# Patient Record
Sex: MALE | Race: BLACK OR AFRICAN AMERICAN | NOT HISPANIC OR LATINO
[De-identification: names, ages, dates, MRNs, and addresses within clinical notes are randomized per-mention and may not be internally consistent; named-entity substitution may affect disease eponyms.]

---

## 2021-04-05 ENCOUNTER — NON-APPOINTMENT (OUTPATIENT)
Age: 61
End: 2021-04-05

## 2022-01-30 ENCOUNTER — FORM ENCOUNTER (OUTPATIENT)
Age: 62
End: 2022-01-30

## 2022-02-14 VITALS
TEMPERATURE: 97.1 F | WEIGHT: 160 LBS | DIASTOLIC BLOOD PRESSURE: 87 MMHG | BODY MASS INDEX: 25.11 KG/M2 | SYSTOLIC BLOOD PRESSURE: 151 MMHG | HEART RATE: 94 BPM | HEIGHT: 67 IN

## 2022-03-02 ENCOUNTER — FORM ENCOUNTER (OUTPATIENT)
Age: 62
End: 2022-03-02

## 2022-03-02 VITALS
SYSTOLIC BLOOD PRESSURE: 158 MMHG | HEART RATE: 117 BPM | TEMPERATURE: 98.6 F | OXYGEN SATURATION: 90 % | DIASTOLIC BLOOD PRESSURE: 83 MMHG

## 2022-03-06 ENCOUNTER — FORM ENCOUNTER (OUTPATIENT)
Age: 62
End: 2022-03-06

## 2022-03-15 ENCOUNTER — OUTPATIENT (OUTPATIENT)
Dept: OUTPATIENT SERVICES | Facility: HOSPITAL | Age: 62
LOS: 1 days | End: 2022-03-15
Payer: COMMERCIAL

## 2022-03-15 VITALS — DIASTOLIC BLOOD PRESSURE: 90 MMHG | TEMPERATURE: 97.8 F | SYSTOLIC BLOOD PRESSURE: 153 MMHG | OXYGEN SATURATION: 97 %

## 2022-03-15 DIAGNOSIS — R35.0 FREQUENCY OF MICTURITION: ICD-10-CM

## 2022-03-15 PROCEDURE — 76775 US EXAM ABDO BACK WALL LIM: CPT

## 2022-03-22 DIAGNOSIS — D41.01 NEOPLASM OF UNCERTAIN BEHAVIOR OF RIGHT KIDNEY: ICD-10-CM

## 2022-04-04 ENCOUNTER — FORM ENCOUNTER (OUTPATIENT)
Age: 62
End: 2022-04-04

## 2022-04-05 VITALS
SYSTOLIC BLOOD PRESSURE: 132 MMHG | HEART RATE: 92 BPM | OXYGEN SATURATION: 96 % | HEIGHT: 66 IN | BODY MASS INDEX: 24.11 KG/M2 | WEIGHT: 150 LBS | TEMPERATURE: 97.6 F | DIASTOLIC BLOOD PRESSURE: 82 MMHG

## 2022-04-21 ENCOUNTER — FORM ENCOUNTER (OUTPATIENT)
Age: 62
End: 2022-04-21

## 2022-04-21 ENCOUNTER — OUTPATIENT (OUTPATIENT)
Dept: OUTPATIENT SERVICES | Facility: HOSPITAL | Age: 62
LOS: 1 days | End: 2022-04-21
Payer: COMMERCIAL

## 2022-04-21 VITALS
WEIGHT: 145.06 LBS | TEMPERATURE: 98 F | HEART RATE: 80 BPM | HEIGHT: 67 IN | OXYGEN SATURATION: 97 % | DIASTOLIC BLOOD PRESSURE: 80 MMHG | SYSTOLIC BLOOD PRESSURE: 141 MMHG | RESPIRATION RATE: 16 BRPM

## 2022-04-21 DIAGNOSIS — I10 ESSENTIAL (PRIMARY) HYPERTENSION: ICD-10-CM

## 2022-04-21 DIAGNOSIS — Z00.8 ENCOUNTER FOR OTHER GENERAL EXAMINATION: ICD-10-CM

## 2022-04-21 DIAGNOSIS — R06.00 DYSPNEA, UNSPECIFIED: ICD-10-CM

## 2022-04-21 DIAGNOSIS — D41.01 NEOPLASM OF UNCERTAIN BEHAVIOR OF RIGHT KIDNEY: ICD-10-CM

## 2022-04-21 DIAGNOSIS — Z98.890 OTHER SPECIFIED POSTPROCEDURAL STATES: Chronic | ICD-10-CM

## 2022-04-21 LAB
BLD GP AB SCN SERPL QL: NEGATIVE — SIGNIFICANT CHANGE UP
RH IG SCN BLD-IMP: NEGATIVE — SIGNIFICANT CHANGE UP

## 2022-04-21 PROCEDURE — 70450 CT HEAD/BRAIN W/O DYE: CPT | Mod: 26

## 2022-04-21 PROCEDURE — 70450 CT HEAD/BRAIN W/O DYE: CPT

## 2022-04-21 NOTE — H&P PST ADULT - NSICDXPASTMEDICALHX_GEN_ALL_CORE_FT
PAST MEDICAL HISTORY:  Hypertension     Neoplasm of uncertain behavior of kidney, right     Rheumatoid arthritis

## 2022-04-21 NOTE — H&P PST ADULT - HISTORY OF PRESENT ILLNESS
61 year old male with pre op dx of neoplasm of uncertain behavior of right kidney. patient scheduled for single port robotic assisted laparoscopic right partial nephrectomy , possible open surgery

## 2022-04-21 NOTE — H&P PST ADULT - PROBLEM SELECTOR PLAN 2
Patient instructed to take metoprolol and ramapril with a sip of water on the morning of procedure.    Abilio precautions Patient instructed to take metoprolol and ramipril with a sip of water on the morning of procedure.    HUBERT precautions

## 2022-04-21 NOTE — H&P PST ADULT - PROBLEM SELECTOR PLAN 3
Requesting cardiac eval due h/o shortness of breathe PST requesting cardiac eval due to shortness of breathe PST requesting cardiac eval due to shortness of breathe.     Requesting recent ECHO and Stress test PST requesting cardiac eval due to shortness of breathe. Patient already seen his cardiologist on 4/18/22    Requesting recent ECHO and Stress test

## 2022-04-21 NOTE — H&P PST ADULT - NEGATIVE OPHTHALMOLOGIC SYMPTOMS
no diplopia/no photophobia/no lacrimation L/no lacrimation R/no blurred vision L/no discharge L/no discharge R/no pain L/no pain R/no irritation L

## 2022-04-21 NOTE — H&P PST ADULT - ACTIVITY
patient complaint of shortness of breath while walking 1 to 2 blocks and when climbs 1 flight of stairs,\ patient complaint of shortness of breathe while walking 1 to 2 blocks and when climbs 1 flight of stairs,

## 2022-04-21 NOTE — H&P PST ADULT - PROBLEM SELECTOR PLAN 1
Patient tentatively scheduled for     Pre-op instructions provided. Pt given verbal and written instructions with teach back on chlorhexidine shampoo and pepcid. Pt verbalized understanding with return demonstration.    Pt has a scheduled preop COVID test. Patient tentatively scheduled for single port robotic assisted laparoscopic right partial nephrectomy , possible open surgery 4/29/22    Pre-op instructions provided. Pt given verbal and written instructions with teach back on chlorhexidine shampoo and Pepcid. Pt verbalized understanding with return demonstration.    Pt has a scheduled preop COVID test.

## 2022-04-21 NOTE — H&P PST ADULT - LAST CARDIAC ANGIOGRAM/IMAGING
Jan/2022 in [facility] due uncontrolled tachycardia Jan/2022 in [facility] ( patient stated " because my heart was beating fast"?)

## 2022-04-21 NOTE — H&P PST ADULT - LAB RESULTS AND INTERPRETATION
CBC , BMP copy in chart, Type and screen pending results CBC , CMP copy in chart, Type and screen pending results

## 2022-04-21 NOTE — H&P PST ADULT - NSICDXPASTSURGICALHX_GEN_ALL_CORE_FT
PAST SURGICAL HISTORY:  History of cardiac cath janusary 2022     PAST SURGICAL HISTORY:  History of cardiac cath jan 2022, at [facility]

## 2022-04-21 NOTE — H&P PST ADULT - GENITOURINARY COMMENTS
Preop dx of neoplasm of uncertain behavior of right kidney preop dx of neoplasm of uncertain behavior of right kidney

## 2022-04-28 ENCOUNTER — APPOINTMENT (OUTPATIENT)
Dept: NUCLEAR MEDICINE | Facility: CLINIC | Age: 62
End: 2022-04-28
Payer: COMMERCIAL

## 2022-04-28 ENCOUNTER — TRANSCRIPTION ENCOUNTER (OUTPATIENT)
Age: 62
End: 2022-04-28

## 2022-04-28 ENCOUNTER — RESULT REVIEW (OUTPATIENT)
Age: 62
End: 2022-04-28

## 2022-04-28 ENCOUNTER — OUTPATIENT (OUTPATIENT)
Dept: OUTPATIENT SERVICES | Facility: HOSPITAL | Age: 62
LOS: 1 days | End: 2022-04-28

## 2022-04-28 DIAGNOSIS — Z01.812 ENCOUNTER FOR PREPROCEDURAL LABORATORY EXAMINATION: ICD-10-CM

## 2022-04-28 DIAGNOSIS — Z98.890 OTHER SPECIFIED POSTPROCEDURAL STATES: Chronic | ICD-10-CM

## 2022-04-28 DIAGNOSIS — Z86.79 PERSONAL HISTORY OF OTHER DISEASES OF THE CIRCULATORY SYSTEM: ICD-10-CM

## 2022-04-28 DIAGNOSIS — Z78.9 OTHER SPECIFIED HEALTH STATUS: ICD-10-CM

## 2022-04-28 DIAGNOSIS — Z20.822 ENCOUNTER FOR PREPROCEDURAL LABORATORY EXAMINATION: ICD-10-CM

## 2022-04-28 DIAGNOSIS — Z00.00 ENCOUNTER FOR GENERAL ADULT MEDICAL EXAMINATION WITHOUT ABNORMAL FINDINGS: ICD-10-CM

## 2022-04-28 PROBLEM — M06.9 RHEUMATOID ARTHRITIS, UNSPECIFIED: Chronic | Status: ACTIVE | Noted: 2022-04-21

## 2022-04-28 PROBLEM — I10 ESSENTIAL (PRIMARY) HYPERTENSION: Chronic | Status: ACTIVE | Noted: 2022-04-21

## 2022-04-28 PROBLEM — D41.01 NEOPLASM OF UNCERTAIN BEHAVIOR OF RIGHT KIDNEY: Chronic | Status: ACTIVE | Noted: 2022-04-21

## 2022-04-28 PROCEDURE — 78815 PET IMAGE W/CT SKULL-THIGH: CPT | Mod: 26,PI

## 2022-04-28 NOTE — ASU PATIENT PROFILE, ADULT - FALL HARM RISK - RISK INTERVENTIONS

## 2022-04-29 ENCOUNTER — INPATIENT (INPATIENT)
Facility: HOSPITAL | Age: 62
LOS: 0 days | Discharge: HOME | End: 2022-04-30
Attending: UROLOGY | Admitting: UROLOGY
Payer: COMMERCIAL

## 2022-04-29 ENCOUNTER — RESULT REVIEW (OUTPATIENT)
Age: 62
End: 2022-04-29

## 2022-04-29 VITALS
RESPIRATION RATE: 16 BRPM | SYSTOLIC BLOOD PRESSURE: 138 MMHG | HEIGHT: 67 IN | WEIGHT: 145.06 LBS | HEART RATE: 102 BPM | OXYGEN SATURATION: 100 % | TEMPERATURE: 99 F | DIASTOLIC BLOOD PRESSURE: 76 MMHG

## 2022-04-29 DIAGNOSIS — D41.01 NEOPLASM OF UNCERTAIN BEHAVIOR OF RIGHT KIDNEY: ICD-10-CM

## 2022-04-29 DIAGNOSIS — Z98.890 OTHER SPECIFIED POSTPROCEDURAL STATES: Chronic | ICD-10-CM

## 2022-04-29 LAB — GAS PNL BLDA: SIGNIFICANT CHANGE UP

## 2022-04-29 PROCEDURE — 88307 TISSUE EXAM BY PATHOLOGIST: CPT | Mod: 26

## 2022-04-29 PROCEDURE — 99223 1ST HOSP IP/OBS HIGH 75: CPT

## 2022-04-29 PROCEDURE — 76998 US GUIDE INTRAOP: CPT | Mod: 26

## 2022-04-29 PROCEDURE — 88313 SPECIAL STAINS GROUP 2: CPT | Mod: 26

## 2022-04-29 PROCEDURE — 50543 LAPARO PARTIAL NEPHRECTOMY: CPT | Mod: RT

## 2022-04-29 DEVICE — LIGATING CLIPS WECK HEMOLOK POLYMER LARGE (PURPLE) 6: Type: IMPLANTABLE DEVICE | Status: FUNCTIONAL

## 2022-04-29 RX ORDER — POLYETHYLENE GLYCOL 3350 17 G/17G
17 POWDER, FOR SOLUTION ORAL DAILY
Refills: 0 | Status: DISCONTINUED | OUTPATIENT
Start: 2022-04-29 | End: 2022-04-30

## 2022-04-29 RX ORDER — SODIUM CHLORIDE 9 MG/ML
1000 INJECTION, SOLUTION INTRAVENOUS
Refills: 0 | Status: DISCONTINUED | OUTPATIENT
Start: 2022-04-29 | End: 2022-04-30

## 2022-04-29 RX ORDER — AMLODIPINE BESYLATE 2.5 MG/1
1 TABLET ORAL
Qty: 0 | Refills: 0 | DISCHARGE

## 2022-04-29 RX ORDER — HEPARIN SODIUM 5000 [USP'U]/ML
5000 INJECTION INTRAVENOUS; SUBCUTANEOUS EVERY 8 HOURS
Refills: 0 | Status: DISCONTINUED | OUTPATIENT
Start: 2022-04-29 | End: 2022-04-30

## 2022-04-29 RX ORDER — SODIUM CHLORIDE 9 MG/ML
1000 INJECTION, SOLUTION INTRAVENOUS
Refills: 0 | Status: DISCONTINUED | OUTPATIENT
Start: 2022-04-29 | End: 2022-04-29

## 2022-04-29 RX ORDER — METOPROLOL TARTRATE 50 MG
25 TABLET ORAL DAILY
Refills: 0 | Status: DISCONTINUED | OUTPATIENT
Start: 2022-04-29 | End: 2022-04-29

## 2022-04-29 RX ORDER — METOPROLOL TARTRATE 50 MG
25 TABLET ORAL DAILY
Refills: 0 | Status: DISCONTINUED | OUTPATIENT
Start: 2022-04-29 | End: 2022-04-30

## 2022-04-29 RX ORDER — OXYCODONE HYDROCHLORIDE 5 MG/1
5 TABLET ORAL EVERY 6 HOURS
Refills: 0 | Status: DISCONTINUED | OUTPATIENT
Start: 2022-04-29 | End: 2022-04-30

## 2022-04-29 RX ORDER — ONDANSETRON 8 MG/1
4 TABLET, FILM COATED ORAL EVERY 6 HOURS
Refills: 0 | Status: DISCONTINUED | OUTPATIENT
Start: 2022-04-29 | End: 2022-04-30

## 2022-04-29 RX ORDER — KETOROLAC TROMETHAMINE 30 MG/ML
15 SYRINGE (ML) INJECTION EVERY 6 HOURS
Refills: 0 | Status: DISCONTINUED | OUTPATIENT
Start: 2022-04-29 | End: 2022-04-30

## 2022-04-29 RX ORDER — LISINOPRIL 2.5 MG/1
20 TABLET ORAL DAILY
Refills: 0 | Status: DISCONTINUED | OUTPATIENT
Start: 2022-04-29 | End: 2022-04-30

## 2022-04-29 RX ORDER — CYCLOBENZAPRINE HYDROCHLORIDE 10 MG/1
1 TABLET, FILM COATED ORAL
Qty: 0 | Refills: 0 | DISCHARGE

## 2022-04-29 RX ORDER — SENNA PLUS 8.6 MG/1
2 TABLET ORAL AT BEDTIME
Refills: 0 | Status: DISCONTINUED | OUTPATIENT
Start: 2022-04-29 | End: 2022-04-30

## 2022-04-29 RX ORDER — METOPROLOL TARTRATE 50 MG
1 TABLET ORAL
Qty: 0 | Refills: 0 | DISCHARGE

## 2022-04-29 RX ORDER — ACETAMINOPHEN 500 MG
650 TABLET ORAL EVERY 6 HOURS
Refills: 0 | Status: DISCONTINUED | OUTPATIENT
Start: 2022-04-29 | End: 2022-04-30

## 2022-04-29 RX ADMIN — Medication 15 MILLIGRAM(S): at 23:40

## 2022-04-29 RX ADMIN — OXYCODONE HYDROCHLORIDE 5 MILLIGRAM(S): 5 TABLET ORAL at 13:25

## 2022-04-29 RX ADMIN — Medication 650 MILLIGRAM(S): at 21:42

## 2022-04-29 RX ADMIN — HEPARIN SODIUM 5000 UNIT(S): 5000 INJECTION INTRAVENOUS; SUBCUTANEOUS at 21:43

## 2022-04-29 RX ADMIN — Medication 650 MILLIGRAM(S): at 16:31

## 2022-04-29 RX ADMIN — SODIUM CHLORIDE 125 MILLILITER(S): 9 INJECTION, SOLUTION INTRAVENOUS at 23:40

## 2022-04-29 RX ADMIN — Medication 1 APPLICATION(S): at 18:06

## 2022-04-29 RX ADMIN — HEPARIN SODIUM 5000 UNIT(S): 5000 INJECTION INTRAVENOUS; SUBCUTANEOUS at 15:05

## 2022-04-29 RX ADMIN — Medication 15 MILLIGRAM(S): at 18:05

## 2022-04-29 RX ADMIN — SODIUM CHLORIDE 125 MILLILITER(S): 9 INJECTION, SOLUTION INTRAVENOUS at 15:05

## 2022-04-29 NOTE — CHART NOTE - NSCHARTNOTEFT_GEN_A_CORE
Post op Check: 62 yo M POD #0 SP RAL right partial nephrectomy    Pt seen and examined without complaints. Pain is controlled. Denies SOB/CP/N/V.     Vital Signs Last 24 Hrs  T(C): 36.6 (29 Apr 2022 14:42), Max: 37.1 (29 Apr 2022 07:08)  T(F): 97.8 (29 Apr 2022 14:42), Max: 98.8 (29 Apr 2022 07:08)  HR: 86 (29 Apr 2022 14:42) (63 - 102)  BP: 138/89 (29 Apr 2022 14:42) (134/83 - 149/84)  BP(mean): 95 (29 Apr 2022 14:00) (94 - 105)  RR: 19 (29 Apr 2022 14:42) (14 - 24)  SpO2: 100% (29 Apr 2022 14:42) (96% - 100%)    I&O's Summary  Humphreys: 225 + 300 in bag yellow  29 Apr 2022 07:01  -  29 Apr 2022 16:07  --------------------------------------------------------  IN: 400 mL / OUT: 225 mL / NET: 175 mL        Physical Exam  Gen: NAD  Pulm: No respiratory distress, clear to auscultation  CV: Reg  Abd: Dressings c/d/i mildly distended, nontender  : Humphreys secured  Venodynes: In place                  Plan:   IVF: LR @ 125  Diet: CLD, may advance in AM  Labs: None  Abx: None  Strict I&O's  Analgesia and antiemetics as needed  F/u Medicine  DVT prophylaxis/OOB  Incentive spirometry

## 2022-04-29 NOTE — CONSULT NOTE ADULT - SUBJECTIVE AND OBJECTIVE BOX
Erik Shannon MD ([place])  Division of Hospital Medicine  Pager [phone]    HPI      ROS:      PAST MEDICAL & SURGICAL HISTORY:  Hypertension    Rheumatoid arthritis    Neoplasm of uncertain behavior of kidney, right    History of cardiac cath  jan 2022, at [facility] no stent          Allergies:   No Known Allergies        Meds:        SHx:      FHx:      Physical Exam:  Vital Signs Last 24 Hrs  T(C): 36.6 (29 Apr 2022 14:42), Max: 37.1 (29 Apr 2022 07:08)  T(F): 97.8 (29 Apr 2022 14:42), Max: 98.8 (29 Apr 2022 07:08)  HR: 86 (29 Apr 2022 14:42) (63 - 102)  BP: 138/89 (29 Apr 2022 14:42) (134/83 - 149/84)  BP(mean): 95 (29 Apr 2022 14:00) (94 - 105)  RR: 19 (29 Apr 2022 14:42) (14 - 24)  SpO2: 100% (29 Apr 2022 14:42) (96% - 100%)          Labs:                    Imaging: Erik WOODSON (Jb) MD [facility]  Division of Hospital Medicine  Pager [phone]    HPI  61M with PMH of HTN, OA, chronic SOB on intermittent home O2 found to have right kidney mass. Presented to hospital today for R partial nephrectomy. Admitted for further monitoring. Pt currently reports mild abdominal pain, mainly at incisional sites. Humphreys in place. Family present at bedside. No other significant acute complaints at this time. Family and pt endorsed concerns re: other issue, namely chronic SOB. Pt had cath in Jan 2022 showing clean coronaries. Pt also reportedly seen by pulmonologist and Rx-ed home O2, which he uses intermittently only in the mornings. He was unable to elaborate whether or not he had PFTs. He reports compliance w/ his BP meds - HTN otherwise well controlled. Pt also reports intermittent hand swelling w/ pain and also cold sensation. He noted nonspecific lesion on the tip of his index finger on the right hand. No drainage/bleeding. Area is painful/tender. He was reportedly started on steroid cream, which helped. Otherwise, no f/c, NV, SOB, CP, diarrhea.     ROS:  All other systems negative except as noted above.     PAST MEDICAL & SURGICAL HISTORY:  Hypertension    Rheumatoid arthritis    Neoplasm of uncertain behavior of kidney, right    History of cardiac cath  jan 2022, at [facility] no stent          Allergies:   No Known Allergies        Meds:  MEDICATIONS  (STANDING):  acetaminophen     Tablet .. 650 milliGRAM(s) Oral every 6 hours  clobetasol 0.05% Cream 1 Application(s) Topical two times a day  heparin   Injectable 5000 Unit(s) SubCutaneous every 8 hours  ketorolac   Injectable 15 milliGRAM(s) IV Push every 6 hours  lactated ringers. 1000 milliLiter(s) (125 mL/Hr) IV Continuous <Continuous>  lisinopril 20 milliGRAM(s) Oral daily  metoprolol succinate ER 25 milliGRAM(s) Oral daily  polyethylene glycol 3350 17 Gram(s) Oral daily    MEDICATIONS  (PRN):  ondansetron Injectable 4 milliGRAM(s) IV Push every 6 hours PRN Nausea and/or Vomiting  oxyCODONE    IR 5 milliGRAM(s) Oral every 6 hours PRN Severe Pain (7 - 10)  senna 2 Tablet(s) Oral at bedtime PRN Constipation      Home Medications:  clobetasol topical: Apply topically to affected area 2 times a day (29 Apr 2022 13:07)  meloxicam 7.5 mg oral tablet: 1 tab(s) orally once a day, As Needed LD 4/22/2022 (29 Apr 2022 13:07)  metoprolol tartrate 25 mg oral tablet: 1 tab(s) orally once a day (29 Apr 2022 13:07)  ramipril 5 mg oral capsule: 1 cap(s) orally once a day (29 Apr 2022 13:07)        SHx:  No tobacco, ETOH, drug use.  Works as a doorman.    FHx:  Sister had CKD.  Parents had HTN/DM.      Physical Exam:  Vital Signs Last 24 Hrs  T(C): 36.6 (29 Apr 2022 14:42), Max: 37.1 (29 Apr 2022 07:08)  T(F): 97.8 (29 Apr 2022 14:42), Max: 98.8 (29 Apr 2022 07:08)  HR: 86 (29 Apr 2022 14:42) (63 - 102)  BP: 138/89 (29 Apr 2022 14:42) (134/83 - 149/84)  BP(mean): 95 (29 Apr 2022 14:00) (94 - 105)  RR: 19 (29 Apr 2022 14:42) (14 - 24)  SpO2: 100% (29 Apr 2022 14:42) (96% - 100%)    Gen- In bed, comfortable, NAD  Eyes- EOMI, PERRLA, nonicteric.  EMNT- Fair dentition. MMM. No tonsilar exudates. No posterior pharynx erythema.  Neck- Supple. No masses. No tracheal deviation.  Resp- CTAB, good effort. No r/r/w. No accessory muscle use.  CVS- RRR, S1S2, no g/r/m. No LE edema.  GI- Soft abd, NT, ND, +BSx4. No HSM.  MSK- No C/C. ROM intact. No crepitus.  Neuro- CN II-XII intact. Speech fluent/face symmetric. Sensation intact.  Skin- No rashes/ulcers. Warm/moist.  Psych- AAOx3. Appropriate mood/affect.  - Humphreys clear vishal urine.    Labs:  No labs at this time.        Imaging: none.

## 2022-04-29 NOTE — CONSULT NOTE ADULT - PROBLEM SELECTOR RECOMMENDATION 3
Nonspecific complaint. Chronic in nature. Brought up by family and inquired on other potential workup.  Explained to family that this seems to be a known problem and pt has already had mutiple diagnostics done - including eval by cards/pulm.  At this time, defer for further evaluation.  -Will check TSH given nonspecific sx of feeling chills/weakness.  -Noted to have home O2  - would check amb sats in AM - he is satting well on RA at rest.     DVT ppx-  HSQ      Communication  Spoke w/ family at bedside.  D/w urology. Pls call with any questions.

## 2022-04-29 NOTE — PATIENT PROFILE ADULT - FALL HARM RISK - HARM RISK INTERVENTIONS

## 2022-04-29 NOTE — PACU DISCHARGE NOTE - COMMENTS
PT SEEN AND EXAMINED 2/2 TO CONCERNS FOR CHANGE IN NEURO EXAM.  PT SEEN AND EXAMINED. NO FOCAL NEURO DEFICIT. A AND O X4.   SURGICAL TEAM AWARE. D/C TO FLOOR.

## 2022-04-29 NOTE — CONSULT NOTE ADULT - ASSESSMENT
61M with PMH of HTN, OA, chronic SOB on intermittent home O2 found to have right kidney mass. Presented to hospital today for R partial nephrectomy. OR course uneventful. Admitted for monitoring. Medicine c/s for post-op med management.

## 2022-04-30 ENCOUNTER — INPATIENT (INPATIENT)
Facility: HOSPITAL | Age: 62
LOS: 2 days | Discharge: HOME | End: 2022-05-03
Attending: UROLOGY | Admitting: UROLOGY
Payer: COMMERCIAL

## 2022-04-30 ENCOUNTER — TRANSCRIPTION ENCOUNTER (OUTPATIENT)
Age: 62
End: 2022-04-30

## 2022-04-30 VITALS
RESPIRATION RATE: 22 BRPM | DIASTOLIC BLOOD PRESSURE: 95 MMHG | OXYGEN SATURATION: 97 % | HEIGHT: 67 IN | TEMPERATURE: 100 F | HEART RATE: 112 BPM | SYSTOLIC BLOOD PRESSURE: 154 MMHG

## 2022-04-30 VITALS
DIASTOLIC BLOOD PRESSURE: 72 MMHG | HEART RATE: 92 BPM | TEMPERATURE: 98 F | SYSTOLIC BLOOD PRESSURE: 124 MMHG | RESPIRATION RATE: 17 BRPM | OXYGEN SATURATION: 94 %

## 2022-04-30 DIAGNOSIS — Z98.890 OTHER SPECIFIED POSTPROCEDURAL STATES: Chronic | ICD-10-CM

## 2022-04-30 LAB — TSH SERPL-MCNC: 4.24 UIU/ML — HIGH (ref 0.27–4.2)

## 2022-04-30 PROCEDURE — 93010 ELECTROCARDIOGRAM REPORT: CPT

## 2022-04-30 PROCEDURE — 99285 EMERGENCY DEPT VISIT HI MDM: CPT | Mod: 25

## 2022-04-30 PROCEDURE — 99232 SBSQ HOSP IP/OBS MODERATE 35: CPT

## 2022-04-30 RX ORDER — OXYCODONE HYDROCHLORIDE 5 MG/1
1 TABLET ORAL
Qty: 8 | Refills: 0
Start: 2022-04-30

## 2022-04-30 RX ORDER — SODIUM CHLORIDE 9 MG/ML
1000 INJECTION, SOLUTION INTRAVENOUS
Refills: 0 | Status: DISCONTINUED | OUTPATIENT
Start: 2022-04-30 | End: 2022-04-30

## 2022-04-30 RX ADMIN — Medication 15 MILLIGRAM(S): at 05:46

## 2022-04-30 RX ADMIN — Medication 650 MILLIGRAM(S): at 12:34

## 2022-04-30 RX ADMIN — Medication 10 MILLIGRAM(S): at 05:47

## 2022-04-30 RX ADMIN — Medication 1 APPLICATION(S): at 05:48

## 2022-04-30 RX ADMIN — Medication 25 MILLIGRAM(S): at 05:46

## 2022-04-30 RX ADMIN — SODIUM CHLORIDE 75 MILLILITER(S): 9 INJECTION, SOLUTION INTRAVENOUS at 07:37

## 2022-04-30 RX ADMIN — HEPARIN SODIUM 5000 UNIT(S): 5000 INJECTION INTRAVENOUS; SUBCUTANEOUS at 05:46

## 2022-04-30 RX ADMIN — LISINOPRIL 20 MILLIGRAM(S): 2.5 TABLET ORAL at 05:46

## 2022-04-30 RX ADMIN — OXYCODONE HYDROCHLORIDE 5 MILLIGRAM(S): 5 TABLET ORAL at 05:49

## 2022-04-30 RX ADMIN — POLYETHYLENE GLYCOL 3350 17 GRAM(S): 17 POWDER, FOR SOLUTION ORAL at 12:34

## 2022-04-30 RX ADMIN — Medication 15 MILLIGRAM(S): at 12:34

## 2022-04-30 RX ADMIN — OXYCODONE HYDROCHLORIDE 5 MILLIGRAM(S): 5 TABLET ORAL at 06:45

## 2022-04-30 RX ADMIN — Medication 100 MILLIGRAM(S): at 12:45

## 2022-04-30 NOTE — CONSULT NOTE ADULT - SUBJECTIVE AND OBJECTIVE BOX
CHIEF COMPLAINT: sob, s/p surgery    HISTORY OF PRESENT ILLNESS:  61M with PMH of HTN, OA, chronic SOB on intermittent home O2 found to have right kidney mass. Presented to hospital today for R partial nephrectomy. Admitted for further monitoring. Pt currently reports mild abdominal pain, mainly at incisional sites. Humphreys in place. Family present at bedside. No other significant acute complaints at this time. Family and pt endorsed concerns re: other issue, namely chronic SOB. Pt had cath in Jan 2022 showing clean coronaries. Pt also reportedly seen by pulmonologist and Rx-ed home O2, which he uses intermittently only in the mornings. He was unable to elaborate whether or not he had PFTs. He reports compliance w/ his BP meds - HTN otherwise well controlled. Pt also reports intermittent hand swelling w/ pain and also cold sensation. He noted nonspecific lesion on the tip of his index finger on the right hand. No drainage/bleeding. Area is painful/tender. He was reportedly started on steroid cream, which helped. Otherwise, no f/c, NV, SOB, CP, diarrhea.       Allergies    No Known Allergies    Intolerances    	    MEDICATIONS:  heparin   Injectable 5000 Unit(s) SubCutaneous every 8 hours  lisinopril 20 milliGRAM(s) Oral daily  metoprolol succinate ER 25 milliGRAM(s) Oral daily        acetaminophen     Tablet .. 650 milliGRAM(s) Oral every 6 hours  ketorolac   Injectable 15 milliGRAM(s) IV Push every 6 hours  ondansetron Injectable 4 milliGRAM(s) IV Push every 6 hours PRN  oxyCODONE    IR 5 milliGRAM(s) Oral every 6 hours PRN    polyethylene glycol 3350 17 Gram(s) Oral daily  senna 2 Tablet(s) Oral at bedtime PRN      clobetasol 0.05% Cream 1 Application(s) Topical two times a day  dextrose 5% + sodium chloride 0.45%. 1000 milliLiter(s) IV Continuous <Continuous>      PAST MEDICAL & SURGICAL HISTORY:  Hypertension    Rheumatoid arthritis    Neoplasm of uncertain behavior of kidney, right    History of cardiac cath  jan 2022, at [facility] no stent        FAMILY HISTORY:  FH: diabetes mellitus (Mother)        SOCIAL HISTORY:    non smoker. indep in adl    REVIEW OF SYSTEMS:  See HPI, otherwise complete 10 point review of systems negative    [ ] All others negative	      PHYSICAL EXAM:  T(C): 36.8 (04-30-22 @ 09:47), Max: 37.3 (04-29-22 @ 21:50)  HR: 92 (04-30-22 @ 09:47) (63 - 99)  BP: 124/72 (04-30-22 @ 09:47) (118/72 - 149/84)  RR: 17 (04-30-22 @ 09:47) (14 - 24)  SpO2: 94% (04-30-22 @ 09:47) (94% - 100%)  Wt(kg): --  I&O's Summary    29 Apr 2022 07:01  -  30 Apr 2022 07:00  --------------------------------------------------------  IN: 1100 mL / OUT: 2425 mL / NET: -1325 mL    30 Apr 2022 07:01  -  30 Apr 2022 10:33  --------------------------------------------------------  IN: 0 mL / OUT: 100 mL / NET: -100 mL        Appearance: No Acute Distress	  HEENT:  Normal oral mucosa, PERRL, EOMI	  Cardiovascular: Normal S1 S2, No JVD, No murmurs/rubs/gallops  Respiratory: Lungs clear to auscultation bilaterally  Gastrointestinal:  Soft, Non-tender, + BS	  Skin: No rashes, No ecchymoses, No cyanosis	  Neurologic: Non-focal  Extremities: No clubbing, cyanosis or edema  Vascular: Peripheral pulses palpable 2+ bilaterally  Psychiatry: A & O x 3, Mood & affect appropriate    Laboratory Data:	 	              proBNP:   Lipid Profile:   HgA1c:   TSH: Thyroid Stimulating Hormone, Serum: 4.24 uIU/mL (04-30 @ 06:48)        CARDIAC MARKERS:            Interpretation of Telemetry: 	    ECG:  	  RADIOLOGY:  OTHER: 	    PREVIOUS DIAGNOSTIC TESTING:    [ ] Echocardiogram:  [ ] Catheterization:  [ ] Stress Test:  	    Assessment:  61M with PMH of HTN, OA, chronic SOB on intermittent home O2 found to have right kidney mass now s/p R partial nephrectomy.    Recs:  cardiac stable  no postop cardiac events noted  chronic dyspnea with extensive op cardiac and pulmonary workup thats been unrevealing. no signs of chf or ischemic at present  care per urology  dvt ppx  will follow  Advanced care planning forms were discussed. Code status including forceful chest compressions, defibrillation and intubation were discussed. The risks benefits and alternatives to pertinent cardiac procedures and interventions were discussed in detail and all questions were answered. Duration: 15 minutes.        Greater than 90 minutes spent on total encounter; more than 50% of the visit was spent counseling and/or coordinating care by the attending physician.   	  Juarez Carver MD   Cardiovascular Diseases  [phone]

## 2022-04-30 NOTE — PROGRESS NOTE ADULT - ASSESSMENT
62 yo male s/p SP RAL R partial nephrectomy.    4/30: No events overnight, pain controlled, tolerating liquids    - AM labs reviewed  - No antibiotics  - Advance diet to regular  - TOV pending  - Likely discharge home today 60 yo male s/p SP RAL R partial nephrectomy.    4/30: No events overnight, pain controlled, tolerating liquids    - AM labs reviewed  - No antibiotics  - Advance diet to regular  - TOV pending  - OOB, DVT ppx  - F/u medicine  - Likely discharge home today

## 2022-04-30 NOTE — DISCHARGE NOTE PROVIDER - CARE PROVIDER_API CALL
Alcides Gardner)  Urology  [address] [place] [address], [place] [address]  Phone: [phone]  Fax: [phone]  Established Patient  Follow Up Time:

## 2022-04-30 NOTE — PROGRESS NOTE ADULT - SUBJECTIVE AND OBJECTIVE BOX
[facility] Division of Hospital Medicine  Erik EllisHarrison) MD [facility]  Pager [phone]    SUBJECTIVE:  Chief complaint: Renal mass.    Pt seen and evaluated at bedside this AM. No o/n events. Denies any SOB/Cp/NV. Feels some bloating. Passing gas, had BM. Voiding w/o issues. No new complaints.      ROS: All systems negative except as noted.      Vital Signs Last 24 Hrs  T(C): 36.8 (30 Apr 2022 09:47), Max: 37.3 (29 Apr 2022 21:50)  T(F): 98.3 (30 Apr 2022 09:47), Max: 99.1 (29 Apr 2022 21:50)  HR: 92 (30 Apr 2022 09:47) (74 - 99)  BP: 124/72 (30 Apr 2022 09:47) (118/72 - 147/78)  BP(mean): 95 (29 Apr 2022 14:00) (95 - 104)  RR: 17 (30 Apr 2022 09:47) (14 - 19)  SpO2: 94% (30 Apr 2022 09:47) (94% - 100%)      PHYSICAL EXAM:  Gen- In bed, comfortable, NAD  Resp- CTAB, good effort. No r/r/w. No accessory muscle use.  CVS- RRR, S1S2, no g/r/m. No LE edema.  GI- Soft abd, NT, ND, +BSx4. No HSM.  MSK- No C/C. ROM intact. No crepitus.  Neuro- CN II-XII intact. Speech fluent/face symmetric. Sensation intact.  Psych- AAOx3. Appropriate mood/affect.      MEDICATION:  MEDICATIONS  (STANDING):  acetaminophen     Tablet .. 650 milliGRAM(s) Oral every 6 hours  clobetasol 0.05% Cream 1 Application(s) Topical two times a day  dextrose 5% + sodium chloride 0.45%. 1000 milliLiter(s) (75 mL/Hr) IV Continuous <Continuous>  heparin   Injectable 5000 Unit(s) SubCutaneous every 8 hours  ketorolac   Injectable 15 milliGRAM(s) IV Push every 6 hours  lisinopril 20 milliGRAM(s) Oral daily  metoprolol succinate ER 25 milliGRAM(s) Oral daily  polyethylene glycol 3350 17 Gram(s) Oral daily    MEDICATIONS  (PRN):  benzonatate 100 milliGRAM(s) Oral three times a day PRN Cough  ondansetron Injectable 4 milliGRAM(s) IV Push every 6 hours PRN Nausea and/or Vomiting  oxyCODONE    IR 5 milliGRAM(s) Oral every 6 hours PRN Severe Pain (7 - 10)  senna 2 Tablet(s) Oral at bedtime PRN Constipation            LABORATORY:  TSH 4.24                  ABG - ( 29 Apr 2022 08:52 )  pH, Arterial: 7.40  pH, Blood: x     /  pCO2: 36    /  pO2: 216   / HCO3: 22    / Base Excess: -2.2  /  SaO2: 99.3

## 2022-04-30 NOTE — DISCHARGE NOTE NURSING/CASE MANAGEMENT/SOCIAL WORK - PATIENT PORTAL LINK FT
You can access the FollowMyHealth Patient Portal offered by [facility] by registering at the following website: [email or web address]. By joining [facility]’s FollowMyHealth portal, you will also be able to view your health information using other applications (apps) compatible with our system.

## 2022-04-30 NOTE — PROGRESS NOTE ADULT - PROBLEM SELECTOR PLAN 3
Nonspecific complaint. Chronic in nature. Brought up by family and inquired on other potential workup.  Explained to family that this seems to be a known problem and pt has already had multiple diagnostics done - including eval by cards/pulm.  At this time, defer for further evaluation.  -Cards recs appreciated.   -TSH just above upper limit of normal. Advised to have repeat TFTs as OP after acute hospitalization.    DVT ppx-  HSQ    Dispo- As per urology. Pls call with any questions.

## 2022-04-30 NOTE — PROGRESS NOTE ADULT - SUBJECTIVE AND OBJECTIVE BOX
Subjective  No acute events overnight. No complaints this morning. Denies abdominal pain. Tolerating liquids without nausea. No flatus yet.    Objective    Vital signs  T(F): , Max: 99.1 (04-29-22 @ 21:50)  HR: 99 (04-30-22 @ 05:48)  BP: 139/76 (04-30-22 @ 05:48)  SpO2: 95% (04-30-22 @ 05:48)  Wt(kg): --    Output     OUT:    Indwelling Catheter - Urethral (mL): 2425 mL  Total OUT: 2425 mL    Total NET: -2425 mL          Gen: NAD  Abd: soft, nontender, mildly distended, incisions c/d/i  : Humphreys secured in place, draining clear yellow urine

## 2022-04-30 NOTE — DISCHARGE NOTE NURSING/CASE MANAGEMENT/SOCIAL WORK - NSDCPEFALRISK_GEN_ALL_CORE
For information on Fall & Injury Prevention, visit: [email or web address] OR  [email or web address] OR  https://www.cdc.gov/steadi/patient.html

## 2022-04-30 NOTE — DISCHARGE NOTE PROVIDER - NSDCCPCAREPLAN_GEN_ALL_CORE_FT
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 Follow up with your primary care provider for continued blood pressure monitoring.

## 2022-04-30 NOTE — DISCHARGE NOTE PROVIDER - NSDCACTIVITY_GEN_ALL_CORE
Return to Work/School allowed/Showering allowed/Walking - Indoors allowed/No heavy lifting/straining/Walking - Outdoors allowed

## 2022-04-30 NOTE — DISCHARGE NOTE PROVIDER - NSDCFUADDINST_GEN_ALL_CORE_FT
Keep well hydrated. No heavy lifting (greater than 10 pounds) or straining for 4 to 6 weeks. You may shower - just pat the white strips dry, which will fall off in a few weeks. Change dressing at drain site daily or as needed until dry. Do not drive when taking pain medication.  Dr. Gardner's office will call you to schedule a follow up appointment.  Call the office if you have fever greater than 101, difficulty urinating, your urine becomes bloody, pain not relieved with pain medication, nausea/vomiting.

## 2022-04-30 NOTE — DISCHARGE NOTE PROVIDER - HOSPITAL COURSE
62 yo male s/p SP RAL R partial nephrectomy.    4/30: No events overnight, pain controlled, tolerating liquids    At the time of discharge, the patient was hemodynamically stable, was tolerating PO diet, was voiding urine, was ambulating, and was comfortable with adequate pain control. The patient was instructed to follow up with Dr. Gardner after discharge from the hospital. The patient felt comfortable with discharge. The patient was discharged to home.

## 2022-04-30 NOTE — ED ADULT TRIAGE NOTE - CHIEF COMPLAINT QUOTE
Pt st" I had mass removed from my Kidney yesterday they kept me overnight just got home today around 4pm checked my temperature it was 101. I called the surgeon told to come back to ER. ...I also have a cough. I took tylenol around 5pm." Pt st" I had mass removed from my Kidney yesterday they kept me overnight just got home today around 4pm checked my temperature it was 101. I called the surgeon told to come back to ER. ...I also have a cough. I took tylenol around 5pm." Pt appears short of breath yet denies feeling short of breath.

## 2022-04-30 NOTE — DISCHARGE NOTE PROVIDER - NSDCMRMEDTOKEN_GEN_ALL_CORE_FT
clobetasol topical: Apply topically to affected area 2 times a day  meloxicam 7.5 mg oral tablet: 1 tab(s) orally once a day, As Needed LD 4/22/2022  metoprolol tartrate 25 mg oral tablet: 1 tab(s) orally once a day  oxyCODONE 5 mg oral tablet: 1 tab(s) orally every 6 hours, As needed, Severe Pain (7 - 10) MDD:4  ramipril 5 mg oral capsule: 1 cap(s) orally once a day

## 2022-05-01 DIAGNOSIS — R50.9 FEVER, UNSPECIFIED: ICD-10-CM

## 2022-05-01 DIAGNOSIS — I10 ESSENTIAL (PRIMARY) HYPERTENSION: ICD-10-CM

## 2022-05-01 DIAGNOSIS — D41.01 NEOPLASM OF UNCERTAIN BEHAVIOR OF RIGHT KIDNEY: ICD-10-CM

## 2022-05-01 DIAGNOSIS — R06.00 DYSPNEA, UNSPECIFIED: ICD-10-CM

## 2022-05-01 DIAGNOSIS — A41.9 SEPSIS, UNSPECIFIED ORGANISM: ICD-10-CM

## 2022-05-01 LAB
ALBUMIN SERPL ELPH-MCNC: 3.9 G/DL — SIGNIFICANT CHANGE UP (ref 3.3–5)
ALP SERPL-CCNC: 79 U/L — SIGNIFICANT CHANGE UP (ref 40–120)
ALT FLD-CCNC: 28 U/L — SIGNIFICANT CHANGE UP (ref 4–41)
ANION GAP SERPL CALC-SCNC: 12 MMOL/L — SIGNIFICANT CHANGE UP (ref 7–14)
ANION GAP SERPL CALC-SCNC: 13 MMOL/L — SIGNIFICANT CHANGE UP (ref 7–14)
ANION GAP SERPL CALC-SCNC: 13 MMOL/L — SIGNIFICANT CHANGE UP (ref 7–14)
APPEARANCE UR: CLEAR — SIGNIFICANT CHANGE UP
APTT BLD: 31.8 SEC — SIGNIFICANT CHANGE UP (ref 27–36.3)
AST SERPL-CCNC: 72 U/L — HIGH (ref 4–40)
B PERT DNA SPEC QL NAA+PROBE: SIGNIFICANT CHANGE UP
B PERT+PARAPERT DNA PNL SPEC NAA+PROBE: SIGNIFICANT CHANGE UP
BACTERIA # UR AUTO: NEGATIVE — SIGNIFICANT CHANGE UP
BASE EXCESS BLDV CALC-SCNC: -0.3 MMOL/L — SIGNIFICANT CHANGE UP (ref -2–3)
BASE EXCESS BLDV CALC-SCNC: 1 MMOL/L — SIGNIFICANT CHANGE UP (ref -2–3)
BASOPHILS # BLD AUTO: 0.04 K/UL — SIGNIFICANT CHANGE UP (ref 0–0.2)
BASOPHILS # BLD AUTO: 0.04 K/UL — SIGNIFICANT CHANGE UP (ref 0–0.2)
BASOPHILS NFR BLD AUTO: 0.3 % — SIGNIFICANT CHANGE UP (ref 0–2)
BASOPHILS NFR BLD AUTO: 0.3 % — SIGNIFICANT CHANGE UP (ref 0–2)
BILIRUB SERPL-MCNC: 0.6 MG/DL — SIGNIFICANT CHANGE UP (ref 0.2–1.2)
BILIRUB UR-MCNC: NEGATIVE — SIGNIFICANT CHANGE UP
BLD GP AB SCN SERPL QL: NEGATIVE — SIGNIFICANT CHANGE UP
BLOOD GAS VENOUS COMPREHENSIVE RESULT: SIGNIFICANT CHANGE UP
BLOOD GAS VENOUS COMPREHENSIVE RESULT: SIGNIFICANT CHANGE UP
BORDETELLA PARAPERTUSSIS (RAPRVP): SIGNIFICANT CHANGE UP
BUN SERPL-MCNC: 12 MG/DL — SIGNIFICANT CHANGE UP (ref 7–23)
BUN SERPL-MCNC: 13 MG/DL — SIGNIFICANT CHANGE UP (ref 7–23)
BUN SERPL-MCNC: 15 MG/DL — SIGNIFICANT CHANGE UP (ref 7–23)
C PNEUM DNA SPEC QL NAA+PROBE: SIGNIFICANT CHANGE UP
CALCIUM SERPL-MCNC: 8.9 MG/DL — SIGNIFICANT CHANGE UP (ref 8.4–10.5)
CALCIUM SERPL-MCNC: 9.2 MG/DL — SIGNIFICANT CHANGE UP (ref 8.4–10.5)
CALCIUM SERPL-MCNC: 9.4 MG/DL — SIGNIFICANT CHANGE UP (ref 8.4–10.5)
CHLORIDE BLDV-SCNC: 103 MMOL/L — SIGNIFICANT CHANGE UP (ref 96–108)
CHLORIDE SERPL-SCNC: 101 MMOL/L — SIGNIFICANT CHANGE UP (ref 98–107)
CHLORIDE SERPL-SCNC: 101 MMOL/L — SIGNIFICANT CHANGE UP (ref 98–107)
CHLORIDE SERPL-SCNC: 102 MMOL/L — SIGNIFICANT CHANGE UP (ref 98–107)
CO2 BLDV-SCNC: 26.4 MMOL/L — HIGH (ref 22–26)
CO2 BLDV-SCNC: 26.8 MMOL/L — HIGH (ref 22–26)
CO2 SERPL-SCNC: 21 MMOL/L — LOW (ref 22–31)
CO2 SERPL-SCNC: 22 MMOL/L — SIGNIFICANT CHANGE UP (ref 22–31)
CO2 SERPL-SCNC: 22 MMOL/L — SIGNIFICANT CHANGE UP (ref 22–31)
COLOR SPEC: SIGNIFICANT CHANGE UP
CREAT SERPL-MCNC: 1 MG/DL — SIGNIFICANT CHANGE UP (ref 0.5–1.3)
CREAT SERPL-MCNC: 1.01 MG/DL — SIGNIFICANT CHANGE UP (ref 0.5–1.3)
CREAT SERPL-MCNC: 1.01 MG/DL — SIGNIFICANT CHANGE UP (ref 0.5–1.3)
DIFF PNL FLD: ABNORMAL
EGFR: 84 ML/MIN/1.73M2 — SIGNIFICANT CHANGE UP
EGFR: 84 ML/MIN/1.73M2 — SIGNIFICANT CHANGE UP
EGFR: 85 ML/MIN/1.73M2 — SIGNIFICANT CHANGE UP
EOSINOPHIL # BLD AUTO: 0.04 K/UL — SIGNIFICANT CHANGE UP (ref 0–0.5)
EOSINOPHIL # BLD AUTO: 0.08 K/UL — SIGNIFICANT CHANGE UP (ref 0–0.5)
EOSINOPHIL NFR BLD AUTO: 0.3 % — SIGNIFICANT CHANGE UP (ref 0–6)
EOSINOPHIL NFR BLD AUTO: 0.6 % — SIGNIFICANT CHANGE UP (ref 0–6)
EPI CELLS # UR: 0 /HPF — SIGNIFICANT CHANGE UP (ref 0–5)
FLUAV SUBTYP SPEC NAA+PROBE: SIGNIFICANT CHANGE UP
FLUBV RNA SPEC QL NAA+PROBE: SIGNIFICANT CHANGE UP
GAS PNL BLDV: 136 MMOL/L — SIGNIFICANT CHANGE UP (ref 136–145)
GLUCOSE BLDV-MCNC: 88 MG/DL — SIGNIFICANT CHANGE UP (ref 70–99)
GLUCOSE SERPL-MCNC: 83 MG/DL — SIGNIFICANT CHANGE UP (ref 70–99)
GLUCOSE SERPL-MCNC: 93 MG/DL — SIGNIFICANT CHANGE UP (ref 70–99)
GLUCOSE SERPL-MCNC: 98 MG/DL — SIGNIFICANT CHANGE UP (ref 70–99)
GLUCOSE UR QL: NEGATIVE — SIGNIFICANT CHANGE UP
HADV DNA SPEC QL NAA+PROBE: SIGNIFICANT CHANGE UP
HCO3 BLDV-SCNC: 25 MMOL/L — SIGNIFICANT CHANGE UP (ref 22–29)
HCO3 BLDV-SCNC: 25 MMOL/L — SIGNIFICANT CHANGE UP (ref 22–29)
HCOV 229E RNA SPEC QL NAA+PROBE: SIGNIFICANT CHANGE UP
HCOV HKU1 RNA SPEC QL NAA+PROBE: SIGNIFICANT CHANGE UP
HCOV NL63 RNA SPEC QL NAA+PROBE: SIGNIFICANT CHANGE UP
HCOV OC43 RNA SPEC QL NAA+PROBE: SIGNIFICANT CHANGE UP
HCT VFR BLD CALC: 32.9 % — LOW (ref 39–50)
HCT VFR BLD CALC: 36.5 % — LOW (ref 39–50)
HCT VFR BLDA CALC: 34 % — LOW (ref 39–51)
HGB BLD CALC-MCNC: 11.2 G/DL — LOW (ref 13–17)
HGB BLD-MCNC: 10.4 G/DL — LOW (ref 13–17)
HGB BLD-MCNC: 11.1 G/DL — LOW (ref 13–17)
HMPV RNA SPEC QL NAA+PROBE: SIGNIFICANT CHANGE UP
HPIV1 RNA SPEC QL NAA+PROBE: SIGNIFICANT CHANGE UP
HPIV2 RNA SPEC QL NAA+PROBE: SIGNIFICANT CHANGE UP
HPIV3 RNA SPEC QL NAA+PROBE: SIGNIFICANT CHANGE UP
HPIV4 RNA SPEC QL NAA+PROBE: SIGNIFICANT CHANGE UP
HYALINE CASTS # UR AUTO: 0 /LPF — SIGNIFICANT CHANGE UP (ref 0–7)
IANC: 10.34 K/UL — HIGH (ref 1.8–7.4)
IANC: 9.96 K/UL — HIGH (ref 1.8–7.4)
IMM GRANULOCYTES NFR BLD AUTO: 0.4 % — SIGNIFICANT CHANGE UP (ref 0–1.5)
IMM GRANULOCYTES NFR BLD AUTO: 0.5 % — SIGNIFICANT CHANGE UP (ref 0–1.5)
INR BLD: 1.26 RATIO — HIGH (ref 0.88–1.16)
KETONES UR-MCNC: ABNORMAL
LACTATE BLDV-MCNC: 2.4 MMOL/L — HIGH (ref 0.5–2)
LEUKOCYTE ESTERASE UR-ACNC: NEGATIVE — SIGNIFICANT CHANGE UP
LYMPHOCYTES # BLD AUTO: 1.85 K/UL — SIGNIFICANT CHANGE UP (ref 1–3.3)
LYMPHOCYTES # BLD AUTO: 13.9 % — SIGNIFICANT CHANGE UP (ref 13–44)
LYMPHOCYTES # BLD AUTO: 16.8 % — SIGNIFICANT CHANGE UP (ref 13–44)
LYMPHOCYTES # BLD AUTO: 2.23 K/UL — SIGNIFICANT CHANGE UP (ref 1–3.3)
M PNEUMO DNA SPEC QL NAA+PROBE: SIGNIFICANT CHANGE UP
MAGNESIUM SERPL-MCNC: 1.8 MG/DL — SIGNIFICANT CHANGE UP (ref 1.6–2.6)
MCHC RBC-ENTMCNC: 29.1 PG — SIGNIFICANT CHANGE UP (ref 27–34)
MCHC RBC-ENTMCNC: 29.2 PG — SIGNIFICANT CHANGE UP (ref 27–34)
MCHC RBC-ENTMCNC: 30.4 GM/DL — LOW (ref 32–36)
MCHC RBC-ENTMCNC: 31.6 GM/DL — LOW (ref 32–36)
MCV RBC AUTO: 92.4 FL — SIGNIFICANT CHANGE UP (ref 80–100)
MCV RBC AUTO: 95.5 FL — SIGNIFICANT CHANGE UP (ref 80–100)
MONOCYTES # BLD AUTO: 0.95 K/UL — HIGH (ref 0–0.9)
MONOCYTES # BLD AUTO: 0.97 K/UL — HIGH (ref 0–0.9)
MONOCYTES NFR BLD AUTO: 7.1 % — SIGNIFICANT CHANGE UP (ref 2–14)
MONOCYTES NFR BLD AUTO: 7.3 % — SIGNIFICANT CHANGE UP (ref 2–14)
NEUTROPHILS # BLD AUTO: 10.34 K/UL — HIGH (ref 1.8–7.4)
NEUTROPHILS # BLD AUTO: 9.96 K/UL — HIGH (ref 1.8–7.4)
NEUTROPHILS NFR BLD AUTO: 74.9 % — SIGNIFICANT CHANGE UP (ref 43–77)
NEUTROPHILS NFR BLD AUTO: 77.6 % — HIGH (ref 43–77)
NITRITE UR-MCNC: NEGATIVE — SIGNIFICANT CHANGE UP
NRBC # BLD: 0 /100 WBCS — SIGNIFICANT CHANGE UP
NRBC # BLD: 0 /100 WBCS — SIGNIFICANT CHANGE UP
NRBC # FLD: 0 K/UL — SIGNIFICANT CHANGE UP
NRBC # FLD: 0 K/UL — SIGNIFICANT CHANGE UP
NT-PROBNP SERPL-SCNC: 964 PG/ML — HIGH
PCO2 BLDV: 38 MMHG — LOW (ref 42–55)
PCO2 BLDV: 45 MMHG — SIGNIFICANT CHANGE UP (ref 42–55)
PH BLDV: 7.36 — SIGNIFICANT CHANGE UP (ref 7.32–7.43)
PH BLDV: 7.43 — SIGNIFICANT CHANGE UP (ref 7.32–7.43)
PH UR: 6 — SIGNIFICANT CHANGE UP (ref 5–8)
PHOSPHATE SERPL-MCNC: 3.5 MG/DL — SIGNIFICANT CHANGE UP (ref 2.5–4.5)
PLATELET # BLD AUTO: 244 K/UL — SIGNIFICANT CHANGE UP (ref 150–400)
PLATELET # BLD AUTO: 312 K/UL — SIGNIFICANT CHANGE UP (ref 150–400)
PO2 BLDV: 29 MMHG — SIGNIFICANT CHANGE UP
PO2 BLDV: 58 MMHG — SIGNIFICANT CHANGE UP
POTASSIUM BLDV-SCNC: 3.9 MMOL/L — SIGNIFICANT CHANGE UP (ref 3.5–5.1)
POTASSIUM SERPL-MCNC: 3.8 MMOL/L — SIGNIFICANT CHANGE UP (ref 3.5–5.3)
POTASSIUM SERPL-MCNC: 4 MMOL/L — SIGNIFICANT CHANGE UP (ref 3.5–5.3)
POTASSIUM SERPL-MCNC: 5.8 MMOL/L — HIGH (ref 3.5–5.3)
POTASSIUM SERPL-SCNC: 3.8 MMOL/L — SIGNIFICANT CHANGE UP (ref 3.5–5.3)
POTASSIUM SERPL-SCNC: 4 MMOL/L — SIGNIFICANT CHANGE UP (ref 3.5–5.3)
POTASSIUM SERPL-SCNC: 5.8 MMOL/L — HIGH (ref 3.5–5.3)
PROT SERPL-MCNC: 7.8 G/DL — SIGNIFICANT CHANGE UP (ref 6–8.3)
PROT UR-MCNC: ABNORMAL
PROTHROM AB SERPL-ACNC: 14.6 SEC — HIGH (ref 10.5–13.4)
RAPID RVP RESULT: SIGNIFICANT CHANGE UP
RBC # BLD: 3.56 M/UL — LOW (ref 4.2–5.8)
RBC # BLD: 3.82 M/UL — LOW (ref 4.2–5.8)
RBC # FLD: 13.8 % — SIGNIFICANT CHANGE UP (ref 10.3–14.5)
RBC # FLD: 14 % — SIGNIFICANT CHANGE UP (ref 10.3–14.5)
RBC CASTS # UR COMP ASSIST: 6 /HPF — HIGH (ref 0–4)
RH IG SCN BLD-IMP: NEGATIVE — SIGNIFICANT CHANGE UP
RSV RNA SPEC QL NAA+PROBE: SIGNIFICANT CHANGE UP
RV+EV RNA SPEC QL NAA+PROBE: SIGNIFICANT CHANGE UP
SAO2 % BLDV: 48.6 % — SIGNIFICANT CHANGE UP
SAO2 % BLDV: 90.2 % — SIGNIFICANT CHANGE UP
SARS-COV-2 RNA SPEC QL NAA+PROBE: SIGNIFICANT CHANGE UP
SODIUM SERPL-SCNC: 135 MMOL/L — SIGNIFICANT CHANGE UP (ref 135–145)
SODIUM SERPL-SCNC: 136 MMOL/L — SIGNIFICANT CHANGE UP (ref 135–145)
SODIUM SERPL-SCNC: 136 MMOL/L — SIGNIFICANT CHANGE UP (ref 135–145)
SP GR SPEC: 1.01 — SIGNIFICANT CHANGE UP (ref 1–1.05)
TROPONIN T, HIGH SENSITIVITY RESULT: 40 NG/L — SIGNIFICANT CHANGE UP
UROBILINOGEN FLD QL: SIGNIFICANT CHANGE UP
WBC # BLD: 13.29 K/UL — HIGH (ref 3.8–10.5)
WBC # BLD: 13.32 K/UL — HIGH (ref 3.8–10.5)
WBC # FLD AUTO: 13.29 K/UL — HIGH (ref 3.8–10.5)
WBC # FLD AUTO: 13.32 K/UL — HIGH (ref 3.8–10.5)
WBC UR QL: 2 /HPF — SIGNIFICANT CHANGE UP (ref 0–5)

## 2022-05-01 PROCEDURE — 99232 SBSQ HOSP IP/OBS MODERATE 35: CPT

## 2022-05-01 PROCEDURE — 71045 X-RAY EXAM CHEST 1 VIEW: CPT | Mod: 26

## 2022-05-01 PROCEDURE — 71275 CT ANGIOGRAPHY CHEST: CPT | Mod: 26,MA

## 2022-05-01 PROCEDURE — 99231 SBSQ HOSP IP/OBS SF/LOW 25: CPT | Mod: 24

## 2022-05-01 RX ORDER — ACETAMINOPHEN 500 MG
975 TABLET ORAL EVERY 6 HOURS
Refills: 0 | Status: DISCONTINUED | OUTPATIENT
Start: 2022-05-01 | End: 2022-05-03

## 2022-05-01 RX ORDER — FUROSEMIDE 40 MG
20 TABLET ORAL ONCE
Refills: 0 | Status: COMPLETED | OUTPATIENT
Start: 2022-05-01 | End: 2022-05-01

## 2022-05-01 RX ORDER — SENNA PLUS 8.6 MG/1
2 TABLET ORAL AT BEDTIME
Refills: 0 | Status: DISCONTINUED | OUTPATIENT
Start: 2022-05-01 | End: 2022-05-03

## 2022-05-01 RX ORDER — LISINOPRIL 2.5 MG/1
20 TABLET ORAL DAILY
Refills: 0 | Status: DISCONTINUED | OUTPATIENT
Start: 2022-05-01 | End: 2022-05-03

## 2022-05-01 RX ORDER — BENZOCAINE AND MENTHOL 5; 1 G/100ML; G/100ML
1 LIQUID ORAL ONCE
Refills: 0 | Status: COMPLETED | OUTPATIENT
Start: 2022-05-01 | End: 2022-05-01

## 2022-05-01 RX ORDER — POLYETHYLENE GLYCOL 3350 17 G/17G
17 POWDER, FOR SOLUTION ORAL DAILY
Refills: 0 | Status: DISCONTINUED | OUTPATIENT
Start: 2022-05-01 | End: 2022-05-03

## 2022-05-01 RX ORDER — METOPROLOL TARTRATE 50 MG
25 TABLET ORAL DAILY
Refills: 0 | Status: DISCONTINUED | OUTPATIENT
Start: 2022-05-01 | End: 2022-05-01

## 2022-05-01 RX ORDER — SODIUM CHLORIDE 9 MG/ML
1000 INJECTION, SOLUTION INTRAVENOUS
Refills: 0 | Status: DISCONTINUED | OUTPATIENT
Start: 2022-05-01 | End: 2022-05-01

## 2022-05-01 RX ORDER — PIPERACILLIN AND TAZOBACTAM 4; .5 G/20ML; G/20ML
3.38 INJECTION, POWDER, LYOPHILIZED, FOR SOLUTION INTRAVENOUS ONCE
Refills: 0 | Status: COMPLETED | OUTPATIENT
Start: 2022-05-01 | End: 2022-05-01

## 2022-05-01 RX ORDER — HEPARIN SODIUM 5000 [USP'U]/ML
5000 INJECTION INTRAVENOUS; SUBCUTANEOUS EVERY 8 HOURS
Refills: 0 | Status: DISCONTINUED | OUTPATIENT
Start: 2022-05-01 | End: 2022-05-03

## 2022-05-01 RX ORDER — VANCOMYCIN HCL 1 G
1000 VIAL (EA) INTRAVENOUS ONCE
Refills: 0 | Status: COMPLETED | OUTPATIENT
Start: 2022-05-01 | End: 2022-05-01

## 2022-05-01 RX ORDER — CEFTRIAXONE 500 MG/1
1000 INJECTION, POWDER, FOR SOLUTION INTRAMUSCULAR; INTRAVENOUS EVERY 24 HOURS
Refills: 0 | Status: DISCONTINUED | OUTPATIENT
Start: 2022-05-01 | End: 2022-05-03

## 2022-05-01 RX ORDER — KETOROLAC TROMETHAMINE 30 MG/ML
15 SYRINGE (ML) INJECTION EVERY 6 HOURS
Refills: 0 | Status: DISCONTINUED | OUTPATIENT
Start: 2022-05-01 | End: 2022-05-03

## 2022-05-01 RX ORDER — SODIUM CHLORIDE 9 MG/ML
2000 INJECTION, SOLUTION INTRAVENOUS ONCE
Refills: 0 | Status: COMPLETED | OUTPATIENT
Start: 2022-05-01 | End: 2022-05-01

## 2022-05-01 RX ORDER — METOPROLOL TARTRATE 50 MG
25 TABLET ORAL DAILY
Refills: 0 | Status: DISCONTINUED | OUTPATIENT
Start: 2022-05-01 | End: 2022-05-03

## 2022-05-01 RX ORDER — OXYCODONE HYDROCHLORIDE 5 MG/1
5 TABLET ORAL EVERY 6 HOURS
Refills: 0 | Status: DISCONTINUED | OUTPATIENT
Start: 2022-05-01 | End: 2022-05-03

## 2022-05-01 RX ORDER — CELECOXIB 200 MG/1
200 CAPSULE ORAL DAILY
Refills: 0 | Status: DISCONTINUED | OUTPATIENT
Start: 2022-05-01 | End: 2022-05-01

## 2022-05-01 RX ORDER — ONDANSETRON 8 MG/1
4 TABLET, FILM COATED ORAL EVERY 6 HOURS
Refills: 0 | Status: DISCONTINUED | OUTPATIENT
Start: 2022-05-01 | End: 2022-05-03

## 2022-05-01 RX ADMIN — SODIUM CHLORIDE 100 MILLILITER(S): 9 INJECTION, SOLUTION INTRAVENOUS at 05:16

## 2022-05-01 RX ADMIN — BENZOCAINE AND MENTHOL 1 LOZENGE: 5; 1 LIQUID ORAL at 10:03

## 2022-05-01 RX ADMIN — Medication 15 MILLIGRAM(S): at 14:49

## 2022-05-01 RX ADMIN — Medication 20 MILLIGRAM(S): at 10:33

## 2022-05-01 RX ADMIN — SODIUM CHLORIDE 2000 MILLILITER(S): 9 INJECTION, SOLUTION INTRAVENOUS at 00:44

## 2022-05-01 RX ADMIN — HEPARIN SODIUM 5000 UNIT(S): 5000 INJECTION INTRAVENOUS; SUBCUTANEOUS at 21:42

## 2022-05-01 RX ADMIN — OXYCODONE HYDROCHLORIDE 5 MILLIGRAM(S): 5 TABLET ORAL at 21:41

## 2022-05-01 RX ADMIN — POLYETHYLENE GLYCOL 3350 17 GRAM(S): 17 POWDER, FOR SOLUTION ORAL at 14:04

## 2022-05-01 RX ADMIN — Medication 15 MILLIGRAM(S): at 13:57

## 2022-05-01 RX ADMIN — LISINOPRIL 20 MILLIGRAM(S): 2.5 TABLET ORAL at 06:24

## 2022-05-01 RX ADMIN — OXYCODONE HYDROCHLORIDE 5 MILLIGRAM(S): 5 TABLET ORAL at 06:39

## 2022-05-01 RX ADMIN — PIPERACILLIN AND TAZOBACTAM 200 GRAM(S): 4; .5 INJECTION, POWDER, LYOPHILIZED, FOR SOLUTION INTRAVENOUS at 00:44

## 2022-05-01 RX ADMIN — OXYCODONE HYDROCHLORIDE 5 MILLIGRAM(S): 5 TABLET ORAL at 22:11

## 2022-05-01 RX ADMIN — HEPARIN SODIUM 5000 UNIT(S): 5000 INJECTION INTRAVENOUS; SUBCUTANEOUS at 06:24

## 2022-05-01 RX ADMIN — HEPARIN SODIUM 5000 UNIT(S): 5000 INJECTION INTRAVENOUS; SUBCUTANEOUS at 13:57

## 2022-05-01 RX ADMIN — Medication 25 MILLIGRAM(S): at 03:59

## 2022-05-01 RX ADMIN — CEFTRIAXONE 100 MILLIGRAM(S): 500 INJECTION, POWDER, FOR SOLUTION INTRAMUSCULAR; INTRAVENOUS at 06:23

## 2022-05-01 RX ADMIN — Medication 250 MILLIGRAM(S): at 03:59

## 2022-05-01 RX ADMIN — Medication 10 MILLIGRAM(S): at 18:11

## 2022-05-01 NOTE — PROGRESS NOTE ADULT - PROBLEM SELECTOR PLAN 2
Chronic in nature. Per cards - had extensive pulm/cardio workup that was unrevealing. At this time, CTA of chest did show GGO, which may be indicative of pulmonary edema, which correlated with his lung exam.  -Pt is s/p 3L IVF in the ED. He has been on maintenance at 100cc/h  -Stop IVF now.  -Will check BNP.  -Lasix 20mg IV x1.  -On 4L by NC - sats have been excellent - would advise weaning off O2 as sats allow.  -Reassess.   -Cardiology called for further collateral. Await callback. Consider possible ECHO.

## 2022-05-01 NOTE — ED ADULT NURSE REASSESSMENT NOTE - NS ED NURSE REASSESS COMMENT FT1
Pt A&Ox4, resting on stretcher. Respirations even and unlabored, sinus tachycardia on monitor, sating 100% on 4L o2 via NC. Repeat labs drawn per order. Pt well appearing, NAD noted. Pending lab results. bed in lowest position, side rails up, call bell in hand, safety maintained.  will continue to monitor.

## 2022-05-01 NOTE — ED PROVIDER NOTE - PHYSICAL EXAMINATION
Gen: Alert, ill-appearing  Head: NC, AT,  EOMI, normal lids/conjunctiva  ENT:  normal hearing, patent oropharynx without erythema/exudate  Neck: +supple, no tenderness/meningismus/JVD, +Trachea midline  Chest: no chest wall tenderness, equal chest rise  Pulm: Bilateral BS, tachypneic, no wheeze/stridor/retractions  CV: tachycardic, no M/R/G, +dist pulses  Abd: +BS, soft, +ttp around corina-umbilical region and adjacent to surgical incision sites, steri-strips in place without blood or drainage, no surrounding erythema  back: +mild left cvat  Rectal: deferred  Mskel: no edema/erythema/cyanosis  Skin: no rash  Neuro: AAOx3, no sensory/motor deficits, CN 2-12 intact

## 2022-05-01 NOTE — ED ADULT NURSE NOTE - OBJECTIVE STATEMENT
61 yom presents A&Ox4 s/p operation for kidney mass removal yesterday, c/o fever and cough that started today. Was dc'd around 4 p.m today then checked temp and noticed it was 101, surgeon told him to come into ED. Respirations even and unlabored, sinus tachycardia on monitor, afebrile at this time - took Tylenol prior to arrival, incision site on abdomen intact. No drainage or redness present. Pt sating 90% on RA, on home o2 - p[laced pt on 4L o2 via NC and o2 improved to 100%. pt denies any chest pain, dyspnea, dizziness, or LOC. 20g IV placed in L AC. MD at bedside for eval. bed in lowest position, side rails up, call bell in hand, safety maintained. awaiting further orders. will continue to monitor.

## 2022-05-01 NOTE — PATIENT PROFILE ADULT - NSPROPTRIGHTCAREGIVER_GEN_A_NUR
[FreeTextEntry1] : This is a very pleasant 70-year-old female who is accompanied by her  to this office.  Working out in the garden last week, she was stung by a yellow jacket on the left third finger.  After 2 days the swelling became significant and she went to the emergency room for evaluation.\par She was found to have cellulitis at the site of the bee sting, and was administered IV vancomycin and IV Unasyn.  There was improvement in symptomatology, she was sent home on Keflex and hydroxyzine.  She has been taking the Keflex faithfully with slow improvement to the finger.\par On recheck today her vital signs are stable and she is doing well.  She will continue to complete the course of Keflex and report any issues\par History of breast cancer who follows with hematology/oncology.\par History of history of osteopiña recently had a DEXA scan\par History of psoriasis.  A prescription for Dovonex cream is prescribed.\par History of Endo morphea diet and exercise are discussed yes declines

## 2022-05-01 NOTE — PATIENT PROFILE ADULT - FALL HARM RISK - HARM RISK INTERVENTIONS

## 2022-05-01 NOTE — ED PROVIDER NOTE - PROGRESS NOTE DETAILS
Jesus PGY3 - Significant improvement in VS w/ HR in the 90s and RR 20.  Pt. evaluated by urology, TBA their service under Dr. Gardner.

## 2022-05-01 NOTE — PROGRESS NOTE ADULT - PROBLEM SELECTOR PLAN 1
Post-op fever. No new fevers since admission. No obvious source. UA/RVP neg. No obvious signs of URI/LRI  -Monitor off antipyretics.  -Urology started pt on CTX.   -Follow up cultures.   -CTA ruled out PE. Would complete workup by checking LE venous duplex to r/o DVT.  -Incentive spirometer use to avoid atelectasis.

## 2022-05-01 NOTE — ED PROVIDER NOTE - CLINICAL SUMMARY MEDICAL DECISION MAKING FREE TEXT BOX
60yo M w/ pmh as above s/p partial R nephrectomy 2days ago, p/w fever, arrives septic. Labs/culture/fluids/abx, urology consulted, TBA.

## 2022-05-01 NOTE — H&P ADULT - ASSESSMENT
62 yo male s/p R laparoscopic partial nephrectomy 4/28 re-admitted with postoperative fever    -- Admit to urology, Dr. Gardner  -- CTA Chest PE protocol  -- Broad spectrum ABx  -- F/u blood, urine cultures  -- wean O2  -- monitor fevers   -- DVT ppx    Discussed with on call attending Dr. Ziegler, and Dr. Gardner

## 2022-05-01 NOTE — H&P ADULT - NSHPPHYSICALEXAM_GEN_ALL_CORE
Gen: resting comfortable, diaphoretic   Res: unlabored breathing on NC  CV: Tachycardic, regular rhythm   Gen: Softly distended, tenderness to deep palpation throughout, post-op crepitus, incisions c/d/i  : voiding clear yellow urine at bedside  Ext: no LE tenderness or erythema

## 2022-05-01 NOTE — H&P ADULT - HISTORY OF PRESENT ILLNESS
62 yo male PMH HTN, CAD, RA, chronic SOB (intermittent home O2) and recent right laparoscopic partial nephrectomy 4/29 who was discharged home 4/30 and returns to the ED after fever at home. Patient recovered well postoperatively and continued to complain of feeling cold at home. Patient's wife performed oral temp of 101.1F and was instructed to come to the ED for further evaluation. Patient states he feels chills and complains of abdominal pain with coughing and moving. Notes bloating. Denies chest pain, difficulty breathing, leg pain. States he is voiding well clear yellow urine without dysuria. Was taking acetaminophen at home.     In ED T 100.2F, -123, RR 20-22 100% on 4L NC. WBC 13.3, HCT 36.5, Cr 1.01    v 62 yo male PMH HTN, CAD, RA, chronic SOB (intermittent home O2) and recent right laparoscopic partial nephrectomy 4/29 who was discharged home 4/30 and returns to the ED after fever at home. Patient recovered well postoperatively and continued to complain of feeling cold at home. Patient's wife performed oral temp of 101.1F and was instructed to come to the ED for further evaluation. Patient states he feels chills and complains of abdominal pain with coughing and moving. Notes bloating. Denies chest pain, difficulty breathing, leg pain. States he is voiding well clear yellow urine without dysuria. Was taking acetaminophen at home.     In ED T 100.2F, -123, RR 20-22 100% on 4L NC. WBC 13.3, HCT 36.5, Cr 1.01. CXR clear lungs.

## 2022-05-01 NOTE — ED PROVIDER NOTE - ATTENDING CONTRIBUTION TO CARE
History and physical above obtained and documented (or dictated) by me (attending physician).  Resident or ACP and/or medical student involved in case for assistance with disposition and may document involvement as necessary via progress note(s).   -Dr. Cheung

## 2022-05-01 NOTE — ED ADULT NURSE NOTE - NSIMPLEMENTINTERV_GEN_ALL_ED
Implemented All Fall Risk Interventions:  [place] to call system. Call bell, personal items and telephone within reach. Instruct patient to call for assistance. Room bathroom lighting operational. Non-slip footwear when patient is off stretcher. Physically safe environment: no spills, clutter or unnecessary equipment. Stretcher in lowest position, wheels locked, appropriate side rails in place. Provide visual cue, wrist band, yellow gown, etc. Monitor gait and stability. Monitor for mental status changes and reorient to person, place, and time. Review medications for side effects contributing to fall risk. Reinforce activity limits and safety measures with patient and family.

## 2022-05-01 NOTE — ED PROVIDER NOTE - OBJECTIVE STATEMENT
Pertinent PMH/PSH/FHx/SHx and Review of Systems contained within:  60yo M w/ pmh htn, OA, chronic SOB on home O2 (4L prn, followed by pulmonology w/ no known copd and no smoking hx), s/p partial Right nephrectomy 2 days ago (Friday morning, April 29th) by Dr. Gardner (urology), discharged yesterday afternoon (Saturday), returns for persistent fevers. Pt reports that after he arrived home, took tylenol, approx hr later - recorded oral temp of 101.8, took more tylenol hours later and still febrile, at which point he called Dr. Gardner's answering service, spoke w/ "Ann Marie" and told to come to ED. Pt reports unchanged/stable amount of abd pain since discharge, worsened when coughing, but does endorse chills. Reports clear urine but episode of urinary incontinence a few hours ago and states "I don't feel like I'm in control of my bladder right now". On ROS, also reports cough productive of clear sputum since discharge. Pt is covid vaxx2 (2nd dose in Dec 2021). Pt has not had BM since discharge but is passing flatus.    No photophobia/eye pain/changes in vision, No ear pain/sore throat/dysphagia, No chest pain/palpitations, no wheeze/stridor, No N/V/D, no dysuria/frequency/discharge, No neck/back pain, no rash, no new focal neuro symptoms.

## 2022-05-01 NOTE — ED ADULT NURSE REASSESSMENT NOTE - NS ED NURSE REASSESS COMMENT FT1
Pt A&Ox4 resting on stretcher. Respirations even and unlabored, sating 100% on 4L o2 via NC, normal sinus rhythm on monitor. pt endorsed 7/10 abdominal pain earlier - medication administered per eMAR. Pt appears more comfortable now, endorses pain relief. Pt denies any other complaints, no dyspnea, chest pain, dizziness or discomfort. Pt well appearing, NAD noted. Report given to 9T RN. Pt awaiting transport to bed assignment.

## 2022-05-01 NOTE — PROGRESS NOTE ADULT - SUBJECTIVE AND OBJECTIVE BOX
[facility] Division of Hospital Medicine  Erik Darden) MD Shiva  Pager [phone]    SUBJECTIVE:  Chief complaint: Renal mass.    Pt seen and evaluated at bedside this AM. The patient was discharged from [facility] yesterday, but returned in the evening for fever. Since arrival, he's had low grade temp. Pt reports a persistent cough since post-op. Mild sore throat since post-op. He states he's voiding w/o issues, no hematura, no foul smell. He reports AVALOS. Otherwise, no chills, NV, no pain.      ROS: All systems negative except as noted.      Vital Signs Last 24 Hrs  T(C): 37.4 (01 May 2022 08:12), Max: 37.9 (2022 23:08)  T(F): 99.3 (01 May 2022 08:12), Max: 100.2 (2022 23:08)  HR: 97 (01 May 2022 08:12) (92 - 123)  BP: 142/85 (01 May 2022 08:12) (133/89 - 155/93)  BP(mean): --  RR: 18 (01 May 2022 08:12) (18 - 22)  SpO2: 98% (01 May 2022 08:12) (90% - 100%)    PHYSICAL EXAM:  Gen- In bed, comfortable, NAD  Resp- Bibasilar crackles, good effort. No r/r/w. No accessory muscle use.  CVS- RRR, S1S2, no g/r/m. No LE edema.  GI- Soft abd, NT, ND, +BSx4. No HSM.  MSK- No C/C. ROM intact. No crepitus.  Neuro- CN II-XII intact. Speech fluent/face symmetric. Sensation intact.  Psych- AAOx3. Appropriate mood/affect.      MEDICATION:  MEDICATIONS  (STANDING):  cefTRIAXone   IVPB 1000 milliGRAM(s) IV Intermittent every 24 hours  furosemide   Injectable 20 milliGRAM(s) IV Push once  heparin   Injectable 5000 Unit(s) SubCutaneous every 8 hours  lisinopril 20 milliGRAM(s) Oral daily  metoprolol succinate ER 25 milliGRAM(s) Oral daily    MEDICATIONS  (PRN):  ketorolac   Injectable 15 milliGRAM(s) IV Push every 6 hours PRN Moderate Pain (4 - 6)  ondansetron Injectable 4 milliGRAM(s) IV Push every 6 hours PRN Nausea and/or Vomiting  oxyCODONE    IR 5 milliGRAM(s) Oral every 6 hours PRN Severe Pain (7 - 10)  senna 2 Tablet(s) Oral at bedtime PRN Constipation      LABORATORY:                          10.4   13.32 )-----------( 244      ( 01 May 2022 06:44 )             32.9     05    136  |  102  |  12  ----------------------------<  98  4.0   |  22  |  1.00    Ca    9.2      01 May 2022 06:44  Phos  3.5     05-  Mg     1.80         TPro  7.8  /  Alb  3.9  /  TBili  0.6  /  DBili  x   /  AST  72<H>  /  ALT  28  /  AlkPhos  79      PT/INR - ( 01 May 2022 00:57 )   PT: 14.6 sec;   INR: 1.26 ratio         PTT - ( 01 May 2022 00:57 )  PTT:31.8 sec  Urinalysis Basic - ( 01 May 2022 01:45 )    Color: Light Yellow / Appearance: Clear / S.014 / pH: x  Gluc: x / Ketone: Small  / Bili: Negative / Urobili: <2 mg/dL   Blood: x / Protein: 30 mg/dL / Nitrite: Negative   Leuk Esterase: Negative / RBC: 6 /HPF / WBC 2 /HPF   Sq Epi: x / Non Sq Epi: 0 /HPF / Bacteria: Negative            SARS-CoV-2: NotDetec (01 May 2022 01:17)                               [facility] Division of Hospital Medicine  Erik DardenRiaz Shannon MD  Pager [phone]    SUBJECTIVE:  Chief complaint: fever.    Pt seen and evaluated at bedside this AM. The patient was discharged from [facility] yesterday, but returned in the evening for fever. Since arrival, he's had low grade temp. Pt reports a persistent cough since post-op. Mild sore throat since post-op. He states he's voiding w/o issues, no hematura, no foul smell. He reports AVALOS. Otherwise, no chills, NV, no pain.      ROS: All systems negative except as noted.      Vital Signs Last 24 Hrs  T(C): 37.4 (01 May 2022 08:12), Max: 37.9 (2022 23:08)  T(F): 99.3 (01 May 2022 08:12), Max: 100.2 (2022 23:08)  HR: 97 (01 May 2022 08:12) (92 - 123)  BP: 142/85 (01 May 2022 08:12) (133/89 - 155/93)  BP(mean): --  RR: 18 (01 May 2022 08:12) (18 - 22)  SpO2: 98% (01 May 2022 08:12) (90% - 100%)    PHYSICAL EXAM:  Gen- In bed, comfortable, NAD  Resp- Bibasilar crackles, good effort. No r/r/w. No accessory muscle use.  CVS- RRR, S1S2, no g/r/m. No LE edema.  GI- Soft abd, NT, ND, +BSx4. No HSM.  MSK- No C/C. ROM intact. No crepitus.  Neuro- CN II-XII intact. Speech fluent/face symmetric. Sensation intact.  Psych- AAOx3. Appropriate mood/affect.      MEDICATION:  MEDICATIONS  (STANDING):  cefTRIAXone   IVPB 1000 milliGRAM(s) IV Intermittent every 24 hours  furosemide   Injectable 20 milliGRAM(s) IV Push once  heparin   Injectable 5000 Unit(s) SubCutaneous every 8 hours  lisinopril 20 milliGRAM(s) Oral daily  metoprolol succinate ER 25 milliGRAM(s) Oral daily    MEDICATIONS  (PRN):  ketorolac   Injectable 15 milliGRAM(s) IV Push every 6 hours PRN Moderate Pain (4 - 6)  ondansetron Injectable 4 milliGRAM(s) IV Push every 6 hours PRN Nausea and/or Vomiting  oxyCODONE    IR 5 milliGRAM(s) Oral every 6 hours PRN Severe Pain (7 - 10)  senna 2 Tablet(s) Oral at bedtime PRN Constipation      LABORATORY:                          10.4   13.32 )-----------( 244      ( 01 May 2022 06:44 )             32.9         136  |  102  |  12  ----------------------------<  98  4.0   |  22  |  1.00    Ca    9.2      01 May 2022 06:44  Phos  3.5       Mg     1.80         TPro  7.8  /  Alb  3.9  /  TBili  0.6  /  DBili  x   /  AST  72<H>  /  ALT  28  /  AlkPhos  79      PT/INR - ( 01 May 2022 00:57 )   PT: 14.6 sec;   INR: 1.26 ratio         PTT - ( 01 May 2022 00:57 )  PTT:31.8 sec  Urinalysis Basic - ( 01 May 2022 01:45 )    Color: Light Yellow / Appearance: Clear / S.014 / pH: x  Gluc: x / Ketone: Small  / Bili: Negative / Urobili: <2 mg/dL   Blood: x / Protein: 30 mg/dL / Nitrite: Negative   Leuk Esterase: Negative / RBC: 6 /HPF / WBC 2 /HPF   Sq Epi: x / Non Sq Epi: 0 /HPF / Bacteria: Negative            SARS-CoV-2: NotDeteyadiel (01 May 2022 01:17)

## 2022-05-01 NOTE — H&P ADULT - NSHPLABSRESULTS_GEN_ALL_CORE
11.1   13.29 )-----------( 312      ( 01 May 2022 00:57 )             36.5       135  |  101  |  15  ----------------------------<  83  5.8<H>   |  21<L>  |  1.01    Ca    9.4      01 May 2022 00:57    TPro  7.8  /  Alb  3.9  /  TBili  0.6  /  DBili  x   /  AST  72<H>  /  ALT  28  /  AlkPhos  79      Urinalysis Basic - ( 01 May 2022 01:45 )    Color: Light Yellow / Appearance: Clear / S.014 / pH: x  Gluc: x / Ketone: Small  / Bili: Negative / Urobili: <2 mg/dL   Blood: x / Protein: 30 mg/dL / Nitrite: Negative   Leuk Esterase: Negative / RBC: 6 /HPF / WBC 2 /HPF   Sq Epi: x / Non Sq Epi: 0 /HPF / Bacteria: Negative    Radiology:  < from: Xray Chest 1 View- PORTABLE-Urgent (22 @ 01:24) >    IMPRESSION:  Clear lungs.    < end of copied text >

## 2022-05-01 NOTE — ED ADULT NURSE NOTE - CHIEF COMPLAINT QUOTE
Pt st" I had mass removed from my Kidney yesterday they kept me overnight just got home today around 4pm checked my temperature it was 101. I called the surgeon told to come back to ER. ...I also have a cough. I took tylenol around 5pm." Pt appears short of breath yet denies feeling short of breath.

## 2022-05-01 NOTE — PROGRESS NOTE ADULT - ASSESSMENT
61M with PMH of HTN, OA, chronic SOB on intermittent home O2 found to have right kidney mass. He is s/p partial nephrectomy. He was discharged from [facility] on 5/1 but returns to hospital with fever. Infectious workup is neg to date. UA fairly unremarkable with on 2WBCs. RVP negative. He does have leukocytosis. CTA ruled out PE, however, he was noted to have GGO, which may be indicative of pulmonary edema. Pt admitted for evaluation of post-op fever.

## 2022-05-02 DIAGNOSIS — M06.9 RHEUMATOID ARTHRITIS, UNSPECIFIED: ICD-10-CM

## 2022-05-02 LAB
ANION GAP SERPL CALC-SCNC: 13 MMOL/L — SIGNIFICANT CHANGE UP (ref 7–14)
BUN SERPL-MCNC: 16 MG/DL — SIGNIFICANT CHANGE UP (ref 7–23)
CALCIUM SERPL-MCNC: 9 MG/DL — SIGNIFICANT CHANGE UP (ref 8.4–10.5)
CHLORIDE SERPL-SCNC: 100 MMOL/L — SIGNIFICANT CHANGE UP (ref 98–107)
CO2 SERPL-SCNC: 24 MMOL/L — SIGNIFICANT CHANGE UP (ref 22–31)
CREAT SERPL-MCNC: 0.99 MG/DL — SIGNIFICANT CHANGE UP (ref 0.5–1.3)
CULTURE RESULTS: SIGNIFICANT CHANGE UP
EGFR: 86 ML/MIN/1.73M2 — SIGNIFICANT CHANGE UP
GLUCOSE SERPL-MCNC: 91 MG/DL — SIGNIFICANT CHANGE UP (ref 70–99)
HCT VFR BLD CALC: 34.5 % — LOW (ref 39–50)
HGB BLD-MCNC: 10.9 G/DL — LOW (ref 13–17)
MCHC RBC-ENTMCNC: 29.4 PG — SIGNIFICANT CHANGE UP (ref 27–34)
MCHC RBC-ENTMCNC: 31.6 GM/DL — LOW (ref 32–36)
MCV RBC AUTO: 93 FL — SIGNIFICANT CHANGE UP (ref 80–100)
NRBC # BLD: 0 /100 WBCS — SIGNIFICANT CHANGE UP
NRBC # FLD: 0 K/UL — SIGNIFICANT CHANGE UP
PLATELET # BLD AUTO: 298 K/UL — SIGNIFICANT CHANGE UP (ref 150–400)
POTASSIUM SERPL-MCNC: 3.7 MMOL/L — SIGNIFICANT CHANGE UP (ref 3.5–5.3)
POTASSIUM SERPL-SCNC: 3.7 MMOL/L — SIGNIFICANT CHANGE UP (ref 3.5–5.3)
RBC # BLD: 3.71 M/UL — LOW (ref 4.2–5.8)
RBC # FLD: 13.7 % — SIGNIFICANT CHANGE UP (ref 10.3–14.5)
SODIUM SERPL-SCNC: 137 MMOL/L — SIGNIFICANT CHANGE UP (ref 135–145)
SPECIMEN SOURCE: SIGNIFICANT CHANGE UP
TROPONIN T, HIGH SENSITIVITY RESULT: 42 NG/L — SIGNIFICANT CHANGE UP
WBC # BLD: 14.32 K/UL — HIGH (ref 3.8–10.5)
WBC # FLD AUTO: 14.32 K/UL — HIGH (ref 3.8–10.5)

## 2022-05-02 PROCEDURE — 99232 SBSQ HOSP IP/OBS MODERATE 35: CPT

## 2022-05-02 PROCEDURE — 99233 SBSQ HOSP IP/OBS HIGH 50: CPT

## 2022-05-02 PROCEDURE — 93970 EXTREMITY STUDY: CPT | Mod: 26

## 2022-05-02 RX ADMIN — Medication 975 MILLIGRAM(S): at 12:38

## 2022-05-02 RX ADMIN — OXYCODONE HYDROCHLORIDE 5 MILLIGRAM(S): 5 TABLET ORAL at 22:30

## 2022-05-02 RX ADMIN — CEFTRIAXONE 100 MILLIGRAM(S): 500 INJECTION, POWDER, FOR SOLUTION INTRAMUSCULAR; INTRAVENOUS at 05:38

## 2022-05-02 RX ADMIN — SENNA PLUS 2 TABLET(S): 8.6 TABLET ORAL at 21:33

## 2022-05-02 RX ADMIN — HEPARIN SODIUM 5000 UNIT(S): 5000 INJECTION INTRAVENOUS; SUBCUTANEOUS at 13:35

## 2022-05-02 RX ADMIN — LISINOPRIL 20 MILLIGRAM(S): 2.5 TABLET ORAL at 05:38

## 2022-05-02 RX ADMIN — Medication 975 MILLIGRAM(S): at 01:08

## 2022-05-02 RX ADMIN — POLYETHYLENE GLYCOL 3350 17 GRAM(S): 17 POWDER, FOR SOLUTION ORAL at 12:37

## 2022-05-02 RX ADMIN — Medication 975 MILLIGRAM(S): at 13:08

## 2022-05-02 RX ADMIN — HEPARIN SODIUM 5000 UNIT(S): 5000 INJECTION INTRAVENOUS; SUBCUTANEOUS at 21:33

## 2022-05-02 RX ADMIN — OXYCODONE HYDROCHLORIDE 5 MILLIGRAM(S): 5 TABLET ORAL at 21:39

## 2022-05-02 RX ADMIN — HEPARIN SODIUM 5000 UNIT(S): 5000 INJECTION INTRAVENOUS; SUBCUTANEOUS at 05:38

## 2022-05-02 RX ADMIN — Medication 25 MILLIGRAM(S): at 05:38

## 2022-05-02 NOTE — PROGRESS NOTE ADULT - SUBJECTIVE AND OBJECTIVE BOX
Tmax 100.5  1AM  Afeb 144/85 96 96% 2L  Pt claims he uses oxygen at home only a few minutes a day  Pt has no c/o; feels better;  had BM overnite after fleet  Abd- softer;  NT ND; + flatus    steri's C&D  Void 300  CTA neg

## 2022-05-02 NOTE — PROGRESS NOTE ADULT - PROBLEM SELECTOR PLAN 4
Stable. Continue home medicine.    DVT ppx- as per primary team. -Management as per urology.  -Pain control PRN.

## 2022-05-02 NOTE — PROGRESS NOTE ADULT - PROBLEM SELECTOR PLAN 2
Chronic in nature. Per cards - had extensive pulm/cardio workup that was unrevealing. At this time, CTA of chest did show GGO, which may be indicative of pulmonary edema, which correlated with his lung exam.  -Pt is s/p 3L IVF in the ED. He has been on maintenance at 100cc/h  -Stop IVF now.  -Will check BNP.  -Lasix 20mg IV x1.  -On 4L by NC - sats have been excellent - would advise weaning off O2 as sats allow.  -Reassess.   -Cardiology called for further collateral. Await callback. Consider possible ECHO. Chronic in nature. Per cards - had extensive pulm/cardio workup that was unrevealing. At this time, CTA of chest did show GGO, which may be indicative of pulmonary edema, which correlated with his lung exam on initial eval by hospitalist. s/p about 3L IVFs  - BNP mildly elevated 964, s/p Lasix 20mg IV x1.  - weaned off O2  - cards notes "chronic dyspnea with extensive op cardiac and pulmonary workup thats been unrevealing. no signs of chf or ischemic at present"

## 2022-05-02 NOTE — PROGRESS NOTE ADULT - PROBLEM SELECTOR PROBLEM 4
A (Catheter Bln Inpact Admiral 5mm 80mm 130cm Accepts) balloon was inflated in the right common femoral artery. The balloon was inflated at 8 dean for 180 seconds at 4/20/2018 10:48 AM.  The balloon was used for 2nd inflation at 8 dean for 60 seconds.   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

## 2022-05-02 NOTE — PROGRESS NOTE ADULT - PROBLEM SELECTOR PLAN 3
-Management as per urology.  -Pain control PRN. Reports dx with RA in January in setting B shoulder pains, was taking some medicine ?meloxicam, but hasn't been taking recently, felt it made his feet swell up. Now just using Tylenol PRN. Saw Dr. Hussain Frederick rheumatology.  - left message for Dr. Frederick to review previous w/u, but does not seem like there is an acute exacerbation at this time

## 2022-05-02 NOTE — PROGRESS NOTE ADULT - SUBJECTIVE AND OBJECTIVE BOX
[facility] Division of Hospital Medicine  Xuan Davis MD  Pager (M-F, 8A-5P):  In-house pager [phone]; Long-range pager [phone]  Other Times:  Please page Hospitalist in Charge -  In-house pager [phone]    Patient is a 61y old  Male who presents with a chief complaint of sepsis (02 May 2022 07:54)    SUBJECTIVE / OVERNIGHT EVENTS:  Low grade temp over night...    ADDITIONAL REVIEW OF SYSTEMS:    MEDICATIONS  (STANDING):  cefTRIAXone   IVPB 1000 milliGRAM(s) IV Intermittent every 24 hours  heparin   Injectable 5000 Unit(s) SubCutaneous every 8 hours  lisinopril 20 milliGRAM(s) Oral daily  metoprolol succinate ER 25 milliGRAM(s) Oral daily  polyethylene glycol 3350 17 Gram(s) Oral daily  senna 2 Tablet(s) Oral at bedtime    MEDICATIONS  (PRN):  acetaminophen     Tablet .. 975 milliGRAM(s) Oral every 6 hours PRN Mild Pain (1 - 3), Moderate Pain (4 - 6), Severe Pain (7 - 10)  ketorolac   Injectable 15 milliGRAM(s) IV Push every 6 hours PRN Moderate Pain (4 - 6)  ondansetron Injectable 4 milliGRAM(s) IV Push every 6 hours PRN Nausea and/or Vomiting  oxyCODONE    IR 5 milliGRAM(s) Oral every 6 hours PRN Severe Pain (7 - 10)  senna 2 Tablet(s) Oral at bedtime PRN Constipation      CAPILLARY BLOOD GLUCOSE        I&O's Summary    01 May 2022 07:01  -  02 May 2022 07:00  --------------------------------------------------------  IN: 0 mL / OUT: 2050 mL / NET: -2050 mL        PHYSICAL EXAM:  Vital Signs Last 24 Hrs  T(C): 37.2 (02 May 2022 09:39), Max: 38.1 (02 May 2022 01:13)  T(F): 98.9 (02 May 2022 09:39), Max: 100.5 (02 May 2022 01:13)  HR: 107 (02 May 2022 09:39) (96 - 116)  BP: 139/91 (02 May 2022 09:39) (139/91 - 153/83)  BP(mean): --  RR: 19 (02 May 2022 09:39) (18 - 22)  SpO2: 93% (02 May 2022 09:39) (82% - 100%)    CONSTITUTIONAL: sitting up on couch, talking on phone, NAD  EYES: PERRLA; conjunctiva and sclera clear  ENMT: Moist oral mucosa, no pharyngeal injection or exudates; normal dentition  NECK: Supple, no palpable masses; no thyromegaly  RESPIRATORY: Normal respiratory effort; lungs are clear to auscultation bilaterally  CARDIOVASCULAR: Regular rate and rhythm, normal S1 and S2, no murmur/rub/gallop; No lower extremity edema; Peripheral pulses are 2+ bilaterally  ABDOMEN: Nontender to palpation, normoactive bowel sounds, no rebound/guarding; No hepatosplenomegaly  MUSCLOSKELETAL:  Normal gait; no clubbing or cyanosis of digits; no joint swelling or tenderness to palpation  PSYCH: A+O to person, place, and time; affect appropriate  NEUROLOGY: CN 2-12 are intact and symmetric; no gross sensory deficits;   SKIN: No rashes; no palpable lesions    LABS:                        10.9   14.32 )-----------( 298      ( 02 May 2022 07:13 )             34.5     05-02    137  |  100  |  16  ----------------------------<  91  3.7   |  24  |  0.99    Ca    9.0      02 May 2022 07:13  Phos  3.5     05-01  Mg     1.80     05-01    TPro  7.8  /  Alb  3.9  /  TBili  0.6  /  DBili  x   /  AST  72<H>  /  ALT  28  /  AlkPhos  79  05-01    PT/INR - ( 01 May 2022 00:57 )   PT: 14.6 sec;   INR: 1.26 ratio    PTT - ( 01 May 2022 00:57 )  PTT:31.8 sec    Urinalysis Basic - ( 01 May 2022 01:45 )  Color: Light Yellow / Appearance: Clear / S.014 / pH: x  Gluc: x / Ketone: Small  / Bili: Negative / Urobili: <2 mg/dL   Blood: x / Protein: 30 mg/dL / Nitrite: Negative   Leuk Esterase: Negative / RBC: 6 /HPF / WBC 2 /HPF   Sq Epi: x / Non Sq Epi: 0 /HPF / Bacteria: Negative    Culture - Blood (collected 01 May 2022 08:35)  Source: .Blood Blood-Peripheral  Preliminary Report (02 May 2022 09:01):    No growth to date.    Culture - Blood (collected 01 May 2022 08:35)  Source: .Blood Blood-Peripheral  Preliminary Report (02 May 2022 09:01):    No growth to date.    Culture - Urine (collected 01 May 2022 01:37)  Source: Clean Catch Clean Catch (Midstream)  Final Report (02 May 2022 07:28):    <10,000 CFU/mL Normal Urogenital Bri    RADIOLOGY & ADDITIONAL TESTS:  Results Reviewed:   Imaging Personally Reviewed:  Electrocardiogram Personally Reviewed:    COORDINATION OF CARE:  Care Discussed with Consultants/Other Providers [Y/N]: urology re overall care  Prior or Outpatient Records Reviewed [Y/N]:   [facility] Division of Hospital Medicine  Xuan Davis MD  Pager (M-F, 8A-5P):  In-house pager [phone]; Long-range pager [phone]  Other Times:  Please page Hospitalist in Charge -  In-house pager [phone]    Patient is a 61y old  Male who presents with a chief complaint of sepsis (02 May 2022 07:54)    SUBJECTIVE / OVERNIGHT EVENTS:  Low grade temp over night.  Seen earlier, sitting up on cough, talking on phone. Doing ok, stomach feels a little bloated. +flatus but no BM until enema last night, then liquidy BM.   Rare cough with little phlegm.   Reports dx with RA in January in setting B shoulder pains, was taking some medicine ?meloxicam, but hasn't been taking recently, felt it made his feet swell up. Now just using Tylenol PRN. Saw Dr. Hussain Frederick rheumatology.  ADDITIONAL REVIEW OF SYSTEMS:    MEDICATIONS  (STANDING):  cefTRIAXone   IVPB 1000 milliGRAM(s) IV Intermittent every 24 hours  heparin   Injectable 5000 Unit(s) SubCutaneous every 8 hours  lisinopril 20 milliGRAM(s) Oral daily  metoprolol succinate ER 25 milliGRAM(s) Oral daily  polyethylene glycol 3350 17 Gram(s) Oral daily  senna 2 Tablet(s) Oral at bedtime    MEDICATIONS  (PRN):  acetaminophen     Tablet .. 975 milliGRAM(s) Oral every 6 hours PRN Mild Pain (1 - 3), Moderate Pain (4 - 6), Severe Pain (7 - 10)  ketorolac   Injectable 15 milliGRAM(s) IV Push every 6 hours PRN Moderate Pain (4 - 6)  ondansetron Injectable 4 milliGRAM(s) IV Push every 6 hours PRN Nausea and/or Vomiting  oxyCODONE    IR 5 milliGRAM(s) Oral every 6 hours PRN Severe Pain (7 - 10)  senna 2 Tablet(s) Oral at bedtime PRN Constipation    I&O's Summary    01 May 2022 07:01  -  02 May 2022 07:00  --------------------------------------------------------  IN: 0 mL / OUT: 2050 mL / NET: -2050 mL    PHYSICAL EXAM:  Vital Signs Last 24 Hrs  T(C): 37.2 (02 May 2022 09:39), Max: 38.1 (02 May 2022 01:13)  T(F): 98.9 (02 May 2022 09:39), Max: 100.5 (02 May 2022 01:13)  HR: 107 (02 May 2022 09:39) (96 - 116)  BP: 139/91 (02 May 2022 09:39) (139/91 - 153/83)  BP(mean): --  RR: 19 (02 May 2022 09:39) (18 - 22)  SpO2: 93% (02 May 2022 09:39) (82% - 100%)    CONSTITUTIONAL: sitting up on couch, talking on phone, NAD  RESPIRATORY: Normal respiratory effort; lungs are clear to auscultation bilaterally  CARDIOVASCULAR: Regular rate and rhythm, normal S1 and S2, no murmur/rub/gallop; No lower extremity edema; Peripheral pulses are 2+ bilaterally  ABDOMEN: steri strips covering incisions, mildly bloated, +BS, no rebound/guarding  MUSCULOSKELETAL no clubbing or cyanosis of digits; no joint swelling or tenderness to palpation  PSYCH: A+O to person, place, and time; affect appropriate  NEUROLOGY: CN 2-12 are intact and symmetric; no gross sensory deficits;   SKIN: No rashes; no palpable lesions  no tenderness, erythema, increased warmth at current or old IV sites     LABS:                        10.9   14.32 )-----------( 298      ( 02 May 2022 07:13 )             34.5     05-    137  |  100  |  16  ----------------------------<  91  3.7   |  24  |  0.99    Ca    9.0      02 May 2022 07:13  Phos  3.5     05-  Mg     1.80     05-    TPro  7.8  /  Alb  3.9  /  TBili  0.6  /  DBili  x   /  AST  72<H>  /  ALT  28  /  AlkPhos  79  05-01    PT/INR - ( 01 May 2022 00:57 )   PT: 14.6 sec;   INR: 1.26 ratio    PTT - ( 01 May 2022 00:57 )  PTT:31.8 sec    Urinalysis Basic - ( 01 May 2022 01:45 )  Color: Light Yellow / Appearance: Clear / S.014 / pH: x  Gluc: x / Ketone: Small  / Bili: Negative / Urobili: <2 mg/dL   Blood: x / Protein: 30 mg/dL / Nitrite: Negative   Leuk Esterase: Negative / RBC: 6 /HPF / WBC 2 /HPF   Sq Epi: x / Non Sq Epi: 0 /HPF / Bacteria: Negative    Culture - Blood (collected 01 May 2022 08:35)  Source: .Blood Blood-Peripheral  Preliminary Report (02 May 2022 09:01):    No growth to date.    Culture - Blood (collected 01 May 2022 08:35)  Source: .Blood Blood-Peripheral  Preliminary Report (02 May 2022 09:01):    No growth to date.    Culture - Urine (collected 01 May 2022 01:37)  Source: Clean Catch Clean Catch (Midstream)  Final Report (02 May 2022 07:28):    <10,000 CFU/mL Normal Urogenital Bri    RADIOLOGY & ADDITIONAL TESTS:  Results Reviewed:   Imaging Personally Reviewed:  Electrocardiogram Personally Reviewed:    COORDINATION OF CARE:  Care Discussed with Consultants/Other Providers [Y/N]: urology re overall care  Prior or Outpatient Records Reviewed [Y/N]:   [facility] Division of Hospital Medicine  Xuan Davis MD  Pager (M-F, 8A-5P):  In-house pager [phone]; Long-range pager [phone]  Other Times:  Please page Hospitalist in Charge -  In-house pager [phone]    Patient is a 61y old  Male who presents with a chief complaint of sepsis (02 May 2022 07:54)    SUBJECTIVE / OVERNIGHT EVENTS:  Low grade temp over night.  Seen earlier, sitting up on cough, talking on phone. Doing ok, stomach feels a little bloated. +flatus, no BM until enema last night, then liquidy BM.   Rare cough with little phlegm.   Reports dx with RA in January in setting B shoulder pains, was taking some medicine ?meloxicam, but hasn't been taking recently, felt it made his feet swell up. Now just using Tylenol PRN. Saw Dr. Hussain Frederick rheumatology.  ADDITIONAL REVIEW OF SYSTEMS:    MEDICATIONS  (STANDING):  cefTRIAXone   IVPB 1000 milliGRAM(s) IV Intermittent every 24 hours  heparin   Injectable 5000 Unit(s) SubCutaneous every 8 hours  lisinopril 20 milliGRAM(s) Oral daily  metoprolol succinate ER 25 milliGRAM(s) Oral daily  polyethylene glycol 3350 17 Gram(s) Oral daily  senna 2 Tablet(s) Oral at bedtime    MEDICATIONS  (PRN):  acetaminophen     Tablet .. 975 milliGRAM(s) Oral every 6 hours PRN Mild Pain (1 - 3), Moderate Pain (4 - 6), Severe Pain (7 - 10)  ketorolac   Injectable 15 milliGRAM(s) IV Push every 6 hours PRN Moderate Pain (4 - 6)  ondansetron Injectable 4 milliGRAM(s) IV Push every 6 hours PRN Nausea and/or Vomiting  oxyCODONE    IR 5 milliGRAM(s) Oral every 6 hours PRN Severe Pain (7 - 10)  senna 2 Tablet(s) Oral at bedtime PRN Constipation    I&O's Summary    01 May 2022 07:01  -  02 May 2022 07:00  --------------------------------------------------------  IN: 0 mL / OUT: 2050 mL / NET: -2050 mL    PHYSICAL EXAM:  Vital Signs Last 24 Hrs  T(C): 37.2 (02 May 2022 09:39), Max: 38.1 (02 May 2022 01:13)  T(F): 98.9 (02 May 2022 09:39), Max: 100.5 (02 May 2022 01:13)  HR: 107 (02 May 2022 09:39) (96 - 116)  BP: 139/91 (02 May 2022 09:39) (139/91 - 153/83)  BP(mean): --  RR: 19 (02 May 2022 09:39) (18 - 22)  SpO2: 93% (02 May 2022 09:39) (82% - 100%)    CONSTITUTIONAL: sitting up on couch, talking on phone, NAD  RESPIRATORY: Normal respiratory effort; lungs are clear to auscultation bilaterally  CARDIOVASCULAR: Regular rate and rhythm, normal S1 and S2, no murmur/rub/gallop; No lower extremity edema; Peripheral pulses are 2+ bilaterally  ABDOMEN: steri strips covering incisions, mildly bloated, +BS, no rebound/guarding  MUSCULOSKELETAL no clubbing or cyanosis of digits; no joint swelling or tenderness to palpation  PSYCH: A+O to person, place, and time; affect appropriate  NEUROLOGY: CN 2-12 are intact and symmetric; no gross sensory deficits;   SKIN: No rashes; no palpable lesions  no tenderness, erythema, increased warmth at current or old IV sites     LABS:                        10.9   14.32 )-----------( 298      ( 02 May 2022 07:13 )             34.5     05-    137  |  100  |  16  ----------------------------<  91  3.7   |  24  |  0.99    Ca    9.0      02 May 2022 07:13  Phos  3.5     05-  Mg     1.80     05-    TPro  7.8  /  Alb  3.9  /  TBili  0.6  /  DBili  x   /  AST  72<H>  /  ALT  28  /  AlkPhos  79  05-01    PT/INR - ( 01 May 2022 00:57 )   PT: 14.6 sec;   INR: 1.26 ratio    PTT - ( 01 May 2022 00:57 )  PTT:31.8 sec    Urinalysis Basic - ( 01 May 2022 01:45 )  Color: Light Yellow / Appearance: Clear / S.014 / pH: x  Gluc: x / Ketone: Small  / Bili: Negative / Urobili: <2 mg/dL   Blood: x / Protein: 30 mg/dL / Nitrite: Negative   Leuk Esterase: Negative / RBC: 6 /HPF / WBC 2 /HPF   Sq Epi: x / Non Sq Epi: 0 /HPF / Bacteria: Negative    Culture - Blood (collected 01 May 2022 08:35)  Source: .Blood Blood-Peripheral  Preliminary Report (02 May 2022 09:01):    No growth to date.    Culture - Blood (collected 01 May 2022 08:35)  Source: .Blood Blood-Peripheral  Preliminary Report (02 May 2022 09:01):    No growth to date.    Culture - Urine (collected 01 May 2022 01:37)  Source: Clean Catch Clean Catch (Midstream)  Final Report (02 May 2022 07:28):    <10,000 CFU/mL Normal Urogenital Bri    RADIOLOGY & ADDITIONAL TESTS:  Results Reviewed:   Imaging Personally Reviewed:  Electrocardiogram Personally Reviewed:    COORDINATION OF CARE:  Care Discussed with Consultants/Other Providers [Y/N]: urology re overall care  Prior or Outpatient Records Reviewed [Y/N]:

## 2022-05-02 NOTE — PROGRESS NOTE ADULT - SUBJECTIVE AND OBJECTIVE BOX
Cardiovascular Disease Progress Note    Overnight events: No acute events overnight.  readmitted for fever. no cp/sob/palps/dizziness  Otherwise review of systems negative    Objective Findings:  T(C): 36.8 (22 @ 05:30), Max: 38.1 (22 @ 01:13)  HR: 96 (22 @ 05:30) (96 - 116)  BP: 144/85 (22 @ 05:30) (142/85 - 153/83)  RR: 18 (22 @ 05:30) (18 - 22)  SpO2: 96% (22 @ 05:30) (82% - 100%)  Wt(kg): --  Daily     Daily Weight in k.8 (02 May 2022 01:13)      Physical Exam:  Gen: NAD  HEENT: EOMI  CV: RRR, normal S1 + S2, no m/r/g  Lungs: CTAB  Abd: soft, non-tender  Ext: No edema    Telemetry:    Laboratory Data:                        10.9   14.32 )-----------( 298      ( 02 May 2022 07:13 )             34.5     05-02    137  |  100  |  16  ----------------------------<  91  3.7   |  24  |  0.99    Ca    9.0      02 May 2022 07:13  Phos  3.5     05-  Mg     1.80     05-    TPro  7.8  /  Alb  3.9  /  TBili  0.6  /  DBili  x   /  AST  72<H>  /  ALT  28  /  AlkPhos  79  05-01    PT/INR - ( 01 May 2022 00:57 )   PT: 14.6 sec;   INR: 1.26 ratio         PTT - ( 01 May 2022 00:57 )  PTT:31.8 sec          Inpatient Medications:  MEDICATIONS  (STANDING):  cefTRIAXone   IVPB 1000 milliGRAM(s) IV Intermittent every 24 hours  heparin   Injectable 5000 Unit(s) SubCutaneous every 8 hours  lisinopril 20 milliGRAM(s) Oral daily  metoprolol succinate ER 25 milliGRAM(s) Oral daily  polyethylene glycol 3350 17 Gram(s) Oral daily  senna 2 Tablet(s) Oral at bedtime      Assessment:  61M with PMH of HTN, OA, chronic SOB on intermittent home O2 found to have right kidney mass now s/p R partial nephrectomy c/b postop fever    Recs:  cardiac stable  no postop cardiac events noted  chronic dyspnea with extensive op cardiac and pulmonary workup thats been unrevealing. no signs of chf or ischemic at present  care per urology  dvt ppx  f/u fever workup  will follow        Over 25 minutes spent on total encounter; more than 50% of the visit was spent counseling and/or coordinating care by the attending physician.      Juarez Carver MD   Cardiovascular Disease  [phone]

## 2022-05-02 NOTE — PROGRESS NOTE ADULT - ASSESSMENT
61M with PMH of HTN, OA, chronic SOB on intermittent home O2 found to have right kidney mass. 4/29 s/p RAL R partial nephrectomy, discharged 5/1, returned with fever, lactate 2.4, given 2L fluid bolus in ED. Infectious workup is neg to date. UA fairly unremarkable with only 2WBCs. RVP negative. He does have leukocytosis. CTA ruled out PE, however, he was noted to have GGO, which may be indicative of pulmonary edema. Pt admitted for evaluation of post-op fever. 61M HTN, OA, ?RA, chronic SOB on intermittent home O2 found to have right kidney mass. 4/29 s/p RAL R partial nephrectomy, discharged 5/1, returned with fever, lactate 2.4, given 2L fluid bolus in ED. Infectious workup is neg to date. UA fairly unremarkable with only 2WBCs. RVP negative. He does have leukocytosis. CTA ruled out PE, however, he was noted to have GGO, which may be indicative of pulmonary edema. Pt admitted for evaluation of post-op fever.

## 2022-05-02 NOTE — PROGRESS NOTE ADULT - SUBJECTIVE AND OBJECTIVE BOX
PULMONARY PROGRESS NOTE    PLACIDO GOLDMAN  MRN-[id number]    Patient is a 61y old  Male who presents with a chief complaint of fever (02 May 2022 09:53)      HPI:  -known to Dr Hill, pfts outpatient show normal belkis/volume with severe reduced dlco  covid vaccine & booster done. - no autoimmune history in family - denies night sweat, -no hemoptysis, has lost about 20 lbs.    -he is now s/p right partial nephrectomy path pending , felt febrile a few hours after discharge so returned back to [facility]      pulm consult placed for fever/abnormal CT chest  seen on 2L 100%   today- feels dyspnea with exertion, - some cough, febrile last night       ROS:   -    ACTIVE MEDICATION LIST:  MEDICATIONS  (STANDING):  cefTRIAXone   IVPB 1000 milliGRAM(s) IV Intermittent every 24 hours  heparin   Injectable 5000 Unit(s) SubCutaneous every 8 hours  lisinopril 20 milliGRAM(s) Oral daily  metoprolol succinate ER 25 milliGRAM(s) Oral daily  polyethylene glycol 3350 17 Gram(s) Oral daily  senna 2 Tablet(s) Oral at bedtime    MEDICATIONS  (PRN):  acetaminophen     Tablet .. 975 milliGRAM(s) Oral every 6 hours PRN Mild Pain (1 - 3), Moderate Pain (4 - 6), Severe Pain (7 - 10)  ketorolac   Injectable 15 milliGRAM(s) IV Push every 6 hours PRN Moderate Pain (4 - 6)  ondansetron Injectable 4 milliGRAM(s) IV Push every 6 hours PRN Nausea and/or Vomiting  oxyCODONE    IR 5 milliGRAM(s) Oral every 6 hours PRN Severe Pain (7 - 10)  senna 2 Tablet(s) Oral at bedtime PRN Constipation      EXAM:  Vital Signs Last 24 Hrs  T(C): 37.2 (02 May 2022 13:50), Max: 38.1 (02 May 2022 01:13)  T(F): 98.9 (02 May 2022 13:50), Max: 100.5 (02 May 2022 01:13)  HR: 93 (02 May 2022 13:50) (93 - 116)  BP: 134/78 (02 May 2022 13:50) (134/78 - 153/83)  BP(mean): --  RR: 19 (02 May 2022 13:50) (18 - 22)  SpO2: 96% (02 May 2022 13:50) (82% - 100%)    GENERAL: The patient is awake and alert in no apparent distress.     LUNGS: Clear to auscultation without wheezing, rales or rhonchi; respirations unlabored    HEART: Regular rate and rhythm without murmur.                            10.9   14.32 )-----------( 298      ( 02 May 2022 07:13 )             34.5       05-02    137  |  100  |  16  ----------------------------<  91  3.7   |  24  |  0.99    Ca    9.0      02 May 2022 07:13  Phos  3.5     05-01  Mg     1.80     05-01    TPro  7.8  /  Alb  3.9  /  TBili  0.6  /  DBili  x   /  AST  72<H>  /  ALT  28  /  AlkPhos  79  05-01     rad< from: CT Angio Chest PE Protocol w/ IV Cont (05.01.22 @ 03:49) >  INTERPRETATION:  CLINICAL INFORMATION: Persistent fevers, partial right   nephrectomy 4/29/2022. Question pulmonary embolism.    COMPARISON: CTA chest 3/8/2022.    CONTRAST/COMPLICATIONS:  IV Contrast: Omnipaque 350  90 cc administered   10 cc discarded  Oral Contrast: NONE  Complications: None reported at time of study completion    PROCEDURE:  CT Angiogram of the chest was obtainedwith intravenous contrast. Three   dimensional maximum intensity projection (MIP) images were generated.    FINDINGS:    PULMONARY ANGIOGRAM: No pulmonary embolism.  Dilatation of the main   pulmonary artery at 3.5 cm.    LYMPH NODES/MEDIASTINUM: Enlarged right hilar lymph node measuring 1.5   cm, slightly increased when compared to prior study.    HEART/VASCULATURE: Heart size is normal. No pericardial effusion.  No   aortic dissection.    AIRWAYS/LUNGS/PLEURA: Patent central airways. Bilateral lower lobe   subsegmental atelectasis. Bilateral groundglass opacities, most confluent   in the left lower lobe. Mild interlobular septal thickening. No pleural   effusion or pneumothorax.    UPPER ABDOMEN: Pneumoperitoneum, likely expected postoperative change.    BONES/SOFT TISSUES: Similar left greater than right bilateral axillary   lymphadenopathy. Postoperative changes of the anterior abdomen with   subcutaneous emphysema. Degenerative changes of the spine and bilateral   glenohumeral joints..    IMPRESSION:    No pulmonary embolism.    Bilateral groundglass opacities and interlobular septal thickening   suggestive of mild pulmonary edema.    Pneumoperitoneum, likely expected postoperative change.    Similar bilateral axillary lymphadenopathy. Slight increase in right   hilar lymphadenopathy compared to prior study 3/8/2022.    --- End of Report ---          < end of copied text >      PROBLEM LIST:  61y Male with HEALTH ISSUES - PROBLEM Dx:  Fever    Hypertension    Neoplasm of uncertain behavior of kidney, right    Dyspnea    Rheumatoid arthritis         RECS:  CT suggestive of fluid overload, would defer to cardio re: any role in diuresis  on empiric abx  f/u cultures  if he remains febrile despite abx- CT abdomen pelvis?  f/u path from renal biopsy  lymphadenopathy? needs follow up outpatient  use IS- post operatively  check room air pulse ox          Please call with any questions.    Ernestina Ye DO  [facility] Pulmonary/Sleep Medicine  [phone]

## 2022-05-02 NOTE — PROGRESS NOTE ADULT - PROBLEM SELECTOR PLAN 1
Post-op fever. No new fevers since admission. No obvious source. UA/RVP neg. No obvious signs of URI/LRI  -Monitor off antipyretics.  -Urology started pt on CTX.   -Follow up cultures.   -CTA ruled out PE. Would complete workup by checking LE venous duplex to r/o DVT.  -Incentive spirometer use to avoid atelectasis. Post-op fever. Tm 100.5 this am  No obvious source. WBC 14...  - UA/RVP neg, blood cx x2 NGTD; cCT no PE but bilateral GGO  -Monitor off antipyretics.  -on CTX per urology  -Follow up cultures, temp, WBC  -CTA ruled out PE. Would complete workup by checking LE venous duplex to r/o DVT.  -Incentive spirometer use to avoid atelectasis.

## 2022-05-02 NOTE — PROGRESS NOTE ADULT - ASSESSMENT
This 62 yo M had R. lap partial neph 4/29; admitted yesterday for fevers at home; low grade here; CTA neg  Plan:  - cultures pending  - LE dopplers pending  - AM labs  - poss CT abd

## 2022-05-03 ENCOUNTER — TRANSCRIPTION ENCOUNTER (OUTPATIENT)
Age: 62
End: 2022-05-03

## 2022-05-03 VITALS
RESPIRATION RATE: 17 BRPM | DIASTOLIC BLOOD PRESSURE: 83 MMHG | OXYGEN SATURATION: 96 % | HEART RATE: 98 BPM | SYSTOLIC BLOOD PRESSURE: 128 MMHG | TEMPERATURE: 100 F

## 2022-05-03 LAB
HCT VFR BLD CALC: 32.5 % — LOW (ref 39–50)
HGB BLD-MCNC: 10.3 G/DL — LOW (ref 13–17)
MCHC RBC-ENTMCNC: 29 PG — SIGNIFICANT CHANGE UP (ref 27–34)
MCHC RBC-ENTMCNC: 31.7 GM/DL — LOW (ref 32–36)
MCV RBC AUTO: 91.5 FL — SIGNIFICANT CHANGE UP (ref 80–100)
NRBC # BLD: 0 /100 WBCS — SIGNIFICANT CHANGE UP
NRBC # FLD: 0 K/UL — SIGNIFICANT CHANGE UP
PLATELET # BLD AUTO: 286 K/UL — SIGNIFICANT CHANGE UP (ref 150–400)
RBC # BLD: 3.55 M/UL — LOW (ref 4.2–5.8)
RBC # FLD: 13.7 % — SIGNIFICANT CHANGE UP (ref 10.3–14.5)
WBC # BLD: 10.85 K/UL — HIGH (ref 3.8–10.5)
WBC # FLD AUTO: 10.85 K/UL — HIGH (ref 3.8–10.5)

## 2022-05-03 PROCEDURE — 99232 SBSQ HOSP IP/OBS MODERATE 35: CPT

## 2022-05-03 RX ORDER — FUROSEMIDE 40 MG
20 TABLET ORAL DAILY
Refills: 0 | Status: DISCONTINUED | OUTPATIENT
Start: 2022-05-03 | End: 2022-05-03

## 2022-05-03 RX ADMIN — Medication 25 MILLIGRAM(S): at 05:36

## 2022-05-03 RX ADMIN — OXYCODONE HYDROCHLORIDE 5 MILLIGRAM(S): 5 TABLET ORAL at 12:37

## 2022-05-03 RX ADMIN — OXYCODONE HYDROCHLORIDE 5 MILLIGRAM(S): 5 TABLET ORAL at 13:37

## 2022-05-03 RX ADMIN — HEPARIN SODIUM 5000 UNIT(S): 5000 INJECTION INTRAVENOUS; SUBCUTANEOUS at 05:36

## 2022-05-03 RX ADMIN — CEFTRIAXONE 100 MILLIGRAM(S): 500 INJECTION, POWDER, FOR SOLUTION INTRAMUSCULAR; INTRAVENOUS at 05:36

## 2022-05-03 RX ADMIN — Medication 20 MILLIGRAM(S): at 12:34

## 2022-05-03 RX ADMIN — LISINOPRIL 20 MILLIGRAM(S): 2.5 TABLET ORAL at 05:36

## 2022-05-03 NOTE — DISCHARGE NOTE PROVIDER - CARE PROVIDER_API CALL
Alcides Gardner)  Urology  [address] [place] [address], [place] [address]  Phone: [phone]  Fax: [phone]  Follow Up Time:    Alcides Gardner)  Urology  [address], [address]  [place] [address]  Phone: [phone]  Fax: [phone]  Follow Up Time:     Jocelyne Hill)  Critical Care Medicine; Internal Medicine; Pulmonary Disease  [address] [facility] [facility] [address]  [place] [address]  Phone: [phone]  Fax: [phone]  Follow Up Time:    Alcides Gardner)  Urology  [address] [facility], [address]  [place] [address]  Phone: [phone]  Fax: [phone]  Follow Up Time:     Jocelyne Hill)  Critical Care Medicine; Internal Medicine; Pulmonary Disease  [address] [facility] [address]  [place] [address]  Phone: [phone]  Fax: [phone]  Follow Up Time:     Jose Daniel Carver  CARDIOVASCULAR DISEASE  [address] [place] [address]  Phone: [phone]  Fax: [phone]  Follow Up Time:

## 2022-05-03 NOTE — DISCHARGE NOTE NURSING/CASE MANAGEMENT/SOCIAL WORK - PATIENT PORTAL LINK FT
You can access the FollowMyHealth Patient Portal offered by [facility] by registering at the following website: [email or web address]. By joining [email or web address]’s FollowMyHealth portal, you will also be able to view your health information using other applications (apps) compatible with our system.

## 2022-05-03 NOTE — PROGRESS NOTE ADULT - SUBJECTIVE AND OBJECTIVE BOX
No events overnite  Afeb 105/49 73 96%RA  Pt has no c/o; feels better  Abd- soft NT ND; + flatus +BM    wounds C&D  Voiding  All cultures neg/NTD  Doppler neg

## 2022-05-03 NOTE — DISCHARGE NOTE PROVIDER - HOSPITAL COURSE
This 60 yo M had R. lap partial neph 4/29; admitted 5/1 for fevers at home; low grade here to 100.5;  abebrile now 36 hrs  - CTA neg  - cultures neg  - LE dopplers neg  No w/u needed from cards, pulm or med; home today; f/u in office This 60 yo M had R. lap partial neph 4/29; admitted 5/1 for fevers at home; low grade here to 100.5;  abebrile now 36 hrs  - CTA neg  - cultures neg  - LE dopplers neg  ****spoke with pt's cardiologist Dr Carver; he will do echo in office

## 2022-05-03 NOTE — PROGRESS NOTE ADULT - PROBLEM SELECTOR PLAN 3
Reports dx with RA in January in setting B shoulder pains, was taking some medicine ?meloxicam, but hasn't been taking recently, felt it made his feet swell up. Now just using Tylenol PRN. Saw Dr. Hussain Frederick rheumatology.  - left message for Dr. Frederick to review previous w/u, but does not seem like there is an acute exacerbation at this time Reports dx with RA in January in setting B shoulder pains, was taking some medicine ?meloxicam, but hasn't been taking recently, felt it made his feet swell up. Now just using Tylenol PRN. Saw Dr. Hussain Frederick rheumatology.  - outpt f/u Dr. Frederick

## 2022-05-03 NOTE — PROGRESS NOTE ADULT - PROBLEM SELECTOR PLAN 1
Post-op fever. Tm 100.5 this am  No obvious source. WBC 14...  - UA/RVP neg, blood cx x2 NGTD; cCT no PE but bilateral GGO  -Monitor off antipyretics.  -on CTX per urology  -Follow up cultures, temp, WBC  -CTA ruled out PE. Would complete workup by checking LE venous duplex to r/o DVT.  -Incentive spirometer use to avoid atelectasis. afebrile past 24 hours, WBC down to 10.8  No obvious source  - UA/RVP neg, blood cx x2 NGTD; cCT no PE but bilateral GGO   -Monitor off antipyretics.  -on CTX per urology - d/c off abx  -Follow up cultures, temp, WBC  -CTA ruled out PE, BLE US neg  d/w pulm - GGO was probably fluid, doubt pneumonia; pulm recommend outpt f/u for enlarged lymph nodes noted on PET scan

## 2022-05-03 NOTE — PROGRESS NOTE ADULT - SUBJECTIVE AND OBJECTIVE BOX
[facility] Division of Hospital Medicine  Xuan Davis MD  Pager (M-F, 8A-5P):  In-house pager [phone]; Long-range pager [phone]  Other Times:  Please page Hospitalist in Charge -  In-house pager [phone]    Patient is a 61y old  Male who presents with a chief complaint of sepsis (03 May 2022 10:10)      SUBJECTIVE / OVERNIGHT EVENTS:    ADDITIONAL REVIEW OF SYSTEMS:    MEDICATIONS  (STANDING):  cefTRIAXone   IVPB 1000 milliGRAM(s) IV Intermittent every 24 hours  furosemide    Tablet 20 milliGRAM(s) Oral daily  heparin   Injectable 5000 Unit(s) SubCutaneous every 8 hours  lisinopril 20 milliGRAM(s) Oral daily  metoprolol succinate ER 25 milliGRAM(s) Oral daily  polyethylene glycol 3350 17 Gram(s) Oral daily  senna 2 Tablet(s) Oral at bedtime    MEDICATIONS  (PRN):  acetaminophen     Tablet .. 975 milliGRAM(s) Oral every 6 hours PRN Mild Pain (1 - 3), Moderate Pain (4 - 6), Severe Pain (7 - 10)  ketorolac   Injectable 15 milliGRAM(s) IV Push every 6 hours PRN Moderate Pain (4 - 6)  ondansetron Injectable 4 milliGRAM(s) IV Push every 6 hours PRN Nausea and/or Vomiting  oxyCODONE    IR 5 milliGRAM(s) Oral every 6 hours PRN Severe Pain (7 - 10)  senna 2 Tablet(s) Oral at bedtime PRN Constipation      CAPILLARY BLOOD GLUCOSE        I&O's Summary      PHYSICAL EXAM:  Vital Signs Last 24 Hrs  T(C): 37 (03 May 2022 09:34), Max: 37.5 (02 May 2022 21:30)  T(F): 98.6 (03 May 2022 09:34), Max: 99.5 (02 May 2022 21:30)  HR: 100 (03 May 2022 09:34) (92 - 102)  BP: 150/83 (03 May 2022 09:34) (134/78 - 152/80)  BP(mean): --  RR: 18 (03 May 2022 09:34) (18 - 19)  SpO2: 95% (03 May 2022 09:34) (95% - 98%)    CONSTITUTIONAL: resting in bed, NAD  RESPIRATORY: Normal respiratory effort; lungs are clear to auscultation bilaterally  CARDIOVASCULAR: Regular rate and rhythm, normal S1 and S2, no murmur/rub/gallop; No lower extremity edema; Peripheral pulses are 2+ bilaterally  ABDOMEN: steri strips covering incisions, mildly bloated, +BS, no rebound/guarding  MUSCULOSKELETAL no clubbing or cyanosis of digits; no joint swelling or tenderness to palpation  PSYCH: A+O to person, place, and time; affect appropriate  NEUROLOGY: CN 2-12 are intact and symmetric; no gross sensory deficits;   SKIN: No rashes; no palpable lesions  no tenderness, erythema, increased warmth at current or old IV sites     LABS:                        10.3   10.85 )-----------( 286      ( 03 May 2022 06:53 )             32.5     05-02    137  |  100  |  16  ----------------------------<  91  3.7   |  24  |  0.99    Ca    9.0      02 May 2022 07:13    Culture - Blood (collected 01 May 2022 08:35)  Source: .Blood Blood-Peripheral  Preliminary Report (02 May 2022 09:01):    No growth to date.    Culture - Blood (collected 01 May 2022 08:35)  Source: .Blood Blood-Peripheral  Preliminary Report (02 May 2022 09:01):    No growth to date.    Culture - Urine (collected 01 May 2022 01:37)  Source: Clean Catch Clean Catch (Midstream)  Final Report (02 May 2022 07:28):    <10,000 CFU/mL Normal Urogenital Bri    RADIOLOGY & ADDITIONAL TESTS:  Results Reviewed:   Imaging Personally Reviewed:  Electrocardiogram Personally Reviewed:    COORDINATION OF CARE:  Care Discussed with Consultants/Other Providers [Y/N]: urology re overall care  Prior or Outpatient Records Reviewed [Y/N]:   [facility] Division of Hospital Medicine  Xuan Davis MD  Pager (M-F, 8A-5P):  In-house pager [phone]; Long-range pager [phone]  Other Times:  Please page Hospitalist in Charge -  In-house pager [phone]    Patient is a 61y old  Male who presents with a chief complaint of sepsis (03 May 2022 10:10)      SUBJECTIVE / OVERNIGHT EVENTS:  Pt seen earlier, doing ok. Mild cough without change. No chest pain. When coughs mild discomfort.   ADDITIONAL REVIEW OF SYSTEMS:    MEDICATIONS  (STANDING):  cefTRIAXone   IVPB 1000 milliGRAM(s) IV Intermittent every 24 hours  furosemide    Tablet 20 milliGRAM(s) Oral daily  heparin   Injectable 5000 Unit(s) SubCutaneous every 8 hours  lisinopril 20 milliGRAM(s) Oral daily  metoprolol succinate ER 25 milliGRAM(s) Oral daily  polyethylene glycol 3350 17 Gram(s) Oral daily  senna 2 Tablet(s) Oral at bedtime    MEDICATIONS  (PRN):  acetaminophen     Tablet .. 975 milliGRAM(s) Oral every 6 hours PRN Mild Pain (1 - 3), Moderate Pain (4 - 6), Severe Pain (7 - 10)  ketorolac   Injectable 15 milliGRAM(s) IV Push every 6 hours PRN Moderate Pain (4 - 6)  ondansetron Injectable 4 milliGRAM(s) IV Push every 6 hours PRN Nausea and/or Vomiting  oxyCODONE    IR 5 milliGRAM(s) Oral every 6 hours PRN Severe Pain (7 - 10)  senna 2 Tablet(s) Oral at bedtime PRN Constipation    PHYSICAL EXAM:  Vital Signs Last 24 Hrs  T(C): 37 (03 May 2022 09:34), Max: 37.5 (02 May 2022 21:30)  T(F): 98.6 (03 May 2022 09:34), Max: 99.5 (02 May 2022 21:30)  HR: 100 (03 May 2022 09:34) (92 - 102)  BP: 150/83 (03 May 2022 09:34) (134/78 - 152/80)  BP(mean): --  RR: 18 (03 May 2022 09:34) (18 - 19)  SpO2: 95% (03 May 2022 09:34) (95% - 98%)    CONSTITUTIONAL: resting in bed, NAD  RESPIRATORY: Normal respiratory effort; lungs are clear to auscultation bilaterally  CARDIOVASCULAR: Regular rate and rhythm, normal S1 and S2, no murmur/rub/gallop; No lower extremity edema; Peripheral pulses are 2+ bilaterally  ABDOMEN: steri strips covering incisions, +BS, no rebound/guarding  MUSCULOSKELETAL no clubbing or cyanosis of digits; no joint swelling or tenderness to palpation  PSYCH: A+O to person, place, and time; affect appropriate  NEUROLOGY: CN 2-12 are intact and symmetric; no gross sensory deficits;   SKIN: No rashes; no palpable lesions  no tenderness, erythema, increased warmth at current or old IV sites     LABS:                        10.3   10.85 )-----------( 286      ( 03 May 2022 06:53 )             32.5     05-02    137  |  100  |  16  ----------------------------<  91  3.7   |  24  |  0.99    Ca    9.0      02 May 2022 07:13    Culture - Blood (collected 01 May 2022 08:35)  Source: .Blood Blood-Peripheral  Preliminary Report (02 May 2022 09:01):    No growth to date.    Culture - Blood (collected 01 May 2022 08:35)  Source: .Blood Blood-Peripheral  Preliminary Report (02 May 2022 09:01):    No growth to date.    Culture - Urine (collected 01 May 2022 01:37)  Source: Clean Catch Clean Catch (Midstream)  Final Report (02 May 2022 07:28):    <10,000 CFU/mL Normal Urogenital Bri    RADIOLOGY & ADDITIONAL TESTS:  Results Reviewed:   Imaging Personally Reviewed:  Electrocardiogram Personally Reviewed:    COORDINATION OF CARE:  Care Discussed with Consultants/Other Providers [Y/N]: urology re overall care  Prior or Outpatient Records Reviewed [Y/N]:

## 2022-05-03 NOTE — PROGRESS NOTE ADULT - ASSESSMENT
61M HTN, OA, ?RA, chronic SOB on intermittent home O2 found to have right kidney mass. 4/29 s/p RAL R partial nephrectomy, discharged 5/1, returned with fever, lactate 2.4, given 2L fluid bolus in ED. Infectious workup is neg to date. UA fairly unremarkable with only 2WBCs. RVP negative. He does have leukocytosis. CTA ruled out PE, however, he was noted to have GGO, which may be indicative of pulmonary edema. Pt admitted for evaluation of post-op fever. 61M HTN, OA, ?RA, chronic SOB on intermittent home O2 found to have right kidney mass. 4/29 s/p RAL R partial nephrectomy, discharged 5/1, returned with fever, lactate 2.4, given 2L fluid bolus in ED. Infectious workup is neg to date. UA fairly unremarkable with only 2WBCs. RVP negative. He does have leukocytosis. CTA ruled out PE, however, he was noted to have GGO, which may be indicative of pulmonary edema. Pt admitted for evaluation of post-op fever.  Infectious w/u negative

## 2022-05-03 NOTE — PROGRESS NOTE ADULT - PROBLEM SELECTOR PLAN 5
Stable. Continue home medicine.    DVT ppx- as per primary team.
Stable. Continue home medicine.    DVT ppx- as per primary team.

## 2022-05-03 NOTE — PROGRESS NOTE ADULT - SUBJECTIVE AND OBJECTIVE BOX
Cardiovascular Disease Progress Note    Overnight events: No acute events overnight.  no cp/sob/palps/dizziness  Otherwise review of systems negative    Objective Findings:  T(C): 37.4 (22 @ 05:35), Max: 37.5 (22 @ 21:30)  HR: 102 (22 @ 05:35) (92 - 107)  BP: 146/86 (22 @ 05:35) (134/78 - 152/80)  RR: 18 (22 @ 05:35) (18 - 19)  SpO2: 95% (22 @ 05:35) (93% - 98%)  Wt(kg): --  Daily     Daily Weight in k.4 (03 May 2022 01:37)      Physical Exam:  Gen: NAD  HEENT: EOMI  CV: RRR, normal S1 + S2, no m/r/g  Lungs: CTAB  Abd: soft, non-tender  Ext: No edema    Telemetry:    Laboratory Data:                        10.3   10.85 )-----------( 286      ( 03 May 2022 06:53 )             32.5         137  |  100  |  16  ----------------------------<  91  3.7   |  24  |  0.99    Ca    9.0      02 May 2022 07:13                Inpatient Medications:  MEDICATIONS  (STANDING):  cefTRIAXone   IVPB 1000 milliGRAM(s) IV Intermittent every 24 hours  furosemide    Tablet 20 milliGRAM(s) Oral daily  heparin   Injectable 5000 Unit(s) SubCutaneous every 8 hours  lisinopril 20 milliGRAM(s) Oral daily  metoprolol succinate ER 25 milliGRAM(s) Oral daily  polyethylene glycol 3350 17 Gram(s) Oral daily  senna 2 Tablet(s) Oral at bedtime      Assessment:  61M with PMH of HTN, OA, chronic SOB on intermittent home O2 found to have right kidney mass now s/p R partial nephrectomy c/b postop fever    Recs:  cardiac stable  no postop cardiac events noted  ct chest suggestive of mild pulmonary edema, elevated bnp, slight dyspnea --> suspect secondary to volume resuscitation from IV fluid administration --> start lasix 20mg po qd, check 2d echo  f/u pulmonary  care per urology  dvt ppx  f/u fever workup  will follow        Over 25 minutes spent on total encounter; more than 50% of the visit was spent counseling and/or coordinating care by the attending physician.      Juarez Carver MD   Cardiovascular Disease  [phone]

## 2022-05-03 NOTE — DISCHARGE NOTE NURSING/CASE MANAGEMENT/SOCIAL WORK - NSDCPNINST_GEN_ALL_CORE
Notify Dr Gardner if you experience any fever >100.5, shaking chills, nausea vomiting.  Drink plenty of fluids.  Complete your course of antibiotics as prescribed.

## 2022-05-03 NOTE — PROGRESS NOTE ADULT - ASSESSMENT
This 62 yo M had R. lap partial neph 4/29; admitted yesterday for fevers at home; low grade here; CTA neg  Plan:  - cultures neg  - LE dopplers neg  - AM CBC  - f/u med, cards, pulm

## 2022-05-03 NOTE — DISCHARGE NOTE NURSING/CASE MANAGEMENT/SOCIAL WORK - NSDCPEFALRISK_GEN_ALL_CORE
For information on Fall & Injury Prevention, visit: [email or web address] OR  [email or web address] OR  https://www.cdc.gov/steadi/patient.html

## 2022-05-03 NOTE — DISCHARGE NOTE PROVIDER - PROVIDER TOKENS
PROVIDER:[TOKEN:[[id number]]] PROVIDER:[TOKEN:[[id number]]],PROVIDER:[TOKEN:[[id number]]] PROVIDER:[TOKEN:[[id number]]],PROVIDER:[TOKEN:[[id number]]],PROVIDER:[TOKEN:[[id number]]]

## 2022-05-03 NOTE — DISCHARGE NOTE PROVIDER - CARE PROVIDERS DIRECT ADDRESSES
,[email or web address]

## 2022-05-03 NOTE — DISCHARGE NOTE PROVIDER - NSDCMRMEDTOKEN_GEN_ALL_CORE_FT
clobetasol topical: Apply topically to affected area 2 times a day  meloxicam 7.5 mg oral tablet: 1 tab(s) orally once a day, As Needed LD 4/22/2022  metoprolol tartrate 25 mg oral tablet: 1 tab(s) orally once a day  ramipril 5 mg oral capsule: 1 cap(s) orally once a day

## 2022-05-03 NOTE — PROGRESS NOTE ADULT - PROBLEM SELECTOR PLAN 2
Chronic in nature. Per cards - had extensive pulm/cardio workup that was unrevealing. At this time, CTA of chest did show GGO, which may be indicative of pulmonary edema, which correlated with his lung exam on initial eval by hospitalist. s/p about 3L IVFs  - BNP mildly elevated 964, s/p Lasix 20mg IV x1.  - weaned off O2  - cards notes "chronic dyspnea with extensive op cardiac and pulmonary workup thats been unrevealing. no signs of chf or ischemic at present" Chronic in nature. Per cards - had extensive pulm/cardio workup that was unrevealing. At this time, CTA of chest did show GGO, which may be indicative of pulmonary edema, which correlated with his lung exam on initial eval by hospitalist. s/p about 3L IVFs  - BNP mildly elevated 964, s/p Lasix 20mg IV x1.  - weaned off O2  - cards notes "chronic dyspnea with extensive op cardiac and pulmonary workup thats been unrevealing. no signs of chf or ischemic at present"  d/w pulm - GGO was probably fluid, doubt pneumonia; pulm recommend outpt f/u for enlarged lymph nodes noted on PET scan

## 2022-05-03 NOTE — DISCHARGE NOTE PROVIDER - NSDCCPCAREPLAN_GEN_ALL_CORE_FT
PRINCIPAL DISCHARGE DIAGNOSIS  Diagnosis: Sepsis  Assessment and Plan of Treatment: Drink plenty of fluids  Follow same recommendations from discharge on Saturday  Call Dr Gardner's off ice f/u appt       PRINCIPAL DISCHARGE DIAGNOSIS  Diagnosis: Sepsis  Assessment and Plan of Treatment: Drink plenty of fluids  Follow same recommendations from discharge on Saturday  Call Dr Gardner's off ice f/u appt      SECONDARY DISCHARGE DIAGNOSES  Diagnosis: Enlarged lymph nodes  Assessment and Plan of Treatment: Make an appointment to see Dr. Hill to follow up on the enlarged lymph nodes noted on your PET scan.     PRINCIPAL DISCHARGE DIAGNOSIS  Diagnosis: Sepsis  Assessment and Plan of Treatment: Drink plenty of fluids  Follow same recommendations from discharge on Saturday  Call Dr Gardner's off ice f/u appt  F/U with Dr Carver to do echo in office      SECONDARY DISCHARGE DIAGNOSES  Diagnosis: Enlarged lymph nodes  Assessment and Plan of Treatment: Make an appointment to see Dr. Hill to follow up on the enlarged lymph nodes noted on your PET scan.

## 2022-05-05 LAB — SURGICAL PATHOLOGY STUDY: SIGNIFICANT CHANGE UP

## 2022-05-06 ENCOUNTER — NON-APPOINTMENT (OUTPATIENT)
Age: 62
End: 2022-05-06

## 2022-05-06 LAB
CULTURE RESULTS: SIGNIFICANT CHANGE UP
CULTURE RESULTS: SIGNIFICANT CHANGE UP
SPECIMEN SOURCE: SIGNIFICANT CHANGE UP
SPECIMEN SOURCE: SIGNIFICANT CHANGE UP

## 2022-05-12 RX ORDER — AMLODIPINE AND OLMESARTAN MEDOXOMIL 10; 40 MG/1; MG/1
TABLET ORAL
Refills: 0 | Status: DISCONTINUED | COMMUNITY
End: 2022-03-02

## 2022-05-12 RX ORDER — MONTELUKAST 10 MG/1
10 TABLET, FILM COATED ORAL
Refills: 0 | Status: COMPLETED | COMMUNITY
Start: 2021-10-08 | End: 2022-02-14

## 2022-05-12 RX ORDER — CLARITHROMYCIN 500 MG/1
500 TABLET, FILM COATED ORAL
Refills: 0 | Status: DISCONTINUED | COMMUNITY
Start: 2021-11-03

## 2022-05-12 RX ORDER — AZILSARTAN KAMEDOXOMIL AND CHLORTHALIDONE 40; 25 MG/1; MG/1
40-25 TABLET ORAL
Refills: 0 | Status: COMPLETED | COMMUNITY
End: 2022-05-12

## 2022-05-12 RX ORDER — FLUCONAZOLE 150 MG/1
150 TABLET ORAL
Refills: 0 | Status: COMPLETED | COMMUNITY
End: 2022-02-14

## 2022-05-12 RX ORDER — FLASH GLUCOSE SENSOR
KIT MISCELLANEOUS
Refills: 0 | Status: COMPLETED | COMMUNITY
Start: 2021-11-17 | End: 2022-02-14

## 2022-05-12 RX ORDER — ALPRAZOLAM 0.25 MG/1
0.25 TABLET ORAL
Refills: 0 | Status: COMPLETED | COMMUNITY
Start: 2021-08-17 | End: 2022-02-14

## 2022-05-12 RX ORDER — FLUTICASONE FUROATE AND VILANTEROL TRIFENATATE 200; 25 UG/1; UG/1
200-25 POWDER RESPIRATORY (INHALATION)
Qty: 1 | Refills: 0 | Status: DISCONTINUED | COMMUNITY
Start: 2022-02-01

## 2022-05-12 RX ORDER — METHYLPREDNISOLONE 4 MG/1
4 TABLET ORAL
Refills: 0 | Status: COMPLETED | COMMUNITY
Start: 2021-11-03 | End: 2022-02-14

## 2022-05-12 RX ORDER — CHOLECALCIFEROL (VITAMIN D3) 10(400)/ML
32G X 4 MM DROPS ORAL
Refills: 0 | Status: COMPLETED | COMMUNITY
Start: 2022-01-15 | End: 2022-02-14

## 2022-05-12 RX ORDER — CEFDINIR 300 MG/1
300 CAPSULE ORAL
Refills: 0 | Status: COMPLETED | COMMUNITY
Start: 2022-01-09 | End: 2022-02-14

## 2022-05-12 RX ORDER — PROPRANOLOL HYDROCHLORIDE 80 MG/1
80 CAPSULE, EXTENDED RELEASE ORAL
Refills: 0 | Status: COMPLETED | COMMUNITY
Start: 2021-07-06 | End: 2022-02-14

## 2022-05-12 RX ORDER — CLOPIDOGREL BISULFATE 75 MG/1
75 TABLET, FILM COATED ORAL
Refills: 0 | Status: COMPLETED | COMMUNITY
Start: 2022-02-01 | End: 2022-02-14

## 2022-05-12 RX ORDER — EMPAGLIFLOZIN 25 MG/1
25 TABLET, FILM COATED ORAL
Refills: 0 | Status: COMPLETED | COMMUNITY
Start: 2022-02-10 | End: 2022-02-14

## 2022-05-12 RX ORDER — PREDNISONE 10 MG/1
10 TABLET ORAL
Refills: 0 | Status: COMPLETED | COMMUNITY
Start: 2022-01-13 | End: 2022-02-14

## 2022-05-12 RX ORDER — INSULIN DEGLUDEC INJECTION 200 U/ML
200 INJECTION, SOLUTION SUBCUTANEOUS
Refills: 0 | Status: COMPLETED | COMMUNITY
Start: 2021-10-26 | End: 2022-02-14

## 2022-05-12 RX ORDER — HYDROCORTISONE 1 %
12 CREAM (GRAM) TOPICAL
Refills: 0 | Status: ACTIVE | COMMUNITY
Start: 2022-01-24

## 2022-05-12 RX ORDER — AMLODIPINE BESYLATE 5 MG/1
5 TABLET ORAL
Refills: 0 | Status: COMPLETED | COMMUNITY
Start: 2021-08-23 | End: 2022-02-14

## 2022-05-12 RX ORDER — AMOXICILLIN AND CLAVULANATE POTASSIUM 875; 125 MG/1; MG/1
875-125 TABLET, COATED ORAL
Refills: 0 | Status: COMPLETED | COMMUNITY
Start: 2021-10-14 | End: 2022-02-14

## 2022-05-12 RX ORDER — FLUTICASONE PROPIONATE 50 UG/1
50 SPRAY, METERED NASAL
Refills: 0 | Status: COMPLETED | COMMUNITY
Start: 2021-10-14 | End: 2022-02-14

## 2022-05-12 RX ORDER — NYSTATIN AND TRIAMCINOLONE ACETONIDE 100000; 1 [USP'U]/G; MG/G
100000-0.1 OINTMENT TOPICAL
Refills: 0 | Status: DISCONTINUED | COMMUNITY
Start: 2021-11-24

## 2022-05-23 ENCOUNTER — APPOINTMENT (OUTPATIENT)
Dept: UROLOGY | Facility: CLINIC | Age: 62
End: 2022-05-23
Payer: COMMERCIAL

## 2022-05-23 DIAGNOSIS — D41.01 NEOPLASM OF UNCERTAIN BEHAVIOR OF RIGHT KIDNEY: ICD-10-CM

## 2022-05-23 PROCEDURE — 99024 POSTOP FOLLOW-UP VISIT: CPT

## 2022-05-23 RX ORDER — OXYCODONE 5 MG/1
5 TABLET ORAL
Qty: 8 | Refills: 0 | Status: DISCONTINUED | COMMUNITY
Start: 2022-04-30

## 2022-05-23 RX ORDER — PROMETHAZINE HYDROCHLORIDE AND DEXTROMETHORPHAN HYDROBROMIDE ORAL SOLUTION 15; 6.25 MG/5ML; MG/5ML
6.25-15 SOLUTION ORAL
Qty: 240 | Refills: 0 | Status: DISCONTINUED | COMMUNITY
Start: 2021-11-29

## 2022-05-23 RX ORDER — CLOBETASOL PROPIONATE 0.5 MG/G
0.05 CREAM TOPICAL
Qty: 60 | Refills: 0 | Status: DISCONTINUED | COMMUNITY
Start: 2022-04-18

## 2022-05-23 RX ORDER — AZITHROMYCIN 250 MG/1
250 TABLET, FILM COATED ORAL
Qty: 6 | Refills: 0 | Status: DISCONTINUED | COMMUNITY
Start: 2021-11-29

## 2022-05-23 RX ORDER — MELOXICAM 7.5 MG/1
7.5 TABLET ORAL
Qty: 30 | Refills: 0 | Status: DISCONTINUED | COMMUNITY
Start: 2022-02-21

## 2022-05-23 NOTE — PHYSICAL EXAM
[General Appearance - Well Developed] : well developed [Normal Appearance] : normal appearance [Abdomen Soft] : soft [Abdomen Tenderness] : non-tender [Urethral Meatus] : meatus normal [Heart Rate And Rhythm] : Heart rate and rhythm were normal [Edema] : no peripheral edema [] : no respiratory distress [Exaggerated Use Of Accessory Muscles For Inspiration] : no accessory muscle use [Oriented To Time, Place, And Person] : oriented to person, place, and time [Affect] : the affect was normal [Not Anxious] : not anxious [Normal Station and Gait] : the gait and station were normal for the patient's age [FreeTextEntry1] : incisions healing

## 2022-05-23 NOTE — HISTORY OF PRESENT ILLNESS
[FreeTextEntry1] : Patient is a 61 year old man here today for follow up for kidney cancer\par S/P right partial nephrectomy April 29th 2022\par PATH: ccRCC, [id number], margins negative. \par \par Denies any post operative complications. Incisions to abdomen healing \par Tolerating PO, denies any fever, denies chills. Denies any bothersome urinary symptoms. Denies any gross hematuria

## 2022-05-23 NOTE — ASSESSMENT
[FreeTextEntry1] : Pathology results reviewed.\par CBC, BMP today.\par Follow up in 6 months for office renal US.

## 2022-05-24 LAB
ANION GAP SERPL CALC-SCNC: 14 MMOL/L
BASOPHILS # BLD AUTO: 0.08 K/UL
BASOPHILS NFR BLD AUTO: 0.7 %
BUN SERPL-MCNC: 20 MG/DL
CALCIUM SERPL-MCNC: 10.1 MG/DL
CHLORIDE SERPL-SCNC: 104 MMOL/L
CO2 SERPL-SCNC: 24 MMOL/L
CREAT SERPL-MCNC: 0.9 MG/DL
EGFR: 97 ML/MIN/1.73M2
EOSINOPHIL # BLD AUTO: 0.19 K/UL
EOSINOPHIL NFR BLD AUTO: 1.7 %
GLUCOSE SERPL-MCNC: 91 MG/DL
HCT VFR BLD CALC: 35.4 %
HGB BLD-MCNC: 10.5 G/DL
IMM GRANULOCYTES NFR BLD AUTO: 0.5 %
LYMPHOCYTES # BLD AUTO: 2.51 K/UL
LYMPHOCYTES NFR BLD AUTO: 21.9 %
MAN DIFF?: NORMAL
MCHC RBC-ENTMCNC: 28.8 PG
MCHC RBC-ENTMCNC: 29.7 GM/DL
MCV RBC AUTO: 97 FL
MONOCYTES # BLD AUTO: 1.03 K/UL
MONOCYTES NFR BLD AUTO: 9 %
NEUTROPHILS # BLD AUTO: 7.58 K/UL
NEUTROPHILS NFR BLD AUTO: 66.2 %
PLATELET # BLD AUTO: 348 K/UL
POTASSIUM SERPL-SCNC: 5.2 MMOL/L
RBC # BLD: 3.65 M/UL
RBC # FLD: 14.6 %
SODIUM SERPL-SCNC: 142 MMOL/L
WBC # FLD AUTO: 11.45 K/UL

## 2022-05-31 ENCOUNTER — APPOINTMENT (OUTPATIENT)
Dept: PULMONOLOGY | Facility: CLINIC | Age: 62
End: 2022-05-31
Payer: COMMERCIAL

## 2022-05-31 VITALS
DIASTOLIC BLOOD PRESSURE: 76 MMHG | BODY MASS INDEX: 23.3 KG/M2 | WEIGHT: 145 LBS | HEIGHT: 66 IN | HEART RATE: 122 BPM | SYSTOLIC BLOOD PRESSURE: 119 MMHG | OXYGEN SATURATION: 95 % | TEMPERATURE: 98.6 F

## 2022-05-31 PROCEDURE — 99214 OFFICE O/P EST MOD 30 MIN: CPT | Mod: 25

## 2022-05-31 NOTE — CONSULT LETTER
[FreeTextEntry1] : Dear Dr.Norman Carver,\par \par I had the pleasure of evaluating your patient, PLACIDO GOLDMAN today in pulmonary consultation.  Please refer to my attached note for my findings and recommendations.\par \par \par Thank you for allowing me to participate in the care of your patient, please feel free to call with any questions or concerns.\par \par \par Sincerely,\par \par Jocelyne Hill MD\par [facility] \par [facility]\par \par

## 2022-05-31 NOTE — ASSESSMENT
[FreeTextEntry1] : sarcoid?\par schedule ultrasound guided lymph node biopsy\par pre op labs today\par \par trial of bronchodilator/inhaled steroid therapy\par \par cont supplemental O2 with exertion\par \par fu 1 mo\par \par Total encounter time 30 minutes.\par

## 2022-05-31 NOTE — HISTORY OF PRESENT ILLNESS
[TextBox_4] : s/p partial nephrectomy\par \par on supplemental O2 bc of dyspnea and desats with exertion\par \par feeling ok\par \par now coughing more

## 2022-05-31 NOTE — PROCEDURE
[FreeTextEntry1] : \par ACC: [id number] EXAM: CT ANGIO CHEST PULM ART WAWIC\par \par PROCEDURE DATE: 05/01/2022\par \par \par \par INTERPRETATION: CLINICAL INFORMATION: Persistent fevers, partial right nephrectomy 4/29/2022. Question pulmonary embolism.\par \par COMPARISON: CTA chest 3/8/2022.\par \par CONTRAST/COMPLICATIONS:\par IV Contrast: Omnipaque 350 90 cc administered 10 cc discarded\par Oral Contrast: NONE\par Complications: None reported at time of study completion\par \par PROCEDURE:\par CT Angiogram of the chest was obtained with intravenous contrast. Three dimensional maximum intensity projection (MIP) images were generated.\par \par FINDINGS:\par \par PULMONARY ANGIOGRAM: No pulmonary embolism. Dilatation of the main pulmonary artery at 3.5 cm.\par \par LYMPH NODES/MEDIASTINUM: Enlarged right hilar lymph node measuring 1.5 cm, slightly increased when compared to prior study.\par \par HEART/VASCULATURE: Heart size is normal. No pericardial effusion. No aortic dissection.\par \par AIRWAYS/LUNGS/PLEURA: Patent central airways. Bilateral lower lobe subsegmental atelectasis. Bilateral groundglass opacities, most confluent in the left lower lobe. Mild interlobular septal thickening. No pleural effusion or pneumothorax.\par \par UPPER ABDOMEN: Pneumoperitoneum, likely expected postoperative change.\par \par BONES/SOFT TISSUES: Similar left greater than right bilateral axillary lymphadenopathy. Postoperative changes of the anterior abdomen with subcutaneous emphysema. Degenerative changes of the spine and bilateral glenohumeral joints..\par \par IMPRESSION:\par \par No pulmonary embolism.\par \par Bilateral groundglass opacities and interlobular septal thickening suggestive of mild pulmonary edema.\par \par Pneumoperitoneum, likely expected postoperative change.\par \par Similar bilateral axillary lymphadenopathy. Slight increase in right hilar lymphadenopathy compared to prior study 3/8/2022.\par \par --- End of Report ---\par \par \par \par \par RUSSEL STOKES MD; Resident Radiology\par This document has been electronically signed.\par VANI PEARSON MD; Attending Radiologist\par This document has been electronically signed. 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 PET/CT is done as part of the initial treatment strategy evaluation.\par \par RADIOPHARMACEUTICAL: 10.73 mCi F-18, FDG, I.V.\par \par TECHNIQUE: Fasting blood sugar measured prior to injection of radiopharmaceutical was 91 mg/dl. Following intravenous injection of the radiopharmaceutical and an uptake period of approximately 60 minutes, FDG-PET/CT was obtained on a [employer] Discovery 710 from skull base to mid thigh. Oral contrast was given during the uptake period. CT was performed during shallow respiration. The CT protocol was optimized for PET attenuation correction and anatomic localization. The CT protocol was not designed to produce and cannot replace state-of-the-art diagnostic CT images with specific imaging protocols for different body parts and indications. Images were reviewed on a dedicated workstation using multiplanar reconstruction.\par \par The standardized uptake values (SUV) are normalized to patient body weight and indicate the highest activity concentration (SUVmax) in a given disease site. All image numbers refer to axial image number.\par \par COMPARISON: No prior PET/CT.\par \par OTHER STUDIES USED FOR CORRELATION: Report of CTA of chest dated 3/8/2022.\par \par FINDINGS:\par \par HEAD/NECK: Physiologic FDG activity in visualized brain, head, and neck.\par \par CHEST: There are a a few mildly enlarged FDG-avid bilateral axillary lymph nodes, larger on the left. For reference, a left axillary lymph node measures 1.8 x 1.5 cm, SUV 4.8 (image 65). A representative right axillary node measures 1.6 x 0.8 cm, SUV 3.7 (image 70). An FDG-avid left retropectoral node measures 0.7 x 0.6 cm, SUV 2.7 (image 65).\par \par There are a few small FDG-avid mediastinal and bilateral hilar lymph nodes which are difficult to delineate on CT. For reference, a subcarinal lymph node demonstrates SUV 2.4 (image 86) and a right perihilar lymph node demonstrates SUV 2.9 (image 85).\par \par LUNGS: No abnormal FDG activity. Non-FDG-avid biapical scarring.\par \par PLEURA/PERICARDIUM: No abnormal FDG activity. No effusion.\par \par HEPATOBILIARY/PANCREAS: Physiologic FDG activity. Liver SUV mean is\par \par SPLEEN: Physiologic FDG activity. Normal in size.\par \par ADRENAL GLANDS: No abnormal FDG activity. No nodule.\par \par KIDNEYS/URINARY BLADDER: Physiologic excreted FDG activity. No renal mass is identified, however, evaluation is limited in the absence of intravenous contrast.\par \par REPRODUCTIVE ORGANS: No abnormal FDG activity.\par \par ABDOMINOPELVIC NODES: No enlarged or FDG-avid lymph node.\par \par BOWEL/PERITONEUM/MESENTERY: No abnormal FDG activity.\par \par BONES/SOFT TISSUES: No abnormal FDG activity.\par \par IMPRESSION: Abnormal FDG-PET/CT scan.\par \par 1. Few mildly enlarged FDG-avid bilateral axillary lymph nodes and small, mildly FDG-avid left retropectoral, mediastinal, and bilateral hilar lymph nodes are nonspecific. Axillary lymph nodes may be further evaluated with ultrasound and percutaneous needle biopsy, as indicated.\par \par 2. Remainder of study demonstrates no evidence of FDG-avid disease.\par \par 3. No renal mass is identified, however, evaluation is limited in the absence of intravenous contrast. Physiologic FDG activity in the kidneys limits detection of FDG-avid renal lesion.\par \par --- End of Report ---\par \par \par

## 2022-06-01 LAB
APTT BLD: 34.9 SEC
INR PPP: 1.12 RATIO
PT BLD: 13 SEC

## 2022-07-29 ENCOUNTER — APPOINTMENT (OUTPATIENT)
Dept: RADIOLOGY | Facility: IMAGING CENTER | Age: 62
End: 2022-07-29

## 2022-07-29 ENCOUNTER — APPOINTMENT (OUTPATIENT)
Dept: ULTRASOUND IMAGING | Facility: IMAGING CENTER | Age: 62
End: 2022-07-29

## 2022-07-29 ENCOUNTER — OUTPATIENT (OUTPATIENT)
Dept: OUTPATIENT SERVICES | Facility: HOSPITAL | Age: 62
LOS: 1 days | End: 2022-07-29
Payer: COMMERCIAL

## 2022-07-29 ENCOUNTER — RESULT REVIEW (OUTPATIENT)
Age: 62
End: 2022-07-29

## 2022-07-29 ENCOUNTER — OUTPATIENT (OUTPATIENT)
Dept: OUTPATIENT SERVICES | Facility: HOSPITAL | Age: 62
LOS: 1 days | End: 2022-07-29

## 2022-07-29 DIAGNOSIS — Z00.8 ENCOUNTER FOR OTHER GENERAL EXAMINATION: ICD-10-CM

## 2022-07-29 DIAGNOSIS — R59.0 LOCALIZED ENLARGED LYMPH NODES: ICD-10-CM

## 2022-07-29 DIAGNOSIS — Z98.890 OTHER SPECIFIED POSTPROCEDURAL STATES: Chronic | ICD-10-CM

## 2022-07-29 PROCEDURE — 88185 FLOWCYTOMETRY/TC ADD-ON: CPT

## 2022-07-29 PROCEDURE — 88341 IMHCHEM/IMCYTCHM EA ADD ANTB: CPT | Mod: 26,59

## 2022-07-29 PROCEDURE — 88189 FLOWCYTOMETRY/READ 16 & >: CPT

## 2022-07-29 PROCEDURE — 88341 IMHCHEM/IMCYTCHM EA ADD ANTB: CPT

## 2022-07-29 PROCEDURE — 76942 ECHO GUIDE FOR BIOPSY: CPT | Mod: 26

## 2022-07-29 PROCEDURE — 88342 IMHCHEM/IMCYTCHM 1ST ANTB: CPT

## 2022-07-29 PROCEDURE — 88173 CYTOPATH EVAL FNA REPORT: CPT | Mod: 26

## 2022-07-29 PROCEDURE — 88305 TISSUE EXAM BY PATHOLOGIST: CPT | Mod: 26

## 2022-07-29 PROCEDURE — 76942 ECHO GUIDE FOR BIOPSY: CPT

## 2022-07-29 PROCEDURE — 88172 CYTP DX EVAL FNA 1ST EA SITE: CPT

## 2022-07-29 PROCEDURE — 88365 INSITU HYBRIDIZATION (FISH): CPT | Mod: 26

## 2022-07-29 PROCEDURE — 88360 TUMOR IMMUNOHISTOCHEM/MANUAL: CPT | Mod: 26

## 2022-07-29 PROCEDURE — 38505 NEEDLE BIOPSY LYMPH NODES: CPT | Mod: LT

## 2022-07-29 PROCEDURE — 73521 X-RAY EXAM HIPS BI 2 VIEWS: CPT | Mod: 26

## 2022-07-29 PROCEDURE — 88173 CYTOPATH EVAL FNA REPORT: CPT

## 2022-07-29 PROCEDURE — 87205 SMEAR GRAM STAIN: CPT

## 2022-07-29 PROCEDURE — 88360 TUMOR IMMUNOHISTOCHEM/MANUAL: CPT

## 2022-07-29 PROCEDURE — 88305 TISSUE EXAM BY PATHOLOGIST: CPT

## 2022-07-29 PROCEDURE — 73552 X-RAY EXAM OF FEMUR 2/>: CPT | Mod: 26,50

## 2022-07-29 PROCEDURE — 38505 NEEDLE BIOPSY LYMPH NODES: CPT

## 2022-07-29 PROCEDURE — 88365 INSITU HYBRIDIZATION (FISH): CPT

## 2022-07-29 PROCEDURE — 73552 X-RAY EXAM OF FEMUR 2/>: CPT

## 2022-07-29 PROCEDURE — 73521 X-RAY EXAM HIPS BI 2 VIEWS: CPT

## 2022-07-29 PROCEDURE — 88342 IMHCHEM/IMCYTCHM 1ST ANTB: CPT | Mod: 26,59

## 2022-08-23 ENCOUNTER — APPOINTMENT (OUTPATIENT)
Dept: MRI IMAGING | Facility: CLINIC | Age: 62
End: 2022-08-23

## 2022-08-23 ENCOUNTER — OUTPATIENT (OUTPATIENT)
Dept: OUTPATIENT SERVICES | Facility: HOSPITAL | Age: 62
LOS: 1 days | End: 2022-08-23
Payer: COMMERCIAL

## 2022-08-23 DIAGNOSIS — Z00.8 ENCOUNTER FOR OTHER GENERAL EXAMINATION: ICD-10-CM

## 2022-08-23 DIAGNOSIS — Z98.890 OTHER SPECIFIED POSTPROCEDURAL STATES: Chronic | ICD-10-CM

## 2022-08-23 PROCEDURE — 72148 MRI LUMBAR SPINE W/O DYE: CPT | Mod: 26

## 2022-08-23 PROCEDURE — 72148 MRI LUMBAR SPINE W/O DYE: CPT

## 2022-09-13 ENCOUNTER — APPOINTMENT (OUTPATIENT)
Dept: PULMONOLOGY | Facility: CLINIC | Age: 62
End: 2022-09-13

## 2022-09-25 ENCOUNTER — OUTPATIENT (OUTPATIENT)
Dept: OUTPATIENT SERVICES | Facility: HOSPITAL | Age: 62
LOS: 1 days | End: 2022-09-25

## 2022-09-25 DIAGNOSIS — Z00.8 ENCOUNTER FOR OTHER GENERAL EXAMINATION: ICD-10-CM

## 2022-09-25 DIAGNOSIS — Z98.890 OTHER SPECIFIED POSTPROCEDURAL STATES: Chronic | ICD-10-CM

## 2022-10-05 ENCOUNTER — APPOINTMENT (OUTPATIENT)
Dept: MRI IMAGING | Facility: CLINIC | Age: 62
End: 2022-10-05
Payer: COMMERCIAL

## 2022-10-05 PROCEDURE — 72149 MRI LUMBAR SPINE W/DYE: CPT

## 2022-10-05 PROCEDURE — A9585: CPT | Mod: JW

## 2022-11-09 ENCOUNTER — OUTPATIENT (OUTPATIENT)
Dept: OUTPATIENT SERVICES | Facility: HOSPITAL | Age: 62
LOS: 1 days | End: 2022-11-09
Payer: COMMERCIAL

## 2022-11-09 ENCOUNTER — APPOINTMENT (OUTPATIENT)
Dept: UROLOGY | Facility: CLINIC | Age: 62
End: 2022-11-09
Payer: COMMERCIAL

## 2022-11-09 DIAGNOSIS — R35.0 FREQUENCY OF MICTURITION: ICD-10-CM

## 2022-11-09 DIAGNOSIS — Z98.890 OTHER SPECIFIED POSTPROCEDURAL STATES: Chronic | ICD-10-CM

## 2022-11-09 DIAGNOSIS — C64.1 MALIGNANT NEOPLASM OF RIGHT KIDNEY, EXCEPT RENAL PELVIS: ICD-10-CM

## 2022-11-09 PROCEDURE — 76775 US EXAM ABDO BACK WALL LIM: CPT

## 2022-11-09 PROCEDURE — 99213 OFFICE O/P EST LOW 20 MIN: CPT

## 2022-11-09 RX ORDER — HALOBETASOL PROPIONATE 0.5 MG/G
0.05 OINTMENT TOPICAL
Qty: 50 | Refills: 0 | Status: ACTIVE | COMMUNITY
Start: 2022-08-22

## 2022-11-09 RX ORDER — TRAMADOL HYDROCHLORIDE 50 MG/1
50 TABLET, COATED ORAL
Qty: 90 | Refills: 0 | Status: COMPLETED | COMMUNITY
Start: 2022-09-19 | End: 2022-11-09

## 2022-11-09 RX ORDER — TRAMADOL HYDROCHLORIDE AND ACETAMINOPHEN 37.5; 325 MG/1; MG/1
37.5-325 TABLET, FILM COATED ORAL
Qty: 60 | Refills: 0 | Status: COMPLETED | COMMUNITY
Start: 2022-08-15 | End: 2022-11-09

## 2022-11-09 NOTE — PHYSICAL EXAM
[General Appearance - Well Developed] : well developed [General Appearance - Well Nourished] : well nourished [Normal Appearance] : normal appearance [Well Groomed] : well groomed [General Appearance - In No Acute Distress] : no acute distress [Abdomen Soft] : soft [Abdomen Tenderness] : non-tender [Costovertebral Angle Tenderness] : no ~M costovertebral angle tenderness [Urethral Meatus] : meatus normal [Urinary Bladder Findings] : the bladder was normal on palpation [Scrotum] : the scrotum was normal [Testes Mass (___cm)] : there were no testicular masses [Edema] : no peripheral edema [] : no respiratory distress [Exaggerated Use Of Accessory Muscles For Inspiration] : no accessory muscle use [Oriented To Time, Place, And Person] : oriented to person, place, and time [Affect] : the affect was normal [Mood] : the mood was normal [Not Anxious] : not anxious [Normal Station and Gait] : the gait and station were normal for the patient's age [No Focal Deficits] : no focal deficits [No Palpable Adenopathy] : no palpable adenopathy

## 2022-11-10 LAB
ANION GAP SERPL CALC-SCNC: 13 MMOL/L
BASOPHILS # BLD AUTO: 0.07 K/UL
BASOPHILS NFR BLD AUTO: 0.7 %
BUN SERPL-MCNC: 16 MG/DL
CALCIUM SERPL-MCNC: 9.7 MG/DL
CHLORIDE SERPL-SCNC: 106 MMOL/L
CO2 SERPL-SCNC: 22 MMOL/L
CREAT SERPL-MCNC: 0.8 MG/DL
EGFR: 99 ML/MIN/1.73M2
EOSINOPHIL # BLD AUTO: 0.12 K/UL
EOSINOPHIL NFR BLD AUTO: 1.2 %
GLUCOSE SERPL-MCNC: 82 MG/DL
HCT VFR BLD CALC: 39 %
HGB BLD-MCNC: 11.7 G/DL
IMM GRANULOCYTES NFR BLD AUTO: 0.3 %
LYMPHOCYTES # BLD AUTO: 2.21 K/UL
LYMPHOCYTES NFR BLD AUTO: 22.3 %
MAN DIFF?: NORMAL
MCHC RBC-ENTMCNC: 28.7 PG
MCHC RBC-ENTMCNC: 30 GM/DL
MCV RBC AUTO: 95.6 FL
MONOCYTES # BLD AUTO: 1.05 K/UL
MONOCYTES NFR BLD AUTO: 10.6 %
NEUTROPHILS # BLD AUTO: 6.44 K/UL
NEUTROPHILS NFR BLD AUTO: 64.9 %
PLATELET # BLD AUTO: 299 K/UL
POTASSIUM SERPL-SCNC: 4.6 MMOL/L
RBC # BLD: 4.08 M/UL
RBC # FLD: 14.6 %
SODIUM SERPL-SCNC: 141 MMOL/L
WBC # FLD AUTO: 9.92 K/UL

## 2022-11-10 NOTE — ASSESSMENT
[FreeTextEntry1] : Reviewed renal US findings. No local recurrence noted \par He will follow up with gastroenterologist in regards to decreased appetite\par \par Will draw CBC and BMP today and call patient with results \par Needs CT  of abdomen for next surveillance imaging in 6 months

## 2022-11-10 NOTE — HISTORY OF PRESENT ILLNESS
[FreeTextEntry1] : Patient is a 62 year old man comes in today for  history of kidney cancer \par S/P single port robotic assisted laparoscopic partial nephrectomy, April 29th 2022 \par PATH: ccRCC, Grade 2, pT1a margins negative \par \par He feels that he has had a decreased appetite recently. Saw Dr. Puentes last month, he will also follow up with GI. He has also been following up with pulmonologist for dyspnea on excretion which he intermittently uses home oxygen. \par Lymph node biopsy performed for enlarged lymph nodes seen on PET \par PATH: reactive follicular hyperplasia \par \par Denies any bothersome urinary symptoms, denies any gross hematuria\par Renal US performed in office today

## 2022-12-14 ENCOUNTER — INPATIENT (INPATIENT)
Facility: HOSPITAL | Age: 62
LOS: 12 days | Discharge: HOME | DRG: 286 | End: 2022-12-27
Attending: INTERNAL MEDICINE | Admitting: INTERNAL MEDICINE
Payer: COMMERCIAL

## 2022-12-14 VITALS
DIASTOLIC BLOOD PRESSURE: 114 MMHG | TEMPERATURE: 98 F | HEART RATE: 70 BPM | OXYGEN SATURATION: 97 % | SYSTOLIC BLOOD PRESSURE: 168 MMHG | HEIGHT: 66 IN | WEIGHT: 134.92 LBS | RESPIRATION RATE: 28 BRPM

## 2022-12-14 DIAGNOSIS — Z98.890 OTHER SPECIFIED POSTPROCEDURAL STATES: Chronic | ICD-10-CM

## 2022-12-14 DIAGNOSIS — R09.89 OTHER SPECIFIED SYMPTOMS AND SIGNS INVOLVING THE CIRCULATORY AND RESPIRATORY SYSTEMS: ICD-10-CM

## 2022-12-14 DIAGNOSIS — R06.00 DYSPNEA, UNSPECIFIED: ICD-10-CM

## 2022-12-14 LAB
ALBUMIN SERPL ELPH-MCNC: 3.9 G/DL — SIGNIFICANT CHANGE UP (ref 3.3–5)
ALP SERPL-CCNC: 181 U/L — HIGH (ref 40–120)
ALT FLD-CCNC: 24 U/L — SIGNIFICANT CHANGE UP (ref 10–45)
ANION GAP SERPL CALC-SCNC: 12 MMOL/L — SIGNIFICANT CHANGE UP (ref 5–17)
APTT BLD: 31.5 SEC — SIGNIFICANT CHANGE UP (ref 27.5–35.5)
AST SERPL-CCNC: 48 U/L — HIGH (ref 10–40)
BASOPHILS # BLD AUTO: 0.08 K/UL — SIGNIFICANT CHANGE UP (ref 0–0.2)
BASOPHILS NFR BLD AUTO: 0.8 % — SIGNIFICANT CHANGE UP (ref 0–2)
BILIRUB SERPL-MCNC: 0.7 MG/DL — SIGNIFICANT CHANGE UP (ref 0.2–1.2)
BUN SERPL-MCNC: 31 MG/DL — HIGH (ref 7–23)
CALCIUM SERPL-MCNC: 9.4 MG/DL — SIGNIFICANT CHANGE UP (ref 8.4–10.5)
CHLORIDE SERPL-SCNC: 106 MMOL/L — SIGNIFICANT CHANGE UP (ref 96–108)
CK MB BLD-MCNC: 3 % — SIGNIFICANT CHANGE UP (ref 0–3.5)
CK MB CFR SERPL CALC: 6.9 NG/ML — HIGH (ref 0–6.7)
CK SERPL-CCNC: 228 U/L — HIGH (ref 30–200)
CO2 SERPL-SCNC: 22 MMOL/L — SIGNIFICANT CHANGE UP (ref 22–31)
CREAT SERPL-MCNC: 1.54 MG/DL — HIGH (ref 0.5–1.3)
EGFR: 50 ML/MIN/1.73M2 — LOW
EOSINOPHIL # BLD AUTO: 0.07 K/UL — SIGNIFICANT CHANGE UP (ref 0–0.5)
EOSINOPHIL NFR BLD AUTO: 0.7 % — SIGNIFICANT CHANGE UP (ref 0–6)
FLUAV AG NPH QL: SIGNIFICANT CHANGE UP
FLUBV AG NPH QL: SIGNIFICANT CHANGE UP
GLUCOSE SERPL-MCNC: 83 MG/DL — SIGNIFICANT CHANGE UP (ref 70–99)
HCT VFR BLD CALC: 42.3 % — SIGNIFICANT CHANGE UP (ref 39–50)
HGB BLD-MCNC: 12.8 G/DL — LOW (ref 13–17)
IMM GRANULOCYTES NFR BLD AUTO: 0.4 % — SIGNIFICANT CHANGE UP (ref 0–0.9)
INR BLD: 1.36 RATIO — HIGH (ref 0.88–1.16)
LYMPHOCYTES # BLD AUTO: 2.65 K/UL — SIGNIFICANT CHANGE UP (ref 1–3.3)
LYMPHOCYTES # BLD AUTO: 24.9 % — SIGNIFICANT CHANGE UP (ref 13–44)
MCHC RBC-ENTMCNC: 28.8 PG — SIGNIFICANT CHANGE UP (ref 27–34)
MCHC RBC-ENTMCNC: 30.3 GM/DL — LOW (ref 32–36)
MCV RBC AUTO: 95.3 FL — SIGNIFICANT CHANGE UP (ref 80–100)
MONOCYTES # BLD AUTO: 0.87 K/UL — SIGNIFICANT CHANGE UP (ref 0–0.9)
MONOCYTES NFR BLD AUTO: 8.2 % — SIGNIFICANT CHANGE UP (ref 2–14)
NEUTROPHILS # BLD AUTO: 6.95 K/UL — SIGNIFICANT CHANGE UP (ref 1.8–7.4)
NEUTROPHILS NFR BLD AUTO: 65 % — SIGNIFICANT CHANGE UP (ref 43–77)
NRBC # BLD: 0 /100 WBCS — SIGNIFICANT CHANGE UP (ref 0–0)
NT-PROBNP SERPL-SCNC: HIGH PG/ML (ref 0–300)
PLATELET # BLD AUTO: 277 K/UL — SIGNIFICANT CHANGE UP (ref 150–400)
POTASSIUM SERPL-MCNC: 4.4 MMOL/L — SIGNIFICANT CHANGE UP (ref 3.5–5.3)
POTASSIUM SERPL-SCNC: 4.4 MMOL/L — SIGNIFICANT CHANGE UP (ref 3.5–5.3)
PROT SERPL-MCNC: 8.3 G/DL — SIGNIFICANT CHANGE UP (ref 6–8.3)
PROTHROM AB SERPL-ACNC: 15.7 SEC — HIGH (ref 10.5–13.4)
RBC # BLD: 4.44 M/UL — SIGNIFICANT CHANGE UP (ref 4.2–5.8)
RBC # FLD: 15.3 % — HIGH (ref 10.3–14.5)
RSV RNA NPH QL NAA+NON-PROBE: SIGNIFICANT CHANGE UP
SARS-COV-2 RNA SPEC QL NAA+PROBE: SIGNIFICANT CHANGE UP
SODIUM SERPL-SCNC: 140 MMOL/L — SIGNIFICANT CHANGE UP (ref 135–145)
TROPONIN T, HIGH SENSITIVITY RESULT: 104 NG/L — HIGH (ref 0–51)
TROPONIN T, HIGH SENSITIVITY RESULT: 84 NG/L — HIGH (ref 0–51)
WBC # BLD: 10.66 K/UL — HIGH (ref 3.8–10.5)
WBC # FLD AUTO: 10.66 K/UL — HIGH (ref 3.8–10.5)

## 2022-12-14 PROCEDURE — 93010 ELECTROCARDIOGRAM REPORT: CPT | Mod: NC

## 2022-12-14 PROCEDURE — 71045 X-RAY EXAM CHEST 1 VIEW: CPT | Mod: 26

## 2022-12-14 PROCEDURE — 71275 CT ANGIOGRAPHY CHEST: CPT | Mod: 26,MD

## 2022-12-14 PROCEDURE — 99233 SBSQ HOSP IP/OBS HIGH 50: CPT

## 2022-12-14 PROCEDURE — 99223 1ST HOSP IP/OBS HIGH 75: CPT

## 2022-12-14 PROCEDURE — 99285 EMERGENCY DEPT VISIT HI MDM: CPT

## 2022-12-14 RX ORDER — METOPROLOL TARTRATE 50 MG
25 TABLET ORAL ONCE
Refills: 0 | Status: COMPLETED | OUTPATIENT
Start: 2022-12-14 | End: 2022-12-14

## 2022-12-14 RX ORDER — FUROSEMIDE 40 MG
20 TABLET ORAL ONCE
Refills: 0 | Status: COMPLETED | OUTPATIENT
Start: 2022-12-14 | End: 2022-12-14

## 2022-12-14 RX ORDER — MELOXICAM 15 MG/1
1 TABLET ORAL
Qty: 0 | Refills: 0 | DISCHARGE

## 2022-12-14 RX ORDER — METOPROLOL TARTRATE 50 MG
1 TABLET ORAL
Qty: 0 | Refills: 0 | DISCHARGE

## 2022-12-14 RX ORDER — RAMIPRIL 5 MG
1 CAPSULE ORAL
Qty: 0 | Refills: 0 | DISCHARGE

## 2022-12-14 RX ADMIN — Medication 20 MILLIGRAM(S): at 20:00

## 2022-12-14 RX ADMIN — Medication 25 MILLIGRAM(S): at 19:59

## 2022-12-14 NOTE — H&P ADULT - NEGATIVE ENMT SYMPTOMS
----- Message from Barron Magaña MD sent at 5/28/2021  8:45 AM CDT -----  Patient has H pylori gastritis.  Please prescribe amoxicillin 1 g b.i.d., clarithromycin 500 mg b.i.d. and omeprazole 40 mg b.i.d. for 2 weeks.  After that take omeprazole 40 mg daily.  Submit stool sample for H pylori in 6 weeks to document eradication.  Also place recall EGD in 3 years for gastric intestinal metaplasia   no hearing difficulty/no ear pain

## 2022-12-14 NOTE — H&P ADULT - PSYCHIATRIC
PT SITTING UP IN BED ALERT AND ORIENTED WITH  AT THE BEDSIDE. REPORTS
NO ISSUES AFTER EATING A REGULAR DIET BREAKFAST AND HAS BEEN ABLE TO VOID X2
WITH NO ISSUES. WILL CONT TO MONITOR. details… normal affect/alert and oriented x3/normal behavior

## 2022-12-14 NOTE — ED PROVIDER NOTE - NS ED ROS FT
Review of Systems:  -General: no fever or chills  -ENT: no congestion, no difficulty swallowing  -Pulmonary: no cough, +shortness of breath  -Cardiac: no chest pain  -Gastrointestinal: no abdominal pain, no nausea, no vomiting, and no diarrhea.  -Genitourinary: no blood or pain with urination  -Musculoskeletal: no back or neck pain  -Skin: no rashes  -Endocrine: No h/o diabetes   -Neurologic: No new weakness or numbness in extremities    All else negative unless otherwise specified elsewhere in this note.

## 2022-12-14 NOTE — ED ADULT NURSE REASSESSMENT NOTE - NS ED NURSE REASSESS COMMENT FT1
Pt remains hypertensive after receiving meds. RN made MD aware. MD to determine next intervention. Pt remains a&ox4, spontaneous respirations and equal chest rise. On 4L per NC. He offers no complaints at this time. Safety precautions maintained. Will continue to monitor.

## 2022-12-14 NOTE — H&P ADULT - SKIN
-- DO NOT REPLY / DO NOT REPLY ALL --  -- Message is from the [facility]--    COVID-19 Universal Screening: N/A - Not about scheduling    General Patient Message      Reason for Call: Mom requesting call back, stated pt had scan done yesterday & wants to know if they need to schedule follow up appt or when should they hear back about scan.    Caller Information       Type Contact Phone    03/24/2021 01:27 PM CDT Phone (Incoming) LAURIE PUCKETT (Mother) [phone] (M)          Alternative phone number: n/a    Turnaround time given to caller:   \"This message will be sent to [state Provider's name]. The clinical team will fulfill your request as soon as they review your message.\"     warm and dry/color normal

## 2022-12-14 NOTE — ED PROVIDER NOTE - PHYSICAL EXAMINATION
On Physical Exam:  General: well appearing, in NAD, speaking clearly in full sentences and without difficulty; cooperative with exam  HEENT: anicteric sclera, airway patent  Neck: no neck tenderness, no nuchal rigidity  Cardiac: tachycardiac, s1s2  Lungs: CTABL  Abdomen: soft nontender/nondistended  : no bladder tenderness or distension  Skin: intact, no rash  Extremities: 2+ pitting peripheral edema in b/l LE, no gross deformities

## 2022-12-14 NOTE — ED PROVIDER NOTE - ST/T WAVE
no st elevations or depressions; new t-wave inversions in lateral lead (compared to previous ECG in EMR)

## 2022-12-14 NOTE — H&P ADULT - PROBLEM SELECTOR PLAN 3
Crt 1.5 up from prior in record. Possibly cardiorenal?  -Trend BMP  -Renal dose medications  -Holding spironolactone for now

## 2022-12-14 NOTE — ED PROVIDER NOTE - OBJECTIVE STATEMENT
Attending note (Martínez): 61 y/o M with h/o HTN, RA, and chronic SOB / on home O2 (4L, prn; no known pulmonary disease history) presenting with worsening shortness of breath; reportedly needing to keep Oxygen on constantly for the past several days. Also increased leg swelling (bilateral).  Per Dr VIKTORIA Puentes: O2 sat 80% on RA, EF on TTE with new moderate dysfunction and LVH.

## 2022-12-14 NOTE — ED ADULT NURSE REASSESSMENT NOTE - NS ED NURSE REASSESS COMMENT FT1
Report received from Michelle OCHOA. Assumed care of pt. Pt is a&ox4, spontaneous respirations and equal chest rise. Hypertensive. On 4L per nasal cannula. Pt denies SOB while at rest. He also denies CP, palpitations, cough, fever, chills, n/v/d. Wife at bedside. Bed locked and in lowest position and call bell within reach. Will continue to monitor.

## 2022-12-14 NOTE — H&P ADULT - NSHPADDITIONALINFOADULT_GEN_ALL_CORE
I was asked to see this patient by the hospitalist in charge. Dr. Marte to assume care for patient in AM and thereafter

## 2022-12-14 NOTE — H&P ADULT - PROBLEM SELECTOR PLAN 1
Pulmonary edema and HF likely contributing given elevated BNP and edema. Unclear if other chronic process ongoing. Sarcoid? pulm htn?  -Cont . Lasix 40mg IV daily for now  -Cont. oxygen  -Continuous pulse oximetry for now  -TTE  -Cardiology and/or pulmonology consult in AM

## 2022-12-14 NOTE — H&P ADULT - PROBLEM SELECTOR PLAN 2
Note some likely pulmonary edema on CTA. Also with signs of R sided HF/ pulmonary HTN. Suspect some relation to whatever chronic ongoing process has been causing pt's idiopathic dyspnea  -Cont. lasix 40mg IV daily for now  -Vital signs and continuous pulse oximetry as above  -TTE  -Strict I/Os and daily weight  -Cards consult and/or pulm consult

## 2022-12-14 NOTE — H&P ADULT - HISTORY OF PRESENT ILLNESS
62M w/ pmhx of HTN, OA, ?RA, chronic idiopathic SOB/dyspnea on intermittent home O2 found to have right kidney mass. 4/29 s/p RAL R partial nephrectomy p/w acute hypoxic respiratory failure. Pt endorses gradually worsening SOB and AVALOS for past 5-7 days. Starting the past 3-5 days he has also started to notice mild bilateral lower extremity edema. Has been worse with exertion. At baseline he usually only uses 02 at home and not at work. When pt woke up this AM his wife felt he looked too bad and made him see Dr. Carver today. While at office reportedly he was 80% on RA and TTE found new moderate dysfunction and LVH. Sent to ER for further evaluation. Pt denies any fevers, chills, recent travel, new cough. Denies any changes in medications or family hx of pulmonary disease. Has been getting worked up this year with Dr. Hill for pulmonology.     In ER: Given Lasix 20mg IV, Lopressor 25mg PO x1

## 2022-12-14 NOTE — H&P ADULT - NSHPLABSRESULTS_GEN_ALL_CORE
I have reviewed the labs, imaging and ekg. EKG with Sinus tachycardia , Qtc 472 TWI  V4-V6. CXR w/o focal consolidations

## 2022-12-14 NOTE — ED ADULT NURSE REASSESSMENT NOTE - NS ED NURSE REASSESS COMMENT FT1
Pt asleep. Now normotensive. NSR on cardiac monitor. NAD. Bed locked and in lowest position and side rails up for safety. Will continue to monitor.

## 2022-12-14 NOTE — ED PROVIDER NOTE - CLINICAL SUMMARY MEDICAL DECISION MAKING FREE TEXT BOX
Attending note (Martínez): 62M p/w worsening SOb/AVALOS; lungs ctabl; +peripheral pitting edema in LE; initial evaluation through QDOC process, labs/cxr sent; notable for clear lungs on CXR, and with elevated troponin and proBNP; concerning for possible heart failure.  Will need further inpatient cardiac and pulmonary evaluation; plan for admission.

## 2022-12-14 NOTE — ED ADULT TRIAGE NOTE - IDEAL BODY WEIGHT(KG)
COVID-19 PCR test completed. Patient handout For Patients Who Have Been Tested for Covid-19 (Coronavirus) was given to the patient, which includes test result notification process.     63

## 2022-12-14 NOTE — ED ADULT NURSE NOTE - OBJECTIVE STATEMENT
61 yo presents to the ED from PMD office with wife at bedside. A&Ox4,ambuatory with 1 assist c/o SOB x 6 months, worsening acutely yesterday. on 4L intermittently at baseline but reports requiring continuous oxygen over the last few days. pt reports AVALOS with minimal exertion. reports chest ache at time of SOB, denies sharp pain. denies any recent fever, chills. reports cough x 3 weeks, nonproductive. pt on lasix x 1 year, denies changes in dose. reports bilateral leg swelling. sent by [facility] for low O2 sat, 80%% on RA and abnormal EKG as per wife. 20G inserted LAC in QDOC. EKG done in triage. pt placed on CM upon arrival to [facility]. pt noted to be hypertensive, reports history of HTN, compliant with meds as per pt. Patient undressed and placed into gown, call bell in hand and side rails up for safety. warm blanket provided.

## 2022-12-14 NOTE — H&P ADULT - ASSESSMENT
62M w/ pmhx of HTN, OA, ?RA, chronic idiopathic SOB/dyspnea on intermittent home O2 found to have right kidney mass. 4/29 s/p RAL R partial nephrectomy p/w acute hypoxic respiratory failure likely with a component of pulmonary edema

## 2022-12-14 NOTE — H&P ADULT - NSHPPHYSICALEXAM_GEN_ALL_CORE
Vital Signs Last 24 Hrs  T(C): 36.6 (12-14-22 @ 20:38), Max: 36.7 (12-14-22 @ 14:41)  T(F): 97.9 (12-14-22 @ 20:38), Max: 98 (12-14-22 @ 14:41)  HR: 101 (12-14-22 @ 23:46) (70 - 111)  BP: 143/96 (12-14-22 @ 23:46) (143/96 - 168/114)  BP(mean): --  RR: 22 (12-14-22 @ 21:51) (20 - 28)  SpO2: 96% (12-14-22 @ 21:51) (96% - 100%)

## 2022-12-14 NOTE — ED PROVIDER NOTE - PROGRESS NOTE DETAILS
Avtar Giang MD, ALENA Culp RN at [facility] notified to bring patient back asap for a + troponin with a near normal eGFR in concern for a NSTEMI. Attending note (Martínez): no evidence of PE on CT, suspicious for pulm HTN; GGO (chronic?). trop elevated but overall seems more consistent with right sided failure rather than acute MI; case discussed with Dr Marte who is discussing with his cardiologist and will admit for further evaluation/management.

## 2022-12-14 NOTE — ED ADULT NURSE NOTE - NSIMPLEMENTINTERV_GEN_ALL_ED
Implemented All Fall Risk Interventions:  [place] to call system. Call bell, personal items and telephone within reach. Instruct patient to call for assistance. Room bathroom lighting operational. Non-slip footwear when patient is off stretcher. Physically safe environment: no spills, clutter or unnecessary equipment. Stretcher in lowest position, wheels locked, appropriate side rails in place. Provide visual cue, wrist band, yellow gown, etc. Monitor gait and stability. Monitor for mental status changes and reorient to person, place, and time. Review medications for side effects contributing to fall risk. Reinforce activity limits and safety measures with patient and family.

## 2022-12-14 NOTE — ED PROVIDER NOTE - RAPID ASSESSMENT
62 y M OA, chronic SOB on home O2 (4L prn, followed by pulmonology w/ no known copd and no smoking hx), s/p partial Right nephrectomy months ago, now with increased work of breathing.  Has needed continuous O2 in the last few days and is having worsening AVALOS.  +LE edema.  From [place] -- "O2 sat 80%, EF on echo moderate dysfunction and LVH, both new.  Concern for amyloidosis/pulm hypertension."    I saw the patient waiting area; a brief history was taken and a thorough physical exam was not performed as there is no physical exam room available.  The patient will be seen and further worked up in the main emergency department and their care will be completed by the main emergency department team.  I was not involved in this patient's care after the QDOC process, and unless otherwise noted in the ED provider note, was not involved in their care during their ED course.  --RAJINDER

## 2022-12-15 DIAGNOSIS — J96.01 ACUTE RESPIRATORY FAILURE WITH HYPOXIA: ICD-10-CM

## 2022-12-15 DIAGNOSIS — Z29.9 ENCOUNTER FOR PROPHYLACTIC MEASURES, UNSPECIFIED: ICD-10-CM

## 2022-12-15 DIAGNOSIS — N17.9 ACUTE KIDNEY FAILURE, UNSPECIFIED: ICD-10-CM

## 2022-12-15 DIAGNOSIS — M06.9 RHEUMATOID ARTHRITIS, UNSPECIFIED: ICD-10-CM

## 2022-12-15 DIAGNOSIS — I10 ESSENTIAL (PRIMARY) HYPERTENSION: ICD-10-CM

## 2022-12-15 DIAGNOSIS — J96.21 ACUTE AND CHRONIC RESPIRATORY FAILURE WITH HYPOXIA: ICD-10-CM

## 2022-12-15 DIAGNOSIS — J81.1 CHRONIC PULMONARY EDEMA: ICD-10-CM

## 2022-12-15 LAB
ALBUMIN SERPL ELPH-MCNC: 3.7 G/DL — SIGNIFICANT CHANGE UP (ref 3.3–5)
ALP SERPL-CCNC: 178 U/L — HIGH (ref 40–120)
ALT FLD-CCNC: 23 U/L — SIGNIFICANT CHANGE UP (ref 10–45)
ANION GAP SERPL CALC-SCNC: 15 MMOL/L — SIGNIFICANT CHANGE UP (ref 5–17)
AST SERPL-CCNC: 35 U/L — SIGNIFICANT CHANGE UP (ref 10–40)
BASOPHILS # BLD AUTO: 0.11 K/UL — SIGNIFICANT CHANGE UP (ref 0–0.2)
BASOPHILS NFR BLD AUTO: 0.9 % — SIGNIFICANT CHANGE UP (ref 0–2)
BILIRUB SERPL-MCNC: 0.7 MG/DL — SIGNIFICANT CHANGE UP (ref 0.2–1.2)
BUN SERPL-MCNC: 36 MG/DL — HIGH (ref 7–23)
CALCIUM SERPL-MCNC: 9.5 MG/DL — SIGNIFICANT CHANGE UP (ref 8.4–10.5)
CHLORIDE SERPL-SCNC: 103 MMOL/L — SIGNIFICANT CHANGE UP (ref 96–108)
CO2 SERPL-SCNC: 22 MMOL/L — SIGNIFICANT CHANGE UP (ref 22–31)
CREAT SERPL-MCNC: 1.59 MG/DL — HIGH (ref 0.5–1.3)
EGFR: 48 ML/MIN/1.73M2 — LOW
EOSINOPHIL # BLD AUTO: 0.19 K/UL — SIGNIFICANT CHANGE UP (ref 0–0.5)
EOSINOPHIL NFR BLD AUTO: 1.5 % — SIGNIFICANT CHANGE UP (ref 0–6)
GLUCOSE SERPL-MCNC: 88 MG/DL — SIGNIFICANT CHANGE UP (ref 70–99)
HCT VFR BLD CALC: 41.2 % — SIGNIFICANT CHANGE UP (ref 39–50)
HCV AB S/CO SERPL IA: 0.08 S/CO — SIGNIFICANT CHANGE UP (ref 0–0.99)
HCV AB SERPL-IMP: SIGNIFICANT CHANGE UP
HGB BLD-MCNC: 12.4 G/DL — LOW (ref 13–17)
IMM GRANULOCYTES NFR BLD AUTO: 0.2 % — SIGNIFICANT CHANGE UP (ref 0–0.9)
LYMPHOCYTES # BLD AUTO: 29.3 % — SIGNIFICANT CHANGE UP (ref 13–44)
LYMPHOCYTES # BLD AUTO: 3.69 K/UL — HIGH (ref 1–3.3)
MAGNESIUM SERPL-MCNC: 2.1 MG/DL — SIGNIFICANT CHANGE UP (ref 1.6–2.6)
MCHC RBC-ENTMCNC: 28.7 PG — SIGNIFICANT CHANGE UP (ref 27–34)
MCHC RBC-ENTMCNC: 30.1 GM/DL — LOW (ref 32–36)
MCV RBC AUTO: 95.4 FL — SIGNIFICANT CHANGE UP (ref 80–100)
MONOCYTES # BLD AUTO: 1.04 K/UL — HIGH (ref 0–0.9)
MONOCYTES NFR BLD AUTO: 8.3 % — SIGNIFICANT CHANGE UP (ref 2–14)
NEUTROPHILS # BLD AUTO: 7.54 K/UL — HIGH (ref 1.8–7.4)
NEUTROPHILS NFR BLD AUTO: 59.8 % — SIGNIFICANT CHANGE UP (ref 43–77)
NRBC # BLD: 0 /100 WBCS — SIGNIFICANT CHANGE UP (ref 0–0)
PLATELET # BLD AUTO: 279 K/UL — SIGNIFICANT CHANGE UP (ref 150–400)
POTASSIUM SERPL-MCNC: 4.6 MMOL/L — SIGNIFICANT CHANGE UP (ref 3.5–5.3)
POTASSIUM SERPL-SCNC: 4.6 MMOL/L — SIGNIFICANT CHANGE UP (ref 3.5–5.3)
PROT SERPL-MCNC: 8 G/DL — SIGNIFICANT CHANGE UP (ref 6–8.3)
RBC # BLD: 4.32 M/UL — SIGNIFICANT CHANGE UP (ref 4.2–5.8)
RBC # FLD: 15.3 % — HIGH (ref 10.3–14.5)
SODIUM SERPL-SCNC: 140 MMOL/L — SIGNIFICANT CHANGE UP (ref 135–145)
WBC # BLD: 12.6 K/UL — HIGH (ref 3.8–10.5)
WBC # FLD AUTO: 12.6 K/UL — HIGH (ref 3.8–10.5)

## 2022-12-15 PROCEDURE — 99223 1ST HOSP IP/OBS HIGH 75: CPT

## 2022-12-15 PROCEDURE — 93306 TTE W/DOPPLER COMPLETE: CPT | Mod: 26

## 2022-12-15 PROCEDURE — 93356 MYOCRD STRAIN IMG SPCKL TRCK: CPT

## 2022-12-15 RX ORDER — LANOLIN ALCOHOL/MO/W.PET/CERES
3 CREAM (GRAM) TOPICAL AT BEDTIME
Refills: 0 | Status: DISCONTINUED | OUTPATIENT
Start: 2022-12-15 | End: 2022-12-27

## 2022-12-15 RX ORDER — HYDRALAZINE HCL 50 MG
10 TABLET ORAL ONCE
Refills: 0 | Status: COMPLETED | OUTPATIENT
Start: 2022-12-15 | End: 2022-12-15

## 2022-12-15 RX ORDER — METOPROLOL TARTRATE 50 MG
25 TABLET ORAL
Refills: 0 | Status: DISCONTINUED | OUTPATIENT
Start: 2022-12-15 | End: 2022-12-16

## 2022-12-15 RX ORDER — FUROSEMIDE 40 MG
40 TABLET ORAL DAILY
Refills: 0 | Status: DISCONTINUED | OUTPATIENT
Start: 2022-12-15 | End: 2022-12-16

## 2022-12-15 RX ORDER — ONDANSETRON 8 MG/1
4 TABLET, FILM COATED ORAL EVERY 8 HOURS
Refills: 0 | Status: DISCONTINUED | OUTPATIENT
Start: 2022-12-15 | End: 2022-12-27

## 2022-12-15 RX ORDER — ACETAMINOPHEN 500 MG
650 TABLET ORAL EVERY 6 HOURS
Refills: 0 | Status: DISCONTINUED | OUTPATIENT
Start: 2022-12-15 | End: 2022-12-27

## 2022-12-15 RX ADMIN — Medication 650 MILLIGRAM(S): at 04:47

## 2022-12-15 RX ADMIN — Medication 10 MILLIGRAM(S): at 20:08

## 2022-12-15 RX ADMIN — Medication 650 MILLIGRAM(S): at 16:40

## 2022-12-15 RX ADMIN — Medication 25 MILLIGRAM(S): at 05:33

## 2022-12-15 RX ADMIN — Medication 650 MILLIGRAM(S): at 05:23

## 2022-12-15 RX ADMIN — Medication 25 MILLIGRAM(S): at 17:15

## 2022-12-15 RX ADMIN — Medication 40 MILLIGRAM(S): at 05:33

## 2022-12-15 NOTE — PROGRESS NOTE ADULT - SUBJECTIVE AND OBJECTIVE BOX
Patient is a 62y old  Male who presents with a chief complaint of SOB, likely CHF exacerbation (15 Dec 2022 14:41)      SUBJECTIVE / OVERNIGHT EVENTS: weak.   Review of Systems  chest pain no  palpitations no  sob no  nausea no  headache no    MEDICATIONS  (STANDING):  furosemide   Injectable 40 milliGRAM(s) IV Push daily  metoprolol tartrate 25 milliGRAM(s) Oral two times a day    MEDICATIONS  (PRN):  acetaminophen     Tablet .. 650 milliGRAM(s) Oral every 6 hours PRN Temp greater or equal to 38C (100.4F), Mild Pain (1 - 3)  melatonin 3 milliGRAM(s) Oral at bedtime PRN Insomnia  ondansetron Injectable 4 milliGRAM(s) IV Push every 8 hours PRN Nausea and/or Vomiting      Vital Signs Last 24 Hrs  T(C): 37 (15 Dec 2022 15:34), Max: 37 (15 Dec 2022 15:34)  T(F): 98.6 (15 Dec 2022 15:34), Max: 98.6 (15 Dec 2022 15:34)  HR: 104 (15 Dec 2022 15:34) (92 - 111)  BP: 122/84 (15 Dec 2022 15:34) (122/84 - 162/119)  BP(mean): --  RR: 17 (15 Dec 2022 09:08) (17 - 26)  SpO2: 98% (15 Dec 2022 09:08) (96% - 100%)    Parameters below as of 15 Dec 2022 09:08  Patient On (Oxygen Delivery Method): nasal cannula  O2 Flow (L/min): 2      PHYSICAL EXAM:  GENERAL: NAD   HEAD:  Atraumatic, Normocephalic  EYES: EOMI, PERRLA, conjunctiva and sclera clear  NECK: Supple, No JVD  CHEST/LUNG: Clear to auscultation bilaterally; No wheeze  HEART: Regular rate and rhythm; No murmurs, rubs, or gallops  ABDOMEN: Soft, Nontender, Nondistended; Bowel sounds present  EXTREMITIES:  1-2+ edema  PSYCH: AAOx3  NEUROLOGY: non-focal  SKIN: No rashes or lesions    LABS:                        12.4   12.60 )-----------( 279      ( 15 Dec 2022 07:08 )             41.2     12-15    140  |  103  |  36<H>  ----------------------------<  88  4.6   |  22  |  1.59<H>    Ca    9.5      15 Dec 2022 07:08  Mg     2.1     12-15    TPro  8.0  /  Alb  3.7  /  TBili  0.7  /  DBili  x   /  AST  35  /  ALT  23  /  AlkPhos  178<H>  12-15    PT/INR - ( 14 Dec 2022 18:46 )   PT: 15.7 sec;   INR: 1.36 ratio         PTT - ( 14 Dec 2022 18:46 )  PTT:31.5 sec  CARDIAC MARKERS ( 14 Dec 2022 20:11 )  x     / x     / 228 U/L / x     / 6.9 ng/mL            RADIOLOGY & ADDITIONAL TESTS:    Imaging Personally Reviewed:    Consultant(s) Notes Reviewed:      Care Discussed with Consultants/Other Providers:

## 2022-12-15 NOTE — CONSULT NOTE ADULT - SUBJECTIVE AND OBJECTIVE BOX
CHIEF COMPLAINT: sob    HISTORY OF PRESENT ILLNESS:  62M w/ pmhx of HTN, OA, ?RA, chronic idiopathic SOB/dyspnea on intermittent home O2 found to have right kidney mass. 4/29 s/p RAL R partial nephrectomy p/w acute hypoxic respiratory failure. Pt endorses gradually worsening SOB and AVALOS for past 5-7 days. Starting the past 3-5 days he has also started to notice mild bilateral lower extremity edema. Has been worse with exertion. At baseline he usually only uses 02 at home and not at work. When pt woke up this AM his wife felt he looked too bad and made him see Dr. Carver today. While at office reportedly he was 80% on RA and TTE found new moderate dysfunction and LVH. Sent to ER for further evaluation. Pt denies any fevers, chills, recent travel, new cough. Denies any changes in medications or family hx of pulmonary disease. Has been getting worked up this year with Dr. Hill for pulmonology.       Allergies    No Known Allergies    Intolerances    	    MEDICATIONS:  furosemide   Injectable 40 milliGRAM(s) IV Push daily  metoprolol tartrate 25 milliGRAM(s) Oral two times a day        acetaminophen     Tablet .. 650 milliGRAM(s) Oral every 6 hours PRN  melatonin 3 milliGRAM(s) Oral at bedtime PRN  ondansetron Injectable 4 milliGRAM(s) IV Push every 8 hours PRN            PAST MEDICAL & SURGICAL HISTORY:  Hypertension      Rheumatoid arthritis      Neoplasm of uncertain behavior of kidney, right      History of cardiac cath  jan 2022, at [place] no stent          FAMILY HISTORY:  FH: diabetes mellitus (Mother)        SOCIAL HISTORY:    non smoker. indep in adl    REVIEW OF SYSTEMS:  See HPI, otherwise complete 10 point review of systems negative    [ ] All others negative	      PHYSICAL EXAM:  T(C): 36.4 (12-15-22 @ 04:30), Max: 36.7 (12-14-22 @ 14:41)  HR: 102 (12-15-22 @ 04:30) (70 - 111)  BP: 145/100 (12-15-22 @ 04:30) (130/98 - 168/114)  RR: 20 (12-15-22 @ 04:30) (20 - 28)  SpO2: 98% (12-15-22 @ 04:30) (96% - 100%)  Wt(kg): --  I&O's Summary      Appearance: No Acute Distress	  HEENT:  Normal oral mucosa, PERRL, EOMI	  Cardiovascular: Normal S1 S2, No JVD, No murmurs/rubs/gallops  Respiratory: Lungs clear to auscultation bilaterally  Gastrointestinal:  Soft, Non-tender, + BS	  Skin: No rashes, No ecchymoses, No cyanosis	  Neurologic: Non-focal  Extremities: No clubbing, cyanosis or edema  Vascular: Peripheral pulses palpable 2+ bilaterally  Psychiatry: A & O x 3, Mood & affect appropriate    Laboratory Data:	 	    CBC Full  -  ( 14 Dec 2022 16:26 )  WBC Count : 10.66 K/uL  Hemoglobin : 12.8 g/dL  Hematocrit : 42.3 %  Platelet Count - Automated : 277 K/uL  Mean Cell Volume : 95.3 fl  Mean Cell Hemoglobin : 28.8 pg  Mean Cell Hemoglobin Concentration : 30.3 gm/dL  Auto Neutrophil # : 6.95 K/uL  Auto Lymphocyte # : 2.65 K/uL  Auto Monocyte # : 0.87 K/uL  Auto Eosinophil # : 0.07 K/uL  Auto Basophil # : 0.08 K/uL  Auto Neutrophil % : 65.0 %  Auto Lymphocyte % : 24.9 %  Auto Monocyte % : 8.2 %  Auto Eosinophil % : 0.7 %  Auto Basophil % : 0.8 %    12-14    140  |  106  |  31<H>  ----------------------------<  83  4.4   |  22  |  1.54<H>    Ca    9.4      14 Dec 2022 16:26    TPro  8.3  /  Alb  3.9  /  TBili  0.7  /  DBili  x   /  AST  48<H>  /  ALT  24  /  AlkPhos  181<H>  12-14      proBNP: Serum Pro-Brain Natriuretic Peptide: 24385 pg/mL (12-14 @ 16:26)    Lipid Profile:   HgA1c:   TSH:       CARDIAC MARKERS:            Interpretation of Telemetry: 	    ECG:  	  RADIOLOGY:  OTHER: 	    PREVIOUS DIAGNOSTIC TESTING:    [ ] Echocardiogram:  [ ] Catheterization:  [ ] Stress Test:  	    IMPRESSION:  No pulmonary embolism. Enlargement of the main pulmonary artery up to 4.2   cm may reflect underlying pulmonary arterial hypertension.    Nonspecific diffuse bilateral groundglass opacities may represent   pulmonary edema versus mosaic attenuation consistent with small airway or   cysts small vessel disease      Assessment:  62M w/ pmhx of HTN, OA, ?RA, chronic idiopathic SOB/dyspnea on intermittent home O2 found to have right kidney mass. 4/29 s/p RAL R partial nephrectomy p/w acute hypoxic respiratory failure    Recs:  cardiac stable  cw iv diuresis, goal net net  cw beta blockers and afterload reduction  lhc 2022 at [facility], normal cors  obtain TTE - recent OP echo notable for lvh with mod lv dysfunction, grade 3 diastolic dysfunction, rv enlargement, severe TR and pulmonary htn  obtain cardiac MRI to evaluate for possible infiltrative cardiomyopathy  may ultimately need RHC to better assess hemodynamics and pHTN workup  pulmonary consultation  tele monitoring  will follow    Advanced care planning forms were discussed. Code status including forceful chest compressions, defibrillation and intubation were discussed. The risks benefits and alternatives to pertinent cardiac procedures and interventions were discussed in detail and all questions were answered. Duration: 15 minutes.    Greater than 90 minutes spent on total encounter; more than 50% of the visit was spent counseling and/or coordinating care by the attending physician.   	  Juarez Carver MD   Cardiovascular Diseases  [phone]

## 2022-12-15 NOTE — PATIENT PROFILE ADULT - FALL HARM RISK - HARM RISK INTERVENTIONS

## 2022-12-15 NOTE — PROGRESS NOTE ADULT - ASSESSMENT
62M w/ pmhx of HTN, OA, ?RA, chronic idiopathic SOB/dyspnea on intermittent home O2 found to have right kidney mass. 4/29 s/p RAL R partial nephrectomy p/w acute hypoxic respiratory failure likely with a component of pulmonary edema    Acute on chronic respiratory failure with hypoxia.   - Pulmonary edema and HF likely contributing given elevated BNP and edema. Unclear if other chronic process ongoing. Sarcoid? pulm htn?  - Lasix 40mg IV daily for now  - oxygen  - Continuous pulse oximetry for now  - TTE  - Cardiology consult  - Pulmonary consult    Pulmonary edema.   - Note some likely pulmonary edema on CTA. Also with signs of R sided HF/ pulmonary HTN. Suspect some relation to whatever chronic ongoing process has been causing pt's idiopathic dyspnea  - lasix 40mg IV daily for now  - Vital signs and continuous pulse oximetry as above  - TTE  - Strict I/Os and daily weight    DON (acute kidney injury).   - Crt 1.5 up from prior in record.  - Trend BMP  - Renal dose medications  - Holding spironolactone for now.    Rheumatoid arthritis.   - stable    Essential hypertension.   - Trend BP  - Cont. lopressor BID with hold parameters.    DVT prophylaxis.   - Hep subq.    Gerardo Marte MD phone [phone]

## 2022-12-15 NOTE — PROGRESS NOTE ADULT - SUBJECTIVE AND OBJECTIVE BOX
PULMONARY PROGRESS NOTE    PLACIDO GOLDMAN  MRN-[id number]    Patient is a 62y old  Male who presents with a chief complaint of SOB, likely CHF exacerbation (15 Dec 2022 07:08)      HPI:  -known to me from the office  -chronic AVALOS and chronic hypoxia  -recently worsening  -lost 35lbs since kidney surgery, echo abnormal as outpt    ROS:   -    MEDICATIONS  (STANDING):  furosemide   Injectable 40 milliGRAM(s) IV Push daily  metoprolol tartrate 25 milliGRAM(s) Oral two times a day    MEDICATIONS  (PRN):  acetaminophen     Tablet .. 650 milliGRAM(s) Oral every 6 hours PRN Temp greater or equal to 38C (100.4F), Mild Pain (1 - 3)  melatonin 3 milliGRAM(s) Oral at bedtime PRN Insomnia  ondansetron Injectable 4 milliGRAM(s) IV Push every 8 hours PRN Nausea and/or Vomiting      EXAM:  Vital Signs Last 24 Hrs  T(C): 36.9 (15 Dec 2022 09:08), Max: 36.9 (15 Dec 2022 09:08)  T(F): 98.4 (15 Dec 2022 09:08), Max: 98.4 (15 Dec 2022 09:08)  HR: 97 (15 Dec 2022 09:08) (86 - 111)  BP: 125/72 (15 Dec 2022 09:08) (125/72 - 162/119)  BP(mean): --  RR: 17 (15 Dec 2022 09:08) (17 - 26)  SpO2: 98% (15 Dec 2022 09:08) (96% - 100%)    Parameters below as of 15 Dec 2022 09:08  Patient On (Oxygen Delivery Method): nasal cannula  O2 Flow (L/min): 2        GENERAL: The patient is awake and alert in no apparent distress.     LUNGS: respirations unlabored      LABS/IMAGING: reviewed                        12.4   12.60 )-----------( 279      ( 15 Dec 2022 07:08 )             41.2     12-15    140  |  103  |  36<H>  ----------------------------<  88  4.6   |  22  |  1.59<H>    Ca    9.5      15 Dec 2022 07:08  Mg     2.1     12-15    TPro  8.0  /  Alb  3.7  /  TBili  0.7  /  DBili  x   /  AST  35  /  ALT  23  /  AlkPhos  178<H>  12-15    < from: CT Angio Chest PE Protocol w/ IV Cont (12.14.22 @ 19:14) >    ACC: [id number] EXAM:  CT ANGIO CHEST PULM [place]                          PROCEDURE DATE:  12/14/2022          INTERPRETATION:  CLINICAL INFORMATION: Dyspnea.    COMPARISON: CTA chest 5/1/2022.    CONTRAST/COMPLICATIONS:  IV Contrast: Omnipaque 350  83 cc administered   17 cc discarded  Oral Contrast: NONE  Complications: None reported at time of study completion    PROCEDURE:  CT Angiogram of the chest was obtained with intravenous contrast. Three   dimensional maximum intensity projection (MIP) images were generated.    FINDINGS:    PULMONARY ANGIOGRAM: Adequate opacification the pulmonary arterial tree   to the level of the segmental and subsegmental pulmonary arteries. No   filling defect. Main pulmonary artery is enlarged, measures 4.2 cm.    LYMPH NODES: Axillary lymphadenopathy in comparison with most recent   prior exam, 5/1/2022.    HEART/VASCULATURE: Heart size is enlarged. Aneurysmal dilatation   ascending aorta measures up to 3.9 cm at the level of the main pulmonary   artery.    AIRWAYS/LUNGS/PLEURA: Patent central airways. Tracheal secretions.   Biapical scarring/atelectasis. Diffuse bilateral groundglass opacities.   Trace left pleural effusion and/or pleural thickening. Minimal biapical   pleural parenchymal scarring.    UPPER ABDOMEN: Trace fluid along the posterior right hepatic lobe/right   perinephric space adjacent to the sutures.    BONES/SOFT TISSUES: Degenerative changes.    IMPRESSION:  No pulmonary embolism. Enlargement of the main pulmonary artery upto 4.2   cm may reflect underlying pulmonary arterial hypertension.    Nonspecific diffuse bilateral groundglass opacities may represent   pulmonary edema versus mosaic attenuation consistent with small airway or   cysts small vessel disease          --- End of Report ---           SUSANNE NUNEZ MD; Resident Radiologist  This document has been electronically signed.  ANAMARIA VICTOR MD; Attending Radiologist  This document has been electronically signed. Dec 14 2022  7:38PM    < end of copied text >    Specimen(s) Submitted   LYMPH NODE, AXILLARY, LEFT, US GUIDED CORE BIOPSY AND FNA   Final Diagnosis   LYMPH NODE, AXILLARY, LEFT, US GUIDED CORE BIOPSY AND FNA   NEGATIVE FOR MALIGNANT CELLS.   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 PET/CT is done as part   of the initial treatment strategy evaluation.    RADIOPHARMACEUTICAL:  10.73 mCi F-18, FDG, I.V.    TECHNIQUE:  Fasting blood sugar measured prior to injection of   radiopharmaceutical was 91 mg/dl. Following intravenous injection of the   radiopharmaceutical and an uptake period of approximately 60 minutes,   FDG-PET/CT was obtained on a [employer] Discovery 710 from skull base to mid   thigh. Oral contrast was given during the uptake period. CT was performed   during shallow respiration. The CT protocol was optimized for PET   attenuation correction and anatomic localization. The CT protocol was not   designed to produce and cannot replace state-of-the-art diagnostic CT   images with specific imaging protocols for different body parts and   indications. Images were reviewed on a dedicated workstation using   multiplanar reconstruction.    The standardized uptake values (SUV) are normalized to patient body   weight and indicate the highest activity concentration (SUVmax) in a   given disease site. All image numbers refer to axial image number.    COMPARISON:  No prior PET/CT.    OTHER STUDIES USED FOR CORRELATION: Report of CTA of chest dated 3/8/2022.    FINDINGS:    HEAD/NECK: Physiologic FDG activity in visualized brain, head, and neck.    CHEST: There are a a few mildly enlarged FDG-avid bilateral axillary   lymph nodes, larger on the left. For reference, a left axillary lymph   node measures 1.8 x 1.5 cm,SUV 4.8 (image 65). A representative right   axillary node measures 1.6 x 0.8 cm, SUV 3.7 (image 70). An FDG-avid left   retropectoral node measures 0.7 x 0.6 cm, SUV 2.7 (image 65).    There are a few small FDG-avid mediastinal and bilateral hilar lymph   nodes which are difficult to delineate on CT. For reference, a subcarinal   lymph node demonstrates SUV 2.4 (image 86) and a right perihilar lymph   node demonstrates SUV 2.9 (image 85).    LUNGS: No abnormal FDG activity. Non-FDG-avid biapicalscarring.    PLEURA/PERICARDIUM: No abnormal FDG activity. No effusion.    HEPATOBILIARY/PANCREAS:  Physiologic FDG activity. Liver SUV mean is    SPLEEN: Physiologic FDG activity. Normal in size.    ADRENAL GLANDS: No abnormal FDG activity. No nodule.    KIDNEYS/URINARY BLADDER: Physiologic excreted FDG activity. No renal mass   is identified, however, evaluation is limited in the absence of   intravenous contrast.    REPRODUCTIVE ORGANS: No abnormal FDG activity.    ABDOMINOPELVIC NODES: No enlarged or FDG-avid lymph node.    BOWEL/PERITONEUM/MESENTERY: No abnormal FDG activity.    BONES/SOFT TISSUES: No abnormal FDG activity.    IMPRESSION: Abnormal FDG-PET/CT scan.    1. Few mildly enlarged FDG-avid bilateral axillary lymph nodes and small,   mildly FDG-avid left retropectoral, mediastinal, and bilateral hilar   lymph nodes are nonspecific. Axillary lymph nodes may be further   evaluated with ultrasound and percutaneous needle biopsy, as indicated.    2. Remainder of study demonstrates no evidence of FDG-avid disease.    3. No renal mass is identified, however, evaluation is limited in the   absence of intravenous contrast. Physiologic FDG activity in the kidneys   limits detection of FDG-avid renal lesion.    --- End of Report ---               JANNY GRANDA MD; Attending Nuclear Medicine   This document has been electronically signed. Apr 28 2022  4:40PM    < end of copied text >      PROBLEM LIST:  62y Male with HEALTH ISSUES - PROBLEM Dx:  Suspected pulmonary embolism    Suspected deep vein thrombosis (DVT)    Pulmonary edema    Acute on chronic respiratory failure with hypoxia    Essential hypertension    DON (acute kidney injury)    DVT prophylaxis    Rheumatoid arthritis    RECS:  -agree with diuresis  -appreciate cardiology fu cardiac MRI  -RHC?  -cont supplemental O2      Unique Costa MD   [phone]           PULMONARY PROGRESS NOTE    PLACIDO GOLDMAN  MRN-[id number]    Patient is a 62y old  Male who presents with a chief complaint of SOB, likely CHF exacerbation (15 Dec 2022 07:08)      HPI:  -known to me from the office  -chronic AVALOS and chronic hypoxia  -recently worsening  -lost 35lbs since kidney surgery, echo abnormal as outpt    Office work up:  PFT april 2022--normal spirometry/volumes/LOW DLCO  exercise oximetry march 2022--desaturation to mid 80's on exertion  -PET scan and lymph note biopsy ordered see below.    ROS:   -    MEDICATIONS  (STANDING):  furosemide   Injectable 40 milliGRAM(s) IV Push daily  metoprolol tartrate 25 milliGRAM(s) Oral two times a day    MEDICATIONS  (PRN):  acetaminophen     Tablet .. 650 milliGRAM(s) Oral every 6 hours PRN Temp greater or equal to 38C (100.4F), Mild Pain (1 - 3)  melatonin 3 milliGRAM(s) Oral at bedtime PRN Insomnia  ondansetron Injectable 4 milliGRAM(s) IV Push every 8 hours PRN Nausea and/or Vomiting      EXAM:  Vital Signs Last 24 Hrs  T(C): 36.9 (15 Dec 2022 09:08), Max: 36.9 (15 Dec 2022 09:08)  T(F): 98.4 (15 Dec 2022 09:08), Max: 98.4 (15 Dec 2022 09:08)  HR: 97 (15 Dec 2022 09:08) (86 - 111)  BP: 125/72 (15 Dec 2022 09:08) (125/72 - 162/119)  BP(mean): --  RR: 17 (15 Dec 2022 09:08) (17 - 26)  SpO2: 98% (15 Dec 2022 09:08) (96% - 100%)    Parameters below as of 15 Dec 2022 09:08  Patient On (Oxygen Delivery Method): nasal cannula  O2 Flow (L/min): 2        GENERAL: The patient is awake and alert in no apparent distress.     LUNGS: respirations unlabored      LABS/IMAGING: reviewed                        12.4   12.60 )-----------( 279      ( 15 Dec 2022 07:08 )             41.2     12-15    140  |  103  |  36<H>  ----------------------------<  88  4.6   |  22  |  1.59<H>    Ca    9.5      15 Dec 2022 07:08  Mg     2.1     12-15    TPro  8.0  /  Alb  3.7  /  TBili  0.7  /  DBili  x   /  AST  35  /  ALT  23  /  AlkPhos  178<H>  12-15    < from: CT Angio Chest PE Protocol w/ IV Cont (12.14.22 @ 19:14) >    ACC: [id number] EXAM:  CT ANGIO CHEST PULM [facility]                          PROCEDURE DATE:  12/14/2022          INTERPRETATION:  CLINICAL INFORMATION: Dyspnea.    COMPARISON: CTA chest 5/1/2022.    CONTRAST/COMPLICATIONS:  IV Contrast: Omnipaque 350  83 cc administered   17 cc discarded  Oral Contrast: NONE  Complications: None reported at time of study completion    PROCEDURE:  CT Angiogram of the chest was obtained with intravenous contrast. Three   dimensional maximum intensity projection (MIP) images were generated.    FINDINGS:    PULMONARY ANGIOGRAM: Adequate opacification the pulmonary arterial tree   to the level of the segmental and subsegmental pulmonary arteries. No   filling defect. Main pulmonary artery is enlarged, measures 4.2 cm.    LYMPH NODES: Axillary lymphadenopathy in comparison with most recent   prior exam, 5/1/2022.    HEART/VASCULATURE: Heart size is enlarged. Aneurysmal dilatation   ascending aorta measures up to 3.9 cm at the level of the main pulmonary   artery.    AIRWAYS/LUNGS/PLEURA: Patent central airways. Tracheal secretions.   Biapical scarring/atelectasis. Diffuse bilateral groundglass opacities.   Trace left pleural effusion and/or pleural thickening. Minimal biapical   pleural parenchymal scarring.    UPPER ABDOMEN: Trace fluid along the posterior right hepatic lobe/right   perinephric space adjacent to the sutures.    BONES/SOFT TISSUES: Degenerative changes.    IMPRESSION:  No pulmonary embolism. Enlargement of the main pulmonary artery upto 4.2   cm may reflect underlying pulmonary arterial hypertension.    Nonspecific diffuse bilateral groundglass opacities may represent   pulmonary edema versus mosaic attenuation consistent with small airway or   cysts small vessel disease          --- End of Report ---           SUSANNE NUNEZ MD; Resident Radiologist  This document has been electronically signed.  ANAMARIA VICTOR MD; Attending Radiologist  This document has been electronically signed. Dec 14 2022  7:38PM    < end of copied text >    Specimen(s) Submitted   LYMPH NODE, AXILLARY, LEFT, US GUIDED CORE BIOPSY AND FNA   Final Diagnosis   LYMPH NODE, AXILLARY, LEFT, US GUIDED CORE BIOPSY AND FNA   NEGATIVE FOR MALIGNANT CELLS.   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 PET/CT is done as part   of the initial treatment strategy evaluation.    RADIOPHARMACEUTICAL:  10.73 mCi F-18, FDG, I.V.    TECHNIQUE:  Fasting blood sugar measured prior to injection of   radiopharmaceutical was 91 mg/dl. Following intravenous injection of the   radiopharmaceutical and an uptake period of approximately 60 minutes,   FDG-PET/CT was obtained on a [employer] Discovery 710 from skull base to mid   thigh. Oral contrast was given during the uptake period. CT was performed   during shallow respiration. The CT protocol was optimized for PET   attenuation correction and anatomic localization. The CT protocol was not   designed to produce and cannot replace state-of-the-art diagnostic CT   images with specific imaging protocols for different body parts and   indications. Images were reviewed on a dedicated workstation using   multiplanar reconstruction.    The standardized uptake values (SUV) are normalized to patient body   weight and indicate the highest activity concentration (SUVmax) in a   given disease site. All image numbers refer to axial image number.    COMPARISON:  No prior PET/CT.    OTHER STUDIES USED FOR CORRELATION: Report of CTA of chest dated 3/8/2022.    FINDINGS:    HEAD/NECK: Physiologic FDG activity in visualized brain, head, and neck.    CHEST: There are a a few mildly enlarged FDG-avid bilateral axillary   lymph nodes, larger on the left. For reference, a left axillary lymph   node measures 1.8 x 1.5 cm,SUV 4.8 (image 65). A representative right   axillary node measures 1.6 x 0.8 cm, SUV 3.7 (image 70). An FDG-avid left   retropectoral node measures 0.7 x 0.6 cm, SUV 2.7 (image 65).    There are a few small FDG-avid mediastinal and bilateral hilar lymph   nodes which are difficult to delineate on CT. For reference, a subcarinal   lymph node demonstrates SUV 2.4 (image 86) and a right perihilar lymph   node demonstrates SUV 2.9 (image 85).    LUNGS: No abnormal FDG activity. Non-FDG-avid biapicalscarring.    PLEURA/PERICARDIUM: No abnormal FDG activity. No effusion.    HEPATOBILIARY/PANCREAS:  Physiologic FDG activity. Liver SUV mean is    SPLEEN: Physiologic FDG activity. Normal in size.    ADRENAL GLANDS: No abnormal FDG activity. No nodule.    KIDNEYS/URINARY BLADDER: Physiologic excreted FDG activity. No renal mass   is identified, however, evaluation is limited in the absence of   intravenous contrast.    REPRODUCTIVE ORGANS: No abnormal FDG activity.    ABDOMINOPELVIC NODES: No enlarged or FDG-avid lymph node.    BOWEL/PERITONEUM/MESENTERY: No abnormal FDG activity.    BONES/SOFT TISSUES: No abnormal FDG activity.    IMPRESSION: Abnormal FDG-PET/CT scan.    1. Few mildly enlarged FDG-avid bilateral axillary lymph nodes and small,   mildly FDG-avid left retropectoral, mediastinal, and bilateral hilar   lymph nodes are nonspecific. Axillary lymph nodes may be further   evaluated with ultrasound and percutaneous needle biopsy, as indicated.    2. Remainder of study demonstrates no evidence of FDG-avid disease.    3. No renal mass is identified, however, evaluation is limited in the   absence of intravenous contrast. Physiologic FDG activity in the kidneys   limits detection of FDG-avid renal lesion.    --- End of Report ---               JANNY GRANDA MD; Attending Nuclear Medicine   This document has been electronically signed. Apr 28 2022  4:40PM    < end of copied text >      PROBLEM LIST:  62y Male with HEALTH ISSUES - PROBLEM Dx:  Suspected pulmonary embolism    Suspected deep vein thrombosis (DVT)    Pulmonary edema    Acute on chronic respiratory failure with hypoxia    Essential hypertension    DON (acute kidney injury)    DVT prophylaxis    Rheumatoid arthritis    RECS:  -agree with diuresis  -appreciate cardiology fu cardiac MRI  -RHC?  -cont supplemental O2      Unique Costa MD   [phone]

## 2022-12-16 LAB
HGB FLD-MCNC: 12.7 G/DL — SIGNIFICANT CHANGE UP (ref 12.6–17.4)
OXYHGB MFR BLDMV: 72.1 % — LOW (ref 90–95)
SAO2 % BLD: 73.6 % — SIGNIFICANT CHANGE UP (ref 60–90)

## 2022-12-16 PROCEDURE — 93451 RIGHT HEART CATH: CPT | Mod: 26

## 2022-12-16 PROCEDURE — 75561 CARDIAC MRI FOR MORPH W/DYE: CPT | Mod: 26

## 2022-12-16 RX ORDER — METOPROLOL TARTRATE 50 MG
50 TABLET ORAL
Refills: 0 | Status: DISCONTINUED | OUTPATIENT
Start: 2022-12-16 | End: 2022-12-27

## 2022-12-16 RX ORDER — CHLORHEXIDINE GLUCONATE 213 G/1000ML
1 SOLUTION TOPICAL DAILY
Refills: 0 | Status: DISCONTINUED | OUTPATIENT
Start: 2022-12-16 | End: 2022-12-27

## 2022-12-16 RX ORDER — SACUBITRIL AND VALSARTAN 24; 26 MG/1; MG/1
1 TABLET, FILM COATED ORAL
Refills: 0 | Status: DISCONTINUED | OUTPATIENT
Start: 2022-12-16 | End: 2022-12-27

## 2022-12-16 RX ADMIN — Medication 40 MILLIGRAM(S): at 05:04

## 2022-12-16 RX ADMIN — Medication 25 MILLIGRAM(S): at 05:03

## 2022-12-16 RX ADMIN — SACUBITRIL AND VALSARTAN 1 TABLET(S): 24; 26 TABLET, FILM COATED ORAL at 17:09

## 2022-12-16 RX ADMIN — Medication 50 MILLIGRAM(S): at 17:09

## 2022-12-16 RX ADMIN — CHLORHEXIDINE GLUCONATE 1 APPLICATION(S): 213 SOLUTION TOPICAL at 17:09

## 2022-12-16 NOTE — PROGRESS NOTE ADULT - SUBJECTIVE AND OBJECTIVE BOX
Patient is a 62y old  Male who presents with a chief complaint of SOB, likely CHF exacerbation (15 Dec 2022 16:42)      SUBJECTIVE / OVERNIGHT EVENTS: weak  Review of Systems  chest pain no  palpitations no  sob yes  nausea no  headache no    MEDICATIONS  (STANDING):  chlorhexidine 2% Cloths 1 Application(s) Topical daily  metoprolol tartrate 50 milliGRAM(s) Oral two times a day  sacubitril 24 mG/valsartan 26 mG 1 Tablet(s) Oral two times a day    MEDICATIONS  (PRN):  acetaminophen     Tablet .. 650 milliGRAM(s) Oral every 6 hours PRN Temp greater or equal to 38C (100.4F), Mild Pain (1 - 3)  melatonin 3 milliGRAM(s) Oral at bedtime PRN Insomnia  ondansetron Injectable 4 milliGRAM(s) IV Push every 8 hours PRN Nausea and/or Vomiting      Vital Signs Last 24 Hrs  T(C): 36.8 (16 Dec 2022 20:22), Max: 36.8 (16 Dec 2022 04:34)  T(F): 98.2 (16 Dec 2022 20:22), Max: 98.3 (16 Dec 2022 04:34)  HR: 57 (16 Dec 2022 20:22) (57 - 102)  BP: 127/89 (16 Dec 2022 20:22) (127/89 - 160/110)  BP(mean): --  RR: 18 (16 Dec 2022 20:22) (18 - 20)  SpO2: 100% (16 Dec 2022 20:22) (100% - 100%)    Parameters below as of 16 Dec 2022 20:22  Patient On (Oxygen Delivery Method): nasal cannula        PHYSICAL EXAM:  GENERAL: NAD  HEAD:  Atraumatic, Normocephalic  EYES: EOMI, PERRLA, conjunctiva and sclera clear  NECK: Supple, No JVD  CHEST/LUNG: Clear to auscultation bilaterally; No wheeze  HEART: Regular rate and rhythm; No murmurs, rubs, or gallops  ABDOMEN: Soft, Nontender, Nondistended; Bowel sounds present  EXTREMITIES:  2+ Peripheral Pulses, No clubbing, cyanosis, or edema  PSYCH: AAOx3  NEUROLOGY: non-focal  SKIN: No rashes or lesions    LABS:                        12.4   12.60 )-----------( 279      ( 15 Dec 2022 07:08 )             41.2     12-15    140  |  103  |  36<H>  ----------------------------<  88  4.6   |  22  |  1.59<H>    Ca    9.5      15 Dec 2022 07:08  Mg     2.1     12-15    TPro  8.0  /  Alb  3.7  /  TBili  0.7  /  DBili  x   /  AST  35  /  ALT  23  /  AlkPhos  178<H>  12-15      < from: TTE with Doppler (w/Cont) (12.15.22 @ 18:19) >  Conclusions:  1. Moderate concentric left ventricular hypertrophy.  2. Severe global left ventricular systolic dysfunction.  Highly trabeculated apex.  Endocardial visualization  enhanced with intravenous injection of Ultrasonic Enhancing  Agent (Definity). No left ventricular thrombus.  3. Global Longitudinal Strain -6.4% (Adali, HR 96 bpm, BP  125/72).  GLS is severely reduced.  4. Increased E/e'  is consistent with elevated left  ventricular filling pressure.  5. Moderate right atrial enlargement.  6. Right ventricular enlargement with decreased right  ventricular systolic function.  7. Estimated pulmonary artery systolic pressure equals 60  mm Hg, assuming right atrial pressure equals 8 mm Hg,  consistent with moderate pulmonary pressures.    < end of copied text >  < from: Cardiac Catheterization (12.16.22 @ 15:31) >  Diagnostic Conclusions:   Mild pre-capillary pulmonary hypertension preserved cardiac output     < end of copied text >            RADIOLOGY & ADDITIONAL TESTS:    Imaging Personally Reviewed:    Consultant(s) Notes Reviewed:      Care Discussed with Consultants/Other Providers:

## 2022-12-16 NOTE — PROGRESS NOTE ADULT - ASSESSMENT
62M w/ pmhx of HTN, OA, ?RA, chronic idiopathic SOB/dyspnea on intermittent home O2 found to have right kidney mass. 4/29 s/p RAL R partial nephrectomy p/w acute hypoxic respiratory failure likely with a component of pulmonary edema    Acute on chronic respiratory failure with hypoxia.   - Pulmonary edema and HF likely contributing given elevated BNP and edema.   - Lasix 40mg IV daily for now  - oxygen  - Continuous pulse oximetry for now  - TTE  - Cardiology consult noted  - Pulmonary consult noted    Pulmonary Hypertension  - Pulmonary follow  - s/p RHC     Pulmonary edema.   - Note some likely pulmonary edema on CTA. Also with signs of R sided HF/ pulmonary HTN. Suspect some relation to whatever chronic ongoing process has been causing pt's idiopathic dyspnea  - lasix 40mg IV daily for now  - Vital signs and continuous pulse oximetry as above  - TTE  - Strict I/Os and daily weight    DON (acute kidney injury).   - Crt 1.5 up from prior in record.  - Trend BMP  - Renal dose medications  - Holding spironolactone for now.    Rheumatoid arthritis.   - stable    Essential hypertension.   - Trend BP  - Cont. lopressor BID with hold parameters.    DVT prophylaxis.   - Hep subq.    Gerardo Marte MD phone [phone]

## 2022-12-16 NOTE — PROGRESS NOTE PEDS - SUBJECTIVE AND OBJECTIVE BOX
Cardiovascular Disease Progress Note    Overnight events: No acute events overnight.   + sob. no cp/palps/dizziness  Otherwise review of systems negative    Objective Findings:  T(C): 36.8 (12-16-22 @ 04:34), Max: 37 (12-15-22 @ 15:34)  HR: 100 (12-16-22 @ 04:34) (97 - 104)  BP: 158/104 (12-16-22 @ 04:34) (122/84 - 158/104)  RR: 18 (12-16-22 @ 04:34) (17 - 18)  SpO2: 100% (12-16-22 @ 04:34) (95% - 100%)  Wt(kg): --  Daily     Daily       Physical Exam:  Gen: NAD  HEENT: EOMI  CV: RRR, normal S1 + S2, no m/r/g  Lungs: CTAB  Abd: soft, non-tender  Ext: No edema    Telemetry:    Laboratory Data:                        12.4   12.60 )-----------( 279      ( 15 Dec 2022 07:08 )             41.2     12-15    140  |  103  |  36<H>  ----------------------------<  88  4.6   |  22  |  1.59<H>    Ca    9.5      15 Dec 2022 07:08  Mg     2.1     12-15    TPro  8.0  /  Alb  3.7  /  TBili  0.7  /  DBili  x   /  AST  35  /  ALT  23  /  AlkPhos  178<H>  12-15    PT/INR - ( 14 Dec 2022 18:46 )   PT: 15.7 sec;   INR: 1.36 ratio         PTT - ( 14 Dec 2022 18:46 )  PTT:31.5 sec  CARDIAC MARKERS ( 14 Dec 2022 20:11 )  x     / x     / 228 U/L / x     / 6.9 ng/mL        Conclusions:  1. Moderate concentric left ventricular hypertrophy.  2. Severe global left ventricular systolic dysfunction.  Highly trabeculated apex.  Endocardial visualization  enhanced with intravenous injection of Ultrasonic Enhancing  Agent (Definity). No left ventricular thrombus.  3. Global Longitudinal Strain -6.4% (Adali, HR 96 bpm, BP  125/72).  GLS is severely reduced.  4. Increased E/e'  is consistent with elevated left  ventricular filling pressure.  5. Moderate right atrial enlargement.  6. Right ventricular enlargement with decreased right  ventricular systolic function.  7. Estimated pulmonary artery systolic pressure equals 60  mm Hg, assuming right atrial pressure equals 8 mm Hg,  consistent with moderate pulmonary pressures.      Inpatient Medications:  MEDICATIONS  (STANDING):  furosemide   Injectable 40 milliGRAM(s) IV Push daily  metoprolol tartrate 25 milliGRAM(s) Oral two times a day      Assessment:  62M w/ pmhx of HTN, OA, ?RA, chronic idiopathic SOB/dyspnea on intermittent home O2 found to have right kidney mass. 4/29 s/p RAL R partial nephrectomy p/w acute hypoxic respiratory failure    Recs:  cardiac stable  cw iv diuresis, goal net neg  cw beta blockers and afterload reduction (start entresto 24/26mg bid)  [facility] 2022 at [facility], normal cors  TTE --> mod clvh, severe lv dysfunction, rve with dec rvsf, mod phtn  obtain cardiac MRI to evaluate for possible infiltrative cardiomyopathy  will need RHC to better assess hemodynamics and pHTN workup  suggest rheumatology evaluation  appreciate pulmonary recommendations  tele monitoring  will follow        Over 35 minutes spent on total encounter; more than 50% of the visit was spent counseling and/or coordinating care by the attending physician.      Juarez Carver MD   Cardiovascular Disease  [phone]

## 2022-12-17 LAB
ALBUMIN SERPL ELPH-MCNC: 3.5 G/DL — SIGNIFICANT CHANGE UP (ref 3.3–5)
ALP SERPL-CCNC: 149 U/L — HIGH (ref 40–120)
ALT FLD-CCNC: 17 U/L — SIGNIFICANT CHANGE UP (ref 10–45)
ANION GAP SERPL CALC-SCNC: 14 MMOL/L — SIGNIFICANT CHANGE UP (ref 5–17)
AST SERPL-CCNC: 25 U/L — SIGNIFICANT CHANGE UP (ref 10–40)
BILIRUB SERPL-MCNC: 0.8 MG/DL — SIGNIFICANT CHANGE UP (ref 0.2–1.2)
BUN SERPL-MCNC: 34 MG/DL — HIGH (ref 7–23)
CALCIUM SERPL-MCNC: 8.8 MG/DL — SIGNIFICANT CHANGE UP (ref 8.4–10.5)
CHLORIDE SERPL-SCNC: 102 MMOL/L — SIGNIFICANT CHANGE UP (ref 96–108)
CO2 SERPL-SCNC: 24 MMOL/L — SIGNIFICANT CHANGE UP (ref 22–31)
CREAT SERPL-MCNC: 1.82 MG/DL — HIGH (ref 0.5–1.3)
EGFR: 41 ML/MIN/1.73M2 — LOW
GLUCOSE SERPL-MCNC: 91 MG/DL — SIGNIFICANT CHANGE UP (ref 70–99)
HCT VFR BLD CALC: 42.3 % — SIGNIFICANT CHANGE UP (ref 39–50)
HGB BLD-MCNC: 13.1 G/DL — SIGNIFICANT CHANGE UP (ref 13–17)
MCHC RBC-ENTMCNC: 29 PG — SIGNIFICANT CHANGE UP (ref 27–34)
MCHC RBC-ENTMCNC: 31 GM/DL — LOW (ref 32–36)
MCV RBC AUTO: 93.6 FL — SIGNIFICANT CHANGE UP (ref 80–100)
NRBC # BLD: 0 /100 WBCS — SIGNIFICANT CHANGE UP (ref 0–0)
PLATELET # BLD AUTO: 305 K/UL — SIGNIFICANT CHANGE UP (ref 150–400)
POTASSIUM SERPL-MCNC: 3.5 MMOL/L — SIGNIFICANT CHANGE UP (ref 3.5–5.3)
POTASSIUM SERPL-SCNC: 3.5 MMOL/L — SIGNIFICANT CHANGE UP (ref 3.5–5.3)
PROT SERPL-MCNC: 7.4 G/DL — SIGNIFICANT CHANGE UP (ref 6–8.3)
RBC # BLD: 4.52 M/UL — SIGNIFICANT CHANGE UP (ref 4.2–5.8)
RBC # FLD: 15.6 % — HIGH (ref 10.3–14.5)
SODIUM SERPL-SCNC: 140 MMOL/L — SIGNIFICANT CHANGE UP (ref 135–145)
WBC # BLD: 10.11 K/UL — SIGNIFICANT CHANGE UP (ref 3.8–10.5)
WBC # FLD AUTO: 10.11 K/UL — SIGNIFICANT CHANGE UP (ref 3.8–10.5)

## 2022-12-17 PROCEDURE — 74176 CT ABD & PELVIS W/O CONTRAST: CPT | Mod: 26

## 2022-12-17 PROCEDURE — 99233 SBSQ HOSP IP/OBS HIGH 50: CPT

## 2022-12-17 RX ADMIN — SACUBITRIL AND VALSARTAN 1 TABLET(S): 24; 26 TABLET, FILM COATED ORAL at 05:28

## 2022-12-17 RX ADMIN — Medication 50 MILLIGRAM(S): at 17:23

## 2022-12-17 RX ADMIN — SACUBITRIL AND VALSARTAN 1 TABLET(S): 24; 26 TABLET, FILM COATED ORAL at 17:23

## 2022-12-17 RX ADMIN — Medication 50 MILLIGRAM(S): at 05:28

## 2022-12-17 RX ADMIN — CHLORHEXIDINE GLUCONATE 1 APPLICATION(S): 213 SOLUTION TOPICAL at 17:23

## 2022-12-17 NOTE — PROGRESS NOTE ADULT - SUBJECTIVE AND OBJECTIVE BOX
Patient is a 62y old  Male who presents with a chief complaint of SOB, likely CHF exacerbation (17 Dec 2022 12:22)      SUBJECTIVE / OVERNIGHT EVENTS: weak  Review of Systems  chest pain no  palpitations no  sob yes  nausea no  headache no    MEDICATIONS  (STANDING):  chlorhexidine 2% Cloths 1 Application(s) Topical daily  metoprolol tartrate 50 milliGRAM(s) Oral two times a day  sacubitril 24 mG/valsartan 26 mG 1 Tablet(s) Oral two times a day    MEDICATIONS  (PRN):  acetaminophen     Tablet .. 650 milliGRAM(s) Oral every 6 hours PRN Temp greater or equal to 38C (100.4F), Mild Pain (1 - 3)  melatonin 3 milliGRAM(s) Oral at bedtime PRN Insomnia  ondansetron Injectable 4 milliGRAM(s) IV Push every 8 hours PRN Nausea and/or Vomiting      Vital Signs Last 24 Hrs  T(C): 36.7 (17 Dec 2022 13:10), Max: 36.8 (16 Dec 2022 20:22)  T(F): 98 (17 Dec 2022 13:10), Max: 98.2 (16 Dec 2022 20:22)  HR: 96 (17 Dec 2022 13:10) (57 - 99)  BP: 128/89 (17 Dec 2022 13:10) (127/89 - 160/110)  BP(mean): --  RR: 18 (17 Dec 2022 13:10) (18 - 20)  SpO2: 97% (17 Dec 2022 13:10) (94% - 100%)    Parameters below as of 17 Dec 2022 13:10  Patient On (Oxygen Delivery Method): nasal cannula  O2 Flow (L/min): 3      PHYSICAL EXAM:  GENERAL: NAD  HEAD:  Atraumatic, Normocephalic  EYES: EOMI, PERRLA, conjunctiva and sclera clear  NECK: Supple, No JVD  CHEST/LUNG: Clear to auscultation bilaterally; No wheeze  HEART: Regular rate and rhythm; No murmurs, rubs, or gallops  ABDOMEN: Soft, Nontender, Nondistended; Bowel sounds present  EXTREMITIES:  2+ Peripheral Pulses, No clubbing, cyanosis, or edema  PSYCH: AAOx3  NEUROLOGY: non-focal  SKIN: No rashes or lesions    LABS:                        13.1   10.11 )-----------( 305      ( 17 Dec 2022 07:07 )             42.3     12-17    140  |  102  |  34<H>  ----------------------------<  91  3.5   |  24  |  1.82<H>    Ca    8.8      17 Dec 2022 07:06    TPro  7.4  /  Alb  3.5  /  TBili  0.8  /  DBili  x   /  AST  25  /  ALT  17  /  AlkPhos  149<H>  12-17                RADIOLOGY & ADDITIONAL TESTS:    Imaging Personally Reviewed:    Consultant(s) Notes Reviewed:      Care Discussed with Consultants/Other Providers:

## 2022-12-17 NOTE — PROGRESS NOTE ADULT - SUBJECTIVE AND OBJECTIVE BOX
Cardiovascular Disease Progress Note    Overnight events: No acute events overnight.  no new cardiac sx  Otherwise review of systems negative    Objective Findings:  T(C): 36.5 (12-17-22 @ 05:48), Max: 36.8 (12-16-22 @ 20:22)  HR: 81 (12-17-22 @ 05:48) (57 - 102)  BP: 131/82 (12-17-22 @ 05:48) (127/89 - 160/110)  RR: 18 (12-17-22 @ 05:48) (18 - 20)  SpO2: 94% (12-17-22 @ 05:48) (94% - 100%)  Wt(kg): --  Daily     Daily       Physical Exam:  Gen: NAD  HEENT: EOMI  CV: RRR, normal S1 + S2, no m/r/g  Lungs: CTAB  Abd: soft, non-tender  Ext: No edema    Telemetry:    Laboratory Data:                        13.1   10.11 )-----------( 305      ( 17 Dec 2022 07:07 )             42.3     12-17    140  |  102  |  34<H>  ----------------------------<  91  3.5   |  24  |  1.82<H>    Ca    8.8      17 Dec 2022 07:06    TPro  7.4  /  Alb  3.5  /  TBili  0.8  /  DBili  x   /  AST  25  /  ALT  17  /  AlkPhos  149<H>  12-17              Inpatient Medications:  MEDICATIONS  (STANDING):  chlorhexidine 2% Cloths 1 Application(s) Topical daily  metoprolol tartrate 50 milliGRAM(s) Oral two times a day  sacubitril 24 mG/valsartan 26 mG 1 Tablet(s) Oral two times a day      Assessment:  62M w/ pmhx of HTN, OA, ?RA, chronic idiopathic SOB/dyspnea on intermittent home O2 found to have right kidney mass. 4/29 s/p RAL R partial nephrectomy p/w acute hypoxic respiratory failure    Recs:  cardiac stable  resume lasix 20mg po qd once scr downtrends  cw gdmt for lv dysfunction - lopressor 50mg bid and entresto 24/26mg bid  [facility] 2022 at [facility], normal cors  TTE --> mod clvh, severe lv dysfunction, rve with dec rvsf, mod phtn  cardiac MRI results noted. cw medical therapy as above  s/p rhc, mild precap phtn, normal output and filling pressures  appreciate pulmonary recommendations  tele monitoring  anticipate dc home in 24 hours            Over 35 minutes spent on total encounter; more than 50% of the visit was spent counseling and/or coordinating care by the attending physician.      Juarez Carver MD   Cardiovascular Disease  [phone]

## 2022-12-17 NOTE — PROGRESS NOTE ADULT - SUBJECTIVE AND OBJECTIVE BOX
PULMONARY PROGRESS NOTE    PLACIDO GOLDMAN  MRN-[id number]    Patient is a 62y old  Male who presents with a chief complaint of SOB, likely CHF exacerbation (17 Dec 2022 08:43)      HPI:  -Appears comfortable. NAD  -    ROS:   -    ACTIVE MEDICATION LIST:  MEDICATIONS  (STANDING):  chlorhexidine 2% Cloths 1 Application(s) Topical daily  metoprolol tartrate 50 milliGRAM(s) Oral two times a day  sacubitril 24 mG/valsartan 26 mG 1 Tablet(s) Oral two times a day    MEDICATIONS  (PRN):  acetaminophen     Tablet .. 650 milliGRAM(s) Oral every 6 hours PRN Temp greater or equal to 38C (100.4F), Mild Pain (1 - 3)  melatonin 3 milliGRAM(s) Oral at bedtime PRN Insomnia  ondansetron Injectable 4 milliGRAM(s) IV Push every 8 hours PRN Nausea and/or Vomiting      EXAM:  Vital Signs Last 24 Hrs  T(C): 36.5 (17 Dec 2022 05:48), Max: 36.8 (16 Dec 2022 20:22)  T(F): 97.7 (17 Dec 2022 05:48), Max: 98.2 (16 Dec 2022 20:22)  HR: 81 (17 Dec 2022 05:48) (57 - 99)  BP: 131/82 (17 Dec 2022 05:48) (127/89 - 160/110)  BP(mean): --  RR: 18 (17 Dec 2022 05:48) (18 - 20)  SpO2: 94% (17 Dec 2022 05:48) (94% - 100%)    Parameters below as of 17 Dec 2022 05:48  Patient On (Oxygen Delivery Method): room air        GENERAL: The patient is awake and alert in no apparent distress.     LUNGS: Clear to auscultation without wheezing, rales or rhonchi; respirations unlabored    HEART: Regular rate and rhythm without murmur.    Labs:                        13.1   10.11 )-----------( 305      ( 17 Dec 2022 07:07 )             42.3   12-17    140  |  102  |  34<H>  ----------------------------<  91  3.5   |  24  |  1.82<H>    Ca    8.8      17 Dec 2022 07:06    TPro  7.4  /  Alb  3.5  /  TBili  0.8  /  DBili  x   /  AST  25  /  ALT  17  /  AlkPhos  149<H>  12-17  ACC: [id number] EXAM:  MR CARD MORPH FUNCTION WAW IC                          PROCEDURE DATE:  12/16/2022          INTERPRETATION:  MRI CARDIAC WITHOUT AND WITH CONTRAST      INDICATION: Dictated cardiomyopathy    COMPARISON: No prior studies available for comparison.    TECHNIQUE: Multi-sequential, multi-planar cardiac MRI was performed   before and after the intravenous administration of 9 mL of Gadavist; 1 mL   was discarded. First passed perfusion and late gadolinium enhanced images   were obtained.    SCANNER: Siemens 3.o T Dianna platform using a cardiac coil.    QUALITY: Fair.      FINDINGS:      MORPHOLOGY:    PERICARDIUM: The pericardium is normal in thickness (less than 4mm).   There is no pericardial effusion.    RIGHT ATRIUM: The right atrium may be enlarged. The inflow the IVC and   SVC are unremarkable.    RIGHT VENTRICLE: Right ventricle is unremarkable. There is no scalloping   or thickening of the free wall the right ventricle.    LEFT ATRIUM: Left atrium measures 3.0 cm the 3 chamber plane. There are   bilateral superior and inferior pulmonary veins.    LEFT VENTRICLE: Concentric thickening of the left ventricular myocardium.   Left ventricle measures 4.1 cm at end diastole and 3.5 cm end systole   (anteroseptal/inferolateral). There is no convincing focus of increased   T2 signal.    There is no convincing first pass perfusion abnormality. At the inferior   hinge point of the right ventricle, there is subtle late gadolinium   enhancement. There are no other convincing foci of late gadolinium   enhancement shown on the early or late phases of late gadolinium enhanced   imaging.    FUNCTION: No segmental wall motion abnormality. No global wall motion   abnormality.    LEFT VENTRICLE:  Unindexed:  EF: 36.04 %  EDV: 117.09 mL  ESV: 74.89 mL  SV: 42.20 mL  CO: 3.96 L/min    Indexed:  EDVi: 69.40 mL/m2  ESVi: 44.38 mL/m2  SV: 25.01 mL/m2  CO: 2.35 L/min/m2      VALVES:    MITRAL VALVE: Mitral regurgitation, not quantified  AORTIC VALVE: Aortic regurgitation, not quantified.      AORTA: Three-vessel left-sided aortic arch and left-sided descending   thoracic aorta.      NONCARDIAC FINDINGS: The other visualized thoracoabdominal structures are   unremarkable.      IMPRESSION:    1.  Subtle late gadolinium enhancement along the inferior hinge point of   the right ventricle can occur in a variety of clinical settings including   altered ventricular mechanics.  2.  Concentric thickening of the ventricular myocardium.  3.  The estimated left ventricular ejection fraction is 36.04%.    --- End of Report ---            LUDA ADAN MD; Attending Radiologist  This document has been electronically signed. Dec 16 2022  5:17PM    ACC: [id number] EXAM:  CT ANGIO CHEST PULM ART [facility]                          PROCEDURE DATE:  12/14/2022          INTERPRETATION:  CLINICAL INFORMATION: Dyspnea.    COMPARISON: CTA chest 5/1/2022.    CONTRAST/COMPLICATIONS:  IV Contrast: Omnipaque 350  83 cc administered   17 cc discarded  Oral Contrast: NONE  Complications: None reported at time of study completion    PROCEDURE:  CT Angiogram of the chest was obtained with intravenous contrast. Three   dimensional maximum intensity projection (MIP) images were generated.    FINDINGS:    PULMONARY ANGIOGRAM: Adequate opacification the pulmonary arterial tree   to the level of the segmental and subsegmental pulmonary arteries. No   filling defect. Main pulmonary artery is enlarged, measures 4.2 cm.    LYMPH NODES: Axillary lymphadenopathy in comparison with most recent   prior exam, 5/1/2022.    HEART/VASCULATURE: Heart size is enlarged. Aneurysmal dilatation   ascending aorta measures up to 3.9 cm at the level of the main pulmonary   artery.    AIRWAYS/LUNGS/PLEURA: Patent central airways. Tracheal secretions.   Biapical scarring/atelectasis. Diffuse bilateral groundglass opacities.   Trace left pleural effusion and/or pleural thickening. Minimal biapical   pleural parenchymal scarring.    UPPER ABDOMEN: Trace fluid along the posterior right hepatic lobe/right   perinephric space adjacent to the sutures.    BONES/SOFT TISSUES: Degenerative changes.    IMPRESSION:  No pulmonary embolism. Enlargement of the main pulmonary artery upto 4.2   cm may reflect underlying pulmonary arterial hypertension.    Nonspecific diffuse bilateral groundglass opacities may represent   pulmonary edema versus mosaic attenuation consistent with small airway or   cysts small vessel disease          --- End of Report ---           SUSANNE NUNEZ MD; Resident Radiologist  This document has been electronically signed.  ANAMARIA VICTOR MD; Attending Radiologist  This document has been electronically signed. Dec 14 2022  7:38PM    PROBLEM LIST:  62y Male with HEALTH ISSUES - PROBLEM Dx:      Pulmonary edema    Acute on chronic respiratory failure with hypoxia    Essential hypertension    DON (acute kidney injury)    DVT prophylaxis    Rheumatoid arthritis              RECS:  -agree with diuresis  -Cardio f/u  -cont supplemental O2      Marisa Lopes,   [phone]

## 2022-12-17 NOTE — PROGRESS NOTE ADULT - ASSESSMENT
62M w/ pmhx of HTN, OA, ?RA, chronic idiopathic SOB/dyspnea on intermittent home O2 found to have right kidney mass. 4/29 s/p RAL R partial nephrectomy p/w acute hypoxic respiratory failure likely with a component of pulmonary edema    Acute on chronic respiratory failure with hypoxia.   - Pulmonary edema and HF likely contributing given elevated BNP and edema.   - Lasix 40mg IV daily for now  - oxygen  - Continuous pulse oximetry for now  - TTE  - Cardiology consult noted  - Pulmonary consult noted    Pulmonary Hypertension  - Pulmonary follow  - s/p RHC   - Rheum eval as per cardiology    Pulmonary edema.   - Note some likely pulmonary edema on CTA. Also with signs of R sided HF/ pulmonary HTN. Suspect some relation to whatever chronic ongoing process has been causing pt's idiopathic dyspnea  - lasix 40mg IV daily for now  - Vital signs and continuous pulse oximetry as above  - TTE  - Strict I/Os and daily weight    DON (acute kidney injury).   - Crt 1.5 up from prior in record.  - Trend BMP  - Renal dose medications  - Holding spironolactone for now.    Rheumatoid arthritis.   - stable    Essential hypertension.   - Trend BP  - Cont. lopressor BID with hold parameters.    DVT prophylaxis.   - Hep subq.    Gerardo Marte MD phone [phone]

## 2022-12-18 LAB
24R-OH-CALCIDIOL SERPL-MCNC: 27.4 NG/ML — LOW (ref 30–80)
CRP SERPL-MCNC: 11 MG/L — HIGH (ref 0–4)
ERYTHROCYTE [SEDIMENTATION RATE] IN BLOOD: 48 MM/HR — HIGH (ref 0–20)
RHEUMATOID FACT SERPL-ACNC: <10 IU/ML — SIGNIFICANT CHANGE UP (ref 0–13)
TSH SERPL-MCNC: 4.55 UIU/ML — HIGH (ref 0.27–4.2)

## 2022-12-18 PROCEDURE — 99233 SBSQ HOSP IP/OBS HIGH 50: CPT | Mod: GC

## 2022-12-18 PROCEDURE — 99223 1ST HOSP IP/OBS HIGH 75: CPT | Mod: GC

## 2022-12-18 RX ADMIN — SACUBITRIL AND VALSARTAN 1 TABLET(S): 24; 26 TABLET, FILM COATED ORAL at 18:27

## 2022-12-18 RX ADMIN — CHLORHEXIDINE GLUCONATE 1 APPLICATION(S): 213 SOLUTION TOPICAL at 12:48

## 2022-12-18 RX ADMIN — Medication 50 MILLIGRAM(S): at 06:04

## 2022-12-18 RX ADMIN — Medication 50 MILLIGRAM(S): at 18:27

## 2022-12-18 RX ADMIN — SACUBITRIL AND VALSARTAN 1 TABLET(S): 24; 26 TABLET, FILM COATED ORAL at 06:04

## 2022-12-18 NOTE — PROGRESS NOTE ADULT - SUBJECTIVE AND OBJECTIVE BOX
Patient is a 62y old  Male who presents with a chief complaint of SOB, likely CHF exacerbation (18 Dec 2022 10:55)      SUBJECTIVE / OVERNIGHT EVENTS: weak.  Review of Systems  chest pain no  palpitations no  sob yes  nausea no  headache no    MEDICATIONS  (STANDING):  chlorhexidine 2% Cloths 1 Application(s) Topical daily  metoprolol tartrate 50 milliGRAM(s) Oral two times a day  sacubitril 24 mG/valsartan 26 mG 1 Tablet(s) Oral two times a day    MEDICATIONS  (PRN):  acetaminophen     Tablet .. 650 milliGRAM(s) Oral every 6 hours PRN Temp greater or equal to 38C (100.4F), Mild Pain (1 - 3)  melatonin 3 milliGRAM(s) Oral at bedtime PRN Insomnia  ondansetron Injectable 4 milliGRAM(s) IV Push every 8 hours PRN Nausea and/or Vomiting      Vital Signs Last 24 Hrs  T(C): 36.8 (18 Dec 2022 18:03), Max: 36.8 (18 Dec 2022 18:03)  T(F): 98.3 (18 Dec 2022 18:03), Max: 98.3 (18 Dec 2022 18:03)  HR: 70 (18 Dec 2022 18:03) (70 - 87)  BP: 134/86 (18 Dec 2022 18:03) (120/78 - 134/86)  BP(mean): --  RR: 17 (18 Dec 2022 18:03) (16 - 18)  SpO2: 96% (18 Dec 2022 18:03) (96% - 99%)    Parameters below as of 18 Dec 2022 18:03  Patient On (Oxygen Delivery Method): nasal cannula  O2 Flow (L/min): 2      PHYSICAL EXAM:  GENERAL: NAD  HEAD:  Atraumatic, Normocephalic  EYES: EOMI, PERRLA, conjunctiva and sclera clear  NECK: Supple, No JVD  CHEST/LUNG: Clear to auscultation bilaterally; No wheeze  HEART: Regular rate and rhythm; No murmurs, rubs, or gallops  ABDOMEN: Soft, Nontender, Nondistended; Bowel sounds present  EXTREMITIES:  2+ Peripheral Pulses, No clubbing, cyanosis, or edema  PSYCH: AAOx3  NEUROLOGY: non-focal  SKIN: No rashes or lesions    LABS:                        13.1   10.11 )-----------( 305      ( 17 Dec 2022 07:07 )             42.3     12-17    140  |  102  |  34<H>  ----------------------------<  91  3.5   |  24  |  1.82<H>    Ca    8.8      17 Dec 2022 07:06    TPro  7.4  /  Alb  3.5  /  TBili  0.8  /  DBili  x   /  AST  25  /  ALT  17  /  AlkPhos  149<H>  12-17                RADIOLOGY & ADDITIONAL TESTS:    Imaging Personally Reviewed:    Consultant(s) Notes Reviewed:      Care Discussed with Consultants/Other Providers:

## 2022-12-18 NOTE — PROGRESS NOTE ADULT - ASSESSMENT
62M w/ pmhx of HTN, OA, ?RA, chronic idiopathic SOB/dyspnea on intermittent home O2 found to have right kidney mass. 4/29 s/p RAL R partial nephrectomy p/w acute hypoxic respiratory failure likely with a component of pulmonary edema    Acute on chronic respiratory failure with hypoxia.   - Pulmonary edema and HF likely contributing given elevated BNP and edema resolved.   - hold Lasix   - oxygen  - Continuous pulse oximetry for now  - TTE noted with severe LV dysfunction   - Cardiology consult noted  - Pulmonary consult noted    Pulmonary Hypertension  - Pulmonary follow  - s/p RHC   - Rheum eval noted  - CT chest high resolution to r/o ILD    Pulmonary edema.   - Note some likely pulmonary edema on CTA. Also with signs of R sided HF/ pulmonary HTN. Suspect some relation to whatever chronic ongoing process has been causing pt's idiopathic dyspnea  - hold lasix   - Vital signs and continuous pulse oximetry as above  - Strict I/Os and daily weight    DON (acute kidney injury).   - Crt 1.5 up from prior in record.  - Trend BMP  - Renal dose medications  - Holding spironolactone and Lasix for now.  - Nephrology evaluation Dr Dominguez    Rheumatoid arthritis.   - stable    Essential hypertension.   - Trend BP  - Cont. lopressor BID with hold parameters.    DVT prophylaxis.   - Hep subq.    Gerardo Marte MD phone [phone]  62M w/ pmhx of HTN, OA, ?RA, chronic idiopathic SOB/dyspnea on intermittent home O2 found to have right kidney mass. 4/29 s/p RAL R partial nephrectomy p/w acute hypoxic respiratory failure likely with a component of pulmonary edema    Acute on chronic respiratory failure with hypoxia.   - Pulmonary edema and HF likely contributing given elevated BNP and edema resolved.   - hold Lasix   - oxygen  - Continuous pulse oximetry for now  - TTE noted with severe LV dysfunction   - Cardiology consult noted  - Pulmonary consult noted    Pulmonary Hypertension  - Pulmonary follow  - s/p RHC   - Rheum eval noted  - CT chest high resolution to r/o ILD if able to obtain.     Pulmonary edema.   - Note some likely pulmonary edema on CTA. Also with signs of R sided HF/ pulmonary HTN. Suspect some relation to whatever chronic ongoing process has been causing pt's idiopathic dyspnea  - hold lasix   - Vital signs and continuous pulse oximetry as above  - Strict I/Os and daily weight    DON (acute kidney injury).   - Crt 1.5 up from prior in record.  - Trend BMP  - Renal dose medications  - Holding spironolactone and Lasix for now.  - Nephrology evaluation Dr Dominguez    Rheumatoid arthritis.   - stable    Essential hypertension.   - Trend BP  - Cont. lopressor BID with hold parameters.    DVT prophylaxis.   - Hep subq.    Gerardo Marte MD phone [phone]

## 2022-12-18 NOTE — CONSULT NOTE ADULT - SUBJECTIVE AND OBJECTIVE BOX
incomplete PLACIDO GOLDMAN  [id number]    HPI: 63 yo M PMH HTN, OA, worsening SOB 2/2 pulmonary hypertension, now on home 02 (dx 2021), R RCC s/p partial nephrectomy (4/2022) p/w worsening sob. Pt says he has had worsening SOB the last couple of months. Used to use home O2 PRN, but now has to use daily. Also endorsed mild bilateral lower extremity edema. When pt woke up this AM his wife felt he looked too bad and made him see Dr. Carver today. While at office reportedly he was 80% on RA and TTE found new moderate dysfunction and LVH. Sent to ER for further evaluation. Pt denies any fevers, chills, recent travel, new cough. Denies any changes in medications or family hx of pulmonary disease. Has been getting worked up this year with Dr. Hill for pulmonology.     Hospital Course: CT chest showing pulmonary edema. Has been receiving diuresis. Had RHC showing mild pre-capillary pulmonary hypertension. Rheumatology consulted to evaluate for autoimmune etiologies.    Subjective: Pt says his respiratory status has been worsening and he has been losing significant weight. Says he established care w/ Rheumatology Dr. Hussain Frederick in 2021, and has negative rheumatologic w/u. Denies hx or being told that he had rheumatoid arthritis. Says he does not know why is breathing is worsening. Denies tobacco use, chemical/construction/9-11 exposure. 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 Born and raised in [facility] and then moved to [place]. Denies hx of RA, or being on prior medications such as methotrexate.     ROS:  -admits to Raynaud's like symptoms, new skin hypopigmentation involving b/l finges/MCP's, anterior chest, and lower neck area, chronic b/l shoulder pain, dry mouth  -denies nasal/oral ulcers, rash, dry eyes, chest pain, fever, chills, abdominal pain, nausea/vomiting     MEDICATIONS  (STANDING):  chlorhexidine 2% Cloths 1 Application(s) Topical daily  metoprolol tartrate 50 milliGRAM(s) Oral two times a day  sacubitril 24 mG/valsartan 26 mG 1 Tablet(s) Oral two times a day    MEDICATIONS  (PRN):  acetaminophen     Tablet .. 650 milliGRAM(s) Oral every 6 hours PRN Temp greater or equal to 38C (100.4F), Mild Pain (1 - 3)  melatonin 3 milliGRAM(s) Oral at bedtime PRN Insomnia  ondansetron Injectable 4 milliGRAM(s) IV Push every 8 hours PRN Nausea and/or Vomiting      Allergies    No Known Allergies    Intolerances    SOCIAL HISTORY: Lives w/ wife, originally from [facility], works as [place] doorman for many years, denies tobacco/alcohol use    FAMILY HISTORY:  FH: diabetes mellitus (Mother)        Vital Signs Last 24 Hrs  T(C): 36.7 (18 Dec 2022 12:11), Max: 36.7 (18 Dec 2022 12:11)  T(F): 98 (18 Dec 2022 12:11), Max: 98 (18 Dec 2022 12:11)  HR: 80 (18 Dec 2022 12:11) (80 - 87)  BP: 121/78 (18 Dec 2022 12:11) (120/78 - 128/85)  BP(mean): --  RR: 16 (18 Dec 2022 12:11) (16 - 18)  SpO2: 99% (18 Dec 2022 12:11) (96% - 99%)    Parameters below as of 18 Dec 2022 12:11  Patient On (Oxygen Delivery Method): nasal cannula  O2 Flow (L/min): 2      Physical Exam:  General: cachexic, breathing on nasal canula, b/l temporal wasting  HEENT: EOMI, MMM, no oral ulcers  CVS: +S1/S2, RRR  Resp: CTA b/l. No crackles/wheezing  GI: Soft, NT/ND +BS  MSK: No synovitis. 5/5 muscle strength in neck/arms/hands/thighs/legs/feet  Skin: no visible rashes. Areas of hypopigmentation involving b/l  MCP's, anterior chest, and lower neck area, dry b/l hands    LABS:                        13.1   10.11 )-----------( 305      ( 17 Dec 2022 07:07 )             42.3     12-17    140  |  102  |  34<H>  ----------------------------<  91  3.5   |  24  |  1.82<H>    Ca    8.8      17 Dec 2022 07:06    TPro  7.4  /  Alb  3.5  /  TBili  0.8  /  DBili  x   /  AST  25  /  ALT  17  /  AlkPhos  149<H>  12-17          RADIOLOGY & ADDITIONAL STUDIES: Reviewed    < from: CT Angio Chest PE Protocol w/ IV Cont (12.14.22 @ 19:14) >    ACC: [id number] EXAM:  CT ANGIO CHEST PULM ART WAWI                          PROCEDURE DATE:  12/14/2022          INTERPRETATION:  CLINICAL INFORMATION: Dyspnea.    COMPARISON: CTA chest 5/1/2022.    CONTRAST/COMPLICATIONS:  IV Contrast: Omnipaque 350  83 cc administered   17 cc discarded  Oral Contrast: NONE  Complications: None reported at time of study completion    PROCEDURE:  CT Angiogram of the chest was obtained with intravenous contrast. Three   dimensional maximum intensity projection (MIP) images were generated.    FINDINGS:    PULMONARY ANGIOGRAM: Adequate opacification the pulmonary arterial tree   to the level of the segmental and subsegmental pulmonary arteries. No   filling defect. Main pulmonary artery is enlarged, measures 4.2 cm.    LYMPH NODES: Axillary lymphadenopathy in comparison with most recent   prior exam, 5/1/2022.    HEART/VASCULATURE: Heart size is enlarged. Aneurysmal dilatation   ascending aorta measures up to 3.9 cm at the level of the main pulmonary   artery.    AIRWAYS/LUNGS/PLEURA: Patent central airways. Tracheal secretions.   Biapical scarring/atelectasis. Diffuse bilateral groundglass opacities.   Trace left pleural effusion and/or pleural thickening. Minimal biapical   pleural parenchymal scarring.    UPPER ABDOMEN: Trace fluid along the posterior right hepatic lobe/right   perinephric space adjacent to the sutures.    BONES/SOFT TISSUES: Degenerative changes.    IMPRESSION:  No pulmonary embolism. Enlargement of the main pulmonary artery upto 4.2   cm may reflect underlying pulmonary arterial hypertension.    Nonspecific diffuse bilateral groundglass opacities may represent   pulmonary edema versus mosaic attenuation consistent with small airway or   cysts small vessel disease          --- End of Report ---           SUSANNE NUNEZ MD; Resident Radiologist    < end of copied text >

## 2022-12-18 NOTE — CONSULT NOTE ADULT - ASSESSMENT
#group 1 pulmonary hypertension:  =No current evidence for ILD on current or past imaging  =prior LN biopsy unrelealing    Plan:  -would obtain high resolution CT chest w/o contrast to evaluate for signs of ILD  -will order connective tissue disease serology including: myomarker panel, SCL-70/centromere/RNA polymerase 3, CCP, RF, SSA/SSB, LUIS FERNANDO, vitamin D 1,25, ACE  -would consider cardiac PET, if CT chest unrevealing, to evaluate for sarcoid (prior cardiac MRI w/ subtle late gadolinum enhancement which can happen w/ sarcoid)    Discussed with Attending Dr. Rigoberto Gonzalez,   Rheumatology Fellow  Pager: [phone]  Available on TEAMS   #group 1 pulmonary hypertension:  =No current evidence for ILD on current or past imaging  =prior axillary LN biopsy unrevealing    Plan:  -would obtain high resolution CT chest w/o contrast to evaluate for signs of ILD  -will order connective tissue disease serology including: myomarker panel, SCL-70/centromere/RNA polymerase 3, CCP, RF, SSA/SSB, LUIS FERNANDO, vitamin D 1,25, ACE  -would consider cardiac PET, if CT chest unrevealing, to evaluate for sarcoid (prior cardiac MRI w/ subtle late gadolinum enhancement which can happen w/ sarcoid)    Discussed with Attending Dr. Rigoberto Gonzalez,   Rheumatology Fellow  Pager: [phone]  Available on TEAMS   61 yo M PMH HTN, OA, b/l axillary lymphadenopathy, worsening SOB 2/2 pulmonary hypertension, now on home 02 (dx 2021), R RCC s/p partial nephrectomy (4/2022) p/w worsening sob. Rheumatology consulted to evaluate for autoimmune etiologies.    #group 1 pulmonary hypertension: RHC 12/16/22  =No current evidence for ILD on current or past imaging (CT chest w/ contrast)  =prior axillary LN biopsy unrevealing  =established care w/ Rheumatology Dr. Hussain Frederick in 2021, and says he has negative rheumatologic w/u. Denies hx or being told that he had rheumatoid arthritis. Denies tobacco use, chemical/construction/9-11 exposure  =does endorse worsening sob, weight loss, raynaud's like symptoms, new skin hypopigmentation involving b/l MCP's/anterior chest/and lower neck area, chronic b/l shoulder pain, and dry mouth  =pt may have an underlying connective tissue disorder which we will repeat serology for, however no current evidence of ILD on prior CT w/ contrast imaging (the ideal test for ILD evaluation is high resolution CT chest without contrast)    Plan:  -would obtain high resolution CT chest w/o contrast to evaluate for signs of ILD  -will order connective tissue disease serology including: myomarker panel, anti-rosanna, SCL-70/centromere/RNA polymerase 3, CCP, RF, SSA/SSB, LUIS FERNANDO, vitamin D 1,25, ACE  -would consider cardiac PET, if CT chest unrevealing, to evaluate for sarcoid (prior cardiac MRI w/ subtle late gadolinum enhancement which can happen w/ sarcoid)    Discussed with Attending Dr. Rigoberto Gonzalez DO  Rheumatology Fellow  Pager: [phone]  Available on TEAMS

## 2022-12-19 LAB
ACE SERPL-CCNC: 26 U/L — SIGNIFICANT CHANGE UP (ref 14–82)
ALBUMIN SERPL ELPH-MCNC: 3.5 G/DL — SIGNIFICANT CHANGE UP (ref 3.3–5)
ALP SERPL-CCNC: 135 U/L — HIGH (ref 40–120)
ALT FLD-CCNC: 14 U/L — SIGNIFICANT CHANGE UP (ref 10–45)
ANION GAP SERPL CALC-SCNC: 11 MMOL/L — SIGNIFICANT CHANGE UP (ref 5–17)
ANION GAP SERPL CALC-SCNC: 14 MMOL/L — SIGNIFICANT CHANGE UP (ref 5–17)
ANTI-RIBONUCLEAR PROTEIN: 0.3 AI — SIGNIFICANT CHANGE UP
AST SERPL-CCNC: 18 U/L — SIGNIFICANT CHANGE UP (ref 10–40)
BILIRUB SERPL-MCNC: 0.9 MG/DL — SIGNIFICANT CHANGE UP (ref 0.2–1.2)
BUN SERPL-MCNC: 34 MG/DL — HIGH (ref 7–23)
BUN SERPL-MCNC: 36 MG/DL — HIGH (ref 7–23)
CALCIUM SERPL-MCNC: 9.3 MG/DL — SIGNIFICANT CHANGE UP (ref 8.4–10.5)
CALCIUM SERPL-MCNC: 9.3 MG/DL — SIGNIFICANT CHANGE UP (ref 8.4–10.5)
CCP IGG SERPL-ACNC: <8 UNITS — SIGNIFICANT CHANGE UP
CENTROMERE AB SER-ACNC: <0.2 AI — SIGNIFICANT CHANGE UP
CHLORIDE SERPL-SCNC: 102 MMOL/L — SIGNIFICANT CHANGE UP (ref 96–108)
CHLORIDE SERPL-SCNC: 103 MMOL/L — SIGNIFICANT CHANGE UP (ref 96–108)
CO2 SERPL-SCNC: 22 MMOL/L — SIGNIFICANT CHANGE UP (ref 22–31)
CO2 SERPL-SCNC: 25 MMOL/L — SIGNIFICANT CHANGE UP (ref 22–31)
CREAT SERPL-MCNC: 1.94 MG/DL — HIGH (ref 0.5–1.3)
CREAT SERPL-MCNC: 2.05 MG/DL — HIGH (ref 0.5–1.3)
DSDNA AB FLD-ACNC: <0.2 AI — SIGNIFICANT CHANGE UP
EGFR: 36 ML/MIN/1.73M2 — LOW
EGFR: 38 ML/MIN/1.73M2 — LOW
ENA JO1 AB SER-ACNC: <0.2 AI — SIGNIFICANT CHANGE UP
ENA SCL70 AB SER-ACNC: <0.2 AI — SIGNIFICANT CHANGE UP
ENA SM AB FLD QL: <0.2 AI — SIGNIFICANT CHANGE UP
ENA SS-A AB FLD IA-ACNC: <0.2 AI — SIGNIFICANT CHANGE UP
GLUCOSE SERPL-MCNC: 150 MG/DL — HIGH (ref 70–99)
GLUCOSE SERPL-MCNC: 77 MG/DL — SIGNIFICANT CHANGE UP (ref 70–99)
POTASSIUM SERPL-MCNC: 4.2 MMOL/L — SIGNIFICANT CHANGE UP (ref 3.5–5.3)
POTASSIUM SERPL-MCNC: 4.2 MMOL/L — SIGNIFICANT CHANGE UP (ref 3.5–5.3)
POTASSIUM SERPL-SCNC: 4.2 MMOL/L — SIGNIFICANT CHANGE UP (ref 3.5–5.3)
POTASSIUM SERPL-SCNC: 4.2 MMOL/L — SIGNIFICANT CHANGE UP (ref 3.5–5.3)
PROT SERPL-MCNC: 7.2 G/DL — SIGNIFICANT CHANGE UP (ref 6–8.3)
RF+CCP IGG SER-IMP: NEGATIVE — SIGNIFICANT CHANGE UP
SODIUM SERPL-SCNC: 138 MMOL/L — SIGNIFICANT CHANGE UP (ref 135–145)
SODIUM SERPL-SCNC: 139 MMOL/L — SIGNIFICANT CHANGE UP (ref 135–145)
VIT D25+D1,25 OH+D1,25 PNL SERPL-MCNC: 22.4 PG/ML — SIGNIFICANT CHANGE UP (ref 19.9–79.3)

## 2022-12-19 RX ORDER — SODIUM CHLORIDE 9 MG/ML
500 INJECTION INTRAMUSCULAR; INTRAVENOUS; SUBCUTANEOUS ONCE
Refills: 0 | Status: DISCONTINUED | OUTPATIENT
Start: 2022-12-19 | End: 2022-12-19

## 2022-12-19 RX ADMIN — SACUBITRIL AND VALSARTAN 1 TABLET(S): 24; 26 TABLET, FILM COATED ORAL at 18:18

## 2022-12-19 RX ADMIN — Medication 50 MILLIGRAM(S): at 06:28

## 2022-12-19 RX ADMIN — CHLORHEXIDINE GLUCONATE 1 APPLICATION(S): 213 SOLUTION TOPICAL at 13:12

## 2022-12-19 RX ADMIN — Medication 50 MILLIGRAM(S): at 18:18

## 2022-12-19 RX ADMIN — SACUBITRIL AND VALSARTAN 1 TABLET(S): 24; 26 TABLET, FILM COATED ORAL at 06:28

## 2022-12-19 NOTE — CONSULT NOTE ADULT - SUBJECTIVE AND OBJECTIVE BOX
NEPHROLOGY - NSN    Patient seen and examined.    HPI:  62M w/ pmhx of HTN, OA, ?RA, chronic idiopathic SOB/dyspnea on intermittent home O2 found to have right kidney mass. 4/29 s/p RAL R partial nephrectomy p/w acute hypoxic respiratory failure. Pt endorses gradually worsening SOB and AVALOS for past 5-7 days. Starting the past 3-5 days he has also started to notice mild bilateral lower extremity edema. Has been worse with exertion. At baseline he usually only uses 02 at home and not at work. When pt woke up this AM his wife felt he looked too bad and made him see Dr. Carver today. While at office reportedly he was 80% on RA and TTE found new moderate dysfunction and LVH. Sent to ER for further evaluation. Pt denies any fevers, chills, recent travel, new cough. Denies any changes in medications or family hx of pulmonary disease. Has been getting worked up this year with Dr. Hill for pulmonology.   He was getting IV lasix and started on Entresto on the 16th.  He states that the breathing has improved   There is no hematuria or bubbles in the urine.  No history of NSAIDS or nephrolithisis.  The patient urinates once or twice in the night and there is no incontinence.  No family hx or renal disease or back pain.    No recent abx use.  No alleviating or aggravating factors with respect to the kidneys.       PAST MEDICAL & SURGICAL HISTORY:  Hypertension      Rheumatoid arthritis      Neoplasm of uncertain behavior of kidney, right      History of cardiac cath  jan 2022, at [facility] no stent          MEDICATIONS  (STANDING):  chlorhexidine 2% Cloths 1 Application(s) Topical daily  metoprolol tartrate 50 milliGRAM(s) Oral two times a day  sacubitril 24 mG/valsartan 26 mG 1 Tablet(s) Oral two times a day  sodium chloride 0.9% Bolus 500 milliLiter(s) IV Bolus once      Allergies    No Known Allergies    Intolerances        SOCIAL HISTORY:  Denies alcohol abuse, drug abuse or tobacco usage.     FAMILY HISTORY:  FH: diabetes mellitus (Mother)        VITALS:  T(C): 36.9 (12-19-22 @ 04:55), Max: 36.9 (12-19-22 @ 04:55)  HR: 99 (12-19-22 @ 04:55) (70 - 99)  BP: 115/78 (12-19-22 @ 04:55) (115/78 - 134/94)  RR: 18 (12-19-22 @ 04:55) (16 - 18)  SpO2: 98% (12-19-22 @ 04:55) (95% - 99%)    REVIEW OF SYSTEMS:  + SOB. Good oral intake and denies fatigue or weakness. All other pertinent systems are reviewed and are negative.    PHYSICAL EXAM:  Constitutional: NAD  HEENT: EOMI  Neck:   + JVD, supple   Respiratory: CTA B/L  Cardiovascular: S1 and S2, RRR  Gastrointestinal: + BS, soft, NT, ND  Extremities: No peripheral edema, + peripheral pulses  Neurological: A/O x 3, CN2-12 intact  Psychiatric: Normal mood, normal affect  : No Humphreys  Skin: No rashes, C/D/I  Access: Not applicable    I and O's:    12-17 @ 07:01  -  12-18 @ 07:00  --------------------------------------------------------  IN: 0 mL / OUT: 700 mL / NET: -700 mL    12-18 @ 07:01  -  12-19 @ 07:00  --------------------------------------------------------  IN: 1200 mL / OUT: 1500 mL / NET: -300 mL          LABS:    12-19    139  |  103  |  36<H>  ----------------------------<  77  4.2   |  22  |  2.05<H>    Ca    9.3      19 Dec 2022 07:20    TPro  7.2  /  Alb  3.5  /  TBili  0.9  /  DBili  x   /  AST  18  /  ALT  14  /  AlkPhos  135<H>  12-19      URINE:      RADIOLOGY & ADDITIONAL STUDIES:    < from: CT Abdomen and Pelvis No Cont (12.17.22 @ 22:51) >    ACC: [id number] EXAM:  CT ABDOMEN AND PELVIS                          PROCEDURE DATE:  12/17/2022          INTERPRETATION:  CLINICAL INFORMATION: Weight loss    COMPARISON: PET/CT 4/28/2022    CONTRAST/COMPLICATIONS:  IV Contrast: NONE  Oral Contrast: NONE  Complications: None reported at time of study completion    PROCEDURE:  CT of the Abdomen and Pelvis was performed.  Sagittal and coronal reformats were performed.    FINDINGS: Evaluation is limited bilaterally intravenous and oral contrast.    LOWER CHEST: Trace anterior pericardial fluid..    LIVER: Within normal limits.  BILE DUCTS: Normal caliber.  GALLBLADDER: Within normal limits contracted.  SPLEEN: Within normal limits.  PANCREAS: Within normal limits.  ADRENALS: Within normal limits.  KIDNEYS/URETERS: Status post partial right renal wedge resection. No   hydronephrosis of either kidney..    BLADDER: Within normal limits.  REPRODUCTIVE ORGANS: Prominent prostate gland.    BOWEL: No bowel obstruction. Appendix is normal. Mild diverticulosis coli.  PERITONEUM: No ascites.  VESSELS: Mild atherosclerotic calcifications..  RETROPERITONEUM/LYMPH NODES: No lymphadenopathy.  ABDOMINAL WALL: Within normal limits.  BONES: Degenerative changes of the spine with grade 1 anterolisthesisof   L4 on L5.    IMPRESSION:  No acute abdominal pathology.    No evidence for abdominal or pelvic mass within the limitations of this   noncontrast CT.        --- End of Report ---            ENEIDA ANSARI MD; Attending Radiologist  This document has been electronically signed. Dec 18 2022  1:27AM    < end of copied text >

## 2022-12-19 NOTE — CONSULT NOTE ADULT - SUBJECTIVE AND OBJECTIVE BOX
Chief Complaint:  Patient is a 62y old  Male who presents with a chief complaint of SOB, likely CHF exacerbation (19 Dec 2022 10:32)      HPI: Patient with history of right sided RCC. He had a partial nephrectomy. It was earlier this year. it was a pT1a. He was noted to have a paraprotein and saw another oncologist in the past. He did have an axillary lymph node biopsy earlier this year and there was no malignancy identified. He is here with SOB. He has heart issues. He has had a CTA and a CT A/P and no malignancy was identified. He says that he had a colonoscopy earlier this year and it was fine. He has lost 40-50 pounds this year. His appetite fluctuates.     Medications:  acetaminophen     Tablet .. 650 milliGRAM(s) Oral every 6 hours PRN  chlorhexidine 2% Cloths 1 Application(s) Topical daily  melatonin 3 milliGRAM(s) Oral at bedtime PRN  metoprolol tartrate 50 milliGRAM(s) Oral two times a day  ondansetron Injectable 4 milliGRAM(s) IV Push every 8 hours PRN  sacubitril 24 mG/valsartan 26 mG 1 Tablet(s) Oral two times a day        Allergies:  No Known Allergies    FAMILY HISTORY:  FH: diabetes mellitus (Mother)  No cancer      Social history: . Works as a . No tobacco, ETOH, or IVDA    PAST MEDICAL & SURGICAL HISTORY:  Hypertension      Rheumatoid arthritis      Neoplasm of uncertain behavior of kidney, right      History of cardiac cath  jan 2022, at [facility] no stent    REVIEW OF SYSTEMS      General: has fatigue. No fevers, chills, or sweats    Skin/Breast: No rash, itch, or bruising  	  Ophthalmologic: No change in vision  	  ENMT: No hearing loss, nosebleeds, sore throat	    Respiratory and Thorax: Occasional cough. SOB is improving. No wheezing  	  Cardiovascular:	No CP or palpitations    Gastrointestinal:	 No abd pain, N/V. Occasional diarrhea    Genitourinary:	No dysuria or hematuria    Musculoskeletal:	 No back pain, leg pain, swelling    Neurological: No HA or dizziness    Vitals:  Vital Signs Last 24 Hrs  T(C): 36.4 (19 Dec 2022 20:29), Max: 36.9 (19 Dec 2022 04:55)  T(F): 97.5 (19 Dec 2022 20:29), Max: 98.5 (19 Dec 2022 04:55)  HR: 85 (19 Dec 2022 20:29) (85 - 104)  BP: 122/85 (19 Dec 2022 20:29) (115/78 - 123/86)  BP(mean): --  RR: 18 (19 Dec 2022 20:29) (18 - 18)  SpO2: 99% (19 Dec 2022 20:29) (95% - 99%)    Parameters below as of 19 Dec 2022 20:29  Patient On (Oxygen Delivery Method): room air        Pex:  alert NAD  EOMI anicteric sclera  Neck No LNA  Cv s1 S2 RRR  Lungs clear B/L  abd soft NT ND +BS  No LE edema or tenderness    Labs:      12-19    138  |  102  |  34<H>  ----------------------------<  150<H>  4.2   |  25  |  1.94<H>    Ca    9.3      19 Dec 2022 11:32    TPro  7.2  /  Alb  3.5  /  TBili  0.9  /  DBili  x   /  AST  18  /  ALT  14  /  AlkPhos  135<H>  12-19      [id number]

## 2022-12-19 NOTE — PROGRESS NOTE ADULT - ASSESSMENT
62M w/ pmhx of HTN, OA, ?RA, chronic idiopathic SOB/dyspnea on intermittent home O2 found to have right kidney mass. 4/29 s/p RAL R partial nephrectomy p/w acute hypoxic respiratory failure likely with a component of pulmonary edema    Acute on chronic respiratory failure with hypoxia.   - Pulmonary edema and HF likely contributing given elevated BNP and edema resolved.   - hold Lasix   - oxygen  - Continuous pulse oximetry for now  - TTE noted with severe LV dysfunction   - Cardiology consult noted  - Pulmonary consult noted    Pulmonary Hypertension  - Pulmonary follow  - s/p RHC   - Rheum eval noted  - CT chest high resolution to r/o ILD if able to obtain.     Pulmonary edema.   - Note some likely pulmonary edema on CTA. Also with signs of R sided HF/ pulmonary HTN. Suspect some relation to whatever chronic ongoing process has been causing pt's idiopathic dyspnea  - hold lasix   - Vital signs and continuous pulse oximetry as above  - Strict I/Os and daily weight    DON (acute kidney injury).   - Crt 1.5 up from prior in record.  - Trend BMP  - Renal dose medications  - Holding spironolactone and Lasix for now.  - Nephrology follow Dr Dominguez    Rheumatoid arthritis.   - stable     Renal Cancer  - s/p R nephrectomy  - Bone scan  - Immunoglobulins   - Oncology evaluation    Essential hypertension.   - Trend BP  - Cont. lopressor BID with hold parameters.    DVT prophylaxis.   - Hep subq.    Gerardo Marte MD phone [phone]

## 2022-12-19 NOTE — CONSULT NOTE ADULT - ASSESSMENT
62M w/ pmhx of HTN, OA, ?RA, chronic idiopathic SOB/dyspnea on intermittent home O2 found to have right kidney mass s/p RAL R partial nephrectomy p/w acute hypoxic respiratory failure  DON on top of CKD stage 3a   New LV dysfunction      1 CVS- Hold off on both IV lasix and IVF;  RHC last week did not show volume overloaded state   The Entresto started on the 16th may explain the DON?  2Renal- There is no obstruction noted and the CT was done without contrast   Check UA;    Loop diuretics on hol d  3 GI-Start Ensure   4 Pulm- CT of chest ordered  [facility]    [facility]ayed [facility]   [phone]

## 2022-12-19 NOTE — CONSULT NOTE ADULT - ASSESSMENT
62-year-old male with RCC (papillary clear cell pT1a) surgically removed earlier this year. He is here with SOB and being treated for CHF. He has lost a significant amount of weight recently. CT C/A/P done here does not note any malignancy. He reports that he had a colonoscopy earlier this year and it was okay. He is not anemic. He has kidney disease. he has a mild high alk phos which is not uncommon in setting of kidney disease. However, with history of RCC would get a bone scan. Also with a history of an MGUS. Calcium is normal. Will order serum immunoglobulins.

## 2022-12-19 NOTE — PROGRESS NOTE ADULT - SUBJECTIVE AND OBJECTIVE BOX
Patient is a 62y old  Male who presents with a chief complaint of SOB, likely CHF exacerbation (19 Dec 2022 21:29)      SUBJECTIVE / OVERNIGHT EVENTS: weak.  Review of Systems  chest pain no  palpitations no  sob improving   nausea no  headache no    MEDICATIONS  (STANDING):  chlorhexidine 2% Cloths 1 Application(s) Topical daily  metoprolol tartrate 50 milliGRAM(s) Oral two times a day  sacubitril 24 mG/valsartan 26 mG 1 Tablet(s) Oral two times a day    MEDICATIONS  (PRN):  acetaminophen     Tablet .. 650 milliGRAM(s) Oral every 6 hours PRN Temp greater or equal to 38C (100.4F), Mild Pain (1 - 3)  melatonin 3 milliGRAM(s) Oral at bedtime PRN Insomnia  ondansetron Injectable 4 milliGRAM(s) IV Push every 8 hours PRN Nausea and/or Vomiting      Vital Signs Last 24 Hrs  T(C): 36.4 (19 Dec 2022 20:29), Max: 36.9 (19 Dec 2022 04:55)  T(F): 97.5 (19 Dec 2022 20:29), Max: 98.5 (19 Dec 2022 04:55)  HR: 85 (19 Dec 2022 20:29) (85 - 104)  BP: 122/85 (19 Dec 2022 20:29) (115/78 - 123/86)  BP(mean): --  RR: 18 (19 Dec 2022 20:29) (18 - 18)  SpO2: 99% (19 Dec 2022 20:29) (95% - 99%)    Parameters below as of 19 Dec 2022 20:29  Patient On (Oxygen Delivery Method): room air        PHYSICAL EXAM:  GENERAL: NAD   HEAD:  Atraumatic, Normocephalic  EYES: EOMI, PERRLA, conjunctiva and sclera clear  NECK: Supple, No JVD  CHEST/LUNG: Clear to auscultation bilaterally; No wheeze  HEART: Regular rate and rhythm; No murmurs, rubs, or gallops  ABDOMEN: Soft, Nontender, Nondistended; Bowel sounds present  EXTREMITIES:  2+ Peripheral Pulses, No clubbing, cyanosis, or edema  PSYCH: AAOx3  NEUROLOGY: non-focal  SKIN: No rashes or lesions    LABS:    12-19    138  |  102  |  34<H>  ----------------------------<  150<H>  4.2   |  25  |  1.94<H>    Ca    9.3      19 Dec 2022 11:32    TPro  7.2  /  Alb  3.5  /  TBili  0.9  /  DBili  x   /  AST  18  /  ALT  14  /  AlkPhos  135<H>  12-19                RADIOLOGY & ADDITIONAL TESTS:    Imaging Personally Reviewed:    Consultant(s) Notes Reviewed:      Care Discussed with Consultants/Other Providers:

## 2022-12-19 NOTE — PROGRESS NOTE ADULT - SUBJECTIVE AND OBJECTIVE BOX
Cardiovascular Disease Progress Note    Overnight events: No acute events overnight.  no cp/sob/palps  Otherwise review of systems negative    Objective Findings:  T(C): 36.9 (12-19-22 @ 04:55), Max: 36.9 (12-19-22 @ 04:55)  HR: 99 (12-19-22 @ 04:55) (70 - 99)  BP: 115/78 (12-19-22 @ 04:55) (115/78 - 134/94)  RR: 18 (12-19-22 @ 04:55) (16 - 18)  SpO2: 98% (12-19-22 @ 04:55) (95% - 99%)  Wt(kg): --  Daily     Daily       Physical Exam:  Gen: NAD  HEENT: EOMI  CV: RRR, normal S1 + S2, no m/r/g  Lungs: CTAB  Abd: soft, non-tender  Ext: No edema    Telemetry:    Laboratory Data:                    Inpatient Medications:  MEDICATIONS  (STANDING):  chlorhexidine 2% Cloths 1 Application(s) Topical daily  metoprolol tartrate 50 milliGRAM(s) Oral two times a day  sacubitril 24 mG/valsartan 26 mG 1 Tablet(s) Oral two times a day      Assessment:  62M w/ pmhx of HTN, OA, ?RA, chronic idiopathic SOB/dyspnea on intermittent home O2 found to have right kidney mass. 4/29 s/p RAL R partial nephrectomy p/w acute hypoxic respiratory failure    Recs:  cardiac stable  resume lasix 20mg po qd once scr downtrends. please obtain daily bmp  cw gdmt for lv dysfunction - lopressor 50mg bid and entresto 24/26mg bid  [facility] 2022 at [facility], normal cors  TTE --> mod clvh, severe lv dysfunction, rve with dec rvsf, mod phtn  cardiac MRI results noted. cw medical therapy as above  s/p rhc, mild precap phtn, normal output and filling pressures  appreciate pulmonary recommendations  tele monitoring  previous lymph node bx neg for sarcoid      Over 35 minutes spent on total encounter; more than 50% of the visit was spent counseling and/or coordinating care by the attending physician.      Juarez Carver MD   Cardiovascular Disease  [phone]

## 2022-12-19 NOTE — CONSULT NOTE ADULT - REASON FOR ADMISSION
SOB, likely CHF exacerbation

## 2022-12-19 NOTE — PROGRESS NOTE ADULT - ASSESSMENT
62M w/ pmhx of HTN, OA, ?RA, chronic idiopathic SOB/dyspnea on intermittent home O2 found to have right kidney mass s/p RAL R partial nephrectomy p/w acute hypoxic respiratory failure  DON

## 2022-12-19 NOTE — PROGRESS NOTE ADULT - SUBJECTIVE AND OBJECTIVE BOX
NEPHROLOGY - NSN    Patient seen and examined.    HPI:  62M w/ pmhx of HTN, OA, ?RA, chronic idiopathic SOB/dyspnea on intermittent home O2 found to have right kidney mass. 4/29 s/p RAL R partial nephrectomy p/w acute hypoxic respiratory failure. Pt endorses gradually worsening SOB and AVALOS for past 5-7 days. Starting the past 3-5 days he has also started to notice mild bilateral lower extremity edema. Has been worse with exertion. At baseline he usually only uses 02 at home and not at work. When pt woke up this AM his wife felt he looked too bad and made him see Dr. Carver today. While at office reportedly he was 80% on RA and TTE found new moderate dysfunction and LVH. Sent to ER for further evaluation. Pt denies any fevers, chills, recent travel, new cough. Denies any changes in medications or family hx of pulmonary disease. Has been getting worked up this year with Dr. Hill for pulmonology.   Entresto started on the 16th and the creatinine has progressively increased  There is no hematuria or bubbles in the urine.  No history of NSAIDS or nephrolithisis.  The patient urinates once or twice in the night and there is no incontinence.  No family hx or renal disease or back pain.    No recent abx use.  No alleviating or aggravating factors with respect to the kidneys.     In ER: Given Lasix 20mg IV, Lopressor 25mg PO x1 (14 Dec 2022 22:32)      PAST MEDICAL & SURGICAL HISTORY:  Hypertension      Rheumatoid arthritis      Neoplasm of uncertain behavior of kidney, right      History of cardiac cath  jan 2022, at [place] no stent          MEDICATIONS  (STANDING):  chlorhexidine 2% Cloths 1 Application(s) Topical daily  metoprolol tartrate 50 milliGRAM(s) Oral two times a day  sacubitril 24 mG/valsartan 26 mG 1 Tablet(s) Oral two times a day  sodium chloride 0.9% Bolus 500 milliLiter(s) IV Bolus once      Allergies    No Known Allergies    Intolerances        SOCIAL HISTORY:  Denies alcohol abuse, drug abuse or tobacco usage.     FAMILY HISTORY:  FH: diabetes mellitus (Mother)        VITALS:  T(C): 36.9 (12-19-22 @ 04:55), Max: 36.9 (12-19-22 @ 04:55)  HR: 99 (12-19-22 @ 04:55) (70 - 99)  BP: 115/78 (12-19-22 @ 04:55) (115/78 - 134/94)  RR: 18 (12-19-22 @ 04:55) (16 - 18)  SpO2: 98% (12-19-22 @ 04:55) (95% - 99%)    REVIEW OF SYSTEMS:  Denies any nausea, vomiting, diarrhea, fever or chills. Denies chest pain, SOB, focal weakness, hematuria or dysuria. Good oral intake and denies fatigue or weakness. All other pertinent systems are reviewed and are negative.    PHYSICAL EXAM:  Constitutional: NAD  HEENT: EOMI  Neck:  No JVD, supple   Respiratory: CTA B/L  Cardiovascular: S1 and S2, RRR  Gastrointestinal: + BS, soft, NT, ND  Extremities: No peripheral edema, + peripheral pulses  Neurological: A/O x 3, CN2-12 intact  Psychiatric: Normal mood, normal affect  : No Humphreys  Skin: No rashes, C/D/I  Access: Not applicable    I and O's:    12-17 @ 07:01  -  12-18 @ 07:00  --------------------------------------------------------  IN: 0 mL / OUT: 700 mL / NET: -700 mL    12-18 @ 07:01  -  12-19 @ 07:00  --------------------------------------------------------  IN: 1200 mL / OUT: 1500 mL / NET: -300 mL          LABS:    12-19    139  |  103  |  36<H>  ----------------------------<  77  4.2   |  22  |  2.05<H>    Ca    9.3      19 Dec 2022 07:20    TPro  7.2  /  Alb  3.5  /  TBili  0.9  /  DBili  x   /  AST  18  /  ALT  14  /  AlkPhos  135<H>  12-19      URINE:      RADIOLOGY & ADDITIONAL STUDIES:

## 2022-12-20 LAB
ANION GAP SERPL CALC-SCNC: 11 MMOL/L — SIGNIFICANT CHANGE UP (ref 5–17)
APPEARANCE UR: CLEAR — SIGNIFICANT CHANGE UP
BACTERIA # UR AUTO: NEGATIVE — SIGNIFICANT CHANGE UP
BILIRUB UR-MCNC: NEGATIVE — SIGNIFICANT CHANGE UP
BUN SERPL-MCNC: 39 MG/DL — HIGH (ref 7–23)
C3 SERPL-MCNC: 132 MG/DL — SIGNIFICANT CHANGE UP (ref 81–157)
C4 SERPL-MCNC: 32 MG/DL — SIGNIFICANT CHANGE UP (ref 13–39)
CALCIUM SERPL-MCNC: 9 MG/DL — SIGNIFICANT CHANGE UP (ref 8.4–10.5)
CHLORIDE SERPL-SCNC: 103 MMOL/L — SIGNIFICANT CHANGE UP (ref 96–108)
CO2 SERPL-SCNC: 22 MMOL/L — SIGNIFICANT CHANGE UP (ref 22–31)
COLOR SPEC: SIGNIFICANT CHANGE UP
CREAT SERPL-MCNC: 1.79 MG/DL — HIGH (ref 0.5–1.3)
DIFF PNL FLD: ABNORMAL
EGFR: 42 ML/MIN/1.73M2 — LOW
EPI CELLS # UR: 1 /HPF — SIGNIFICANT CHANGE UP
GLUCOSE SERPL-MCNC: 87 MG/DL — SIGNIFICANT CHANGE UP (ref 70–99)
GLUCOSE UR QL: ABNORMAL
HYALINE CASTS # UR AUTO: 2 /LPF — SIGNIFICANT CHANGE UP (ref 0–2)
IGA FLD-MCNC: 286 MG/DL — SIGNIFICANT CHANGE UP (ref 84–499)
IGG FLD-MCNC: 1774 MG/DL — HIGH (ref 610–1660)
IGM SERPL-MCNC: 70 MG/DL — SIGNIFICANT CHANGE UP (ref 35–242)
KAPPA LC SER QL IFE: 10.7 MG/DL — HIGH (ref 0.33–1.94)
KAPPA LC SER QL IFE: 10.7 MG/DL — HIGH (ref 0.33–1.94)
KAPPA/LAMBDA FREE LIGHT CHAIN RATIO, SERUM: 3.23 RATIO — HIGH (ref 0.26–1.65)
KAPPA/LAMBDA FREE LIGHT CHAIN RATIO, SERUM: 3.23 RATIO — HIGH (ref 0.26–1.65)
KETONES UR-MCNC: NEGATIVE — SIGNIFICANT CHANGE UP
LAMBDA LC SER QL IFE: 3.31 MG/DL — HIGH (ref 0.57–2.63)
LAMBDA LC SER QL IFE: 3.31 MG/DL — HIGH (ref 0.57–2.63)
LEUKOCYTE ESTERASE UR-ACNC: NEGATIVE — SIGNIFICANT CHANGE UP
NITRITE UR-MCNC: NEGATIVE — SIGNIFICANT CHANGE UP
PH UR: 6 — SIGNIFICANT CHANGE UP (ref 5–8)
POTASSIUM SERPL-MCNC: 5.1 MMOL/L — SIGNIFICANT CHANGE UP (ref 3.5–5.3)
POTASSIUM SERPL-SCNC: 5.1 MMOL/L — SIGNIFICANT CHANGE UP (ref 3.5–5.3)
PROT UR-MCNC: ABNORMAL
RBC CASTS # UR COMP ASSIST: 3 /HPF — SIGNIFICANT CHANGE UP (ref 0–4)
SODIUM SERPL-SCNC: 136 MMOL/L — SIGNIFICANT CHANGE UP (ref 135–145)
SP GR SPEC: 1.02 — SIGNIFICANT CHANGE UP (ref 1.01–1.02)
UROBILINOGEN FLD QL: NEGATIVE — SIGNIFICANT CHANGE UP
WBC UR QL: 1 /HPF — SIGNIFICANT CHANGE UP (ref 0–5)

## 2022-12-20 PROCEDURE — 99233 SBSQ HOSP IP/OBS HIGH 50: CPT

## 2022-12-20 PROCEDURE — 78306 BONE IMAGING WHOLE BODY: CPT | Mod: 26

## 2022-12-20 PROCEDURE — 71045 X-RAY EXAM CHEST 1 VIEW: CPT | Mod: 26

## 2022-12-20 RX ADMIN — SACUBITRIL AND VALSARTAN 1 TABLET(S): 24; 26 TABLET, FILM COATED ORAL at 17:36

## 2022-12-20 RX ADMIN — Medication 50 MILLIGRAM(S): at 17:36

## 2022-12-20 RX ADMIN — SACUBITRIL AND VALSARTAN 1 TABLET(S): 24; 26 TABLET, FILM COATED ORAL at 05:05

## 2022-12-20 RX ADMIN — Medication 50 MILLIGRAM(S): at 05:05

## 2022-12-20 NOTE — PROGRESS NOTE ADULT - SUBJECTIVE AND OBJECTIVE BOX
NEPHROLOGY-[facility] [phone]        Patient seen and examined in bed.  He was the same         MEDICATIONS  (STANDING):  chlorhexidine 2% Cloths 1 Application(s) Topical daily  metoprolol tartrate 50 milliGRAM(s) Oral two times a day  sacubitril 24 mG/valsartan 26 mG 1 Tablet(s) Oral two times a day      VITAL:  T(C): , Max: 36.9 (12-19-22 @ 17:50)  T(F): , Max: 98.5 (12-19-22 @ 17:50)  HR: 94 (12-20-22 @ 04:28)  BP: 118/77 (12-20-22 @ 04:28)  BP(mean): --  RR: 18 (12-20-22 @ 04:28)  SpO2: 99% (12-20-22 @ 04:28)  Wt(kg): --    I and O's:    12-19 @ 07:01  -  12-20 @ 07:00  --------------------------------------------------------  IN: 500 mL / OUT: 701 mL / NET: -201 mL          PHYSICAL EXAM:    Constitutional: NAD  Neck:  No JVD  Respiratory: crackles at bases   Cardiovascular: S1 and S2  Gastrointestinal: BS+, soft, NT/ND  Extremities: No peripheral edema  Neurological: A/O x 3, no focal deficits  Psychiatric: Normal mood, normal affect  : No Humphreys  Skin: No rashes  Access: Not applicable    LABS:    12-20    136  |  103  |  39<H>  ----------------------------<  87  5.1   |  22  |  1.79<H>    Ca    9.0      20 Dec 2022 05:03    TPro  7.2  /  Alb  3.5  /  TBili  0.9  /  DBili  x   /  AST  18  /  ALT  14  /  AlkPhos  135<H>  12-19          Urine Studies:          RADIOLOGY & ADDITIONAL STUDIES:

## 2022-12-20 NOTE — PROGRESS NOTE ADULT - SUBJECTIVE AND OBJECTIVE BOX
Patient is a 62y old  Male who presents with a chief complaint of SOB, likely CHF exacerbation (20 Dec 2022 15:36)      SUBJECTIVE / OVERNIGHT EVENTS: Comfortable without new complaints.   Review of Systems  chest pain no  palpitations no  sob improving   nausea no  headache no    MEDICATIONS  (STANDING):  chlorhexidine 2% Cloths 1 Application(s) Topical daily  metoprolol tartrate 50 milliGRAM(s) Oral two times a day  sacubitril 24 mG/valsartan 26 mG 1 Tablet(s) Oral two times a day    MEDICATIONS  (PRN):  acetaminophen     Tablet .. 650 milliGRAM(s) Oral every 6 hours PRN Temp greater or equal to 38C (100.4F), Mild Pain (1 - 3)  melatonin 3 milliGRAM(s) Oral at bedtime PRN Insomnia  ondansetron Injectable 4 milliGRAM(s) IV Push every 8 hours PRN Nausea and/or Vomiting      Vital Signs Last 24 Hrs  T(C): 36.7 (20 Dec 2022 12:44), Max: 36.8 (20 Dec 2022 04:28)  T(F): 98 (20 Dec 2022 12:44), Max: 98.2 (20 Dec 2022 04:28)  HR: 94 (20 Dec 2022 12:44) (85 - 94)  BP: 118/83 (20 Dec 2022 12:44) (118/77 - 122/85)  BP(mean): --  RR: 18 (20 Dec 2022 12:44) (18 - 18)  SpO2: 98% (20 Dec 2022 12:44) (98% - 99%)    Parameters below as of 20 Dec 2022 12:44  Patient On (Oxygen Delivery Method): nasal cannula  O2 Flow (L/min): 2      PHYSICAL EXAM:  GENERAL: NAD   HEAD:  Atraumatic, Normocephalic  EYES: EOMI, PERRLA, conjunctiva and sclera clear  NECK: Supple, No JVD  CHEST/LUNG: Clear to auscultation bilaterally; No wheeze  HEART: Regular rate and rhythm; No murmurs, rubs, or gallops  ABDOMEN: Soft, Nontender, Nondistended; Bowel sounds present  EXTREMITIES:  2+ Peripheral Pulses, No clubbing, cyanosis, or edema  PSYCH: AAOx3  NEUROLOGY: non-focal  SKIN: No rashes or lesions    LABS:        136  |  103  |  39<H>  ----------------------------<  87  5.1   |  22  |  1.79<H>    Ca    9.0      20 Dec 2022 05:03    TPro  7.2  /  Alb  3.5  /  TBili  0.9  /  DBili  x   /  AST  18  /  ALT  14  /  AlkPhos  135<H>  12-19          Urinalysis Basic - ( 20 Dec 2022 10:34 )    Color: Light Yellow / Appearance: Clear / S.018 / pH: x  Gluc: x / Ketone: Negative  / Bili: Negative / Urobili: Negative   Blood: x / Protein: 100 mg/dl / Nitrite: Negative   Leuk Esterase: Negative / RBC: 3 /hpf / WBC 1 /HPF   Sq Epi: x / Non Sq Epi: 1 /hpf / Bacteria: Negative          RADIOLOGY & ADDITIONAL TESTS:    Imaging Personally Reviewed:  < from: [facility] Bone Imaging Total (22 @ 10:55) >  IMPRESSION: No scintigraphic evidence of skeletal metastasis.    < end of copied text >    Consultant(s) Notes Reviewed:      Care Discussed with Consultants/Other Providers:

## 2022-12-20 NOTE — PROGRESS NOTE ADULT - SUBJECTIVE AND OBJECTIVE BOX
Cardiovascular Disease Progress Note    Overnight events: No acute events overnight.  no cp/sob/palps  Otherwise review of systems negative    Objective Findings:  T(C): 36.8 (12-20-22 @ 04:28), Max: 36.9 (12-19-22 @ 17:50)  HR: 94 (12-20-22 @ 04:28) (85 - 104)  BP: 118/77 (12-20-22 @ 04:28) (118/77 - 123/86)  RR: 18 (12-20-22 @ 04:28) (18 - 18)  SpO2: 99% (12-20-22 @ 04:28) (95% - 99%)  Wt(kg): --  Daily     Daily       Physical Exam:  Gen: NAD  HEENT: EOMI  CV: RRR, normal S1 + S2, no m/r/g  Lungs: CTAB  Abd: soft, non-tender  Ext: No edema    Telemetry:    Laboratory Data:    12-20    136  |  103  |  39<H>  ----------------------------<  87  5.1   |  22  |  1.79<H>    Ca    9.0      20 Dec 2022 05:03    TPro  7.2  /  Alb  3.5  /  TBili  0.9  /  DBili  x   /  AST  18  /  ALT  14  /  AlkPhos  135<H>  12-19              Inpatient Medications:  MEDICATIONS  (STANDING):  chlorhexidine 2% Cloths 1 Application(s) Topical daily  metoprolol tartrate 50 milliGRAM(s) Oral two times a day  sacubitril 24 mG/valsartan 26 mG 1 Tablet(s) Oral two times a day      Assessment:  62M w/ pmhx of HTN, OA, ?RA, chronic idiopathic SOB/dyspnea on intermittent home O2 found to have right kidney mass. 4/29 s/p RAL R partial nephrectomy p/w acute hypoxic respiratory failure    Recs:  cardiac stable  resume lasix 20mg po qd in 24-48 hours  cw gdmt for lv dysfunction - lopressor 50mg bid and entresto 24/26mg bid  [facility] 2022 at [facility], normal cors  TTE --> mod clvh, severe lv dysfunction, rve with dec rvsf, mod phtn  cardiac MRI results noted. cw medical therapy as above  s/p rhc, mild precap phtn, normal output and filling pressures  appreciate pulmonary recommendations  tele monitoring  previous lymph node bx neg for sarcoid, ? role for cardiac PET per rheum          Over 25 minutes spent on total encounter; more than 50% of the visit was spent counseling and/or coordinating care by the attending physician.      Juarez Carver MD   Cardiovascular Disease  [phone]

## 2022-12-20 NOTE — PROGRESS NOTE ADULT - ASSESSMENT
History of RCC. Unintentional weight loss. CT C/A/P no obvious malignancy. Bone scan done (mild high alk phos) and await results. History of an MGUS. Immunoglobulins as above not too high. DELVIS pending. DON improving.

## 2022-12-20 NOTE — PROGRESS NOTE ADULT - SUBJECTIVE AND OBJECTIVE BOX
PULMONARY PROGRESS NOTE    PLACIDO GOLDMAN  MRN-[id number]    Patient is a 62y old  Male who presents with a chief complaint of SOB, likely CHF exacerbation (17 Dec 2022 08:43)      HPI:  sitting up eating breakfast  on room air  says breathing feels better    ROS:   -    MEDICATIONS  (STANDING):  chlorhexidine 2% Cloths 1 Application(s) Topical daily  metoprolol tartrate 50 milliGRAM(s) Oral two times a day  sacubitril 24 mG/valsartan 26 mG 1 Tablet(s) Oral two times a day    MEDICATIONS  (PRN):  acetaminophen     Tablet .. 650 milliGRAM(s) Oral every 6 hours PRN Temp greater or equal to 38C (100.4F), Mild Pain (1 - 3)  melatonin 3 milliGRAM(s) Oral at bedtime PRN Insomnia  ondansetron Injectable 4 milliGRAM(s) IV Push every 8 hours PRN Nausea and/or Vomiting      EXAM:          GENERAL: The patient is awake and alert in no apparent distress.     LUNGS: Clear to auscultation without wheezing, rales or rhonchi; respirations unlabored        Labs:             12-20    136  |  103  |  39<H>  ----------------------------<  87  5.1   |  22  |  1.79<H>    Ca    9.0      20 Dec 2022 05:03    TPro  7.2  /  Alb  3.5  /  TBili  0.9  /  DBili  x   /  AST  18  /  ALT  14  /  AlkPhos  135<H>  12-19    ACC: [id number] EXAM:  MR CARD MORPH FUNCTION [facility]                          PROCEDURE DATE:  12/16/2022          INTERPRETATION:  MRI CARDIAC WITHOUT AND WITH CONTRAST      INDICATION: Dictated cardiomyopathy    COMPARISON: No prior studies available for comparison.    TECHNIQUE: Multi-sequential, multi-planar cardiac MRI was performed   before and after the intravenous administration of 9 mL of Gadavist; 1 mL   was discarded. First passed perfusion and late gadolinium enhanced images   were obtained.    SCANNER: Siemens 3.o T Dianna platform using a cardiac coil.    QUALITY: Fair.      FINDINGS:      MORPHOLOGY:    PERICARDIUM: The pericardium is normal in thickness (less than 4mm).   There is no pericardial effusion.    RIGHT ATRIUM: The right atrium may be enlarged. The inflow the IVC and   SVC are unremarkable.    RIGHT VENTRICLE: Right ventricle is unremarkable. There is no scalloping   or thickening of the free wall the right ventricle.    LEFT ATRIUM: Left atrium measures 3.0 cm the 3 chamber plane. There are   bilateral superior and inferior pulmonary veins.    LEFT VENTRICLE: Concentric thickening of the left ventricular myocardium.   Left ventricle measures 4.1 cm at end diastole and 3.5 cm end systole   (anteroseptal/inferolateral). There is no convincing focus of increased   T2 signal.    There is no convincing first pass perfusion abnormality. At the inferior   hinge point of the right ventricle, there is subtle late gadolinium   enhancement. There are no other convincing foci of late gadolinium   enhancement shown on the early or late phases of late gadolinium enhanced   imaging.    FUNCTION: No segmental wall motion abnormality. No global wall motion   abnormality.    LEFT VENTRICLE:  Unindexed:  EF: 36.04 %  EDV: 117.09 mL  ESV: 74.89 mL  SV: 42.20 mL  CO: 3.96 L/min    Indexed:  EDVi: 69.40 mL/m2  ESVi: 44.38 mL/m2  SV: 25.01 mL/m2  CO: 2.35 L/min/m2      VALVES:    MITRAL VALVE: Mitral regurgitation, not quantified  AORTIC VALVE: Aortic regurgitation, not quantified.      AORTA: Three-vessel left-sided aortic arch and left-sided descending   thoracic aorta.      NONCARDIAC FINDINGS: The other visualized thoracoabdominal structures are   unremarkable.      IMPRESSION:    1.  Subtle late gadolinium enhancement along the inferior hinge point of   the right ventricle can occur in a variety of clinical settings including   altered ventricular mechanics.  2.  Concentric thickening of the ventricular myocardium.  3.  The estimated left ventricular ejection fraction is 36.04%.    --- End of Report ---            LUDA ADAN MD; Attending Radiologist  This document has been electronically signed. Dec 16 2022  5:17PM    ACC: [id number] [facility]                          PROCEDURE DATE:  12/14/2022          INTERPRETATION:  CLINICAL INFORMATION: Dyspnea.    COMPARISON: CTA chest 5/1/2022.    CONTRAST/COMPLICATIONS:  IV Contrast: Omnipaque 350  83 cc administered   17 cc discarded  Oral Contrast: NONE  Complications: None reported at time of study completion    PROCEDURE:  CT Angiogram of the chest was obtained with intravenous contrast. Three   dimensional maximum intensity projection (MIP) images were generated.    FINDINGS:    PULMONARY ANGIOGRAM: Adequate opacification the pulmonary arterial tree   to the level of the segmental and subsegmental pulmonary arteries. No   filling defect. Main pulmonary artery is enlarged, measures 4.2 cm.    LYMPH NODES: Axillary lymphadenopathy in comparison with most recent   prior exam, 5/1/2022.    HEART/VASCULATURE: Heart size is enlarged. Aneurysmal dilatation   ascending aorta measures up to 3.9 cm at the level of the main pulmonary   artery.    AIRWAYS/LUNGS/PLEURA: Patent central airways. Tracheal secretions.   Biapical scarring/atelectasis. Diffuse bilateral groundglass opacities.   Trace left pleural effusion and/or pleural thickening. Minimal biapical   pleural parenchymal scarring.    UPPER ABDOMEN: Trace fluid along the posterior right hepatic lobe/right   perinephric space adjacent to the sutures.    BONES/SOFT TISSUES: Degenerative changes.    IMPRESSION:  No pulmonary embolism. Enlargement of the main pulmonary artery upto 4.2   cm may reflect underlying pulmonary arterial hypertension.    Nonspecific diffuse bilateral groundglass opacities may represent   pulmonary edema versus mosaic attenuation consistent with small airway or   cysts small vessel disease          --- End of Report ---           SUSANNE NUNEZ MD; Resident Radiologist  This document has been electronically signed.  ANAMARIA VICTOR MD; Attending Radiologist  This document has been electronically signed. Dec 14 2022  7:38PM    PROBLEM LIST:  62y Male with HEALTH ISSUES - PROBLEM Dx:      Pulmonary edema    Acute on chronic respiratory failure with hypoxia    Essential hypertension    DON (acute kidney injury)    DVT prophylaxis    Rheumatoid arthritis              RECS:  -workup ongoing  -appreciate rheum/onc evals  -monitor sats  -cards fu    Jocelyne Hill MD  [phone]

## 2022-12-20 NOTE — PROGRESS NOTE ADULT - ASSESSMENT
62M w/ pmhx of HTN, OA, ?RA, chronic idiopathic SOB/dyspnea on intermittent home O2 found to have right kidney mass s/p RAL R partial nephrectomy p/w acute hypoxic respiratory failure  DON on top of CKD stage 3a   New LV dysfunction  Hx of MGUS as well per Onc notation     1 CVS-   RHC last week did not show volume overloaded state   The Entresto started on the 16th  2Renal- Creatinine is improving(no lasix or ivf given yesterday) There is no obstruction noted and the CT was done without contrast   Check UA;  Check urine for bence alfaro given hx of MGUS as well   Loop diuretics on hold  3 GI-Start Ensure   4 Pulm- Order CXR today       Sayed [facility]   [phone]

## 2022-12-20 NOTE — PROGRESS NOTE ADULT - ASSESSMENT
62M w/ pmhx of HTN, OA, ?RA, chronic idiopathic SOB/dyspnea on intermittent home O2 found to have right kidney mass. 4/29 s/p RAL R partial nephrectomy p/w acute hypoxic respiratory failure likely with a component of pulmonary edema    Acute on chronic respiratory failure with hypoxia.   - Pulmonary edema and HF likely contributing given elevated BNP and edema resolved.   - hold Lasix   - oxygen  - Continuous pulse oximetry for now  - TTE noted with severe LV dysfunction   - Cardiology follow  - Pulmonary follow    Pulmonary Hypertension  - Pulmonary follow  - s/p RHC   - Rheum eval noted    Pulmonary edema.   - Note some likely pulmonary edema on CTA. Also with signs of R sided HF/ pulmonary HTN. Suspect some relation to whatever chronic ongoing process has been causing pt's idiopathic dyspnea  - hold lasix   - Vital signs and continuous pulse oximetry as above  - Strict I/Os and daily weight    DON (acute kidney injury).   - Crt 1.5 up from prior in record.  - Trend BMP  - Renal dose medications  - Holding spironolactone and Lasix for now.  - Nephrology follow Dr Dominguez    Rheumatoid arthritis.   - stable     Renal Cancer  - s/p R nephrectomy  - Bone scan noted  - Immunoglobulins noted  - Bence Pfeiffer protein in urine pending    - Oncology follow    Essential hypertension.   - Trend BP  - Cont. lopressor BID with hold parameters.    DVT prophylaxis.   - Hep subq.    DCP home.     Gerardo Marte MD phone [phone]

## 2022-12-20 NOTE — PROGRESS NOTE ADULT - SUBJECTIVE AND OBJECTIVE BOX
Patient is a 62y old  Male who presents with a chief complaint of SOB, likely CHF exacerbation (20 Dec 2022 09:27)    says that breathing is improving. Cough is better. No CP. No HA or dizziness. No bleeding. No N/V/D or abd pain. No bleeding noted.     Medication:   acetaminophen     Tablet .. 650 milliGRAM(s) Oral every 6 hours PRN  chlorhexidine 2% Cloths 1 Application(s) Topical daily  melatonin 3 milliGRAM(s) Oral at bedtime PRN  metoprolol tartrate 50 milliGRAM(s) Oral two times a day  ondansetron Injectable 4 milliGRAM(s) IV Push every 8 hours PRN  sacubitril 24 mG/valsartan 26 mG 1 Tablet(s) Oral two times a day      Physical exam    T(C): 36.7 (12-20-22 @ 12:44), Max: 36.9 (12-19-22 @ 17:50)  HR: 94 (12-20-22 @ 12:44) (85 - 104)  BP: 118/83 (12-20-22 @ 12:44) (118/77 - 123/86)  RR: 18 (12-20-22 @ 12:44) (18 - 18)  SpO2: 98% (12-20-22 @ 12:44) (98% - 99%)  Wt(kg): --    alert NAD  EOMI anicteric sclera  Cv s1 S2 RRR  Lungs clear B/L anteriorly   abd soft NT ND +BS  No LE edema or tenderness    Labs            12-20    136  |  103  |  39<H>  ----------------------------<  87  5.1   |  22  |  1.79<H>    Ca    9.0      20 Dec 2022 05:03    TPro  7.2  /  Alb  3.5  /  TBili  0.9  /  DBili  x   /  AST  18  /  ALT  14  /  AlkPhos  135<H>  12-19      LIVER FUNCTIONS - ( 19 Dec 2022 07:20 )  Alb: 3.5 g/dL / Pro: 7.2 g/dL / ALK PHOS: 135 U/L / ALT: 14 U/L / AST: 18 U/L / GGT: x           IgG   1774   IgA  286    IgM  70  kappa 10.7   lambda    3.3      [phone]

## 2022-12-20 NOTE — CHART NOTE - NSCHARTNOTEFT_GEN_A_CORE
Spoke w/ Radiologist Dr. Fowler and reviewed pt's CT chest imaging. No findings of ILD or mixed connective tissue disease fibrosis. Pt's does have non-specific ground glass opacities on recent CT, and can have imaging in future to have it followed up. Autoimmune serology negative including SLE, RA, sjogrens, systemic sclerosis.     Plan:  -f/u myomarker panel and RNA polymerase 3  -pt should follow up w/ his outpatient Rheumatologist Dr. Frederick on discharge for continued monitoring  -continue w/ heme/onc follow up for hx of MGUS and malignancy work up, will order SPEP/UPEP    Discussed with Attending Dr. Rigoberto Gonzalez, DO  Rheumatology Fellow  Pager: [phone]  Available on TEAMS Spoke w/ Radiologist Dr. Fowler and reviewed pt's CT chest imaging. No findings of ILD or mixed connective tissue disease fibrosis. Pt's does have non-specific ground glass opacities on recent CT, and can have imaging in future to have it followed up. Autoimmune serology negative including SLE, RA, sjogrens, systemic sclerosis.     Plan:  -no need for repeat CT chest at this time  -f/u myomarker panel and RNA polymerase 3  -pt should follow up w/ his outpatient Rheumatologist Dr. Frederick on discharge for continued monitoring  -continue w/ heme/onc follow up for hx of MGUS and malignancy work up, will order SPEP/UPEP    Discussed with Attending Dr. Rigoberto Gonzalez,   Rheumatology Fellow  Pager: [phone]  Available on TEAMS

## 2022-12-21 ENCOUNTER — NON-APPOINTMENT (OUTPATIENT)
Age: 62
End: 2022-12-21

## 2022-12-21 ENCOUNTER — TRANSCRIPTION ENCOUNTER (OUTPATIENT)
Age: 62
End: 2022-12-21

## 2022-12-21 LAB
ANA PAT FLD IF-IMP: ABNORMAL
ANA TITR SER: ABNORMAL
ANION GAP SERPL CALC-SCNC: 12 MMOL/L — SIGNIFICANT CHANGE UP (ref 5–17)
BUN SERPL-MCNC: 38 MG/DL — HIGH (ref 7–23)
CALCIUM SERPL-MCNC: 8.9 MG/DL — SIGNIFICANT CHANGE UP (ref 8.4–10.5)
CHLORIDE SERPL-SCNC: 104 MMOL/L — SIGNIFICANT CHANGE UP (ref 96–108)
CO2 SERPL-SCNC: 20 MMOL/L — LOW (ref 22–31)
CREAT SERPL-MCNC: 1.92 MG/DL — HIGH (ref 0.5–1.3)
EGFR: 39 ML/MIN/1.73M2 — LOW
GLUCOSE SERPL-MCNC: 83 MG/DL — SIGNIFICANT CHANGE UP (ref 70–99)
HCT VFR BLD CALC: 39.7 % — SIGNIFICANT CHANGE UP (ref 39–50)
HGB BLD-MCNC: 12 G/DL — LOW (ref 13–17)
MAGNESIUM SERPL-MCNC: 2 MG/DL — SIGNIFICANT CHANGE UP (ref 1.6–2.6)
MCHC RBC-ENTMCNC: 28.9 PG — SIGNIFICANT CHANGE UP (ref 27–34)
MCHC RBC-ENTMCNC: 30.2 GM/DL — LOW (ref 32–36)
MCV RBC AUTO: 95.7 FL — SIGNIFICANT CHANGE UP (ref 80–100)
NRBC # BLD: 0 /100 WBCS — SIGNIFICANT CHANGE UP (ref 0–0)
PHOSPHATE SERPL-MCNC: 3 MG/DL — SIGNIFICANT CHANGE UP (ref 2.5–4.5)
PLATELET # BLD AUTO: 333 K/UL — SIGNIFICANT CHANGE UP (ref 150–400)
POTASSIUM SERPL-MCNC: 4.7 MMOL/L — SIGNIFICANT CHANGE UP (ref 3.5–5.3)
POTASSIUM SERPL-SCNC: 4.7 MMOL/L — SIGNIFICANT CHANGE UP (ref 3.5–5.3)
PROT ?TM UR-MCNC: 77 MG/DL — HIGH (ref 0–12)
PROT ?TM UR-MCNC: 77 MG/DL — HIGH (ref 0–12)
PROT SERPL-MCNC: 6.6 G/DL — SIGNIFICANT CHANGE UP (ref 6–8.3)
PROT SERPL-MCNC: 6.6 G/DL — SIGNIFICANT CHANGE UP (ref 6–8.3)
RAPID RVP RESULT: SIGNIFICANT CHANGE UP
RBC # BLD: 4.15 M/UL — LOW (ref 4.2–5.8)
RBC # FLD: 15.7 % — HIGH (ref 10.3–14.5)
SARS-COV-2 RNA SPEC QL NAA+PROBE: SIGNIFICANT CHANGE UP
SODIUM SERPL-SCNC: 136 MMOL/L — SIGNIFICANT CHANGE UP (ref 135–145)
WBC # BLD: 11.52 K/UL — HIGH (ref 3.8–10.5)
WBC # FLD AUTO: 11.52 K/UL — HIGH (ref 3.8–10.5)

## 2022-12-21 RX ORDER — SODIUM CHLORIDE 9 MG/ML
1000 INJECTION INTRAMUSCULAR; INTRAVENOUS; SUBCUTANEOUS
Refills: 0 | Status: DISCONTINUED | OUTPATIENT
Start: 2022-12-21 | End: 2022-12-25

## 2022-12-21 RX ADMIN — SODIUM CHLORIDE 50 MILLILITER(S): 9 INJECTION INTRAMUSCULAR; INTRAVENOUS; SUBCUTANEOUS at 13:13

## 2022-12-21 RX ADMIN — Medication 650 MILLIGRAM(S): at 17:32

## 2022-12-21 RX ADMIN — SACUBITRIL AND VALSARTAN 1 TABLET(S): 24; 26 TABLET, FILM COATED ORAL at 05:56

## 2022-12-21 RX ADMIN — Medication 100 MILLIGRAM(S): at 10:13

## 2022-12-21 RX ADMIN — CHLORHEXIDINE GLUCONATE 1 APPLICATION(S): 213 SOLUTION TOPICAL at 13:14

## 2022-12-21 RX ADMIN — SACUBITRIL AND VALSARTAN 1 TABLET(S): 24; 26 TABLET, FILM COATED ORAL at 17:08

## 2022-12-21 RX ADMIN — Medication 50 MILLIGRAM(S): at 05:56

## 2022-12-21 RX ADMIN — Medication 650 MILLIGRAM(S): at 17:08

## 2022-12-21 RX ADMIN — Medication 50 MILLIGRAM(S): at 17:08

## 2022-12-21 NOTE — DISCHARGE NOTE PROVIDER - HOSPITAL COURSE
62M w/ pmhx of HTN, OA, ?RA, chronic idiopathic SOB/dyspnea on intermittent home O2 found to have right kidney mass. 4/29 s/p RAL R partial nephrectomy p/w acute hypoxic respiratory failure likely with a component of pulmonary edema    Acute on chronic respiratory failure with hypoxia.   - Pulmonary edema and HF likely contributing given elevated BNP and edema resolved.   - hold Lasix   - oxygen  - Continuous pulse oximetry for now  - TTE noted with severe LV dysfunction   - Cardiology follow  - Pulmonary follow    Pulmonary Hypertension  - Pulmonary follow  - s/p RHC   - Rheum eval noted    Pulmonary edema.   - Note some likely pulmonary edema on CTA. Also with signs of R sided HF/ pulmonary HTN. Suspect some relation to whatever chronic ongoing process has been causing pt's idiopathic dyspnea  - Vital signs and continuous pulse oximetry as above  - Strict I/Os and daily weight    DON (acute kidney injury).   - Crt 1.5 up from prior in record.  - Trend BMP  - Renal dose medications  - Holding spironolactone and Lasix for now.  - Nephrology follow Dr Dominguez 62M w/ pmhx of HTN, OA, ?RA, chronic idiopathic SOB/dyspnea on intermittent home O2 found to have right kidney mass. 4/29 s/p RAL R partial nephrectomy p/w acute hypoxic respiratory failure likely with a component of pulmonary edema. Admitted for Acute on chronic respiratory failure with hypoxia, 2/2 to Pulmonary edema and HF likely contributing given elevated BNP and edema resolved. TTE noted with severe LV dysfunction. Suspect some relation to whatever chronic ongoing process has been causing pt's idiopathic dyspnea. Patient also with DON and right kidney mass. Followed by Nephrology/Hem/Onc, Crt 1.5 up from prior in record. Holding spironolactone and Lasix for now. Per Hem/Nephro pt can follow as outpt, for possible out-pt bone marrow biopsy. A dx of plasma cell dyscrasia will not change inpt mgmt and mild anemia likely an AOCD process due to kidney disease. Stable for discharge home per Dr. Marte.

## 2022-12-21 NOTE — PROGRESS NOTE ADULT - ASSESSMENT
Kidney disease, history of MGUS. DELVIS pending. May consider out-pt bone marrow biopsy. Mild anemia likely an AOCD process due to kidney disease. Weight loss of unclear etiology. History of RCC resected. CT scans and bone scan without any evidence of malignancy. Reportedly normal colonoscopy earlier this year. He says that he is going home today so will not order anemia labs here, but can do from the office. He was given business card to call and make an appointment.

## 2022-12-21 NOTE — DISCHARGE NOTE PROVIDER - DETAILS OF MALNUTRITION DIAGNOSIS/DIAGNOSES
This patient has been assessed with a concern for Malnutrition and was treated during this hospitalization for the following Nutrition diagnosis/diagnoses:     -  12/22/2022: Severe protein-calorie malnutrition

## 2022-12-21 NOTE — PROGRESS NOTE ADULT - ASSESSMENT
62M w/ pmhx of HTN, OA, ?RA, chronic idiopathic SOB/dyspnea on intermittent home O2 found to have right kidney mass. 4/29 s/p RAL R partial nephrectomy p/w acute hypoxic respiratory failure likely with a component of pulmonary edema    Acute on chronic respiratory failure with hypoxia.   - Pulmonary edema and HF likely contributing given elevated BNP and edema resolved.   - hold Lasix   - oxygen  - Continuous pulse oximetry for now  - TTE noted with severe LV dysfunction   - Cardiology follow  - Pulmonary follow    Pulmonary Hypertension  - Pulmonary follow  - s/p RHC   - Rheum eval noted    Pulmonary edema.   - Note some likely pulmonary edema on CTA. Also with signs of R sided HF/ pulmonary HTN. Suspect some relation to whatever chronic ongoing process has been causing pt's idiopathic dyspnea  - hold lasix   - Vital signs and continuous pulse oximetry as above  - Strict I/Os and daily weight    DON (acute kidney injury).   - Crt 1.5 up from prior in record.  - Trend BMP  - Renal dose medications  - Holding spironolactone and Lasix for now.  - Nephrology follow Dr Dominguez    Rheumatoid arthritis.   - stable     Renal Cancer  - s/p R nephrectomy  - Bone scan noted  - Immunoglobulins noted  - Bence Pfeiffer protein in urine pending    - Oncology follow    Essential hypertension.   - Trend BP  - Cont. lopressor BID with hold parameters.    LUIS FERNANDO high titer  - DS DNA pending     DVT prophylaxis.   - Hep subq.    DCP home.     d/w patient and wife QA    Gerardo Marte MD phone [phone]

## 2022-12-21 NOTE — PROGRESS NOTE ADULT - SUBJECTIVE AND OBJECTIVE BOX
NEPHROLOGY-[facility] [phone]        Patient seen and examined in bed.  He was the same         MEDICATIONS  (STANDING):  chlorhexidine 2% Cloths 1 Application(s) Topical daily  metoprolol tartrate 50 milliGRAM(s) Oral two times a day  sacubitril 24 mG/valsartan 26 mG 1 Tablet(s) Oral two times a day      VITAL:  T(C): , Max: 36.8 (22 @ 20:27)  T(F): , Max: 98.3 (22 @ 20:27)  HR: 93 (22 @ 00:20)  BP: 121/81 (22 @ 00:20)  BP(mean): --  RR: 18 (22 @ 00:20)  SpO2: 100% (22 @ 00:20)  Wt(kg): --    I and O's:     @ 07:01  -   @ 07:00  --------------------------------------------------------  IN: 540 mL / OUT: 1500 mL / NET: -960 mL          PHYSICAL EXAM:    Constitutional: NAD  Neck:  No JVD  Respiratory: CTAB/L  Cardiovascular: S1 and S2  Gastrointestinal: BS+, soft, NT/ND  Extremities: No peripheral edema  Neurological: A/O x 3, no focal deficits  Psychiatric: Normal mood, normal affect  : No Humphreys  Skin: No rashes  Access: Not applicable    LABS:                        12.0   11.52 )-----------( 333      ( 21 Dec 2022 07:15 )             39.7         136  |  104  |  38<H>  ----------------------------<  83  4.7   |  20<L>  |  1.92<H>    Ca    8.9      21 Dec 2022 07:07  Phos  3.0       Mg     2.0         TPro  6.6  /  Alb  x   /  TBili  x   /  DBili  x   /  AST  x   /  ALT  x   /  AlkPhos  x             Urine Studies:  Urinalysis Basic - ( 20 Dec 2022 10:34 )    Color: Light Yellow / Appearance: Clear / S.018 / pH: x  Gluc: x / Ketone: Negative  / Bili: Negative / Urobili: Negative   Blood: x / Protein: 100 mg/dl / Nitrite: Negative   Leuk Esterase: Negative / RBC: 3 /hpf / WBC 1 /HPF   Sq Epi: x / Non Sq Epi: 1 /hpf / Bacteria: Negative            RADIOLOGY & ADDITIONAL STUDIES:  < from: Xray Chest 1 View- PORTABLE-Routine (Xray Chest 1 View- PORTABLE-Routine .) (22 @ 11:16) >    ACC: [id number] EXAM:  XR CHEST PORTABLE ROUTINE 1V                          PROCEDURE DATE:  2022          INTERPRETATION:  EXAMINATION: XR CHEST    CLINICAL INDICATION: crackles on exam    TECHNIQUE: Single frontal view of the chest was obtained.    COMPARISON: Chest x-ray 2022.    FINDINGS:    The heart size cannot be assessed on this projection.    The lungs are clear. No pneumothorax. No pleural effusion.    No acute osseous abnormalities.    IMPRESSION:  Clear lungs.    --- End of Report ---           IDANIA MEDINA MD; Resident Radiologist  This document has been electronically signed.  BELKIS CRUZ MD; Attending Radiologist  This document has been electronically signed. Dec 20 2022  3:2    < end of copied text >

## 2022-12-21 NOTE — PROGRESS NOTE ADULT - SUBJECTIVE AND OBJECTIVE BOX
Patient is a 62y old  Male who presents with a chief complaint of SOB, likely CHF exacerbation (21 Dec 2022 11:12)      SUBJECTIVE / OVERNIGHT EVENTS: No new complaints. Wife at bedside  Review of Systems  chest pain no  palpitations no  sob improving   nausea no  headache no    MEDICATIONS  (STANDING):  chlorhexidine 2% Cloths 1 Application(s) Topical daily  metoprolol tartrate 50 milliGRAM(s) Oral two times a day  sacubitril 24 mG/valsartan 26 mG 1 Tablet(s) Oral two times a day  sodium chloride 0.9%. 1000 milliLiter(s) (50 mL/Hr) IV Continuous <Continuous>    MEDICATIONS  (PRN):  acetaminophen     Tablet .. 650 milliGRAM(s) Oral every 6 hours PRN Temp greater or equal to 38C (100.4F), Mild Pain (1 - 3)  guaiFENesin Oral Liquid (Sugar-Free) 100 milliGRAM(s) Oral every 6 hours PRN Cough  melatonin 3 milliGRAM(s) Oral at bedtime PRN Insomnia  ondansetron Injectable 4 milliGRAM(s) IV Push every 8 hours PRN Nausea and/or Vomiting      Vital Signs Last 24 Hrs  T(C): 36.6 (21 Dec 2022 21:39), Max: 36.6 (21 Dec 2022 00:20)  T(F): 97.8 (21 Dec 2022 21:39), Max: 97.8 (21 Dec 2022 00:20)  HR: 91 (21 Dec 2022 21:39) (91 - 94)  BP: 122/77 (21 Dec 2022 21:39) (114/81 - 138/89)  BP(mean): --  RR: 18 (21 Dec 2022 21:39) (18 - 18)  SpO2: 98% (21 Dec 2022 21:39) (96% - 100%)    Parameters below as of 21 Dec 2022 21:39  Patient On (Oxygen Delivery Method): nasal cannula  O2 Flow (L/min): 2      PHYSICAL EXAM:  GENERAL: NAD  HEAD:  Atraumatic, Normocephalic  EYES: EOMI, PERRLA, conjunctiva and sclera clear  NECK: Supple, No JVD  CHEST/LUNG: Clear to auscultation bilaterally; No wheeze  HEART: Regular rate and rhythm; No murmurs, rubs, or gallops  ABDOMEN: Soft, Nontender, Nondistended; Bowel sounds present  EXTREMITIES:  2+ Peripheral Pulses, No clubbing, cyanosis, or edema  PSYCH: AAOx3  NEUROLOGY: non-focal  SKIN: No rashes or lesions    LABS:                        12.0   11.52 )-----------( 333      ( 21 Dec 2022 07:15 )             39.7     12-    136  |  104  |  38<H>  ----------------------------<  83  4.7   |  20<L>  |  1.92<H>    Ca    8.9      21 Dec 2022 07:07  Phos  3.0       Mg     2.0         TPro  6.6  /  Alb  x   /  TBili  x   /  DBili  x   /  AST  x   /  ALT  x   /  AlkPhos  x   -          Urinalysis Basic - ( 20 Dec 2022 10:34 )    Color: Light Yellow / Appearance: Clear / S.018 / pH: x  Gluc: x / Ketone: Negative  / Bili: Negative / Urobili: Negative   Blood: x / Protein: 100 mg/dl / Nitrite: Negative   Leuk Esterase: Negative / RBC: 3 /hpf / WBC 1 /HPF   Sq Epi: x / Non Sq Epi: 1 /hpf / Bacteria: Negative          RADIOLOGY & ADDITIONAL TESTS:    Imaging Personally Reviewed:    Consultant(s) Notes Reviewed:      Care Discussed with Consultants/Other Providers:

## 2022-12-21 NOTE — DISCHARGE NOTE PROVIDER - CARE PROVIDERS DIRECT ADDRESSES
,DirectAddress_Unknown ,DirectAddress_Unknown,[email or web address] ,DirectAddress_Unknown,[email or web address],DirectAddress_Unknown

## 2022-12-21 NOTE — DISCHARGE NOTE PROVIDER - NSDCCPCAREPLAN_GEN_ALL_CORE_FT
PRINCIPAL DISCHARGE DIAGNOSIS  Diagnosis: Acute on chronic respiratory failure with hypoxia  Assessment and Plan of Treatment: Take medication as prescribed  Follow up with your primary care provider      SECONDARY DISCHARGE DIAGNOSES  Diagnosis: DON (acute kidney injury)  Assessment and Plan of Treatment: Take medication as prescribed  Follow up with your primary care provider   Follow up with your nephrologist     PRINCIPAL DISCHARGE DIAGNOSIS  Diagnosis: Acute on chronic respiratory failure with hypoxia  Assessment and Plan of Treatment: Your respiratory failure was due to heart failure, congestive heart failure (CHF) is a chronic condition in which the heart has trouble pumping blood. In some cases of heart failure, fluid may back up into your lungs or you may have swelling (edema) in your lower legs. There are many causes of heart failure including high blood pressure, coronary artery disease, abnormal heart valves, heart muscle disease, lung disease, diabetes, etc. Symptoms include shortness of breath with activity or when lying flat, cough, swelling of the legs, fatigue, or increased urination during the night.   Treatment is aimed at managing the symptoms of heart failure and may include lifestyle changes, medications, or surgical procedures. Take medicines only as directed by your health care provider and do not stop unless instructed to do so. Eat heart-healthy foods with low or no trans/saturated fats, cholesterol and salt. Weigh yourself every day for early recognition of fluid accumulation.  SEEK IMMEDIATE MEDICAL CARE IF YOU HAVE ANY OF THE FOLLOWING SYMPTOMS: shortness of breath, change in mental status, chest pain, lightheadedness/dizziness/fainting, or worsening of symptoms including not being able to conduct normal physical activity. Take medication as prescribed  Follow up with your primary care provider      SECONDARY DISCHARGE DIAGNOSES  Diagnosis: DON (acute kidney injury)  Assessment and Plan of Treatment: You were noted to have a temporary insult to your kidney function either at the time that you arrived at the hospital or during your stay here. We have monitored your kidney function with blood work during your time here and you are at a level that no longer requires continued hospital level care. We do recommend that you follow up to continually have your kidney function checked. You can follow up with your primary care doctor, or, if recommended in the discharge paperwork, you should follow up with a kidney specialist called a nephrologist. Take medication as prescribed. Follow up with your primary care provider   Follow up with your nephrologist    Diagnosis: Renal mass  Assessment and Plan of Treatment: Please follow up with Nephrology and Hematology/Oncology for furhter work up and possible biopsy of mass.    Diagnosis: Essential hypertension  Assessment and Plan of Treatment: Hypertension  Hypertension, commonly called high blood pressure, is when the force of blood pumping through your arteries is too strong. Hypertension forces your heart to work harder to pump blood. Your arteries may become narrow or stiff. Having untreated or uncontrolled hypertension for a long period of time can cause heart attack, stroke, kidney disease, and other problems. If started on a medication, take exactly as prescribed by your health care professional. Maintain a healthy lifestyle and follow up with your primary care physician.  SEEK IMMEDIATE MEDICAL CARE IF YOU HAVE ANY OF THE FOLLOWING SYMPTOMS: severe headache, confusion, chest pain, abdominal pain, vomiting, or shortness of breath.

## 2022-12-21 NOTE — PROGRESS NOTE ADULT - SUBJECTIVE AND OBJECTIVE BOX
Patient is a 62y old  Male who presents with a chief complaint of SOB, likely CHF exacerbation (21 Dec 2022 09:55)    says that he feels okay. No pain in back. No CP or SOB. No cough. No fever, sweats, chills, No HA or dizziness. No N/V/D. No bleeding    Medication:   acetaminophen     Tablet .. 650 milliGRAM(s) Oral every 6 hours PRN  chlorhexidine 2% Cloths 1 Application(s) Topical daily  guaiFENesin Oral Liquid (Sugar-Free) 100 milliGRAM(s) Oral every 6 hours PRN  melatonin 3 milliGRAM(s) Oral at bedtime PRN  metoprolol tartrate 50 milliGRAM(s) Oral two times a day  ondansetron Injectable 4 milliGRAM(s) IV Push every 8 hours PRN  sacubitril 24 mG/valsartan 26 mG 1 Tablet(s) Oral two times a day      Physical exam    T(C): 36.6 (12-21-22 @ 00:20), Max: 36.8 (12-20-22 @ 20:27)  HR: 93 (12-21-22 @ 00:20) (92 - 94)  BP: 121/81 (12-21-22 @ 00:20) (118/83 - 126/87)  RR: 18 (12-21-22 @ 00:20) (18 - 18)  SpO2: 100% (12-21-22 @ 00:20) (98% - 100%)  Wt(kg): --    alert NAD  EOMI anicteric sclera  Cv s1 S2 RRR  Lungs clear B/L  abd soft NT ND +BS  No LE edema or tenderness    Labs                        12.0   11.52 )-----------( 333      ( 21 Dec 2022 07:15 )             39.7       12-21    136  |  104  |  38<H>  ----------------------------<  83  4.7   |  20<L>  |  1.92<H>    Ca    8.9      21 Dec 2022 07:07  Phos  3.0     12-21  Mg     2.0     12-21    TPro  6.6  /  Alb  x   /  TBili  x   /  DBili  x   /  AST  x   /  ALT  x   /  AlkPhos  x   12-21      LIVER FUNCTIONS - ( 21 Dec 2022 07:22 )  Alb: x     / Pro: 6.6 g/dL / ALK PHOS: x     / ALT: x     / AST: x     / GGT: x             [id number]

## 2022-12-21 NOTE — PROGRESS NOTE ADULT - ASSESSMENT
62M w/ pmhx of HTN, OA, ?RA, chronic idiopathic SOB/dyspnea on intermittent home O2 found to have right kidney mass s/p RAL R partial nephrectomy   DON on top of CKD stage 3a   New LV dysfunction  Hx of MGUS as well per Onc notation     1 CVS-   RHC last week did not show volume overloaded state   The Entresto started on the 16th  2Renal- Creatinine is still elevated ; no obstruction noted and the CT was done without contrast   Check UA;  Check urine for bence alfaro given hx of MGUS as well   Loop diuretics on hold  Start NS at 50cc/hr for 10 hours   3 GI-Start Ensure   4 Pulm-   CXR doen yesterday was clear        Sayed [facility]   [facility]   [phone]

## 2022-12-21 NOTE — DISCHARGE NOTE PROVIDER - NSDCMRMEDTOKEN_GEN_ALL_CORE_FT
Advair Diskus 250 mcg-50 mcg inhalation powder: 1 puff(s) inhaled 2 times a day  Albuterol (Eqv-ProAir HFA) 90 mcg/inh inhalation aerosol: 2 puff(s) inhaled 3 times a day  furosemide 20 mg oral tablet: 1 tab(s) orally every other day  metoprolol tartrate 25 mg oral tablet: 1 tab(s) orally 2 times a day  potassium chloride 20 mEq oral tablet, extended release: 1 tab(s) orally every other day  spironolactone 25 mg oral tablet: 1 tab(s) orally once a day  traMADol 50 mg oral tablet: 1 tab(s) orally 3 times a day, As Needed   acetaminophen 325 mg oral tablet: 2 tab(s) orally every 6 hours, As needed, Temp greater or equal to 38C (100.4F), Mild Pain (1 - 3)  Advair Diskus 250 mcg-50 mcg inhalation powder: 1 puff(s) inhaled 2 times a day  Albuterol (Eqv-ProAir HFA) 90 mcg/inh inhalation aerosol: 2 puff(s) inhaled 3 times a day, As Needed  fluticasone 50 mcg/inh nasal spray: 1 spray(s) nasal 2 times a day  furosemide 20 mg oral tablet: 1 tab(s) orally every other day  metoprolol tartrate 50 mg oral tablet: 1 tab(s) orally 2 times a day  potassium chloride 20 mEq oral tablet, extended release: 1 tab(s) orally every other day  sacubitril-valsartan 24 mg-26 mg oral tablet: 1 tab(s) orally 2 times a day  spironolactone 25 mg oral tablet: 1 tab(s) orally once a day   acetaminophen 325 mg oral tablet: 2 tab(s) orally every 6 hours, As needed, Temp greater or equal to 38C (100.4F), Mild Pain (1 - 3)  Advair Diskus 250 mcg-50 mcg inhalation powder: 1 puff(s) inhaled 2 times a day  Albuterol (Eqv-ProAir HFA) 90 mcg/inh inhalation aerosol: 2 puff(s) inhaled 3 times a day, As Needed  fluticasone 50 mcg/inh nasal spray: 1 spray(s) nasal 2 times a day  furosemide 20 mg oral tablet: 1 tab(s) orally every other day  metoprolol tartrate 50 mg oral tablet: 1 tab(s) orally 2 times a day  sacubitril-valsartan 24 mg-26 mg oral tablet: 1 tab(s) orally 2 times a day  spironolactone 25 mg oral tablet: 1 tab(s) orally once a day

## 2022-12-21 NOTE — DISCHARGE NOTE PROVIDER - PROVIDER TOKENS
PROVIDER:[TOKEN:[[id number]]] PROVIDER:[TOKEN:[[id number]]],PROVIDER:[TOKEN:[[id number]]] PROVIDER:[TOKEN:[[id number]]],PROVIDER:[TOKEN:[[id number]]],PROVIDER:[TOKEN:[[id number]]]

## 2022-12-21 NOTE — DISCHARGE NOTE PROVIDER - CARE PROVIDER_API CALL
Jose Daniel Carver J  CARDIOVASCULAR DISEASE  [address] [place] [address]  Phone: [phone]  Fax: [phone]  Follow Up Time:    Jose Daniel Carver J  CARDIOVASCULAR DISEASE  [address] [place] [address]  Phone: [phone]  Fax: [phone]  Follow Up Time:     Miller Dominguez)  Internal Medicine; Nephrology  [address] [facility], [address]  [place] [address]  Phone: [phone]  Fax: [phone]  Follow Up Time:    Jose Daniel Carver J  CARDIOVASCULAR DISEASE  [address] [place] [address]  Phone: [phone]  Fax: [phone]  Follow Up Time:     Miller Dominguez (MD)  Internal Medicine; Nephrology  [address] [facility] [address]  [place] [address]  Phone: [phone]  Fax: [phone]  Follow Up Time:     Toni Schmidt  HEMATOLOGY  [address] [place] [address], [address]  [place] [address]  Phone: [phone]  Fax: [phone]  Follow Up Time:

## 2022-12-22 LAB
DSDNA AB SER QL CLIF: NEGATIVE — SIGNIFICANT CHANGE UP
PROT SERPL-MCNC: 6.5 G/DL — SIGNIFICANT CHANGE UP (ref 6–8.3)
PROT SERPL-MCNC: 6.5 G/DL — SIGNIFICANT CHANGE UP (ref 6–8.3)

## 2022-12-22 RX ORDER — FLUTICASONE PROPIONATE 50 MCG
1 SPRAY, SUSPENSION NASAL
Refills: 0 | Status: DISCONTINUED | OUTPATIENT
Start: 2022-12-22 | End: 2022-12-27

## 2022-12-22 RX ADMIN — Medication 50 MILLIGRAM(S): at 05:56

## 2022-12-22 RX ADMIN — SACUBITRIL AND VALSARTAN 1 TABLET(S): 24; 26 TABLET, FILM COATED ORAL at 05:56

## 2022-12-22 RX ADMIN — Medication 100 MILLIGRAM(S): at 21:40

## 2022-12-22 RX ADMIN — Medication 100 MILLIGRAM(S): at 05:57

## 2022-12-22 RX ADMIN — SACUBITRIL AND VALSARTAN 1 TABLET(S): 24; 26 TABLET, FILM COATED ORAL at 17:14

## 2022-12-22 RX ADMIN — Medication 50 MILLIGRAM(S): at 17:15

## 2022-12-22 RX ADMIN — Medication 1 SPRAY(S): at 23:10

## 2022-12-22 RX ADMIN — CHLORHEXIDINE GLUCONATE 1 APPLICATION(S): 213 SOLUTION TOPICAL at 17:15

## 2022-12-22 NOTE — PROGRESS NOTE ADULT - ASSESSMENT
62M w/ pmhx of HTN, OA, ?RA, chronic idiopathic SOB/dyspnea on intermittent home O2 found to have right kidney mass s/p RAL R partial nephrectomy   DON on top of CKD stage 3a   New LV dysfunction  Hx of MGUS as well per Onc notation     1 CVS-   RHC last week did not show volume overloaded state   The Entresto started on the 16th  2Renal- No obstruction noted and the CT was done without contrast   Check UA;  Check urine for bence alfaro given hx of MGUS as well (re ordered for this am)   Loop diuretics on hold  SP fluids  3 GI-Start Ensure   4 Pulm-   CXR   clear      If creatinine has improved then dc planning with close outpt follow up       Sayed [facility]   [phone]

## 2022-12-22 NOTE — DIETITIAN INITIAL EVALUATION ADULT - ORAL INTAKE PTA/DIET HISTORY
cereal, milk, eggs, toast for breakfast, skips lunch, chicken or whatever else his wife prepares for dinner. snacks on fruit and nuts. strawberry flavored nutrition shakes and fruit smoothies.

## 2022-12-22 NOTE — PROGRESS NOTE ADULT - ASSESSMENT
62M w/ pmhx of HTN, OA, ?RA, chronic idiopathic SOB/dyspnea on intermittent home O2 found to have right kidney mass. 4/29 s/p RAL R partial nephrectomy p/w acute hypoxic respiratory failure likely with a component of pulmonary edema    Acute on chronic respiratory failure with hypoxia.   - Pulmonary edema and HF likely contributing given elevated BNP and edema resolved.   - hold Lasix   - oxygen  - Continuous pulse oximetry for now  - TTE noted with severe LV dysfunction   - Cardiology follow  - Pulmonary follow    Pulmonary Hypertension  - Pulmonary follow  - s/p RHC   - Rheum eval noted    Pulmonary edema.   - Note some likely pulmonary edema on CTA. Also with signs of R sided HF/ pulmonary HTN. Suspect some relation to whatever chronic ongoing process has been causing pt's idiopathic dyspnea  - hold lasix   - Vital signs and continuous pulse oximetry as above  - Strict I/Os and daily weight    DON (acute kidney injury).   - Crt 1.5 up from prior in record.  - Trend BMP  - Renal dose medications  - Holding spironolactone and Lasix for now.  - Nephrology follow Dr Dominguez    Rheumatoid arthritis.   - stable   - markers negative.    Renal Cancer  - s/p R nephrectomy  - Bone scan noted  - Immunoglobulins noted  - Bence Pfeiffer protein in urine pending   - UPEP   - Oncology follow    Essential hypertension.   - Trend BP  - Cont. lopressor BID with hold parameters.    LUIS FERNANDO high titer  - DS DNA negative  - Rheum follow    DVT prophylaxis.   - Hep subq.    DCP home.     d/w patient QA    Gerardo Marte MD phone [phone]

## 2022-12-22 NOTE — PROGRESS NOTE ADULT - ASSESSMENT
1) Heme- Pt Kidney disease, history of MGUS. DELVIS pending.   -May consider out-pt bone marrow biopsy.  - Mild anemia likely an AOCD process due to kidney disease.     2) - Weight loss of unclear etiology. History of RCC resected. CT scans and bone scan without any evidence of malignancy.   Reportedly normal colonoscopy earlier this year.      He was given business card , By Dr. Morrison for outpt follow up appointment.

## 2022-12-22 NOTE — PROGRESS NOTE ADULT - SUBJECTIVE AND OBJECTIVE BOX
Cardiovascular Disease Progress Note    Overnight events: No acute events overnight.  no cp/sob/palps  Otherwise review of systems negative    Objective Findings:  T(C): 37.1 (12-22-22 @ 05:55), Max: 37.1 (12-22-22 @ 05:55)  HR: 99 (12-22-22 @ 05:55) (91 - 99)  BP: 136/78 (12-22-22 @ 05:55) (114/81 - 138/89)  RR: 18 (12-22-22 @ 05:55) (18 - 18)  SpO2: 95% (12-22-22 @ 05:55) (95% - 98%)  Wt(kg): --  Daily     Daily       Physical Exam:  Gen: NAD  HEENT: EOMI  CV: RRR, normal S1 + S2, no m/r/g  Lungs: CTAB  Abd: soft, non-tender  Ext: No edema    Telemetry:    Laboratory Data:                        12.0   11.52 )-----------( 333      ( 21 Dec 2022 07:15 )             39.7     12-21    136  |  104  |  38<H>  ----------------------------<  83  4.7   |  20<L>  |  1.92<H>    Ca    8.9      21 Dec 2022 07:07  Phos  3.0     12-21  Mg     2.0     12-21    TPro  6.6  /  Alb  x   /  TBili  x   /  DBili  x   /  AST  x   /  ALT  x   /  AlkPhos  x   12-21              Inpatient Medications:  MEDICATIONS  (STANDING):  chlorhexidine 2% Cloths 1 Application(s) Topical daily  metoprolol tartrate 50 milliGRAM(s) Oral two times a day  sacubitril 24 mG/valsartan 26 mG 1 Tablet(s) Oral two times a day  sodium chloride 0.9%. 1000 milliLiter(s) (50 mL/Hr) IV Continuous <Continuous>      Assessment:  62M w/ pmhx of HTN, OA, ?RA, chronic idiopathic SOB/dyspnea on intermittent home O2 found to have right kidney mass. 4/29 s/p RAL R partial nephrectomy p/w acute hypoxic respiratory failure    Recs:  cardiac stable  diuretics per renal  cw gdmt for lv dysfunction - lopressor 50mg bid and entresto 24/26mg bid  lhc 2022 at [facility], normal cors  TTE --> mod clvh, severe lv dysfunction, rve with dec rvsf, mod phtn  cardiac MRI results noted. cw medical therapy as above  s/p rhc, mild precap phtn, normal output and filling pressures  appreciate pulmonary recommendations  tele monitoring  reportedly previous lymph node bx neg for sarcoid, ? role for cardiac PET per rheum      Over 25 minutes spent on total encounter; more than 50% of the visit was spent counseling and/or coordinating care by the attending physician.      Juarez Carver MD   Cardiovascular Disease  [phone]

## 2022-12-22 NOTE — DIETITIAN INITIAL EVALUATION ADULT - PERTINENT MEDS FT
MEDICATIONS  (STANDING):  chlorhexidine 2% Cloths 1 Application(s) Topical daily  metoprolol tartrate 50 milliGRAM(s) Oral two times a day  sacubitril 24 mG/valsartan 26 mG 1 Tablet(s) Oral two times a day  sodium chloride 0.9%. 1000 milliLiter(s) (50 mL/Hr) IV Continuous <Continuous>    MEDICATIONS  (PRN):  acetaminophen     Tablet .. 650 milliGRAM(s) Oral every 6 hours PRN Temp greater or equal to 38C (100.4F), Mild Pain (1 - 3)  guaiFENesin Oral Liquid (Sugar-Free) 100 milliGRAM(s) Oral every 6 hours PRN Cough  melatonin 3 milliGRAM(s) Oral at bedtime PRN Insomnia  ondansetron Injectable 4 milliGRAM(s) IV Push every 8 hours PRN Nausea and/or Vomiting

## 2022-12-22 NOTE — DIETITIAN INITIAL EVALUATION ADULT - PERTINENT LABORATORY DATA
12-21    136  |  104  |  38<H>  ----------------------------<  83  4.7   |  20<L>  |  1.92<H>    Ca    8.9      21 Dec 2022 07:07  Phos  3.0     12-21  Mg     2.0     12-21    TPro  6.6  /  Alb  x   /  TBili  x   /  DBili  x   /  AST  x   /  ALT  x   /  AlkPhos  x   12-21

## 2022-12-22 NOTE — DIETITIAN INITIAL EVALUATION ADULT - NS [facility] DIET ORDER
Diet, Regular:   Low Sodium  Supplement Feeding Modality:  Oral  Ensure Enlive Cans or Servings Per Day:  1       Frequency:  Three Times a day (12-19-22 @ 10:50) [Active]

## 2022-12-22 NOTE — PROGRESS NOTE ADULT - SUBJECTIVE AND OBJECTIVE BOX
NEPHROLOGY-[facility] [phone]        Patient seen and examined in bed.  He was the same         MEDICATIONS  (STANDING):  chlorhexidine 2% Cloths 1 Application(s) Topical daily  metoprolol tartrate 50 milliGRAM(s) Oral two times a day  sacubitril 24 mG/valsartan 26 mG 1 Tablet(s) Oral two times a day  sodium chloride 0.9%. 1000 milliLiter(s) (50 mL/Hr) IV Continuous <Continuous>      VITAL:  T(C): , Max: 37.1 (12-22-22 @ 05:55)  T(F): , Max: 98.7 (12-22-22 @ 05:55)  HR: 99 (12-22-22 @ 05:55)  BP: 136/78 (12-22-22 @ 05:55)  BP(mean): --  RR: 18 (12-22-22 @ 05:55)  SpO2: 95% (12-22-22 @ 05:55)  Wt(kg): --    I and O's:        PHYSICAL EXAM:    Constitutional: NAD  Neck:  No JVD  Respiratory: CTAB/L  Cardiovascular: S1 and S2  Gastrointestinal: BS+, soft, NT/ND  Extremities: No peripheral edema  Neurological: A/O x 3, no focal deficits  Psychiatric: Normal mood, normal affect  : No Humphreys  Skin: No rashes  Access: Not applicable    LABS:                        12.0   11.52 )-----------( 333      ( 21 Dec 2022 07:15 )             39.7     12-21    136  |  104  |  38<H>  ----------------------------<  83  4.7   |  20<L>  |  1.92<H>    Ca    8.9      21 Dec 2022 07:07  Phos  3.0     12-21  Mg     2.0     12-21    TPro  6.6  /  Alb  x   /  TBili  x   /  DBili  x   /  AST  x   /  ALT  x   /  AlkPhos  x   12-21          Urine Studies:          RADIOLOGY & ADDITIONAL STUDIES:

## 2022-12-22 NOTE — PROGRESS NOTE ADULT - SUBJECTIVE AND OBJECTIVE BOX
Patient is a 62y old  Male who presents with a chief complaint of SOB, likely CHF exacerbation (22 Dec 2022 11:45)      SUBJECTIVE / OVERNIGHT EVENTS: No new complaints.   Review of Systems  chest pain no  palpitations no  sob no  nausea no  headache no    MEDICATIONS  (STANDING):  chlorhexidine 2% Cloths 1 Application(s) Topical daily  metoprolol tartrate 50 milliGRAM(s) Oral two times a day  sacubitril 24 mG/valsartan 26 mG 1 Tablet(s) Oral two times a day  sodium chloride 0.9%. 1000 milliLiter(s) (50 mL/Hr) IV Continuous <Continuous>    MEDICATIONS  (PRN):  acetaminophen     Tablet .. 650 milliGRAM(s) Oral every 6 hours PRN Temp greater or equal to 38C (100.4F), Mild Pain (1 - 3)  guaiFENesin Oral Liquid (Sugar-Free) 100 milliGRAM(s) Oral every 6 hours PRN Cough  melatonin 3 milliGRAM(s) Oral at bedtime PRN Insomnia  ondansetron Injectable 4 milliGRAM(s) IV Push every 8 hours PRN Nausea and/or Vomiting      Vital Signs Last 24 Hrs  T(C): 36.5 (22 Dec 2022 12:47), Max: 37.1 (22 Dec 2022 05:55)  T(F): 97.7 (22 Dec 2022 12:47), Max: 98.7 (22 Dec 2022 05:55)  HR: 90 (22 Dec 2022 16:57) (90 - 99)  BP: 129/84 (22 Dec 2022 16:57) (110/76 - 136/78)  BP(mean): --  RR: 18 (22 Dec 2022 12:47) (18 - 18)  SpO2: 96% (22 Dec 2022 12:47) (95% - 98%)    Parameters below as of 22 Dec 2022 12:47  Patient On (Oxygen Delivery Method): nasal cannula  O2 Flow (L/min): 2      PHYSICAL EXAM:  GENERAL: NAD, weak  HEAD:  Atraumatic, Normocephalic  EYES: EOMI, PERRLA, conjunctiva and sclera clear  NECK: Supple, No JVD  CHEST/LUNG: Clear to auscultation bilaterally; No wheeze  HEART: Regular rate and rhythm; No murmurs, rubs, or gallops  ABDOMEN: Soft, Nontender, Nondistended; Bowel sounds present  EXTREMITIES:  2+ Peripheral Pulses, No clubbing, cyanosis, or edema  PSYCH: AAOx3  NEUROLOGY: non-focal  SKIN: No rashes or lesions    LABS:                        12.0   11.52 )-----------( 333      ( 21 Dec 2022 07:15 )             39.7     12-21    136  |  104  |  38<H>  ----------------------------<  83  4.7   |  20<L>  |  1.92<H>    Ca    8.9      21 Dec 2022 07:07  Phos  3.0     12-21  Mg     2.0     12-21    TPro  6.5  /  Alb  x   /  TBili  x   /  DBili  x   /  AST  x   /  ALT  x   /  AlkPhos  x   12-22                RADIOLOGY & ADDITIONAL TESTS:    Imaging Personally Reviewed:    Consultant(s) Notes Reviewed:      Care Discussed with Consultants/Other Providers:

## 2022-12-22 NOTE — PROGRESS NOTE ADULT - SUBJECTIVE AND OBJECTIVE BOX
PLACIDO GOLDMAN  MRN-[id number]          Review of System    Pt reports feeling ok overnight.  reports having trouble sleeping  denies pain.  no f/c/s  no cp or palp.  no cough.  breathing comfortably on o2 nc  no n/v/d  no dysuria or hematuria.     Current Meds  MEDICATIONS  (STANDING):  chlorhexidine 2% Cloths 1 Application(s) Topical daily  metoprolol tartrate 50 milliGRAM(s) Oral two times a day  sacubitril 24 mG/valsartan 26 mG 1 Tablet(s) Oral two times a day  sodium chloride 0.9%. 1000 milliLiter(s) (50 mL/Hr) IV Continuous <Continuous>    MEDICATIONS  (PRN):  acetaminophen     Tablet .. 650 milliGRAM(s) Oral every 6 hours PRN Temp greater or equal to 38C (100.4F), Mild Pain (1 - 3)  guaiFENesin Oral Liquid (Sugar-Free) 100 milliGRAM(s) Oral every 6 hours PRN Cough  melatonin 3 milliGRAM(s) Oral at bedtime PRN Insomnia  ondansetron Injectable 4 milliGRAM(s) IV Push every 8 hours PRN Nausea and/or Vomiting    Vital Signs Last 24 Hrs  T(C): 37.1 (22 Dec 2022 05:55), Max: 37.1 (22 Dec 2022 05:55)  T(F): 98.7 (22 Dec 2022 05:55), Max: 98.7 (22 Dec 2022 05:55)  HR: 99 (22 Dec 2022 05:55) (91 - 99)  BP: 136/78 (22 Dec 2022 05:55) (114/81 - 138/89)  BP(mean): --  RR: 18 (22 Dec 2022 05:55) (18 - 18)  SpO2: 95% (22 Dec 2022 05:55) (95% - 98%)    Parameters below as of 22 Dec 2022 05:55  Patient On (Oxygen Delivery Method): nasal cannula  O2 Flow (L/min): 2    PHYSICAL EXAM:  Constitutional: NAD    Eyes: PERRLA EOMI, anicteric sclera    Heent :No oral sores, no pharyngeal injection. moist mucosa.    Neck: supple, no jvd, no LAD    Respiratory: CTA b/l     Cardiovascular: s1s2    Gastrointestinal: soft, nt, nd, + BS    Neurological:A&O x 3 moves all ext.    Lymph Nodes: no lymphadenopathy.    Lab  CBC Full  -  ( 21 Dec 2022 07:15 )  WBC Count : 11.52 K/uL  RBC Count : 4.15 M/uL  Hemoglobin : 12.0 g/dL  Hematocrit : 39.7 %  Platelet Count - Automated : 333 K/uL  Mean Cell Volume : 95.7 fl  Mean Cell Hemoglobin : 28.9 pg  Mean Cell Hemoglobin Concentration : 30.2 gm/dL  Auto Neutrophil # : x  Auto Lymphocyte # : x  Auto Monocyte # : x  Auto Eosinophil # : x  Auto Basophil # : x  Auto Neutrophil % : x  Auto Lymphocyte % : x  Auto Monocyte % : x  Auto Eosinophil % : x  Auto Basophil % : x    12-21    136  |  104  |  38<H>  ----------------------------<  83  4.7   |  20<L>  |  1.92<H>    Ca    8.9      21 Dec 2022 07:07  Phos  3.0     12-21  Mg     2.0     12-21    TPro  6.6  /  Alb  x   /  TBili  x   /  DBili  x   /  AST  x   /  ALT  x   /  AlkPhos  x   12-21        Rad:    Assessment/Plan

## 2022-12-23 LAB — RNAP III AB SER-ACNC: 5 UNITS — SIGNIFICANT CHANGE UP (ref 0–19)

## 2022-12-23 PROCEDURE — 99232 SBSQ HOSP IP/OBS MODERATE 35: CPT

## 2022-12-23 RX ORDER — HEPARIN SODIUM 5000 [USP'U]/ML
5000 INJECTION INTRAVENOUS; SUBCUTANEOUS EVERY 8 HOURS
Refills: 0 | Status: DISCONTINUED | OUTPATIENT
Start: 2022-12-23 | End: 2022-12-27

## 2022-12-23 RX ADMIN — HEPARIN SODIUM 5000 UNIT(S): 5000 INJECTION INTRAVENOUS; SUBCUTANEOUS at 22:27

## 2022-12-23 RX ADMIN — Medication 50 MILLIGRAM(S): at 17:29

## 2022-12-23 RX ADMIN — Medication 650 MILLIGRAM(S): at 12:24

## 2022-12-23 RX ADMIN — Medication 50 MILLIGRAM(S): at 05:26

## 2022-12-23 RX ADMIN — Medication 1 SPRAY(S): at 17:30

## 2022-12-23 RX ADMIN — CHLORHEXIDINE GLUCONATE 1 APPLICATION(S): 213 SOLUTION TOPICAL at 11:23

## 2022-12-23 RX ADMIN — Medication 650 MILLIGRAM(S): at 11:21

## 2022-12-23 RX ADMIN — SACUBITRIL AND VALSARTAN 1 TABLET(S): 24; 26 TABLET, FILM COATED ORAL at 05:26

## 2022-12-23 RX ADMIN — SACUBITRIL AND VALSARTAN 1 TABLET(S): 24; 26 TABLET, FILM COATED ORAL at 17:29

## 2022-12-23 RX ADMIN — Medication 1 SPRAY(S): at 05:26

## 2022-12-23 NOTE — PROGRESS NOTE ADULT - SUBJECTIVE AND OBJECTIVE BOX
NEPHROLOGY[facility] [phone]        Patient seen and examined in bed.  He was the same         MEDICATIONS  (STANDING):  chlorhexidine 2% Cloths 1 Application(s) Topical daily  fluticasone propionate 50 MICROgram(s)/spray Nasal Spray 1 Spray(s) Both Nostrils two times a day  metoprolol tartrate 50 milliGRAM(s) Oral two times a day  sacubitril 24 mG/valsartan 26 mG 1 Tablet(s) Oral two times a day  sodium chloride 0.9%. 1000 milliLiter(s) (50 mL/Hr) IV Continuous <Continuous>      VITAL:  T(C): , Max: 36.9 (12-22-22 @ 20:53)  T(F): , Max: 98.4 (12-22-22 @ 20:53)  HR: 93 (12-23-22 @ 11:17)  BP: 117/74 (12-23-22 @ 11:17)  BP(mean): --  RR: 18 (12-23-22 @ 11:17)  SpO2: 99% (12-23-22 @ 11:17)  Wt(kg): --    I and O's:        PHYSICAL EXAM:    Constitutional: NAD  Neck:  No JVD  Respiratory: CTAB/L  Cardiovascular: S1 and S2  Gastrointestinal: BS+, soft, NT/ND  Extremities: No peripheral edema  Neurological: A/O x 3, no focal deficits  Psychiatric: Normal mood, normal affect  : No Humphreys  Skin: No rashes  Access: Not applicable    LABS:        TPro  6.5  /  Alb  x   /  TBili  x   /  DBili  x   /  AST  x   /  ALT  x   /  AlkPhos  x   12-22          Urine Studies:          RADIOLOGY & ADDITIONAL STUDIES:

## 2022-12-23 NOTE — PROGRESS NOTE ADULT - ASSESSMENT
62M w/ pmhx of HTN, OA, ?RA, chronic idiopathic SOB/dyspnea on intermittent home O2 found to have right kidney mass s/p RAL R partial nephrectomy   DON on top of CKD stage 3a   New LV dysfunction  Hx of MGUS as well per Onc notation     1 CVS-   RHC last week did not show volume overloaded state   The Entresto started on the 16th  2Renal- No obstruction noted and the CT was done without contrast   Labs pending ;  Check urine for bence alfaro (immunofixation is testing)     Loop diuretics on hold  SP fluids  3 GI-Start Ensure   4 Pulm-   CXR   clear  but remains on oxygen       Sayed [facility]   [phone]

## 2022-12-23 NOTE — PROGRESS NOTE ADULT - ASSESSMENT
62M w/ pmhx of HTN, OA, ?RA, chronic idiopathic SOB/dyspnea on intermittent home O2 found to have right kidney mass. 4/29 s/p RAL R partial nephrectomy p/w acute hypoxic respiratory failure likely with a component of pulmonary edema    Acute on chronic respiratory failure with hypoxia.   - Pulmonary edema and HF likely contributing given elevated BNP and edema resolved.   - hold Lasix   - oxygen  - Continuous pulse oximetry for now  - TTE noted with severe LV dysfunction   - Cardiology follow  - Pulmonary follow    Pulmonary Hypertension  - Pulmonary follow  - s/p RHC   - Rheum eval noted    Pulmonary edema.   - Note some likely pulmonary edema on CTA. Also with signs of R sided HF/ pulmonary HTN. Suspect some relation to whatever chronic ongoing process has been causing pt's idiopathic dyspnea  - Vital signs and continuous pulse oximetry as above  - Strict I/Os and daily weight    DON (acute kidney injury).   - Crt 1.5 up from prior in record.  - Trend BMP  - Renal dose medications  - Holding spironolactone and Lasix for now.  - Nephrology follow Dr Dominguez    Rheumatoid arthritis.   - stable   - markers negative.    Renal Cancer  - s/p R nephrectomy  - Bone scan noted  - Immunoglobulins noted  - Bence Pfeiffer protein in urine pending   - UPEP   - Oncology follow    Essential hypertension.   - Trend BP  - Cont. lopressor BID with hold parameters.    LUIS FERNANDO high titer  - DS DNA negative  - Rheum follow    DVT prophylaxis.   - Hep subq.    DCP home.     d/w patient and wife QA    Gerardo Marte MD phone [phone]

## 2022-12-23 NOTE — PROGRESS NOTE ADULT - SUBJECTIVE AND OBJECTIVE BOX
Patient is a 62y old  Male who presents with a chief complaint of SOB, likely CHF exacerbation (23 Dec 2022 15:23)      SUBJECTIVE / OVERNIGHT EVENTS: still on supplemental O2.   Review of Systems  chest pain no  palpitations no  sob improving   nausea no  headache no    MEDICATIONS  (STANDING):  chlorhexidine 2% Cloths 1 Application(s) Topical daily  fluticasone propionate 50 MICROgram(s)/spray Nasal Spray 1 Spray(s) Both Nostrils two times a day  heparin   Injectable 5000 Unit(s) SubCutaneous every 8 hours  metoprolol tartrate 50 milliGRAM(s) Oral two times a day  sacubitril 24 mG/valsartan 26 mG 1 Tablet(s) Oral two times a day  sodium chloride 0.9%. 1000 milliLiter(s) (50 mL/Hr) IV Continuous <Continuous>    MEDICATIONS  (PRN):  acetaminophen     Tablet .. 650 milliGRAM(s) Oral every 6 hours PRN Temp greater or equal to 38C (100.4F), Mild Pain (1 - 3)  guaiFENesin Oral Liquid (Sugar-Free) 100 milliGRAM(s) Oral every 6 hours PRN Cough  melatonin 3 milliGRAM(s) Oral at bedtime PRN Insomnia  ondansetron Injectable 4 milliGRAM(s) IV Push every 8 hours PRN Nausea and/or Vomiting      Vital Signs Last 24 Hrs  T(C): 36.3 (23 Dec 2022 11:17), Max: 36.4 (23 Dec 2022 04:28)  T(F): 97.3 (23 Dec 2022 11:17), Max: 97.6 (23 Dec 2022 04:28)  HR: 91 (23 Dec 2022 17:25) (91 - 93)  BP: 123/85 (23 Dec 2022 17:25) (111/74 - 123/85)  BP(mean): --  RR: 19 (23 Dec 2022 17:25) (18 - 19)  SpO2: 97% (23 Dec 2022 17:25) (97% - 99%)    Parameters below as of 23 Dec 2022 17:25  Patient On (Oxygen Delivery Method): nasal cannula  O2 Flow (L/min): 2      PHYSICAL EXAM:  GENERAL: NAD  HEAD:  Atraumatic, Normocephalic  EYES: EOMI, PERRLA, conjunctiva and sclera clear  NECK: Supple, No JVD  CHEST/LUNG: Clear to auscultation bilaterally; No wheeze  HEART: Regular rate and rhythm; No murmurs, rubs, or gallops  ABDOMEN: Soft, Nontender, Nondistended; Bowel sounds present  EXTREMITIES:  2+ Peripheral Pulses, No clubbing, cyanosis, or edema  PSYCH: AAOx3  NEUROLOGY: non-focal  SKIN: No rashes or lesions    LABS:        TPro  6.5  /  Alb  x   /  TBili  x   /  DBili  x   /  AST  x   /  ALT  x   /  AlkPhos  x   12-22                RADIOLOGY & ADDITIONAL TESTS:    Imaging Personally Reviewed:    Consultant(s) Notes Reviewed:      Care Discussed with Consultants/Other Providers:

## 2022-12-23 NOTE — PROGRESS NOTE ADULT - SUBJECTIVE AND OBJECTIVE BOX
Cardiovascular Disease Progress Note    Overnight events: No acute events overnight.  no new cardiac sx  Otherwise review of systems negative    Objective Findings:  T(C): 36.4 (12-23-22 @ 04:28), Max: 36.9 (12-22-22 @ 20:53)  HR: 93 (12-23-22 @ 04:28) (90 - 98)  BP: 111/74 (12-23-22 @ 04:28) (110/76 - 129/84)  RR: 18 (12-23-22 @ 04:28) (18 - 18)  SpO2: 99% (12-23-22 @ 04:28) (96% - 99%)  Wt(kg): --  Daily     Daily       Physical Exam:  Gen: NAD  HEENT: EOMI  CV: RRR, normal S1 + S2, no m/r/g  Lungs: CTAB  Abd: soft, non-tender  Ext: No edema    Telemetry:    Laboratory Data:                        12.0   11.52 )-----------( 333      ( 21 Dec 2022 07:15 )             39.7     12-21    136  |  104  |  38<H>  ----------------------------<  83  4.7   |  20<L>  |  1.92<H>    Ca    8.9      21 Dec 2022 07:07  Phos  3.0     12-21  Mg     2.0     12-21    TPro  6.5  /  Alb  x   /  TBili  x   /  DBili  x   /  AST  x   /  ALT  x   /  AlkPhos  x   12-22              Inpatient Medications:  MEDICATIONS  (STANDING):  chlorhexidine 2% Cloths 1 Application(s) Topical daily  fluticasone propionate 50 MICROgram(s)/spray Nasal Spray 1 Spray(s) Both Nostrils two times a day  metoprolol tartrate 50 milliGRAM(s) Oral two times a day  sacubitril 24 mG/valsartan 26 mG 1 Tablet(s) Oral two times a day  sodium chloride 0.9%. 1000 milliLiter(s) (50 mL/Hr) IV Continuous <Continuous>      Assessment:  62M w/ pmhx of HTN, OA, ?RA, chronic idiopathic SOB/dyspnea on intermittent home O2 found to have right kidney mass. 4/29 s/p RAL R partial nephrectomy p/w acute hypoxic respiratory failure    Recs:  cardiac stable  diuretics per renal  suggest obtaining bmp  cw gdmt for lv dysfunction - lopressor 50mg bid and entresto 24/26mg bid  lhc 2022 at [facility], normal cors  TTE --> mod clvh, severe lv dysfunction, rve with dec rvsf, mod phtn  cardiac MRI results noted. cw medical therapy as above  s/p rhc, mild precap phtn, normal output and filling pressures  appreciate pulmonary recommendations  tele monitoring  reportedly previous lymph node bx neg for sarcoid, ? role for cardiac PET per rheum      Over 25 minutes spent on total encounter; more than 50% of the visit was spent counseling and/or coordinating care by the attending physician.      Juarez Carver MD   Cardiovascular Disease  [phone]

## 2022-12-23 NOTE — PROGRESS NOTE ADULT - SUBJECTIVE AND OBJECTIVE BOX
PULMONARY PROGRESS NOTE    PLACIDO GOLDMAN  MRN-[id number]    Patient is a 62y old  Male who presents with a chief complaint of SOB, likely CHF exacerbation (23 Dec 2022 06:48)      HPI:  -on O2 feels ok awaiting test results  -    ROS:   -    MEDICATIONS  (STANDING):  chlorhexidine 2% Cloths 1 Application(s) Topical daily  fluticasone propionate 50 MICROgram(s)/spray Nasal Spray 1 Spray(s) Both Nostrils two times a day  metoprolol tartrate 50 milliGRAM(s) Oral two times a day  sacubitril 24 mG/valsartan 26 mG 1 Tablet(s) Oral two times a day  sodium chloride 0.9%. 1000 milliLiter(s) (50 mL/Hr) IV Continuous <Continuous>    MEDICATIONS  (PRN):  acetaminophen     Tablet .. 650 milliGRAM(s) Oral every 6 hours PRN Temp greater or equal to 38C (100.4F), Mild Pain (1 - 3)  guaiFENesin Oral Liquid (Sugar-Free) 100 milliGRAM(s) Oral every 6 hours PRN Cough  melatonin 3 milliGRAM(s) Oral at bedtime PRN Insomnia  ondansetron Injectable 4 milliGRAM(s) IV Push every 8 hours PRN Nausea and/or Vomiting        EXAM:  Vital Signs Last 24 Hrs  T(C): 36.4 (23 Dec 2022 04:28), Max: 36.9 (22 Dec 2022 20:53)  T(F): 97.6 (23 Dec 2022 04:28), Max: 98.4 (22 Dec 2022 20:53)  HR: 93 (23 Dec 2022 04:28) (90 - 98)  BP: 111/74 (23 Dec 2022 04:28) (110/76 - 129/84)  BP(mean): --  RR: 18 (23 Dec 2022 04:28) (18 - 18)  SpO2: 99% (23 Dec 2022 04:28) (96% - 99%)    Parameters below as of 23 Dec 2022 04:28  Patient On (Oxygen Delivery Method): nasal cannula  O2 Flow (L/min): 2        GENERAL: The patient is awake and alert in no apparent distress.     LUNGS: Clear to auscultation without wheezing, rales or rhonchi; respirations unlabored      LABS/IMAGING: reviewed      TPro  6.5  /  Alb  x   /  TBili  x   /  DBili  x   /  AST  x   /  ALT  x   /  AlkPhos  x   12-22      PROBLEM LIST:  62y Male with HEALTH ISSUES - PROBLEM Dx:  Suspected pulmonary embolism    Suspected deep vein thrombosis (DVT)    Pulmonary edema    Acute on chronic respiratory failure with hypoxia    Essential hypertension    DON (acute kidney injury)    DVT prophylaxis    Rheumatoid arthritis      RECS:  -cont supplemental O2  -fu onc studies  -appreciate cards   -outpt pulm fu      Jocelyne Hill MD   [phone]

## 2022-12-24 LAB
ANION GAP SERPL CALC-SCNC: 12 MMOL/L — SIGNIFICANT CHANGE UP (ref 5–17)
BUN SERPL-MCNC: 41 MG/DL — HIGH (ref 7–23)
CALCIUM SERPL-MCNC: 9.7 MG/DL — SIGNIFICANT CHANGE UP (ref 8.4–10.5)
CHLORIDE SERPL-SCNC: 102 MMOL/L — SIGNIFICANT CHANGE UP (ref 96–108)
CO2 SERPL-SCNC: 22 MMOL/L — SIGNIFICANT CHANGE UP (ref 22–31)
CREAT SERPL-MCNC: 1.8 MG/DL — HIGH (ref 0.5–1.3)
CREATININE, URINE RESULT: 58 MG/DL — SIGNIFICANT CHANGE UP
EGFR: 42 ML/MIN/1.73M2 — LOW
GLUCOSE SERPL-MCNC: 75 MG/DL — SIGNIFICANT CHANGE UP (ref 70–99)
HCT VFR BLD CALC: 39.2 % — SIGNIFICANT CHANGE UP (ref 39–50)
HGB BLD-MCNC: 11.8 G/DL — LOW (ref 13–17)
MCHC RBC-ENTMCNC: 28.8 PG — SIGNIFICANT CHANGE UP (ref 27–34)
MCHC RBC-ENTMCNC: 30.1 GM/DL — LOW (ref 32–36)
MCV RBC AUTO: 95.6 FL — SIGNIFICANT CHANGE UP (ref 80–100)
NRBC # BLD: 0 /100 WBCS — SIGNIFICANT CHANGE UP (ref 0–0)
PLATELET # BLD AUTO: 282 K/UL — SIGNIFICANT CHANGE UP (ref 150–400)
POTASSIUM SERPL-MCNC: 4.4 MMOL/L — SIGNIFICANT CHANGE UP (ref 3.5–5.3)
POTASSIUM SERPL-SCNC: 4.4 MMOL/L — SIGNIFICANT CHANGE UP (ref 3.5–5.3)
PROT ?TM UR-MCNC: 81 MG/DL — HIGH (ref 0–12)
PROT ?TM UR-MCNC: 81 MG/DL — HIGH (ref 0–12)
RBC # BLD: 4.1 M/UL — LOW (ref 4.2–5.8)
RBC # FLD: 15.6 % — HIGH (ref 10.3–14.5)
SODIUM SERPL-SCNC: 136 MMOL/L — SIGNIFICANT CHANGE UP (ref 135–145)
WBC # BLD: 9.83 K/UL — SIGNIFICANT CHANGE UP (ref 3.8–10.5)
WBC # FLD AUTO: 9.83 K/UL — SIGNIFICANT CHANGE UP (ref 3.8–10.5)

## 2022-12-24 RX ORDER — METOPROLOL TARTRATE 50 MG
1 TABLET ORAL
Qty: 0 | Refills: 0 | DISCHARGE

## 2022-12-24 RX ORDER — SACUBITRIL AND VALSARTAN 24; 26 MG/1; MG/1
1 TABLET, FILM COATED ORAL
Qty: 0 | Refills: 0 | DISCHARGE
Start: 2022-12-24

## 2022-12-24 RX ORDER — FLUTICASONE PROPIONATE 50 MCG
1 SPRAY, SUSPENSION NASAL
Qty: 0 | Refills: 0 | DISCHARGE
Start: 2022-12-24

## 2022-12-24 RX ORDER — METOPROLOL TARTRATE 50 MG
1 TABLET ORAL
Qty: 0 | Refills: 0 | DISCHARGE
Start: 2022-12-24

## 2022-12-24 RX ORDER — ALBUTEROL 90 UG/1
2 AEROSOL, METERED ORAL
Qty: 0 | Refills: 0 | DISCHARGE

## 2022-12-24 RX ORDER — METOPROLOL TARTRATE 50 MG
2 TABLET ORAL
Qty: 0 | Refills: 0 | DISCHARGE
Start: 2022-12-24

## 2022-12-24 RX ORDER — TRAMADOL HYDROCHLORIDE 50 MG/1
1 TABLET ORAL
Qty: 0 | Refills: 0 | DISCHARGE

## 2022-12-24 RX ORDER — ACETAMINOPHEN 500 MG
2 TABLET ORAL
Qty: 0 | Refills: 0 | DISCHARGE
Start: 2022-12-24

## 2022-12-24 RX ADMIN — Medication 1 SPRAY(S): at 18:49

## 2022-12-24 RX ADMIN — SACUBITRIL AND VALSARTAN 1 TABLET(S): 24; 26 TABLET, FILM COATED ORAL at 18:48

## 2022-12-24 RX ADMIN — CHLORHEXIDINE GLUCONATE 1 APPLICATION(S): 213 SOLUTION TOPICAL at 12:41

## 2022-12-24 RX ADMIN — Medication 50 MILLIGRAM(S): at 18:48

## 2022-12-24 RX ADMIN — HEPARIN SODIUM 5000 UNIT(S): 5000 INJECTION INTRAVENOUS; SUBCUTANEOUS at 05:40

## 2022-12-24 RX ADMIN — HEPARIN SODIUM 5000 UNIT(S): 5000 INJECTION INTRAVENOUS; SUBCUTANEOUS at 14:41

## 2022-12-24 RX ADMIN — Medication 50 MILLIGRAM(S): at 05:39

## 2022-12-24 RX ADMIN — SACUBITRIL AND VALSARTAN 1 TABLET(S): 24; 26 TABLET, FILM COATED ORAL at 05:39

## 2022-12-24 RX ADMIN — Medication 1 SPRAY(S): at 05:40

## 2022-12-24 RX ADMIN — HEPARIN SODIUM 5000 UNIT(S): 5000 INJECTION INTRAVENOUS; SUBCUTANEOUS at 22:46

## 2022-12-24 NOTE — PROGRESS NOTE ADULT - SUBJECTIVE AND OBJECTIVE BOX
PLACIDO GOLDMAN  MRN-[id number]    Patient is a 62y old  Male who presents with a chief complaint of SOB, likely CHF exacerbation (24 Dec 2022 15:07)      Review of System    Sitting in chair at bedside, talking on cell phone with spouse who then joins myself and pt , via phone for this evenings visit.     General:	Denies fatigue, fevers, chills, sweats, decreased appetite.    Skin/Breast: denies pruritis, rash  	  Ophthalmologic: no change in vision or blurring  	  HEENT	Denies dry mouth, oral sores, dysphagia,  change in hearing.    Respiratory and Thorax:  no cough, sob, wheeze, hemoptysis  	  Cardiovascular:	no cp , palp, orthopnea    Gastrointestinal:	no n/v/d constipation    Genitourinary:	no dysuria of frequency, no hematuria, no flank pain    Musculoskeletal:	no bone or joint pain. no muscle aches.     Neurological:	no change in sensory or motor function. no headache. no weakness.     Psychiatric:	no depression, no anxiety, insomnia.     Hematology/Lymphatics:	no bleeding or bruising      MEDICATIONS  (STANDING):  chlorhexidine 2% Cloths 1 Application(s) Topical daily  fluticasone propionate 50 MICROgram(s)/spray Nasal Spray 1 Spray(s) Both Nostrils two times a day  heparin   Injectable 5000 Unit(s) SubCutaneous every 8 hours  metoprolol tartrate 50 milliGRAM(s) Oral two times a day  sacubitril 24 mG/valsartan 26 mG 1 Tablet(s) Oral two times a day  sodium chloride 0.9%. 1000 milliLiter(s) (50 mL/Hr) IV Continuous <Continuous>    MEDICATIONS  (PRN):  acetaminophen     Tablet .. 650 milliGRAM(s) Oral every 6 hours PRN Temp greater or equal to 38C (100.4F), Mild Pain (1 - 3)  guaiFENesin Oral Liquid (Sugar-Free) 100 milliGRAM(s) Oral every 6 hours PRN Cough  melatonin 3 milliGRAM(s) Oral at bedtime PRN Insomnia  ondansetron Injectable 4 milliGRAM(s) IV Push every 8 hours PRN Nausea and/or Vomiting      Vitals  Vital Signs Last 24 Hrs  T(C): 36.4 (24 Dec 2022 21:20), Max: 36.8 (24 Dec 2022 18:45)  T(F): 97.6 (24 Dec 2022 21:20), Max: 98.3 (24 Dec 2022 18:45)  HR: 95 (24 Dec 2022 21:20) (82 - 99)  BP: 129/80 (24 Dec 2022 21:20) (121/82 - 134/84)  BP(mean): --  RR: 18 (24 Dec 2022 21:20) (18 - 18)  SpO2: 96% (24 Dec 2022 21:20) (93% - 96%)    Parameters below as of 24 Dec 2022 21:20  Patient On (Oxygen Delivery Method): room air      PHYSICAL EXAM:      Constitutional: NAD    Eyes: PERRLA EOMI, anicteric sclera    Heent :No oral sores, no pharyngeal injection. moist mucosa.    Neck: supple, no jvd, no LAD    Respiratory: CTA b/l     Cardiovascular: s1s2, no m/g/r    Gastrointestinal: soft, nt, nd, + BS    Extremities: no c/c/e    Neurological:A&O x 3 moves all ext.    Skin: no rash on exposed skin    Lymph Nodes: no lymphadenopathy.      Lab  CBC Full  -  ( 24 Dec 2022 07:11 )  WBC Count : 9.83 K/uL  RBC Count : 4.10 M/uL  Hemoglobin : 11.8 g/dL  Hematocrit : 39.2 %  Platelet Count - Automated : 282 K/uL  Mean Cell Volume : 95.6 fl  Mean Cell Hemoglobin : 28.8 pg  Mean Cell Hemoglobin Concentration : 30.1 gm/dL  Auto Neutrophil # : x  Auto Lymphocyte # : x  Auto Monocyte # : x  Auto Eosinophil # : x  Auto Basophil # : x  Auto Neutrophil % : x  Auto Lymphocyte % : x  Auto Monocyte % : x  Auto Eosinophil % : x  Auto Basophil % : x    12-24    136  |  102  |  41<H>  ----------------------------<  75  4.4   |  22  |  1.80<H>    Ca    9.7      24 Dec 2022 07:11          Rad:    Assessment/Plan

## 2022-12-24 NOTE — PROGRESS NOTE ADULT - ASSESSMENT
62M w/ pmhx of HTN, OA, ?RA, chronic idiopathic SOB/dyspnea on intermittent home O2 found to have right kidney mass. 4/29 s/p RAL R partial nephrectomy p/w acute hypoxic respiratory failure likely with a component of pulmonary edema    Acute on chronic respiratory failure with hypoxia.   - Pulmonary edema and HF likely contributing given elevated BNP and edema resolved.   - hold Lasix   - oxygen  - Continuous pulse oximetry for now  - TTE noted with severe LV dysfunction   - Cardiology follow  - Pulmonary follow    Pulmonary Hypertension  - Pulmonary follow  - s/p RHC   - Rheum eval noted    Pulmonary edema.   - Note some likely pulmonary edema on CTA. Also with signs of R sided HF/ pulmonary HTN. Suspect some relation to whatever chronic ongoing process has been causing pt's idiopathic dyspnea  - Vital signs and continuous pulse oximetry as above  - Strict I/Os and daily weight    DON (acute kidney injury).   - Crt 1.5 up from prior in record.  - Trend BMP  - Renal dose medications  - Holding spironolactone and Lasix for now.  - Nephrology follow Dr Dominguez    Rheumatoid arthritis.   - stable   - markers negative.    Renal Cancer  - s/p R nephrectomy  - Bone scan noted  - Immunoglobulins noted  - Bence Pfeiffer protein in urine pending   - UPEP   - Oncology follow    Essential hypertension.   - Trend BP  - Cont. lopressor BID with hold parameters.    LUIS FERNANDO high titer  - DS DNA negative  - Rheum follow    DVT prophylaxis.   - Hep subq.    DCP home if cleared by Nephrology.      d/w patient QA    Gerardo Marte MD phone [phone]

## 2022-12-24 NOTE — PROGRESS NOTE ADULT - ASSESSMENT
1) Heme- Pt Kidney disease, history of MGUS. livan pending  -May consider out-pt bone marrow biopsy.  - Mild anemia likely an AOCD process due to kidney disease.     2) - Weight loss of unclear etiology. History of RCC resected. CT scans and bone scan without any evidence of malignancy.   Reportedly normal colonoscopy earlier this year.      He was given business card , By Dr. Morrison for outpt follow up appointment.   plan d/w pt and spouse.

## 2022-12-24 NOTE — PROGRESS NOTE ADULT - SUBJECTIVE AND OBJECTIVE BOX
Patient is a 62y old  Male who presents with a chief complaint of SOB, likely CHF exacerbation (23 Dec 2022 21:37)      SUBJECTIVE / OVERNIGHT EVENTS: No new complaints.   Review of Systems  chest pain no  palpitations no  sob improving   nausea no  headache no    MEDICATIONS  (STANDING):  chlorhexidine 2% Cloths 1 Application(s) Topical daily  fluticasone propionate 50 MICROgram(s)/spray Nasal Spray 1 Spray(s) Both Nostrils two times a day  heparin   Injectable 5000 Unit(s) SubCutaneous every 8 hours  metoprolol tartrate 50 milliGRAM(s) Oral two times a day  sacubitril 24 mG/valsartan 26 mG 1 Tablet(s) Oral two times a day  sodium chloride 0.9%. 1000 milliLiter(s) (50 mL/Hr) IV Continuous <Continuous>    MEDICATIONS  (PRN):  acetaminophen     Tablet .. 650 milliGRAM(s) Oral every 6 hours PRN Temp greater or equal to 38C (100.4F), Mild Pain (1 - 3)  guaiFENesin Oral Liquid (Sugar-Free) 100 milliGRAM(s) Oral every 6 hours PRN Cough  melatonin 3 milliGRAM(s) Oral at bedtime PRN Insomnia  ondansetron Injectable 4 milliGRAM(s) IV Push every 8 hours PRN Nausea and/or Vomiting      Vital Signs Last 24 Hrs  T(C): 36.6 (24 Dec 2022 13:06), Max: 36.9 (23 Dec 2022 21:15)  T(F): 97.8 (24 Dec 2022 13:06), Max: 98.5 (23 Dec 2022 21:15)  HR: 99 (24 Dec 2022 13:06) (75 - 99)  BP: 134/84 (24 Dec 2022 13:06) (123/85 - 145/92)  BP(mean): --  RR: 18 (24 Dec 2022 13:06) (18 - 19)  SpO2: 93% (24 Dec 2022 13:06) (93% - 99%)    Parameters below as of 24 Dec 2022 04:50  Patient On (Oxygen Delivery Method): room air        PHYSICAL EXAM:  GENERAL: NAD  HEAD:  Atraumatic, Normocephalic  EYES: EOMI, PERRLA, conjunctiva and sclera clear  NECK: Supple, No JVD  CHEST/LUNG: Clear to auscultation bilaterally; No wheeze  HEART: Regular rate and rhythm; No murmurs, rubs, or gallops  ABDOMEN: Soft, Nontender, Nondistended; Bowel sounds present  EXTREMITIES:  2+ Peripheral Pulses, No clubbing, cyanosis, or edema  PSYCH: AAOx3  NEUROLOGY: non-focal  SKIN: No rashes or lesions    LABS:                        11.8   9.83  )-----------( 282      ( 24 Dec 2022 07:11 )             39.2     12-24    136  |  102  |  41<H>  ----------------------------<  75  4.4   |  22  |  1.80<H>    Ca    9.7      24 Dec 2022 07:11                  RADIOLOGY & ADDITIONAL TESTS:    Imaging Personally Reviewed:    Consultant(s) Notes Reviewed:      Care Discussed with Consultants/Other Providers:

## 2022-12-25 LAB
ANION GAP SERPL CALC-SCNC: 13 MMOL/L — SIGNIFICANT CHANGE UP (ref 5–17)
BUN SERPL-MCNC: 44 MG/DL — HIGH (ref 7–23)
CALCIUM SERPL-MCNC: 9.2 MG/DL — SIGNIFICANT CHANGE UP (ref 8.4–10.5)
CHLORIDE SERPL-SCNC: 101 MMOL/L — SIGNIFICANT CHANGE UP (ref 96–108)
CO2 SERPL-SCNC: 21 MMOL/L — LOW (ref 22–31)
CREAT SERPL-MCNC: 1.95 MG/DL — HIGH (ref 0.5–1.3)
EGFR: 38 ML/MIN/1.73M2 — LOW
GLUCOSE SERPL-MCNC: 82 MG/DL — SIGNIFICANT CHANGE UP (ref 70–99)
POTASSIUM SERPL-MCNC: 4.3 MMOL/L — SIGNIFICANT CHANGE UP (ref 3.5–5.3)
POTASSIUM SERPL-SCNC: 4.3 MMOL/L — SIGNIFICANT CHANGE UP (ref 3.5–5.3)
SODIUM SERPL-SCNC: 135 MMOL/L — SIGNIFICANT CHANGE UP (ref 135–145)

## 2022-12-25 RX ORDER — SODIUM CHLORIDE 9 MG/ML
1000 INJECTION INTRAMUSCULAR; INTRAVENOUS; SUBCUTANEOUS
Refills: 0 | Status: DISCONTINUED | OUTPATIENT
Start: 2022-12-25 | End: 2022-12-26

## 2022-12-25 RX ADMIN — HEPARIN SODIUM 5000 UNIT(S): 5000 INJECTION INTRAVENOUS; SUBCUTANEOUS at 05:29

## 2022-12-25 RX ADMIN — CHLORHEXIDINE GLUCONATE 1 APPLICATION(S): 213 SOLUTION TOPICAL at 12:29

## 2022-12-25 RX ADMIN — SODIUM CHLORIDE 50 MILLILITER(S): 9 INJECTION INTRAMUSCULAR; INTRAVENOUS; SUBCUTANEOUS at 11:40

## 2022-12-25 RX ADMIN — HEPARIN SODIUM 5000 UNIT(S): 5000 INJECTION INTRAVENOUS; SUBCUTANEOUS at 13:47

## 2022-12-25 RX ADMIN — SACUBITRIL AND VALSARTAN 1 TABLET(S): 24; 26 TABLET, FILM COATED ORAL at 18:38

## 2022-12-25 RX ADMIN — Medication 50 MILLIGRAM(S): at 05:29

## 2022-12-25 RX ADMIN — Medication 50 MILLIGRAM(S): at 18:38

## 2022-12-25 RX ADMIN — Medication 1 SPRAY(S): at 05:29

## 2022-12-25 RX ADMIN — Medication 1 SPRAY(S): at 18:38

## 2022-12-25 RX ADMIN — Medication 100 MILLIGRAM(S): at 00:23

## 2022-12-25 RX ADMIN — SACUBITRIL AND VALSARTAN 1 TABLET(S): 24; 26 TABLET, FILM COATED ORAL at 05:29

## 2022-12-25 RX ADMIN — HEPARIN SODIUM 5000 UNIT(S): 5000 INJECTION INTRAVENOUS; SUBCUTANEOUS at 23:03

## 2022-12-25 NOTE — PROGRESS NOTE ADULT - SUBJECTIVE AND OBJECTIVE BOX
[facility] [phone]        Patient seen and examined in bed    He was the same         MEDICATIONS  (STANDING):  chlorhexidine 2% Cloths 1 Application(s) Topical daily  fluticasone propionate 50 MICROgram(s)/spray Nasal Spray 1 Spray(s) Both Nostrils two times a day  heparin   Injectable 5000 Unit(s) SubCutaneous every 8 hours  metoprolol tartrate 50 milliGRAM(s) Oral two times a day  sacubitril 24 mG/valsartan 26 mG 1 Tablet(s) Oral two times a day  sodium chloride 0.9%. 1000 milliLiter(s) (50 mL/Hr) IV Continuous <Continuous>      VITAL:  T(C): , Max: 36.8 (12-24-22 @ 18:45)  T(F): , Max: 98.3 (12-24-22 @ 18:45)  HR: 101 (12-25-22 @ 04:59)  BP: 125/79 (12-25-22 @ 04:59)  BP(mean): --  RR: 18 (12-25-22 @ 04:59)  SpO2: 99% (12-25-22 @ 04:59)  Wt(kg): --    I and O's:    12-24 @ 07:01  -  12-25 @ 07:00  --------------------------------------------------------  IN: 240 mL / OUT: 500 mL / NET: -260 mL          PHYSICAL EXAM:    Constitutional: NAD  Neck:  No JVD  Respiratory: CTAB/L  Cardiovascular: S1 and S2  Gastrointestinal: BS+, soft, NT/ND  Extremities: No peripheral edema  Neurological: A/O x 3, no focal deficits  Psychiatric: Normal mood, normal affect  : No Humphreys  Skin: No rashes  Access: Not applicable    LABS:                        11.8   9.83  )-----------( 282      ( 24 Dec 2022 07:11 )             39.2     12-25    135  |  101  |  44<H>  ----------------------------<  82  4.3   |  21<L>  |  1.95<H>    Ca    9.2      25 Dec 2022 07:03            Urine Studies:          RADIOLOGY & ADDITIONAL STUDIES:            < from: Xray Chest 1 View- PORTABLE-Routine (Xray Chest 1 View- PORTABLE-Routine .) (12.20.22 @ 11:16) >    ACC: [id number] EXAM:  XR CHEST PORTABLE ROUTINE 1V                          PROCEDURE DATE:  12/20/2022          INTERPRETATION:  EXAMINATION: XR CHEST    CLINICAL INDICATION: crackles on exam    TECHNIQUE: Single frontal view of the chest was obtained.    COMPARISON: Chest x-ray 12/14/2022.    FINDINGS:    The heart size cannot be assessed on this projection.    The lungs are clear. No pneumothorax. No pleural effusion.    No acute osseous abnormalities.    IMPRESSION:  Clear lungs.    --- End of Report ---           IDANIA MEDINA MD; Resident Radiologist  This document has been electronically signed.  BELKIS CRUZ MD; Attending Radiologist  This document has been electronically signed. Dec 20 2022  3:24PM    < end of copied text >

## 2022-12-25 NOTE — PROGRESS NOTE ADULT - SUBJECTIVE AND OBJECTIVE BOX
PLACIDO GOLDMAN  MRN-[id number]    Patient is a 62y old  Male who presents with a chief complaint of SOB, likely CHF exacerbation (24 Dec 2022 21:29)      Review of System    Resting comfortably    Current Meds  MEDICATIONS  (STANDING):  chlorhexidine 2% Cloths 1 Application(s) Topical daily  fluticasone propionate 50 MICROgram(s)/spray Nasal Spray 1 Spray(s) Both Nostrils two times a day  heparin   Injectable 5000 Unit(s) SubCutaneous every 8 hours  metoprolol tartrate 50 milliGRAM(s) Oral two times a day  sacubitril 24 mG/valsartan 26 mG 1 Tablet(s) Oral two times a day  sodium chloride 0.9%. 1000 milliLiter(s) (50 mL/Hr) IV Continuous <Continuous>    MEDICATIONS  (PRN):  acetaminophen     Tablet .. 650 milliGRAM(s) Oral every 6 hours PRN Temp greater or equal to 38C (100.4F), Mild Pain (1 - 3)  guaiFENesin Oral Liquid (Sugar-Free) 100 milliGRAM(s) Oral every 6 hours PRN Cough  melatonin 3 milliGRAM(s) Oral at bedtime PRN Insomnia  ondansetron Injectable 4 milliGRAM(s) IV Push every 8 hours PRN Nausea and/or Vomiting      Vitals  Vital Signs Last 24 Hrs  T(C): 36.8 (25 Dec 2022 04:59), Max: 36.8 (24 Dec 2022 18:45)  T(F): 98.3 (25 Dec 2022 04:59), Max: 98.3 (24 Dec 2022 18:45)  HR: 101 (25 Dec 2022 04:59) (82 - 101)  BP: 125/79 (25 Dec 2022 04:59) (121/82 - 134/84)  BP(mean): --  RR: 18 (25 Dec 2022 04:59) (18 - 18)  SpO2: 99% (25 Dec 2022 04:59) (93% - 99%)    Parameters below as of 25 Dec 2022 04:59  Patient On (Oxygen Delivery Method): room air      PHYSICAL EXAM:     NAD    Lab  CBC Full  -  ( 24 Dec 2022 07:11 )  WBC Count : 9.83 K/uL  RBC Count : 4.10 M/uL  Hemoglobin : 11.8 g/dL  Hematocrit : 39.2 %  Platelet Count - Automated : 282 K/uL  Mean Cell Volume : 95.6 fl  Mean Cell Hemoglobin : 28.8 pg  Mean Cell Hemoglobin Concentration : 30.1 gm/dL  Auto Neutrophil # : x  Auto Lymphocyte # : x  Auto Monocyte # : x  Auto Eosinophil # : x  Auto Basophil # : x  Auto Neutrophil % : x  Auto Lymphocyte % : x  Auto Monocyte % : x  Auto Eosinophil % : x  Auto Basophil % : x    12-25    135  |  101  |  44<H>  ----------------------------<  82  4.3   |  21<L>  |  1.95<H>    Ca    9.2      25 Dec 2022 07:03          Rad:    Assessment/Plan

## 2022-12-25 NOTE — PROGRESS NOTE ADULT - ASSESSMENT
62M w/ pmhx of HTN, OA, ?RA, chronic idiopathic SOB/dyspnea on intermittent home O2 found to have right kidney mass. 4/29 s/p RAL R partial nephrectomy p/w acute hypoxic respiratory failure likely with a component of pulmonary edema    Acute on chronic respiratory failure with hypoxia.   - Pulmonary edema and HF likely contributing given elevated BNP and edema resolved.   - hold Lasix   - oxygen  - Continuous pulse oximetry for now  - TTE noted with severe LV dysfunction   - Cardiology follow  - Pulmonary follow    Pulmonary Hypertension  - Pulmonary follow  - s/p RHC   - Rheum eval noted    Pulmonary edema.   - Note some likely pulmonary edema on CTA. Also with signs of R sided HF/ pulmonary HTN. Suspect some relation to whatever chronic ongoing process has been causing pt's idiopathic dyspnea  - Vital signs and continuous pulse oximetry as above  - Strict I/Os and daily weight    DON (acute kidney injury).   - Crt 1.5 up from prior in record.  - Trend BMP  - Renal dose medications  - Holding spironolactone and Lasix for now.  - gentle IVF  - Nephrology follow Dr Dominguez    Rheumatoid arthritis.   - stable   - markers negative.    Renal Cancer  - s/p R nephrectomy  - Bone scan noted  - Immunoglobulins noted  - Bence Pfeiffer protein in urine pending   - UPEP   - Oncology follow  - may need BM bx    Essential hypertension.   - Trend BP  - Cont. lopressor BID with hold parameters.    LUIS FERNANDO high titer  - DS DNA negative  - Rheum follow  - check HIV    DVT prophylaxis.   - Hep subq.    DCP home if cleared by Nephrology.      d/w patient and sisterARTHUR Marte MD phone [phone]

## 2022-12-25 NOTE — PROGRESS NOTE ADULT - SUBJECTIVE AND OBJECTIVE BOX
Patient is a 62y old  Male who presents with a chief complaint of SOB, likely CHF exacerbation (25 Dec 2022 11:01)      SUBJECTIVE / OVERNIGHT EVENTS: weak. Sister at bedside.  Review of Systems  chest pain no  palpitations no  sob yes  nausea no  headache no    MEDICATIONS  (STANDING):  chlorhexidine 2% Cloths 1 Application(s) Topical daily  fluticasone propionate 50 MICROgram(s)/spray Nasal Spray 1 Spray(s) Both Nostrils two times a day  heparin   Injectable 5000 Unit(s) SubCutaneous every 8 hours  metoprolol tartrate 50 milliGRAM(s) Oral two times a day  sacubitril 24 mG/valsartan 26 mG 1 Tablet(s) Oral two times a day  sodium chloride 0.9%. 1000 milliLiter(s) (50 mL/Hr) IV Continuous <Continuous>    MEDICATIONS  (PRN):  acetaminophen     Tablet .. 650 milliGRAM(s) Oral every 6 hours PRN Temp greater or equal to 38C (100.4F), Mild Pain (1 - 3)  guaiFENesin Oral Liquid (Sugar-Free) 100 milliGRAM(s) Oral every 6 hours PRN Cough  melatonin 3 milliGRAM(s) Oral at bedtime PRN Insomnia  ondansetron Injectable 4 milliGRAM(s) IV Push every 8 hours PRN Nausea and/or Vomiting      Vital Signs Last 24 Hrs  T(C): 37.1 (25 Dec 2022 12:21), Max: 37.1 (25 Dec 2022 12:21)  T(F): 98.7 (25 Dec 2022 12:21), Max: 98.7 (25 Dec 2022 12:21)  HR: 89 (25 Dec 2022 12:21) (82 - 101)  BP: 117/79 (25 Dec 2022 12:21) (117/79 - 129/80)  BP(mean): --  RR: 18 (25 Dec 2022 12:21) (18 - 18)  SpO2: 95% (25 Dec 2022 12:21) (95% - 99%)    Parameters below as of 25 Dec 2022 12:21  Patient On (Oxygen Delivery Method): nasal cannula  O2 Flow (L/min): 2      PHYSICAL EXAM:  GENERAL: NAD   HEAD:  Atraumatic, Normocephalic  EYES: EOMI, PERRLA, conjunctiva and sclera clear  NECK: Supple, No JVD  CHEST/LUNG: Clear to auscultation bilaterally; No wheeze  HEART: Regular rate and rhythm; No murmurs, rubs, or gallops  ABDOMEN: Soft, Nontender, Nondistended; Bowel sounds present  EXTREMITIES:  2+ Peripheral Pulses, No clubbing, cyanosis, or edema  PSYCH: AAOx3  NEUROLOGY: non-focal  SKIN: No rashes or lesions    LABS:                        11.8   9.83  )-----------( 282      ( 24 Dec 2022 07:11 )             39.2     12-25    135  |  101  |  44<H>  ----------------------------<  82  4.3   |  21<L>  |  1.95<H>    Ca    9.2      25 Dec 2022 07:03                  RADIOLOGY & ADDITIONAL TESTS:    Imaging Personally Reviewed:    Consultant(s) Notes Reviewed:      Care Discussed with Consultants/Other Providers:

## 2022-12-26 LAB
ANION GAP SERPL CALC-SCNC: 14 MMOL/L — SIGNIFICANT CHANGE UP (ref 5–17)
BUN SERPL-MCNC: 46 MG/DL — HIGH (ref 7–23)
CALCIUM SERPL-MCNC: 9.3 MG/DL — SIGNIFICANT CHANGE UP (ref 8.4–10.5)
CHLORIDE SERPL-SCNC: 103 MMOL/L — SIGNIFICANT CHANGE UP (ref 96–108)
CO2 SERPL-SCNC: 20 MMOL/L — LOW (ref 22–31)
CREAT SERPL-MCNC: 2 MG/DL — HIGH (ref 0.5–1.3)
EGFR: 37 ML/MIN/1.73M2 — LOW
GLUCOSE SERPL-MCNC: 84 MG/DL — SIGNIFICANT CHANGE UP (ref 70–99)
HCT VFR BLD CALC: 39 % — SIGNIFICANT CHANGE UP (ref 39–50)
HGB BLD-MCNC: 11.7 G/DL — LOW (ref 13–17)
MCHC RBC-ENTMCNC: 29.4 PG — SIGNIFICANT CHANGE UP (ref 27–34)
MCHC RBC-ENTMCNC: 30 GM/DL — LOW (ref 32–36)
MCV RBC AUTO: 98 FL — SIGNIFICANT CHANGE UP (ref 80–100)
NRBC # BLD: 0 /100 WBCS — SIGNIFICANT CHANGE UP (ref 0–0)
PLATELET # BLD AUTO: 295 K/UL — SIGNIFICANT CHANGE UP (ref 150–400)
POTASSIUM SERPL-MCNC: 4.2 MMOL/L — SIGNIFICANT CHANGE UP (ref 3.5–5.3)
POTASSIUM SERPL-SCNC: 4.2 MMOL/L — SIGNIFICANT CHANGE UP (ref 3.5–5.3)
RBC # BLD: 3.98 M/UL — LOW (ref 4.2–5.8)
RBC # FLD: 15.4 % — HIGH (ref 10.3–14.5)
SODIUM SERPL-SCNC: 137 MMOL/L — SIGNIFICANT CHANGE UP (ref 135–145)
WBC # BLD: 9.85 K/UL — SIGNIFICANT CHANGE UP (ref 3.8–10.5)
WBC # FLD AUTO: 9.85 K/UL — SIGNIFICANT CHANGE UP (ref 3.8–10.5)

## 2022-12-26 RX ORDER — POTASSIUM CHLORIDE 20 MEQ
1 PACKET (EA) ORAL
Qty: 0 | Refills: 0 | DISCHARGE

## 2022-12-26 RX ADMIN — Medication 1 SPRAY(S): at 05:21

## 2022-12-26 RX ADMIN — HEPARIN SODIUM 5000 UNIT(S): 5000 INJECTION INTRAVENOUS; SUBCUTANEOUS at 21:16

## 2022-12-26 RX ADMIN — HEPARIN SODIUM 5000 UNIT(S): 5000 INJECTION INTRAVENOUS; SUBCUTANEOUS at 05:21

## 2022-12-26 RX ADMIN — CHLORHEXIDINE GLUCONATE 1 APPLICATION(S): 213 SOLUTION TOPICAL at 13:29

## 2022-12-26 RX ADMIN — Medication 1 SPRAY(S): at 17:14

## 2022-12-26 RX ADMIN — Medication 50 MILLIGRAM(S): at 08:51

## 2022-12-26 RX ADMIN — SACUBITRIL AND VALSARTAN 1 TABLET(S): 24; 26 TABLET, FILM COATED ORAL at 17:14

## 2022-12-26 RX ADMIN — Medication 50 MILLIGRAM(S): at 17:14

## 2022-12-26 RX ADMIN — HEPARIN SODIUM 5000 UNIT(S): 5000 INJECTION INTRAVENOUS; SUBCUTANEOUS at 13:29

## 2022-12-26 RX ADMIN — SACUBITRIL AND VALSARTAN 1 TABLET(S): 24; 26 TABLET, FILM COATED ORAL at 06:11

## 2022-12-26 NOTE — PROGRESS NOTE ADULT - SUBJECTIVE AND OBJECTIVE BOX
[facility] [phone]        Patient seen and examined in bed.  He was the same         MEDICATIONS  (STANDING):  aluminum hydroxide/magnesium hydroxide/simethicone Suspension 30 milliLiter(s) Oral once  chlorhexidine 2% Cloths 1 Application(s) Topical daily  fluticasone propionate 50 MICROgram(s)/spray Nasal Spray 1 Spray(s) Both Nostrils two times a day  heparin   Injectable 5000 Unit(s) SubCutaneous every 8 hours  metoprolol tartrate 50 milliGRAM(s) Oral two times a day  sacubitril 24 mG/valsartan 26 mG 1 Tablet(s) Oral two times a day  sodium chloride 0.9%. 1000 milliLiter(s) (50 mL/Hr) IV Continuous <Continuous>      VITAL:  T(C): , Max: 37.2 (12-26-22 @ 06:00)  T(F): , Max: 99 (12-26-22 @ 06:00)  HR: 90 (12-26-22 @ 08:34)  BP: 117/78 (12-26-22 @ 08:34)  BP(mean): --  RR: 18 (12-26-22 @ 04:44)  SpO2: 97% (12-26-22 @ 04:44)  Wt(kg): --    I and O's:    12-25 @ 07:01  -  12-26 @ 07:00  --------------------------------------------------------  IN: 0 mL / OUT: 600 mL / NET: -600 mL          PHYSICAL EXAM:    Constitutional: NAD  Neck:  No JVD  Respiratory: CTAB/L  Cardiovascular: S1 and S2  Gastrointestinal: BS+, soft, NT/ND  Extremities: No peripheral edema  Neurological: A/O x 3, no focal deficits  Psychiatric: Normal mood, normal affect  : No Humphreys  Skin: No rashes  Access: Not applicable    LABS:                        11.7   9.85  )-----------( 295      ( 26 Dec 2022 06:43 )             39.0     12-26    137  |  103  |  46<H>  ----------------------------<  84  4.2   |  20<L>  |  2.00<H>    Ca    9.3      26 Dec 2022 06:43            Urine Studies:          RADIOLOGY & ADDITIONAL STUDIES:

## 2022-12-26 NOTE — PROGRESS NOTE ADULT - SUBJECTIVE AND OBJECTIVE BOX
PLACIDO GOLDMAN  MRN-[id number]    Patient is a 62y old  Male who presents with a chief complaint of SOB, likely CHF exacerbation (25 Dec 2022 18:17)      Review of System    comfortable    Current Meds  MEDICATIONS  (STANDING):  aluminum hydroxide/magnesium hydroxide/simethicone Suspension 30 milliLiter(s) Oral once  chlorhexidine 2% Cloths 1 Application(s) Topical daily  fluticasone propionate 50 MICROgram(s)/spray Nasal Spray 1 Spray(s) Both Nostrils two times a day  heparin   Injectable 5000 Unit(s) SubCutaneous every 8 hours  metoprolol tartrate 50 milliGRAM(s) Oral two times a day  sacubitril 24 mG/valsartan 26 mG 1 Tablet(s) Oral two times a day  sodium chloride 0.9%. 1000 milliLiter(s) (50 mL/Hr) IV Continuous <Continuous>    MEDICATIONS  (PRN):  acetaminophen     Tablet .. 650 milliGRAM(s) Oral every 6 hours PRN Temp greater or equal to 38C (100.4F), Mild Pain (1 - 3)  guaiFENesin Oral Liquid (Sugar-Free) 100 milliGRAM(s) Oral every 6 hours PRN Cough  melatonin 3 milliGRAM(s) Oral at bedtime PRN Insomnia  ondansetron Injectable 4 milliGRAM(s) IV Push every 8 hours PRN Nausea and/or Vomiting      Vitals  Vital Signs Last 24 Hrs  T(C): 37.2 (26 Dec 2022 06:00), Max: 37.2 (26 Dec 2022 06:00)  T(F): 99 (26 Dec 2022 06:00), Max: 99 (26 Dec 2022 06:00)  HR: 90 (26 Dec 2022 08:34) (89 - 94)  BP: 117/78 (26 Dec 2022 08:34) (101/64 - 128/83)  BP(mean): --  RR: 18 (26 Dec 2022 04:44) (18 - 18)  SpO2: 97% (26 Dec 2022 04:44) (95% - 99%)    Parameters below as of 26 Dec 2022 04:44  Patient On (Oxygen Delivery Method): nasal cannula  O2 Flow (L/min): 2      Physical Exam     NAD    Lab  CBC Full  -  ( 26 Dec 2022 06:43 )  WBC Count : 9.85 K/uL  RBC Count : 3.98 M/uL  Hemoglobin : 11.7 g/dL  Hematocrit : 39.0 %  Platelet Count - Automated : 295 K/uL  Mean Cell Volume : 98.0 fl  Mean Cell Hemoglobin : 29.4 pg  Mean Cell Hemoglobin Concentration : 30.0 gm/dL  Auto Neutrophil # : x  Auto Lymphocyte # : x  Auto Monocyte # : x  Auto Eosinophil # : x  Auto Basophil # : x  Auto Neutrophil % : x  Auto Lymphocyte % : x  Auto Monocyte % : x  Auto Eosinophil % : x  Auto Basophil % : x    12-26    137  |  103  |  46<H>  ----------------------------<  84  4.2   |  20<L>  |  2.00<H>    Ca    9.3      26 Dec 2022 06:43          Rad:    Assessment/Plan

## 2022-12-26 NOTE — PROGRESS NOTE ADULT - SUBJECTIVE AND OBJECTIVE BOX
Patient is a 62y old  Male who presents with a chief complaint of SOB, likely CHF exacerbation (26 Dec 2022 11:15)      SUBJECTIVE / OVERNIGHT EVENTS: No new complaints.   Review of Systems  chest pain no  palpitations no  sob improving   nausea no  headache no    MEDICATIONS  (STANDING):  aluminum hydroxide/magnesium hydroxide/simethicone Suspension 30 milliLiter(s) Oral once  chlorhexidine 2% Cloths 1 Application(s) Topical daily  fluticasone propionate 50 MICROgram(s)/spray Nasal Spray 1 Spray(s) Both Nostrils two times a day  heparin   Injectable 5000 Unit(s) SubCutaneous every 8 hours  metoprolol tartrate 50 milliGRAM(s) Oral two times a day  sacubitril 24 mG/valsartan 26 mG 1 Tablet(s) Oral two times a day    MEDICATIONS  (PRN):  acetaminophen     Tablet .. 650 milliGRAM(s) Oral every 6 hours PRN Temp greater or equal to 38C (100.4F), Mild Pain (1 - 3)  guaiFENesin Oral Liquid (Sugar-Free) 100 milliGRAM(s) Oral every 6 hours PRN Cough  melatonin 3 milliGRAM(s) Oral at bedtime PRN Insomnia  ondansetron Injectable 4 milliGRAM(s) IV Push every 8 hours PRN Nausea and/or Vomiting      Vital Signs Last 24 Hrs  T(C): 36.6 (26 Dec 2022 12:52), Max: 37.2 (26 Dec 2022 06:00)  T(F): 97.8 (26 Dec 2022 12:52), Max: 99 (26 Dec 2022 06:00)  HR: 76 (26 Dec 2022 12:52) (76 - 94)  BP: 128/87 (26 Dec 2022 12:52) (101/64 - 128/87)  BP(mean): --  RR: 18 (26 Dec 2022 12:52) (18 - 18)  SpO2: 94% (26 Dec 2022 12:52) (94% - 99%)    Parameters below as of 26 Dec 2022 12:52  Patient On (Oxygen Delivery Method): room air        PHYSICAL EXAM:  GENERAL: NAD   HEAD:  Atraumatic, Normocephalic  EYES: EOMI, PERRLA, conjunctiva and sclera clear  NECK: Supple, No JVD  CHEST/LUNG: Clear to auscultation bilaterally; No wheeze  HEART: Regular rate and rhythm; No murmurs, rubs, or gallops  ABDOMEN: Soft, Nontender, Nondistended; Bowel sounds present  EXTREMITIES:  2+ Peripheral Pulses, No clubbing, cyanosis, or edema  PSYCH: AAOx3  NEUROLOGY: non-focal  SKIN: No rashes or lesions    LABS:                        11.7   9.85  )-----------( 295      ( 26 Dec 2022 06:43 )             39.0     12-26    137  |  103  |  46<H>  ----------------------------<  84  4.2   |  20<L>  |  2.00<H>    Ca    9.3      26 Dec 2022 06:43                  RADIOLOGY & ADDITIONAL TESTS:    Imaging Personally Reviewed:    Consultant(s) Notes Reviewed:      Care Discussed with Consultants/Other Providers:

## 2022-12-26 NOTE — PROGRESS NOTE ADULT - ASSESSMENT
62M w/ pmhx of HTN, OA, ?RA, chronic idiopathic SOB/dyspnea on intermittent home O2 found to have right kidney mass. 4/29 s/p RAL R partial nephrectomy p/w acute hypoxic respiratory failure likely with a component of pulmonary edema    Acute on chronic respiratory failure with hypoxia.   - Pulmonary edema and HF likely contributing given elevated BNP and edema resolved.   - hold Lasix   - oxygen  - Continuous pulse oximetry for now  - TTE noted with severe LV dysfunction   - Cardiology follow  - Pulmonary follow    CHF chronic systolic  - diurese as possible  - Entresto  - Cardiology follow    Pulmonary Hypertension  - Pulmonary follow  - s/p RHC   - Rheum eval noted    Pulmonary edema.   - Note some likely pulmonary edema on CTA. Also with signs of R sided HF/ pulmonary HTN. Suspect some relation to whatever chronic ongoing process has been causing pt's idiopathic dyspnea  - Vital signs and continuous pulse oximetry as above  - Strict I/Os and daily weight    DON (acute kidney injury).   - Crt 1.5 up from prior in record.  - Trend BMP  - Renal dose medications  - Holding spironolactone and Lasix for now.  - gentle IVF  - Nephrology follow Dr Dominguez    Rheumatoid arthritis.   - stable   - markers negative.    Renal Cancer  - s/p R nephrectomy  - Bone scan noted  - Immunoglobulins noted  - Bence Pfeiffer protein in urine pending   - UPEP   - Oncology follow  - may need BM bx ( OTP as per Heme Onc)     Essential hypertension.   - Trend BP  - Cont. lopressor BID with hold parameters.    LUIS FERNANDO high titer  - DS DNA negative  - Rheum follow  - check HIV    DVT prophylaxis.   - Hep subq.    DCP home in am if stable.     d/w patient QA    Gerardo Marte MD phone [phone]

## 2022-12-26 NOTE — PROGRESS NOTE ADULT - ASSESSMENT
62M w/ pmhx of HTN, OA, ?RA, chronic idiopathic SOB/dyspnea on intermittent home O2 found to have right kidney mass s/p RAL R partial nephrectomy   DON on top of CKD stage 3a   New LV dysfunction  Hx of MGUS as well per Onc notation     1 CVS-   RHC last week did not show volume overloaded state   The Entresto started on the 16th.  Dont want to stop   Was tachycardic yesterday despite being on bblockers and today the heart rate is better     2Renal- No obstruction noted and the CT was done without contrast   Check urine for bencamila alfaro (- test goes to [facility] and there the system is cancelling?-- I called out lab and although the urine electrophoresis says "results available: it is still testing )     Loop diuretics on hold  DC  IVF now   3 GI-Start Ensure   4 Pulm-   CXR   clear  but remains on oxygen     Spoke with Heme/Onc and pt can follow as outpt(possibility of amyloid was brought up).  From my end the renal function is about the same for the last few days 1.8-2.0.  Can he be dc'd with close outpt follow up?    Sayed [facility]   [phone]

## 2022-12-26 NOTE — PROGRESS NOTE ADULT - ASSESSMENT
1) Heme- Pt Kidney disease, history of MGUS. livan pending, bence jones pending.   -May consider out-pt bone marrow biopsy. A dx of plasma cell dyscrasia will not change our inpt mgmt  - Mild anemia likely an AOCD process due to kidney disease.     2) - Weight loss of unclear etiology. History of RCC resected. CT scans and bone scan without any evidence of malignancy.   Reportedly normal colonoscopy earlier this year.        Plan d/w Dr. Marte and Dr. Dominguez

## 2022-12-27 ENCOUNTER — TRANSCRIPTION ENCOUNTER (OUTPATIENT)
Age: 62
End: 2022-12-27

## 2022-12-27 VITALS
HEART RATE: 99 BPM | SYSTOLIC BLOOD PRESSURE: 135 MMHG | OXYGEN SATURATION: 93 % | DIASTOLIC BLOOD PRESSURE: 90 MMHG | TEMPERATURE: 98 F | RESPIRATION RATE: 18 BRPM

## 2022-12-27 LAB
ANION GAP SERPL CALC-SCNC: 12 MMOL/L — SIGNIFICANT CHANGE UP (ref 5–17)
BUN SERPL-MCNC: 43 MG/DL — HIGH (ref 7–23)
CALCIUM SERPL-MCNC: 9.4 MG/DL — SIGNIFICANT CHANGE UP (ref 8.4–10.5)
CHLORIDE SERPL-SCNC: 104 MMOL/L — SIGNIFICANT CHANGE UP (ref 96–108)
CO2 SERPL-SCNC: 22 MMOL/L — SIGNIFICANT CHANGE UP (ref 22–31)
CREAT SERPL-MCNC: 1.81 MG/DL — HIGH (ref 0.5–1.3)
EGFR: 42 ML/MIN/1.73M2 — LOW
GLUCOSE SERPL-MCNC: 85 MG/DL — SIGNIFICANT CHANGE UP (ref 70–99)
HCT VFR BLD CALC: 38.7 % — LOW (ref 39–50)
HGB BLD-MCNC: 11.5 G/DL — LOW (ref 13–17)
HIV 1+2 AB+HIV1 P24 AG SERPL QL IA: SIGNIFICANT CHANGE UP
MCHC RBC-ENTMCNC: 28.4 PG — SIGNIFICANT CHANGE UP (ref 27–34)
MCHC RBC-ENTMCNC: 29.7 GM/DL — LOW (ref 32–36)
MCV RBC AUTO: 95.6 FL — SIGNIFICANT CHANGE UP (ref 80–100)
NRBC # BLD: 0 /100 WBCS — SIGNIFICANT CHANGE UP (ref 0–0)
PLATELET # BLD AUTO: 303 K/UL — SIGNIFICANT CHANGE UP (ref 150–400)
POTASSIUM SERPL-MCNC: 4.6 MMOL/L — SIGNIFICANT CHANGE UP (ref 3.5–5.3)
POTASSIUM SERPL-SCNC: 4.6 MMOL/L — SIGNIFICANT CHANGE UP (ref 3.5–5.3)
RBC # BLD: 4.05 M/UL — LOW (ref 4.2–5.8)
RBC # FLD: 15.4 % — HIGH (ref 10.3–14.5)
SODIUM SERPL-SCNC: 138 MMOL/L — SIGNIFICANT CHANGE UP (ref 135–145)
WBC # BLD: 11.19 K/UL — HIGH (ref 3.8–10.5)
WBC # FLD AUTO: 11.19 K/UL — HIGH (ref 3.8–10.5)

## 2022-12-27 PROCEDURE — 85025 COMPLETE CBC W/AUTO DIFF WBC: CPT

## 2022-12-27 PROCEDURE — 86038 ANTINUCLEAR ANTIBODIES: CPT

## 2022-12-27 PROCEDURE — 0225U NFCT DS DNA&RNA 21 SARSCOV2: CPT

## 2022-12-27 PROCEDURE — 75561 CARDIAC MRI FOR MORPH W/DYE: CPT

## 2022-12-27 PROCEDURE — 83880 ASSAY OF NATRIURETIC PEPTIDE: CPT

## 2022-12-27 PROCEDURE — 81001 URINALYSIS AUTO W/SCOPE: CPT

## 2022-12-27 PROCEDURE — 82652 VIT D 1 25-DIHYDROXY: CPT

## 2022-12-27 PROCEDURE — 71275 CT ANGIOGRAPHY CHEST: CPT | Mod: MD

## 2022-12-27 PROCEDURE — C1894: CPT

## 2022-12-27 PROCEDURE — 86431 RHEUMATOID FACTOR QUANT: CPT

## 2022-12-27 PROCEDURE — C1887: CPT

## 2022-12-27 PROCEDURE — A9585: CPT

## 2022-12-27 PROCEDURE — 86803 HEPATITIS C AB TEST: CPT

## 2022-12-27 PROCEDURE — 85027 COMPLETE CBC AUTOMATED: CPT

## 2022-12-27 PROCEDURE — 82784 ASSAY IGA/IGD/IGG/IGM EACH: CPT

## 2022-12-27 PROCEDURE — 86140 C-REACTIVE PROTEIN: CPT

## 2022-12-27 PROCEDURE — 93451 RIGHT HEART CATH: CPT

## 2022-12-27 PROCEDURE — 84165 PROTEIN E-PHORESIS SERUM: CPT

## 2022-12-27 PROCEDURE — 86255 FLUORESCENT ANTIBODY SCREEN: CPT

## 2022-12-27 PROCEDURE — 86200 CCP ANTIBODY: CPT

## 2022-12-27 PROCEDURE — 86235 NUCLEAR ANTIGEN ANTIBODY: CPT

## 2022-12-27 PROCEDURE — 84443 ASSAY THYROID STIM HORMONE: CPT

## 2022-12-27 PROCEDURE — 36415 COLL VENOUS BLD VENIPUNCTURE: CPT

## 2022-12-27 PROCEDURE — 84484 ASSAY OF TROPONIN QUANT: CPT

## 2022-12-27 PROCEDURE — 93356 MYOCRD STRAIN IMG SPCKL TRCK: CPT

## 2022-12-27 PROCEDURE — 78306 BONE IMAGING WHOLE BODY: CPT

## 2022-12-27 PROCEDURE — 82550 ASSAY OF CK (CPK): CPT

## 2022-12-27 PROCEDURE — 96374 THER/PROPH/DIAG INJ IV PUSH: CPT

## 2022-12-27 PROCEDURE — 82553 CREATINE MB FRACTION: CPT

## 2022-12-27 PROCEDURE — 83521 IG LIGHT CHAINS FREE EACH: CPT

## 2022-12-27 PROCEDURE — 85610 PROTHROMBIN TIME: CPT

## 2022-12-27 PROCEDURE — 85730 THROMBOPLASTIN TIME PARTIAL: CPT

## 2022-12-27 PROCEDURE — 82306 VITAMIN D 25 HYDROXY: CPT

## 2022-12-27 PROCEDURE — 74176 CT ABD & PELVIS W/O CONTRAST: CPT

## 2022-12-27 PROCEDURE — 82164 ANGIOTENSIN I ENZYME TEST: CPT

## 2022-12-27 PROCEDURE — 94640 AIRWAY INHALATION TREATMENT: CPT

## 2022-12-27 PROCEDURE — 86316 IMMUNOASSAY TUMOR OTHER: CPT

## 2022-12-27 PROCEDURE — 84166 PROTEIN E-PHORESIS/URINE/CSF: CPT

## 2022-12-27 PROCEDURE — 80048 BASIC METABOLIC PNL TOTAL CA: CPT

## 2022-12-27 PROCEDURE — C8929: CPT

## 2022-12-27 PROCEDURE — 84100 ASSAY OF PHOSPHORUS: CPT

## 2022-12-27 PROCEDURE — 82803 BLOOD GASES ANY COMBINATION: CPT

## 2022-12-27 PROCEDURE — 87637 SARSCOV2&INF A&B&RSV AMP PRB: CPT

## 2022-12-27 PROCEDURE — 86334 IMMUNOFIX E-PHORESIS SERUM: CPT

## 2022-12-27 PROCEDURE — 80053 COMPREHEN METABOLIC PANEL: CPT

## 2022-12-27 PROCEDURE — 83516 IMMUNOASSAY NONANTIBODY: CPT

## 2022-12-27 PROCEDURE — 85652 RBC SED RATE AUTOMATED: CPT

## 2022-12-27 PROCEDURE — 87389 HIV-1 AG W/HIV-1&-2 AB AG IA: CPT

## 2022-12-27 PROCEDURE — 84155 ASSAY OF PROTEIN SERUM: CPT

## 2022-12-27 PROCEDURE — 99285 EMERGENCY DEPT VISIT HI MDM: CPT

## 2022-12-27 PROCEDURE — 71045 X-RAY EXAM CHEST 1 VIEW: CPT

## 2022-12-27 PROCEDURE — 86160 COMPLEMENT ANTIGEN: CPT

## 2022-12-27 PROCEDURE — 83735 ASSAY OF MAGNESIUM: CPT

## 2022-12-27 PROCEDURE — A9561: CPT

## 2022-12-27 RX ORDER — FLUTICASONE PROPIONATE AND SALMETEROL 50; 250 UG/1; UG/1
1 POWDER ORAL; RESPIRATORY (INHALATION)
Qty: 1 | Refills: 0
Start: 2022-12-27 | End: 2023-01-25

## 2022-12-27 RX ORDER — FLUTICASONE PROPIONATE AND SALMETEROL 50; 250 UG/1; UG/1
1 POWDER ORAL; RESPIRATORY (INHALATION)
Qty: 0 | Refills: 0 | DISCHARGE

## 2022-12-27 RX ORDER — SACUBITRIL AND VALSARTAN 24; 26 MG/1; MG/1
1 TABLET, FILM COATED ORAL
Qty: 60 | Refills: 0
Start: 2022-12-27 | End: 2023-01-25

## 2022-12-27 RX ADMIN — Medication 100 MILLIGRAM(S): at 02:40

## 2022-12-27 RX ADMIN — Medication 1 SPRAY(S): at 17:54

## 2022-12-27 RX ADMIN — SACUBITRIL AND VALSARTAN 1 TABLET(S): 24; 26 TABLET, FILM COATED ORAL at 17:54

## 2022-12-27 RX ADMIN — Medication 50 MILLIGRAM(S): at 17:54

## 2022-12-27 RX ADMIN — Medication 50 MILLIGRAM(S): at 05:22

## 2022-12-27 RX ADMIN — SACUBITRIL AND VALSARTAN 1 TABLET(S): 24; 26 TABLET, FILM COATED ORAL at 05:23

## 2022-12-27 NOTE — PROGRESS NOTE ADULT - ASSESSMENT
62M w/ pmhx of HTN, OA, ?RA, chronic idiopathic SOB/dyspnea on intermittent home O2 found to have right kidney mass. 4/29 s/p RAL R partial nephrectomy p/w acute hypoxic respiratory failure likely with a component of pulmonary edema    Acute on chronic respiratory failure with hypoxia.   - Pulmonary edema and HF likely contributing given elevated BNP and edema resolved.   - Lasix   - oxygen  - TTE noted with severe LV dysfunction   - Cardiology follow  - Pulmonary follow    CHF chronic systolic  - diurese as possible   - Entresto  - Cardiology follow    Pulmonary Hypertension  - Pulmonary follow  - s/p RHC   - Rheum eval noted    Pulmonary edema.   - Note some likely pulmonary edema on CTA. Also with signs of R sided HF/ pulmonary HTN. Suspect some relation to whatever chronic ongoing process has been causing pt's idiopathic dyspnea  - Vital signs and continuous pulse oximetry as above  - Strict I/Os and daily weight    DON (acute kidney injury).   - Crt 1.5 up from prior in record.  - Trend BMP  - Renal dose medications  - Holding spironolactone and Lasix for now.  - s/p gentle IVF  - Nephrology follow Dr Dominguez    Rheumatoid arthritis.   - stable   - markers negative.    Renal Cancer  - s/p R nephrectomy  - Bone scan noted  - Immunoglobulins noted  - Bence Pfeiffer protein in urine pending   - UPEP   - Oncology follow  - may need BM bx ( OTP as per Heme Onc)     Essential hypertension.   - Trend BP  - Cont. lopressor BID with hold parameters.    LUIS FERNANDO high titer  - DS DNA negative  - Rheum follow  - HIV negative    Carcinoid work up as OTP    DVT prophylaxis.   - Hep subq.    DC home. Follow with PMD/ Cardiology/ Oncology/ Nephrology in 3-4 days. QA     Gerardo Marte MD phone [phone]

## 2022-12-27 NOTE — PROGRESS NOTE ADULT - SUBJECTIVE AND OBJECTIVE BOX
NEPHROLOGY     Patient seen and examined.    MEDICATIONS  (STANDING):  aluminum hydroxide/magnesium hydroxide/simethicone Suspension 30 milliLiter(s) Oral once  chlorhexidine 2% Cloths 1 Application(s) Topical daily  fluticasone propionate 50 MICROgram(s)/spray Nasal Spray 1 Spray(s) Both Nostrils two times a day  heparin   Injectable 5000 Unit(s) SubCutaneous every 8 hours  metoprolol tartrate 50 milliGRAM(s) Oral two times a day  sacubitril 24 mG/valsartan 26 mG 1 Tablet(s) Oral two times a day    VITALS:  T(C): , Max: 36.9 (12-27-22 @ 11:42)  T(F): , Max: 98.5 (12-27-22 @ 11:42)  HR: 90 (12-27-22 @ 11:42)  BP: 122/74 (12-27-22 @ 11:42)  RR: 18 (12-27-22 @ 11:42)  SpO2: 99% (12-27-22 @ 11:42)    PHYSICAL EXAM:  Constitutional: NAD  Neck:  No JVD  Respiratory: CTAB/L  Cardiovascular: S1 and S2  Gastrointestinal: BS+, soft, NT/ND  Extremities: No peripheral edema  Neurological: A/O x 3, no focal deficits  Psychiatric: Normal mood, normal affect  : No Humphreys  Skin: No rashes    LABS:                        11.5   11.19 )-----------( 303      ( 27 Dec 2022 07:22 )             38.7     12-27    138  |  104  |  43<H>  ----------------------------<  85  4.6   |  22  |  1.81<H>    Ca    9.4      27 Dec 2022 07:22   NEPHROLOGY     Patient seen and examined with family at bedside, no new complaints, denies pain, no sob, on room air at present, in no acute distress.     MEDICATIONS  (STANDING):  aluminum hydroxide/magnesium hydroxide/simethicone Suspension 30 milliLiter(s) Oral once  chlorhexidine 2% Cloths 1 Application(s) Topical daily  fluticasone propionate 50 MICROgram(s)/spray Nasal Spray 1 Spray(s) Both Nostrils two times a day  heparin   Injectable 5000 Unit(s) SubCutaneous every 8 hours  metoprolol tartrate 50 milliGRAM(s) Oral two times a day  sacubitril 24 mG/valsartan 26 mG 1 Tablet(s) Oral two times a day    VITALS:  T(C): , Max: 36.9 (12-27-22 @ 11:42)  T(F): , Max: 98.5 (12-27-22 @ 11:42)  HR: 90 (12-27-22 @ 11:42)  BP: 122/74 (12-27-22 @ 11:42)  RR: 18 (12-27-22 @ 11:42)  SpO2: 99% (12-27-22 @ 11:42)    PHYSICAL EXAM:  Constitutional: NAD  Neck:  No JVD  Respiratory: CTAB/L  Cardiovascular: S1 and S2  Gastrointestinal: BS+, soft, NT/ND  Extremities: No peripheral edema  Neurological: A/O x 3, no focal deficits  Psychiatric: Normal mood, normal affect  : No Humphreys  Skin: No rashes    LABS:                        11.5   11.19 )-----------( 303      ( 27 Dec 2022 07:22 )             38.7     12-27    138  |  104  |  43<H>  ----------------------------<  85  4.6   |  22  |  1.81<H>  rad  Ca    9.4      27 Dec 2022 07:22    ASSESSMENT/PLAN:  62M w/ pmhx of HTN, OA, ?RA, chronic idiopathic SOB/dyspnea on intermittent home O2 found to have right kidney mass s/p RAL R partial nephrectomy   DON on top of CKD stage 3a   New LV dysfunction  Hx of MGUS as well per Onc notation     1 CVS-   RHC last week did not show volume overloaded state   The Entresto started on the 16th.  Dont want to stop   Was tachycardic day prior despite being on bblockers and now the heart rate is better     2Renal- No obstruction noted and the CT was done without contrast   Check urine for bence alfaro (- test goes to [facility] and there the system is cancelling?-- Dr. Dominguez called out lab and although the urine electrophoresis says "results available: it is still testing )     Loop diuretics on hold  Renal fxn stable last few days ~1.8-2    3 GI-Continue Ensure     4 Pulm-CXR clear but remains on oxygen     5 Heme/Onc d/w Dr. Dominguez, pt can follow as outpt(possibility of amyloid was brought up), possible outpt bmbx as outpt. Renal function is about the same for the last few days 1.8-2.0.  Can he be dc'd with close outpt follow up?    Haley Hagan, NP-C  [facility]  [phone]

## 2022-12-27 NOTE — PROGRESS NOTE ADULT - PROVIDER SPECIALTY LIST ADULT
Nephrology
Pulmonology
Cardiology
Heme/Onc
Internal Medicine
Nephrology
Pulmonology
Pulmonology
Cardiology
Heme/Onc
Internal Medicine
Pulmonology
Heme/Onc
Heme/Onc
Internal Medicine
Nephrology
Internal Medicine

## 2022-12-27 NOTE — PROGRESS NOTE ADULT - SUBJECTIVE AND OBJECTIVE BOX
Cardiovascular Disease Progress Note    Overnight events: No acute events overnight.  no cp/sob/palps  Otherwise review of systems negative    Objective Findings:  T(C): 36.5 (12-27-22 @ 04:40), Max: 36.6 (12-26-22 @ 12:52)  HR: 100 (12-27-22 @ 04:40) (76 - 100)  BP: 117/72 (12-27-22 @ 04:40) (117/72 - 140/85)  RR: 18 (12-27-22 @ 04:40) (18 - 18)  SpO2: 97% (12-27-22 @ 04:40) (94% - 97%)  Wt(kg): --  Daily     Daily       Physical Exam:  Gen: NAD  HEENT: EOMI  CV: RRR, normal S1 + S2, no m/r/g  Lungs: CTAB  Abd: soft, non-tender  Ext: No edema    Telemetry:    Laboratory Data:                        11.7   9.85  )-----------( 295      ( 26 Dec 2022 06:43 )             39.0     12-26    137  |  103  |  46<H>  ----------------------------<  84  4.2   |  20<L>  |  2.00<H>    Ca    9.3      26 Dec 2022 06:43                Inpatient Medications:  MEDICATIONS  (STANDING):  aluminum hydroxide/magnesium hydroxide/simethicone Suspension 30 milliLiter(s) Oral once  chlorhexidine 2% Cloths 1 Application(s) Topical daily  fluticasone propionate 50 MICROgram(s)/spray Nasal Spray 1 Spray(s) Both Nostrils two times a day  heparin   Injectable 5000 Unit(s) SubCutaneous every 8 hours  metoprolol tartrate 50 milliGRAM(s) Oral two times a day  sacubitril 24 mG/valsartan 26 mG 1 Tablet(s) Oral two times a day      Assessment:  62M w/ pmhx of HTN, OA, ?RA, chronic idiopathic SOB/dyspnea on intermittent home O2 found to have right kidney mass. 4/29 s/p RAL R partial nephrectomy p/w acute hypoxic respiratory failure    Recs:  cardiac stable  diuretics per renal  suggest obtaining bmp  cw gdmt for lv dysfunction - lopressor 50mg bid and entresto 24/26mg bid  [facility] 2022 at [facility], normal cors  TTE --> mod clvh, severe lv dysfunction, rve with dec rvsf, mod phtn  cardiac MRI results noted. cw medical therapy as above  s/p rhc, mild precap phtn, normal output and filling pressures  appreciate pulmonary recommendations  tele monitoring  reportedly previous lymph node bx neg for sarcoid, ? role for cardiac PET per rheum  would r/o carcinoid syndrome given TR, severe RA enlargement, diarrhea and unexplained weight loss --> check serum chromogranin A and 24 hour urine 5-  HIAA    Over 35 minutes spent on total encounter; more than 50% of the visit was spent counseling and/or coordinating care by the attending physician.      Juarez Carver MD   Cardiovascular Disease  [phone]

## 2022-12-27 NOTE — PROGRESS NOTE ADULT - SUBJECTIVE AND OBJECTIVE BOX
PLACIDO GOLDMAN  MRN-[id number]    Patient is a 62y old  Male who presents with a chief complaint of SOB, likely CHF exacerbation (27 Dec 2022 07:18)      Review of System    Resting comfortably  No new events    Current Meds  MEDICATIONS  (STANDING):  aluminum hydroxide/magnesium hydroxide/simethicone Suspension 30 milliLiter(s) Oral once  chlorhexidine 2% Cloths 1 Application(s) Topical daily  fluticasone propionate 50 MICROgram(s)/spray Nasal Spray 1 Spray(s) Both Nostrils two times a day  heparin   Injectable 5000 Unit(s) SubCutaneous every 8 hours  metoprolol tartrate 50 milliGRAM(s) Oral two times a day  sacubitril 24 mG/valsartan 26 mG 1 Tablet(s) Oral two times a day    MEDICATIONS  (PRN):  acetaminophen     Tablet .. 650 milliGRAM(s) Oral every 6 hours PRN Temp greater or equal to 38C (100.4F), Mild Pain (1 - 3)  guaiFENesin Oral Liquid (Sugar-Free) 100 milliGRAM(s) Oral every 6 hours PRN Cough  melatonin 3 milliGRAM(s) Oral at bedtime PRN Insomnia  ondansetron Injectable 4 milliGRAM(s) IV Push every 8 hours PRN Nausea and/or Vomiting    Vital Signs Last 24 Hrs  T(C): 36.5 (27 Dec 2022 04:40), Max: 36.6 (26 Dec 2022 12:52)  T(F): 97.7 (27 Dec 2022 04:40), Max: 97.8 (26 Dec 2022 12:52)  HR: 100 (27 Dec 2022 04:40) (76 - 100)  BP: 117/72 (27 Dec 2022 04:40) (117/72 - 140/85)  BP(mean): --  RR: 18 (27 Dec 2022 04:40) (18 - 18)  SpO2: 97% (27 Dec 2022 04:40) (94% - 97%)    Parameters below as of 27 Dec 2022 04:40  Patient On (Oxygen Delivery Method): room air      PHYSICAL EXAM:     NAD    Lab  CBC Full  -  ( 27 Dec 2022 07:22 )  WBC Count : 11.19 K/uL  RBC Count : 4.05 M/uL  Hemoglobin : 11.5 g/dL  Hematocrit : 38.7 %  Platelet Count - Automated : 303 K/uL  Mean Cell Volume : 95.6 fl  Mean Cell Hemoglobin : 28.4 pg  Mean Cell Hemoglobin Concentration : 29.7 gm/dL  Auto Neutrophil # : x  Auto Lymphocyte # : x  Auto Monocyte # : x  Auto Eosinophil # : x  Auto Basophil # : x  Auto Neutrophil % : x  Auto Lymphocyte % : x  Auto Monocyte % : x  Auto Eosinophil % : x  Auto Basophil % : x    12-26    137  |  103  |  46<H>  ----------------------------<  84  4.2   |  20<L>  |  2.00<H>    Ca    9.3      26 Dec 2022 06:43          Rad:    Assessment/Plan

## 2022-12-27 NOTE — DISCHARGE NOTE NURSING/CASE MANAGEMENT/SOCIAL WORK - PATIENT PORTAL LINK FT
You can access the FollowMyHealth Patient Portal offered by [facility] by registering at the following website: [email or web address]. By joining [facility]’s FollowMyHealth portal, you will also be able to view your health information using other applications (apps) compatible with our system.

## 2022-12-27 NOTE — PROGRESS NOTE ADULT - NUTRITIONAL ASSESSMENT
This patient has been assessed with a concern for Malnutrition and has been determined to have a diagnosis/diagnoses of Severe protein-calorie malnutrition.    This patient is being managed with:   Diet Regular-  Low Sodium  Supplement Feeding Modality:  Oral  Ensure Enlive Cans or Servings Per Day:  1       Frequency:  Three Times a day  Entered: Dec 19 2022 10:49AM    

## 2022-12-27 NOTE — PROGRESS NOTE ADULT - SUBJECTIVE AND OBJECTIVE BOX
Patient is a 62y old  Male who presents with a chief complaint of SOB, likely CHF exacerbation (27 Dec 2022 15:12)      SUBJECTIVE / OVERNIGHT EVENTS: No new complaints. Wife at bedside.  Review of Systems  chest pain no  palpitations no  sob as usual   nausea no  headache no    MEDICATIONS  (STANDING):  aluminum hydroxide/magnesium hydroxide/simethicone Suspension 30 milliLiter(s) Oral once  chlorhexidine 2% Cloths 1 Application(s) Topical daily  fluticasone propionate 50 MICROgram(s)/spray Nasal Spray 1 Spray(s) Both Nostrils two times a day  heparin   Injectable 5000 Unit(s) SubCutaneous every 8 hours  metoprolol tartrate 50 milliGRAM(s) Oral two times a day  sacubitril 24 mG/valsartan 26 mG 1 Tablet(s) Oral two times a day    MEDICATIONS  (PRN):  acetaminophen     Tablet .. 650 milliGRAM(s) Oral every 6 hours PRN Temp greater or equal to 38C (100.4F), Mild Pain (1 - 3)  guaiFENesin Oral Liquid (Sugar-Free) 100 milliGRAM(s) Oral every 6 hours PRN Cough  melatonin 3 milliGRAM(s) Oral at bedtime PRN Insomnia  ondansetron Injectable 4 milliGRAM(s) IV Push every 8 hours PRN Nausea and/or Vomiting      Vital Signs Last 24 Hrs  T(C): 36.9 (27 Dec 2022 11:42), Max: 36.9 (27 Dec 2022 11:42)  T(F): 98.5 (27 Dec 2022 11:42), Max: 98.5 (27 Dec 2022 11:42)  HR: 90 (27 Dec 2022 11:42) (88 - 100)  BP: 122/74 (27 Dec 2022 11:42) (117/72 - 140/85)  BP(mean): --  RR: 18 (27 Dec 2022 11:42) (18 - 18)  SpO2: 99% (27 Dec 2022 11:42) (95% - 99%)    Parameters below as of 27 Dec 2022 11:42  Patient On (Oxygen Delivery Method): room air        PHYSICAL EXAM:  GENERAL: NAD   HEAD:  Atraumatic, Normocephalic  EYES: EOMI, PERRLA, conjunctiva and sclera clear  NECK: Supple, No JVD  CHEST/LUNG: Clear to auscultation bilaterally; No wheeze  HEART: Regular rate and rhythm; No murmurs, rubs, or gallops  ABDOMEN: Soft, Nontender, Nondistended; Bowel sounds present  EXTREMITIES:  2+ Peripheral Pulses, No clubbing, cyanosis, or edema  PSYCH: AAOx3  NEUROLOGY: non-focal  SKIN: No rashes or lesions    LABS:                        11.5   11.19 )-----------( 303      ( 27 Dec 2022 07:22 )             38.7     12-27    138  |  104  |  43<H>  ----------------------------<  85  4.6   |  22  |  1.81<H>    Ca    9.4      27 Dec 2022 07:22                  RADIOLOGY & ADDITIONAL TESTS:    Imaging Personally Reviewed:    Consultant(s) Notes Reviewed:      Care Discussed with Consultants/Other Providers:

## 2022-12-28 LAB
% GAMMA, URINE: 12.2 % — SIGNIFICANT CHANGE UP
% M SPIKE, URINE: 1.8 % — SIGNIFICANT CHANGE UP
ALBUMIN 24H MFR UR ELPH: 36.6 % — SIGNIFICANT CHANGE UP
ALPHA1 GLOB 24H MFR UR ELPH: 24.5 % — SIGNIFICANT CHANGE UP
ALPHA2 GLOB 24H MFR UR ELPH: 11 % — SIGNIFICANT CHANGE UP
B-GLOBULIN 24H MFR UR ELPH: 15.7 % — SIGNIFICANT CHANGE UP
INTERPRETATION 24H UR IFE-IMP: SIGNIFICANT CHANGE UP
M PROTEIN 24H UR ELPH-MRATE: PRESENT
PROT PATTERN 24H UR ELPH-IMP: SIGNIFICANT CHANGE UP

## 2022-12-29 LAB — CGA FLD-MCNC: 156.5 NG/ML — HIGH (ref 0–101.8)

## 2022-12-30 LAB
% GAMMA, URINE: 12.9 % — SIGNIFICANT CHANGE UP
ALBUMIN 24H MFR UR ELPH: 38.3 % — SIGNIFICANT CHANGE UP
ALPHA1 GLOB 24H MFR UR ELPH: 25.1 % — SIGNIFICANT CHANGE UP
ALPHA2 GLOB 24H MFR UR ELPH: 10.3 % — SIGNIFICANT CHANGE UP
B-GLOBULIN 24H MFR UR ELPH: 13.4 % — SIGNIFICANT CHANGE UP
INTERPRETATION 24H UR IFE-IMP: SIGNIFICANT CHANGE UP
M PROTEIN 24H UR ELPH-MRATE: PRESENT
PROT PATTERN 24H UR ELPH-IMP: SIGNIFICANT CHANGE UP
TOTAL VOLUME - 24 HOUR: SIGNIFICANT CHANGE UP ML
URINE CREATININE CALCULATION: SIGNIFICANT CHANGE UP G/24 H (ref 1–2)

## 2023-01-01 NOTE — ED ADULT NURSE NOTE - HAVE YOU HAD A FIRST COVID-19 BOOSTER?
Subjective:     Stable, no events noted overnight.    Feeding: Breastmilk - baby cluster feeding appropriately   Infant is voiding and stooling.    Objective:     Vital Signs (Most Recent)  Temp: 99.1 °F (37.3 °C) (08/07/23 0855)  Pulse: 120 (08/07/23 0855)  Resp: (!) 39 (08/07/23 0855)     Most Recent Weight: 3585 g (7 lb 14.5 oz) (08/06/23 2040)  Percent Weight Change Since Birth: -3.1      Physical Exam  Vitals and nursing note reviewed.   Constitutional:       General: She is not in acute distress.     Appearance: Normal appearance.   HENT:      Head: Normocephalic. Anterior fontanelle is flat.      Comments: Caput resolved     Right Ear: External ear normal.      Left Ear: External ear normal.      Nose: Nose normal.      Mouth/Throat:      Mouth: Mucous membranes are moist.   Eyes:      Conjunctiva/sclera: Conjunctivae normal.   Cardiovascular:      Rate and Rhythm: Normal rate and regular rhythm.      Pulses: Normal pulses.      Heart sounds: No murmur heard.  Pulmonary:      Effort: Pulmonary effort is normal. No respiratory distress or retractions.      Breath sounds: Normal breath sounds.   Abdominal:      General: Abdomen is flat. Bowel sounds are normal. There is no distension.      Palpations: Abdomen is soft.   Genitourinary:     General: Normal vulva.   Musculoskeletal:         General: Normal range of motion.      Cervical back: Normal range of motion.   Skin:     General: Skin is warm.      Turgor: Normal.      Coloration: Skin is jaundiced (minimal facial).   Neurological:      General: No focal deficit present.      Mental Status: She is alert.      Primitive Reflexes: Suck normal. Symmetric [place].          Labs:  Recent Results (from the past 24 hour(s))   POCT glucose    Collection Time: 08/06/23 10:58 AM   Result Value Ref Range    POCT Glucose 56 (L) 70 - 110 mg/dL   POCT glucose    Collection Time: 08/06/23  4:13 PM   Result Value Ref Range    POCT Glucose 65 (L) 70 - 110 mg/dL   Bilirubin,  , Total    Collection Time: 23  6:30 AM   Result Value Ref Range    Bilirubin, Total -  7.0 (H) 0.1 - 6.0 mg/dL    Bilirubin, Direct    Collection Time: 23  6:30 AM   Result Value Ref Range    Bilirubin, Direct -  0.3 0.1 - 0.6 mg/dL          Yes

## 2023-01-03 LAB
% ALBUMIN: 47.4 % — SIGNIFICANT CHANGE UP
% ALBUMIN: 48.1 % — SIGNIFICANT CHANGE UP
% ALPHA 1: 5.7 % — SIGNIFICANT CHANGE UP
% ALPHA 1: 6.2 % — SIGNIFICANT CHANGE UP
% ALPHA 2: 10.3 % — SIGNIFICANT CHANGE UP
% ALPHA 2: 10.4 % — SIGNIFICANT CHANGE UP
% BETA: 11.8 % — SIGNIFICANT CHANGE UP
% BETA: 12.6 % — SIGNIFICANT CHANGE UP
% GAMMA: 23.6 % — SIGNIFICANT CHANGE UP
% GAMMA: 23.9 % — SIGNIFICANT CHANGE UP
ALBUMIN SERPL ELPH-MCNC: 3.1 G/DL — LOW (ref 3.6–5.5)
ALBUMIN SERPL ELPH-MCNC: 3.2 G/DL — LOW (ref 3.6–5.5)
ALBUMIN/GLOB SERPL ELPH: 0.9 RATIO — SIGNIFICANT CHANGE UP
ALBUMIN/GLOB SERPL ELPH: 0.9 RATIO — SIGNIFICANT CHANGE UP
ALPHA1 GLOB SERPL ELPH-MCNC: 0.4 G/DL — SIGNIFICANT CHANGE UP (ref 0.1–0.4)
ALPHA1 GLOB SERPL ELPH-MCNC: 0.4 G/DL — SIGNIFICANT CHANGE UP (ref 0.1–0.4)
ALPHA2 GLOB SERPL ELPH-MCNC: 0.7 G/DL — SIGNIFICANT CHANGE UP (ref 0.5–1)
ALPHA2 GLOB SERPL ELPH-MCNC: 0.7 G/DL — SIGNIFICANT CHANGE UP (ref 0.5–1)
B-GLOBULIN SERPL ELPH-MCNC: 0.8 G/DL — SIGNIFICANT CHANGE UP (ref 0.5–1)
B-GLOBULIN SERPL ELPH-MCNC: 0.8 G/DL — SIGNIFICANT CHANGE UP (ref 0.5–1)
GAMMA GLOBULIN: 1.6 G/DL — SIGNIFICANT CHANGE UP (ref 0.6–1.6)
GAMMA GLOBULIN: 1.6 G/DL — SIGNIFICANT CHANGE UP (ref 0.6–1.6)
INTERPRETATION SERPL IFE-IMP: SIGNIFICANT CHANGE UP
PROT PATTERN SERPL ELPH-IMP: SIGNIFICANT CHANGE UP
PROT PATTERN SERPL ELPH-IMP: SIGNIFICANT CHANGE UP

## 2023-01-09 LAB
ANTI PM-SCL-100 PLUS: <20 UNITS — SIGNIFICANT CHANGE UP
ANTI-SAE 1 IGG: <20 UNITS — SIGNIFICANT CHANGE UP
ANTI-SS-A 52 KD AB, IGG PLUS: <20 UNITS — SIGNIFICANT CHANGE UP
ANTI-U1-RNP AB PLUS: <20 UNITS — SIGNIFICANT CHANGE UP
EJ MYOMARKER3 PLUS: NEGATIVE — SIGNIFICANT CHANGE UP
ENA JO1 AB SER IA-ACNC: <20 UNITS — SIGNIFICANT CHANGE UP
FIBRILLARIN (U3 RNP) PLUS: NEGATIVE — SIGNIFICANT CHANGE UP
KU MYOMARKER3 PLUS: NEGATIVE — SIGNIFICANT CHANGE UP
MDA5 (P140)(CADM-140) PLUS: <20 UNITS — SIGNIFICANT CHANGE UP
MI-2 PLUS: NEGATIVE — SIGNIFICANT CHANGE UP
NXP-2 (P140) MYOPLUS: <20 UNITS — SIGNIFICANT CHANGE UP
OJ MYOMARKER3 PLUS: NEGATIVE — SIGNIFICANT CHANGE UP
PL-12 PLUS: NEGATIVE — SIGNIFICANT CHANGE UP
PL-7 PLUS: NEGATIVE — SIGNIFICANT CHANGE UP
SRP MYOMARKER3 PLUS: NEGATIVE — SIGNIFICANT CHANGE UP
TIF GAMMA (P155/140) PLUS: <20 UNITS — SIGNIFICANT CHANGE UP
U2 SNRNP PLUS: NEGATIVE — SIGNIFICANT CHANGE UP

## 2023-01-13 ENCOUNTER — APPOINTMENT (OUTPATIENT)
Dept: HEART FAILURE | Facility: CLINIC | Age: 63
End: 2023-01-13
Payer: COMMERCIAL

## 2023-01-13 ENCOUNTER — LABORATORY RESULT (OUTPATIENT)
Age: 63
End: 2023-01-13

## 2023-01-13 ENCOUNTER — NON-APPOINTMENT (OUTPATIENT)
Age: 63
End: 2023-01-13

## 2023-01-13 VITALS — SYSTOLIC BLOOD PRESSURE: 144 MMHG | HEART RATE: 105 BPM | DIASTOLIC BLOOD PRESSURE: 99 MMHG

## 2023-01-13 VITALS
SYSTOLIC BLOOD PRESSURE: 186 MMHG | HEART RATE: 114 BPM | DIASTOLIC BLOOD PRESSURE: 105 MMHG | BODY MASS INDEX: 21.69 KG/M2 | WEIGHT: 135 LBS | HEIGHT: 66 IN

## 2023-01-13 PROCEDURE — 99205 OFFICE O/P NEW HI 60 MIN: CPT | Mod: 25

## 2023-01-13 PROCEDURE — 36415 COLL VENOUS BLD VENIPUNCTURE: CPT

## 2023-01-13 PROCEDURE — 93000 ELECTROCARDIOGRAM COMPLETE: CPT

## 2023-01-13 RX ORDER — POTASSIUM CHLORIDE 750 MG/1
10 CAPSULE, EXTENDED RELEASE ORAL
Qty: 7 | Refills: 0 | Status: DISCONTINUED | COMMUNITY
Start: 2022-05-03 | End: 2023-01-13

## 2023-01-13 RX ORDER — FLUTICASONE PROPIONATE AND SALMETEROL 500; 50 UG/1; UG/1
500-50 POWDER RESPIRATORY (INHALATION)
Qty: 60 | Refills: 0 | Status: DISCONTINUED | COMMUNITY
Start: 2022-08-08 | End: 2023-01-13

## 2023-01-13 RX ORDER — CYCLOBENZAPRINE HYDROCHLORIDE 7.5 MG/1
TABLET, FILM COATED ORAL
Refills: 0 | Status: DISCONTINUED | COMMUNITY
End: 2023-01-13

## 2023-01-13 RX ORDER — FUROSEMIDE 20 MG/1
20 TABLET ORAL
Qty: 90 | Refills: 1 | Status: DISCONTINUED | COMMUNITY
Start: 2023-01-13 | End: 2023-01-13

## 2023-01-13 RX ORDER — METOPROLOL TARTRATE 75 MG/1
TABLET, FILM COATED ORAL
Refills: 0 | Status: DISCONTINUED | COMMUNITY
End: 2023-01-13

## 2023-01-13 RX ORDER — POTASSIUM CHLORIDE 1500 MG/1
20 TABLET, EXTENDED RELEASE ORAL
Qty: 15 | Refills: 0 | Status: DISCONTINUED | COMMUNITY
Start: 2022-07-25 | End: 2023-01-13

## 2023-01-13 RX ORDER — FUROSEMIDE 20 MG/1
20 TABLET ORAL
Qty: 15 | Refills: 0 | Status: DISCONTINUED | COMMUNITY
Start: 2022-07-25 | End: 2023-01-13

## 2023-01-13 RX ORDER — FLUTICASONE PROPIONATE AND SALMETEROL 250; 50 UG/1; UG/1
250-50 POWDER RESPIRATORY (INHALATION)
Qty: 1 | Refills: 2 | Status: DISCONTINUED | COMMUNITY
Start: 2022-05-31 | End: 2023-01-13

## 2023-01-18 ENCOUNTER — NON-APPOINTMENT (OUTPATIENT)
Age: 63
End: 2023-01-18

## 2023-01-20 LAB
ALBUMIN SERPL ELPH-MCNC: 4.2 G/DL
ALP BLD-CCNC: 206 U/L
ALT SERPL-CCNC: 36 U/L
ANION GAP SERPL CALC-SCNC: 17 MMOL/L
AST SERPL-CCNC: 53 U/L
BASOPHILS # BLD AUTO: 0.09 K/UL
BASOPHILS NFR BLD AUTO: 0.6 %
BILIRUB SERPL-MCNC: 0.4 MG/DL
BUN SERPL-MCNC: 42 MG/DL
CALCIUM SERPL-MCNC: 9.6 MG/DL
CHLORIDE SERPL-SCNC: 105 MMOL/L
CHOLEST SERPL-MCNC: 168 MG/DL
CO2 SERPL-SCNC: 19 MMOL/L
CREAT SERPL-MCNC: 2.21 MG/DL
CRP SERPL-MCNC: 18 MG/L
DEPRECATED KAPPA LC FREE/LAMBDA SER: 3.38 RATIO
EGFR: 33 ML/MIN/1.73M2
EOSINOPHIL # BLD AUTO: 0.09 K/UL
EOSINOPHIL NFR BLD AUTO: 0.6 %
ERYTHROCYTE [SEDIMENTATION RATE] IN BLOOD BY WESTERGREN METHOD: 76 MM/HR
GLUCOSE SERPL-MCNC: 51 MG/DL
HCT VFR BLD CALC: 37.6 %
HDLC SERPL-MCNC: 39 MG/DL
HGB BLD-MCNC: 11.1 G/DL
IMM GRANULOCYTES NFR BLD AUTO: 0.3 %
KAPPA LC CSF-MCNC: 3.41 MG/DL
KAPPA LC SERPL-MCNC: 11.54 MG/DL
LDLC SERPL CALC-MCNC: 97 MG/DL
LYMPHOCYTES # BLD AUTO: 3.92 K/UL
LYMPHOCYTES NFR BLD AUTO: 27.2 %
M PROTEIN SPEC IFE-MCNC: NORMAL
MAN DIFF?: NORMAL
MCHC RBC-ENTMCNC: 29.5 GM/DL
MCHC RBC-ENTMCNC: 30 PG
MCV RBC AUTO: 101.6 FL
MONOCYTES # BLD AUTO: 0.79 K/UL
MONOCYTES NFR BLD AUTO: 5.5 %
NEUTROPHILS # BLD AUTO: 9.49 K/UL
NEUTROPHILS NFR BLD AUTO: 65.8 %
NONHDLC SERPL-MCNC: 129 MG/DL
NT-PROBNP SERPL-MCNC: ABNORMAL PG/ML
PLATELET # BLD AUTO: 299 K/UL
POTASSIUM SERPL-SCNC: 4.9 MMOL/L
PROT SERPL-MCNC: 8 G/DL
RBC # BLD: 3.7 M/UL
RBC # FLD: 16.3 %
SODIUM SERPL-SCNC: 141 MMOL/L
TRIGL SERPL-MCNC: 161 MG/DL
TSH SERPL-ACNC: 7.47 UIU/ML
WBC # FLD AUTO: 14.43 K/UL

## 2023-01-22 NOTE — ASSESSMENT
[FreeTextEntry1] : Briefly, Mr. Cain is a 61 y/o [place] M w/ h/o HTN, ? RA, RCC s/p partial R nephrectomy (4/22), CKD (b/l Cr 1.5; 0.9 5/22), HFrEF/NICM (EF 25-30%, LVEDD 4.1 cm) who presents for establishment of care. Currently appears overloaded and hypertensive. Unclear etiology of CM however given LVH, relative low voltage, neuropathy, amyloid still on the differential despite reportedly nl Tc-Pyp as well as possible AL (given Kappa band on serum IF). Other consideration is sarcoid despite reassuring cardiac MRI as has LAD on CT imaging. Requires further workup including likely cardiac biopsy. \par \par 1. HFrEF - rapidly progressive over past 1-2 years\par - change lasix to bumex; instructed to take 2 mg twice/day for 3 days, then 2 mg daily\par - start hydral 25 mg q8h \par - c/w Entresto 24/26 twice/day for now \par - change lopressor to toprol 75 mg daily; uptitrate when more compensated\par - c/w belkis 25 mg daily\par - monitor weight/BP at home\par - counseled extensively on disease process\par - check labs\par - will review cardiac MRI \par \par 2. CKD - Cr august 1.3; recently 1.5-2\par - may benefit from renal consult\par - c/w meds as above\par \par 3. Hypertension\par - meds as above\par \par 4. Plasma cell dyscrasia - ? amyloid vs MGUS\par - will need further heme w/u\par \par RTC 2 and 4 weeks with NP, then me in 6 weeks

## 2023-01-22 NOTE — CARDIOLOGY SUMMARY
[de-identified] : \par 12/14/22 - sinus, , PRWP, inferior Q waves [de-identified] : \par 7/26/21 - treadmill stress test - EF 71%, no ECG changes; negative for ischemia/infarct [de-identified] : \par 12/15/22 - septum 1.5 cm, PW 1.3 cm, LVEDD 4.1 cm, EF 28%, highly trabeculated apex, mild-mod TR, RVSP 60, RV dysfunction/enlargement, DT 78 msec, E/e' 28, LVOT VTI 12 cm, IVC 1.5 cm [de-identified] : \par 12/16/22 - nl pericardium; concentric LVH; EF 36%, subtle LGE along inferior hinge point of RV\par \par 3/8/22 - Chest CT PE protocol - dilated PA (4 cm), mildly enlarged subcarinal LN (1.1 cm), mildly enlarged R hilar LN (1.2 cm), clear lungs; mildly enlarged bilateral axillary LN (L>R); no PE [de-identified] : \par 12/16/22 - RHC - RA 5, RV 48/11, PA 46/24/33, PCWP9, CO/CI 4.9/2.94, PVR 4.8\par \par January 2022 ([facility]) - reportedly normal LHC [de-identified] : \par 1/3/22 ([facility]) Tc-Pyp scan - negative for TTR amyloid

## 2023-01-22 NOTE — HISTORY OF PRESENT ILLNESS
[FreeTextEntry1] : Briefly, Mr. Cain is a 61 y/o Venezuelan M w/ h/o HTN, ? RA, RCC s/p partial R nephrectomy (4/22), CKD (b/l Cr 1.5; 0.9 5/22), HFrEF/NICM (EF 25-30%, LVEDD 4.1 cm) who presents for establishment of care. Referred by Dr. Manuelito Carver. Accompanied by sister (Cesar). \par \par Per patient, had been well and was followed by Dr. Carver. Was found to have hypertension and was treated with anti-hypertensives. \[facility] mariusz Reports having dyspnea in 6/2021 and underwent stress test which was normal with EF 71%. No further workup was done and continued to work through that time. mariusz vee Was found to have to have tachycardia with dyspnea (reportedly had been going on for 6 months) and was admitted to [facility] 12/29/21 and was referred to [facility]. While there, he had a workup which showed a right kidney mass. Was told he had some LV dysfunction. Underwent a Tc-PYP scan 1/3/22 which was negative for TTR amyloid. Underwent an angiogram which was reportedly normal. \shantel vee Remained dyspneic and had seen Dr. Hill (pulmonary) with an unrevealing workup. Had a CTA which was negative for PE but showed lymphadenopathy. Underwent PFTs 4/5/22 which showed FEV1 2.46 (89%), FVC 2.97 (86%), FEV1/FVC 83% with DLCO 43% which was normal lung volumes but with decreased DLCO. Was prescribed oxygen which he used as needed. 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 Went back to work June 1st until December when he had acute on chronic dyspnea. An echo done by Dr. Carver showed severe LV dysfunction. Was admitted to [facility] 12/14-12/27 for acute hypoxic respiratory failure.Also noted to have renal dysfunction. Was placed on low dose medications. During admission, had workup for anemia with some suspicion of plasma cell dyscrasia with plan for outpatient BM biopsy. Kappa/lambda ratio of 3.23 (10.7/3.31) with serum IF revealing weak Kappa band. \par \par Has been having weight loss over past year, approximately 30-35 pounds. Reported to have a poor PO intake with early satiety. Denies fevers/chills. Denies prior Covid illness. Received Covid vaccines. \par \par Due to weight loss, diarrhea had chromogranin checked for possible carcinoid syndrome which was elevated at 156.5 (although unclear significance of this). A 5-HIAA test was recommended but wasn't sent.\par \par Since discharge, has been having worsening dyspnea and cough. Was admitted at [facility] where he underwent a repeat angiogram which was normal. Had an EP study which showed inducible VT (?) and had ICD placed. Noted to have positive LUIS FERNANDO and is undergoing rheumatologic workup.\par \par Since discharge from [facility], reports not feeling well. States BP has been elevated. Has been having a dry cough since December. Reports some LE swelling. \par \par Denies orthopnea but reports cough is worse with sitting up. Denies PND. ET is limited to couple of steps. \par \par Reports numbness in hands/feet for past 2 years. Reports cold intolerance and fingers become pale. \par \par Reports having diarrhea for the past year. States he has 2-3x/day that is non-bloody. Denies N/V. Had a screening colonoscopy 1 year prior which was reportedly normal; found to have 2 polyps.  \par \par Monitors BP at home which has been elevated; ranging 120-150.

## 2023-01-22 NOTE — PHYSICAL EXAM
[Well Developed] : well developed [Well Nourished] : well nourished [No Acute Distress] : no acute distress [Normal Conjunctiva] : normal conjunctiva [Normal Venous Pressure] : normal venous pressure [No Carotid Bruit] : no carotid bruit [Normal S1, S2] : normal S1, S2 [No Murmur] : no murmur [No Rub] : no rub [No Gallop] : no gallop [Clear Lung Fields] : clear lung fields [Good Air Entry] : good air entry [No Respiratory Distress] : no respiratory distress  [Non Tender] : non-tender [No Masses/organomegaly] : no masses/organomegaly [Normal Bowel Sounds] : normal bowel sounds [Normal Gait] : normal gait [No Edema] : no edema [No Cyanosis] : no cyanosis [No Clubbing] : no clubbing [No Varicosities] : no varicosities [No Rash] : no rash [No Skin Lesions] : no skin lesions [Moves all extremities] : moves all extremities [No Focal Deficits] : no focal deficits [Normal Speech] : normal speech [Alert and Oriented] : alert and oriented [Normal memory] : normal memory [de-identified] : mildly dysneic  [de-identified] : JVP elevated approx 10-12 cm w/ HJR [de-identified] : tachycardic [de-identified] : distended [de-identified] : 1+ pitting edema to thighs

## 2023-01-25 ENCOUNTER — APPOINTMENT (OUTPATIENT)
Dept: HEART FAILURE | Facility: CLINIC | Age: 63
End: 2023-01-25
Payer: COMMERCIAL

## 2023-01-25 ENCOUNTER — NON-APPOINTMENT (OUTPATIENT)
Age: 63
End: 2023-01-25

## 2023-01-25 VITALS — BODY MASS INDEX: 20.98 KG/M2 | WEIGHT: 130 LBS

## 2023-01-25 VITALS
BODY MASS INDEX: 20.98 KG/M2 | HEART RATE: 121 BPM | DIASTOLIC BLOOD PRESSURE: 89 MMHG | SYSTOLIC BLOOD PRESSURE: 128 MMHG | HEIGHT: 66 IN

## 2023-01-25 PROCEDURE — 99214 OFFICE O/P EST MOD 30 MIN: CPT | Mod: 25

## 2023-01-25 PROCEDURE — 36415 COLL VENOUS BLD VENIPUNCTURE: CPT

## 2023-01-25 PROCEDURE — 93000 ELECTROCARDIOGRAM COMPLETE: CPT

## 2023-01-25 NOTE — PHYSICAL EXAM
[Well Developed] : well developed [Well Nourished] : well nourished [No Acute Distress] : no acute distress [Normal Conjunctiva] : normal conjunctiva [Normal Venous Pressure] : normal venous pressure [No Carotid Bruit] : no carotid bruit [Normal S1, S2] : normal S1, S2 [No Murmur] : no murmur [No Rub] : no rub [No Gallop] : no gallop [Clear Lung Fields] : clear lung fields [Good Air Entry] : good air entry [No Respiratory Distress] : no respiratory distress  [Non Tender] : non-tender [No Masses/organomegaly] : no masses/organomegaly [Normal Bowel Sounds] : normal bowel sounds [Normal Gait] : normal gait [No Edema] : no edema [No Cyanosis] : no cyanosis [No Clubbing] : no clubbing [No Varicosities] : no varicosities [No Rash] : no rash [No Skin Lesions] : no skin lesions [Moves all extremities] : moves all extremities [No Focal Deficits] : no focal deficits [Normal Speech] : normal speech [Alert and Oriented] : alert and oriented [Normal memory] : normal memory [Soft] : abdomen soft [Normal] : alert and oriented, normal memory [de-identified] : mildly dyspneic  [de-identified] : JVP approx 8 cm [de-identified] : tachycardic

## 2023-01-25 NOTE — CARDIOLOGY SUMMARY
[de-identified] : 1/25/23 sinus tachycardia, , PRWP, inferior Q waves\par \par 12/14/22 - sinus, , PRWP, inferior Q waves [de-identified] : \par 7/26/21 - treadmill stress test - EF 71%, no ECG changes; negative for ischemia/infarct [de-identified] : \par 12/15/22 - septum 1.5 cm, PW 1.3 cm, LVEDD 4.1 cm, EF 28%, highly trabeculated apex, mild-mod TR, RVSP 60, RV dysfunction/enlargement, DT 78 msec, E/e' 28, LVOT VTI 12 cm, IVC 1.5 cm [de-identified] : \par 12/16/22 - nl pericardium; concentric LVH; EF 36%, subtle LGE along inferior hinge point of RV\par \par 3/8/22 - Chest CT PE protocol - dilated PA (4 cm), mildly enlarged subcarinal LN (1.1 cm), mildly enlarged R hilar LN (1.2 cm), clear lungs; mildly enlarged bilateral axillary LN (L>R); no PE [de-identified] : \par 1/3/22 ([facility]) Tc-Pyp scan - negative for TTR amyloid [de-identified] : \par 12/16/22 - RHC - RA 5, RV 48/11, PA 46/24/33, PCWP9, CO/CI 4.9/2.94, PVR 4.8\par \par January 2022 ([facility]) - reportedly normal LHC

## 2023-01-25 NOTE — ASSESSMENT
[FreeTextEntry1] : Briefly, Mr. Cain is a 61 y/o Mauritanian M w/ h/o HTN, ? RA, RCC s/p partial R nephrectomy (4/22), CKD (b/l Cr 1.5; 0.9 5/22), HFrEF/NICM (EF 25-30%, LVEDD 4.1 cm) who presents for establishment of care. Currently appears euvolemic and normotensive but tachycardia. Unclear etiology of CM however given LVH, relative low voltage, neuropathy, amyloid still on the differential despite reportedly nl Tc-Pyp as well as possible AL (given Kappa band on serum IF). Other consideration is sarcoid despite reassuring cardiac MRI as has LAD on CT imaging. Requires further workup including likely cardiac biopsy. \par \par 1. HFrEF - rapidly progressive over past 1-2 years\par - continue bumex 2 mg daily, may be able to decrease\par - continue hydral 25 mg q8h \par - c/w Entresto 24/26 twice/day , will check labs and if K and renal function are stable, will increase Entresto to 49-51 mg bid and will decrease bumex\par - increase toprol  to 100 mg qhs from 75 mg daily\par - c/w belkis 25 mg daily\par - monitor weight/BP at home\par - counseled extensively on disease process\par - check labs\par - will review cardiac MRI \par - S/P ICD implantation, will schedule ICD check 2/8 to transfer care to [facility]\par \par 2. CKD - Cr august 1.3; recently 1.5-2\par - may benefit from renal consult, will recheck toay\par - c/w meds as above\par \par 3. Hypertension- improved\par - meds as above\par \par 4. Plasma cell dyscrasia - ? amyloid vs MGUS\par - will need further heme w/u\par \par RTC 2 weeks with NP and with Dr. Cuellar in 4 weeks, will call with labs and med changes.

## 2023-01-25 NOTE — HISTORY OF PRESENT ILLNESS
[FreeTextEntry1] : Briefly, Mr. Cain is a 61 y/o Finnish M w/ h/o HTN, ? RA, RCC s/p partial R nephrectomy (4/22), CKD (b/l Cr 1.5; 0.9 5/22), HFrEF/NICM (EF 25-30%, LVEDD 4.1 cm) who presents for establishment of care. Referred by Dr. Manuelito Carver. Accompanied by sister (Cesar). \par \par Per patient, had been well and was followed by Dr. Carver. Was found to have hypertension and was treated with anti-hypertensives. \[facility] mariusz Reports having dyspnea in 6/2021 and underwent stress test which was normal with EF 71%. No further workup was done and continued to work through that time. mariusz vee Was found to have to have tachycardia with dyspnea (reportedly had been going on for 6 months) and was admitted to [facility] 12/29/21 and was referred to [facility]. While there, he had a workup which showed a right kidney mass. Was told he had some LV dysfunction. Underwent a Tc-PYP scan 1/3/22 which was negative for TTR amyloid. Underwent an angiogram which was reportedly normal. \shantel vee Remained dyspneic and had seen Dr. Hill (pulmonary) with an unrevealing workup. Had a CTA which was negative for PE but showed lymphadenopathy. Underwent PFTs 4/5/22 which showed FEV1 2.46 (89%), FVC 2.97 (86%), FEV1/FVC 83% with DLCO 43% which was normal lung volumes but with decreased DLCO. Was prescribed oxygen which he used as needed. 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 Went back to work June 1st until December when he had acute on chronic dyspnea. An echo done by Dr. Carver showed severe LV dysfunction. Was admitted to [facility] 12/14-12/27 for acute hypoxic respiratory failure.Also noted to have renal dysfunction. Was placed on low dose medications. During admission, had workup for anemia with some suspicion of plasma cell dyscrasia with plan for outpatient BM biopsy. Kappa/lambda ratio of 3.23 (10.7/3.31) with serum IF revealing weak Kappa band. \par \par Has been having weight loss over past year, approximately 30-35 pounds. \par \par Reported to have a poor PO intake with early satiety. Denies fevers/chills. Denies prior Covid illness. Received Covid vaccines. \par \par Due to weight loss, diarrhea had chromogranin checked for possible carcinoid syndrome which was elevated at 156.5 (although unclear significance of this). A 5-HIAA test was recommended but wasn't sent.\par \par Since discharge, has been having worsening dyspnea and cough. Was admitted at [facility] where he underwent a repeat angiogram which was normal. Had an EP study with Dr. Balderas which showed inducible VT (?) and had ICD placed. Noted to have positive LUIS FERNANDO and is undergoing rheumatologic workup.\par \par Since discharge from [facility], reported not feeling well. States BP had been elevated. Has been having a dry cough since December. Reported some LE swelling which has resolved.. \par \par Last visit on 1/13/23, He was found to be volume overloaded and Dr. Cuellar changed lasix to bumex; instructed to take 2 mg twice/day for 3 days, then 2 mg daily, started hydral 25 mg q8h and has since increased to 50 mg q 8 hours, continued Entresto 24/26 twice/day  and changed lopressor to toprol 75 mg daily, continued  belkis 25 mg daily.  labs notable for serum pro BNP 12,067 and creat 2.21 and ESR 76. \par \par Denies orthopnea but reports cough is worse with sitting up. Denies PND. ET is improved from last visit and can walk up to 1 block from prior, was limited to couple of steps. States he can walk up 5 steps and then rests before continuing.  \par \par Reports numbness in hands/feet for past 2 years. Reports cold intolerance and fingers become pale. \par \par Reports having diarrhea for the past year. States he has 2-3x/day that is non-bloody. Denies N/V. Had a screening colonoscopy 1 year prior which was reportedly normal; found to have 2 polyps.  \par \par Monitors BP at home which has improved from last visit with 145/91 on 1/13 and recent B/P range 108/84 to 118/74 and weight 130 lbs.from 135 lbs last visit. \par \par He denies chest pain, palpitations, dizziness/LH, syncope and no ICD shocks. He wants to establish care in the device clinic at [facility], will request a transfer of remote with an in person device check next visit.

## 2023-01-27 LAB
ALBUMIN SERPL ELPH-MCNC: 4.3 G/DL
ALP BLD-CCNC: 159 U/L
ALT SERPL-CCNC: 16 U/L
ANION GAP SERPL CALC-SCNC: 18 MMOL/L
AST SERPL-CCNC: 30 U/L
BILIRUB SERPL-MCNC: 0.3 MG/DL
BUN SERPL-MCNC: 46 MG/DL
CALCIUM SERPL-MCNC: 9.7 MG/DL
CHLORIDE SERPL-SCNC: 101 MMOL/L
CO2 SERPL-SCNC: 24 MMOL/L
CREAT SERPL-MCNC: 1.53 MG/DL
EGFR: 51 ML/MIN/1.73M2
NT-PROBNP SERPL-MCNC: 4423 PG/ML
POTASSIUM SERPL-SCNC: 4.2 MMOL/L
PROT SERPL-MCNC: 8.2 G/DL
SODIUM SERPL-SCNC: 142 MMOL/L

## 2023-02-06 NOTE — DISCHARGE NOTE PROVIDER - NSDCCPGOAL_GEN_ALL_CORE_FT
To get better and follow your care plan as instructed. Quality 431: Preventive Care And Screening: Unhealthy Alcohol Use - Screening: Patient screened for unhealthy alcohol use using a single question and scores less than 2 times per year Quality 110: Preventive Care And Screening: Influenza Immunization: Influenza Immunization Administered during Influenza season Quality 111:Pneumonia Vaccination Status For Older Adults: Pneumococcal Vaccination Previously Received Quality 128: Preventive Care And Screening: Body Mass Index (Bmi) Screening And Follow-Up Plan: BMI is documented within normal parameters and no follow-up plan is required. Quality 130: Documentation Of Current Medications In The Medical Record: Current Medications Documented Quality 402: Tobacco Use And Help With Quitting Among Adolescents: Patient screened for tobacco and never smoked Detail Level: Detailed

## 2023-02-08 ENCOUNTER — NON-APPOINTMENT (OUTPATIENT)
Age: 63
End: 2023-02-08

## 2023-02-08 ENCOUNTER — APPOINTMENT (OUTPATIENT)
Dept: HEART FAILURE | Facility: CLINIC | Age: 63
End: 2023-02-08
Payer: COMMERCIAL

## 2023-02-08 ENCOUNTER — APPOINTMENT (OUTPATIENT)
Dept: ELECTROPHYSIOLOGY | Facility: CLINIC | Age: 63
End: 2023-02-08
Payer: COMMERCIAL

## 2023-02-08 VITALS
HEART RATE: 99 BPM | HEIGHT: 66 IN | BODY MASS INDEX: 20.93 KG/M2 | SYSTOLIC BLOOD PRESSURE: 110 MMHG | DIASTOLIC BLOOD PRESSURE: 78 MMHG | WEIGHT: 130.25 LBS

## 2023-02-08 LAB
ALBUMIN SERPL ELPH-MCNC: 4.2 G/DL
ALP BLD-CCNC: 163 U/L
ALT SERPL-CCNC: 16 U/L
ANION GAP SERPL CALC-SCNC: 15 MMOL/L
AST SERPL-CCNC: 26 U/L
BILIRUB SERPL-MCNC: 0.3 MG/DL
BUN SERPL-MCNC: 33 MG/DL
CALCIUM SERPL-MCNC: 9.7 MG/DL
CHLORIDE SERPL-SCNC: 104 MMOL/L
CO2 SERPL-SCNC: 22 MMOL/L
CREAT SERPL-MCNC: 1.58 MG/DL
EGFR: 49 ML/MIN/1.73M2
NT-PROBNP SERPL-MCNC: 6322 PG/ML
POTASSIUM SERPL-SCNC: 4.8 MMOL/L
PROT SERPL-MCNC: 7.6 G/DL
SODIUM SERPL-SCNC: 142 MMOL/L

## 2023-02-08 PROCEDURE — 93000 ELECTROCARDIOGRAM COMPLETE: CPT

## 2023-02-08 PROCEDURE — 93282 PRGRMG EVAL IMPLANTABLE DFB: CPT

## 2023-02-08 PROCEDURE — 99214 OFFICE O/P EST MOD 30 MIN: CPT | Mod: 25

## 2023-02-08 PROCEDURE — 36415 COLL VENOUS BLD VENIPUNCTURE: CPT

## 2023-02-09 NOTE — ADDENDUM
[FreeTextEntry1] : Labs reviewed with K 4.8 and creat 1.5, will increase Entresto to  mg bid and will restart belkis 12.5 mg daily.

## 2023-02-09 NOTE — PHYSICAL EXAM
[Well Developed] : well developed [Well Nourished] : well nourished [No Acute Distress] : no acute distress [Normal Conjunctiva] : normal conjunctiva [Normal Venous Pressure] : normal venous pressure [No Carotid Bruit] : no carotid bruit [Normal S1, S2] : normal S1, S2 [No Murmur] : no murmur [No Rub] : no rub [No Gallop] : no gallop [Clear Lung Fields] : clear lung fields [Good Air Entry] : good air entry [No Respiratory Distress] : no respiratory distress  [Soft] : abdomen soft [Non Tender] : non-tender [No Masses/organomegaly] : no masses/organomegaly [Normal Bowel Sounds] : normal bowel sounds [Normal Gait] : normal gait [No Cyanosis] : no cyanosis [No Clubbing] : no clubbing [No Varicosities] : no varicosities [No Rash] : no rash [No Skin Lesions] : no skin lesions [Moves all extremities] : moves all extremities [No Focal Deficits] : no focal deficits [Normal Speech] : normal speech [Normal] : alert and oriented, normal memory [Alert and Oriented] : alert and oriented [Normal memory] : normal memory [de-identified] : mildly dyspneic  [de-identified] : JVP approx 8-10 cm [de-identified] : tachycardic, Left  hest ICD [de-identified] : trace lower extremitye edema bilaterally, goes down at night

## 2023-02-09 NOTE — ASSESSMENT
[FreeTextEntry1] : Briefly, Mr. Cain is a 63 y/o [place] M w/ h/o HTN, ? RA, RCC s/p partial R nephrectomy (4/22), CKD (b/l Cr 1.5; 0.9 5/22), HFrEF/NICM (EF 25-30%, LVEDD 4.1 cm) who presents for establishment of care. Currently appears euvolemic and normotensive but tachycardia. Unclear etiology of CM however given LVH, relative low voltage, neuropathy, amyloid still on the differential despite reportedly nl Tc-Pyp as well as possible AL (given Kappa band on serum IF). Other consideration is sarcoid despite reassuring cardiac MRI as has LAD on CT imaging. Requires further workup including likely cardiac biopsy. Appears mildly volume overloaded and normotensive \par \par 1. HFrEF - rapidly progressive over past 1-2 years\par - take additional Bumex 2 mg today and then continue Bumex 2 mg daily with additional as needed for weight gain of 2-3 lbs in 2-3 days\par - increase hydra to 50 mg q 8 from 50 mg bid. \par - c/w Entresto 49/51 twice/day , will check labs and if K and renal function are stable, will increase Entresto to  mg bid.\par - continue Toprol 100 mg qhs \par - pt is not taking belkis 25 mg daily, may resume if K and renal function are stable\par - monitor weight/BP at home\par - counseled extensively on disease process\par - check labs\par - will review cardiac MRI \par - S/P ICD implantation 12/29/22. He had a device check today to transfer care and has a Biotronik ICD set at VDI 40 with no events, presenting rhythm 's, implanted by Dr Balderas at [facility]. \par \par 2. CKD - Cr august 1.3; recently 1.5-2\par - may benefit from renal consult, will recheck today\par - c/w meds as above\par \par 3. Hypertension- improved\par - meds as above\par \par 4. Plasma cell dyscrasia - ? amyloid vs MGUS\par - will need further heme w/u\par \par RTC 3 weeks with Dr. Cuellar in 4 weeks, will call with labs and med changes.

## 2023-02-09 NOTE — DISCHARGE NOTE NURSING/CASE MANAGEMENT/SOCIAL WORK - NSDCPNINST_GEN_ALL_CORE
Follow up with Dr. Gardner as instructed. Notify your provider if you experience fevers over 100.4, inability to urinate, dark red urine and/or blood clots in your urine, persistent nausea vomiting, pain unrelieved with pain medication. Drink plenty of fluids. Avoid straining and strenuous activity. You may take over the counter stool softeners as needed. 
Alert-The patient is alert, awake and responds to voice. The patient is oriented to time, place, and person. The triage nurse is able to obtain subjective information.

## 2023-02-09 NOTE — CARDIOLOGY SUMMARY
[de-identified] : 2/8/23 sinus tachycardia, , PRWP, inferior Q waves\par \par 1/25/23 sinus tachycardia, , PRWP, inferior Q waves\par \par 12/14/22 - sinus, , PRWP, inferior Q waves [de-identified] : \par 7/26/21 - treadmill stress test - EF 71%, no ECG changes; negative for ischemia/infarct [de-identified] : \par 12/15/22 - septum 1.5 cm, PW 1.3 cm, LVEDD 4.1 cm, EF 28%, highly trabeculated apex, mild-mod TR, RVSP 60, RV dysfunction/enlargement, DT 78 msec, E/e' 28, LVOT VTI 12 cm, IVC 1.5 cm [de-identified] : \par 12/16/22 - nl pericardium; concentric LVH; EF 36%, subtle LGE along inferior hinge point of RV\par \par 3/8/22 - Chest CT PE protocol - dilated PA (4 cm), mildly enlarged subcarinal LN (1.1 cm), mildly enlarged R hilar LN (1.2 cm), clear lungs; mildly enlarged bilateral axillary LN (L>R); no PE [de-identified] : \par 1/3/22 ([facility]) Tc-Pyp scan - negative for TTR amyloid [de-identified] : \par 12/16/22 - RHC - RA 5, RV 48/11, PA 46/24/33, PCWP9, CO/CI 4.9/2.94, PVR 4.8\par \par January 2022 ([facility]) - reportedly normal LHC

## 2023-02-09 NOTE — HISTORY OF PRESENT ILLNESS
[FreeTextEntry1] : Briefly, Mr. Cain is a 63 y/o Maldivian M w/ h/o HTN, ? RA, RCC s/p partial R nephrectomy (4/22), CKD (b/l Cr 1.5; 0.9 5/22), HFrEF/NICM (EF 25-30%, LVEDD 4.1 cm) who presents for establishment of care. Referred by Dr. Manuelito Carver. Accompanied by sister (Cesar). \par \par Per patient, had been well and was followed by Dr. Carver. Was found to have hypertension and was treated with antihypertensives. \shantel vee Reports having dyspnea in 6/2021 and underwent stress test which was normal with EF 71%. No further workup was done and continued to work through that time. mariusz vee Was found to have to have tachycardia with dyspnea (reportedly had been going on for 6 months) and was admitted to [place] 12/29/21 and was referred to [place]. While there, he had a workup which showed a right kidney mass. Was told he had some LV dysfunction. Underwent a Tc-PYP scan 1/3/22 which was negative for TTR amyloid. Underwent an angiogram which was reportedly normal. mariusz vee Remained dyspneic and had seen Dr. Hill (pulmonary) with an unrevealing workup. Had a CTA which was negative for PE but showed lymphadenopathy. Underwent PFTs 4/5/22 which showed FEV1 2.46 (89%), FVC 2.97 (86%), FEV1/FVC 83% with DLCO 43% which was normal lung volumes but with decreased DLCO. Was prescribed oxygen which he used as needed. 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 Went back to work June 1st until December when he had acute on chronic dyspnea. An echo done by Dr. Carver showed severe LV dysfunction. Was admitted to [facility] 12/14-12/27 for acute hypoxic respiratory failure.Also noted to have renal dysfunction. Was placed on low dose medications. During admission, had workup for anemia with some suspicion of plasma cell dyscrasia with plan for outpatient BM biopsy. Kappa/lambda ratio of 3.23 (10.7/3.31) with serum IF revealing weak Kappa band. \par \par Has been having weight loss over past year, approximately 30-35 pounds. \par \par Reported to have a poor PO intake with early satiety. Denies fevers/chills. Denies prior Covid illness. Received Covid vaccines. \par \par Due to weight loss, diarrhea had chromogranin checked for possible carcinoid syndrome which was elevated at 156.5 (although unclear significance of this). A 5-HIAA test was recommended but wasn't sent.\par \par Since discharge, has been having worsening dyspnea and cough. Was admitted at [place] where he underwent a repeat angiogram which was normal. Had an EP study with Dr. Balderas which showed inducible VT (?) and had ICD placed. Noted to have positive LUIS FERNANDO and is undergoing rheumatologic workup.\par \par Reports numbness in hands/feet for past 2 years. Reports cold intolerance and fingers become pale. \par \par Since discharge from [place], reported not feeling well. States BP had been elevated. Has been having a dry cough since December. Reported some LE swelling which has resolved.. \par \par Last visit on 1/2523, He was found to be compensated and Entresto was increased to 49-51 mg bid and metoprolol increased to 100 mg qhs. States he is not taking belkis 25 mg daily, thought it was stopped. He is taking hydral 50 mg bid, not tid. \par 1/25/23 labs notable for K 4.2 and creat 1.53, serum pro BNP serum pro BNP 4423 from  12,067 and creat 2.21 and ESR 76. \par \par Denies orthopnea/ PND but has dry cough during day and night. ET is improved since d/c and can walk up to 1 block from prior, was limited to couple of steps but he has dyspnea and his legs feel weak after. States he can walk up 5 steps and then rests before continuing.  He has poor appetite and feels full after small amounts of food. \par \par Reports having diarrhea for the past year. States he has 2-3x/day that is non-bloody. Denies N/V. Had a screening colonoscopy 1 year prior which was reportedly normal; found to have 2 polyps.  \par \[facility] Monitors BP at home which has been in the range of 119/62 to 128/ 62. Weight  range 130-134 lbs. \par \par He denies chest pain, palpitations, dizziness/LH, syncope and no ICD shocks. Pt had ICD check  today; he has a Biotronik ICD set at VDI 40 with no events, presenting rhythm 's, implanted by Dr Balderas at [facility] 12/29/22.

## 2023-02-22 ENCOUNTER — NON-APPOINTMENT (OUTPATIENT)
Age: 63
End: 2023-02-22

## 2023-02-22 ENCOUNTER — APPOINTMENT (OUTPATIENT)
Dept: HEART FAILURE | Facility: CLINIC | Age: 63
End: 2023-02-22
Payer: COMMERCIAL

## 2023-02-22 ENCOUNTER — INPATIENT (INPATIENT)
Facility: HOSPITAL | Age: 63
LOS: 8 days | Discharge: HOME | End: 2023-03-03
Attending: GENERAL PRACTICE | Admitting: GENERAL PRACTICE
Payer: COMMERCIAL

## 2023-02-22 VITALS
HEIGHT: 66 IN | HEART RATE: 105 BPM | SYSTOLIC BLOOD PRESSURE: 132 MMHG | BODY MASS INDEX: 21.05 KG/M2 | WEIGHT: 131 LBS | DIASTOLIC BLOOD PRESSURE: 92 MMHG | TEMPERATURE: 98.6 F

## 2023-02-22 VITALS
DIASTOLIC BLOOD PRESSURE: 93 MMHG | HEART RATE: 101 BPM | TEMPERATURE: 98 F | SYSTOLIC BLOOD PRESSURE: 122 MMHG | RESPIRATION RATE: 18 BRPM

## 2023-02-22 DIAGNOSIS — Z98.890 OTHER SPECIFIED POSTPROCEDURAL STATES: Chronic | ICD-10-CM

## 2023-02-22 LAB
ALBUMIN SERPL ELPH-MCNC: 4 G/DL — SIGNIFICANT CHANGE UP (ref 3.3–5)
ALP SERPL-CCNC: 178 U/L — HIGH (ref 40–120)
ALT FLD-CCNC: 24 U/L — SIGNIFICANT CHANGE UP (ref 4–41)
ANION GAP SERPL CALC-SCNC: 12 MMOL/L — SIGNIFICANT CHANGE UP (ref 7–14)
APTT BLD: 32.2 SEC — SIGNIFICANT CHANGE UP (ref 27–36.3)
AST SERPL-CCNC: 33 U/L — SIGNIFICANT CHANGE UP (ref 4–40)
B PERT DNA SPEC QL NAA+PROBE: SIGNIFICANT CHANGE UP
B PERT+PARAPERT DNA PNL SPEC NAA+PROBE: SIGNIFICANT CHANGE UP
BASE EXCESS BLDV CALC-SCNC: -4.1 MMOL/L — LOW (ref -2–3)
BASOPHILS # BLD AUTO: 0.09 K/UL — SIGNIFICANT CHANGE UP (ref 0–0.2)
BASOPHILS NFR BLD AUTO: 0.8 % — SIGNIFICANT CHANGE UP (ref 0–2)
BILIRUB SERPL-MCNC: 0.5 MG/DL — SIGNIFICANT CHANGE UP (ref 0.2–1.2)
BLD GP AB SCN SERPL QL: NEGATIVE — SIGNIFICANT CHANGE UP
BLOOD GAS VENOUS COMPREHENSIVE RESULT: SIGNIFICANT CHANGE UP
BORDETELLA PARAPERTUSSIS (RAPRVP): SIGNIFICANT CHANGE UP
BUN SERPL-MCNC: 25 MG/DL — HIGH (ref 7–23)
C PNEUM DNA SPEC QL NAA+PROBE: SIGNIFICANT CHANGE UP
CALCIUM SERPL-MCNC: 10 MG/DL — SIGNIFICANT CHANGE UP (ref 8.4–10.5)
CHLORIDE BLDV-SCNC: 109 MMOL/L — HIGH (ref 96–108)
CHLORIDE SERPL-SCNC: 111 MMOL/L — HIGH (ref 98–107)
CO2 BLDV-SCNC: 24 MMOL/L — SIGNIFICANT CHANGE UP (ref 22–26)
CO2 SERPL-SCNC: 21 MMOL/L — LOW (ref 22–31)
CREAT SERPL-MCNC: 1.4 MG/DL — HIGH (ref 0.5–1.3)
EGFR: 56 ML/MIN/1.73M2 — LOW
EOSINOPHIL # BLD AUTO: 0.22 K/UL — SIGNIFICANT CHANGE UP (ref 0–0.5)
EOSINOPHIL NFR BLD AUTO: 1.9 % — SIGNIFICANT CHANGE UP (ref 0–6)
FLUAV SUBTYP SPEC NAA+PROBE: SIGNIFICANT CHANGE UP
FLUBV RNA SPEC QL NAA+PROBE: SIGNIFICANT CHANGE UP
GAS PNL BLDV: 143 MMOL/L — SIGNIFICANT CHANGE UP (ref 136–145)
GAS PNL BLDV: SIGNIFICANT CHANGE UP
GLUCOSE BLDV-MCNC: 87 MG/DL — SIGNIFICANT CHANGE UP (ref 70–99)
GLUCOSE SERPL-MCNC: 87 MG/DL — SIGNIFICANT CHANGE UP (ref 70–99)
HADV DNA SPEC QL NAA+PROBE: SIGNIFICANT CHANGE UP
HCO3 BLDV-SCNC: 23 MMOL/L — SIGNIFICANT CHANGE UP (ref 22–29)
HCOV 229E RNA SPEC QL NAA+PROBE: SIGNIFICANT CHANGE UP
HCOV HKU1 RNA SPEC QL NAA+PROBE: SIGNIFICANT CHANGE UP
HCOV NL63 RNA SPEC QL NAA+PROBE: SIGNIFICANT CHANGE UP
HCOV OC43 RNA SPEC QL NAA+PROBE: SIGNIFICANT CHANGE UP
HCT VFR BLD CALC: 40 % — SIGNIFICANT CHANGE UP (ref 39–50)
HCT VFR BLDA CALC: 38 % — LOW (ref 39–51)
HGB BLD CALC-MCNC: 12.5 G/DL — LOW (ref 12.6–17.4)
HGB BLD-MCNC: 12.1 G/DL — LOW (ref 13–17)
HMPV RNA SPEC QL NAA+PROBE: SIGNIFICANT CHANGE UP
HPIV1 RNA SPEC QL NAA+PROBE: SIGNIFICANT CHANGE UP
HPIV2 RNA SPEC QL NAA+PROBE: SIGNIFICANT CHANGE UP
HPIV3 RNA SPEC QL NAA+PROBE: SIGNIFICANT CHANGE UP
HPIV4 RNA SPEC QL NAA+PROBE: SIGNIFICANT CHANGE UP
IANC: 6.43 K/UL — SIGNIFICANT CHANGE UP (ref 1.8–7.4)
IMM GRANULOCYTES NFR BLD AUTO: 0.3 % — SIGNIFICANT CHANGE UP (ref 0–0.9)
INR BLD: 1.34 RATIO — HIGH (ref 0.88–1.16)
LACTATE BLDV-MCNC: 3.2 MMOL/L — HIGH (ref 0.5–2)
LYMPHOCYTES # BLD AUTO: 34.9 % — SIGNIFICANT CHANGE UP (ref 13–44)
LYMPHOCYTES # BLD AUTO: 4.13 K/UL — HIGH (ref 1–3.3)
M PNEUMO DNA SPEC QL NAA+PROBE: SIGNIFICANT CHANGE UP
MCHC RBC-ENTMCNC: 29.7 PG — SIGNIFICANT CHANGE UP (ref 27–34)
MCHC RBC-ENTMCNC: 30.3 GM/DL — LOW (ref 32–36)
MCV RBC AUTO: 98 FL — SIGNIFICANT CHANGE UP (ref 80–100)
MONOCYTES # BLD AUTO: 0.95 K/UL — HIGH (ref 0–0.9)
MONOCYTES NFR BLD AUTO: 8 % — SIGNIFICANT CHANGE UP (ref 2–14)
NEUTROPHILS # BLD AUTO: 6.43 K/UL — SIGNIFICANT CHANGE UP (ref 1.8–7.4)
NEUTROPHILS NFR BLD AUTO: 54.1 % — SIGNIFICANT CHANGE UP (ref 43–77)
NRBC # BLD: 0 /100 WBCS — SIGNIFICANT CHANGE UP (ref 0–0)
NRBC # FLD: 0 K/UL — SIGNIFICANT CHANGE UP (ref 0–0)
NT-PROBNP SERPL-SCNC: 6513 PG/ML — HIGH
PCO2 BLDV: 47 MMHG — SIGNIFICANT CHANGE UP (ref 42–55)
PH BLDV: 7.29 — LOW (ref 7.32–7.43)
PLATELET # BLD AUTO: 271 K/UL — SIGNIFICANT CHANGE UP (ref 150–400)
PO2 BLDV: 24 MMHG — LOW (ref 25–45)
POTASSIUM BLDV-SCNC: 5 MMOL/L — SIGNIFICANT CHANGE UP (ref 3.5–5.1)
POTASSIUM SERPL-MCNC: 5 MMOL/L — SIGNIFICANT CHANGE UP (ref 3.5–5.3)
POTASSIUM SERPL-SCNC: 5 MMOL/L — SIGNIFICANT CHANGE UP (ref 3.5–5.3)
PROT SERPL-MCNC: 7.8 G/DL — SIGNIFICANT CHANGE UP (ref 6–8.3)
PROTHROM AB SERPL-ACNC: 15.6 SEC — HIGH (ref 10.5–13.4)
RAPID RVP RESULT: SIGNIFICANT CHANGE UP
RBC # BLD: 4.08 M/UL — LOW (ref 4.2–5.8)
RBC # FLD: 15.1 % — HIGH (ref 10.3–14.5)
RH IG SCN BLD-IMP: NEGATIVE — SIGNIFICANT CHANGE UP
RSV RNA SPEC QL NAA+PROBE: SIGNIFICANT CHANGE UP
RV+EV RNA SPEC QL NAA+PROBE: SIGNIFICANT CHANGE UP
SAO2 % BLDV: 25.3 % — LOW (ref 67–88)
SARS-COV-2 RNA SPEC QL NAA+PROBE: SIGNIFICANT CHANGE UP
SODIUM SERPL-SCNC: 144 MMOL/L — SIGNIFICANT CHANGE UP (ref 135–145)
TROPONIN T, HIGH SENSITIVITY RESULT: 88 NG/L — CRITICAL HIGH
WBC # BLD: 11.85 K/UL — HIGH (ref 3.8–10.5)
WBC # FLD AUTO: 11.85 K/UL — HIGH (ref 3.8–10.5)

## 2023-02-22 PROCEDURE — 99215 OFFICE O/P EST HI 40 MIN: CPT | Mod: 25

## 2023-02-22 PROCEDURE — 93000 ELECTROCARDIOGRAM COMPLETE: CPT

## 2023-02-22 PROCEDURE — 99285 EMERGENCY DEPT VISIT HI MDM: CPT

## 2023-02-22 PROCEDURE — 71045 X-RAY EXAM CHEST 1 VIEW: CPT | Mod: 26

## 2023-02-22 RX ORDER — BUMETANIDE 0.25 MG/ML
2 INJECTION INTRAMUSCULAR; INTRAVENOUS ONCE
Refills: 0 | Status: COMPLETED | OUTPATIENT
Start: 2023-02-22 | End: 2023-02-22

## 2023-02-22 RX ADMIN — BUMETANIDE 2 MILLIGRAM(S): 0.25 INJECTION INTRAMUSCULAR; INTRAVENOUS at 23:34

## 2023-02-22 NOTE — ED PROVIDER NOTE - ATTENDING APP SHARED VISIT CONTRIBUTION OF CARE
Patient is a 61 yo M with history of HTN, OA, RA, chronic idiopathic SOB/dyspnea on intermittent home O2 found to have right kidney mass. 4/29 s/p RAL R partial nephrectomy, history of CHF on entresto and Bumex here for progressive worsening of shortness of breath. He reports worsening progressive shortness of breath, AVALOS over the past 6 months. He uses O2 at home as needed. Patient had previous admission 12/14/22 - 12/22/22 for acute hypoxic respiratory failure likely with a component of pulmonary edema.  cardioloigst: Michael Cuellar  VS noted  Gen. no acute distress, Non toxic   HEENT: EOMI, mmm  Lungs: CTAB/L no C/ W /R   CVS: RRR   Abd; Soft non tender, non distended   Ext: b/l +1 pitting edema  Skin: no rash  Neuro: awake, alert, non focal clear speech  a/p: AVALOS, progressive - cardiology saw patient and recommends admission. Plan for labs, CXR, bumex 2 mg IV. TBA.   - Mary VICENTE Patient is a 61 yo M with history of HTN, OA, RA, chronic idiopathic SOB/dyspnea on intermittent home O2 found to have right kidney mass. 4/29 s/p RAL R partial nephrectomy, history of CHF on entresto and Bumex here for progressive worsening of shortness of breath. He reports worsening progressive shortness of breath, AVALOS over the past 6 months. He has had recent cough and orthopnea. He uses O2 at home as needed. Patient had previous admission 12/14/22 - 12/22/22 for acute hypoxic respiratory failure likely with a component of pulmonary edema. Patient saw Dr. Cuellar, cardiology, in the office today and was told to come to the hospital for admission and treatment.     cardiologist: Michael Cuellar.  VS noted  Gen. no acute distress, Non toxic   HEENT: EOMI, mmm  Lungs: CTAB/L no C/ W /R   CVS: RRR   Abd; Soft non tender, non distended   Ext: b/l +1 pitting edema  Skin: no rash  Neuro: awake, alert, non focal clear speech  a/p: AVALOS, progressive - cardiology saw patient and recommends admission. Plan for labs, CXR, bumex 2 mg IV. TBA.   - Mary VICENTE Patient is a 61 yo M with history of HTN, OA, RA, chronic idiopathic SOB/dyspnea on intermittent home O2 found to have right kidney mass. 4/29 s/p RAL R partial nephrectomy, history of CHF on entresto and Bumex here for progressive worsening of shortness of breath. He reports worsening progressive shortness of breath, AVALOS over the past 6 months. He has had recent cough and orthopnea. He uses O2 at home as needed. Patient had previous admission 12/14/22 - 12/22/22 for acute hypoxic respiratory failure likely with a component of pulmonary edema. Patient saw Dr. Cuellar, cardiology, in the office today and was told to come to the hospital for admission and treatment.     cardiologist: Michael Cuellar.  VS noted  Gen. no acute distress, Non toxic   HEENT: EOMI, mmm  Lungs: CTAB/L no C/ W /R   CVS: RRR   Abd; Soft non tender, non distended   Ext: b/l +1 pitting edema  Skin: no rash  Neuro: awake, alert, non focal clear speech  a/p: AVALOS, progressive - Concern for worsening CHF given AVALOS. cardiology saw patient and recommends admission. Plan for labs, CXR, bumex 2 mg IV. TBA.   - Mary VICENTE

## 2023-02-22 NOTE — CHART NOTE - NSCHARTNOTEFT_GEN_A_CORE
Patient seen in HF clinic by Dr. Michael Cuellar today.     Briefly, Mr. Cain is a 61 y/o Filipino M w/ h/o HTN, ? 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 Referred by Dr. Manuelito Carver. Accompanied by wife (Aliya).     Plan for ED:  Admit for ADHF with volume overload.    Please check:   CMP, proBNP, lactate, CBC.  CXR  Give Bumex 2 mg IV BID.   Pending BP and BUN/Cr, K - resume Entresto 97/103 mg po BID, and spironolactone 25 mg po qd. Hold for SBP <90.  Increase hydralazine to 50 mg po TID.   Continue Toprol 100 mg po qhs, hold for SBP <90.     Official consult from HF team to follow tomorrow.   The above is as d/w Dr. Michael Cuellar.   Call Spec [phone] or [phone] ([facility] overnight team) with questions. Patient seen in HF clinic by Dr. Michael Cuellar today.     Briefly, Mr. Cain is a 61 y/o [place] M w/ h/o HTN, ? RA, RCC s/p partial R nephrectomy (4/22), CKD (b/l Cr 1.5; 0.9 5/22), HFrEF/NICM (EF 25-30%, LVEDD 4.1 cm) of unclear etiology s/p ICD, plasma cell dyscrasia (Kappa/lambda 3.23) who presents for establishment of care at HF clinic today. Referred by Dr. Manuelito Carver. Accompanied by wife (Aliya). Sent to ED for ADHF.     Plan for ED:  Admit for ADHF with volume overload.    Please check:   CMP, proBNP, lactate, CBC.  CXR  Give Bumex 2 mg IV BID.   Pending BP and BUN/Cr, K - resume Entresto 97/103 mg po BID, and spironolactone 25 mg po qd. Hold for SBP <90.  Increase hydralazine to 50 mg po TID.   Continue Toprol 100 mg po qhs, hold for SBP <90.     Official consult from HF team to follow tomorrow.   The above is as d/w Dr. Michael Cuellar.   Call Spec [phone] or [phone] ([facility] overnight team) with questions.

## 2023-02-22 NOTE — ED PROVIDER NOTE - CLINICAL SUMMARY MEDICAL DECISION MAKING FREE TEXT BOX
62yoM with PMH of HTN, RA, RCC s/p R partial nephrectomy, CKD, HFrEF/NICM EF 25-30%, ICD, plasma cell dyscrasia, seen at HF clinic today, sent to ED for worsening heart failure. pt admits to progressively increasing SOB, AVALOS with cough, worsening at night over past 6 months, however increasingly worse over past month. pt on bumex and entresto.    Cardio evaluated, recommending inpatient diuresis and further monitoring.

## 2023-02-22 NOTE — ED PROVIDER NOTE - OBJECTIVE STATEMENT
62yoM with PMH of HTN, RA, RCC s/p R partial nephrectomy, CKD, HFrEF/NICM EF 25-30%, ICD, plasma cell dyscrasia, seen at HF clinic today, sent to ED for worsening heart failure. pt admits to progressively increasing SOB, AVALOS with cough, worsening at night over past 6 months, however increasingly worse over past month. pt on bumex and entresto. Pt using O2 at home as needed. Pt denies any chest pain, fevers, recent surgeries, dizziness, bloody stools. Follows with Cardio, DR. Michael Cuellar.

## 2023-02-22 NOTE — ED ADULT TRIAGE NOTE - CHIEF COMPLAINT QUOTE
pt sent from cardiologist office for SOB and cough x 1 year. pt also reports swelling to B/L LE. pt denies chest pain, n/v/d, fevers, chills. PMH CHF, PM, HTN. unable to obtain O2 in triage.

## 2023-02-22 NOTE — ED ADULT NURSE NOTE - OBJECTIVE STATEMENT
Pt presents to ED brought in by EMS for SOB and cough. Pt states that he has been dealing with SOB and cough with intermittent CP x1 year. Pt had PPM placed about a month ago, and since then has been having worsening SOB and cough. Pt states SOB has gotten so bad he is not able to ambulate more than a few steps without having to sit down and rest. Pt states his MD told him to come to ED for further eval. Pt currently tachypneic in the stretcher and O2 is 100% on RA. Difficulty obtaining O2 sat. Pt also noted to be hypertensive. Denies any other medical complaints.

## 2023-02-22 NOTE — ED PROVIDER NOTE - PROGRESS NOTE DETAILS
plan for admission for worsening ADHF, requiring BUMEX diuresis and further cardio recs  cr 1.4 (baseline 1.8)  trop 88 x2 ( c/w prior, no ECG changes or active chest pain)  BNP 6K  CXR clear  will admit.

## 2023-02-22 NOTE — ED PROVIDER NOTE - PHYSICAL EXAMINATION
CONSTITUTIONAL: Well-appearing; well-nourished; in no apparent distress;  HEAD: Normocephalic, atraumatic;  EYES: conjunctiva and sclera WNL;  ENT: normal nose;  NECK/LYMPH: Supple  CARD: Normal S1, S2; no murmurs, rubs, or gallops noted  RESP: Normal chest excursion with respiration; breath sounds clear and equal bilaterally; no wheezes, rhonchi, or rales noted  EXT/MS: moves all extremities; distal pulses are normal, 1+ pitting pedal edema b/l  SKIN: Normal for age and race; warm; dry; good turgor; no apparent lesions or exudate noted  NEURO: Awake, alert, oriented x 3, no gross deficits, CN II-XII grossly intact, no motor or sensory deficit noted  PSYCH: Normal mood; appropriate affect

## 2023-02-22 NOTE — ED PROVIDER NOTE - CARE PLAN
1 Principal Discharge DX:	Heart failure   Complex Repair And Bilobe Flap Text: The defect edges were debeveled with a #15 scalpel blade.  The primary defect was closed partially with a complex linear closure.  Given the location of the remaining defect, shape of the defect and the proximity to free margins a bilobe flap was deemed most appropriate for complete closure of the defect.  Using a sterile surgical marker, an appropriate advancement flap was drawn incorporating the defect and placing the expected incisions within the relaxed skin tension lines where possible.    The area thus outlined was incised deep to adipose tissue with a #15 scalpel blade.  The skin margins were undermined to an appropriate distance in all directions utilizing iris scissors.

## 2023-02-22 NOTE — ED PROVIDER NOTE - NS ED ATTENDING STATEMENT MOD
This was a shared visit with the NIESHA. I reviewed and verified the documentation and independently performed the documented:

## 2023-02-23 DIAGNOSIS — J96.21 ACUTE AND CHRONIC RESPIRATORY FAILURE WITH HYPOXIA: ICD-10-CM

## 2023-02-23 DIAGNOSIS — E88.09 OTHER DISORDERS OF PLASMA-PROTEIN METABOLISM, NOT ELSEWHERE CLASSIFIED: ICD-10-CM

## 2023-02-23 DIAGNOSIS — I50.9 HEART FAILURE, UNSPECIFIED: ICD-10-CM

## 2023-02-23 DIAGNOSIS — I10 ESSENTIAL (PRIMARY) HYPERTENSION: ICD-10-CM

## 2023-02-23 DIAGNOSIS — I50.23 ACUTE ON CHRONIC SYSTOLIC (CONGESTIVE) HEART FAILURE: ICD-10-CM

## 2023-02-23 DIAGNOSIS — N18.31 CHRONIC KIDNEY DISEASE, STAGE 3A: ICD-10-CM

## 2023-02-23 LAB
CK MB BLD-MCNC: 3.3 % — HIGH (ref 0–2.5)
CK MB CFR SERPL CALC: 6.6 NG/ML — SIGNIFICANT CHANGE UP
CK SERPL-CCNC: 202 U/L — HIGH (ref 30–200)
TROPONIN T, HIGH SENSITIVITY RESULT: 88 NG/L — CRITICAL HIGH

## 2023-02-23 PROCEDURE — 99223 1ST HOSP IP/OBS HIGH 75: CPT

## 2023-02-23 RX ORDER — BUDESONIDE AND FORMOTEROL FUMARATE DIHYDRATE 160; 4.5 UG/1; UG/1
2 AEROSOL RESPIRATORY (INHALATION)
Refills: 0 | Status: DISCONTINUED | OUTPATIENT
Start: 2023-02-23 | End: 2023-03-03

## 2023-02-23 RX ORDER — HYDRALAZINE HCL 50 MG
50 TABLET ORAL
Refills: 0 | Status: DISCONTINUED | OUTPATIENT
Start: 2023-02-23 | End: 2023-02-25

## 2023-02-23 RX ORDER — ONDANSETRON 8 MG/1
4 TABLET, FILM COATED ORAL EVERY 8 HOURS
Refills: 0 | Status: DISCONTINUED | OUTPATIENT
Start: 2023-02-23 | End: 2023-03-03

## 2023-02-23 RX ORDER — METOPROLOL TARTRATE 50 MG
50 TABLET ORAL
Refills: 0 | Status: DISCONTINUED | OUTPATIENT
Start: 2023-02-23 | End: 2023-02-23

## 2023-02-23 RX ORDER — SPIRONOLACTONE 25 MG/1
25 TABLET, FILM COATED ORAL DAILY
Refills: 0 | Status: DISCONTINUED | OUTPATIENT
Start: 2023-02-23 | End: 2023-03-03

## 2023-02-23 RX ORDER — OMEPRAZOLE MAGNESIUM, AMOXICILLIN AND RIFABUTIN 10; 250; 12.5 MG/1; MG/1; MG/1
4 CAPSULE, DELAYED RELEASE ORAL
Qty: 0 | Refills: 0 | DISCHARGE

## 2023-02-23 RX ORDER — ENOXAPARIN SODIUM 100 MG/ML
40 INJECTION SUBCUTANEOUS EVERY 24 HOURS
Refills: 0 | Status: DISCONTINUED | OUTPATIENT
Start: 2023-02-23 | End: 2023-03-03

## 2023-02-23 RX ORDER — LANOLIN ALCOHOL/MO/W.PET/CERES
3 CREAM (GRAM) TOPICAL AT BEDTIME
Refills: 0 | Status: DISCONTINUED | OUTPATIENT
Start: 2023-02-23 | End: 2023-03-03

## 2023-02-23 RX ORDER — METOPROLOL TARTRATE 50 MG
100 TABLET ORAL AT BEDTIME
Refills: 0 | Status: DISCONTINUED | OUTPATIENT
Start: 2023-02-23 | End: 2023-02-23

## 2023-02-23 RX ORDER — METOPROLOL TARTRATE 50 MG
50 TABLET ORAL DAILY
Refills: 0 | Status: DISCONTINUED | OUTPATIENT
Start: 2023-02-23 | End: 2023-03-03

## 2023-02-23 RX ORDER — ALBUTEROL 90 UG/1
1 AEROSOL, METERED ORAL THREE TIMES A DAY
Refills: 0 | Status: DISCONTINUED | OUTPATIENT
Start: 2023-02-23 | End: 2023-03-03

## 2023-02-23 RX ORDER — SACUBITRIL AND VALSARTAN 24; 26 MG/1; MG/1
1 TABLET, FILM COATED ORAL
Refills: 0 | Status: DISCONTINUED | OUTPATIENT
Start: 2023-02-23 | End: 2023-02-23

## 2023-02-23 RX ORDER — SACUBITRIL AND VALSARTAN 24; 26 MG/1; MG/1
1 TABLET, FILM COATED ORAL
Refills: 0 | Status: DISCONTINUED | OUTPATIENT
Start: 2023-02-23 | End: 2023-02-26

## 2023-02-23 RX ORDER — ACETAMINOPHEN 500 MG
650 TABLET ORAL EVERY 6 HOURS
Refills: 0 | Status: DISCONTINUED | OUTPATIENT
Start: 2023-02-23 | End: 2023-03-03

## 2023-02-23 RX ORDER — BUMETANIDE 0.25 MG/ML
2 INJECTION INTRAMUSCULAR; INTRAVENOUS
Refills: 0 | Status: COMPLETED | OUTPATIENT
Start: 2023-02-24 | End: 2023-02-25

## 2023-02-23 RX ORDER — FUROSEMIDE 40 MG
1 TABLET ORAL
Qty: 0 | Refills: 0 | DISCHARGE

## 2023-02-23 RX ORDER — FUROSEMIDE 40 MG
20 TABLET ORAL
Refills: 0 | Status: DISCONTINUED | OUTPATIENT
Start: 2023-02-23 | End: 2023-02-23

## 2023-02-23 RX ORDER — HYDRALAZINE HCL 50 MG
50 TABLET ORAL
Refills: 0 | Status: DISCONTINUED | OUTPATIENT
Start: 2023-02-23 | End: 2023-02-23

## 2023-02-23 RX ADMIN — Medication 50 MILLIGRAM(S): at 18:10

## 2023-02-23 RX ADMIN — Medication 20 MILLIGRAM(S): at 14:38

## 2023-02-23 RX ADMIN — BUDESONIDE AND FORMOTEROL FUMARATE DIHYDRATE 2 PUFF(S): 160; 4.5 AEROSOL RESPIRATORY (INHALATION) at 23:14

## 2023-02-23 RX ADMIN — SACUBITRIL AND VALSARTAN 1 TABLET(S): 24; 26 TABLET, FILM COATED ORAL at 18:10

## 2023-02-23 NOTE — CONSULT NOTE ADULT - SUBJECTIVE AND OBJECTIVE BOX
Patient is a 62y old  Male who presents with a chief complaint of     HPI:  62 year old Male with PMH of HTN, RA, plasma cell dyscrasia,  RCC s/p R partial nephrectomy,  CKD (baseline 1.4) and , HFrEF (28%; LVIDd 4.1 moderate TR/PH) s/p ICD seen at HF clinic on  with c/o progressive worsening  SOB/AVALOS/PND with cough X 3 months. Patient sent to ED by Dr. Cuellar for ADHF.         PAST MEDICAL & SURGICAL HISTORY:  Hypertension      Rheumatoid arthritis      Neoplasm of uncertain behavior of kidney, right      History of cardiac cath  2022, at [facility] no stent          FAMILY HISTORY:  FH: diabetes mellitus (Mother)        Home Medications:  acetaminophen 325 mg oral tablet: 2 tab(s) orally every 6 hours, As needed, Temp greater or equal to 38C (100.4F), Mild Pain (1 - 3) (24 Dec 2022 15:04)  Albuterol (Eqv-ProAir HFA) 90 mcg/inh inhalation aerosol: 2 puff(s) inhaled 3 times a day, As Needed (24 Dec 2022 15:04)  fluticasone 50 mcg/inh nasal spray: 1 spray(s) nasal 2 times a day (24 Dec 2022 15:04)  furosemide 20 mg oral tablet: 1 tab(s) orally every other day (14 Dec 2022 23:07)  metoprolol tartrate 50 mg oral tablet: 1 tab(s) orally 2 times a day (24 Dec 2022 15:04)  spironolactone 25 mg oral tablet: 1 tab(s) orally once a day (14 Dec 2022 23:07)      Medications:  acetaminophen     Tablet .. 650 milliGRAM(s) Oral every 6 hours PRN  albuterol    90 MICROgram(s) HFA Inhaler 1 Puff(s) Inhalation three times a day PRN  aluminum hydroxide/magnesium hydroxide/simethicone Suspension 30 milliLiter(s) Oral every 4 hours PRN  budesonide 160 MICROgram(s)/formoterol 4.5 MICROgram(s) Inhaler 2 Puff(s) Inhalation two times a day  furosemide   Injectable 20 milliGRAM(s) IV Push two times a day  melatonin 3 milliGRAM(s) Oral at bedtime PRN  metoprolol tartrate 50 milliGRAM(s) Oral two times a day  ondansetron Injectable 4 milliGRAM(s) IV Push every 8 hours PRN  sacubitril 24 mG/valsartan 26 mG 1 Tablet(s) Oral two times a day  spironolactone 25 milliGRAM(s) Oral daily      Review of systems:  10 point review of systems completed and are negative unless noted in HPI    Vitals:  T(C): 36.4 (23 @ 11:13), Max: 36.9 (23 @ 23:45)  HR: 104 (23 @ 11:13) (84 - 104)  BP: 124/81 (23 @ 11:13) (117/97 - 133/114)  BP(mean): --  RR: 18 (23 @ 11:13) (18 - 21)  SpO2: 96% (23 @ 11:13) (93% - 98%)    Daily     Daily         I&O's Summary      Physical Exam:  Appearance: No Acute Distress  Neck: JVD  Cardiovascular: Normal S1 S2  Respiratory: Clear to auscultation bilaterally  Gastrointestinal: Soft, Non-tender	  Skin: No cyanosis	  Neurologic: Non-focal  Extremities: BLE edema  Psychiatry: A & O x 3, Mood & affect appropriate    Labs:                        12.1   11.85 )-----------( 271      ( 2023 20:46 )             40.0         144  |  111<H>  |  25<H>  ----------------------------<  87  5.0   |  21<L>  |  1.40<H>    Ca    10.0      2023 20:46    TPro  7.8  /  Alb  4.0  /  TBili  0.5  /  DBili  x   /  AST  33  /  ALT  24  /  AlkPhos  178<H>      PT/INR - ( 2023 20:46 )   PT: 15.6 sec;   INR: 1.34 ratio         PTT - ( 2023 20:46 )  PTT:32.2 sec  CARDIAC MARKERS ( 2023 23:23 )  x     / x     / 202 U/L / x     / 6.6 ng/mL    Serum Pro-Brain Natriuretic Peptide: 6513:  (23 @ 20:46)    Blood Gas Venous - Lactate: 3.2: Elevated lactate. Consider ordering follow-up lactate to trend. mmol/L (23 @ 20:46)    Troponin T, High Sensitivity Result: 88:  (23 @ 23:23)        TELEMETRY:    Echocardiogram:  TTE with Doppler (w/Cont) (12.15.22 @ 18:19)   ------------------------------------------------------------------------  Dimensions:    Normal Values:  LA:     3.6    2.0 - 4.0 cm  Ao:     2.9    2.0 - 3.8 cm  SEPTUM: 1.5    0.6 - 1.2 cm  PWT:    1.3    0.6 - 1.1 cm  LVIDd:  4.1   3.0 - 5.6 cm  LVIDs:  3.0    1.8 - 4.0 cm  Derived variables:  LVMI: 128 g/m2  RWT: 0.63  Fractional short: 27 %  EF (Espino Rule): 28 %  ------------------------------------------------------------------------  Observations:  Mitral Valve: Normal mitral valve. Minimal mitral  regurgitation.  Aortic Valve/Aorta: Normal trileaflet aortic valve. Mild  aortic regurgitation.  Aortic Root: 2.9 cm.  Left Atrium: Normal left atrium.  LA volume index = 21  cc/m2.  Left Ventricle: Severe global left ventricular systolic  dysfunction.   Highly trabeculated apex.  Endocardial  visualization enhanced with intravenous injection of  Ultrasonic Enhancing Agent (Definity). No left ventricular  thrombus. Moderate concentric left ventricular hypertrophy.  Increased E/e'  is consistent with elevated left  ventricular filling pressure.  Right Heart: Moderate right atrial enlargement. Right  ventricular enlargement with decreased right ventricular  systolic function. Normal tricuspid valve. Mild-moderate  tricuspid regurgitation. Normal pulmonic valve. Mild  pulmonic regurgitation.  Pericardium/Pleura: Normal pericardium with no pericardial  effusion.  Hemodynamic: Estimated right atrial pressure is 8 mm Hg.  Estimated right ventricular systolic pressure equals 60 mm  Hg, assuming right atrial pressure equals 8 mm Hg,  consistent with moderate pulmonary hypertension.  ------------------------------------------------------------------------  Conclusions:  1. Moderate concentric left ventricular hypertrophy.  2. Severe global left ventricular systolic dysfunction.  Highly trabeculated apex.  Endocardial visualization  enhanced with intravenous injection of Ultrasonic Enhancing  Agent (Definity). No left ventricular thrombus.  3. Global Longitudinal Strain -6.4% (Adali, HR 96 bpm, BP  125/72).  GLS is severely reduced.  4. Increased E/e'  is consistent with elevated left  ventricular filling pressure.  5. Moderate right atrial enlargement.  6. Right ventricular enlargement with decreased right  ventricular systolic function.  7. Estimated pulmonary artery systolic pressure equals 60  mm Hg, assuming right atrial pressure equals 8 mm Hg,  consistent with moderate pulmonary pressures.  *** No previous Echo exam        EK Lead ECG (23 @ 15:50)     Ventricular Rate 96 BPM    Atrial Rate 96 BPM    P-R Interval 178 ms    QRS Duration 98 ms    Q-T Interval 378 ms    QTC Calculation(Bazett) 477 ms    P Axis 70 degrees    R Axis 265 degrees    T Axis -22 degrees    Diagnosis Line Normal sinus rhythm with sinus arrhythmia  Right superior axis deviation  Right ventricular hypertrophy with repolarization abnormality  Inferior infarct , age undetermined  Anterior infarct , age undetermined    Xray Chest 1 View- PORTABLE-Urgent (Xray Chest 1 View- PORTABLE-Urgent .) (23 @ 21:46)     FINDINGS:    Single lead left chest wall AICD.  Enlargement of the main pulmonary artery as noted on CTA chest from   2022.  The heart is normal in size.  The lungs are clear.  No pleural effusion or pneumothorax.    IMPRESSION:  Clear lungs.         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 Patient sent to ED by Dr. Cuellar for ADHF.         PAST MEDICAL & SURGICAL HISTORY:  Hypertension      Rheumatoid arthritis      Neoplasm of uncertain behavior of kidney, right      History of cardiac cath  2022, at [facility] no stent          FAMILY HISTORY:  FH: diabetes mellitus (Mother)        Home Medications:  acetaminophen 325 mg oral tablet: 2 tab(s) orally every 6 hours, As needed, Temp greater or equal to 38C (100.4F), Mild Pain (1 - 3) (24 Dec 2022 15:04)  Albuterol (Eqv-ProAir HFA) 90 mcg/inh inhalation aerosol: 2 puff(s) inhaled 3 times a day, As Needed (24 Dec 2022 15:04)  fluticasone 50 mcg/inh nasal spray: 1 spray(s) nasal 2 times a day (24 Dec 2022 15:04)  furosemide 20 mg oral tablet: 1 tab(s) orally every other day (14 Dec 2022 23:07)  metoprolol tartrate 50 mg oral tablet: 1 tab(s) orally 2 times a day (24 Dec 2022 15:04)  spironolactone 25 mg oral tablet: 1 tab(s) orally once a day (14 Dec 2022 23:07)      Medications:  acetaminophen     Tablet .. 650 milliGRAM(s) Oral every 6 hours PRN  albuterol    90 MICROgram(s) HFA Inhaler 1 Puff(s) Inhalation three times a day PRN  aluminum hydroxide/magnesium hydroxide/simethicone Suspension 30 milliLiter(s) Oral every 4 hours PRN  budesonide 160 MICROgram(s)/formoterol 4.5 MICROgram(s) Inhaler 2 Puff(s) Inhalation two times a day  furosemide   Injectable 20 milliGRAM(s) IV Push two times a day  melatonin 3 milliGRAM(s) Oral at bedtime PRN  metoprolol tartrate 50 milliGRAM(s) Oral two times a day  ondansetron Injectable 4 milliGRAM(s) IV Push every 8 hours PRN  sacubitril 24 mG/valsartan 26 mG 1 Tablet(s) Oral two times a day  spironolactone 25 milliGRAM(s) Oral daily      Review of systems:  10 point review of systems completed and are negative unless noted in HPI    Vitals:  T(C): 36.4 (23 @ 11:13), Max: 36.9 (23 @ 23:45)  HR: 104 (23 @ 11:13) (84 - 104)  BP: 124/81 (23 @ 11:13) (117/97 - 133/114)  BP(mean): --  RR: 18 (23 @ 11:13) (18 - 21)  SpO2: 96% (23 @ 11:13) (93% - 98%)    Daily     Daily         I&O's Summary      Physical Exam:  Appearance: No Acute Distress  Neck: JVD  Cardiovascular: Normal S1 S2  Respiratory: Clear to auscultation bilaterally  Gastrointestinal: Soft, Non-tender	  Skin: No cyanosis	  Neurologic: Non-focal  Extremities: BLE edema  Psychiatry: A & O x 3, Mood & affect appropriate    Labs:                        12.1   11.85 )-----------( 271      ( 2023 20:46 )             40.0         144  |  111<H>  |  25<H>  ----------------------------<  87  5.0   |  21<L>  |  1.40<H>    Ca    10.0      2023 20:46    TPro  7.8  /  Alb  4.0  /  TBili  0.5  /  DBili  x   /  AST  33  /  ALT  24  /  AlkPhos  178<H>      PT/INR - ( 2023 20:46 )   PT: 15.6 sec;   INR: 1.34 ratio         PTT - ( 2023 20:46 )  PTT:32.2 sec  CARDIAC MARKERS ( 2023 23:23 )  x     / x     / 202 U/L / x     / 6.6 ng/mL    Serum Pro-Brain Natriuretic Peptide: 6513:  (23 @ 20:46)    Blood Gas Venous - Lactate: 3.2: Elevated lactate. Consider ordering follow-up lactate to trend. mmol/L (23 @ 20:46)    Troponin T, High Sensitivity Result: 88:  (23 @ 23:23)        TELEMETRY:    Echocardiogram:  TTE with Doppler (w/Cont) (12.15.22 @ 18:19)   ------------------------------------------------------------------------  Dimensions:    Normal Values:  LA:     3.6    2.0 - 4.0 cm  Ao:     2.9    2.0 - 3.8 cm  SEPTUM: 1.5    0.6 - 1.2 cm  PWT:    1.3    0.6 - 1.1 cm  LVIDd:  4.1   3.0 - 5.6 cm  LVIDs:  3.0    1.8 - 4.0 cm  Derived variables:  LVMI: 128 g/m2  RWT: 0.63  Fractional short: 27 %  EF (Espino Rule): 28 %  ------------------------------------------------------------------------  Observations:  Mitral Valve: Normal mitral valve. Minimal mitral  regurgitation.  Aortic Valve/Aorta: Normal trileaflet aortic valve. Mild  aortic regurgitation.  Aortic Root: 2.9 cm.  Left Atrium: Normal left atrium.  LA volume index = 21  cc/m2.  Left Ventricle: Severe global left ventricular systolic  dysfunction.   Highly trabeculated apex.  Endocardial  visualization enhanced with intravenous injection of  Ultrasonic Enhancing Agent (Definity). No left ventricular  thrombus. Moderate concentric left ventricular hypertrophy.  Increased E/e'  is consistent with elevated left  ventricular filling pressure.  Right Heart: Moderate right atrial enlargement. Right  ventricular enlargement with decreased right ventricular  systolic function. Normal tricuspid valve. Mild-moderate  tricuspid regurgitation. Normal pulmonic valve. Mild  pulmonic regurgitation.  Pericardium/Pleura: Normal pericardium with no pericardial  effusion.  Hemodynamic: Estimated right atrial pressure is 8 mm Hg.  Estimated right ventricular systolic pressure equals 60 mm  Hg, assuming right atrial pressure equals 8 mm Hg,  consistent with moderate pulmonary hypertension.  ------------------------------------------------------------------------  Conclusions:  1. Moderate concentric left ventricular hypertrophy.  2. Severe global left ventricular systolic dysfunction.  Highly trabeculated apex.  Endocardial visualization  enhanced with intravenous injection of Ultrasonic Enhancing  Agent (Definity). No left ventricular thrombus.  3. Global Longitudinal Strain -6.4% (Adali, HR 96 bpm, BP  125/72).  GLS is severely reduced.  4. Increased E/e'  is consistent with elevated left  ventricular filling pressure.  5. Moderate right atrial enlargement.  6. Right ventricular enlargement with decreased right  ventricular systolic function.  7. Estimated pulmonary artery systolic pressure equals 60  mm Hg, assuming right atrial pressure equals 8 mm Hg,  consistent with moderate pulmonary pressures.  *** No previous Echo exam        EK Lead ECG (23 @ 15:50)     Ventricular Rate 96 BPM    Atrial Rate 96 BPM    P-R Interval 178 ms    QRS Duration 98 ms    Q-T Interval 378 ms    QTC Calculation(Bazett) 477 ms    P Axis 70 degrees    R Axis 265 degrees    T Axis -22 degrees    Diagnosis Line Normal sinus rhythm with sinus arrhythmia  Right superior axis deviation  Right ventricular hypertrophy with repolarization abnormality  Inferior infarct , age undetermined  Anterior infarct , age undetermined    Xray Chest 1 View- PORTABLE-Urgent (Xray Chest 1 View- PORTABLE-Urgent .) (23 @ 21:46)     FINDINGS:    Single lead left chest wall AICD.  Enlargement of the main pulmonary artery as noted on CTA chest from   2022.  The heart is normal in size.  The lungs are clear.  No pleural effusion or pneumothorax.    IMPRESSION:  Clear lungs.         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 Patient sent to ED by Dr. Cuellar for ADHF.         PAST MEDICAL & SURGICAL HISTORY:  Hypertension      Rheumatoid arthritis      Neoplasm of uncertain behavior of kidney, right      History of cardiac cath  2022, at [facility] no stent          FAMILY HISTORY:  FH: diabetes mellitus (Mother)        Home Medications:  acetaminophen 325 mg oral tablet: 2 tab(s) orally every 6 hours, As needed, Temp greater or equal to 38C (100.4F), Mild Pain (1 - 3) (24 Dec 2022 15:04)  Albuterol (Eqv-ProAir HFA) 90 mcg/inh inhalation aerosol: 2 puff(s) inhaled 3 times a day, As Needed (24 Dec 2022 15:04)  fluticasone 50 mcg/inh nasal spray: 1 spray(s) nasal 2 times a day (24 Dec 2022 15:04)  furosemide 20 mg oral tablet: 1 tab(s) orally every other day (14 Dec 2022 23:07)  metoprolol tartrate 50 mg oral tablet: 1 tab(s) orally 2 times a day (24 Dec 2022 15:04)  spironolactone 25 mg oral tablet: 1 tab(s) orally once a day (14 Dec 2022 23:07)      Medications:  acetaminophen     Tablet .. 650 milliGRAM(s) Oral every 6 hours PRN  albuterol    90 MICROgram(s) HFA Inhaler 1 Puff(s) Inhalation three times a day PRN  aluminum hydroxide/magnesium hydroxide/simethicone Suspension 30 milliLiter(s) Oral every 4 hours PRN  budesonide 160 MICROgram(s)/formoterol 4.5 MICROgram(s) Inhaler 2 Puff(s) Inhalation two times a day  furosemide   Injectable 20 milliGRAM(s) IV Push two times a day  melatonin 3 milliGRAM(s) Oral at bedtime PRN  metoprolol tartrate 50 milliGRAM(s) Oral two times a day  ondansetron Injectable 4 milliGRAM(s) IV Push every 8 hours PRN  sacubitril 24 mG/valsartan 26 mG 1 Tablet(s) Oral two times a day  spironolactone 25 milliGRAM(s) Oral daily      Review of systems:  10 point review of systems completed and are negative unless noted in HPI    Vitals:  T(C): 36.4 (23 @ 11:13), Max: 36.9 (23 @ 23:45)  HR: 104 (23 @ 11:13) (84 - 104)  BP: 124/81 (23 @ 11:13) (117/97 - 133/114)  BP(mean): --  RR: 18 (23 @ 11:13) (18 - 21)  SpO2: 96% (23 @ 11:13) (93% - 98%)    Daily     Daily         I&O's Summary      Physical Exam:  Appearance: No Acute Distress  Neck: JVP 8-10   Cardiovascular: Normal S1 S2  Respiratory: Clear to auscultation bilaterally  Gastrointestinal: Soft, Non-tender	  Skin: No cyanosis	  Neurologic: Non-focal  Extremities: No BLE edema; cool to touch   Psychiatry: A & O x 3, Mood & affect appropriate    Labs:                        12.1   11.85 )-----------( 271      ( 2023 20:46 )             40.0         144  |  111<H>  |  25<H>  ----------------------------<  87  5.0   |  21<L>  |  1.40<H>    Ca    10.0      2023 20:46    TPro  7.8  /  Alb  4.0  /  TBili  0.5  /  DBili  x   /  AST  33  /  ALT  24  /  AlkPhos  178<H>      PT/INR - ( 2023 20:46 )   PT: 15.6 sec;   INR: 1.34 ratio         PTT - ( 2023 20:46 )  PTT:32.2 sec  CARDIAC MARKERS ( 2023 23:23 )  x     / x     / 202 U/L / x     / 6.6 ng/mL    Serum Pro-Brain Natriuretic Peptide: 6513:  (23 @ 20:46)    Blood Gas Venous - Lactate: 3.2: Elevated lactate. Consider ordering follow-up lactate to trend. mmol/L (23 @ 20:46)    Troponin T, High Sensitivity Result: 88:  (23 @ 23:23)        TELEMETRY: Sinus Rhythm     Echocardiogram:  TTE with Doppler (w/Cont) (12.15.22 @ 18:19)   ------------------------------------------------------------------------  Dimensions:    Normal Values:  LA:     3.6    2.0 - 4.0 cm  Ao:     2.9    2.0 - 3.8 cm  SEPTUM: 1.5    0.6 - 1.2 cm  PWT:    1.3    0.6 - 1.1 cm  LVIDd:  4.1   3.0 - 5.6 cm  LVIDs:  3.0    1.8 - 4.0 cm  Derived variables:  LVMI: 128 g/m2  RWT: 0.63  Fractional short: 27 %  EF (Espino Rule): 28 %  ------------------------------------------------------------------------  Observations:  Mitral Valve: Normal mitral valve. Minimal mitral  regurgitation.  Aortic Valve/Aorta: Normal trileaflet aortic valve. Mild  aortic regurgitation.  Aortic Root: 2.9 cm.  Left Atrium: Normal left atrium.  LA volume index = 21  cc/m2.  Left Ventricle: Severe global left ventricular systolic  dysfunction.   Highly trabeculated apex.  Endocardial  visualization enhanced with intravenous injection of  Ultrasonic Enhancing Agent (Definity). No left ventricular  thrombus. Moderate concentric left ventricular hypertrophy.  Increased E/e'  is consistent with elevated left  ventricular filling pressure.  Right Heart: Moderate right atrial enlargement. Right  ventricular enlargement with decreased right ventricular  systolic function. Normal tricuspid valve. Mild-moderate  tricuspid regurgitation. Normal pulmonic valve. Mild  pulmonic regurgitation.  Pericardium/Pleura: Normal pericardium with no pericardial  effusion.  Hemodynamic: Estimated right atrial pressure is 8 mm Hg.  Estimated right ventricular systolic pressure equals 60 mm  Hg, assuming right atrial pressure equals 8 mm Hg,  consistent with moderate pulmonary hypertension.  ------------------------------------------------------------------------  Conclusions:  1. Moderate concentric left ventricular hypertrophy.  2. Severe global left ventricular systolic dysfunction.  Highly trabeculated apex.  Endocardial visualization  enhanced with intravenous injection of Ultrasonic Enhancing  Agent (Definity). No left ventricular thrombus.  3. Global Longitudinal Strain -6.4% (Adali, HR 96 bpm, BP  125/72).  GLS is severely reduced.  4. Increased E/e'  is consistent with elevated left  ventricular filling pressure.  5. Moderate right atrial enlargement.  6. Right ventricular enlargement with decreased right  ventricular systolic function.  7. Estimated pulmonary artery systolic pressure equals 60  mm Hg, assuming right atrial pressure equals 8 mm Hg,  consistent with moderate pulmonary pressures.  *** No previous Echo exam        EK Lead ECG (23 @ 15:50)     Ventricular Rate 96 BPM    Atrial Rate 96 BPM    P-R Interval 178 ms    QRS Duration 98 ms    Q-T Interval 378 ms    QTC Calculation(Bazett) 477 ms    P Axis 70 degrees    R Axis 265 degrees    T Axis -22 degrees    Diagnosis Line Normal sinus rhythm with sinus arrhythmia  Right superior axis deviation  Right ventricular hypertrophy with repolarization abnormality  Inferior infarct , age undetermined  Anterior infarct , age undetermined    Xray Chest 1 View- PORTABLE-Urgent (Xray Chest 1 View- PORTABLE-Urgent .) (23 @ 21:46)     FINDINGS:    Single lead left chest wall AICD.  Enlargement of the main pulmonary artery as noted on CTA chest from   2022.  The heart is normal in size.  The lungs are clear.  No pleural effusion or pneumothorax.    IMPRESSION:  Clear lungs.

## 2023-02-23 NOTE — CONSULT NOTE ADULT - ASSESSMENT
62 year old Male with PMH of HTN, RA, plasma cell dyscrasia,  RCC s/p R partial nephrectomy,  CKD (baseline 1.4) and , HFrEF (       ) seen at HF clinic on 2/22 with c/o progressive worsening  SOB/AVALOS/PND with cough X 3 months. Patient sent to ED by Dr. Cuellar for ADHF.     Pertinent labs:  BNP  6513    Lactate  3.2 repeat pending    Troponin 88/88/84     CXR: Enlargement of the main pulmonary artery as noted on CTA chest from 12/14/2022. The lungs are clear. No pleural effusion or pneumothorax.    EKG: Normal sinus rhythm with sinus arrhythmia Right superior axis deviation, RVH, Inferior infarct , age undetermined Anterior infarct , age undetermined    Cardiac Catheterization (12.16.22 @ 15:31)     Hemodynamic Pressures:   Phase          Location          [mmHg]               [mmHg]  [mmHg]           HR  Phase 0       RV                  s      48  ed      1189  Phase 0       PA                  s      46                d      24  m   33         97  Phase 0       PCW                 a      11                v       9  m       9      96  Phase 0       RA                  a       6                v       5  m       5      95    Oxygen Saturations   Phase          Location        Hb             Sat            pO2  Content        Group  Phase 0         AO                       13             100  17.27   SA  Phase 0         PA                       13          74  12.71   MV    Oxygen Saturation Average, Phase: Phase 0   PV Hb                PV Sat                 PV pO2  PV Content  SA Hb      12.70 g/dL SA Sat     100.00 %   SA pO2  SA Content     17.27 %  PA Hb                PA Sat     PA pO2  PA Content  MV Hb      12.70 g/dL MV Sat     73.60 %    MV pO2  MV Content     12.71 %  Strokework:   Phase:                    Phase 0     LVSW:                                             LVSW (index):   RVSW:                     19.50 g*m  RVSW (index):             11.55 g*m/m2  Flow Calculations, Phase: Phase 0   CO                     4.97 l/min    HR    MR Cardiac w/wo IV Cont (12.16.22 @ 11:15)   FINDINGS:      MORPHOLOGY:    PERICARDIUM: The pericardium is normal in thickness (less than 4mm).   There is no pericardial effusion.    RIGHT ATRIUM: The right atrium may be enlarged. The inflow the IVC and   SVC are unremarkable.    RIGHT VENTRICLE: Right ventricle is unremarkable. There is no scalloping   or thickening of the free wall the right ventricle.    LEFT ATRIUM: Left atrium measures 3.0 cm the 3 chamber plane. There are   bilateral superior and inferior pulmonary veins.    LEFT VENTRICLE: Concentric thickening of the left ventricular myocardium.   Left ventricle measures 4.1 cm at end diastole and 3.5 cm end systole   (anteroseptal/inferolateral). There is no convincing focus of increased   T2 signal.    There is no convincing first pass perfusion abnormality. At the inferior   hinge point of the right ventricle, there is subtle late gadolinium   enhancement. There are no other convincing foci of late gadolinium   enhancement shown on the early or late phases of late gadolinium enhanced   imaging.    FUNCTION: No segmental wall motion abnormality. No global wall motion   abnormality.    LEFT VENTRICLE:  Unindexed:  EF: 36.04 %  EDV: 117.09 mL  ESV: 74.89 mL  SV: 42.20 mL  CO: 3.96 L/min    Indexed:  EDVi: 69.40 mL/m2  ESVi: 44.38 mL/m2  SV: 25.01 mL/m2  CO: 2.35 L/min/m2      VALVES:    MITRAL VALVE: Mitral regurgitation, not quantified  AORTIC VALVE: Aortic regurgitation, not quantified.      AORTA: Three-vessel left-sided aortic arch and left-sided descending   thoracic aorta.      NONCARDIAC FINDINGS: The other visualized thoracoabdominal structures are   unremarkable.      IMPRESSION:    1.  Subtle late gadolinium enhancement along the inferior hinge point of   the right ventricle can occur in a variety of clinical settings including   altered ventricular mechanics.  2.  Concentric thickening of the ventricular myocardium.  3.  The estimated left ventricular ejection fraction is 36.04%.         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 Patient sent to ED by Dr. Cuellar for ADHF.     Pertinent labs:  BNP  6513    Lactate  3.2 repeat pending    Troponin 88/88/84     CXR: Enlargement of the main pulmonary artery as noted on CTA chest from 12/14/2022. The lungs are clear. No pleural effusion or pneumothorax.    EKG: Normal sinus rhythm with sinus arrhythmia Right superior axis deviation, RVH, Inferior infarct , age undetermined Anterior infarct , age undetermined    Cardiac Catheterization (12.16.22 @ 15:31)     Hemodynamic Pressures:   Phase          Location          [mmHg]               [mmHg]  [mmHg]           HR  Phase 0       RV                  s      48  ed      1189  Phase 0       PA                  s      46                d      24  m   33         97  Phase 0       PCW                 a      11                v       9  m       9      96  Phase 0       RA                  a       6                v       5  m       5      95    Oxygen Saturations   Phase          Location        Hb             Sat            pO2  Content        Group  Phase 0         AO                       13             100  17.27   SA  Phase 0         PA                       13          74  12.71   MV    Oxygen Saturation Average, Phase: Phase 0   PV Hb                PV Sat                 PV pO2  PV Content  SA Hb      12.70 g/dL SA Sat     100.00 %   SA pO2  SA Content     17.27 %  PA Hb                PA Sat     PA pO2  PA Content  MV Hb      12.70 g/dL MV Sat     73.60 %    MV pO2  MV Content     12.71 %  Strokework:   Phase:                    Phase 0     LVSW:                                             LVSW (index):   RVSW:                     19.50 g*m  RVSW (index):             11.55 g*m/m2  Flow Calculations, Phase: Phase 0   CO                     4.97 l/min    HR    MR Cardiac w/wo IV Cont (12.16.22 @ 11:15)   FINDINGS:      MORPHOLOGY:    PERICARDIUM: The pericardium is normal in thickness (less than 4mm).   There is no pericardial effusion.    RIGHT ATRIUM: The right atrium may be enlarged. The inflow the IVC and   SVC are unremarkable.    RIGHT VENTRICLE: Right ventricle is unremarkable. There is no scalloping   or thickening of the free wall the right ventricle.    LEFT ATRIUM: Left atrium measures 3.0 cm the 3 chamber plane. There are   bilateral superior and inferior pulmonary veins.    LEFT VENTRICLE: Concentric thickening of the left ventricular myocardium.   Left ventricle measures 4.1 cm at end diastole and 3.5 cm end systole   (anteroseptal/inferolateral). There is no convincing focus of increased   T2 signal.    There is no convincing first pass perfusion abnormality. At the inferior   hinge point of the right ventricle, there is subtle late gadolinium   enhancement. There are no other convincing foci of late gadolinium   enhancement shown on the early or late phases of late gadolinium enhanced   imaging.    FUNCTION: No segmental wall motion abnormality. No global wall motion   abnormality.    LEFT VENTRICLE:  Unindexed:  EF: 36.04 %  EDV: 117.09 mL  ESV: 74.89 mL  SV: 42.20 mL  CO: 3.96 L/min    Indexed:  EDVi: 69.40 mL/m2  ESVi: 44.38 mL/m2  SV: 25.01 mL/m2  CO: 2.35 L/min/m2      VALVES:    MITRAL VALVE: Mitral regurgitation, not quantified  AORTIC VALVE: Aortic regurgitation, not quantified.      AORTA: Three-vessel left-sided aortic arch and left-sided descending   thoracic aorta.      NONCARDIAC FINDINGS: The other visualized thoracoabdominal structures are   unremarkable.      IMPRESSION:    1.  Subtle late gadolinium enhancement along the inferior hinge point of   the right ventricle can occur in a variety of clinical settings including   altered ventricular mechanics.  2.  Concentric thickening of the ventricular myocardium.  3.  The estimated left ventricular ejection fraction is 36.04%.         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 Patient sent to ED by Dr. Cuellar for ADHF.     Pertinent labs:  BNP  6513    Lactate  3.2 repeat pending    Troponin 88/88/84         CXR: Enlargement of the main pulmonary artery as noted on CTA chest from 12/14/2022. The lungs are clear. No pleural effusion or pneumothorax.    EKG: Normal sinus rhythm with sinus arrhythmia Right superior axis deviation, RVH, Inferior infarct , age undetermined Anterior infarct , age undetermined    Cardiac Catheterization (12.16.22 @ 15:31)     Hemodynamic Pressures:   Phase          Location          [mmHg]               [mmHg]  [mmHg]           HR  Phase 0       RV                  s      48  ed      1189  Phase 0       PA                  s      46                d      24  m   33         97  Phase 0       PCW                 a      11                v       9  m       9      96  Phase 0       RA                  a       6                v       5  m       5      95    Oxygen Saturations   Phase          Location        Hb             Sat            pO2  Content        Group  Phase 0         AO                       13             100  17.27   SA  Phase 0         PA                       13          74  12.71   MV    Oxygen Saturation Average, Phase: Phase 0   PV Hb                PV Sat                 PV pO2  PV Content  SA Hb      12.70 g/dL SA Sat     100.00 %   SA pO2  SA Content     17.27 %  PA Hb                PA Sat     PA pO2  PA Content  MV Hb      12.70 g/dL MV Sat     73.60 %    MV pO2  MV Content     12.71 %  Strokework:   Phase:                    Phase 0     LVSW:                                             LVSW (index):   RVSW:                     19.50 g*m  RVSW (index):             11.55 g*m/m2  Flow Calculations, Phase: Phase 0   CO                     4.97 l/min    HR    MR Cardiac w/wo IV Cont (12.16.22 @ 11:15)   FINDINGS:      MORPHOLOGY:    PERICARDIUM: The pericardium is normal in thickness (less than 4mm).   There is no pericardial effusion.    RIGHT ATRIUM: The right atrium may be enlarged. The inflow the IVC and   SVC are unremarkable.    RIGHT VENTRICLE: Right ventricle is unremarkable. There is no scalloping   or thickening of the free wall the right ventricle.    LEFT ATRIUM: Left atrium measures 3.0 cm the 3 chamber plane. There are   bilateral superior and inferior pulmonary veins.    LEFT VENTRICLE: Concentric thickening of the left ventricular myocardium.   Left ventricle measures 4.1 cm at end diastole and 3.5 cm end systole   (anteroseptal/inferolateral). There is no convincing focus of increased   T2 signal.    There is no convincing first pass perfusion abnormality. At the inferior   hinge point of the right ventricle, there is subtle late gadolinium   enhancement. There are no other convincing foci of late gadolinium   enhancement shown on the early or late phases of late gadolinium enhanced   imaging.    FUNCTION: No segmental wall motion abnormality. No global wall motion   abnormality.    LEFT VENTRICLE:  Unindexed:  EF: 36.04 %  EDV: 117.09 mL  ESV: 74.89 mL  SV: 42.20 mL  CO: 3.96 L/min    Indexed:  EDVi: 69.40 mL/m2  ESVi: 44.38 mL/m2  SV: 25.01 mL/m2  CO: 2.35 L/min/m2      VALVES:    MITRAL VALVE: Mitral regurgitation, not quantified  AORTIC VALVE: Aortic regurgitation, not quantified.      AORTA: Three-vessel left-sided aortic arch and left-sided descending   thoracic aorta.      NONCARDIAC FINDINGS: The other visualized thoracoabdominal structures are   unremarkable.      IMPRESSION:    1.  Subtle late gadolinium enhancement along the inferior hinge point of   the right ventricle can occur in a variety of clinical settings including   altered ventricular mechanics.  2.  Concentric thickening of the ventricular myocardium.  3.  The estimated left ventricular ejection fraction is 36.04%.

## 2023-02-23 NOTE — H&P ADULT - PROBLEM SELECTOR PLAN 1
Pulmonary edema and HF likely contributing given elevated BNP and edema. Unclear if other chronic process ongoing. Sarcoid? pulm htn?  -Cont . Lasix 40mg IV daily for now  -Cont. oxygen  -Continuous pulse oximetry for now  -TTE  -Cardiology and/or pulmonology consult in AM IV diuretics  monitor Ins and outs, weight, labs, and vitals closely  O2 as needed and wean off as able  Cardio and HF team aware / following  medical optimization  patient post AICD in December  Continue Current medications otherwise and monitor.  supportive care

## 2023-02-23 NOTE — ED ADULT NURSE REASSESSMENT NOTE - AS PAIN REST
0 (no pain/absence of nonverbal indicators of pain)
0 (no pain/absence of nonverbal indicators of pain)
1 (mild pain)
0 (no pain/absence of nonverbal indicators of pain)

## 2023-02-23 NOTE — CONSULT NOTE ADULT - PROBLEM SELECTOR RECOMMENDATION 9
IV Lasix 20mg BID; Please switch to Bumex 2mg BID  Toprol 50mg in AM and 100mg in PM (hold SBP<90)  Entresto 24/26mg BID; Please increase to 97/103mg BID (hold SBP<90)  Hydralazine 50mg TID (hold SBP<90)  Spironolactone 25mg daily   Strict I/O  Daily standing weights   Monitor lytes replete K>4.0 and Mg>2.0  Trend SCr  Repeat TTE ordered/pending   Please consider Heme/Onc workup; Evaluation for AL possible bone marrow biopsy   Management per primary team  Pending final recommendations from HF attending IV Lasix 20mg BID; Please switch to Bumex 2mg BID  Toprol 50mg in AM and 100mg in PM (hold SBP<90); change to 100 mg in PM  Entresto 24/26mg BID; Please increase to 97/103mg BID (hold SBP<90)  Hydralazine 50mg TID (hold SBP<90)  Spironolactone 25mg daily   Strict I/O  Daily standing weights   Monitor lytes replete K>4.0 and Mg>2.0  Trend SCr  Repeat TTE ordered/pending   Please consider Heme/Onc workup; Evaluation for AL possible bone marrow biopsy   Management per primary team  Pending final recommendations from HF attending

## 2023-02-23 NOTE — CONSULT NOTE ADULT - NS ATTEND AMEND GEN_ALL_CORE FT
Briefly, 63 y/o Thai M w/ h/o HTN, RCC s/p partial R nephrectomy (4/22), CKD (bl Cr 1.5), HFrEF/NICM (EF 25-30%, LVEDD 4.1 cm, septum 1.3 cm/PW 1.3 cm) s/p ICD (at [facility] for positive EP study), plasma cell dyscrasia (Kappa/lambda 3.23; awaiting further workup) who was referred to ER for persistent dyspnea and cough for past 6 weeks. Of note, he had been hospitalized at [facility] as well as [facility] in past 1-2 months. Was evaluated for TTR amyloid which was negative but never underwent biopsy. Also noted to have R hilar LAD and axillary LAD on CT chest in 12/22 (negative for PE). Was given IV diuretics with good response. States cough improved. On exam, NAD, JVP 8-10 cm w/ HJR, RRR, no m/r/g, CTAB, nontender abdomen, no pedal edema, cool to palpation. Labs reviewed - trop 80s, BUN/Cr 25/1.4, BNP 6k. Overall stage C HF, NYHA class III with suspicion that there may be an infiltrative process (e.g. amyloid vs. sarcoid) and requires expedited workup. Would obtain BM biopsy this admission and may require a cardiac biopsy to evaluate for AL amyloid as has kappa predominance. In addition, sarcoid is a possibility as well given RVH and hilar LAD.  - resume meds as above  - switch to bumex 2 mg twice/day IV  - please consult hematology re: BM biopsy  - will consider cardiac biopsy in which case will need to be transferred to [facility] for further workup  - standing weights daily   - switched lopressor to toprol xl 50 mg daily  - c/w belkis 25 mg daily

## 2023-02-23 NOTE — H&P ADULT - HISTORY OF PRESENT ILLNESS
Pt is a 63 yo man with PMH of HTN, RA, RCC s/p R partial nephrectomy, CKD, HFrEF/NICM EF 25-30%, ICD, plasma cell dyscrasia, h/o H-pylori, treated, seen at HF clinic and sent to ED for worsening heart failure.   pt admits to progressively increasing SOB, AVALOS with cough, worsening at night over past 6 months with leg edema,  however increasingly worse over past month.   pt on bumex and entresto.   Pt using O2 at home as needed.   Pt denies any chest pain, fevers, recent surgeries, dizziness, bloody stools.

## 2023-02-23 NOTE — CONSULT NOTE ADULT - SUBJECTIVE AND OBJECTIVE BOX
CHIEF COMPLAINT: sob     HISTORY OF PRESENT ILLNESS:  62yoM with PMH of HTN, RA, RCC s/p R partial nephrectomy, CKD, HFrEF/NICM EF 25-30%, ICD, plasma cell dyscrasia, seen at HF clinic today, sent to ED for worsening heart failure. pt admits to progressively increasing SOB, AVALOS with cough, worsening at night over past 6 months, however increasingly worse over past month. pt on bumex and entresto. Pt using O2 at home as needed. Pt denies any chest pain, fevers, recent surgeries, dizziness, bloody stools. Follows with Cardio, DR. Michael Cuellar.      Allergies    No Known Allergies    Intolerances    	    MEDICATIONS:  see med rec                PAST MEDICAL & SURGICAL HISTORY:  Hypertension      Rheumatoid arthritis      Neoplasm of uncertain behavior of kidney, right      History of cardiac cath  jan 2022, at [facility] no stent          FAMILY HISTORY:  FH: diabetes mellitus (Mother)        SOCIAL HISTORY:    non smoker. indep in adl    REVIEW OF SYSTEMS:  See HPI, otherwise complete 10 point review of systems negative    [ ] All others negative	      PHYSICAL EXAM:  T(C): 36.9 (02-23-23 @ 05:09), Max: 36.9 (02-22-23 @ 23:45)  HR: 100 (02-23-23 @ 05:09) (84 - 101)  BP: 125/99 (02-23-23 @ 05:09) (117/97 - 133/114)  RR: 18 (02-23-23 @ 05:09) (18 - 21)  SpO2: 97% (02-23-23 @ 05:09) (93% - 98%)  Wt(kg): --  I&O's Summary      Appearance: No Acute Distress	  HEENT:  Normal oral mucosa, PERRL, EOMI	  Cardiovascular: Normal S1 S2, No JVD, No murmurs/rubs/gallops  Respiratory: Lungs clear to auscultation bilaterally  Gastrointestinal:  Soft, Non-tender, + BS	  Skin: No rashes, No ecchymoses, No cyanosis	  Neurologic: Non-focal  Extremities: No clubbing, cyanosis or edema  Vascular: Peripheral pulses palpable 2+ bilaterally  Psychiatry: A & O x 3, Mood & affect appropriate    Laboratory Data:	 	    CBC Full  -  ( 22 Feb 2023 20:46 )  WBC Count : 11.85 K/uL  Hemoglobin : 12.1 g/dL  Hematocrit : 40.0 %  Platelet Count - Automated : 271 K/uL  Mean Cell Volume : 98.0 fL  Mean Cell Hemoglobin : 29.7 pg  Mean Cell Hemoglobin Concentration : 30.3 gm/dL  Auto Neutrophil # : 6.43 K/uL  Auto Lymphocyte # : 4.13 K/uL  Auto Monocyte # : 0.95 K/uL  Auto Eosinophil # : 0.22 K/uL  Auto Basophil # : 0.09 K/uL  Auto Neutrophil % : 54.1 %  Auto Lymphocyte % : 34.9 %  Auto Monocyte % : 8.0 %  Auto Eosinophil % : 1.9 %  Auto Basophil % : 0.8 %    02-22    144  |  111<H>  |  25<H>  ----------------------------<  87  5.0   |  21<L>  |  1.40<H>    Ca    10.0      22 Feb 2023 20:46    TPro  7.8  /  Alb  4.0  /  TBili  0.5  /  DBili  x   /  AST  33  /  ALT  24  /  AlkPhos  178<H>  02-22      proBNP: Serum Pro-Brain Natriuretic Peptide: 6513 pg/mL (02-22 @ 20:46)    Lipid Profile:   HgA1c:   TSH:       CARDIAC MARKERS:            Interpretation of Telemetry: 	    ECG:  	  RADIOLOGY:  OTHER: 	    PREVIOUS DIAGNOSTIC TESTING:    [ ] Echocardiogram:  [ ] Catheterization:  [ ] Stress Test:  	    Assessment:  62yoM with PMH of HTN, RA, RCC s/p R partial nephrectomy, CKD, HFrEF/NICM EF 25-30%, ICD, plasma cell dyscrasia, seen at HF clinic today, sent to ED for worsening heart failure. pt admits to progressively increasing SOB, AVALOS with cough, worsening at night over past 6 months, however increasingly worse over past month    Recs:  admit to dr blount  consult hf/dr cuellar  cw gdmt for lv dysfunction  cw aggressive diuresis  heme f/u for plasma cell dyscrasia  recent cardiac mri with subtle LGE in RV --> ? should he be ruled out for cardiac sarcoid/cardiac bx  routine icd interrogation as outpatient  tele monitoring   will follow      Advanced care planning forms were discussed. Code status including forceful chest compressions, defibrillation and intubation were discussed. The risks benefits and alternatives to pertinent cardiac procedures and interventions were discussed in detail and all questions were answered. Duration: 15 minutes.    Greater than 90 minutes spent on total encounter; more than 50% of the visit was spent counseling and/or coordinating care by the attending physician.   	  Juarez Carver MD   Cardiovascular Diseases  [phone]

## 2023-02-23 NOTE — ED ADULT NURSE REASSESSMENT NOTE - NS ED NURSE REASSESS COMMENT FT1
Patient resting in stretcher, no distress noted. Side rails up and safety maintained. Fall precaution in place. Call bells within reach.
Pt A&Ox4. Respirations equal and unlabored. Pt O2 sat 99% on 3L NC. Pt resting in stretcher at this time with no complaints. Report given to GABRIELA Land for continuity of care. IV intact. VSS. No acute distress noted at this time. Awaiting transport to inpatient bed assignment.
Received patient in room 18, admitted to medicine waiting for bed assignment. Patient denies any pain/discomfort at this time. Patient is on cardiac monitor sinus rhythm w/O2 sat 100% on a 3L/NC. Patient is A&OX4, airway patent, breathing unlabored and even, radial pulses palpable. Side rails up and safety maintained. Fall precaution in place. Call bells within reach.
Report received from RN Small: Pt A&Ox4, respirations equal and unlabored. Pt resting in stretcher at this time comfortably with no complaints IV patent. Paced on cardiac monitor. O2 sat 99% on 4L NC. Awaiting inpatient bed assignment. No acute distress noted at this time.
Pt A&Ox4, respirations equal and unlabored. Pt resting in stretcher at this time with no complaints. Family member at bedside. IV patent. VSS. Medicated as per EMR orders. Safety maintained. Side rails raised, call bell in reach, bed in lowest position. Urinal at bedside. Awaiting inpatient bed assignment. No acute distress noted at this time.
Pt in room 18. A&Ox4, amb at baseline. Denies chest pain ,SOB, nausea, vomiting, headache, dizziness, numbness/tingling to hands/feet. Pt endorses symptom improvement post arrival. Pt states oxygen has helped with SOB as well as after medicating. Pt states after he urinated multiple times post medicating pt doesn't feel as short of breath.
Vitally stable. Endorses improvement with SOB. Satting 98% on 3L NC. Denies chest pain, SOB, nausea, vomiting, headache, dizziness, numbness/tingling to hands/feet. Repeat labs sent.

## 2023-02-23 NOTE — H&P ADULT - PROBLEM SELECTOR PLAN 2
Note some likely pulmonary edema on CTA. Also with signs of R sided HF/ pulmonary HTN. Suspect some relation to whatever chronic ongoing process has been causing pt's idiopathic dyspnea  -Cont. lasix 40mg IV daily for now  -Vital signs and continuous pulse oximetry as above  -TTE  -Strict I/Os and daily weight  -Cards consult and/or pulm consult as above  diuresis  wean off O2 as able  patient has PRN O2 at home

## 2023-02-23 NOTE — PATIENT PROFILE ADULT - FALL HARM RISK - HARM RISK INTERVENTIONS

## 2023-02-23 NOTE — H&P ADULT - NSHPPHYSICALEXAM_GEN_ALL_CORE
Vital Signs Last 24 HrsT(C): 36.4 (02-23-23 @ 11:13)  T(F): 97.5 (02-23-23 @ 11:13), Max: 98.5 (02-23-23 @ 05:09)  HR: 104 (02-23-23 @ 11:13) (84 - 104)  BP: 124/81 (02-23-23 @ 11:13)  RR: 18 (02-23-23 @ 11:13) (18 - 21)  SpO2: 96% (02-23-23 @ 11:13) (93% - 98%)        GEN: A&O X 3 , NAD , comfortable, pleasant, calm  in bed  HEENT: NCAT, PERRL, MMM, hearing intact, nasal canula O2  Neck: supple , no significant JVD appreciated   CVS: S1S2 , regular , No M/R/G appreciated  PULM: CTA B/L,  no W/R/R appreciated  ABD.: soft. non tender, non distended,  bowel sounds present  Extrem: intact pulses , no significant pitting edema appreciated    Derm: No rash , no ecchymoses  PSYCH : normal mood,  no delusion not anxious

## 2023-02-23 NOTE — H&P ADULT - PROBLEM SELECTOR PLAN 5
-Trend BP  -Cont. lopressor BID with hold parameters likely can follow up as outpatient with Onc  o CBC

## 2023-02-23 NOTE — H&P ADULT - NSVTERISKREFERASSESS_GEN_ALL_CORE
Refer to the Assessment tab to view/cancel completed assessment. Manual Repair Warning Statement: We plan on removing the manually selected variable below in favor of our much easier automatic structured text blocks found in the previous tab. We decided to do this to help make the flow better and give you the full power of structured data. Manual selection is never going to be ideal in our platform and I would encourage you to avoid using manual selection from this point on, especially since I will be sunsetting this feature. It is important that you do one of two things with the customized text below. First, you can save all of the text in a word file so you can have it for future reference. Second, transfer the text to the appropriate area in the Library tab. Lastly, if there is a flap or graft type which we do not have you need to let us know right away so I can add it in before the variable is hidden. No need to panic, we plan to give you roughly 6 months to make the change.

## 2023-02-23 NOTE — ED ADULT NURSE REASSESSMENT NOTE - PAIN RATING/NUMBER SCALE (0-10): ACTIVITY
0 (no pain/absence of nonverbal indicators of pain)
1 (mild pain)

## 2023-02-23 NOTE — H&P ADULT - CONVERSATION DETAILS
*** Advance directive /  goals of care discussion      I had a long discussion with patient ( and family) about patient's overall diagnosis, expected prognosis, and potential complications.       Discussed treatment options, comfort care / hospice as appropriate, and all other potential options of care.         Discussed risks, benefits, and alternatives of treatment as well.          Opportunity given for and all questions answered.            Reviewed available advance directives as available > patient has not thought much about this .. discussed in detail and all questions answered.      At this time patient to be  full code for now , with continuation of current medical therapy.     Goal is for pt to return > home and follow up as outpatient with current doctors      will continue to discuss GOC with pt and family and update plan as needed.     Patient's family have my contact information and will contact me with questions.     Additional time spent on Goals of care: 22 min.

## 2023-02-23 NOTE — H&P ADULT - PROBLEM SELECTOR PLAN 4
Pt endorses taking some meds previously but not anymore currently appears stable  monitor  Avoid nephrotoxic medications.

## 2023-02-23 NOTE — H&P ADULT - ASSESSMENT
62M w/ pmhx of HTN, OA, ?RA, chronic idiopathic SOB/dyspnea on intermittent home O2 found to have right kidney mass. 4/29 s/p RAL R partial nephrectomy p/w acute hypoxic respiratory failure likely with a component of pulmonary edema Pt is a 63 yo man with PMH of HTN, RA, RCC s/p R partial nephrectomy, CKD, HFrEF/NICM EF 25-30%, ICD, plasma cell dyscrasia, h/o H-pylori, treated, seen at HF clinic and sent to ED for worsening heart failure.   pt admits to progressively increasing SOB, AVALOS with cough, worsening at night over past 6 months with leg edema,  however increasingly worse over past month.   pt on bumex and entresto.   Pt using O2 at home as needed.   Pt denies any chest pain, fevers, recent surgeries, dizziness, bloody stools.

## 2023-02-23 NOTE — H&P ADULT - PROBLEM SELECTOR PLAN 3
Crt 1.5 up from prior in record. Possibly cardiorenal?  -Trend BMP  -Renal dose medications  -Holding spironolactone for now Continue home medications and monitor.  monitor and adjust as needed  cardiologist following

## 2023-02-23 NOTE — H&P ADULT - NSHPADDITIONALINFOADULT_GEN_ALL_CORE
I was asked to see this patient by the hospitalist in charge. Dr. Marte to assume care for patient in AM and thereafter Dr. SALIMA Vu ([phone])

## 2023-02-23 NOTE — H&P ADULT - NSHPREVIEWOFSYSTEMS_GEN_ALL_CORE
GEN: no fever, no chills, no pain  RESP: no SOB at rest , no cough, no sputum  CVS: no chest pain, no palpitations, edema improved   GI: no abdominal pain, no nausea, no vomiting, no constipation, no diarrhea  : no dysuria, no frequency, no hematuria, + good response to diuretics   NEURO: no headache, no dizziness  PSYCH: no depression, not anxious  Derm : no itching, no rash

## 2023-02-24 LAB
ALBUMIN SERPL ELPH-MCNC: 3.9 G/DL — SIGNIFICANT CHANGE UP (ref 3.3–5)
ALP SERPL-CCNC: 166 U/L — HIGH (ref 40–120)
ALT FLD-CCNC: 16 U/L — SIGNIFICANT CHANGE UP (ref 4–41)
ANION GAP SERPL CALC-SCNC: 12 MMOL/L — SIGNIFICANT CHANGE UP (ref 7–14)
AST SERPL-CCNC: 24 U/L — SIGNIFICANT CHANGE UP (ref 4–40)
BASOPHILS # BLD AUTO: 0.07 K/UL — SIGNIFICANT CHANGE UP (ref 0–0.2)
BASOPHILS NFR BLD AUTO: 0.7 % — SIGNIFICANT CHANGE UP (ref 0–2)
BILIRUB SERPL-MCNC: 0.6 MG/DL — SIGNIFICANT CHANGE UP (ref 0.2–1.2)
BUN SERPL-MCNC: 29 MG/DL — HIGH (ref 7–23)
CALCIUM SERPL-MCNC: 9.7 MG/DL — SIGNIFICANT CHANGE UP (ref 8.4–10.5)
CHLORIDE SERPL-SCNC: 107 MMOL/L — SIGNIFICANT CHANGE UP (ref 98–107)
CHOLEST SERPL-MCNC: 139 MG/DL — SIGNIFICANT CHANGE UP
CO2 SERPL-SCNC: 22 MMOL/L — SIGNIFICANT CHANGE UP (ref 22–31)
CREAT SERPL-MCNC: 1.54 MG/DL — HIGH (ref 0.5–1.3)
EGFR: 50 ML/MIN/1.73M2 — LOW
EOSINOPHIL # BLD AUTO: 0.19 K/UL — SIGNIFICANT CHANGE UP (ref 0–0.5)
EOSINOPHIL NFR BLD AUTO: 2 % — SIGNIFICANT CHANGE UP (ref 0–6)
GLUCOSE SERPL-MCNC: 85 MG/DL — SIGNIFICANT CHANGE UP (ref 70–99)
HCT VFR BLD CALC: 39.5 % — SIGNIFICANT CHANGE UP (ref 39–50)
HDLC SERPL-MCNC: 30 MG/DL — LOW
HGB BLD-MCNC: 11.9 G/DL — LOW (ref 13–17)
IANC: 5.19 K/UL — SIGNIFICANT CHANGE UP (ref 1.8–7.4)
IMM GRANULOCYTES NFR BLD AUTO: 0.3 % — SIGNIFICANT CHANGE UP (ref 0–0.9)
LIPID PNL WITH DIRECT LDL SERPL: 81 MG/DL — SIGNIFICANT CHANGE UP
LYMPHOCYTES # BLD AUTO: 3.19 K/UL — SIGNIFICANT CHANGE UP (ref 1–3.3)
LYMPHOCYTES # BLD AUTO: 34 % — SIGNIFICANT CHANGE UP (ref 13–44)
MAGNESIUM SERPL-MCNC: 2 MG/DL — SIGNIFICANT CHANGE UP (ref 1.6–2.6)
MCHC RBC-ENTMCNC: 28.9 PG — SIGNIFICANT CHANGE UP (ref 27–34)
MCHC RBC-ENTMCNC: 30.1 GM/DL — LOW (ref 32–36)
MCV RBC AUTO: 95.9 FL — SIGNIFICANT CHANGE UP (ref 80–100)
MONOCYTES # BLD AUTO: 0.7 K/UL — SIGNIFICANT CHANGE UP (ref 0–0.9)
MONOCYTES NFR BLD AUTO: 7.5 % — SIGNIFICANT CHANGE UP (ref 2–14)
NEUTROPHILS # BLD AUTO: 5.19 K/UL — SIGNIFICANT CHANGE UP (ref 1.8–7.4)
NEUTROPHILS NFR BLD AUTO: 55.5 % — SIGNIFICANT CHANGE UP (ref 43–77)
NON HDL CHOLESTEROL: 109 MG/DL — SIGNIFICANT CHANGE UP
NRBC # BLD: 0 /100 WBCS — SIGNIFICANT CHANGE UP (ref 0–0)
NRBC # FLD: 0 K/UL — SIGNIFICANT CHANGE UP (ref 0–0)
PHOSPHATE SERPL-MCNC: 4.1 MG/DL — SIGNIFICANT CHANGE UP (ref 2.5–4.5)
PLATELET # BLD AUTO: 289 K/UL — SIGNIFICANT CHANGE UP (ref 150–400)
POTASSIUM SERPL-MCNC: 3.6 MMOL/L — SIGNIFICANT CHANGE UP (ref 3.5–5.3)
POTASSIUM SERPL-SCNC: 3.6 MMOL/L — SIGNIFICANT CHANGE UP (ref 3.5–5.3)
PROT SERPL-MCNC: 7.7 G/DL — SIGNIFICANT CHANGE UP (ref 6–8.3)
RBC # BLD: 4.12 M/UL — LOW (ref 4.2–5.8)
RBC # FLD: 14.9 % — HIGH (ref 10.3–14.5)
SODIUM SERPL-SCNC: 141 MMOL/L — SIGNIFICANT CHANGE UP (ref 135–145)
TRIGL SERPL-MCNC: 138 MG/DL — SIGNIFICANT CHANGE UP
WBC # BLD: 9.37 K/UL — SIGNIFICANT CHANGE UP (ref 3.8–10.5)
WBC # FLD AUTO: 9.37 K/UL — SIGNIFICANT CHANGE UP (ref 3.8–10.5)

## 2023-02-24 PROCEDURE — 99233 SBSQ HOSP IP/OBS HIGH 50: CPT

## 2023-02-24 PROCEDURE — 93282 PRGRMG EVAL IMPLANTABLE DFB: CPT | Mod: 26

## 2023-02-24 PROCEDURE — 93010 ELECTROCARDIOGRAM REPORT: CPT

## 2023-02-24 PROCEDURE — 93306 TTE W/DOPPLER COMPLETE: CPT | Mod: 26

## 2023-02-24 RX ORDER — POTASSIUM CHLORIDE 20 MEQ
20 PACKET (EA) ORAL ONCE
Refills: 0 | Status: COMPLETED | OUTPATIENT
Start: 2023-02-24 | End: 2023-02-24

## 2023-02-24 RX ADMIN — Medication 20 MILLIEQUIVALENT(S): at 13:29

## 2023-02-24 RX ADMIN — ENOXAPARIN SODIUM 40 MILLIGRAM(S): 100 INJECTION SUBCUTANEOUS at 13:28

## 2023-02-24 RX ADMIN — BUMETANIDE 2 MILLIGRAM(S): 0.25 INJECTION INTRAMUSCULAR; INTRAVENOUS at 13:29

## 2023-02-24 RX ADMIN — SACUBITRIL AND VALSARTAN 1 TABLET(S): 24; 26 TABLET, FILM COATED ORAL at 17:45

## 2023-02-24 RX ADMIN — Medication 50 MILLIGRAM(S): at 06:02

## 2023-02-24 RX ADMIN — Medication 50 MILLIGRAM(S): at 17:45

## 2023-02-24 RX ADMIN — BUMETANIDE 2 MILLIGRAM(S): 0.25 INJECTION INTRAMUSCULAR; INTRAVENOUS at 06:01

## 2023-02-24 RX ADMIN — SPIRONOLACTONE 25 MILLIGRAM(S): 25 TABLET, FILM COATED ORAL at 06:02

## 2023-02-24 RX ADMIN — SACUBITRIL AND VALSARTAN 1 TABLET(S): 24; 26 TABLET, FILM COATED ORAL at 06:54

## 2023-02-24 RX ADMIN — BUDESONIDE AND FORMOTEROL FUMARATE DIHYDRATE 2 PUFF(S): 160; 4.5 AEROSOL RESPIRATORY (INHALATION) at 21:21

## 2023-02-24 RX ADMIN — BUDESONIDE AND FORMOTEROL FUMARATE DIHYDRATE 2 PUFF(S): 160; 4.5 AEROSOL RESPIRATORY (INHALATION) at 09:30

## 2023-02-24 NOTE — PROVIDER CONTACT NOTE (CHANGE IN STATUS NOTIFICATION) - BACKGROUND
plasma cell dyscrasia, stage 3a chronic kidney disease, hypertension, acute on chronic respiratory failure with hypoxia, acute on chronic systolic heart failure

## 2023-02-24 NOTE — CONSULT NOTE ADULT - SUBJECTIVE AND OBJECTIVE BOX
Chief Complaint:  Patient is a 62y old  Male who presents with a chief complaint of SOB, likely CHF exacerbation (24 Feb 2023 10:10)      HPI: patient known from prior admission. He has a recent history of RCC treated with surgery. It was an early stage lesion. He had a subsequent axillary lymph node biopsy to assess for lymphoma and it was negative. He had CT scans and a bone scan and there was no evidence of malignancy. He has a mild anemia and kidney disease. A serum DELVIS reported a faint band in igG and kappa and quantitatively they were high, but mild. He was supposed to follow up as out-pt but did not despite calling him and leaving messages. he admits that he got the messages but did not come in. Now admitted with SOB and asked to se ept for possible amyloid. He is being followed by art failure service.     Medications:  acetaminophen     Tablet .. 650 milliGRAM(s) Oral every 6 hours PRN  albuterol    90 MICROgram(s) HFA Inhaler 1 Puff(s) Inhalation three times a day PRN  aluminum hydroxide/magnesium hydroxide/simethicone Suspension 30 milliLiter(s) Oral every 4 hours PRN  budesonide 160 MICROgram(s)/formoterol 4.5 MICROgram(s) Inhaler 2 Puff(s) Inhalation two times a day  buMETAnide Injectable 2 milliGRAM(s) IV Push two times a day  enoxaparin Injectable 40 milliGRAM(s) SubCutaneous every 24 hours  hydrALAZINE 50 milliGRAM(s) Oral two times a day  melatonin 3 milliGRAM(s) Oral at bedtime PRN  metoprolol succinate ER 50 milliGRAM(s) Oral daily  ondansetron Injectable 4 milliGRAM(s) IV Push every 8 hours PRN  sacubitril 97 mG/valsartan 103 mG 1 Tablet(s) Oral two times a day  spironolactone 25 milliGRAM(s) Oral daily        Allergies:  No Known Allergies    FAMILY HISTORY:  FH: diabetes mellitus (Mother)        Social history:     PAST MEDICAL & SURGICAL HISTORY:  Hypertension      Rheumatoid arthritis      Neoplasm of uncertain behavior of kidney, right      History of cardiac cath  jan 2022, at [place] no stent      REVIEW OF SYSTEMS      General: Some weight loss. Appetite is fair. No fevers or sweats    Skin/Breast: No rash, itch, bruising.  	  Ophthalmologic: No change in vision  	  ENMT:	No hearing loss, nosebleeds, sore throat    Respiratory and Thorax: No hemoptysis or wheeze. Occasional cough and SOB are improving  	  Cardiovascular:	No CP    Gastrointestinal:	No N/V/D. abd pain    Genitourinary:	No dysuria or hematuria    Musculoskeletal:	 No back pain, leg pain, swelling    Neurological:	No HA or dizziness    Vitals:  Vital Signs Last 24 Hrs  T(C): 37.1 (24 Feb 2023 11:45), Max: 37.1 (24 Feb 2023 11:45)  T(F): 98.7 (24 Feb 2023 11:45), Max: 98.7 (24 Feb 2023 11:45)  HR: 100 (24 Feb 2023 13:36) (99 - 106)  BP: 99/65 (24 Feb 2023 13:36) (99/65 - 119/83)  BP(mean): --  RR: 17 (24 Feb 2023 11:45) (16 - 18)  SpO2: 99% (24 Feb 2023 11:45) (99% - 100%)    Parameters below as of 24 Feb 2023 11:45  Patient On (Oxygen Delivery Method): nasal cannula  O2 Flow (L/min): 3      Pex:  alert NAD  EOMI anicteric sclera  Neck No LNA  Cv RRR  Resp non labored  abd soft NT ND  No LE edema or tenderness    Labs:                        11.9   9.37  )-----------( 289      ( 24 Feb 2023 03:44 )             39.5     CBC Full  -  ( 24 Feb 2023 03:44 )  WBC Count : 9.37 K/uL  RBC Count : 4.12 M/uL  Hemoglobin : 11.9 g/dL  Hematocrit : 39.5 %  Platelet Count - Automated : 289 K/uL  Mean Cell Volume : 95.9 fL  Mean Cell Hemoglobin : 28.9 pg  Mean Cell Hemoglobin Concentration : 30.1 gm/dL  Auto Neutrophil # : 5.19 K/uL  Auto Lymphocyte # : 3.19 K/uL  Auto Monocyte # : 0.70 K/uL  Auto Eosinophil # : 0.19 K/uL  Auto Basophil # : 0.07 K/uL  Auto Neutrophil % : 55.5 %  Auto Lymphocyte % : 34.0 %  Auto Monocyte % : 7.5 %  Auto Eosinophil % : 2.0 %  Auto Basophil % : 0.7 %    02-24    141  |  107  |  29<H>  ----------------------------<  85  3.6   |  22  |  1.54<H>    Ca    9.7      24 Feb 2023 06:38  Phos  4.1     02-22  Mg     2.00     02-22    TPro  7.7  /  Alb  3.9  /  TBili  0.6  /  DBili  x   /  AST  24  /  ALT  16  /  AlkPhos  166<H>  02-24    PT/INR - ( 22 Feb 2023 20:46 )   PT: 15.6 sec;   INR: 1.34 ratio         PTT - ( 22 Feb 2023 20:46 )  PTT:32.2 sec  [id number]

## 2023-02-24 NOTE — PROGRESS NOTE ADULT - SUBJECTIVE AND OBJECTIVE BOX
Interval History:  Patient resting comfortably in bed   Denies CP/SOB/palpitations/dizziness  No acute events overnight    Medications:  acetaminophen     Tablet .. 650 milliGRAM(s) Oral every 6 hours PRN  albuterol    90 MICROgram(s) HFA Inhaler 1 Puff(s) Inhalation three times a day PRN  aluminum hydroxide/magnesium hydroxide/simethicone Suspension 30 milliLiter(s) Oral every 4 hours PRN  budesonide 160 MICROgram(s)/formoterol 4.5 MICROgram(s) Inhaler 2 Puff(s) Inhalation two times a day  buMETAnide Injectable 2 milliGRAM(s) IV Push two times a day  enoxaparin Injectable 40 milliGRAM(s) SubCutaneous every 24 hours  hydrALAZINE 50 milliGRAM(s) Oral two times a day  melatonin 3 milliGRAM(s) Oral at bedtime PRN  metoprolol succinate ER 50 milliGRAM(s) Oral daily  ondansetron Injectable 4 milliGRAM(s) IV Push every 8 hours PRN  potassium chloride   Powder 20 milliEquivalent(s) Oral once  sacubitril 97 mG/valsartan 103 mG 1 Tablet(s) Oral two times a day  spironolactone 25 milliGRAM(s) Oral daily      Vitals:  T(C): 36.7 (02-24-23 @ 05:34), Max: 36.8 (02-23-23 @ 14:30)  HR: 106 (02-24-23 @ 05:34) (99 - 106)  BP: 119/83 (02-24-23 @ 05:34) (103/76 - 124/81)  BP(mean): --  RR: 17 (02-24-23 @ 05:34) (16 - 18)  SpO2: 100% (02-24-23 @ 05:34) (96% - 100%)    Daily     Daily         I&O's Summary    23 Feb 2023 07:01  -  24 Feb 2023 07:00  --------------------------------------------------------  IN: 0 mL / OUT: 500 mL / NET: -500 mL        Physical Exam:  Appearance: No Acute Distress  Neck: JVP  Cardiovascular: Normal S1 S2  Respiratory: Clear to auscultation bilaterally  Gastrointestinal: Soft, Non-tender	  Skin: No cyanosis	  Neurologic: Non-focal  Extremities: No LE edema  Psychiatry: A & O x 3, Mood & affect appropriate    Labs:                        11.9   9.37  )-----------( 289      ( 24 Feb 2023 03:44 )             39.5     02-24    141  |  107  |  29<H>  ----------------------------<  85  3.6   |  22  |  1.54<H>    Ca    9.7      24 Feb 2023 06:38  Phos  4.1     02-22  Mg     2.00     02-22    TPro  7.7  /  Alb  3.9  /  TBili  0.6  /  DBili  x   /  AST  24  /  ALT  16  /  AlkPhos  166<H>  02-24    PT/INR - ( 22 Feb 2023 20:46 )   PT: 15.6 sec;   INR: 1.34 ratio         PTT - ( 22 Feb 2023 20:46 )  PTT:32.2 sec  CARDIAC MARKERS ( 22 Feb 2023 23:23 )  x     / x     / 202 U/L / x     / 6.6 ng/mL        TELEMETRY: Sinus Rhythm with brief episodes of NSVT     Echocardiogram:  TTE with Doppler (w/Cont) (12.15.22 @ 18:19)   ------------------------------------------------------------------------  Dimensions:    Normal Values:  LA:     3.6    2.0 - 4.0 cm  Ao:     2.9    2.0 - 3.8 cm  SEPTUM: 1.5    0.6 - 1.2 cm  PWT:    1.3    0.6 - 1.1 cm  LVIDd:  4.1   3.0 - 5.6 cm  LVIDs:  3.0    1.8 - 4.0 cm  Derived variables:  LVMI: 128 g/m2  RWT: 0.63  Fractional short: 27 %  EF (Espino Rule): 28 %  ------------------------------------------------------------------------  Observations:  Mitral Valve: Normal mitral valve. Minimal mitral  regurgitation.  Aortic Valve/Aorta: Normal trileaflet aortic valve. Mild  aortic regurgitation.  Aortic Root: 2.9 cm.  Left Atrium: Normal left atrium.  LA volume index = 21  cc/m2.  Left Ventricle: Severe global left ventricular systolic  dysfunction.   Highly trabeculated apex.  Endocardial  visualization enhanced with intravenous injection of  Ultrasonic Enhancing Agent (Definity). No left ventricular  thrombus. Moderate concentric left ventricular hypertrophy.  Increased E/e'  is consistent with elevated left  ventricular filling pressure.  Right Heart: Moderate right atrial enlargement. Right  ventricular enlargement with decreased right ventricular  systolic function. Normal tricuspid valve. Mild-moderate  tricuspid regurgitation. Normal pulmonic valve. Mild  pulmonic regurgitation.  Pericardium/Pleura: Normal pericardium with no pericardial  effusion.  Hemodynamic: Estimated right atrial pressure is 8 mm Hg.  Estimated right ventricular systolic pressure equals 60 mm  Hg, assuming right atrial pressure equals 8 mm Hg,  consistent with moderate pulmonary hypertension.  ------------------------------------------------------------------------  Conclusions:  1. Moderate concentric left ventricular hypertrophy.  2. Severe global left ventricular systolic dysfunction.  Highly trabeculated apex.  Endocardial visualization  enhanced with intravenous injection of Ultrasonic Enhancing  Agent (Definity). No left ventricular thrombus.  3. Global Longitudinal Strain -6.4% (Adali, HR 96 bpm, BP  125/72).  GLS is severely reduced.  4. Increased E/e'  is consistent with elevated left  ventricular filling pressure.  5. Moderate right atrial enlargement.  6. Right ventricular enlargement with decreased right  ventricular systolic function.  7. Estimated pulmonary artery systolic pressure equals 60  mm Hg, assuming right atrial pressure equals 8 mm Hg,  consistent with moderate pulmonary pressures.  *** No previous Echo exam.         Interval History:  Patient resting comfortably in bed   Denies CP/SOB/palpitations/dizziness  No acute events overnight    Medications:  acetaminophen     Tablet .. 650 milliGRAM(s) Oral every 6 hours PRN  albuterol    90 MICROgram(s) HFA Inhaler 1 Puff(s) Inhalation three times a day PRN  aluminum hydroxide/magnesium hydroxide/simethicone Suspension 30 milliLiter(s) Oral every 4 hours PRN  budesonide 160 MICROgram(s)/formoterol 4.5 MICROgram(s) Inhaler 2 Puff(s) Inhalation two times a day  buMETAnide Injectable 2 milliGRAM(s) IV Push two times a day  enoxaparin Injectable 40 milliGRAM(s) SubCutaneous every 24 hours  hydrALAZINE 50 milliGRAM(s) Oral two times a day  melatonin 3 milliGRAM(s) Oral at bedtime PRN  metoprolol succinate ER 50 milliGRAM(s) Oral daily  ondansetron Injectable 4 milliGRAM(s) IV Push every 8 hours PRN  potassium chloride   Powder 20 milliEquivalent(s) Oral once  sacubitril 97 mG/valsartan 103 mG 1 Tablet(s) Oral two times a day  spironolactone 25 milliGRAM(s) Oral daily      Vitals:  T(C): 36.7 (02-24-23 @ 05:34), Max: 36.8 (02-23-23 @ 14:30)  HR: 106 (02-24-23 @ 05:34) (99 - 106)  BP: 119/83 (02-24-23 @ 05:34) (103/76 - 124/81)  BP(mean): --  RR: 17 (02-24-23 @ 05:34) (16 - 18)  SpO2: 100% (02-24-23 @ 05:34) (96% - 100%)    Daily     Daily         I&O's Summary    23 Feb 2023 07:01  -  24 Feb 2023 07:00  --------------------------------------------------------  IN: 0 mL / OUT: 500 mL / NET: -500 mL        Physical Exam:  Appearance: No Acute Distress  Neck: JVP 10-12  Cardiovascular: Normal S1 S2  Respiratory: Clear to auscultation bilaterally  Gastrointestinal: Soft, Non-tender, distended	  Skin: No cyanosis	  Neurologic: Non-focal  Extremities: No LE edema  Psychiatry: A & O x 3, Mood & affect appropriate    Labs:                        11.9   9.37  )-----------( 289      ( 24 Feb 2023 03:44 )             39.5     02-24    141  |  107  |  29<H>  ----------------------------<  85  3.6   |  22  |  1.54<H>    Ca    9.7      24 Feb 2023 06:38  Phos  4.1     02-22  Mg     2.00     02-22    TPro  7.7  /  Alb  3.9  /  TBili  0.6  /  DBili  x   /  AST  24  /  ALT  16  /  AlkPhos  166<H>  02-24    PT/INR - ( 22 Feb 2023 20:46 )   PT: 15.6 sec;   INR: 1.34 ratio         PTT - ( 22 Feb 2023 20:46 )  PTT:32.2 sec  CARDIAC MARKERS ( 22 Feb 2023 23:23 )  x     / x     / 202 U/L / x     / 6.6 ng/mL        TELEMETRY: Sinus Rhythm with brief episodes of NSVT     Echocardiogram:  TTE with Doppler (w/Cont) (12.15.22 @ 18:19)   ------------------------------------------------------------------------  Dimensions:    Normal Values:  LA:     3.6    2.0 - 4.0 cm  Ao:     2.9    2.0 - 3.8 cm  SEPTUM: 1.5    0.6 - 1.2 cm  PWT:    1.3    0.6 - 1.1 cm  LVIDd:  4.1   3.0 - 5.6 cm  LVIDs:  3.0    1.8 - 4.0 cm  Derived variables:  LVMI: 128 g/m2  RWT: 0.63  Fractional short: 27 %  EF (Espino Rule): 28 %  ------------------------------------------------------------------------  Observations:  Mitral Valve: Normal mitral valve. Minimal mitral  regurgitation.  Aortic Valve/Aorta: Normal trileaflet aortic valve. Mild  aortic regurgitation.  Aortic Root: 2.9 cm.  Left Atrium: Normal left atrium.  LA volume index = 21  cc/m2.  Left Ventricle: Severe global left ventricular systolic  dysfunction.   Highly trabeculated apex.  Endocardial  visualization enhanced with intravenous injection of  Ultrasonic Enhancing Agent (Definity). No left ventricular  thrombus. Moderate concentric left ventricular hypertrophy.  Increased E/e'  is consistent with elevated left  ventricular filling pressure.  Right Heart: Moderate right atrial enlargement. Right  ventricular enlargement with decreased right ventricular  systolic function. Normal tricuspid valve. Mild-moderate  tricuspid regurgitation. Normal pulmonic valve. Mild  pulmonic regurgitation.  Pericardium/Pleura: Normal pericardium with no pericardial  effusion.  Hemodynamic: Estimated right atrial pressure is 8 mm Hg.  Estimated right ventricular systolic pressure equals 60 mm  Hg, assuming right atrial pressure equals 8 mm Hg,  consistent with moderate pulmonary hypertension.  ------------------------------------------------------------------------  Conclusions:  1. Moderate concentric left ventricular hypertrophy.  2. Severe global left ventricular systolic dysfunction.  Highly trabeculated apex.  Endocardial visualization  enhanced with intravenous injection of Ultrasonic Enhancing  Agent (Definity). No left ventricular thrombus.  3. Global Longitudinal Strain -6.4% (Adali, HR 96 bpm, BP  125/72).  GLS is severely reduced.  4. Increased E/e'  is consistent with elevated left  ventricular filling pressure.  5. Moderate right atrial enlargement.  6. Right ventricular enlargement with decreased right  ventricular systolic function.  7. Estimated pulmonary artery systolic pressure equals 60  mm Hg, assuming right atrial pressure equals 8 mm Hg,  consistent with moderate pulmonary pressures.  *** No previous Echo exam.

## 2023-02-24 NOTE — PROCEDURE NOTE - NSINTLVENTLD_CARD_ALL_CORE
Date: 6/16/2021    Patient Name: Honorio Myles To Whom it may concern: This letter has been written at the patient's request. The above patient was seen at the [facility] for treatment of a medical condition.     This patient No

## 2023-02-24 NOTE — CONSULT NOTE ADULT - SUBJECTIVE AND OBJECTIVE BOX
Interventional Radiology    Evaluate for Procedure:     HPI: 62y Male with history of RCC s/p R partial nephrectomy, HLD, and CKD presented for shortness of breath. Recent CT imaging and bone scan were negative for malignancy. Past serum DELVIS reported a faint IgG band and elevated kappa levels. There is a suspicion for possible amyloid and multiple myeloma, albeit low. IR is consulted for bone marrow biopsy.     Allergies:   Medications (Abx/Cardiac/Anticoagulation/Blood Products)  buMETAnide Injectable: 2 milliGRAM(s) IV Push ( @ 13:29)  buMETAnide Injectable: 2 milliGRAM(s) IV Push ( @ 23:34)  enoxaparin Injectable: 40 milliGRAM(s) SubCutaneous ( @ 13:28)  furosemide   Injectable: 20 milliGRAM(s) IV Push ( @ 14:38)  hydrALAZINE: 50 milliGRAM(s) Oral ( @ 18:10)  hydrALAZINE: 50 milliGRAM(s) Oral ( @ 06:02)  metoprolol succinate ER: 50 milliGRAM(s) Oral ( @ 06:02)  sacubitril 97 mG/valsartan 103 m Tablet(s) Oral ( @ 18:10)  sacubitril 97 mG/valsartan 103 m Tablet(s) Oral ( @ 06:54)  spironolactone: 25 milliGRAM(s) Oral ( @ 06:02)    Data:  167.6  57  T(C): 37.1  HR: 100  BP: 99/65  RR: 17  SpO2: 99%    -WBC 9.37 / HgB 11.9 / Hct 39.5 / Plt 289  -Na 141 / Cl 107 / BUN 29 / Glucose 85  -K 3.6 / CO2 22 / Cr 1.54  -ALT 16 / Alk Phos 166 / T.Bili 0.6  -INR 1.34 / PTT 32.2      Assessment/Plan: 62y Male with history of RCC s/p R partial nephrectomy, HLD, and CKD presented for shortness of breath. Recent CT imaging and bone scan were negative for malignancy. Past serum DELVIS reported a faint IgG band and elevated kappa levels. There is a suspicion for possible amyloid and multiple myeloma, albeit low. IR is consulted for bone marrow biopsy.     -- IR will plan to perform bone marrow biopsy next Wednesday, 3/1  -- NPO at midnight  -- hold a.m. anticoagulation  -- COVID test within 5 days of procedure  -- Pre-procedure labs  -- please complete IR pre-procedure note  -- please place IR procedure request order under      --  Daron Owen MD, PGY-2  Available on [employer] Teams    - Non-emergent consults: Place IR consult order in [place]  - Emergent issues (pager): [facility] [phone]; [facility] [phone]; [phone]  - Scheduling questions: [facility] [phone]; [facility] [phone]  - Clinic/outpatient booking: [facility] [phone]; [facility] [phone]

## 2023-02-24 NOTE — PROVIDER CONTACT NOTE (CHANGE IN STATUS NOTIFICATION) - ASSESSMENT
Patient vital signs 103/76, temp 98.1, 105 HR, Resp 18, 02 saturation 99%. No s/s of distress, patient denies pain or discomfort at this time.

## 2023-02-24 NOTE — PROGRESS NOTE ADULT - PROBLEM SELECTOR PLAN 1
IV Bumex 2mg BID  Toprol 50mg QD; Please increase to BID (hold SBP<90)  Entresto 97/103mg BID (hold SBP<90)  Hydralazine 50mg TID (hold SBP<90)  Spironolactone 25mg daily   Strict I/O  Daily standing weights   Monitor lytes replete K>4.0 and Mg>2.0  Trend SCr  Repeat TTE ordered/pending   Please consider Heme/Onc workup; Evaluation for AL possible bone marrow biopsy   EP/ICD interrogation today   Management per primary team  Pending final recommendations from HF attending IV Bumex 2mg BID; Please switch to PO Bumex 3mg on Sunday, 2/26  Toprol 50mg QD (hold SBP<90)  Entresto 97/103mg BID (hold SBP<90)  Hydralazine 50mg TID; Please increase to 75mg TID (hold SBP<90)- Uptitrate as tolerated   Spironolactone 25mg daily   Strict I/O  Daily standing weights   Monitor lytes replete K>4.0 and Mg>2.0  Trend SCr  Repeat Lactate in AM  Repeat TTE with strain ordered/pending   Please consider Heme/Onc workup; Evaluation for AL possible bone marrow biopsy   EP/ICD interrogation completed- no events noted    Management per primary team  Pending final recommendations from HF attending

## 2023-02-24 NOTE — PROGRESS NOTE ADULT - NS ATTEND AMEND GEN_ALL_CORE FT
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 Of note, he had been hospitalized at [facility] as well as [facility] in past 1-2 months. Was evaluated for TTR amyloid which was negative but never underwent biopsy. Also noted to have R hilar LAD and axillary LAD on CT chest in 12/22 (negative for PE). Was given IV diuretics with good response. States cough improved. On exam, NAD, JVP 8 cm w/ HJR, RRR, no m/r/g, CTAB, nontender abdomen, no pedal edema, cool to palpation. Labs reviewed - trop 80s, BUN/Cr 29/1.5, BNP 6k. Overall stage C HF, NYHA class III with suspicion that there may be an infiltrative process (e.g. amyloid vs. sarcoid) and requires expedited workup. Would obtain BM biopsy this admission and may require a cardiac biopsy to evaluate for AL amyloid as has kappa predominance. In addition, sarcoid is a possibility as well given RVH and hilar LAD.  - c/w bumex 2 mg twice/day IV for 2 days, switch to bumex 3 mg po daily on Sunday  - appreciate heme c/s re: BM biopsy  - will consider cardiac biopsy in which case will need to be transferred to [facility] for further workup however can do as outpatient   - increase hydral to 75 mg q8h; escalate as tolerated   - standing weights daily   - c/w toprol xl 50 mg daily  - c/w belkis 25 mg daily

## 2023-02-24 NOTE — CHART NOTE - NSCHARTNOTEFT_GEN_A_CORE
Interventional Radiology Pre-Procedure Note    This is a 62y Male    Procedure: Bone Marrow biopsy     IR Attending: Dr Ren     Medicine Attending: Dr Vu     Diagnosis/Indication: Patient is a 62y old  Male who presents with a chief complaint of SOB, likely CHF exacerbation (24 Feb 2023 16:33)      PAST MEDICAL & SURGICAL HISTORY:  Hypertension      Rheumatoid arthritis      Neoplasm of uncertain behavior of kidney, right      History of cardiac cath  jan 2022, at [facility] no stent           Male    Allergies: No Known Allergies      LABS:  CBC Full  -  ( 24 Feb 2023 03:44 )  WBC Count : 9.37 K/uL  RBC Count : 4.12 M/uL  Hemoglobin : 11.9 g/dL  Hematocrit : 39.5 %  Platelet Count - Automated : 289 K/uL  Mean Cell Volume : 95.9 fL  Mean Cell Hemoglobin : 28.9 pg  Mean Cell Hemoglobin Concentration : 30.1 gm/dL  Auto Neutrophil # : 5.19 K/uL  Auto Lymphocyte # : 3.19 K/uL  Auto Monocyte # : 0.70 K/uL  Auto Eosinophil # : 0.19 K/uL  Auto Basophil # : 0.07 K/uL  Auto Neutrophil % : 55.5 %  Auto Lymphocyte % : 34.0 %  Auto Monocyte % : 7.5 %  Auto Eosinophil % : 2.0 %  Auto Basophil % : 0.7 %    02-24    141  |  107  |  29<H>  ----------------------------<  85  3.6   |  22  |  1.54<H>    Ca    9.7      24 Feb 2023 06:38  Phos  4.1     02-22  Mg     2.00     02-22    TPro  7.7  /  Alb  3.9  /  TBili  0.6  /  DBili  x   /  AST  24  /  ALT  16  /  AlkPhos  166<H>  02-24    PT/INR - ( 22 Feb 2023 20:46 )   PT: 15.6 sec;   INR: 1.34 ratio         PTT - ( 22 Feb 2023 20:46 )  PTT:32.2 sec

## 2023-02-24 NOTE — CHART NOTE - NSCHARTNOTEFT_GEN_A_CORE
PA consulted IR for bone marrow biopsy.    ACP team to follow up recommendations.     Cheikh Romero PA-C,   Internal Medicine ACP   In house pager #[phone]

## 2023-02-24 NOTE — PROGRESS NOTE ADULT - ASSESSMENT
_________________________________________________________________________________________  ========>>  M E D I C A L   A T T E N D I N G    F O L L O W  U P  N O T E  <<=========  -----------------------------------------------------------------------------------------------------    - Patient seen and examined by me earlier today.   - In summary,  PLACIDO GOLDMAN is a 62y year old man admitted with CHF exacerbation  - Patient today overall doing ok, comfortable, eating OK.     Overall feeling better, breathing improved, good urine output, good appetite.    ==================>> REVIEW OF SYSTEM <<=================    GEN: no fever, no chills, no pain  RESP: no SOB at rest , no cough, no sputum  CVS: no chest pain, no palpitations, no edema  GI: no abdominal pain, no nausea, no constipation, no diarrhea  : no dysuria, no frequency, no hematuria  Neuro: no headache, no dizziness  Derm : no itching, no rash    ==================>> PHYSICAL EXAM <<=================    GEN: A&O X 3 , NAD , comfortable, pleasant, calm   HEENT: NCAT, PERRL, MMM, hearing intact, on minimal O2 via NC  Neck: supple , no JVD appreciated  CVS: S1S2 , regular , No M/R/G appreciated  PULM: CTA B/L,  no W/R/R appreciated  ABD.: soft. non tender, non distended,  bowel sounds present  Extrem: intact pulses , no signif edema   PSYCH : normal mood,  not anxious                            ( Note Written / Date of service :  02-24-23 )    ==================>> MEDICATIONS <<====================    MEDICATIONS  (STANDING):  budesonide 160 MICROgram(s)/formoterol 4.5 MICROgram(s) Inhaler 2 Puff(s) Inhalation two times a day  buMETAnide Injectable 2 milliGRAM(s) IV Push two times a day  enoxaparin Injectable 40 milliGRAM(s) SubCutaneous every 24 hours  hydrALAZINE 50 milliGRAM(s) Oral two times a day  metoprolol succinate ER 50 milliGRAM(s) Oral daily  sacubitril 97 mG/valsartan 103 mG 1 Tablet(s) Oral two times a day  spironolactone 25 milliGRAM(s) Oral daily    MEDICATIONS  (PRN):  acetaminophen     Tablet .. 650 milliGRAM(s) Oral every 6 hours PRN Temp greater or equal to 38C (100.4F), Mild Pain (1 - 3)  albuterol    90 MICROgram(s) HFA Inhaler 1 Puff(s) Inhalation three times a day PRN Shortness of Breath and/or Wheezing  aluminum hydroxide/magnesium hydroxide/simethicone Suspension 30 milliLiter(s) Oral every 4 hours PRN Dyspepsia  melatonin 3 milliGRAM(s) Oral at bedtime PRN Insomnia  ondansetron Injectable 4 milliGRAM(s) IV Push every 8 hours PRN Nausea and/or Vomiting    ___________  Active diet:  Diet, Regular:   DASH/TLC Sodium & Cholesterol Restricted (DASH)  ___________________    ==================>> VITAL SIGNS <<==================    T(C): 37.1 (02-24-23 @ 11:45), Max: 37.1 (02-24-23 @ 11:45)  HR: 100 (02-24-23 @ 13:36) (99 - 106)  BP: 99/65 (02-24-23 @ 13:36) (99/65 - 119/83)  RR: 17 (02-24-23 @ 11:45) (16 - 18)  SpO2: 99% (02-24-23 @ 11:45) (99% - 100%)     I&O's Summary    23 Feb 2023 07:01  -  24 Feb 2023 07:00  --------------------------------------------------------  IN: 0 mL / OUT: 500 mL / NET: -500 mL    24 Feb 2023 07:01  -  24 Feb 2023 16:33  --------------------------------------------------------  IN: 0 mL / OUT: 900 mL / NET: -900 mL     ==================>> LAB AND IMAGING <<==================                        11.9   9.37  )-----------( 289      ( 24 Feb 2023 03:44 )             39.5        02-24    141  |  107  |  29<H>  ----------------------------<  85  3.6   |  22  |  1.54<H>    Creatinine:  1.54  <<==, 1.40  <<==    Ca    9.7      24 Feb 2023 06:38  Phos  4.1     02-22  Mg     2.00     02-22    TPro  7.7  /  Alb  3.9  /  TBili  0.6  /  DBili  x   /  AST  24  /  ALT  16  /  AlkPhos  166<H>  02-24    PT/INR - ( 22 Feb 2023 20:46 )   PT: 15.6 sec;   INR: 1.34 ratio    PTT - ( 22 Feb 2023 20:46 )  PTT:32.2 sec              ~~ High Sensitivity Troponin  ~~  88  <<--, 88  <<--    Pro-BNP: 6513 (02-22-23 @ 20:46)    TSH:      4.55   (12-18-22)           Lipid profile:  (02-24-23)     Total: 139     LDL  : (p)     HDL  :30     TG   :138       ___________________________________________________________________________________  ===============>>  A S S E S S M E N T   A N D   P L A N <<===============  ------------------------------------------------------------------------------------------    · Assessment	  Pt is a 61 yo man with PMH of HTN, RA, RCC s/p R partial nephrectomy, CKD, HFrEF/NICM EF 25-30%, ICD, plasma cell dyscrasia, h/o H-pylori, treated, seen at HF clinic and sent to ED for worsening heart failure.   pt admits to progressively increasing SOB, AVALOS with cough, worsening at night over past 6 months with leg edema,  however increasingly worse over past month.   pt on bumex and entresto.   Pt using O2 at home as needed.   Pt denies any chest pain, fevers, recent surgeries, dizziness, bloody stools.       Problem/Plan - 1:  ·  Problem: Acute on chronic systolic heart failure.   Cardiology and heart failure following management appreciated.    Patient on IV diuretics with bumetanide.  monitor Ins and outs, weight, labs, and vitals closely  O2 as needed and wean off as able  medical optimization  patient post AICD in December  Continue Current medications otherwise and monitor.  supportive care.    Problem/Plan - 2:  ·  Problem: Acute on chronic respiratory failure with hypoxia.   ·  Plan: as above  diuresis  wean off O2 as able  patient has PRN O2 at home.    Problem/Plan - 3:  ·  Problem: history of  Hypertension.   Patient currently with low blood pressures.    Appreciated heart failure recommendations.    judicial adjustment of medications as tolerated.  monitor and adjust as needed    Problem/Plan - 4:  ·  Problem: Stage 3a chronic kidney disease.   ·  Plan: currently appears stable  monitor  Avoid nephrotoxic medications.    Problem/Plan - 5:  ·  Problem: history of Plasma cell dyscrasia.   Oncology consulted and appreciated.    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  Recommending a bone marrow biopsy.  Interventional radiology to evaluate for possible bone biopsy.    -GI/DVT Prophylaxis per protocol.    --------------------------------------------  Case discussed with patient, HF and Onc   Education given on findings and plan of care  ___________________________  H. JEANETH Vu.  Pager: [phone]

## 2023-02-24 NOTE — CHART NOTE - NSCHARTNOTEFT_GEN_A_CORE
Notified by primary RN that patient is with 24 beats of NSVT, and patient is now back to baseline rhythm. Patient was asymptomatic and NAD; sleeping during the time of episode. VSS. 12 lead EKG ordered. Advised RN to send Am Mag, Phos, and BMP stat and recommended to give am dose of lopressor early now. RN to notify with any acute changes. Will continue to monitor patient. Notified by primary RN that patient is with 24 beats of NSVT, and patient is now back to baseline rhythm. Patient was asymptomatic and NAD; sleeping during the time of episode. VSS. 12 lead EKG ordered; no acute changes seen. Advised RN to send Am Mag, Phos, and BMP stat and recommended to give am dose of lopressor early now. RN to notify with any acute changes. Will continue to monitor patient.

## 2023-02-24 NOTE — CONSULT NOTE ADULT - ASSESSMENT
62-year-old male admitted with SOB due to CHF. He has been losing weight. He has a history of RCC and had surgery. It was an early stage. Recent CT imaging and bone scan were negative for malignancy. He had a prior lymph node biopsy and that was negative for malignancy. He has kidney disease. He has a mild anemia and likely has an AOCD process. A serum DELVIS reported a faint band in igG and kappa and levels were high, but very mild. Doubt amyloid and MM and rather is an MGUS, but reasonable to do a bone marrow biopsy to assess for these and please schedule that with IR. Will order iron studies, B12, folate, LDh, and haptoglobin.

## 2023-02-24 NOTE — PROCEDURE NOTE - ADDITIONAL PROCEDURE DETAILS
Appropriate pacing and sensing. Capture threshold tested via iterative testing. No events corresponding tot his admission. No reprogramming done

## 2023-02-24 NOTE — PROGRESS NOTE ADULT - ASSESSMENT
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 Patient sent to ED by Dr. Cuellar for ADHF.     Pertinent labs:  BNP  6513    Lactate  3.2 repeat pending    Troponin 88/88/84         CXR: Enlargement of the main pulmonary artery as noted on CTA chest from 12/14/2022. The lungs are clear. No pleural effusion or pneumothorax.    EKG: Normal sinus rhythm with sinus arrhythmia Right superior axis deviation, RVH, Inferior infarct , age undetermined Anterior infarct , age undetermined    Cardiac Catheterization (12.16.22 @ 15:31)     Hemodynamic Pressures:   Phase          Location          [mmHg]               [mmHg]  [mmHg]           HR  Phase 0       RV                  s      48  ed      1189  Phase 0       PA                  s      46                d      24  m   33         97  Phase 0       PCW                 a      11                v       9  m       9      96  Phase 0       RA                  a       6                v       5  m       5      95    Oxygen Saturations   Phase          Location        Hb             Sat            pO2  Content        Group  Phase 0         AO                       13             100  17.27   SA  Phase 0         PA                       13          74  12.71   MV    Oxygen Saturation Average, Phase: Phase 0   PV Hb                PV Sat                 PV pO2  PV Content  SA Hb      12.70 g/dL SA Sat     100.00 %   SA pO2  SA Content     17.27 %  PA Hb                PA Sat     PA pO2  PA Content  MV Hb      12.70 g/dL MV Sat     73.60 %    MV pO2  MV Content     12.71 %  Strokework:   Phase:                    Phase 0     LVSW:                                             LVSW (index):   RVSW:                     19.50 g*m  RVSW (index):             11.55 g*m/m2  Flow Calculations, Phase: Phase 0   CO                     4.97 l/min    HR    MR Cardiac w/wo IV Cont (12.16.22 @ 11:15)   FINDINGS:      MORPHOLOGY:    PERICARDIUM: The pericardium is normal in thickness (less than 4mm).   There is no pericardial effusion.    RIGHT ATRIUM: The right atrium may be enlarged. The inflow the IVC and   SVC are unremarkable.    RIGHT VENTRICLE: Right ventricle is unremarkable. There is no scalloping   or thickening of the free wall the right ventricle.    LEFT ATRIUM: Left atrium measures 3.0 cm the 3 chamber plane. There are   bilateral superior and inferior pulmonary veins.    LEFT VENTRICLE: Concentric thickening of the left ventricular myocardium.   Left ventricle measures 4.1 cm at end diastole and 3.5 cm end systole   (anteroseptal/inferolateral). There is no convincing focus of increased   T2 signal.    There is no convincing first pass perfusion abnormality. At the inferior   hinge point of the right ventricle, there is subtle late gadolinium   enhancement. There are no other convincing foci of late gadolinium   enhancement shown on the early or late phases of late gadolinium enhanced   imaging.    FUNCTION: No segmental wall motion abnormality. No global wall motion   abnormality.    LEFT VENTRICLE:  Unindexed:  EF: 36.04 %  EDV: 117.09 mL  ESV: 74.89 mL  SV: 42.20 mL  CO: 3.96 L/min    Indexed:  EDVi: 69.40 mL/m2  ESVi: 44.38 mL/m2  SV: 25.01 mL/m2  CO: 2.35 L/min/m2      VALVES:    MITRAL VALVE: Mitral regurgitation, not quantified  AORTIC VALVE: Aortic regurgitation, not quantified.      AORTA: Three-vessel left-sided aortic arch and left-sided descending   thoracic aorta.      NONCARDIAC FINDINGS: The other visualized thoracoabdominal structures are   unremarkable.      IMPRESSION:    1.  Subtle late gadolinium enhancement along the inferior hinge point of   the right ventricle can occur in a variety of clinical settings including   altered ventricular mechanics.  2.  Concentric thickening of the ventricular myocardium.  3.  The estimated left ventricular ejection fraction is 36.04%.

## 2023-02-25 LAB
ALBUMIN SERPL ELPH-MCNC: 3.8 G/DL — SIGNIFICANT CHANGE UP (ref 3.3–5)
ALP SERPL-CCNC: 153 U/L — HIGH (ref 40–120)
ALT FLD-CCNC: 19 U/L — SIGNIFICANT CHANGE UP (ref 4–41)
ANION GAP SERPL CALC-SCNC: 16 MMOL/L — HIGH (ref 7–14)
AST SERPL-CCNC: 23 U/L — SIGNIFICANT CHANGE UP (ref 4–40)
BILIRUB SERPL-MCNC: 0.5 MG/DL — SIGNIFICANT CHANGE UP (ref 0.2–1.2)
BUN SERPL-MCNC: 42 MG/DL — HIGH (ref 7–23)
CALCIUM SERPL-MCNC: 9.5 MG/DL — SIGNIFICANT CHANGE UP (ref 8.4–10.5)
CHLORIDE SERPL-SCNC: 103 MMOL/L — SIGNIFICANT CHANGE UP (ref 98–107)
CO2 SERPL-SCNC: 22 MMOL/L — SIGNIFICANT CHANGE UP (ref 22–31)
CREAT SERPL-MCNC: 1.63 MG/DL — HIGH (ref 0.5–1.3)
EGFR: 47 ML/MIN/1.73M2 — LOW
FERRITIN SERPL-MCNC: 286 NG/ML — SIGNIFICANT CHANGE UP (ref 30–400)
FOLATE SERPL-MCNC: 12.9 NG/ML — SIGNIFICANT CHANGE UP (ref 3.1–17.5)
GLUCOSE SERPL-MCNC: 92 MG/DL — SIGNIFICANT CHANGE UP (ref 70–99)
HAPTOGLOB SERPL-MCNC: 152 MG/DL — SIGNIFICANT CHANGE UP (ref 34–200)
HCT VFR BLD CALC: 41.7 % — SIGNIFICANT CHANGE UP (ref 39–50)
HGB BLD-MCNC: 12.7 G/DL — LOW (ref 13–17)
IRON SATN MFR SERPL: 19 % — SIGNIFICANT CHANGE UP (ref 14–50)
IRON SATN MFR SERPL: 49 UG/DL — SIGNIFICANT CHANGE UP (ref 45–165)
LACTATE SERPL-SCNC: 2.8 MMOL/L — HIGH (ref 0.5–2)
LDH SERPL L TO P-CCNC: 305 U/L — HIGH (ref 135–225)
MAGNESIUM SERPL-MCNC: 2 MG/DL — SIGNIFICANT CHANGE UP (ref 1.6–2.6)
MCHC RBC-ENTMCNC: 29.1 PG — SIGNIFICANT CHANGE UP (ref 27–34)
MCHC RBC-ENTMCNC: 30.5 GM/DL — LOW (ref 32–36)
MCV RBC AUTO: 95.4 FL — SIGNIFICANT CHANGE UP (ref 80–100)
NRBC # BLD: 0 /100 WBCS — SIGNIFICANT CHANGE UP (ref 0–0)
NRBC # FLD: 0 K/UL — SIGNIFICANT CHANGE UP (ref 0–0)
PHOSPHATE SERPL-MCNC: 4.8 MG/DL — HIGH (ref 2.5–4.5)
PLATELET # BLD AUTO: 304 K/UL — SIGNIFICANT CHANGE UP (ref 150–400)
POTASSIUM SERPL-MCNC: 4 MMOL/L — SIGNIFICANT CHANGE UP (ref 3.5–5.3)
POTASSIUM SERPL-SCNC: 4 MMOL/L — SIGNIFICANT CHANGE UP (ref 3.5–5.3)
PROT SERPL-MCNC: 7.9 G/DL — SIGNIFICANT CHANGE UP (ref 6–8.3)
RBC # BLD: 4.37 M/UL — SIGNIFICANT CHANGE UP (ref 4.2–5.8)
RBC # FLD: 14.9 % — HIGH (ref 10.3–14.5)
SODIUM SERPL-SCNC: 141 MMOL/L — SIGNIFICANT CHANGE UP (ref 135–145)
TIBC SERPL-MCNC: 257 UG/DL — SIGNIFICANT CHANGE UP (ref 220–430)
UIBC SERPL-MCNC: 208 UG/DL — SIGNIFICANT CHANGE UP (ref 110–370)
VIT B12 SERPL-MCNC: 782 PG/ML — SIGNIFICANT CHANGE UP (ref 200–900)
WBC # BLD: 9.06 K/UL — SIGNIFICANT CHANGE UP (ref 3.8–10.5)
WBC # FLD AUTO: 9.06 K/UL — SIGNIFICANT CHANGE UP (ref 3.8–10.5)

## 2023-02-25 RX ORDER — BUMETANIDE 0.25 MG/ML
3 INJECTION INTRAMUSCULAR; INTRAVENOUS DAILY
Refills: 0 | Status: DISCONTINUED | OUTPATIENT
Start: 2023-02-26 | End: 2023-03-02

## 2023-02-25 RX ORDER — HYDRALAZINE HCL 50 MG
50 TABLET ORAL THREE TIMES A DAY
Refills: 0 | Status: DISCONTINUED | OUTPATIENT
Start: 2023-02-25 | End: 2023-02-26

## 2023-02-25 RX ADMIN — Medication 50 MILLIGRAM(S): at 05:36

## 2023-02-25 RX ADMIN — BUDESONIDE AND FORMOTEROL FUMARATE DIHYDRATE 2 PUFF(S): 160; 4.5 AEROSOL RESPIRATORY (INHALATION) at 09:17

## 2023-02-25 RX ADMIN — SACUBITRIL AND VALSARTAN 1 TABLET(S): 24; 26 TABLET, FILM COATED ORAL at 05:37

## 2023-02-25 RX ADMIN — Medication 50 MILLIGRAM(S): at 22:13

## 2023-02-25 RX ADMIN — BUMETANIDE 2 MILLIGRAM(S): 0.25 INJECTION INTRAMUSCULAR; INTRAVENOUS at 18:06

## 2023-02-25 RX ADMIN — BUMETANIDE 2 MILLIGRAM(S): 0.25 INJECTION INTRAMUSCULAR; INTRAVENOUS at 05:36

## 2023-02-25 RX ADMIN — SACUBITRIL AND VALSARTAN 1 TABLET(S): 24; 26 TABLET, FILM COATED ORAL at 19:02

## 2023-02-25 RX ADMIN — SPIRONOLACTONE 25 MILLIGRAM(S): 25 TABLET, FILM COATED ORAL at 05:37

## 2023-02-25 RX ADMIN — BUDESONIDE AND FORMOTEROL FUMARATE DIHYDRATE 2 PUFF(S): 160; 4.5 AEROSOL RESPIRATORY (INHALATION) at 22:11

## 2023-02-25 RX ADMIN — ENOXAPARIN SODIUM 40 MILLIGRAM(S): 100 INJECTION SUBCUTANEOUS at 13:50

## 2023-02-25 NOTE — PROGRESS NOTE ADULT - SUBJECTIVE AND OBJECTIVE BOX
CARDIOLOGY FOLLOW UP - Dr. Yanez(for [facility])  Date of Service: 2/25/2023  CC: no cp/sob    Review of Systems:  Constitutional: No fever, weight loss, or fatigue  Respiratory: No cough, wheezing, or hemoptysis, no shortness of breath  Cardiovascular: No chest pain, palpitations, passing out, dizziness, or leg swelling  Gastrointestinal: No abd or epigastric pain. No nausea, vomiting, or hematemesis; no diarrhea or consiptaiton, no melena or hematochezia  Vascular: No edema     TELEMETRY:    PHYSICAL EXAM:  T(C): 36.4 (02-25-23 @ 12:35), Max: 36.7 (02-25-23 @ 05:33)  HR: 112 (02-25-23 @ 12:35) (100 - 112)  BP: 98/56 (02-25-23 @ 12:35) (98/56 - 112/78)  RR: 18 (02-25-23 @ 12:35) (16 - 20)  SpO2: 98% (02-25-23 @ 12:35) (93% - 98%)  Wt(kg): --  I&O's Summary    24 Feb 2023 07:01  -  25 Feb 2023 07:00  --------------------------------------------------------  IN: 90 mL / OUT: 1900 mL / NET: -1810 mL    25 Feb 2023 07:01  -  25 Feb 2023 16:18  --------------------------------------------------------  IN: 0 mL / OUT: 700 mL / NET: -700 mL        Appearance: Normal	  Cardiovascular: Normal S1 S2,RRR, No JVD, No murmurs  Respiratory: Lungs clear to auscultation	  Gastrointestinal:  Soft, Non-tender, + BS	  Extremities: Normal range of motion, No clubbing, cyanosis or edema  Vascular: Peripheral pulses palpable 2+ bilaterally       Home Medications:  acetaminophen 325 mg oral tablet: 2 tab(s) orally every 6 hours, As needed, Temp greater or equal to 38C (100.4F), Mild Pain (1 - 3) (24 Dec 2022 15:04)  Albuterol (Eqv-ProAir HFA) 90 mcg/inh inhalation aerosol: 2 puff(s) inhaled 3 times a day, As Needed (24 Dec 2022 15:04)  Bumex 2 mg oral tablet: 1 tab(s) orally once a day (23 Feb 2023 12:48)  Entresto 97 mg-103 mg oral tablet: 1 tab(s) orally 2 times a day (23 Feb 2023 13:39)  fluticasone 50 mcg/inh nasal spray: 1 spray(s) nasal 2 times a day (24 Dec 2022 15:04)  hydrALAZINE 50 mg oral tablet: 1 tab(s) orally 2 times a day (23 Feb 2023 12:47)  spironolactone 25 mg oral tablet: 1 tab(s) orally once a day (14 Dec 2022 23:07)  Toprol- mg oral tablet, extended release: 1 tab(s) orally once a day (at bedtime) (23 Feb 2023 13:40)        MEDICATIONS  (STANDING):  budesonide 160 MICROgram(s)/formoterol 4.5 MICROgram(s) Inhaler 2 Puff(s) Inhalation two times a day  buMETAnide Injectable 2 milliGRAM(s) IV Push two times a day  enoxaparin Injectable 40 milliGRAM(s) SubCutaneous every 24 hours  hydrALAZINE 50 milliGRAM(s) Oral three times a day  metoprolol succinate ER 50 milliGRAM(s) Oral daily  sacubitril 97 mG/valsartan 103 mG 1 Tablet(s) Oral two times a day  spironolactone 25 milliGRAM(s) Oral daily        EKG:  RADIOLOGY:  DIAGNOSTIC TESTING:  [ ] Echocardiogram:  [ ] Catherterization:  [ ] Stress Test:  OTHER:     LABS:	 	                          12.7   9.06  )-----------( 304      ( 25 Feb 2023 06:55 )             41.7     02-25    141  |  103  |  42<H>  ----------------------------<  92  4.0   |  22  |  1.63<H>    Ca    9.5      25 Feb 2023 06:55  Phos  4.8     02-25  Mg     2.00     02-25    TPro  7.9  /  Alb  3.8  /  TBili  0.5  /  DBili  x   /  AST  23  /  ALT  19  /  AlkPhos  153<H>  02-25          CARDIAC MARKERS:

## 2023-02-25 NOTE — PROGRESS NOTE ADULT - SUBJECTIVE AND OBJECTIVE BOX
Interval History: no cardiac complaints    Medications:  acetaminophen     Tablet .. 650 milliGRAM(s) Oral every 6 hours PRN  albuterol    90 MICROgram(s) HFA Inhaler 1 Puff(s) Inhalation three times a day PRN  aluminum hydroxide/magnesium hydroxide/simethicone Suspension 30 milliLiter(s) Oral every 4 hours PRN  budesonide 160 MICROgram(s)/formoterol 4.5 MICROgram(s) Inhaler 2 Puff(s) Inhalation two times a day  buMETAnide Injectable 2 milliGRAM(s) IV Push two times a day  enoxaparin Injectable 40 milliGRAM(s) SubCutaneous every 24 hours  hydrALAZINE 50 milliGRAM(s) Oral two times a day  melatonin 3 milliGRAM(s) Oral at bedtime PRN  metoprolol succinate ER 50 milliGRAM(s) Oral daily  ondansetron Injectable 4 milliGRAM(s) IV Push every 8 hours PRN  sacubitril 97 mG/valsartan 103 mG 1 Tablet(s) Oral two times a day  spironolactone 25 milliGRAM(s) Oral daily      Vitals:  T(C): 36.7 (02-25-23 @ 05:33), Max: 37.1 (02-24-23 @ 11:45)  HR: 101 (02-25-23 @ 05:33) (100 - 105)  BP: 112/78 (02-25-23 @ 05:33) (99/65 - 112/78)  RR: 20 (02-25-23 @ 05:33) (16 - 20)  SpO2: 93% (02-25-23 @ 05:33) (93% - 99%)      Daily Height in cm: 167.64 (24 Feb 2023 15:46)    Daily     Weight (kg): 57 (02-24 @ 15:46)    I&O's Summary    24 Feb 2023 07:01  -  25 Feb 2023 07:00  --------------------------------------------------------  IN: 90 mL / OUT: 1900 mL / NET: -1810 mL        Physical Exam:  Appearance: No Acute Distress  HEENT: No JVD  Cardiovascular: Normal S1 S2, No murmurs/rubs/gallops  Respiratory: Clear to auscultation bilaterally  Gastrointestinal: Soft, Non-tender	  Skin: No cyanosis	  Neurologic: Non-focal  Extremities: No LE edema  Psychiatry: A & O x 3, Mood & affect appropriate    Labs:                        12.7   9.06  )-----------( 304      ( 25 Feb 2023 06:55 )             41.7     02-25    141  |  103  |  42<H>  ----------------------------<  92  4.0   |  22  |  1.63<H>    Ca    9.5      25 Feb 2023 06:55  Phos  4.8     02-25  Mg     2.00     02-25    TPro  7.9  /  Alb  3.8  /  TBili  0.5  /  DBili  x   /  AST  23  /  ALT  19  /  AlkPhos  153<H>  02-25          Serum Pro-Brain Natriuretic Peptide: 6513 pg/mL (02-22 @ 20:46)      Lactate Dehydrogenase, Serum: 305 U/L (02-25 @ 06:55)    Lactate, Blood: 2.8 mmol/L (02-25 @ 06:55)

## 2023-02-25 NOTE — PROGRESS NOTE ADULT - ASSESSMENT
62yoM with PMH of HTN, RA, RCC s/p R partial nephrectomy, CKD, HFrEF/NICM EF 25-30%, ICD, plasma cell dyscrasia, seen at HF clinic today, sent to ED for worsening heart failure. pt admits to progressively increasing SOB, AVALOS with cough, worsening at night over past 6 months, however increasingly worse over past month    Recs:  diuresis per CHF  cont GDMT  heme f/u for plasma cell dyscrasia  possible BM biopsy  eventual cardiac bx at [facility] per CHF  tele monitoring   will follow    35 minutes spent on total encounter; more than 50% of the visit was spent counseling and/or coordinating care by the attending physician.

## 2023-02-25 NOTE — PROGRESS NOTE ADULT - ASSESSMENT
63 yo man with PMH of HTN, RA, RCC s/p R partial nephrectomy, CKD, HFrEF/NICM EF 25-30%, ICD, plasma cell dyscrasia, h/o H-pylori, treated, seen at HF clinic and sent to ED for worsening heart failure.   pt admits to progressively increasing SOB, AVALOS with cough, worsening at night over past 6 months with leg edema,  however increasingly worse over past month.   pt on bumex and entresto.   Pt using O2 at home as needed.   Pt denies any chest pain, fevers, recent surgeries, dizziness, bloody stools.        Problem/Plan - 1:  ·  Problem: Acute on chronic systolic heart failure.   Cardiology and heart failure following management appreciated.    Patient on IV diuretics with bumetanide.  monitor Ins and outs, weight, labs, and vitals closely  O2 as needed and wean off as able  medical optimization  patient post AICD in December  Continue Current medications otherwise and monitor.  supportive care.     Problem/Plan - 2:  ·  Problem: Acute on chronic respiratory failure with hypoxia.   ·  Plan: as above  diuresis  wean off O2 as able  patient has PRN O2 at home.     Problem/Plan - 3:  ·  Problem: history of  Hypertension.   Patient currently with low blood pressures.    Appreciated heart failure recommendations.    judicial adjustment of medications as tolerated.  monitor and adjust as needed     Problem/Plan - 4:  ·  Problem: Stage 3a chronic kidney disease.   ·  Plan: currently appears stable  monitor  Avoid nephrotoxic medications.     Problem/Plan - 5:  ·  Problem: history of Plasma cell dyscrasia.   Oncology consulted and appreciated.    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  Recommending a bone marrow biopsy.Pt not sure ??  Interventional radiology to evaluate for possible bone biopsy.    -GI/DVT Prophylaxis per protocol.

## 2023-02-25 NOTE — PROGRESS NOTE ADULT - PROBLEM SELECTOR PLAN 1
IV Bumex 2mg BID; Please switch to PO Bumex 3mg on Sunday, 2/26  Toprol 50mg QD (hold SBP<90)  Entresto 97/103mg BID (hold SBP<90)  Hydralazine 50mg TID; Please increase to 75mg TID (hold SBP<90)- Uptitrate as tolerated   Spironolactone 25mg daily   Awaiting BM biopsy  Strict I/O  Daily standing weights   Monitor lytes replete K>4.0 and Mg>2.0  Trend SCr  Repeat Lactate in AM  Repeat TTE with strain ordered/pending   EP/ICD interrogation completed- no events noted    Management per primary team

## 2023-02-25 NOTE — PROGRESS NOTE ADULT - ASSESSMENT
Patient with a mild anemia. He has CKD. It is likely AOCD, Iron studies, B12, folate normal. LDH mild high, but haptoglobin normal so no hemolysis. No need for Epo at these levels.    Paraprotein. Favor an MGUS, but can do a bone marrow biopsy with IR to assess for MM/amyloid.

## 2023-02-25 NOTE — PROGRESS NOTE ADULT - SUBJECTIVE AND OBJECTIVE BOX
Date of Service  : 02-25-23     INTERVAL HPI/OVERNIGHT EVENTS: eating lunch . Said I am not sure if I should get Bone marrow BX??   Vital Signs Last 24 Hrs  T(C): 36.4 (25 Feb 2023 12:35), Max: 36.7 (25 Feb 2023 05:33)  T(F): 97.5 (25 Feb 2023 12:35), Max: 98.1 (25 Feb 2023 05:33)  HR: 112 (25 Feb 2023 12:35) (100 - 112)  BP: 98/56 (25 Feb 2023 12:35) (98/56 - 112/78)  BP(mean): --  RR: 18 (25 Feb 2023 12:35) (16 - 20)  SpO2: 98% (25 Feb 2023 12:35) (93% - 98%)    Parameters below as of 25 Feb 2023 12:35  Patient On (Oxygen Delivery Method): nasal cannula  O2 Flow (L/min): 2    I&O's Summary    24 Feb 2023 07:01  -  25 Feb 2023 07:00  --------------------------------------------------------  IN: 90 mL / OUT: 1900 mL / NET: -1810 mL    25 Feb 2023 07:01  -  25 Feb 2023 17:14  --------------------------------------------------------  IN: 0 mL / OUT: 700 mL / NET: -700 mL      MEDICATIONS  (STANDING):  budesonide 160 MICROgram(s)/formoterol 4.5 MICROgram(s) Inhaler 2 Puff(s) Inhalation two times a day  buMETAnide Injectable 2 milliGRAM(s) IV Push two times a day  enoxaparin Injectable 40 milliGRAM(s) SubCutaneous every 24 hours  hydrALAZINE 50 milliGRAM(s) Oral three times a day  metoprolol succinate ER 50 milliGRAM(s) Oral daily  sacubitril 97 mG/valsartan 103 mG 1 Tablet(s) Oral two times a day  spironolactone 25 milliGRAM(s) Oral daily    MEDICATIONS  (PRN):  acetaminophen     Tablet .. 650 milliGRAM(s) Oral every 6 hours PRN Temp greater or equal to 38C (100.4F), Mild Pain (1 - 3)  albuterol    90 MICROgram(s) HFA Inhaler 1 Puff(s) Inhalation three times a day PRN Shortness of Breath and/or Wheezing  aluminum hydroxide/magnesium hydroxide/simethicone Suspension 30 milliLiter(s) Oral every 4 hours PRN Dyspepsia  melatonin 3 milliGRAM(s) Oral at bedtime PRN Insomnia  ondansetron Injectable 4 milliGRAM(s) IV Push every 8 hours PRN Nausea and/or Vomiting    LABS:                        12.7   9.06  )-----------( 304      ( 25 Feb 2023 06:55 )             41.7     02-25    141  |  103  |  42<H>  ----------------------------<  92  4.0   |  22  |  1.63<H>    Ca    9.5      25 Feb 2023 06:55  Phos  4.8     02-25  Mg     2.00     02-25    TPro  7.9  /  Alb  3.8  /  TBili  0.5  /  DBili  x   /  AST  23  /  ALT  19  /  AlkPhos  153<H>  02-25        CAPILLARY BLOOD GLUCOSE              REVIEW OF SYSTEMS:  CONSTITUTIONAL: No fever, weight loss, or fatigue  EYES: No eye pain, visual disturbances, or discharge  ENMT:  No difficulty hearing, tinnitus, vertigo; No sinus or throat pain  NECK: No pain or stiffness  RESPIRATORY: No cough, wheezing, chills or hemoptysis; No shortness of breath  CARDIOVASCULAR: No chest pain, palpitations, dizziness, or leg swelling  GASTROINTESTINAL: No abdominal or epigastric pain. No nausea, vomiting, or hematemesis; No diarrhea or constipation. No melena or hematochezia.  GENITOURINARY: No dysuria, frequency, hematuria, or incontinence  NEUROLOGICAL: No headaches, memory loss, loss of strength, numbness, or tremors      Consultant(s) Notes Reviewed:  [x ] YES  [ ] NO    PHYSICAL EXAM:  GENERAL: NAD, NC Oxygen   HEAD:  Atraumatic, Normocephalic  EYES: EOMI, PERRLA, conjunctiva and sclera clear  ENMT: No tonsillar erythema, exudates, or enlargement; Moist mucous membranes, Good dentition, No lesions  NECK: Supple, No JVD, Normal thyroid  NERVOUS SYSTEM:  Alert & Oriented X3, No focal deficit   CHEST/LUNG: Good air entry bilateral with no  rales, rhonchi, wheezing, or rubs  HEART: Regular rate and rhythm; No murmurs, rubs, or gallops  ABDOMEN: Soft, Nontender, Nondistended; Bowel sounds present  EXTREMITIES:  2+ Peripheral Pulses, No clubbing, cyanosis, or edema      Care Discussed with Consultants/Other Providers [ x] YES  [ ] NO

## 2023-02-25 NOTE — PROGRESS NOTE ADULT - SUBJECTIVE AND OBJECTIVE BOX
Patient is a 62y old  Male who presents with a chief complaint of SOB, likely CHF exacerbation (25 Feb 2023 16:18)    says that the SOB is improving. No cough or CP. No fever. No HA or dizziness. he is eating and denies N/V/D. He is urinating a lot and no burning.     Medication:   acetaminophen     Tablet .. 650 milliGRAM(s) Oral every 6 hours PRN  albuterol    90 MICROgram(s) HFA Inhaler 1 Puff(s) Inhalation three times a day PRN  aluminum hydroxide/magnesium hydroxide/simethicone Suspension 30 milliLiter(s) Oral every 4 hours PRN  budesonide 160 MICROgram(s)/formoterol 4.5 MICROgram(s) Inhaler 2 Puff(s) Inhalation two times a day  enoxaparin Injectable 40 milliGRAM(s) SubCutaneous every 24 hours  hydrALAZINE 50 milliGRAM(s) Oral three times a day  melatonin 3 milliGRAM(s) Oral at bedtime PRN  metoprolol succinate ER 50 milliGRAM(s) Oral daily  ondansetron Injectable 4 milliGRAM(s) IV Push every 8 hours PRN  sacubitril 97 mG/valsartan 103 mG 1 Tablet(s) Oral two times a day  spironolactone 25 milliGRAM(s) Oral daily      Physical exam    T(C): 36.7 (02-25-23 @ 18:00), Max: 36.7 (02-25-23 @ 05:33)  HR: 100 (02-25-23 @ 18:00) (100 - 112)  BP: 95/65 (02-25-23 @ 19:34) (95/65 - 112/78)  RR: 18 (02-25-23 @ 18:00) (18 - 20)  SpO2: 95% (02-25-23 @ 18:00) (93% - 98%)  Wt(kg): --    alert NAD  EOMI anicteric sclera  Neck Supple No LNA  Cv s1 S2 RRR  Lungs clear B/L  abd soft NT ND +BS  No LE edema or tenderness    Labs                        12.7   9.06  )-----------( 304      ( 25 Feb 2023 06:55 )             41.7       02-25    141  |  103  |  42<H>  ----------------------------<  92  4.0   |  22  |  1.63<H>    Ca    9.5      25 Feb 2023 06:55  Phos  4.8     02-25  Mg     2.00     02-25    TPro  7.9  /  Alb  3.8  /  TBili  0.5  /  DBili  x   /  AST  23  /  ALT  19  /  AlkPhos  153<H>  02-25      LIVER FUNCTIONS - ( 25 Feb 2023 06:55 )  Alb: 3.8 g/dL / Pro: 7.9 g/dL / ALK PHOS: 153 U/L / ALT: 19 U/L / AST: 23 U/L / GGT: x           iron 49   TIBC  257  Ferritin  286    B12  782    folate   12.9    LDH   305 haptoglobin 152   [id number]

## 2023-02-26 LAB
ALBUMIN SERPL ELPH-MCNC: 3.6 G/DL — SIGNIFICANT CHANGE UP (ref 3.3–5)
ALP SERPL-CCNC: 144 U/L — HIGH (ref 40–120)
ALT FLD-CCNC: 19 U/L — SIGNIFICANT CHANGE UP (ref 4–41)
ANION GAP SERPL CALC-SCNC: 12 MMOL/L — SIGNIFICANT CHANGE UP (ref 7–14)
AST SERPL-CCNC: 22 U/L — SIGNIFICANT CHANGE UP (ref 4–40)
BILIRUB SERPL-MCNC: 0.4 MG/DL — SIGNIFICANT CHANGE UP (ref 0.2–1.2)
BUN SERPL-MCNC: 42 MG/DL — HIGH (ref 7–23)
CALCIUM SERPL-MCNC: 9.2 MG/DL — SIGNIFICANT CHANGE UP (ref 8.4–10.5)
CHLORIDE SERPL-SCNC: 105 MMOL/L — SIGNIFICANT CHANGE UP (ref 98–107)
CO2 SERPL-SCNC: 22 MMOL/L — SIGNIFICANT CHANGE UP (ref 22–31)
CREAT SERPL-MCNC: 1.64 MG/DL — HIGH (ref 0.5–1.3)
EGFR: 47 ML/MIN/1.73M2 — LOW
GLUCOSE SERPL-MCNC: 102 MG/DL — HIGH (ref 70–99)
HCT VFR BLD CALC: 41.5 % — SIGNIFICANT CHANGE UP (ref 39–50)
HGB BLD-MCNC: 12.5 G/DL — LOW (ref 13–17)
LACTATE SERPL-SCNC: 1.7 MMOL/L — SIGNIFICANT CHANGE UP (ref 0.5–2)
MAGNESIUM SERPL-MCNC: 2 MG/DL — SIGNIFICANT CHANGE UP (ref 1.6–2.6)
MCHC RBC-ENTMCNC: 29 PG — SIGNIFICANT CHANGE UP (ref 27–34)
MCHC RBC-ENTMCNC: 30.1 GM/DL — LOW (ref 32–36)
MCV RBC AUTO: 96.3 FL — SIGNIFICANT CHANGE UP (ref 80–100)
NRBC # BLD: 0 /100 WBCS — SIGNIFICANT CHANGE UP (ref 0–0)
NRBC # FLD: 0 K/UL — SIGNIFICANT CHANGE UP (ref 0–0)
PHOSPHATE SERPL-MCNC: 4.1 MG/DL — SIGNIFICANT CHANGE UP (ref 2.5–4.5)
PLATELET # BLD AUTO: 317 K/UL — SIGNIFICANT CHANGE UP (ref 150–400)
POTASSIUM SERPL-MCNC: 4 MMOL/L — SIGNIFICANT CHANGE UP (ref 3.5–5.3)
POTASSIUM SERPL-SCNC: 4 MMOL/L — SIGNIFICANT CHANGE UP (ref 3.5–5.3)
PROT SERPL-MCNC: 7.6 G/DL — SIGNIFICANT CHANGE UP (ref 6–8.3)
RBC # BLD: 4.31 M/UL — SIGNIFICANT CHANGE UP (ref 4.2–5.8)
RBC # FLD: 14.9 % — HIGH (ref 10.3–14.5)
SODIUM SERPL-SCNC: 139 MMOL/L — SIGNIFICANT CHANGE UP (ref 135–145)
WBC # BLD: 11.19 K/UL — HIGH (ref 3.8–10.5)
WBC # FLD AUTO: 11.19 K/UL — HIGH (ref 3.8–10.5)

## 2023-02-26 PROCEDURE — 93010 ELECTROCARDIOGRAM REPORT: CPT

## 2023-02-26 PROCEDURE — 73600 X-RAY EXAM OF ANKLE: CPT | Mod: 26,RT

## 2023-02-26 RX ORDER — SODIUM CHLORIDE 9 MG/ML
250 INJECTION INTRAMUSCULAR; INTRAVENOUS; SUBCUTANEOUS ONCE
Refills: 0 | Status: COMPLETED | OUTPATIENT
Start: 2023-02-26 | End: 2023-02-26

## 2023-02-26 RX ORDER — SACUBITRIL AND VALSARTAN 24; 26 MG/1; MG/1
1 TABLET, FILM COATED ORAL
Refills: 0 | Status: DISCONTINUED | OUTPATIENT
Start: 2023-02-26 | End: 2023-03-03

## 2023-02-26 RX ADMIN — SODIUM CHLORIDE 250 MILLILITER(S): 9 INJECTION INTRAMUSCULAR; INTRAVENOUS; SUBCUTANEOUS at 18:36

## 2023-02-26 RX ADMIN — ENOXAPARIN SODIUM 40 MILLIGRAM(S): 100 INJECTION SUBCUTANEOUS at 14:11

## 2023-02-26 RX ADMIN — SPIRONOLACTONE 25 MILLIGRAM(S): 25 TABLET, FILM COATED ORAL at 06:05

## 2023-02-26 RX ADMIN — Medication 3 MILLIGRAM(S): at 21:00

## 2023-02-26 RX ADMIN — BUDESONIDE AND FORMOTEROL FUMARATE DIHYDRATE 2 PUFF(S): 160; 4.5 AEROSOL RESPIRATORY (INHALATION) at 09:30

## 2023-02-26 RX ADMIN — BUDESONIDE AND FORMOTEROL FUMARATE DIHYDRATE 2 PUFF(S): 160; 4.5 AEROSOL RESPIRATORY (INHALATION) at 21:00

## 2023-02-26 NOTE — PROGRESS NOTE ADULT - PROBLEM SELECTOR PLAN 1
Transitioned to PO Bumex 3mg on Sunday, 2/26  Toprol 50mg QD (hold SBP<90)  Entresto 97/103mg BID (hold SBP<90)  Please discontinue Hydralazine for now   Spironolactone 25mg daily   Awaiting BM biopsy  Strict I/O  Daily standing weights   Monitor lytes replete K>4.0 and Mg>2.0  Trend SCr  Repeat Lactate in AM  Repeat TTE with strain ordered/pending   EP/ICD interrogation completed- no events noted    Management per primary team

## 2023-02-26 NOTE — PROVIDER CONTACT NOTE (MEDICATION) - SITUATION
pt is a/ox4, denies any pain, symptoms of hypotension, denies weakness, dizziness, on 2LNC. Pt BP manual was 90/56, HR is between 110-120 at this time sinus tachy, afebrile

## 2023-02-26 NOTE — PROVIDER CONTACT NOTE (MEDICATION) - ACTION/TREATMENT ORDERED:
per ACP, do not give entresto, reassess pt vital signs for any acute changes and monitor for any s/s of acute hypotension noted.

## 2023-02-26 NOTE — CHART NOTE - NSCHARTNOTEFT_GEN_A_CORE
Pt remains hypotensive, 80/56 manual and tachycardic to 126 on tele, sinus rhythm.  Pt seen and examined at bedside, no complaints, denies chest pain, sob.   EKG obtained     Discussed with Dr. Vu, will order 250 cc bolus     Will continue to monitor     Janice Youssef PA-C  Department of Medicine  Pager [phone] Pt remains hypotensive, 80/56 manual and tachycardic to 126 on tele, sinus rhythm.  Pt seen and examined at bedside, no complaints, denies chest pain, sob.   EKG obtained and reviewed with Dr. Vu, no acute ST-T changes     Discussed with Dr. uV, will order 250 cc bolus concern for overdiuresis     Signed out provided to night staff     Janice Youssef PA-C  Department of Medicine  Pager [phone] Pt remains hypotensive, 80/56 manual and tachycardic to 126 on tele, sinus rhythm.  Pt seen and examined at bedside, no complaints, denies chest pain, sob.     NAD  Lungs CTA  Ab soft nontender  LE no edema     EKG obtained and reviewed with Dr. Vu, no acute ST-T changes     Discussed with Dr. Vu, will order 250 cc bolus concern for overdiuresis     Signed out provided to night staff     Janice Youssef PA-C  Department of Medicine  Pager [phone]

## 2023-02-26 NOTE — PROGRESS NOTE ADULT - ASSESSMENT
Monoclonal protein, CKD. based upon immunoglobulin levels it is favored to be an MGUS, but cardiology wants to rule out amyloid (less likely) and in setting of CKD it is reasonable to assess for MM. He will be having a bone marrow biopsy with IR later this week.    Anemia is mild. Likely is AOCD. Anemia evaluation was negative for iorn.B12/folate deficiency and hemolysis. No need for Epo at this time. Just monitor the Hgb.    Mild leucocytosis today. Afebrile and no symptoms of infection. Monitor and care per medicine.    D/w medical attending.

## 2023-02-26 NOTE — PROGRESS NOTE ADULT - SUBJECTIVE AND OBJECTIVE BOX
Patient is a 62y old  Male who presents with a chief complaint of SOB, likely CHF exacerbation (26 Feb 2023 13:44)    Sister at bedside. AVALOS but no SOB at rest. No CP. Urinating and having BM. Appetite is fair and no N/V/D. No bleeding. No fever. No dizziness.     Medication:   acetaminophen     Tablet .. 650 milliGRAM(s) Oral every 6 hours PRN  albuterol    90 MICROgram(s) HFA Inhaler 1 Puff(s) Inhalation three times a day PRN  aluminum hydroxide/magnesium hydroxide/simethicone Suspension 30 milliLiter(s) Oral every 4 hours PRN  budesonide 160 MICROgram(s)/formoterol 4.5 MICROgram(s) Inhaler 2 Puff(s) Inhalation two times a day  buMETAnide 3 milliGRAM(s) Oral daily  enoxaparin Injectable 40 milliGRAM(s) SubCutaneous every 24 hours  melatonin 3 milliGRAM(s) Oral at bedtime PRN  metoprolol succinate ER 50 milliGRAM(s) Oral daily  ondansetron Injectable 4 milliGRAM(s) IV Push every 8 hours PRN  sacubitril 49 mG/valsartan 51 mG 1 Tablet(s) Oral two times a day  spironolactone 25 milliGRAM(s) Oral daily      Physical exam    T(C): 36.4 (02-26-23 @ 09:29), Max: 36.7 (02-25-23 @ 18:00)  HR: 110 (02-26-23 @ 10:24) (100 - 110)  BP: 88/52 (02-26-23 @ 10:24) (84/53 - 107/50)  RR: 16 (02-26-23 @ 09:29) (16 - 18)  SpO2: 96% (02-26-23 @ 09:29) (95% - 97%)  Wt(kg): --    alert NAD  EOMI anicteric sclera  Neck Supple No LNA  Cv s1 S2 RRR  Lungs clear B/L  abd soft NT ND +BS  No LE edema or tenderness    Labs                        12.5   11.19 )-----------( 317      ( 26 Feb 2023 06:36 )             41.5       02-26    139  |  105  |  42<H>  ----------------------------<  102<H>  4.0   |  22  |  1.64<H>    Ca    9.2      26 Feb 2023 06:36  Phos  4.1     02-26  Mg     2.00     02-26    TPro  7.6  /  Alb  3.6  /  TBili  0.4  /  DBili  x   /  AST  22  /  ALT  19  /  AlkPhos  144<H>  02-26      LIVER FUNCTIONS - ( 26 Feb 2023 06:36 )  Alb: 3.6 g/dL / Pro: 7.6 g/dL / ALK PHOS: 144 U/L / ALT: 19 U/L / AST: 22 U/L / GGT: x             [id number]

## 2023-02-26 NOTE — PROVIDER CONTACT NOTE (MEDICATION) - SITUATION
Pt receiving entresto, bumex and metoprolol, BP is 90/58 manual Pt receiving entresto, bumex and metoprolol, BP is 90/58 manual. Reassess BP and it was 88/52

## 2023-02-26 NOTE — PROVIDER CONTACT NOTE (MEDICATION) - ASSESSMENT
pt is a/ox4, denies any pain, symptoms of hypotension, denies weakness, dizziness, on 2LNC. Pt BP manual was 90/56, HR is between 110-120 at this time sinus tachy, afebrile
pt is a/ox4, denies any pain/distress, no s/s of hypotension of dizziness/weakness

## 2023-02-26 NOTE — PROGRESS NOTE ADULT - ASSESSMENT
_________________________________________________________________________________________  ========>>  M E D I C A L   A T T E N D I N G    F O L L O W  U P  N O T E  <<=========  -----------------------------------------------------------------------------------------------------    - Patient seen and examined by me earlier today.   - In summary,  PLACIDO GOLDMAN is a 62y year old man admitted with CHF exacerbation  - Patient today overall doing ok, comfortable, eating OK.     Overall feeling ok, breathing improved, good urine output, good appetite.    BP on the lower side today per RN, meds held for the most part .. Cardio / CHF team following    ==================>> REVIEW OF SYSTEM <<=================    GEN: no fever, no chills, no pain  RESP: no SOB at rest , no cough, no sputum  CVS: no chest pain, no palpitations, no edema  GI: no abdominal pain, no nausea, no constipation though no BM today yet ..   : no dysuria, no frequency  Neuro: no headache, no dizziness  Derm : no itching, no rash    ==================>> PHYSICAL EXAM <<=================    GEN: A&O X 3 , NAD , comfortable, pleasant, calm   HEENT: NCAT, PERRL, MMM, hearing intact, on minimal O2 via NC  Neck: supple , no JVD appreciated  CVS: S1S2 , regular , No M/R/G appreciated  PULM: CTA B/L,  no W/R/R appreciated  ABD.: soft. non tender, non distended,  bowel sounds present  Extrem: intact pulses , no signif edema   PSYCH : normal mood,  not anxious                             ( Note written / Date of service 02-26-23 )    ==================>> MEDICATIONS <<====================    budesonide 160 MICROgram(s)/formoterol 4.5 MICROgram(s) Inhaler 2 Puff(s) Inhalation two times a day  buMETAnide 3 milliGRAM(s) Oral daily  enoxaparin Injectable 40 milliGRAM(s) SubCutaneous every 24 hours  metoprolol succinate ER 50 milliGRAM(s) Oral daily  spironolactone 25 milliGRAM(s) Oral daily    MEDICATIONS  (PRN):  acetaminophen     Tablet .. 650 milliGRAM(s) Oral every 6 hours PRN Temp greater or equal to 38C (100.4F), Mild Pain (1 - 3)  albuterol    90 MICROgram(s) HFA Inhaler 1 Puff(s) Inhalation three times a day PRN Shortness of Breath and/or Wheezing  aluminum hydroxide/magnesium hydroxide/simethicone Suspension 30 milliLiter(s) Oral every 4 hours PRN Dyspepsia  melatonin 3 milliGRAM(s) Oral at bedtime PRN Insomnia  ondansetron Injectable 4 milliGRAM(s) IV Push every 8 hours PRN Nausea and/or Vomiting    ___________  Active diet:  Diet, Regular:   DASH/TLC Sodium & Cholesterol Restricted (DASH)  ___________________    ==================>> VITAL SIGNS <<==================    Height (cm): 167.6  Weight (kg): 57  BMI (kg/m2): 20.3  Vital Signs Last 24 HrsT(C): 36.4 (02-26-23 @ 09:29)  T(F): 97.6 (02-26-23 @ 09:29), Max: 98.1 (02-25-23 @ 18:00)  HR: 110 (02-26-23 @ 10:24) (100 - 110)  BP: 88/52 (02-26-23 @ 10:24)  RR: 16 (02-26-23 @ 09:29) (16 - 18)  SpO2: 96% (02-26-23 @ 09:29) (95% - 97%)       ==================>> LAB AND IMAGING <<==================                        12.5   11.19 )-----------( 317      ( 26 Feb 2023 06:36 )             41.5        02-26    139  |  105  |  42<H>  ----------------------------<  102<H>  4.0   |  22  |  1.64<H>    Ca    9.2      26 Feb 2023 06:36  Phos  4.1     02-26  Mg     2.00     02-26    TPro  7.6  /  Alb  3.6  /  TBili  0.4  /  DBili  x   /  AST  22  /  ALT  19  /  AlkPhos  144<H>  02-26    WBC count:   11.19 <<== ,  9.06 <<== ,  9.37 <<== ,  11.85 <<==   Hemoglobin:   12.5 <<==,  12.7 <<==,  11.9 <<==,  12.1 <<==  platelets:  317 <==, 304 <==, 289 <==, 271 <==    Creatinine:  1.64  <<==, 1.63  <<==, 1.54  <<==, 1.40  <<==  Sodium:   139  <==, 141  <==, 141  <==, 144  <==       AST:          22 <== , 23 <== , 24 <== , 33 <==      ALT:        19  <== , 19  <== , 16  <== , 24  <==      AP:        144  <=, 153  <=, 166  <=, 178  <=     Bili:        0.4  <=, 0.5  <=, 0.6  <=, 0.5  <=         ___________________________________________________________________________________  ===============>>  A S S E S S M E N T   A N D   P L A N <<===============  ------------------------------------------------------------------------------------------    · Assessment	  Pt is a 63 yo man with PMH of HTN, RA, RCC s/p R partial nephrectomy, CKD, HFrEF/NICM EF 25-30%, ICD, plasma cell dyscrasia, h/o H-pylori, treated, seen at HF clinic and sent to ED for worsening heart failure.   pt admits to progressively increasing SOB, AVALOS with cough, worsening at night over past 6 months with leg edema,  however increasingly worse over past month.   pt on bumex and entresto.   Pt using O2 at home as needed.   Pt denies any chest pain, fevers, recent surgeries, dizziness, bloody stools.       Problem/Plan - 1:  ·  Problem: Acute on chronic systolic heart failure.   Cardiology and heart failure following management appreciated.    Patient on IV diuretics with bumetanide.  monitor Ins and outs, weight, labs, and vitals closely  O2 as needed and wean off as able  medical optimization    BP on the low side >> ? over diuresed .. ? >>> meds being adjusted per cardiologist / CHF team...   patient post AICD in December  Continue Current medications otherwise and monitor.  supportive care.    Problem/Plan - 2:  ·  Problem: Acute on chronic respiratory failure with hypoxia.   ·  Plan: as above  diuresis  wean off O2 as able  patient has PRN O2 at home.    Problem/Plan - 3:  ·  Problem: history of  Hypertension.   Patient currently with low blood pressures.    Appreciated heart failure recommendations.    judicial adjustment of medications as tolerated.  monitor and adjust as needed    Problem/Plan - 4:  ·  Problem: Stage 3a chronic kidney disease.   ·  Plan: currently appears stable  monitor  Avoid nephrotoxic medications.    Problem/Plan - 5:  ·  Problem: history of Plasma cell dyscrasia.   Oncology consulted and appreciated.    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  Recommending a bone marrow biopsy.  Interventional radiology following for BX    -GI/DVT Prophylaxis per protocol.    --------------------------------------------  Case discussed with patient, family at bedside, RN..    Education given on findings and plan of care  ___________________________  H. JEANETH Vu.  Pager: [phone]

## 2023-02-26 NOTE — CHART NOTE - NSCHARTNOTEFT_GEN_A_CORE
Patient hypotensive 88/52 this am.   Cardiology called, pt due for metoprolol, entresto and bumex. Per cards hold metoprolol, and bumex today, decrease entresto dose to 49/51.     Janice Youssef PA-C  Department of Medicine  Pager [phone]

## 2023-02-26 NOTE — PROVIDER CONTACT NOTE (MEDICATION) - ACTION/TREATMENT ORDERED:
per ACP, give metoprolol d/t heart rate for control  reassess BP/vital signs in an hour after med given before giving entresto/bumex  monitor for acute s/s of hypotension per ACP and cardiology, do not give bumex, metoprolol or entresto  Recommend for reduced dosage of entresto at this time. Awaiting new orders and reassess BP

## 2023-02-26 NOTE — PROVIDER CONTACT NOTE (OTHER) - ACTION/TREATMENT ORDERED:
As per provider, RN to provide oral hydration and recheck blood pressure in 30 minutes. As per provider, RN to hold AM blood pressure meds. RN to provide oral hydration and recheck blood pressure in 30 minutes.

## 2023-02-26 NOTE — PROGRESS NOTE ADULT - SUBJECTIVE AND OBJECTIVE BOX
Interval History: Low BP pressures this morning     Medications:  acetaminophen     Tablet .. 650 milliGRAM(s) Oral every 6 hours PRN  albuterol    90 MICROgram(s) HFA Inhaler 1 Puff(s) Inhalation three times a day PRN  aluminum hydroxide/magnesium hydroxide/simethicone Suspension 30 milliLiter(s) Oral every 4 hours PRN  budesonide 160 MICROgram(s)/formoterol 4.5 MICROgram(s) Inhaler 2 Puff(s) Inhalation two times a day  buMETAnide 3 milliGRAM(s) Oral daily  enoxaparin Injectable 40 milliGRAM(s) SubCutaneous every 24 hours  hydrALAZINE 50 milliGRAM(s) Oral three times a day  melatonin 3 milliGRAM(s) Oral at bedtime PRN  metoprolol succinate ER 50 milliGRAM(s) Oral daily  ondansetron Injectable 4 milliGRAM(s) IV Push every 8 hours PRN  sacubitril 97 mG/valsartan 103 mG 1 Tablet(s) Oral two times a day  spironolactone 25 milliGRAM(s) Oral daily      Vitals:  T(C): 36.4 (23 @ 22:07), Max: 36.7 (23 @ 18:00)  HR: 106 (23 @ 06:00) (100 - 112)  BP: 92/62 (23 @ 06:36) (84/53 - 107/50)  RR: 16 (23 @ 06:00) (16 - 18)  SpO2: 97% (23 @ 06:00) (95% - 98%)      Daily Height in cm: 167.64 (2023 18:00)    Daily Weight in k.5 (2023 18:00)    Weight (kg): 57 ( @ 15:46)    I&O's Summary    2023 07:01  -  2023 07:00  --------------------------------------------------------  IN: 516 mL / OUT: 1200 mL / NET: -684 mL        Physical Exam:  Appearance: No Acute Distress  HEENT: No JVD  Cardiovascular: Normal S1 S2, No murmurs/rubs/gallops  Respiratory: Clear to auscultation bilaterally  Gastrointestinal: Soft, Non-tender	  Skin: No cyanosis	  Neurologic: Non-focal  Extremities: No LE edema  Psychiatry: A & O x 3, Mood & affect appropriate    Labs:                        12.5   11.19 )-----------( 317      ( 2023 06:36 )             41.5         139  |  105  |  42<H>  ----------------------------<  102<H>  4.0   |  22  |  1.64<H>    Ca    9.2      2023 06:36  Phos  4.1       Mg     2.00         TPro  7.6  /  Alb  3.6  /  TBili  0.4  /  DBili  x   /  AST  22  /  ALT  19  /  AlkPhos  144<H>            Serum Pro-Brain Natriuretic Peptide: 6513 pg/mL ( @ 20:46)      Lactate Dehydrogenase, Serum: 305 U/L ( @ 06:55)    Lactate, Blood: 1.7 mmol/L ( @ 06:36)  Lactate, Blood: 2.8 mmol/L ( @ 06:55)

## 2023-02-27 LAB
ALBUMIN SERPL ELPH-MCNC: 3.6 G/DL — SIGNIFICANT CHANGE UP (ref 3.3–5)
ALP SERPL-CCNC: 134 U/L — HIGH (ref 40–120)
ALT FLD-CCNC: 18 U/L — SIGNIFICANT CHANGE UP (ref 4–41)
ANION GAP SERPL CALC-SCNC: 10 MMOL/L — SIGNIFICANT CHANGE UP (ref 7–14)
AST SERPL-CCNC: 23 U/L — SIGNIFICANT CHANGE UP (ref 4–40)
BILIRUB SERPL-MCNC: 0.5 MG/DL — SIGNIFICANT CHANGE UP (ref 0.2–1.2)
BUN SERPL-MCNC: 36 MG/DL — HIGH (ref 7–23)
CALCIUM SERPL-MCNC: 9.2 MG/DL — SIGNIFICANT CHANGE UP (ref 8.4–10.5)
CHLORIDE SERPL-SCNC: 103 MMOL/L — SIGNIFICANT CHANGE UP (ref 98–107)
CO2 SERPL-SCNC: 22 MMOL/L — SIGNIFICANT CHANGE UP (ref 22–31)
CREAT SERPL-MCNC: 1.41 MG/DL — HIGH (ref 0.5–1.3)
EGFR: 56 ML/MIN/1.73M2 — LOW
GLUCOSE SERPL-MCNC: 93 MG/DL — SIGNIFICANT CHANGE UP (ref 70–99)
HCT VFR BLD CALC: 39.3 % — SIGNIFICANT CHANGE UP (ref 39–50)
HGB BLD-MCNC: 11.8 G/DL — LOW (ref 13–17)
LACTATE SERPL-SCNC: 1.3 MMOL/L — SIGNIFICANT CHANGE UP (ref 0.5–2)
MAGNESIUM SERPL-MCNC: 2.1 MG/DL — SIGNIFICANT CHANGE UP (ref 1.6–2.6)
MCHC RBC-ENTMCNC: 28.8 PG — SIGNIFICANT CHANGE UP (ref 27–34)
MCHC RBC-ENTMCNC: 30 GM/DL — LOW (ref 32–36)
MCV RBC AUTO: 95.9 FL — SIGNIFICANT CHANGE UP (ref 80–100)
NRBC # BLD: 0 /100 WBCS — SIGNIFICANT CHANGE UP (ref 0–0)
NRBC # FLD: 0 K/UL — SIGNIFICANT CHANGE UP (ref 0–0)
PHOSPHATE SERPL-MCNC: 3.4 MG/DL — SIGNIFICANT CHANGE UP (ref 2.5–4.5)
PLATELET # BLD AUTO: 296 K/UL — SIGNIFICANT CHANGE UP (ref 150–400)
POTASSIUM SERPL-MCNC: 4.2 MMOL/L — SIGNIFICANT CHANGE UP (ref 3.5–5.3)
POTASSIUM SERPL-SCNC: 4.2 MMOL/L — SIGNIFICANT CHANGE UP (ref 3.5–5.3)
PROT SERPL-MCNC: 7.4 G/DL — SIGNIFICANT CHANGE UP (ref 6–8.3)
RBC # BLD: 4.1 M/UL — LOW (ref 4.2–5.8)
RBC # FLD: 14.7 % — HIGH (ref 10.3–14.5)
SODIUM SERPL-SCNC: 135 MMOL/L — SIGNIFICANT CHANGE UP (ref 135–145)
WBC # BLD: 8.75 K/UL — SIGNIFICANT CHANGE UP (ref 3.8–10.5)
WBC # FLD AUTO: 8.75 K/UL — SIGNIFICANT CHANGE UP (ref 3.8–10.5)

## 2023-02-27 PROCEDURE — 93010 ELECTROCARDIOGRAM REPORT: CPT

## 2023-02-27 PROCEDURE — 99233 SBSQ HOSP IP/OBS HIGH 50: CPT

## 2023-02-27 RX ADMIN — ENOXAPARIN SODIUM 40 MILLIGRAM(S): 100 INJECTION SUBCUTANEOUS at 13:36

## 2023-02-27 RX ADMIN — BUDESONIDE AND FORMOTEROL FUMARATE DIHYDRATE 2 PUFF(S): 160; 4.5 AEROSOL RESPIRATORY (INHALATION) at 10:05

## 2023-02-27 RX ADMIN — Medication 50 MILLIGRAM(S): at 01:49

## 2023-02-27 RX ADMIN — SACUBITRIL AND VALSARTAN 1 TABLET(S): 24; 26 TABLET, FILM COATED ORAL at 21:21

## 2023-02-27 RX ADMIN — BUDESONIDE AND FORMOTEROL FUMARATE DIHYDRATE 2 PUFF(S): 160; 4.5 AEROSOL RESPIRATORY (INHALATION) at 21:21

## 2023-02-27 RX ADMIN — BUMETANIDE 3 MILLIGRAM(S): 0.25 INJECTION INTRAMUSCULAR; INTRAVENOUS at 06:10

## 2023-02-27 NOTE — PROGRESS NOTE ADULT - PROBLEM SELECTOR PLAN 1
Currently appears to have low normal JVP, but still has AVALOS and cough.   The cough is also persistent, and he does not believe it started after Entresto. He says he had it in the same severity prior to Entresto as well.   Missed Entresto and belkis due to low normal BP.   Transitioned to PO Bumex 3mg on Sunday, 2/26- maybe too much? Will d/w Dr. Dhillon regarding holding Bumex.  Continue Toprol 50mg QD (hold SBP<90)  Continue Entresto 49/51 mg BID (hold SBP<90)  Continue spironolactone 25mg daily   Awaiting BM biopsy- 3/1  Strict I/O  Daily standing weights   Monitor lytes replete to keep k 4.0-5.0 and Mg>2.0  Repeat TTE with strain ordered/pending   EP/ICD interrogation completed- no events noted    May need cardiac biopsy at [facility] -to r/o sarcoid and amyloid.

## 2023-02-27 NOTE — PROGRESS NOTE ADULT - ASSESSMENT
Monoclonal protein, anemia, CKD. For a bone marrow biopsy with IR, but low suspicion for MM/amyloid. Hgb adequate and can monitor for now.

## 2023-02-27 NOTE — PROGRESS NOTE ADULT - SUBJECTIVE AND OBJECTIVE BOX
Cardiovascular Disease Progress Note    Overnight events: No acute events overnight.  no cp/sob/palps/dizziness  Otherwise review of systems negative    Objective Findings:  T(C): 36.7 (02-27-23 @ 06:08), Max: 37.1 (02-27-23 @ 01:15)  HR: 108 (02-27-23 @ 06:08) (105 - 126)  BP: 104/69 (02-27-23 @ 06:08) (80/56 - 118/68)  RR: 16 (02-27-23 @ 06:08) (16 - 19)  SpO2: 94% (02-27-23 @ 06:08) (92% - 100%)  Wt(kg): --  Daily     Daily       Physical Exam:  Gen: NAD  HEENT: EOMI  CV: RRR, normal S1 + S2, no m/r/g  Lungs: CTAB  Abd: soft, non-tender  Ext: No edema    Telemetry: nsr st    Laboratory Data:                        11.8   8.75  )-----------( 296      ( 27 Feb 2023 06:20 )             39.3     02-27    135  |  103  |  36<H>  ----------------------------<  93  4.2   |  22  |  1.41<H>    Ca    9.2      27 Feb 2023 06:20  Phos  3.4     02-27  Mg     2.10     02-27    TPro  7.4  /  Alb  3.6  /  TBili  0.5  /  DBili  x   /  AST  23  /  ALT  18  /  AlkPhos  134<H>  02-27              Inpatient Medications:  MEDICATIONS  (STANDING):  budesonide 160 MICROgram(s)/formoterol 4.5 MICROgram(s) Inhaler 2 Puff(s) Inhalation two times a day  buMETAnide 3 milliGRAM(s) Oral daily  enoxaparin Injectable 40 milliGRAM(s) SubCutaneous every 24 hours  metoprolol succinate ER 50 milliGRAM(s) Oral daily  sacubitril 49 mG/valsartan 51 mG 1 Tablet(s) Oral two times a day  spironolactone 25 milliGRAM(s) Oral daily      Assessment:  62yoM with PMH of HTN, RA, RCC s/p R partial nephrectomy, CKD, HFrEF/NICM EF 25-30%, ICD, plasma cell dyscrasia, seen at HF clinic today, sent to ED for worsening heart failure. pt admits to progressively increasing SOB, AVALOS with cough, worsening at night over past 6 months, however increasingly worse over past month    Recs:  diuresis per CHF  cont GDMT  heme f/u for plasma cell dyscrasia  possible BM biopsy  eventual cardiac bx at [facility] per CHF  tele monitoring   will follow        Over 25 minutes spent on total encounter; more than 50% of the visit was spent counseling and/or coordinating care by the attending physician.      Juarez Carver MD   Cardiovascular Disease  [phone]

## 2023-02-27 NOTE — CARDIOLOGY SUMMARY
[de-identified] : \par 2/22/23 sinus tach, , APCs, incomplete RBBB, inferior Q waves; relatively low voltage\par 2/8/23 sinus tachycardia, , PRWP, incomplete RBBB, inferior Q waves\par 1/25/23 sinus tachycardia, , PRWP, inferior Q waves, incomplete RBBB\par 12/14/22 - sinus, , PRWP, inferior Q waves [de-identified] : \par 7/26/21 - treadmill stress test - EF 71%, no ECG changes; negative for ischemia/infarct [de-identified] : \par 12/15/22 - septum 1.5 cm, PW 1.3 cm, LVEDD 4.1 cm, EF 28%, highly trabeculated apex, mild-mod TR, RVSP 60, RV dysfunction/enlargement, DT 78 msec, E/e' 28, LVOT VTI 12 cm, IVC 1.5 cm [de-identified] : \par 12/16/22 - nl pericardium; concentric LVH; EF 36%, subtle LGE along inferior hinge point of RV\par \par 3/8/22 - Chest CT PE protocol - dilated PA (4 cm), mildly enlarged subcarinal LN (1.1 cm), mildly enlarged R hilar LN (1.2 cm), clear lungs; mildly enlarged bilateral axillary LN (L>R); no PE [de-identified] : \par 1/3/22 ([facility]) Tc-Pyp scan - negative for TTR amyloid [de-identified] : \par 12/16/22 - RHC - RA 5, RV 48/11, PA 46/24/33, PCWP9, CO/CI 4.9/2.94, PVR 4.8\par \par January 2022 ([facility]) - reportedly normal LHC

## 2023-02-27 NOTE — PROGRESS NOTE ADULT - SUBJECTIVE AND OBJECTIVE BOX
Medications:  acetaminophen     Tablet .. 650 milliGRAM(s) Oral every 6 hours PRN  albuterol    90 MICROgram(s) HFA Inhaler 1 Puff(s) Inhalation three times a day PRN  aluminum hydroxide/magnesium hydroxide/simethicone Suspension 30 milliLiter(s) Oral every 4 hours PRN  budesonide 160 MICROgram(s)/formoterol 4.5 MICROgram(s) Inhaler 2 Puff(s) Inhalation two times a day  buMETAnide 3 milliGRAM(s) Oral daily  enoxaparin Injectable 40 milliGRAM(s) SubCutaneous every 24 hours  melatonin 3 milliGRAM(s) Oral at bedtime PRN  metoprolol succinate ER 50 milliGRAM(s) Oral daily  ondansetron Injectable 4 milliGRAM(s) IV Push every 8 hours PRN  sacubitril 49 mG/valsartan 51 mG 1 Tablet(s) Oral two times a day  spironolactone 25 milliGRAM(s) Oral daily      Vitals:  Vital Signs Last 24 Hrs  T(C): 36.7 (27 Feb 2023 06:08), Max: 37.1 (27 Feb 2023 01:15)  T(F): 98.1 (27 Feb 2023 06:08), Max: 98.7 (27 Feb 2023 01:15)  HR: 102 (27 Feb 2023 09:23) (102 - 126)  BP: 98/66 (27 Feb 2023 09:23) (80/56 - 118/68)  BP(mean): 75 (27 Feb 2023 09:23) (75 - 75)  RR: 18 (27 Feb 2023 09:23) (16 - 19)  SpO2: 95% (27 Feb 2023 09:23) (92% - 100%)    Parameters below as of 27 Feb 2023 09:23  Patient On (Oxygen Delivery Method): nasal cannula  O2 Flow (L/min): 2      Daily     Daily     I&O's Detail    26 Feb 2023 07:01  -  27 Feb 2023 07:00  --------------------------------------------------------  IN:  Total IN: 0 mL    OUT:    Voided (mL): 1600 mL  Total OUT: 1600 mL    Total NET: -1600 mL      27 Feb 2023 07:01  -  27 Feb 2023 10:59  --------------------------------------------------------  IN:  Total IN: 0 mL    OUT:    Voided (mL): 350 mL  Total OUT: 350 mL    Total NET: -350 mL          Physical Exam:     General: No distress. Comfortable.  HEENT: EOM intact.  Neck: Neck supple. JVP not elevated. No masses  Chest: Clear to auscultation bilaterally  CV: Normal S1 and S2. No murmurs, rub, or gallops. Radial pulses normal.  Abdomen: Soft, non-distended, non-tender  Skin: No rashes or skin breakdown  Neurology: Alert and oriented times three. Sensation intact  Psych: Affect normal    Labs:                        11.8   8.75  )-----------( 296      ( 27 Feb 2023 06:20 )             39.3     02-27    135  |  103  |  36<H>  ----------------------------<  93  4.2   |  22  |  1.41<H>    Ca    9.2      27 Feb 2023 06:20  Phos  3.4     02-27  Mg     2.10     02-27    TPro  7.4  /  Alb  3.6  /  TBili  0.5  /  DBili  x   /  AST  23  /  ALT  18  /  AlkPhos  134<H>  02-27           Patient seen and examined. He states he still has AVALOS when walking on the floors here.   The cough is also persistent, and he does not believe it started after Entresto. He says he had it in the same severity prior to Entresto as well.   No SOB at rest, orthopnea, PND, CP, palpitations.       Medications:  acetaminophen     Tablet .. 650 milliGRAM(s) Oral every 6 hours PRN  albuterol    90 MICROgram(s) HFA Inhaler 1 Puff(s) Inhalation three times a day PRN  aluminum hydroxide/magnesium hydroxide/simethicone Suspension 30 milliLiter(s) Oral every 4 hours PRN  budesonide 160 MICROgram(s)/formoterol 4.5 MICROgram(s) Inhaler 2 Puff(s) Inhalation two times a day  buMETAnide 3 milliGRAM(s) Oral daily  enoxaparin Injectable 40 milliGRAM(s) SubCutaneous every 24 hours  melatonin 3 milliGRAM(s) Oral at bedtime PRN  metoprolol succinate ER 50 milliGRAM(s) Oral daily  ondansetron Injectable 4 milliGRAM(s) IV Push every 8 hours PRN  sacubitril 49 mG/valsartan 51 mG 1 Tablet(s) Oral two times a day  spironolactone 25 milliGRAM(s) Oral daily      Vitals:  Vital Signs Last 24 Hrs  T(C): 36.7 (27 Feb 2023 06:08), Max: 37.1 (27 Feb 2023 01:15)  T(F): 98.1 (27 Feb 2023 06:08), Max: 98.7 (27 Feb 2023 01:15)  HR: 102 (27 Feb 2023 09:23) (102 - 126)  BP: 98/66 (27 Feb 2023 09:23) (80/56 - 118/68)  BP(mean): 75 (27 Feb 2023 09:23) (75 - 75)  RR: 18 (27 Feb 2023 09:23) (16 - 19)  SpO2: 95% (27 Feb 2023 09:23) (92% - 100%)    Parameters below as of 27 Feb 2023 09:23  Patient On (Oxygen Delivery Method): nasal cannula  O2 Flow (L/min): 2      Daily     Daily     I&O's Detail    26 Feb 2023 07:01  -  27 Feb 2023 07:00  --------------------------------------------------------  IN:  Total IN: 0 mL    OUT:    Voided (mL): 1600 mL  Total OUT: 1600 mL    Total NET: -1600 mL      27 Feb 2023 07:01  -  27 Feb 2023 10:59  --------------------------------------------------------  IN:  Total IN: 0 mL    OUT:    Voided (mL): 350 mL  Total OUT: 350 mL    Total NET: -350 mL          Physical Exam:     General: No distress. Comfortable.  HEENT: EOM intact.  Neck: Neck supple. JVP normal. No masses  Chest: Clear to auscultation bilaterally  CV: Normal S1 and S2. No murmurs, rub, or gallops. Radial pulses normal. No LE edema and is warm b/l.   Abdomen: Soft, non-distended, non-tender  Skin: No rashes or skin breakdown  Neurology: Alert and oriented times three. Sensation intact  Psych: Affect normal    Labs:                        11.8   8.75  )-----------( 296      ( 27 Feb 2023 06:20 )             39.3     02-27    135  |  103  |  36<H>  ----------------------------<  93  4.2   |  22  |  1.41<H>    Ca    9.2      27 Feb 2023 06:20  Phos  3.4     02-27  Mg     2.10     02-27    TPro  7.4  /  Alb  3.6  /  TBili  0.5  /  DBili  x   /  AST  23  /  ALT  18  /  AlkPhos  134<H>  02-27

## 2023-02-27 NOTE — ASSESSMENT
[FreeTextEntry1] : Briefly, Mr. Cain is a 63 y/o [place] M w/ h/o HTN, ? 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 Unclear etiology of CM however given LVH, relative low voltage, neuropathy, amyloid still on the differential despite reportedly nl Tc-Pyp as well as possible AL (given Kappa band on serum IF) however cardiac MRI not suggestive of amyloid. Other consideration is sarcoid despite reassuring cardiac MRI as has LAD on CT imaging, incomplete RBBB. Requires further workup including likely cardiac biopsy. Appears mildly volume overloaded and normotensive and was recommended to be admitted. \par \par 1. HFrEF - rapidly progressive over past 1-2 years\par - on bumex 2 mg daily; will admit for IV diuresis\par - c/w hydral 50 mg three times/day\par - c/w Entresto 97/103 twice/day\par - continue Toprol 100 mg qhs \par - c/w belkis 12.5 mg daily \par - monitor weight/BP at home\par - counseled extensively on disease process\par - reviewed cardiac MRI \par - S/P ICD implantation 12/29/22\par \par 2. CKD - Cr august 1.3; recently 1.5-2\par - may benefit from renal consult\par - c/w meds as above\par \par 3. Hypertension- improved\par - meds as above\par \par 4. Plasma cell dyscrasia - ? amyloid vs MGUS\par - will need further heme w/u; plan for BM biopsy while admitted\par \par RTC after discharge

## 2023-02-27 NOTE — HISTORY OF PRESENT ILLNESS
[FreeTextEntry1] : Briefly, Mr. Cain is a 61 y/o [place] M w/ h/o HTN, ? 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 Referred by Dr. Manuelito Carver. Accompanied by wife (Aliya). \par \par For full initial details, please refer to note from 1/13/23\par \shantel Has had several admissions in the past year; last at [facility] in January. \par \shantel Was admitted at [facility] in December for heart failure and renal dysfunction. Was noted to have anemia with some suspicion of plasma cell dyscrasia with plan for outpatient BM biopsy. Kappa/lambda ratio of 3.23 (10.7/3.31) with serum IF revealing weak [place] band. Was to see Dr. Schmidt (hematology) for a BM biopsy but did not make appointment. \par \par Since last visit has continued to feel poorly with persistent cough during day and night. ET has been limited due to leg fatigue. States he can walk up 5 steps and then rests before continuing. He has poor appetite and feels full after small amounts of food. Recently had a fall this past Sunday when got lightheaded but denies any head trauma. \par \par Reports having diarrhea for the past year. States he has 2-3x/day that is non-bloody. Denies N/V. Had a screening colonoscopy 1 year prior which was reportedly normal; found to have 2 polyps.  \par \[facility] Monitors BP at home which has been in the range of 110-140. Weight range 130-132 lbs. \par \par He denies chest pain, palpitations, dizziness/LH, syncope and no ICD shocks. \par \par Denies prior Covid illness. Received Covid vaccines.

## 2023-02-27 NOTE — PROGRESS NOTE ADULT - ASSESSMENT
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 Patient sent to ED by Dr. Cuellar for ADHF.       Currently appears to have normal JVP, but still has AVALOS and cough.

## 2023-02-27 NOTE — PROGRESS NOTE ADULT - ASSESSMENT
_________________________________________________________________________________________  ========>>  M E D I C A L   A T T E N D I N G    F O L L O W  U P  N O T E  <<=========  -----------------------------------------------------------------------------------------------------    - Patient evaluated by me, chart reviewed.   - In summary,  PLACIDO GOLDMAN is a 62y year old man admitted with CHF exacerbation  - Patient today overall doing ok, comfortable, eating OK.      noted events last PM with decreased BP and tachycardia... now improved today     ==================>> REVIEW OF SYSTEM <<=================    GEN: no fever, no chills, no pain  RESP: no SOB at rest , no cough, no sputum  CVS: no chest pain, no palpitations, no edema  GI: no abdominal pain, no nausea, no constipation   : no dysuria, no frequency  Neuro: no headache, no dizziness  Derm : no itching, no rash    ==================>> PHYSICAL EXAM <<=================    GEN: A&O X 3 , NAD , comfortable, pleasant, calm   HEENT: NCAT, PERRL, MMM, hearing intact, on minimal O2 via NC  Neck: supple , no JVD appreciated  CVS: S1S2 , regular , No M/R/G appreciated  PULM: CTA B/L,  no W/R/R appreciated  ABD.: soft. non tender, non distended,  bowel sounds present  Extrem: intact pulses , no signif edema   PSYCH : normal mood,  not anxious                             ( Note written / Date of service 02-27-23 )    ==================>> MEDICATIONS <<====================    budesonide 160 MICROgram(s)/formoterol 4.5 MICROgram(s) Inhaler 2 Puff(s) Inhalation two times a day  buMETAnide 3 milliGRAM(s) Oral daily  enoxaparin Injectable 40 milliGRAM(s) SubCutaneous every 24 hours  metoprolol succinate ER 50 milliGRAM(s) Oral daily  sacubitril 49 mG/valsartan 51 mG 1 Tablet(s) Oral two times a day  spironolactone 25 milliGRAM(s) Oral daily    MEDICATIONS  (PRN):  acetaminophen     Tablet .. 650 milliGRAM(s) Oral every 6 hours PRN Temp greater or equal to 38C (100.4F), Mild Pain (1 - 3)  albuterol    90 MICROgram(s) HFA Inhaler 1 Puff(s) Inhalation three times a day PRN Shortness of Breath and/or Wheezing  aluminum hydroxide/magnesium hydroxide/simethicone Suspension 30 milliLiter(s) Oral every 4 hours PRN Dyspepsia  melatonin 3 milliGRAM(s) Oral at bedtime PRN Insomnia  ondansetron Injectable 4 milliGRAM(s) IV Push every 8 hours PRN Nausea and/or Vomiting    ___________  Active diet:  Diet, Regular:   DASH/TLC Sodium & Cholesterol Restricted (DASH)  ___________________    ==================>> VITAL SIGNS <<==================  Height (cm): 167.6  Weight (kg): 57  BMI (kg/m2): 20.3  Vital Signs Last 24 HrsT(C): 36.5 (02-27-23 @ 13:44)  T(F): 97.7 (02-27-23 @ 13:44), Max: 98.7 (02-27-23 @ 01:15)  HR: 112 (02-27-23 @ 13:44) (102 - 126)  BP: 101/74 (02-27-23 @ 13:44)  RR: 20 (02-27-23 @ 13:44) (16 - 20)  SpO2: 100% (02-27-23 @ 13:44) (92% - 100%)       ==================>> LAB AND IMAGING <<==================                        11.8   8.75  )-----------( 296      ( 27 Feb 2023 06:20 )             39.3        02-27    135  |  103  |  36<H>  ----------------------------<  93  4.2   |  22  |  1.41<H>    Ca    9.2      27 Feb 2023 06:20  Phos  3.4     02-27  Mg     2.10     02-27    TPro  7.4  /  Alb  3.6  /  TBili  0.5  /  DBili  x   /  AST  23  /  ALT  18  /  AlkPhos  134<H>  02-27    WBC count:   8.75 <<== ,  11.19 <<== ,  9.06 <<== ,  9.37 <<== ,  11.85 <<==   Hemoglobin:   11.8 <<==,  12.5 <<==,  12.7 <<==,  11.9 <<==,  12.1 <<==  platelets:  296 <==, 317 <==, 304 <==, 289 <==, 271 <==    Creatinine:  1.41  <<==, 1.64  <<==, 1.63  <<==, 1.54  <<==, 1.40  <<==  Sodium:   135  <==, 139  <==, 141  <==, 141  <==, 144  <==       AST:          23 <== , 22 <== , 23 <== , 24 <== , 33 <==      ALT:        18  <== , 19  <== , 19  <== , 16  <== , 24  <==      AP:        134  <=, 144  <=, 153  <=, 166  <=, 178  <=     Bili:        0.5  <=, 0.4  <=, 0.5  <=, 0.6  <=, 0.5  <=    ___________________________________________________________________________________  ===============>>  A S S E S S M E N T   A N D   P L A N <<===============  ------------------------------------------------------------------------------------------    · Assessment	  Pt is a 63 yo man with PMH of HTN, RA, RCC s/p R partial nephrectomy, CKD, HFrEF/NICM EF 25-30%, ICD, plasma cell dyscrasia, h/o H-pylori, treated, seen at HF clinic and sent to ED for worsening heart failure.   pt admits to progressively increasing SOB, AVALOS with cough, worsening at night over past 6 months with leg edema,  however increasingly worse over past month.   pt on bumex and entresto.   Pt using O2 at home as needed.   Pt denies any chest pain, fevers, recent surgeries, dizziness, bloody stools.       Problem/Plan - 1:  ·  Problem: Acute on chronic systolic heart failure.   Cardiology and heart failure following management appreciated.    Patient on IV diuretics with bumetanide.  monitor Ins and outs, weight, labs, and vitals closely  O2 as needed and wean off as able  medical optimization    BP on the low side >> ? over diuresed .. ? >>> meds being adjusted per cardiologist / CHF team...   patient post AICD in December  Continue Current medications otherwise and monitor.  supportive care.    Problem/Plan - 2:  ·  Problem: Acute on chronic respiratory failure with hypoxia.   ·  Plan: as above  diuresis  wean off O2 as able  patient has PRN O2 at home.    Problem/Plan - 3:  ·  Problem: history of  Hypertension.   Patient currently with low blood pressures.    Appreciated heart failure recommendations.    judicial adjustment of medications as tolerated.  monitor and adjust as needed    Problem/Plan - 4:  ·  Problem: Stage 3a chronic kidney disease.   ·  Plan: currently appears stable  monitor  Avoid nephrotoxic medications.    Problem/Plan - 5:  ·  Problem: history of Plasma cell dyscrasia.   Oncology consulted and appreciated.    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  Recommending a bone marrow biopsy.  Interventional radiology following for BX    -GI/DVT Prophylaxis per protocol.    --------------------------------------------  Case discussed   Education given on findings and plan of care  ___________________________  H. JEANETH Vu.  Pager: [phone]

## 2023-02-27 NOTE — PROGRESS NOTE ADULT - SUBJECTIVE AND OBJECTIVE BOX
Patient is a 62y old  Male who presents with a chief complaint of SOB, likely CHF exacerbation (27 Feb 2023 13:52)  says that he feels about the same. Breathing a little better. No pain. Appetite fair.     Medication:   acetaminophen     Tablet .. 650 milliGRAM(s) Oral every 6 hours PRN  albuterol    90 MICROgram(s) HFA Inhaler 1 Puff(s) Inhalation three times a day PRN  aluminum hydroxide/magnesium hydroxide/simethicone Suspension 30 milliLiter(s) Oral every 4 hours PRN  budesonide 160 MICROgram(s)/formoterol 4.5 MICROgram(s) Inhaler 2 Puff(s) Inhalation two times a day  buMETAnide 3 milliGRAM(s) Oral daily  enoxaparin Injectable 40 milliGRAM(s) SubCutaneous every 24 hours  melatonin 3 milliGRAM(s) Oral at bedtime PRN  metoprolol succinate ER 50 milliGRAM(s) Oral daily  ondansetron Injectable 4 milliGRAM(s) IV Push every 8 hours PRN  sacubitril 49 mG/valsartan 51 mG 1 Tablet(s) Oral two times a day  spironolactone 25 milliGRAM(s) Oral daily      Physical exam    T(C): 36.5 (02-27-23 @ 13:44), Max: 37.1 (02-27-23 @ 01:15)  HR: 107 (02-27-23 @ 17:12) (102 - 122)  BP: 107/76 (02-27-23 @ 17:12) (98/66 - 118/68)  RR: 18 (02-27-23 @ 17:12) (16 - 20)  SpO2: 100% (02-27-23 @ 17:12) (92% - 100%)  Wt(kg): --    alert NAD  EOMI anicteric sclera  Cv s1 S2 RRR  Lungs clear B/L anteriorly  abd soft NT ND +BS  No LE edema or tenderness    Labs                        11.8   8.75  )-----------( 296      ( 27 Feb 2023 06:20 )             39.3       02-27    135  |  103  |  36<H>  ----------------------------<  93  4.2   |  22  |  1.41<H>    Ca    9.2      27 Feb 2023 06:20  Phos  3.4     02-27  Mg     2.10     02-27    TPro  7.4  /  Alb  3.6  /  TBili  0.5  /  DBili  x   /  AST  23  /  ALT  18  /  AlkPhos  134<H>  02-27      LIVER FUNCTIONS - ( 27 Feb 2023 06:20 )  Alb: 3.6 g/dL / Pro: 7.4 g/dL / ALK PHOS: 134 U/L / ALT: 18 U/L / AST: 23 U/L / GGT: x             [id number]

## 2023-02-27 NOTE — PHYSICAL EXAM
[Well Developed] : well developed [Well Nourished] : well nourished [No Acute Distress] : no acute distress [Normal Conjunctiva] : normal conjunctiva [Normal Venous Pressure] : normal venous pressure [No Carotid Bruit] : no carotid bruit [Normal S1, S2] : normal S1, S2 [No Murmur] : no murmur [No Rub] : no rub [No Gallop] : no gallop [Clear Lung Fields] : clear lung fields [Good Air Entry] : good air entry [No Respiratory Distress] : no respiratory distress  [Soft] : abdomen soft [Non Tender] : non-tender [No Masses/organomegaly] : no masses/organomegaly [Normal Bowel Sounds] : normal bowel sounds [Normal Gait] : normal gait [No Cyanosis] : no cyanosis [No Clubbing] : no clubbing [No Varicosities] : no varicosities [No Rash] : no rash [No Skin Lesions] : no skin lesions [Moves all extremities] : moves all extremities [No Focal Deficits] : no focal deficits [Normal Speech] : normal speech [Normal] : alert and oriented, normal memory [Alert and Oriented] : alert and oriented [Normal memory] : normal memory [de-identified] : mildly dyspneic; appears uncomfortable [de-identified] : JVP approx 8-10 cm (best seen on L) [de-identified] : tachycardic, Left  hest ICD [de-identified] : trace lower extremitye edema bilaterally

## 2023-02-27 NOTE — PROGRESS NOTE ADULT - NS ATTEND AMEND GEN_ALL_CORE FT
Diuretic was held yesterday, with downtick in BUN and SCr.  Continue Bumex 3 mg po qd and monitor renal function.  Awaiting BM biopsy tomorrow.

## 2023-02-28 LAB
ANION GAP SERPL CALC-SCNC: 10 MMOL/L — SIGNIFICANT CHANGE UP (ref 7–14)
BUN SERPL-MCNC: 39 MG/DL — HIGH (ref 7–23)
CALCIUM SERPL-MCNC: 9.5 MG/DL — SIGNIFICANT CHANGE UP (ref 8.4–10.5)
CHLORIDE SERPL-SCNC: 102 MMOL/L — SIGNIFICANT CHANGE UP (ref 98–107)
CO2 SERPL-SCNC: 24 MMOL/L — SIGNIFICANT CHANGE UP (ref 22–31)
CREAT SERPL-MCNC: 1.42 MG/DL — HIGH (ref 0.5–1.3)
EGFR: 56 ML/MIN/1.73M2 — LOW
GLUCOSE SERPL-MCNC: 90 MG/DL — SIGNIFICANT CHANGE UP (ref 70–99)
HCT VFR BLD CALC: 38.8 % — LOW (ref 39–50)
HGB BLD-MCNC: 11.8 G/DL — LOW (ref 13–17)
MCHC RBC-ENTMCNC: 29.4 PG — SIGNIFICANT CHANGE UP (ref 27–34)
MCHC RBC-ENTMCNC: 30.4 GM/DL — LOW (ref 32–36)
MCV RBC AUTO: 96.5 FL — SIGNIFICANT CHANGE UP (ref 80–100)
NRBC # BLD: 0 /100 WBCS — SIGNIFICANT CHANGE UP (ref 0–0)
NRBC # FLD: 0 K/UL — SIGNIFICANT CHANGE UP (ref 0–0)
PLATELET # BLD AUTO: 301 K/UL — SIGNIFICANT CHANGE UP (ref 150–400)
POTASSIUM SERPL-MCNC: 4.2 MMOL/L — SIGNIFICANT CHANGE UP (ref 3.5–5.3)
POTASSIUM SERPL-SCNC: 4.2 MMOL/L — SIGNIFICANT CHANGE UP (ref 3.5–5.3)
RBC # BLD: 4.02 M/UL — LOW (ref 4.2–5.8)
RBC # FLD: 14.7 % — HIGH (ref 10.3–14.5)
SARS-COV-2 RNA SPEC QL NAA+PROBE: SIGNIFICANT CHANGE UP
SODIUM SERPL-SCNC: 136 MMOL/L — SIGNIFICANT CHANGE UP (ref 135–145)
WBC # BLD: 10.99 K/UL — HIGH (ref 3.8–10.5)
WBC # FLD AUTO: 10.99 K/UL — HIGH (ref 3.8–10.5)

## 2023-02-28 RX ORDER — SODIUM CHLORIDE 9 MG/ML
250 INJECTION INTRAMUSCULAR; INTRAVENOUS; SUBCUTANEOUS ONCE
Refills: 0 | Status: COMPLETED | OUTPATIENT
Start: 2023-02-28 | End: 2023-02-28

## 2023-02-28 RX ADMIN — SACUBITRIL AND VALSARTAN 1 TABLET(S): 24; 26 TABLET, FILM COATED ORAL at 21:34

## 2023-02-28 RX ADMIN — SODIUM CHLORIDE 250 MILLILITER(S): 9 INJECTION INTRAMUSCULAR; INTRAVENOUS; SUBCUTANEOUS at 09:12

## 2023-02-28 RX ADMIN — ENOXAPARIN SODIUM 40 MILLIGRAM(S): 100 INJECTION SUBCUTANEOUS at 12:41

## 2023-02-28 RX ADMIN — BUDESONIDE AND FORMOTEROL FUMARATE DIHYDRATE 2 PUFF(S): 160; 4.5 AEROSOL RESPIRATORY (INHALATION) at 09:13

## 2023-02-28 RX ADMIN — SACUBITRIL AND VALSARTAN 1 TABLET(S): 24; 26 TABLET, FILM COATED ORAL at 07:27

## 2023-02-28 RX ADMIN — SPIRONOLACTONE 25 MILLIGRAM(S): 25 TABLET, FILM COATED ORAL at 07:27

## 2023-02-28 RX ADMIN — Medication 50 MILLIGRAM(S): at 12:40

## 2023-02-28 NOTE — PROGRESS NOTE ADULT - ASSESSMENT
Monoclonal protein, anemia, CKD. For a bone marrow biopsy with IR, but low suspicion for MM/amyloid. Hgb adequate and can monitor for now.     BMBx planned for 3/1, with IR

## 2023-02-28 NOTE — PROGRESS NOTE ADULT - ASSESSMENT
_________________________________________________________________________________________  ========>>  M E D I C A L   A T T E N D I N G    F O L L O W  U P  N O T E  <<=========  -----------------------------------------------------------------------------------------------------    - Patient evaluated by me, chart reviewed.   - In summary,  PLACIDO GOLDMAN is a 62y year old man admitted with CHF exacerbation  - Patient today overall doing ok, comfortable, eating OK.      noted events last PM with decreased BP and tachycardia... now improved today     ==================>> REVIEW OF SYSTEM <<=================    GEN: no fever, no chills, no pain  RESP: no SOB at rest , no cough, no sputum  CVS: no chest pain, no palpitations, no edema  GI: no abdominal pain, no nausea, no constipation   : no dysuria, no frequency  Neuro: no headache, no dizziness  Derm : no itching, no rash    ==================>> PHYSICAL EXAM <<=================    GEN: A&O X 3 , NAD , comfortable, pleasant, calm   HEENT: NCAT, PERRL, MMM, hearing intact, on minimal O2 via NC  Neck: supple , no JVD appreciated  CVS: S1S2 , regular , No M/R/G appreciated  PULM: CTA B/L,  no W/R/R appreciated  ABD.: soft. non tender, non distended,  bowel sounds present  Extrem: intact pulses , no signif edema   PSYCH : normal mood,  not anxious                              ( Note written / Date of service 02-28-23 )    ==================>> MEDICATIONS <<====================    budesonide 160 MICROgram(s)/formoterol 4.5 MICROgram(s) Inhaler 2 Puff(s) Inhalation two times a day  buMETAnide 3 milliGRAM(s) Oral daily  enoxaparin Injectable 40 milliGRAM(s) SubCutaneous every 24 hours  metoprolol succinate ER 50 milliGRAM(s) Oral daily  sacubitril 49 mG/valsartan 51 mG 1 Tablet(s) Oral two times a day  spironolactone 25 milliGRAM(s) Oral daily    MEDICATIONS  (PRN):  acetaminophen     Tablet .. 650 milliGRAM(s) Oral every 6 hours PRN Temp greater or equal to 38C (100.4F), Mild Pain (1 - 3)  albuterol    90 MICROgram(s) HFA Inhaler 1 Puff(s) Inhalation three times a day PRN Shortness of Breath and/or Wheezing  aluminum hydroxide/magnesium hydroxide/simethicone Suspension 30 milliLiter(s) Oral every 4 hours PRN Dyspepsia  melatonin 3 milliGRAM(s) Oral at bedtime PRN Insomnia  ondansetron Injectable 4 milliGRAM(s) IV Push every 8 hours PRN Nausea and/or Vomiting    ___________  Active diet:  Diet, NPO after Midnight:      NPO Start Date: 28-Feb-2023,   NPO Start Time: 23:59  Diet, Regular:   DASH/TLC Sodium & Cholesterol Restricted (DASH)  ___________________    ==================>> VITAL SIGNS <<==================  Height (cm): 167.6  Vital Signs Last 24 HrsT(C): 36.3 (02-28-23 @ 12:32)  T(F): 97.4 (02-28-23 @ 12:32), Max: 98.2 (02-27-23 @ 22:40)  HR: 112 (02-28-23 @ 12:32) (106 - 114)  BP: 105/72 (02-28-23 @ 12:32)  RR: 18 (02-28-23 @ 12:32) (18 - 20)  SpO2: 100% (02-28-23 @ 12:32) (97% - 100%)      CAPILLARY BLOOD GLUCOSE         ==================>> LAB AND IMAGING <<==================                        11.8   10.99 )-----------( 301      ( 28 Feb 2023 06:03 )             38.8        02-28    136  |  102  |  39<H>  ----------------------------<  90  4.2   |  24  |  1.42<H>    Ca    9.5      28 Feb 2023 06:03  Phos  3.4     02-27  Mg     2.10     02-27    TPro  7.4  /  Alb  3.6  /  TBili  0.5  /  DBili  x   /  AST  23  /  ALT  18  /  AlkPhos  134<H>  02-27    WBC count:   10.99 <<== ,  8.75 <<== ,  11.19 <<== ,  9.06 <<== ,  9.37 <<==   Hemoglobin:   11.8 <<==,  11.8 <<==,  12.5 <<==,  12.7 <<==,  11.9 <<==  platelets:  301 <==, 296 <==, 317 <==, 304 <==, 289 <==, 271 <==    Creatinine:  1.42  <<==, 1.41  <<==, 1.64  <<==, 1.63  <<==, 1.54  <<==, 1.40  <<==  Sodium:   136  <==, 135  <==, 139  <==, 141  <==, 141  <==, 144  <==       AST:          23 <== , 22 <== , 23 <== , 24 <== , 33 <==      ALT:        18  <== , 19  <== , 19  <== , 16  <== , 24  <==      AP:        134  <=, 144  <=, 153  <=, 166  <=, 178  <=     Bili:        0.5  <=, 0.4  <=, 0.5  <=, 0.6  <=, 0.5  <=    ____________________________    M I C R O B I O L O G Y :      SARS-CoV-2: Tomer (02-22-23 @ 20:42)    ___________________________________________________________________________________  ===============>>  A S S E S S M E N T   A N D   P L A N <<===============  ------------------------------------------------------------------------------------------    · Assessment	  Pt is a 61 yo man with PMH of HTN, RA, RCC s/p R partial nephrectomy, CKD, HFrEF/NICM EF 25-30%, ICD, plasma cell dyscrasia, h/o H-pylori, treated, seen at HF clinic and sent to ED for worsening heart failure.   pt admits to progressively increasing SOB, AVALOS with cough, worsening at night over past 6 months with leg edema,  however increasingly worse over past month.   pt on bumex and entresto.   Pt using O2 at home as needed.   Pt denies any chest pain, fevers, recent surgeries, dizziness, bloody stools.       Problem/Plan - 1:  ·  Problem: Acute on chronic systolic heart failure.   Cardiology and heart failure following management appreciated.    Patient on IV diuretics with bumetanide.  monitor Ins and outs, weight, labs, and vitals closely  O2 as needed and wean off as able  medical optimization    BP on the low side >> ? over diuresed .. ? >>> meds being adjusted per cardiologist / CHF team...   patient post AICD in December  Continue Current medications otherwise and monitor.  supportive care.    Problem/Plan - 2:  ·  Problem: Acute on chronic respiratory failure with hypoxia.   ·  Plan: as above  diuresis  wean off O2 as able  patient has PRN O2 at home.    Problem/Plan - 3:  ·  Problem: history of  Hypertension.   Patient currently with low blood pressures.    Appreciated heart failure recommendations.    judicial adjustment of medications as tolerated.  monitor and adjust as needed    Problem/Plan - 4:  ·  Problem: Stage 3a chronic kidney disease.   ·  Plan: currently appears stable  monitor  Avoid nephrotoxic medications.    Problem/Plan - 5:  ·  Problem: history of Plasma cell dyscrasia.   Oncology consulted and appreciated.    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  Recommending a bone marrow biopsy.  Interventional radiology following for BX    -GI/DVT Prophylaxis per protocol.    --------------------------------------------  Case discussed   Education given on findings and plan of care  ___________________________  HEvangelist Vu D.O.  Pager: [phone]       _________________________________________________________________________________________  ========>>  M E D I C A L   A T T E N D I N G    F O L L O W  U P  N O T E  <<=========  -----------------------------------------------------------------------------------------------------    - Patient evaluated by me, chart reviewed.   - In summary,  PLACIDO GOLDMAN is a 62y year old man admitted with CHF exacerbation  - Patient today overall doing ok, comfortable, eating OK.     Patient overall feeling okay, Ambulating in the unit, no shortness of breath, no chest pain.    ==================>> REVIEW OF SYSTEM <<=================    GEN: no fever, no chills, no pain  RESP: no SOB at rest , no cough, no sputum  CVS: no chest pain, no palpitations, no edema  GI: no abdominal pain, no nausea, no constipation   : no dysuria, no frequency  Neuro: no headache, no dizziness  Derm : no itching, no rash    ==================>> PHYSICAL EXAM <<=================    GEN: A&O X 3 , NAD , comfortable, pleasant, calm in bed.. encouraged out of bed to chair with assistance as needed.   HEENT: NCAT, PERRL, MMM, hearing intact, not on O2 , saturating well   Neck: supple , no JVD appreciated  CVS: S1S2 , regular , Tachycardic.  Sinus tachycardia on telemetry  PULM: CTA B/L,  no W/R/R appreciated  ABD.: soft. non tender, non distended,  bowel sounds present  Extrem: intact pulses , no signif edema   PSYCH : normal mood,  not anxious                              ( Note written / Date of service 02-28-23 )    ==================>> MEDICATIONS <<====================    budesonide 160 MICROgram(s)/formoterol 4.5 MICROgram(s) Inhaler 2 Puff(s) Inhalation two times a day  buMETAnide 3 milliGRAM(s) Oral daily  enoxaparin Injectable 40 milliGRAM(s) SubCutaneous every 24 hours  metoprolol succinate ER 50 milliGRAM(s) Oral daily  sacubitril 49 mG/valsartan 51 mG 1 Tablet(s) Oral two times a day  spironolactone 25 milliGRAM(s) Oral daily    MEDICATIONS  (PRN):  acetaminophen     Tablet .. 650 milliGRAM(s) Oral every 6 hours PRN Temp greater or equal to 38C (100.4F), Mild Pain (1 - 3)  albuterol    90 MICROgram(s) HFA Inhaler 1 Puff(s) Inhalation three times a day PRN Shortness of Breath and/or Wheezing  aluminum hydroxide/magnesium hydroxide/simethicone Suspension 30 milliLiter(s) Oral every 4 hours PRN Dyspepsia  melatonin 3 milliGRAM(s) Oral at bedtime PRN Insomnia  ondansetron Injectable 4 milliGRAM(s) IV Push every 8 hours PRN Nausea and/or Vomiting    ___________  Active diet:  Diet, NPO after Midnight:      NPO Start Date: 28-Feb-2023,   NPO Start Time: 23:59  Diet, Regular:   DASH/TLC Sodium & Cholesterol Restricted (DASH)  ___________________    ==================>> VITAL SIGNS <<==================  Height (cm): 167.6  Vital Signs Last 24 HrsT(C): 36.3 (02-28-23 @ 12:32)  T(F): 97.4 (02-28-23 @ 12:32), Max: 98.2 (02-27-23 @ 22:40)  HR: 112 (02-28-23 @ 12:32) (106 - 114)  BP: 105/72 (02-28-23 @ 12:32)  RR: 18 (02-28-23 @ 12:32) (18 - 20)  SpO2: 100% (02-28-23 @ 12:32) (97% - 100%)       ==================>> LAB AND IMAGING <<==================                        11.8   10.99 )-----------( 301      ( 28 Feb 2023 06:03 )             38.8        02-28    136  |  102  |  39<H>  ----------------------------<  90  4.2   |  24  |  1.42<H>    Ca    9.5      28 Feb 2023 06:03  Phos  3.4     02-27  Mg     2.10     02-27    TPro  7.4  /  Alb  3.6  /  TBili  0.5  /  DBili  x   /  AST  23  /  ALT  18  /  AlkPhos  134<H>  02-27    WBC count:   10.99 <<== ,  8.75 <<== ,  11.19 <<== ,  9.06 <<== ,  9.37 <<==   Hemoglobin:   11.8 <<==,  11.8 <<==,  12.5 <<==,  12.7 <<==,  11.9 <<==  platelets:  301 <==, 296 <==, 317 <==, 304 <==, 289 <==, 271 <==    Creatinine:  1.42  <<==, 1.41  <<==, 1.64  <<==, 1.63  <<==, 1.54  <<==, 1.40  <<==  Sodium:   136  <==, 135  <==, 139  <==, 141  <==, 141  <==, 144  <==       AST:          23 <== , 22 <== , 23 <== , 24 <== , 33 <==      ALT:        18  <== , 19  <== , 19  <== , 16  <== , 24  <==      AP:        134  <=, 144  <=, 153  <=, 166  <=, 178  <=     Bili:        0.5  <=, 0.4  <=, 0.5  <=, 0.6  <=, 0.5  <=    __________________________________________________________________________________  ===============>>  A S S E S S M E N T   A N D   P L A N <<===============  ------------------------------------------------------------------------------------------    · Assessment	  Pt is a 61 yo man with PMH of HTN, RA, RCC s/p R partial nephrectomy, CKD, HFrEF/NICM EF 25-30%, ICD, plasma cell dyscrasia, h/o H-pylori, treated, seen at HF clinic and sent to ED for worsening heart failure.   pt admits to progressively increasing SOB, AVALOS with cough, worsening at night over past 6 months with leg edema,  however increasingly worse over past month.   pt on bumex and entresto.   Pt using O2 at home as needed.   Pt denies any chest pain, fevers, recent surgeries, dizziness, bloody stools.       Problem/Plan - 1:  ·  Problem: Acute on chronic systolic heart failure.   Cardiology and heart failure following management appreciated.    diuretics s needed as per cardiology  monitor Ins and outs, weight, labs, and vitals closely  O2 as needed   medical optimization    BP better but still having tachycardia  patient post AICD in December  Continue Current medications otherwise and monitor.  supportive care.    Problem/Plan - 2:  ·  Problem: Acute on chronic respiratory failure with hypoxia.   ·  Plan: as above  diuresis  wean off O2 as able  patient has PRN O2 at home.    Problem/Plan - 3:  ·  Problem: history of  Hypertension.   Patient currently with low blood pressures.    Appreciated heart failure recommendations.    judicial adjustment of medications as tolerated.  monitor and adjust as needed    Problem/Plan - 4:  ·  Problem: Stage 3a chronic kidney disease.   ·  Plan: currently appears stable  monitor  Avoid nephrotoxic medications.    Problem/Plan - 5:  ·  Problem: history of Plasma cell dyscrasia.   Oncology consulted and appreciated.    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  Recommending a bone marrow biopsy.  Interventional radiology following for BX    -GI/DVT Prophylaxis per protocol.    Discharge planning pending cardiology and CHF plan of care.    --------------------------------------------  Case discussed With patient and ACP  Education given on findings and plan of care  ___________________________  H. JEANETH Vu.  Pager: [phone]

## 2023-02-28 NOTE — PROGRESS NOTE ADULT - SUBJECTIVE AND OBJECTIVE BOX
Cardiovascular Disease Progress Note    Overnight events: No acute events overnight.  no cp/sob/palps  Otherwise review of systems negative    Objective Findings:  T(C): 36.8 (02-27-23 @ 22:40), Max: 36.8 (02-27-23 @ 22:40)  HR: 114 (02-27-23 @ 22:40) (102 - 114)  BP: 101/68 (02-27-23 @ 22:40) (98/66 - 107/76)  RR: 20 (02-27-23 @ 22:40) (16 - 20)  SpO2: 97% (02-27-23 @ 22:40) (94% - 100%)  Wt(kg): --  Daily     Daily       Physical Exam:  Gen: NAD  HEENT: EOMI  CV: RRR, normal S1 + S2, no m/r/g  Lungs: CTAB  Abd: soft, non-tender  Ext: No edema    Telemetry:    Laboratory Data:                        11.8   8.75  )-----------( 296      ( 27 Feb 2023 06:20 )             39.3     02-27    135  |  103  |  36<H>  ----------------------------<  93  4.2   |  22  |  1.41<H>    Ca    9.2      27 Feb 2023 06:20  Phos  3.4     02-27  Mg     2.10     02-27    TPro  7.4  /  Alb  3.6  /  TBili  0.5  /  DBili  x   /  AST  23  /  ALT  18  /  AlkPhos  134<H>  02-27          CONCLUSIONS:  1. Mitral annular calcification, otherwise normal mitral  valve. Mild mitral regurgitation.  2. Increased relative wall thickness with normal left  ventricular mass index, consistent with concentric left  ventricular remodeling.  3. Endocardium not well visualized; grossly normal left  ventricular systolic function.  4. Severe right atrial enlargement.  5. Right ventricular enlargement with decreased right  ventricular systolic function. A device wire is noted in  the right heart.  Systolic flattening of the  interventricular septum, consistent with RV pressure  overload.  6. Estimated right ventricular systolic pressure equals 60  mm Hg, assuming right atrial pressure equals 10 mm Hg,  consistent with moderate pulmonary hypertension.  7. Global longitudinal strain (GLS) analysis was performed  with the [employer] Vivid E95 (/minute; BP 99/65); global  longitudinal strain equals -12.3%, which is reduced.  *** No previous Echo exam.        Inpatient Medications:  MEDICATIONS  (STANDING):  budesonide 160 MICROgram(s)/formoterol 4.5 MICROgram(s) Inhaler 2 Puff(s) Inhalation two times a day  buMETAnide 3 milliGRAM(s) Oral daily  enoxaparin Injectable 40 milliGRAM(s) SubCutaneous every 24 hours  metoprolol succinate ER 50 milliGRAM(s) Oral daily  sacubitril 49 mG/valsartan 51 mG 1 Tablet(s) Oral two times a day  spironolactone 25 milliGRAM(s) Oral daily      Assessment:  62yoM with PMH of HTN, RA, RCC s/p R partial nephrectomy, CKD, HFrEF/NICM EF 25-30%, ICD, plasma cell dyscrasia, seen at HF clinic today, sent to ED for worsening heart failure. pt admits to progressively increasing SOB, AVALOS with cough, worsening at night over past 6 months, however increasingly worse over past month    Recs:  diuresis per CHF  cont GDMT, lvef improved  heme f/u for plasma cell dyscrasia  possible BM biopsy  eventual cardiac bx at [facility] per CHF  tele monitoring   will follow          Over 25 minutes spent on total encounter; more than 50% of the visit was spent counseling and/or coordinating care by the attending physician.      Juarez Carver MD   Cardiovascular Disease  [phone]

## 2023-02-28 NOTE — PROGRESS NOTE ADULT - SUBJECTIVE AND OBJECTIVE BOX
PLACIDO GOLDMAN  MRN-[id number]    Patient is a 62y old  Male who presents with a chief complaint of SOB, likely CHF exacerbation (28 Feb 2023 06:06)      Review of System    Comfortable overnight.  without complaints    General:	Denies fatigue, fevers, chills, sweats, decreased appetite.    Skin/Breast: denies pruritis, rash  	  Ophthalmologic: no change in vision or blurring  	  HEENT	Denies dry mouth, oral sores, dysphagia,  change in hearing.    Respiratory and Thorax: no cough, sob, wheeze, hemoptysis  	  Cardiovascular:	no cp , palp, orthopnea    Gastrointestinal:	no n/v/d constipation    Genitourinary:	no dysuria of frequency, no hematuria, no flank pain    Musculoskeletal:	no bone or joint pain. no muscle aches.     Neurological:	no change in sensory or motor function. no headache. no weakness.     Psychiatric:	no depression, no anxiety, insomnia.     Hematology/Lymphatics:	no bleeding or bruising    Current Meds  MEDICATIONS  (STANDING):  budesonide 160 MICROgram(s)/formoterol 4.5 MICROgram(s) Inhaler 2 Puff(s) Inhalation two times a day  buMETAnide 3 milliGRAM(s) Oral daily  enoxaparin Injectable 40 milliGRAM(s) SubCutaneous every 24 hours  metoprolol succinate ER 50 milliGRAM(s) Oral daily  sacubitril 49 mG/valsartan 51 mG 1 Tablet(s) Oral two times a day  spironolactone 25 milliGRAM(s) Oral daily    MEDICATIONS  (PRN):  acetaminophen     Tablet .. 650 milliGRAM(s) Oral every 6 hours PRN Temp greater or equal to 38C (100.4F), Mild Pain (1 - 3)  albuterol    90 MICROgram(s) HFA Inhaler 1 Puff(s) Inhalation three times a day PRN Shortness of Breath and/or Wheezing  aluminum hydroxide/magnesium hydroxide/simethicone Suspension 30 milliLiter(s) Oral every 4 hours PRN Dyspepsia  melatonin 3 milliGRAM(s) Oral at bedtime PRN Insomnia  ondansetron Injectable 4 milliGRAM(s) IV Push every 8 hours PRN Nausea and/or Vomiting      Vital Signs Last 24 Hrs  T(C): 36.8 (28 Feb 2023 07:20), Max: 36.8 (27 Feb 2023 22:40)  T(F): 98.2 (28 Feb 2023 07:20), Max: 98.2 (27 Feb 2023 22:40)  HR: 113 (28 Feb 2023 07:20) (102 - 114)  BP: 85/59 (28 Feb 2023 07:20) (85/59 - 107/76)  BP(mean): 87 (27 Feb 2023 17:12) (75 - 87)  RR: 20 (28 Feb 2023 07:20) (18 - 20)  SpO2: 100% (28 Feb 2023 07:20) (95% - 100%)    Parameters below as of 28 Feb 2023 07:20  Patient On (Oxygen Delivery Method): room air      PHYSICAL EXAM:    Constitutional: NAD    Eyes: PERRLA EOMI, anicteric sclera    Heent :No oral sores, no pharyngeal injection. moist mucosa.    Neck: supple, no jvd, no LAD    Respiratory: CTA b/l     Cardiovascular: s1s2, no m/g/r    Gastrointestinal: soft, nt, nd, + BS    Extremities: no c/c/e    Neurological:A&O x 3 moves all ext.    Skin: no rash on exposed skin    Lymph Nodes: no lymphadenopathy.    Lab  CBC Full  -  ( 28 Feb 2023 06:03 )  WBC Count : 10.99 K/uL  RBC Count : 4.02 M/uL  Hemoglobin : 11.8 g/dL  Hematocrit : 38.8 %  Platelet Count - Automated : 301 K/uL  Mean Cell Volume : 96.5 fL  Mean Cell Hemoglobin : 29.4 pg  Mean Cell Hemoglobin Concentration : 30.4 gm/dL  Auto Neutrophil # : x  Auto Lymphocyte # : x  Auto Monocyte # : x  Auto Eosinophil # : x  Auto Basophil # : x  Auto Neutrophil % : x  Auto Lymphocyte % : x  Auto Monocyte % : x  Auto Eosinophil % : x  Auto Basophil % : x    02-28    136  |  102  |  39<H>  ----------------------------<  90  4.2   |  24  |  1.42<H>    Ca    9.5      28 Feb 2023 06:03  Phos  3.4     02-27  Mg     2.10     02-27    TPro  7.4  /  Alb  3.6  /  TBili  0.5  /  DBili  x   /  AST  23  /  ALT  18  /  AlkPhos  134<H>  02-27        Rad:    Assessment/Plan

## 2023-03-01 LAB
ANION GAP SERPL CALC-SCNC: 12 MMOL/L — SIGNIFICANT CHANGE UP (ref 7–14)
APTT BLD: 32.9 SEC — SIGNIFICANT CHANGE UP (ref 27–36.3)
BUN SERPL-MCNC: 36 MG/DL — HIGH (ref 7–23)
CALCIUM SERPL-MCNC: 9.5 MG/DL — SIGNIFICANT CHANGE UP (ref 8.4–10.5)
CHLORIDE SERPL-SCNC: 105 MMOL/L — SIGNIFICANT CHANGE UP (ref 98–107)
CO2 SERPL-SCNC: 21 MMOL/L — LOW (ref 22–31)
CREAT SERPL-MCNC: 1.4 MG/DL — HIGH (ref 0.5–1.3)
EGFR: 56 ML/MIN/1.73M2 — LOW
GLUCOSE SERPL-MCNC: 83 MG/DL — SIGNIFICANT CHANGE UP (ref 70–99)
HCT VFR BLD CALC: 38.1 % — LOW (ref 39–50)
HGB BLD-MCNC: 11.5 G/DL — LOW (ref 13–17)
INR BLD: 1.2 RATIO — HIGH (ref 0.88–1.16)
MAGNESIUM SERPL-MCNC: 2.1 MG/DL — SIGNIFICANT CHANGE UP (ref 1.6–2.6)
MCHC RBC-ENTMCNC: 29.3 PG — SIGNIFICANT CHANGE UP (ref 27–34)
MCHC RBC-ENTMCNC: 30.2 GM/DL — LOW (ref 32–36)
MCV RBC AUTO: 97.2 FL — SIGNIFICANT CHANGE UP (ref 80–100)
NRBC # BLD: 0 /100 WBCS — SIGNIFICANT CHANGE UP (ref 0–0)
NRBC # FLD: 0 K/UL — SIGNIFICANT CHANGE UP (ref 0–0)
PHOSPHATE SERPL-MCNC: 3.1 MG/DL — SIGNIFICANT CHANGE UP (ref 2.5–4.5)
PLATELET # BLD AUTO: 271 K/UL — SIGNIFICANT CHANGE UP (ref 150–400)
POTASSIUM SERPL-MCNC: 4.3 MMOL/L — SIGNIFICANT CHANGE UP (ref 3.5–5.3)
POTASSIUM SERPL-SCNC: 4.3 MMOL/L — SIGNIFICANT CHANGE UP (ref 3.5–5.3)
PROTHROM AB SERPL-ACNC: 14 SEC — HIGH (ref 10.5–13.4)
RBC # BLD: 3.92 M/UL — LOW (ref 4.2–5.8)
RBC # FLD: 14.7 % — HIGH (ref 10.3–14.5)
SODIUM SERPL-SCNC: 138 MMOL/L — SIGNIFICANT CHANGE UP (ref 135–145)
WBC # BLD: 9.91 K/UL — SIGNIFICANT CHANGE UP (ref 3.8–10.5)
WBC # FLD AUTO: 9.91 K/UL — SIGNIFICANT CHANGE UP (ref 3.8–10.5)

## 2023-03-01 PROCEDURE — 77012 CT SCAN FOR NEEDLE BIOPSY: CPT | Mod: 26

## 2023-03-01 PROCEDURE — 88305 TISSUE EXAM BY PATHOLOGIST: CPT | Mod: 26,59

## 2023-03-01 PROCEDURE — 85097 BONE MARROW INTERPRETATION: CPT

## 2023-03-01 PROCEDURE — 20220 BONE BIOPSY TROCAR/NDL SUPFC: CPT

## 2023-03-01 PROCEDURE — 99233 SBSQ HOSP IP/OBS HIGH 50: CPT

## 2023-03-01 PROCEDURE — 88365 INSITU HYBRIDIZATION (FISH): CPT | Mod: 26

## 2023-03-01 PROCEDURE — 88341 IMHCHEM/IMCYTCHM EA ADD ANTB: CPT | Mod: 26,59

## 2023-03-01 PROCEDURE — 88360 TUMOR IMMUNOHISTOCHEM/MANUAL: CPT | Mod: 26

## 2023-03-01 PROCEDURE — 88342 IMHCHEM/IMCYTCHM 1ST ANTB: CPT | Mod: 26,59

## 2023-03-01 PROCEDURE — 88364 INSITU HYBRIDIZATION (FISH): CPT | Mod: 26

## 2023-03-01 PROCEDURE — 88189 FLOWCYTOMETRY/READ 16 & >: CPT

## 2023-03-01 PROCEDURE — 88313 SPECIAL STAINS GROUP 2: CPT | Mod: 26

## 2023-03-01 RX ADMIN — Medication 650 MILLIGRAM(S): at 23:47

## 2023-03-01 RX ADMIN — Medication 650 MILLIGRAM(S): at 22:33

## 2023-03-01 RX ADMIN — SPIRONOLACTONE 25 MILLIGRAM(S): 25 TABLET, FILM COATED ORAL at 06:22

## 2023-03-01 RX ADMIN — BUMETANIDE 3 MILLIGRAM(S): 0.25 INJECTION INTRAMUSCULAR; INTRAVENOUS at 06:22

## 2023-03-01 RX ADMIN — SACUBITRIL AND VALSARTAN 1 TABLET(S): 24; 26 TABLET, FILM COATED ORAL at 06:21

## 2023-03-01 RX ADMIN — BUDESONIDE AND FORMOTEROL FUMARATE DIHYDRATE 2 PUFF(S): 160; 4.5 AEROSOL RESPIRATORY (INHALATION) at 21:43

## 2023-03-01 RX ADMIN — Medication 50 MILLIGRAM(S): at 06:21

## 2023-03-01 RX ADMIN — ENOXAPARIN SODIUM 40 MILLIGRAM(S): 100 INJECTION SUBCUTANEOUS at 18:42

## 2023-03-01 RX ADMIN — SACUBITRIL AND VALSARTAN 1 TABLET(S): 24; 26 TABLET, FILM COATED ORAL at 18:42

## 2023-03-01 NOTE — DIETITIAN INITIAL EVALUATION ADULT - DATE OF WEIGHT PRIOR TO ADM
Detail Level: Simple Render Risk Assessment In Note?: no Comment: drug allergy (dye) vs. urticaria vs. urticarial stage of BP 29-Apr-2022

## 2023-03-01 NOTE — PROGRESS NOTE ADULT - SUBJECTIVE AND OBJECTIVE BOX
LPACIDO GOLDMAN  MRN-[id number]    Patient is a 62y old  Male who presents with a chief complaint of SOB, likely CHF exacerbation (01 Mar 2023 07:42)      Review of System        General:	Denies fatigue, fevers, chills, sweats, decreased appetite.    Skin/Breast: denies pruritis, rash  	  Ophthalmologic: no change in vision or blurring  	  HEENT	Denies dry mouth, oral sores, dysphagia,  change in hearing.    Respiratory and Thorax:  cough, sob, wheeze, hemoptysis  	  Cardiovascular:	no cp , palp, orthopnea    Gastrointestinal:	no n/v/d constipation    Genitourinary:	no dysuria of frequency, no hematuria, no flank pain    Musculoskeletal:	no bone or joint pain. no muscle aches.     Neurological:	no change in sensory or motor function. no headache. no weakness.     Psychiatric:	no depression, no anxiety, insomnia.     Hematology/Lymphatics:	no bleeding or bruising        Current Meds  MEDICATIONS  (STANDING):  budesonide 160 MICROgram(s)/formoterol 4.5 MICROgram(s) Inhaler 2 Puff(s) Inhalation two times a day  buMETAnide 3 milliGRAM(s) Oral daily  enoxaparin Injectable 40 milliGRAM(s) SubCutaneous every 24 hours  metoprolol succinate ER 50 milliGRAM(s) Oral daily  sacubitril 49 mG/valsartan 51 mG 1 Tablet(s) Oral two times a day  spironolactone 25 milliGRAM(s) Oral daily    MEDICATIONS  (PRN):  acetaminophen     Tablet .. 650 milliGRAM(s) Oral every 6 hours PRN Temp greater or equal to 38C (100.4F), Mild Pain (1 - 3)  albuterol    90 MICROgram(s) HFA Inhaler 1 Puff(s) Inhalation three times a day PRN Shortness of Breath and/or Wheezing  aluminum hydroxide/magnesium hydroxide/simethicone Suspension 30 milliLiter(s) Oral every 4 hours PRN Dyspepsia  melatonin 3 milliGRAM(s) Oral at bedtime PRN Insomnia  ondansetron Injectable 4 milliGRAM(s) IV Push every 8 hours PRN Nausea and/or Vomiting      Vitals  Vital Signs Last 24 Hrs  T(C): 36.8 (01 Mar 2023 05:20), Max: 36.8 (28 Feb 2023 17:26)  T(F): 98.2 (01 Mar 2023 05:20), Max: 98.3 (28 Feb 2023 17:26)  HR: 106 (01 Mar 2023 06:16) (104 - 112)  BP: 103/67 (01 Mar 2023 06:16) (90/50 - 119/97)  BP(mean): --  RR: 18 (01 Mar 2023 05:20) (18 - 18)  SpO2: 96% (01 Mar 2023 05:20) (96% - 100%)    Parameters below as of 01 Mar 2023 05:20  Patient On (Oxygen Delivery Method): nasal cannula  O2 Flow (L/min): 2      Physical Exam  PHYSICAL EXAM:      Constitutional: NAD    Eyes: PERRLA EOMI, anicteric sclera    Heent :No oral sores, no pharyngeal injection. moist mucosa.    Neck: supple, no jvd, no LAD    Respiratory: CTA b/l     Cardiovascular: s1s2, no m/g/r    Gastrointestinal: soft, nt, nd, + BS    Extremities: no c/c/e    Neurological:A&O x 3 moves all ext.    Skin: no rash on exposed skin    Lymph Nodes: no lymphadenopathy.              Lab  CBC Full  -  ( 01 Mar 2023 05:50 )  WBC Count : 9.91 K/uL  RBC Count : 3.92 M/uL  Hemoglobin : 11.5 g/dL  Hematocrit : 38.1 %  Platelet Count - Automated : 271 K/uL  Mean Cell Volume : 97.2 fL  Mean Cell Hemoglobin : 29.3 pg  Mean Cell Hemoglobin Concentration : 30.2 gm/dL  Auto Neutrophil # : x  Auto Lymphocyte # : x  Auto Monocyte # : x  Auto Eosinophil # : x  Auto Basophil # : x  Auto Neutrophil % : x  Auto Lymphocyte % : x  Auto Monocyte % : x  Auto Eosinophil % : x  Auto Basophil % : x    03-01    138  |  105  |  36<H>  ----------------------------<  83  4.3   |  21<L>  |  1.40<H>    Ca    9.5      01 Mar 2023 05:50  Phos  3.1     03-01  Mg     2.10     03-01      PT/INR - ( 01 Mar 2023 05:50 )   PT: 14.0 sec;   INR: 1.20 ratio         PTT - ( 01 Mar 2023 05:50 )  PTT:32.9 sec    Rad:    Assessment/Plan   PLACIDO GOLDMAN  MRN-[id number]    Patient is a 62y old  Male who presents with a chief complaint of SOB, likely CHF exacerbation (01 Mar 2023 07:42)      Review of System    Comfortable    General:	Denies fatigue, fevers, chills, sweats, decreased appetite.    Skin/Breast: denies pruritis, rash  	  Ophthalmologic: no change in vision or blurring  	  HEENT	Denies dry mouth, oral sores, dysphagia,  change in hearing.    Respiratory and Thorax: no cough, sob, wheeze, hemoptysis  	  Cardiovascular:	no cp , palp, orthopnea    Gastrointestinal:	no n/v/d constipation    Genitourinary:	no dysuria of frequency, no hematuria, no flank pain    Musculoskeletal:	no bone or joint pain. no muscle aches.     Neurological:	no change in sensory or motor function. no headache. no weakness.     Psychiatric:	no depression, no anxiety, insomnia.     Hematology/Lymphatics:	no bleeding or bruising    Current Meds  MEDICATIONS  (STANDING):  budesonide 160 MICROgram(s)/formoterol 4.5 MICROgram(s) Inhaler 2 Puff(s) Inhalation two times a day  buMETAnide 3 milliGRAM(s) Oral daily  enoxaparin Injectable 40 milliGRAM(s) SubCutaneous every 24 hours  metoprolol succinate ER 50 milliGRAM(s) Oral daily  sacubitril 49 mG/valsartan 51 mG 1 Tablet(s) Oral two times a day  spironolactone 25 milliGRAM(s) Oral daily    MEDICATIONS  (PRN):  acetaminophen     Tablet .. 650 milliGRAM(s) Oral every 6 hours PRN Temp greater or equal to 38C (100.4F), Mild Pain (1 - 3)  albuterol    90 MICROgram(s) HFA Inhaler 1 Puff(s) Inhalation three times a day PRN Shortness of Breath and/or Wheezing  aluminum hydroxide/magnesium hydroxide/simethicone Suspension 30 milliLiter(s) Oral every 4 hours PRN Dyspepsia  melatonin 3 milliGRAM(s) Oral at bedtime PRN Insomnia  ondansetron Injectable 4 milliGRAM(s) IV Push every 8 hours PRN Nausea and/or Vomiting      Vitals  Vital Signs Last 24 Hrs  T(C): 36.8 (01 Mar 2023 05:20), Max: 36.8 (28 Feb 2023 17:26)  T(F): 98.2 (01 Mar 2023 05:20), Max: 98.3 (28 Feb 2023 17:26)  HR: 106 (01 Mar 2023 06:16) (104 - 112)  BP: 103/67 (01 Mar 2023 06:16) (90/50 - 119/97)  BP(mean): --  RR: 18 (01 Mar 2023 05:20) (18 - 18)  SpO2: 96% (01 Mar 2023 05:20) (96% - 100%)    Parameters below as of 01 Mar 2023 05:20  Patient On (Oxygen Delivery Method): nasal cannula  O2 Flow (L/min): 2      Physical Exam  PHYSICAL EXAM:      Constitutional: NAD    Eyes: PERRLA EOMI, anicteric sclera    Heent :No oral sores, no pharyngeal injection. moist mucosa.    Neck: supple, no jvd, no LAD    Respiratory: CTA b/l     Cardiovascular: s1s2, no m/g/r    Gastrointestinal: soft, nt, nd, + BS    Extremities: no c/c/e    Neurological:A&O x 3 moves all ext.    Skin: no rash on exposed skin    Lymph Nodes: no lymphadenopathy.              Lab  CBC Full  -  ( 01 Mar 2023 05:50 )  WBC Count : 9.91 K/uL  RBC Count : 3.92 M/uL  Hemoglobin : 11.5 g/dL  Hematocrit : 38.1 %  Platelet Count - Automated : 271 K/uL  Mean Cell Volume : 97.2 fL  Mean Cell Hemoglobin : 29.3 pg  Mean Cell Hemoglobin Concentration : 30.2 gm/dL  Auto Neutrophil # : x  Auto Lymphocyte # : x  Auto Monocyte # : x  Auto Eosinophil # : x  Auto Basophil # : x  Auto Neutrophil % : x  Auto Lymphocyte % : x  Auto Monocyte % : x  Auto Eosinophil % : x  Auto Basophil % : x    03-01    138  |  105  |  36<H>  ----------------------------<  83  4.3   |  21<L>  |  1.40<H>    Ca    9.5      01 Mar 2023 05:50  Phos  3.1     03-01  Mg     2.10     03-01      PT/INR - ( 01 Mar 2023 05:50 )   PT: 14.0 sec;   INR: 1.20 ratio         PTT - ( 01 Mar 2023 05:50 )  PTT:32.9 sec    Rad:    Assessment/Plan

## 2023-03-01 NOTE — DIETITIAN INITIAL EVALUATION ADULT - PERTINENT MEDS FT
MEDICATIONS  (STANDING):  budesonide 160 MICROgram(s)/formoterol 4.5 MICROgram(s) Inhaler 2 Puff(s) Inhalation two times a day  buMETAnide 3 milliGRAM(s) Oral daily  enoxaparin Injectable 40 milliGRAM(s) SubCutaneous every 24 hours  metoprolol succinate ER 50 milliGRAM(s) Oral daily  sacubitril 49 mG/valsartan 51 mG 1 Tablet(s) Oral two times a day  spironolactone 25 milliGRAM(s) Oral daily    MEDICATIONS  (PRN):  acetaminophen     Tablet .. 650 milliGRAM(s) Oral every 6 hours PRN Temp greater or equal to 38C (100.4F), Mild Pain (1 - 3)  albuterol    90 MICROgram(s) HFA Inhaler 1 Puff(s) Inhalation three times a day PRN Shortness of Breath and/or Wheezing  aluminum hydroxide/magnesium hydroxide/simethicone Suspension 30 milliLiter(s) Oral every 4 hours PRN Dyspepsia  melatonin 3 milliGRAM(s) Oral at bedtime PRN Insomnia  ondansetron Injectable 4 milliGRAM(s) IV Push every 8 hours PRN Nausea and/or Vomiting

## 2023-03-01 NOTE — CHART NOTE - NSCHARTNOTEFT_GEN_A_CORE
PRE-INTERVENTIONAL RADIOLOGY PROCEDURE NOTE      Patient Age:     Patient Gender: M    Procedure: Bone Marrow Biopsy     Diagnosis/Indication: Monoclonal protein, anemia, CKD.    Interventional Radiology Attending Physician: Dr. Ren     Ordering Attending Physician: Dr. Vu    Pertinent Medical History:    Pertinent labs:                      11.5   9.91  )-----------( 271      ( 01 Mar 2023 05:50 )             38.1     03-01    138  |  105  |  36<H>  ----------------------------<  83  4.3   |  21<L>  |  1.40<H>    Ca    9.5      01 Mar 2023 05:50  Phos  3.1     03-01  Mg     2.10     03-01  PT/INR - ( 01 Mar 2023 05:50 )   PT: 14.0 sec;   INR: 1.20 ratio    PTT - ( 01 Mar 2023 05:50 )  PTT:32.9 sec    Patient Aware ? Yes PRE-INTERVENTIONAL RADIOLOGY PROCEDURE NOTE      Patient Age: 62    Patient Gender: M    Procedure: Bone Marrow Biopsy     Diagnosis/Indication: Monoclonal protein, anemia, CKD.    Interventional Radiology Attending Physician: Dr. Ren     Ordering Attending Physician: Dr. Vu    Pertinent Medical History:    Pertinent labs:                      11.5   9.91  )-----------( 271      ( 01 Mar 2023 05:50 )             38.1     03-01    138  |  105  |  36<H>  ----------------------------<  83  4.3   |  21<L>  |  1.40<H>    Ca    9.5      01 Mar 2023 05:50  Phos  3.1     03-01  Mg     2.10     03-01  PT/INR - ( 01 Mar 2023 05:50 )   PT: 14.0 sec;   INR: 1.20 ratio    PTT - ( 01 Mar 2023 05:50 )  PTT:32.9 sec    Patient Aware ? Yes

## 2023-03-01 NOTE — PROGRESS NOTE ADULT - PROBLEM SELECTOR PLAN 1
appears  not volume overloaded on exam, low normal JVP, but still has AVALOS.   The cough is also persistent, and he does not believe it started after Entresto. He says he had it in the same severity prior to Entresto as well.   Scr 1.4   I&O 2500, ? no daily standing weights   Transitioned to PO Bumex 3mg on Sunday, 2/26  Continue Toprol 50mg QD (hold SBP<90)  Continue Entresto 49/51 mg BID (hold SBP<90)  Continue spironolactone 25mg daily   Awaiting BM biopsy today   Strict I/O  Daily standing weights   Monitor lytes replete to keep k 4.0-5.0 and Mg>2.0  EP/ICD interrogation completed- no events noted    May need cardiac biopsy at [facility] -to r/o sarcoid and amyloid.

## 2023-03-01 NOTE — PROGRESS NOTE ADULT - ASSESSMENT
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 Patient sent to ED by Dr. Cuellar for ADHF.       Currently appears to have normal JVP, but still has AVALOS.

## 2023-03-01 NOTE — PRE PROCEDURE NOTE - PRE PROCEDURE EVALUATION
Interventional Radiology Pre-Procedure Note    HPI:  Pt is a 63 yo man with PMH of HTN, RA, RCC s/p R partial nephrectomy, CKD, HFrEF/NICM EF 25-30%, ICD, plasma cell dyscrasia, h/o H-pylori, treated, seen at HF clinic and sent to ED for worsening heart failure.   pt admits to progressively increasing SOB, AVALOS with cough, worsening at night over past 6 months with leg edema,  however increasingly worse over past month.   pt on bumex and entresto.   Pt using O2 at home as needed.   Pt denies any chest pain, fevers, recent surgeries, dizziness, bloody stools.  (23 Feb 2023 10:31)      PAST MEDICAL & SURGICAL HISTORY:  Hypertension      Rheumatoid arthritis      Neoplasm of uncertain behavior of kidney, right      History of cardiac cath  jan 2022, at [facility] no stent          Social History:     FAMILY HISTORY:  FH: diabetes mellitus (Mother)        Allergies: No Known Allergies      Current Medications: acetaminophen     Tablet .. 650 milliGRAM(s) Oral every 6 hours PRN  albuterol    90 MICROgram(s) HFA Inhaler 1 Puff(s) Inhalation three times a day PRN  aluminum hydroxide/magnesium hydroxide/simethicone Suspension 30 milliLiter(s) Oral every 4 hours PRN  budesonide 160 MICROgram(s)/formoterol 4.5 MICROgram(s) Inhaler 2 Puff(s) Inhalation two times a day  buMETAnide 3 milliGRAM(s) Oral daily  enoxaparin Injectable 40 milliGRAM(s) SubCutaneous every 24 hours  melatonin 3 milliGRAM(s) Oral at bedtime PRN  metoprolol succinate ER 50 milliGRAM(s) Oral daily  ondansetron Injectable 4 milliGRAM(s) IV Push every 8 hours PRN  sacubitril 49 mG/valsartan 51 mG 1 Tablet(s) Oral two times a day  spironolactone 25 milliGRAM(s) Oral daily      Labs:                         11.5   9.91  )-----------( 271      ( 01 Mar 2023 05:50 )             38.1       03-01    138  |  105  |  36<H>  ----------------------------<  83  4.3   |  21<L>  |  1.40<H>    Ca    9.5      01 Mar 2023 05:50  Phos  3.1     03-01  Mg     2.10     03-01        HCG:       Assessment/Plan:   This is a 62y  Male  presents with plasma cell dyscrasia  Plan is for bone marrow biopsy  Procedure/ risks/ benefits/ goals/ alternatives were explained. All questions answered. Informed content obtained from patient. Consent placed in chart.

## 2023-03-01 NOTE — PROGRESS NOTE ADULT - SUBJECTIVE AND OBJECTIVE BOX
Cardiovascular Disease Progress Note    Overnight events: No acute events overnight.  no new cardiac sx  Otherwise review of systems negative    Objective Findings:  T(C): 36.8 (03-01-23 @ 05:20), Max: 36.8 (02-28-23 @ 17:26)  HR: 106 (03-01-23 @ 06:16) (104 - 112)  BP: 103/67 (03-01-23 @ 06:16) (90/50 - 119/97)  RR: 18 (03-01-23 @ 05:20) (18 - 18)  SpO2: 96% (03-01-23 @ 05:20) (96% - 100%)  Wt(kg): --  Daily     Daily       Physical Exam:  Gen: NAD  HEENT: EOMI  CV: RRR, normal S1 + S2, no m/r/g  Lungs: CTAB  Abd: soft, non-tender  Ext: No edema    Telemetry:    Laboratory Data:                        11.5   9.91  )-----------( 271      ( 01 Mar 2023 05:50 )             38.1     02-28    136  |  102  |  39<H>  ----------------------------<  90  4.2   |  24  |  1.42<H>    Ca    9.5      28 Feb 2023 06:03      PT/INR - ( 01 Mar 2023 05:50 )   PT: 14.0 sec;   INR: 1.20 ratio         PTT - ( 01 Mar 2023 05:50 )  PTT:32.9 sec          Inpatient Medications:  MEDICATIONS  (STANDING):  budesonide 160 MICROgram(s)/formoterol 4.5 MICROgram(s) Inhaler 2 Puff(s) Inhalation two times a day  buMETAnide 3 milliGRAM(s) Oral daily  enoxaparin Injectable 40 milliGRAM(s) SubCutaneous every 24 hours  metoprolol succinate ER 50 milliGRAM(s) Oral daily  sacubitril 49 mG/valsartan 51 mG 1 Tablet(s) Oral two times a day  spironolactone 25 milliGRAM(s) Oral daily      Assessment:  62yoM with PMH of HTN, RA, RCC s/p R partial nephrectomy, CKD, HFrEF/NICM EF 25-30%, ICD, plasma cell dyscrasia, seen at HF clinic today, sent to ED for worsening heart failure. pt admits to progressively increasing SOB, AVALOS with cough, worsening at night over past 6 months, however increasingly worse over past month    Recs:  diuresis per CHF  cont GDMT, lvef improved  heme f/u for plasma cell dyscrasia  possible BM biopsy today  eventual cardiac bx at [facility] per CHF  tele monitoring   will follow          Over 25 minutes spent on total encounter; more than 50% of the visit was spent counseling and/or coordinating care by the attending physician.      Juarez Carver MD   Cardiovascular Disease  [phone]

## 2023-03-01 NOTE — DIETITIAN INITIAL EVALUATION ADULT - PERTINENT LABORATORY DATA
03-01    138  |  105  |  36<H>  ----------------------------<  83  4.3   |  21<L>  |  1.40<H>    Ca    9.5      01 Mar 2023 05:50  Phos  3.1     03-01  Mg     2.10     03-01

## 2023-03-01 NOTE — PROGRESS NOTE ADULT - ASSESSMENT
Monoclonal protein, anemia, CKD. For a bone marrow biopsy with IR, but low suspicion for MM/amyloid. Hgb adequate and can monitor for now.     BMBx planned for 3/1, with IR Monoclonal protein, anemia, CKD. For a bone marrow biopsy with IR, but low suspicion for MM/amyloid. Hgb adequate and can monitor for now.     BMBx planned for today, with IR

## 2023-03-01 NOTE — DIETITIAN INITIAL EVALUATION ADULT - [facility] FNS DIET ORDER
Diet, NPO after Midnight:      NPO Start Date: 28-Feb-2023,   NPO Start Time: 23:59 (02-28-23 @ 12:40)  Diet, Regular:   DASH/TLC {Sodium & Cholesterol Restricted} (DASH) (02-23-23 @ 10:34)

## 2023-03-01 NOTE — PROGRESS NOTE ADULT - ASSESSMENT
_________________________________________________________________________________________  ========>>  M E D I C A L   A T T E N D I N G    F O L L O W  U P  N O T E  <<=========  -----------------------------------------------------------------------------------------------------    - Patient evaluated by me, chart reviewed.   - In summary,  PLACIDO GOLDMAN is a 62y year old man admitted with CHF exacerbation  - Patient today overall doing ok, comfortable, eating OK.     Patient overall feeling okay, no shortness of breath, no chest pain. encouraged out of bed to chair with assistance as needed.     ==================>> REVIEW OF SYSTEM <<=================    GEN: no fever, no chills, no pain  RESP: no SOB at rest , no cough, no sputum  CVS: no chest pain, no palpitations, no edema  GI: no abdominal pain, no nausea,  : no dysuria, no frequency  Neuro: no headache, no dizziness  Derm : no itching, no rash    ==================>> PHYSICAL EXAM <<=================    GEN: A&O X 3 , NAD , comfortable, pleasant, calm in bed.. encouraged out of bed to chair with assistance as needed.   HEENT: NCAT, PERRL, MMM, hearing intact, on and off minimal O2 , saturating well   Neck: supple , no JVD appreciated  CVS: S1S2 , regular , Tachycardic.  Sinus tachycardia on telemetry  PULM: CTA B/L,  no W/R/R appreciated  ABD.: soft. non tender, non distended,  bowel sounds present  Extrem: intact pulses , no signif edema   PSYCH : normal mood,  not anxious                             ( Note written / Date of service 03-01-23 )    ==================>> MEDICATIONS <<====================    budesonide 160 MICROgram(s)/formoterol 4.5 MICROgram(s) Inhaler 2 Puff(s) Inhalation two times a day  buMETAnide 3 milliGRAM(s) Oral daily  enoxaparin Injectable 40 milliGRAM(s) SubCutaneous every 24 hours  metoprolol succinate ER 50 milliGRAM(s) Oral daily  sacubitril 49 mG/valsartan 51 mG 1 Tablet(s) Oral two times a day  spironolactone 25 milliGRAM(s) Oral daily    MEDICATIONS  (PRN):  acetaminophen     Tablet .. 650 milliGRAM(s) Oral every 6 hours PRN Temp greater or equal to 38C (100.4F), Mild Pain (1 - 3)  albuterol    90 MICROgram(s) HFA Inhaler 1 Puff(s) Inhalation three times a day PRN Shortness of Breath and/or Wheezing  aluminum hydroxide/magnesium hydroxide/simethicone Suspension 30 milliLiter(s) Oral every 4 hours PRN Dyspepsia  melatonin 3 milliGRAM(s) Oral at bedtime PRN Insomnia  ondansetron Injectable 4 milliGRAM(s) IV Push every 8 hours PRN Nausea and/or Vomiting    ___________  Active diet:  Diet, NPO after Midnight:      NPO Start Date: 28-Feb-2023,   NPO Start Time: 23:59  Diet, Regular:   DASH/TLC Sodium & Cholesterol Restricted (DASH)  ___________________    ==================>> VITAL SIGNS <<==================    Vital Signs Last 24 HrsT(C): 36.8 (03-01-23 @ 05:20)  T(F): 98.2 (03-01-23 @ 05:20), Max: 98.3 (02-28-23 @ 17:26)  HR: 106 (03-01-23 @ 06:16) (104 - 112)  BP: 103/67 (03-01-23 @ 06:16)  RR: 18 (03-01-23 @ 05:20) (18 - 18)  SpO2: 96% (03-01-23 @ 05:20) (96% - 100%)       ==================>> LAB AND IMAGING <<==================                        11.5   9.91  )-----------( 271      ( 01 Mar 2023 05:50 )             38.1        03-01    138  |  105  |  36<H>  ----------------------------<  83  4.3   |  21<L>  |  1.40<H>    Ca    9.5      01 Mar 2023 05:50  Phos  3.1     03-01  Mg     2.10     03-01      WBC count:   9.91 <<== ,  10.99 <<== ,  8.75 <<== ,  11.19 <<== ,  9.06 <<==   Hemoglobin:   11.5 <<==,  11.8 <<==,  11.8 <<==,  12.5 <<==,  12.7 <<==  platelets:  271 <==, 301 <==, 296 <==, 317 <==, 304 <==, 289 <==    Creatinine:  1.40  <<==, 1.42  <<==, 1.41  <<==, 1.64  <<==, 1.63  <<==, 1.54  <<==  Sodium:   138  <==, 136  <==, 135  <==, 139  <==, 141  <==, 141  <==       AST:          23 <== , 22 <== , 23 <== , 24 <== , 33 <==      ALT:        18  <== , 19  <== , 19  <== , 16  <== , 24  <==      AP:        134  <=, 144  <=, 153  <=, 166  <=, 178  <=     Bili:        0.5  <=, 0.4  <=, 0.5  <=, 0.6  <=, 0.5  <=    __________________________________________________________________________________  ===============>>  A S S E S S M E N T   A N D   P L A N <<===============  ------------------------------------------------------------------------------------------    · Assessment	  Pt is a 61 yo man with PMH of HTN, RA, RCC s/p R partial nephrectomy, CKD, HFrEF/NICM EF 25-30%, ICD, plasma cell dyscrasia, h/o H-pylori, treated, seen at HF clinic and sent to ED for worsening heart failure.   pt admits to progressively increasing SOB, AVALOS with cough, worsening at night over past 6 months with leg edema,  however increasingly worse over past month.   pt on bumex and entresto.   Pt using O2 at home as needed.   Pt denies any chest pain, fevers, recent surgeries, dizziness, bloody stools.       Problem/Plan - 1:  ·  Problem: Acute on chronic systolic heart failure.   Cardiology and heart failure following management appreciated.    diuretics s needed as per cardiology  monitor Ins and outs, weight, labs, and vitals closely  O2 as needed   medical optimization    BP better but still having tachycardia  patient post AICD in December  Continue Current medications otherwise and monitor.  supportive care.    Problem/Plan - 2:  ·  Problem: Acute on chronic respiratory failure with hypoxia.   ·  Plan: as above  diuresis  wean off O2 as able  patient has PRN O2 at home.    Problem/Plan - 3:  ·  Problem: history of  Hypertension.   Patient currently with low blood pressures.    Appreciated heart failure recommendations.    judicial adjustment of medications as tolerated.  monitor and adjust as needed    Problem/Plan - 4:  ·  Problem: Stage 3a chronic kidney disease.   ·  Plan: currently appears stable  monitor  Avoid nephrotoxic medications.    Problem/Plan - 5:  ·  Problem: history of Plasma cell dyscrasia.   Oncology consulted and appreciated.    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  Recommending a bone marrow biopsy.  Interventional radiology following for BX>> planned for today   ?? may need cardiac bx as well .. ? this admission or as outpatient ?     -GI/DVT Prophylaxis per protocol.    Discharge planning pending cardiology and CHF plan of care.    --------------------------------------------  Case discussed With patient and ACP  Education given on findings and plan of care  ___________________________  H. JEANETH Vu.  Pager: [phone]

## 2023-03-01 NOTE — DIETITIAN INITIAL EVALUATION ADULT - OTHER INFO
61 yo man with PMH of HTN, RA, RCC s/p R partial nephrectomy, CKD, HFrEF/NICM EF 25-30%, ICD, plasma cell dyscrasia, h/o H-pylori, treated, seen at HF clinic and sent to ED for worsening heart failure.     Pt NPO at present for Bone marrow biopsy, when feasible resume Regular, DASH/TLC diet. Noted wt. loss of 18#/ 12% in past 1 year. UBW- 145# (4/29/22) per [facility], Current wt- 126.7# (2/25/23). Noted skin intact, no edema per nursing flow sheet. Rec supplement Ensure Plus HP x3/day to promote wt gain.
40

## 2023-03-01 NOTE — PROGRESS NOTE ADULT - SUBJECTIVE AND OBJECTIVE BOX
Matt Lott NP  CCU Service  Cardiology   Spect: [id number] ([facility])     PATIENT: PLACIDO GOLDMAN, MRN: [id number]    CHIEF COMPLAINT: Patient is a 62y old  Male who presents with a chief complaint of SOB, likely CHF exacerbation (01 Mar 2023 08:06)      INTERVAL HISTORY/OVERNIGHT EVENTS: No overnight events. Complaining on AVALOS.  Denies abdominal pain, chest pain, cough. Has been ambulating with assistance. Oriented to person, place, and time. Breathing comfortably on O2 2l NC.    REVIEW OF SYSTEMS:    Constitutional:     [ ] negative [ ] fevers [ ] chills [ ] weight loss [ ] weight gain  HEENT:                  [ ] negative [ ] dry eyes [ ] eye irritation [ ] postnasal drip [ ] nasal congestion  CV:                         [ ] negative  [ ] chest pain [ ] orthopnea [ ] palpitations [ ] murmur  Resp:                     [ ] negative [ ] cough [ ] shortness of breath [ ] dyspnea [ ] wheezing [ ] sputum [ ] hemoptysis  GI:                          [ ] negative [ ] nausea [ ] vomiting [ ] diarrhea [ ] constipation [ ] abd pain [ ] dysphagia   :                        [ ] negative [ ] dysuria [ ] nocturia [ ] hematuria [ ] increased urinary frequency  Musculoskeletal: [ ] negative [ ] back pain [ ] myalgias [ ] arthralgias [ ] fracture  Skin:                       [ ] negative [ ] rash [ ] itch  Neurological:        [ ] negative [ ] headache [ ] dizziness [ ] syncope [ ] weakness [ ] numbness  Psychiatric:           [ ] negative [ ] anxiety [ ] depression  Endocrine:            [ ] negative [ ] diabetes [ ] thyroid problem  Heme/Lymph:      [ ] negative [ ] anemia [ ] bleeding problem  Allergic/Immune: [ ] negative [ ] itchy eyes [ ] nasal discharge [ ] hives [ ] angioedema    [x ] All other systems negative  [ ] Unable to assess ROS because ________.    MEDICATIONS:  MEDICATIONS  (STANDING):  budesonide 160 MICROgram(s)/formoterol 4.5 MICROgram(s) Inhaler 2 Puff(s) Inhalation two times a day  buMETAnide 3 milliGRAM(s) Oral daily  enoxaparin Injectable 40 milliGRAM(s) SubCutaneous every 24 hours  metoprolol succinate ER 50 milliGRAM(s) Oral daily  sacubitril 49 mG/valsartan 51 mG 1 Tablet(s) Oral two times a day  spironolactone 25 milliGRAM(s) Oral daily    MEDICATIONS  (PRN):  acetaminophen     Tablet .. 650 milliGRAM(s) Oral every 6 hours PRN Temp greater or equal to 38C (100.4F), Mild Pain (1 - 3)  albuterol    90 MICROgram(s) HFA Inhaler 1 Puff(s) Inhalation three times a day PRN Shortness of Breath and/or Wheezing  aluminum hydroxide/magnesium hydroxide/simethicone Suspension 30 milliLiter(s) Oral every 4 hours PRN Dyspepsia  melatonin 3 milliGRAM(s) Oral at bedtime PRN Insomnia  ondansetron Injectable 4 milliGRAM(s) IV Push every 8 hours PRN Nausea and/or Vomiting      ALLERGIES: Allergies    No Known Allergies    Intolerances        OBJECTIVE:  ICU Vital Signs Last 24 Hrs  T(C): 36.8 (01 Mar 2023 05:20), Max: 36.8 (28 Feb 2023 17:26)  T(F): 98.2 (01 Mar 2023 05:20), Max: 98.3 (28 Feb 2023 17:26)  HR: 106 (01 Mar 2023 06:16) (104 - 112)  BP: 103/67 (01 Mar 2023 06:16) (91/56 - 119/97)  RR: 18 (01 Mar 2023 05:20) (18 - 18)  SpO2: 96% (01 Mar 2023 05:20) (96% - 100%)    O2 Parameters below as of 01 Mar 2023 05:20  Patient On (Oxygen Delivery Method): nasal cannula  O2 Flow (L/min): 2    CAPILLARY BLOOD GLUCOSE        I&O's Summary    28 Feb 2023 07:01  -  01 Mar 2023 07:00  --------------------------------------------------------  IN: 2500 mL / OUT: 0 mL / NET: 2500 mL      Daily     Daily     PHYSICAL EXAMINATION:  General: Comfortable, no acute distress, cooperative with exam.  HEENT: Moist mucous membranes.  Respiratory: CTAB, normal respiratory effort, no coughing, wheezes, crackles, or rales.  CV: RRR, S1S2, no murmurs, rubs or gallops. No JVD. Distal pulses intact.  Abdominal: Soft, nontender, nondistended, no rebound or guarding, normal bowel sounds.  Neurology: AOx3, no focal neuro defects, MCINTOSH x 4.  Extremities: No pitting edema, + Peripheral pulses.  Incisions:   Tubes:    LABS:                          11.5   9.91  )-----------( 271      ( 01 Mar 2023 05:50 )             38.1     03-01    138  |  105  |  36<H>  ----------------------------<  83  4.3   |  21<L>  |  1.40<H>    Ca    9.5      01 Mar 2023 05:50  Phos  3.1     03-01  Mg     2.10     03-01        PT/INR - ( 01 Mar 2023 05:50 )   PT: 14.0 sec;   INR: 1.20 ratio         PTT - ( 01 Mar 2023 05:50 )  PTT:32.9 sec            TELEMETRY: Sinus tachycardia, HR 100s     EKG:     IMAGING:     Transthoracic Echocardiogram (02.24.23 @ 16:42)  DIMENSIONS:  Dimensions:     Normal Values:  LA:     2.6 cm    2.0 - 4.0 cm  Ao:     3.3 cm    2.0 - 3.8 cm  SEPTUM: 1.0 cm    0.6 - 1.2 cm  PWT:    1.0 cm    0.6 - 1.1 cm  LVIDd:  3.7 cm    3.0 - 5.6 cm  LVIDs:  2.5 cm    1.8 - 4.0 cm  Derived Variables:  LVMI: 69 g/m2  RWT: 0.54  Fractional short: 32 %  Ejection Fraction (Modified Espino Rule): 62 %  ------------------------------------------------------------------------  OBSERVATIONS:  Mitral Valve: Mitral annular calcification, otherwise  normal mitral valve. Mild mitral regurgitation.  Aortic Root: Normal aortic root.  Aortic Valve: Calcified trileaflet aorticvalve with normal  opening.  Left Atrium: Normal left atrium.  Left Ventricle: Endocardium not well visualized; grossly  normal left ventricular systolic function. Increased  relative wall thickness with normal left ventricular mass  index, consistent with concentric left ventricular  remodeling.  Right Heart: Severe right atrial enlargement. Right  ventricular enlargement with decreased right ventricular  systolic function. A device wire is noted in the right  heart.  Systolic flattening of the interventricular septum,  consistent with RV pressure overload. Normal tricuspid  valve. Mild-moderate tricuspid regurgitation. Normal  pulmonic valve.  Mild pulmonic regurgitation.  Pericardium/PleuraNormal pericardium with no pericardial  effusion.  Hemodynamic: Estimated right ventricular systolic pressure  equals 60 mm Hg, assuming right atrial pressure equals 10  mm Hg, consistent with moderate pulmonary hypertension.  ------------------------------------------------------------------------  CONCLUSIONS:  1. Mitral annular calcification, otherwise normal mitral  valve. Mild mitral regurgitation.  2. Increased relative wall thickness with normal left  ventricular mass index, consistent with concentric left  ventricular remodeling.  3. Endocardium not well visualized; grossly normal left  ventricular systolic function.  4. Severe right atrial enlargement.  5. Right ventricular enlargement with decreased right  ventricular systolic function. A device wire is noted in  the right heart.  Systolic flattening of the  interventricular septum, consistent with RV pressure  overload.  6. Estimated right ventricular systolic pressure equals 60  mm Hg, assuming right atrial pressure equals 10 mm Hg,  consistent with moderate pulmonary hypertension.  7. Global longitudinal strain (GLS) analysis was performed  with the GE Vivid E95 (/minute; BP 99/65); global  longitudinal strain equals -12.3%, which is reduced.

## 2023-03-01 NOTE — DIETITIAN INITIAL EVALUATION ADULT - ORAL INTAKE PTA/DIET HISTORY
Pt reports poor appetite and had wt. loss in past one year due to illness. UBW- 145# (4/29/22) per [facility] JOMAR.

## 2023-03-02 ENCOUNTER — TRANSCRIPTION ENCOUNTER (OUTPATIENT)
Age: 63
End: 2023-03-02

## 2023-03-02 LAB
ANION GAP SERPL CALC-SCNC: 16 MMOL/L — HIGH (ref 7–14)
BUN SERPL-MCNC: 43 MG/DL — HIGH (ref 7–23)
CALCIUM SERPL-MCNC: 9.4 MG/DL — SIGNIFICANT CHANGE UP (ref 8.4–10.5)
CHLORIDE SERPL-SCNC: 101 MMOL/L — SIGNIFICANT CHANGE UP (ref 98–107)
CO2 SERPL-SCNC: 18 MMOL/L — LOW (ref 22–31)
CREAT SERPL-MCNC: 1.51 MG/DL — HIGH (ref 0.5–1.3)
EGFR: 52 ML/MIN/1.73M2 — LOW
GLUCOSE SERPL-MCNC: 109 MG/DL — HIGH (ref 70–99)
HCT VFR BLD CALC: 38.9 % — LOW (ref 39–50)
HGB BLD-MCNC: 11.5 G/DL — LOW (ref 13–17)
MAGNESIUM SERPL-MCNC: 2.1 MG/DL — SIGNIFICANT CHANGE UP (ref 1.6–2.6)
MCHC RBC-ENTMCNC: 28.8 PG — SIGNIFICANT CHANGE UP (ref 27–34)
MCHC RBC-ENTMCNC: 29.6 GM/DL — LOW (ref 32–36)
MCV RBC AUTO: 97.3 FL — SIGNIFICANT CHANGE UP (ref 80–100)
NRBC # BLD: 0 /100 WBCS — SIGNIFICANT CHANGE UP (ref 0–0)
NRBC # FLD: 0 K/UL — SIGNIFICANT CHANGE UP (ref 0–0)
PHOSPHATE SERPL-MCNC: 3.6 MG/DL — SIGNIFICANT CHANGE UP (ref 2.5–4.5)
PLATELET # BLD AUTO: 236 K/UL — SIGNIFICANT CHANGE UP (ref 150–400)
POTASSIUM SERPL-MCNC: 4.2 MMOL/L — SIGNIFICANT CHANGE UP (ref 3.5–5.3)
POTASSIUM SERPL-SCNC: 4.2 MMOL/L — SIGNIFICANT CHANGE UP (ref 3.5–5.3)
RBC # BLD: 4 M/UL — LOW (ref 4.2–5.8)
RBC # FLD: 14.6 % — HIGH (ref 10.3–14.5)
SODIUM SERPL-SCNC: 135 MMOL/L — SIGNIFICANT CHANGE UP (ref 135–145)
WBC # BLD: 10.83 K/UL — HIGH (ref 3.8–10.5)
WBC # FLD AUTO: 10.83 K/UL — HIGH (ref 3.8–10.5)

## 2023-03-02 PROCEDURE — 99233 SBSQ HOSP IP/OBS HIGH 50: CPT

## 2023-03-02 RX ORDER — ONDANSETRON 8 MG/1
4 TABLET, FILM COATED ORAL
Qty: 0 | Refills: 0 | DISCHARGE
Start: 2023-03-02

## 2023-03-02 RX ORDER — ALBUTEROL 90 UG/1
2 AEROSOL, METERED ORAL
Qty: 0 | Refills: 0 | DISCHARGE

## 2023-03-02 RX ORDER — ALBUTEROL 90 UG/1
1 AEROSOL, METERED ORAL
Qty: 0 | Refills: 0 | DISCHARGE
Start: 2023-03-02

## 2023-03-02 RX ORDER — BUMETANIDE 0.25 MG/ML
3 INJECTION INTRAMUSCULAR; INTRAVENOUS
Qty: 0 | Refills: 0 | DISCHARGE
Start: 2023-03-02

## 2023-03-02 RX ORDER — BUMETANIDE 0.25 MG/ML
3 INJECTION INTRAMUSCULAR; INTRAVENOUS DAILY
Refills: 0 | Status: DISCONTINUED | OUTPATIENT
Start: 2023-03-02 | End: 2023-03-03

## 2023-03-02 RX ORDER — METOPROLOL TARTRATE 50 MG
1 TABLET ORAL
Qty: 0 | Refills: 0 | DISCHARGE

## 2023-03-02 RX ORDER — BUMETANIDE 0.25 MG/ML
1 INJECTION INTRAMUSCULAR; INTRAVENOUS
Qty: 0 | Refills: 0 | DISCHARGE

## 2023-03-02 RX ORDER — LANOLIN ALCOHOL/MO/W.PET/CERES
1 CREAM (GRAM) TOPICAL
Qty: 0 | Refills: 0 | DISCHARGE
Start: 2023-03-02

## 2023-03-02 RX ORDER — HYDRALAZINE HCL 50 MG
1 TABLET ORAL
Qty: 0 | Refills: 0 | DISCHARGE

## 2023-03-02 RX ORDER — ACETAMINOPHEN 500 MG
2 TABLET ORAL
Qty: 0 | Refills: 0 | DISCHARGE
Start: 2023-03-02

## 2023-03-02 RX ORDER — SACUBITRIL AND VALSARTAN 24; 26 MG/1; MG/1
1 TABLET, FILM COATED ORAL
Qty: 0 | Refills: 0 | DISCHARGE

## 2023-03-02 RX ORDER — SACUBITRIL AND VALSARTAN 24; 26 MG/1; MG/1
1 TABLET, FILM COATED ORAL
Qty: 0 | Refills: 0 | DISCHARGE
Start: 2023-03-02

## 2023-03-02 RX ORDER — METOPROLOL TARTRATE 50 MG
1 TABLET ORAL
Qty: 0 | Refills: 0 | DISCHARGE
Start: 2023-03-02

## 2023-03-02 RX ADMIN — ENOXAPARIN SODIUM 40 MILLIGRAM(S): 100 INJECTION SUBCUTANEOUS at 14:10

## 2023-03-02 RX ADMIN — BUDESONIDE AND FORMOTEROL FUMARATE DIHYDRATE 2 PUFF(S): 160; 4.5 AEROSOL RESPIRATORY (INHALATION) at 21:17

## 2023-03-02 RX ADMIN — SACUBITRIL AND VALSARTAN 1 TABLET(S): 24; 26 TABLET, FILM COATED ORAL at 17:11

## 2023-03-02 RX ADMIN — SACUBITRIL AND VALSARTAN 1 TABLET(S): 24; 26 TABLET, FILM COATED ORAL at 05:59

## 2023-03-02 RX ADMIN — SPIRONOLACTONE 25 MILLIGRAM(S): 25 TABLET, FILM COATED ORAL at 05:59

## 2023-03-02 RX ADMIN — BUDESONIDE AND FORMOTEROL FUMARATE DIHYDRATE 2 PUFF(S): 160; 4.5 AEROSOL RESPIRATORY (INHALATION) at 08:27

## 2023-03-02 RX ADMIN — Medication 50 MILLIGRAM(S): at 05:59

## 2023-03-02 RX ADMIN — BUMETANIDE 3 MILLIGRAM(S): 0.25 INJECTION INTRAMUSCULAR; INTRAVENOUS at 17:11

## 2023-03-02 NOTE — DISCHARGE NOTE PROVIDER - NSDCMRMEDTOKEN_GEN_ALL_CORE_FT
acetaminophen 325 mg oral tablet: 2 tab(s) orally every 6 hours, As needed, Temp greater or equal to 38C (100.4F), Mild Pain (1 - 3)  Advair Diskus 250 mcg-50 mcg inhalation powder: 1 puff(s) inhaled 2 times a day  albuterol 90 mcg/inh inhalation aerosol: 1 puff(s) inhaled 3 times a day, As needed, Shortness of Breath and/or Wheezing  aluminum hydroxide-magnesium hydroxide 200 mg-200 mg/5 mL oral suspension: 30 milliliter(s) orally every 4 hours, As needed, Dyspepsia  bumetanide 1 mg oral tablet: 3 tab(s) orally once a day  melatonin 3 mg oral tablet: 1 tab(s) orally once a day (at bedtime), As needed, Insomnia  metoprolol succinate 50 mg oral tablet, extended release: 1 tab(s) orally once a day  ondansetron 2 mg/mL injectable solution: 4 milligram(s) injectable every 8 hours, As Needed  sacubitril-valsartan 49 mg-51 mg oral tablet: 1 tab(s) orally 2 times a day  spironolactone 25 mg oral tablet: 1 tab(s) orally once a day   acetaminophen 325 mg oral tablet: 2 tab(s) orally every 6 hours, As needed, Temp greater or equal to 38C (100.4F), Mild Pain (1 - 3)  Advair Diskus 250 mcg-50 mcg inhalation powder: 1 puff(s) inhaled 2 times a day  albuterol 90 mcg/inh inhalation aerosol: 1 puff(s) inhaled 3 times a day, As needed, Shortness of Breath and/or Wheezing  aluminum hydroxide-magnesium hydroxide 200 mg-200 mg/5 mL oral suspension: 30 milliliter(s) orally every 4 hours, As needed, Dyspepsia  bumetanide 1 mg oral tablet: 3 tab(s) orally once a day  melatonin 3 mg oral tablet: 1 tab(s) orally once a day (at bedtime), As needed, Insomnia  metoprolol succinate 50 mg oral tablet, extended release: 1 tab(s) orally once a day  ondansetron 2 mg/mL injectable solution: 4 milligram(s) injectable every 8 hours, As Needed  sacubitril-valsartan 49 mg-51 mg oral tablet: 1 tab(s) orally 2 times a day  sodium chloride 0.65% nasal spray: 1 spray(s) nasal once a day, As Needed  spironolactone 25 mg oral tablet: 1 tab(s) orally once a day   acetaminophen 325 mg oral tablet: 2 tab(s) orally every 6 hours, As needed, Temp greater or equal to 38C (100.4F), Mild Pain (1 - 3)  Advair Diskus 250 mcg-50 mcg inhalation powder: 1 puff(s) inhaled 2 times a day  bumetanide 1 mg oral tablet: 1 tab(s) orally once a day  melatonin 3 mg oral tablet: 1 tab(s) orally once a day (at bedtime), As needed, Insomnia  metoprolol succinate 25 mg oral tablet, extended release: 1 tab(s) orally once a day  sildenafil 20 mg oral tablet: 0.5 tab(s) orally once a day

## 2023-03-02 NOTE — PROVIDER CONTACT NOTE (OTHER) - REASON
PAtient's BP is 95/65
BP
BP
Patient's BP is 98/56
Pt hypotensive 80/56
Tachycardia
BP:96/62- hold Entresto parameter
Low blood pressure
soft BP

## 2023-03-02 NOTE — DISCHARGE NOTE PROVIDER - DETAILS OF MALNUTRITION DIAGNOSIS/DIAGNOSES
This patient has been assessed with a concern for Malnutrition and was treated during this hospitalization for the following Nutrition diagnosis/diagnoses:     -  03/01/2023: Moderate protein-calorie malnutrition

## 2023-03-02 NOTE — DISCHARGE NOTE PROVIDER - HOSPITAL COURSE
63 yo man with PMH of HTN, RA, RCC s/p R partial nephrectomy, CKD, HFrEF/NICM EF 25-30%, ICD, plasma cell dyscrasia, h/o H-pylori, treated, seen at HF clinic and sent to ED for worsening heart failure.     Acute on chronic systolic heart failure.  - HF following, transitioned to Po Bumex 3mg on Sunday 2/26  - Strict I/O monitoring, daily weights   - Weaning O2, patient is on home O2 as needed   - Cardiology following, cleared for bone marrow biopsy   - s/p AICD in December  - C/w Toprol 50mg QD (hold SBP<90), Entresto 49/51 mg BID (hold SBP<90), Continue spironolactone 25mg daily   - EP/ICD interrogation completed- no events noted    - May need cardiac biopsy at [facility] -to r/o sarcoid and amyloid.  - TTE - EF 62% - LV remodeling/wall thickness, severe RA enlargement, mod pulm HTN    Acute on chronic respiratory failure with hypoxia.  - HF team following- plan as above  - PO Bumex, transitioned from IV 2/36    Stage 3a chronic kidney disease.  -currently appears stable  - monitor  - Avoid nephrotoxic medications.    Plasma cell dyscrasia.  -likely can follow up as outpatient with Onc  -monitor CBC.  - s/p bone marrow biopsy 3/1     On 3/2/23, discussed with , patient is medically cleared and optimized for transfer to [facility] hospital for cardiac evaluation to r/o amyloidosis/ sarcoidosis   63 yo man with PMH of HTN, RA, RCC s/p R partial nephrectomy, CKD, HFrEF/NICM EF 25-30%, ICD, plasma cell dyscrasia, h/o H-pylori, treated, seen at HF clinic and sent to ED for worsening heart failure.     Acute on chronic systolic heart failure.  - HF following, transitioned to Po Bumex 3mg on Sunday 2/26  - Strict I/O monitoring, daily weights   - Weaning O2, patient is on home O2 as needed   - Cardiology following, cleared for bone marrow biopsy   - s/p AICD in December  - C/w Toprol 50mg QD (hold SBP<90), Entresto 49/51 mg BID (hold SBP<90), Continue spironolactone 25mg daily   - EP/ICD interrogation completed- no events noted    - May need cardiac biopsy at [facility] -to r/o sarcoid and amyloid.  - TTE - EF 62% - LV remodeling/wall thickness, severe RA enlargement, mod pulm HTN    Acute on chronic respiratory failure with hypoxia.  - HF team following- plan as above  - PO Bumex, transitioned from IV 2/36    Stage 3a chronic kidney disease.  -currently appears stable  - monitor  - Avoid nephrotoxic medications.    Plasma cell dyscrasia.  -likely can follow up as outpatient with Onc  -monitor CBC.  - s/p bone marrow biopsy 3/1     On 3/2/23, discussed with , patient is medically cleared and optimized for transfer to [facility] hospital for cardiac evaluation to r/o amyloidosis/ sarcoidosis

## 2023-03-02 NOTE — PROVIDER CONTACT NOTE (OTHER) - RECOMMENDATIONS
RN maintain safety.
AM Metoprolol to be given now as per NP order.
n/a
Notify provider
Notify provider

## 2023-03-02 NOTE — PROGRESS NOTE ADULT - SUBJECTIVE AND OBJECTIVE BOX
Matt Lott NP  CCU Service  Cardiology   Spect: [phone] ([facility])     PATIENT: PLACIDO GOLDMAN, MRN: [id number]    CHIEF COMPLAINT: Patient is a 62y old  Male who presents with a chief complaint of SOB, likely CHF exacerbation (02 Mar 2023 07:50)      INTERVAL HISTORY/OVERNIGHT EVENTS: No overnight events. + AVALOS.  Denies abdominal pain, chest pain, cough. Has been ambulating with assistance. Oriented to person, place, and time. Breathing comfortably on room air.    REVIEW OF SYSTEMS:    Constitutional:     [ ] negative [ ] fevers [ ] chills [ ] weight loss [ ] weight gain  HEENT:                  [ ] negative [ ] dry eyes [ ] eye irritation [ ] postnasal drip [ ] nasal congestion  CV:                         [ ] negative  [ ] chest pain [ ] orthopnea [ ] palpitations [ ] murmur  Resp:                     [ ] negative [ ] cough [x ] shortness of breath [ ] dyspnea [ ] wheezing [ ] sputum [ ] hemoptysis  GI:                          [ ] negative [ ] nausea [ ] vomiting [ ] diarrhea [ ] constipation [ ] abd pain [ ] dysphagia   :                        [ ] negative [ ] dysuria [ ] nocturia [ ] hematuria [ ] increased urinary frequency  Musculoskeletal: [ ] negative [ ] back pain [ ] myalgias [ ] arthralgias [ ] fracture  Skin:                       [ ] negative [ ] rash [ ] itch  Neurological:        [ ] negative [ ] headache [ ] dizziness [ ] syncope [ ] weakness [ ] numbness  Psychiatric:           [ ] negative [ ] anxiety [ ] depression  Endocrine:            [ ] negative [ ] diabetes [ ] thyroid problem  Heme/Lymph:      [ ] negative [ ] anemia [ ] bleeding problem  Allergic/Immune: [ ] negative [ ] itchy eyes [ ] nasal discharge [ ] hives [ ] angioedema    [ x] All other systems negative  [ ] Unable to assess ROS because ________.    MEDICATIONS:  MEDICATIONS  (STANDING):  budesonide 160 MICROgram(s)/formoterol 4.5 MICROgram(s) Inhaler 2 Puff(s) Inhalation two times a day  buMETAnide 3 milliGRAM(s) Oral daily  enoxaparin Injectable 40 milliGRAM(s) SubCutaneous every 24 hours  metoprolol succinate ER 50 milliGRAM(s) Oral daily  sacubitril 49 mG/valsartan 51 mG 1 Tablet(s) Oral two times a day  spironolactone 25 milliGRAM(s) Oral daily    MEDICATIONS  (PRN):  acetaminophen     Tablet .. 650 milliGRAM(s) Oral every 6 hours PRN Temp greater or equal to 38C (100.4F), Mild Pain (1 - 3)  albuterol    90 MICROgram(s) HFA Inhaler 1 Puff(s) Inhalation three times a day PRN Shortness of Breath and/or Wheezing  aluminum hydroxide/magnesium hydroxide/simethicone Suspension 30 milliLiter(s) Oral every 4 hours PRN Dyspepsia  melatonin 3 milliGRAM(s) Oral at bedtime PRN Insomnia  ondansetron Injectable 4 milliGRAM(s) IV Push every 8 hours PRN Nausea and/or Vomiting      ALLERGIES: Allergies    No Known Allergies    Intolerances        OBJECTIVE:  ICU Vital Signs Last 24 Hrs  T(C): 36.9 (02 Mar 2023 05:59), Max: 36.9 (02 Mar 2023 05:59)  T(F): 98.4 (02 Mar 2023 05:59), Max: 98.4 (02 Mar 2023 05:59)  HR: 103 (02 Mar 2023 05:59) (88 - 106)  BP: 99/58 (02 Mar 2023 05:59) (90/68 - 100/69)  RR: 18 (02 Mar 2023 05:59) (17 - 19)  SpO2: 97% (02 Mar 2023 05:59) (95% - 97%)    O2 Parameters below as of 02 Mar 2023 05:59  Patient On (Oxygen Delivery Method): nasal cannula  O2 Flow (L/min): 2    CAPILLARY BLOOD GLUCOSE        I&O's Summary    01 Mar 2023 07:01  -  02 Mar 2023 07:00  --------------------------------------------------------  IN: 445 mL / OUT: 1375 mL / NET: -930 mL      Daily     PHYSICAL EXAMINATION:  General: Comfortable, no acute distress, cooperative with exam.  HEENT: Moist mucous membranes.  Respiratory: CTAB, normal respiratory effort, no coughing, wheezes, crackles, or rales.  CV: RRR, S1S2, no murmurs, rubs or gallops. No JVD. Distal pulses intact.  Abdominal: Soft, nontender, nondistended, no rebound or guarding, normal bowel sounds.  Neurology: AOx3, no focal neuro defects, MCINTOSH x 4.  Extremities: No pitting edema, + Peripheral pulses.  Warm   Incisions:   Tubes:    LABS:                          11.5   10.83 )-----------( 236      ( 02 Mar 2023 06:30 )             38.9     03-02    135  |  101  |  43<H>  ----------------------------<  109<H>  4.2   |  18<L>  |  1.51<H>    Ca    9.4      02 Mar 2023 06:30  Phos  3.6     03-02  Mg     2.10     03-02        PT/INR - ( 01 Mar 2023 05:50 )   PT: 14.0 sec;   INR: 1.20 ratio         PTT - ( 01 Mar 2023 05:50 )  PTT:32.9 sec      TELEMETRY: Sinus tachycardia     EKG:     IMAGING:     Transthoracic Echocardiogram (02.24.23 @ 16:42)  CONCLUSIONS:  1. Mitral annular calcification, otherwise normal mitral  valve. Mild mitral regurgitation.  2. Increased relative wall thickness with normal left  ventricular mass index, consistent with concentric left  ventricular remodeling.  3. Endocardium not well visualized; grossly normal left  ventricular systolic function.  4. Severe right atrial enlargement.  5. Right ventricular enlargement with decreased right  ventricular systolic function. A device wire is noted in  the right heart.  Systolic flattening of the  interventricular septum, consistent with RV pressure  overload.  6. Estimated right ventricular systolic pressure equals 60  mm Hg, assuming right atrial pressure equals 10 mm Hg,  consistent with moderate pulmonary hypertension.  7. Global longitudinal strain (GLS) analysis was performed  with the [employer] Vivid E95 (/minute; BP 99/65); global  longitudinal strain equals -12.3%, which is reduced.  *** No previous Echo exam.

## 2023-03-02 NOTE — DISCHARGE NOTE PROVIDER - NSDCFUSCHEDAPPT_GEN_ALL_CORE_FT
[facility]  CATSCAN [address]  Scheduled Appointment: 03/08/2023    Alcides Gardner  [facility]  UROLOGY [address]  Scheduled Appointment: 04/10/2023    [facility]  ELECTROPH [id number]  Scheduled Appointment: 04/12/2023     Shruti Villeda  [facility]  PULMMED [address]  Scheduled Appointment: 05/02/2023    [facility]  HEARTFAIL [address]  Scheduled Appointment: 05/10/2023    [facility]  UROLOGY [address]  Scheduled Appointment: 05/16/2023    Alcides Gardner  [facility]  UROLOGY [address]  Scheduled Appointment: 05/16/2023    Michael Cuellar  [facility]  HEARTFAIL [address]  Scheduled Appointment: 06/30/2023    [facility]  ELECTROPH [id number]  Scheduled Appointment: 07/12/2023

## 2023-03-02 NOTE — PROGRESS NOTE ADULT - NS ATTEND AMEND GEN_ALL_CORE FT
Reported SOB yesterday with mild exertion. Was considering tx to [facility] for RHC and cardiac biopsy, but feels better today.  Will check with Dr. Cuellar whether he wants to transfer pt or d/c him and arrange procedures as n outpatient.  F/u BM bx.  Continue Bumex 3 mg po qd. Reported SOB yesterday with mild exertion. Was considering tx to [facility] for RHC and cardiac biopsy, but feels better today.  Will check with Dr. Cuellar whether he wants to transfer pt or d/c him and arrange procedures as an outpatient.  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  F/u BM bx.  Continue Bumex 3 mg po qd. Reported SOB yesterday with mild exertion. Was considering tx to [facility] for RHC and cardiac biopsy, but feels better today.  Will check with Dr. Cuellar whether he wants to transfer pt or d/c him and arrange procedures as an outpatient.  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  F/u BM bx.  Continue Bumex 3 mg po qd.    Addendum:  Pt agreed to transfer.

## 2023-03-02 NOTE — PROVIDER CONTACT NOTE (OTHER) - SITUATION
provider at bedside, pt BP is 80/56, denies any s/s of hypotension, denies chest pain, palpitation, dizziness, SOB, weakness, lethargy, confusion or any chills/fever. Pt is afebrile
BP:96/62- hold Entresto parameter
Bp asymptomatic, has been having hypotensive episodes and tachycardia. No complaints from patient
Patient's BP is 98/56
Patient BP 93/57, due for Bumex. BP outside of parameters for administration
Blood pressure 88/58, auscultated with stethoscope.
PAtient's BP is 95/65
Patient BP 95/57, due for Bumex. BP outside of parameters for administration
Pt w/HR sustaining 125.

## 2023-03-02 NOTE — DISCHARGE NOTE PROVIDER - NSDCCAREPROVSEEN_GEN_ALL_CORE_FT
Gema Vu Hoorbod Delshadfar  Hoorbod Delshadfar  Hoorbod Delshadfar  Trevor Cuellar  User ADM  Ordering Physician  Juarez Cuellar  Team [facility] Medicine ACP  Team [facility] Heart Failure  Team [facility] Interventional Radiology      [ Greater than 35 min spent for discharge services.   HEvangelist Vu ]

## 2023-03-02 NOTE — PROVIDER CONTACT NOTE (OTHER) - ASSESSMENT
pt is a/ox4, provider at bedside, pt BP is 80/56, denies any s/s of hypotension, denies chest pain, palpitation, dizziness, SOB, weakness, lethargy, confusion or any chills/fever. Pt is afebrile
AOx4, denies pain, chest pain, dizziness, headache, nausea.
Patient denies chest pain, dizziness, and shortness of breath.
bp 118/68. Pt asymptomatic. . EKG done as ordered.
pt AOx4, no c/o pain or discomfort, alert.
patient asymptomatic
patient asymptomatic
AOx4, denies pain, chest pain, headache, dizziness, nausea. Endorsing mild SOB
Pt A&Ox4, asymptomatic, held AM BP meds

## 2023-03-02 NOTE — PROGRESS NOTE ADULT - ASSESSMENT
_________________________________________________________________________________________  ========>>  M E D I C A L   A T T E N D I N G    F O L L O W  U P  N O T E  <<=========  -----------------------------------------------------------------------------------------------------    - Patient evaluated by me, chart reviewed.   - In summary,  PLACIDO GOLDMAN is a 62y year old man admitted with CHF exacerbation  - Patient today overall doing ok, comfortable, eating OK.     Patient overall feeling okay, no shortness of breath, no chest pain. encouraged out of bed to chair with assistance as needed.     ==================>> REVIEW OF SYSTEM <<=================    GEN: no fever, no chills, no pain  RESP: no SOB at rest , no cough, no sputum  CVS: no chest pain, no palpitations, no edema  GI: no abdominal pain, no nausea,  : no dysuria, no frequency  Neuro: no headache, no dizziness  Derm : no itching, no rash    ==================>> PHYSICAL EXAM <<=================    GEN: A&O X 3 , NAD , comfortable, pleasant, calm in bed.. encouraged out of bed to chair with assistance as needed.   HEENT: NCAT, PERRL, MMM, hearing intact, on and off minimal O2 , saturating well   Neck: supple , no JVD appreciated  CVS: S1S2 , regular , Tachycardic.  Sinus tachycardia on telemetry  PULM: CTA B/L,  no W/R/R appreciated  ABD.: soft. non tender, non distended,  bowel sounds present  Extrem: intact pulses , no signif edema   PSYCH : normal mood,  not anxious                              ( Note written / Date of service 03-02-23 )    ==================>> MEDICATIONS <<====================    budesonide 160 MICROgram(s)/formoterol 4.5 MICROgram(s) Inhaler 2 Puff(s) Inhalation two times a day  buMETAnide 3 milliGRAM(s) Oral daily  enoxaparin Injectable 40 milliGRAM(s) SubCutaneous every 24 hours  metoprolol succinate ER 50 milliGRAM(s) Oral daily  sacubitril 49 mG/valsartan 51 mG 1 Tablet(s) Oral two times a day  spironolactone 25 milliGRAM(s) Oral daily    MEDICATIONS  (PRN):  acetaminophen     Tablet .. 650 milliGRAM(s) Oral every 6 hours PRN Temp greater or equal to 38C (100.4F), Mild Pain (1 - 3)  albuterol    90 MICROgram(s) HFA Inhaler 1 Puff(s) Inhalation three times a day PRN Shortness of Breath and/or Wheezing  aluminum hydroxide/magnesium hydroxide/simethicone Suspension 30 milliLiter(s) Oral every 4 hours PRN Dyspepsia  melatonin 3 milliGRAM(s) Oral at bedtime PRN Insomnia  ondansetron Injectable 4 milliGRAM(s) IV Push every 8 hours PRN Nausea and/or Vomiting    ___________  Active diet:  Diet, Regular:   DASH/TLC Sodium & Cholesterol Restricted (DASH)  ___________________    ==================>> VITAL SIGNS <<==================    Vital Signs Last 24 HrsT(C): 36.7 (03-02-23 @ 17:10)  T(F): 98 (03-02-23 @ 17:10), Max: 98.4 (03-02-23 @ 05:59)  HR: 104 (03-02-23 @ 17:10) (88 - 105)  BP: 94/68 (03-02-23 @ 17:10)  RR: 18 (03-02-23 @ 17:10) (18 - 19)  SpO2: 95% (03-02-23 @ 17:10) (95% - 97%)      CAPILLARY BLOOD GLUCOSE         ==================>> LAB AND IMAGING <<==================                        11.5   10.83 )-----------( 236      ( 02 Mar 2023 06:30 )             38.9        03-02    135  |  101  |  43<H>  ----------------------------<  109<H>  4.2   |  18<L>  |  1.51<H>    Ca    9.4      02 Mar 2023 06:30  Phos  3.6     03-02  Mg     2.10     03-02      WBC count:   10.83 <<== ,  9.91 <<== ,  10.99 <<== ,  8.75 <<== ,  11.19 <<==   Hemoglobin:   11.5 <<==,  11.5 <<==,  11.8 <<==,  11.8 <<==,  12.5 <<==  platelets:  236 <==, 271 <==, 301 <==, 296 <==, 317 <==, 304 <==    Creatinine:  1.51  <<==, 1.40  <<==, 1.42  <<==, 1.41  <<==, 1.64  <<==, 1.63  <<==  Sodium:   135  <==, 138  <==, 136  <==, 135  <==, 139  <==, 141  <==       AST:          23 <== , 22 <== , 23 <== , 24 <==      ALT:        18  <== , 19  <== , 19  <== , 16  <==      AP:        134  <=, 144  <=, 153  <=, 166  <=     Bili:        0.5  <=, 0.4  <=, 0.5  <=, 0.6  <=    ____________________________    M I C R O B I O L O G Y :      COVID-19 PCR: NotDete (02-28-23 @ 15:03)  SARS-CoV-2: NotDete (02-22-23 @ 20:42)    __________________________________________________________________________________  ===============>>  A S S E S S M E N T   A N D   P L A N <<===============  ------------------------------------------------------------------------------------------    · Assessment	  Pt is a 61 yo man with PMH of HTN, RA, RCC s/p R partial nephrectomy, CKD, HFrEF/NICM EF 25-30%, ICD, plasma cell dyscrasia, h/o H-pylori, treated, seen at HF clinic and sent to ED for worsening heart failure.   pt admits to progressively increasing SOB, AVALOS with cough, worsening at night over past 6 months with leg edema,  however increasingly worse over past month.   pt on bumex and entresto.   Pt using O2 at home as needed.   Pt denies any chest pain, fevers, recent surgeries, dizziness, bloody stools.       Problem/Plan - 1:  ·  Problem: Acute on chronic systolic heart failure.   Cardiology and heart failure following management appreciated.    diuretics s needed as per cardiology  monitor Ins and outs, weight, labs, and vitals closely  O2 as needed   medical optimization    BP better but still having tachycardia  patient post AICD in December  Continue Current medications otherwise and monitor.  supportive care.    Problem/Plan - 2:  ·  Problem: Acute on chronic respiratory failure with hypoxia.   ·  Plan: as above  diuresis  wean off O2 as able  patient has PRN O2 at home.    Problem/Plan - 3:  ·  Problem: history of  Hypertension.   Patient currently with low blood pressures.    Appreciated heart failure recommendations.    judicial adjustment of medications as tolerated.  monitor and adjust as needed    Problem/Plan - 4:  ·  Problem: Stage 3a chronic kidney disease.   ·  Plan: currently appears stable  monitor  Avoid nephrotoxic medications.    Problem/Plan - 5:  ·  Problem: history of Plasma cell dyscrasia.   Oncology consulted and appreciated.    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  Recommending a bone marrow biopsy.  Interventional radiology following for BX>> planned for today   ?? may need cardiac bx as well .. ? this admission or as outpatient ?     -GI/DVT Prophylaxis per protocol.    Discharge planning pending cardiology and CHF plan of care.    --------------------------------------------  Case discussed With patient and ACP  Education given on findings and plan of care  ___________________________  H. JEANETH Vu.  Pager: [phone]       _________________________________________________________________________________________  ========>>  M E D I C A L   A T T E N D I N G    F O L L O W  U P  N O T E  <<=========  -----------------------------------------------------------------------------------------------------    - Patient evaluated by me, chart reviewed.   - In summary,  PLACIDO GOLDMAN is a 62y year old man admitted with CHF exacerbation  - Patient today overall doing ok, comfortable, eating OK.     Patient overall feeling okay, no shortness of breath at rest, no chest pain. encouraged out of bed to chair with assistance as needed.     Discuss with The heart failure team, patient shortness of breath with ambulation. Plan is for transfer to [facility] for right heart cath and biopsy of the heart of the same time.    ==================>> REVIEW OF SYSTEM <<=================    GEN: no fever, no chills, no pain  RESP: no SOB at rest , no cough, no sputum  CVS: no chest pain, no palpitations, no edema  GI: no abdominal pain, no nausea,  : no dysuria, no frequency  Neuro: no headache, no dizziness  Derm : no itching, no rash    ==================>> PHYSICAL EXAM <<=================    GEN: A&O X 3 , NAD , comfortable, pleasant, calm in bed.. encouraged out of bed to chair with assistance as needed.   HEENT: NCAT, PERRL, MMM, hearing intact, not on O2   Neck: supple , no JVD appreciated  CVS: S1S2 , regular , Tachycardic.  Sinus tachycardia on telemetry  PULM: CTA B/L,  no W/R/R appreciated  ABD.: soft. non tender, non distended,  bowel sounds present  Extrem: intact pulses , no signif edema   PSYCH : normal mood,  not anxious                              ( Note written / Date of service 03-02-23 )    ==================>> MEDICATIONS <<====================    budesonide 160 MICROgram(s)/formoterol 4.5 MICROgram(s) Inhaler 2 Puff(s) Inhalation two times a day  buMETAnide 3 milliGRAM(s) Oral daily  enoxaparin Injectable 40 milliGRAM(s) SubCutaneous every 24 hours  metoprolol succinate ER 50 milliGRAM(s) Oral daily  sacubitril 49 mG/valsartan 51 mG 1 Tablet(s) Oral two times a day  spironolactone 25 milliGRAM(s) Oral daily    MEDICATIONS  (PRN):  acetaminophen     Tablet .. 650 milliGRAM(s) Oral every 6 hours PRN Temp greater or equal to 38C (100.4F), Mild Pain (1 - 3)  albuterol    90 MICROgram(s) HFA Inhaler 1 Puff(s) Inhalation three times a day PRN Shortness of Breath and/or Wheezing  aluminum hydroxide/magnesium hydroxide/simethicone Suspension 30 milliLiter(s) Oral every 4 hours PRN Dyspepsia  melatonin 3 milliGRAM(s) Oral at bedtime PRN Insomnia  ondansetron Injectable 4 milliGRAM(s) IV Push every 8 hours PRN Nausea and/or Vomiting    ___________  Active diet:  Diet, Regular:   DASH/TLC Sodium & Cholesterol Restricted (DASH)  ___________________    ==================>> VITAL SIGNS <<==================    Vital Signs Last 24 HrsT(C): 36.7 (03-02-23 @ 17:10)  T(F): 98 (03-02-23 @ 17:10), Max: 98.4 (03-02-23 @ 05:59)  HR: 104 (03-02-23 @ 17:10) (88 - 105)  BP: 94/68 (03-02-23 @ 17:10)  RR: 18 (03-02-23 @ 17:10) (18 - 19)  SpO2: 95% (03-02-23 @ 17:10) (95% - 97%)       ==================>> LAB AND IMAGING <<==================                        11.5   10.83 )-----------( 236      ( 02 Mar 2023 06:30 )             38.9        03-02    135  |  101  |  43<H>  ----------------------------<  109<H>  4.2   |  18<L>  |  1.51<H>    Ca    9.4      02 Mar 2023 06:30  Phos  3.6     03-02  Mg     2.10     03-02      WBC count:   10.83 <<== ,  9.91 <<== ,  10.99 <<== ,  8.75 <<== ,  11.19 <<==   Hemoglobin:   11.5 <<==,  11.5 <<==,  11.8 <<==,  11.8 <<==,  12.5 <<==  platelets:  236 <==, 271 <==, 301 <==, 296 <==, 317 <==, 304 <==    Creatinine:  1.51  <<==, 1.40  <<==, 1.42  <<==, 1.41  <<==, 1.64  <<==, 1.63  <<==  Sodium:   135  <==, 138  <==, 136  <==, 135  <==, 139  <==, 141  <==       AST:          23 <== , 22 <== , 23 <== , 24 <==      ALT:        18  <== , 19  <== , 19  <== , 16  <==      AP:        134  <=, 144  <=, 153  <=, 166  <=     Bili:        0.5  <=, 0.4  <=, 0.5  <=, 0.6  <=  __________________________________________________________________________________  ===============>>  A S S E S S M E N T   A N D   P L A N <<===============  ------------------------------------------------------------------------------------------    · Assessment	  Pt is a 61 yo man with PMH of HTN, RA, RCC s/p R partial nephrectomy, CKD, HFrEF/NICM EF 25-30%, ICD, plasma cell dyscrasia, h/o H-pylori, treated, seen at HF clinic and sent to ED for worsening heart failure.   pt admits to progressively increasing SOB, AVALOS with cough, worsening at night over past 6 months with leg edema,  however increasingly worse over past month.   pt on bumex and entresto.   Pt using O2 at home as needed.   Pt denies any chest pain, fevers, recent surgeries, dizziness, bloody stools.       Problem/Plan - 1:  ·  Problem: Acute on chronic systolic heart failure.   Cardiology and heart failure following management appreciated.    diuretics s needed as per cardiology  monitor Ins and outs, weight, labs, and vitals closely  O2 as needed   medical optimization    BP better but still having tachycardia  patient post AICD in December  Continue Current medications otherwise and monitor.  supportive care.    Problem/Plan - 2:  ·  Problem: Acute on chronic respiratory failure with hypoxia.   ·  Plan: as above  diuresis  wean off O2 as able  patient has PRN O2 at home.      As above, plan for transfer to [facility] for right heart cath abd Bx    Problem/Plan - 3:  ·  Problem: history of  Hypertension.   Patient currently with low blood pressures.    Appreciated heart failure recommendations.    judicial adjustment of medications as tolerated.  monitor and adjust as needed    Problem/Plan - 4:  ·  Problem: Stage 3a chronic kidney disease.   ·  Plan: currently appears stable  monitor  Avoid nephrotoxic medications.    Problem/Plan - 5:  ·  Problem: history of Plasma cell dyscrasia.   Oncology consulted and appreciated.    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  Recommending a bone marrow biopsy.  Interventional radiology following for BX>> planned for today   ?? may need cardiac bx as well .. ? this admission or as outpatient ?     -GI/DVT Prophylaxis per protocol.    Discharge planning on holdpending above     --------------------------------------------  Case discussed With patient and ACP, CHF team.  Education given on findings and plan of care  ___________________________  H. JEANETH Vu.  Pager: [phone]

## 2023-03-02 NOTE — PROGRESS NOTE ADULT - SUBJECTIVE AND OBJECTIVE BOX
Cardiovascular Disease Progress Note    Overnight events: No acute events overnight.  no cp/sob/palps  Otherwise review of systems negative    Objective Findings:  T(C): 36.9 (03-02-23 @ 05:59), Max: 36.9 (03-02-23 @ 05:59)  HR: 103 (03-02-23 @ 05:59) (88 - 106)  BP: 99/58 (03-02-23 @ 05:59) (90/68 - 100/69)  RR: 18 (03-02-23 @ 05:59) (17 - 19)  SpO2: 97% (03-02-23 @ 05:59) (95% - 97%)  Wt(kg): --  Daily     Daily       Physical Exam:  Gen: NAD  HEENT: EOMI  CV: RRR, normal S1 + S2, no m/r/g  Lungs: CTAB  Abd: soft, non-tender  Ext: No edema    Telemetry:    Laboratory Data:                        11.5   10.83 )-----------( 236      ( 02 Mar 2023 06:30 )             38.9     03-02    135  |  101  |  43<H>  ----------------------------<  109<H>  4.2   |  18<L>  |  1.51<H>    Ca    9.4      02 Mar 2023 06:30  Phos  3.6     03-02  Mg     2.10     03-02      PT/INR - ( 01 Mar 2023 05:50 )   PT: 14.0 sec;   INR: 1.20 ratio         PTT - ( 01 Mar 2023 05:50 )  PTT:32.9 sec          Inpatient Medications:  MEDICATIONS  (STANDING):  budesonide 160 MICROgram(s)/formoterol 4.5 MICROgram(s) Inhaler 2 Puff(s) Inhalation two times a day  buMETAnide 3 milliGRAM(s) Oral daily  enoxaparin Injectable 40 milliGRAM(s) SubCutaneous every 24 hours  metoprolol succinate ER 50 milliGRAM(s) Oral daily  sacubitril 49 mG/valsartan 51 mG 1 Tablet(s) Oral two times a day  spironolactone 25 milliGRAM(s) Oral daily      Assessment:  62yoM with PMH of HTN, RA, RCC s/p R partial nephrectomy, CKD, HFrEF/NICM EF 25-30%, ICD, plasma cell dyscrasia, seen at HF clinic today, sent to ED for worsening heart failure. pt admits to progressively increasing SOB, AVALOS with cough, worsening at night over past 6 months, however increasingly worse over past month    Recs:  diuresis per CHF  cont GDMT, lvef improved  heme f/u for plasma cell dyscrasia  s/p BM biopsy on 3/1/23  eventual cardiac bx at [facility] per CHF  tele monitoring   will follow          Over 25 minutes spent on total encounter; more than 50% of the visit was spent counseling and/or coordinating care by the attending physician.      Juarez Carver MD   Cardiovascular Disease  [phone]

## 2023-03-02 NOTE — DISCHARGE NOTE PROVIDER - CARE PROVIDER_API CALL
Jose Daniel Carver J  CARDIOVASCULAR DISEASE  [address] [place] [id number]  Phone: [phone]  Fax: [phone]  Follow Up Time:

## 2023-03-02 NOTE — PHARMACOTHERAPY INTERVENTION NOTE - COMMENTS
Discharge medications reviewed with the patient. Current medication schedule was discussed in detail including: medication name, indication, dose, administration times, side effects, and special instructions. Patient questions and concerns were answered and addressed. Patient demonstrated understanding. Patient provided with educational handout.    Reviewed medications: bumetanide, metoprolol succinate, entresto, and spironolactone.    Adriana Laguerre, PharmD  Clinical Pharmacy Specialist

## 2023-03-02 NOTE — PROGRESS NOTE ADULT - SUBJECTIVE AND OBJECTIVE BOX
PLACIDO GOLDMAN  MRN-[id number]    Patient is a 62y old  Male who presents with a chief complaint of Heart failure     (01 Mar 2023 16:01)      Review of System  REVIEW OF SYSTEMS      General:	Denies fatigue, fevers, chills, sweats, decreased appetite.    Skin/Breast: denies pruritis, rash  	  Ophthalmologic: no change in vision or blurring  	  HEENT	Denies dry mouth, oral sores, dysphagia,  change in hearing.    Respiratory and Thorax:  cough, sob, wheeze, hemoptysis  	  Cardiovascular:	no cp , palp, orthopnea    Gastrointestinal:	no n/v/d constipation    Genitourinary:	no dysuria of frequency, no hematuria, no flank pain    Musculoskeletal:	no bone or joint pain. no muscle aches.     Neurological:	no change in sensory or motor function. no headache. no weakness.     Psychiatric:	no depression, no anxiety, insomnia.     Hematology/Lymphatics:	no bleeding or bruising        Current Meds  MEDICATIONS  (STANDING):  budesonide 160 MICROgram(s)/formoterol 4.5 MICROgram(s) Inhaler 2 Puff(s) Inhalation two times a day  buMETAnide 3 milliGRAM(s) Oral daily  enoxaparin Injectable 40 milliGRAM(s) SubCutaneous every 24 hours  metoprolol succinate ER 50 milliGRAM(s) Oral daily  sacubitril 49 mG/valsartan 51 mG 1 Tablet(s) Oral two times a day  spironolactone 25 milliGRAM(s) Oral daily    MEDICATIONS  (PRN):  acetaminophen     Tablet .. 650 milliGRAM(s) Oral every 6 hours PRN Temp greater or equal to 38C (100.4F), Mild Pain (1 - 3)  albuterol    90 MICROgram(s) HFA Inhaler 1 Puff(s) Inhalation three times a day PRN Shortness of Breath and/or Wheezing  aluminum hydroxide/magnesium hydroxide/simethicone Suspension 30 milliLiter(s) Oral every 4 hours PRN Dyspepsia  melatonin 3 milliGRAM(s) Oral at bedtime PRN Insomnia  ondansetron Injectable 4 milliGRAM(s) IV Push every 8 hours PRN Nausea and/or Vomiting      Vitals  Vital Signs Last 24 Hrs  T(C): 36.7 (01 Mar 2023 23:36), Max: 36.8 (01 Mar 2023 05:20)  T(F): 98 (01 Mar 2023 23:36), Max: 98.2 (01 Mar 2023 05:20)  HR: 105 (01 Mar 2023 23:36) (88 - 106)  BP: 100/69 (01 Mar 2023 23:36) (90/68 - 103/67)  BP(mean): --  RR: 19 (01 Mar 2023 23:36) (17 - 19)  SpO2: 97% (01 Mar 2023 23:36) (95% - 97%)    Parameters below as of 01 Mar 2023 23:36  Patient On (Oxygen Delivery Method): nasal cannula  O2 Flow (L/min): 2      Physical Exam  PHYSICAL EXAM:      Constitutional: NAD    Eyes: PERRLA EOMI, anicteric sclera    Heent :No oral sores, no pharyngeal injection. moist mucosa.    Neck: supple, no jvd, no LAD    Respiratory: CTA b/l     Cardiovascular: s1s2, no m/g/r    Gastrointestinal: soft, nt, nd, + BS    Extremities: no c/c/e    Neurological:A&O x 3 moves all ext.    Skin: no rash on exposed skin    Lymph Nodes: no lymphadenopathy.              Lab  CBC Full  -  ( 01 Mar 2023 05:50 )  WBC Count : 9.91 K/uL  RBC Count : 3.92 M/uL  Hemoglobin : 11.5 g/dL  Hematocrit : 38.1 %  Platelet Count - Automated : 271 K/uL  Mean Cell Volume : 97.2 fL  Mean Cell Hemoglobin : 29.3 pg  Mean Cell Hemoglobin Concentration : 30.2 gm/dL  Auto Neutrophil # : x  Auto Lymphocyte # : x  Auto Monocyte # : x  Auto Eosinophil # : x  Auto Basophil # : x  Auto Neutrophil % : x  Auto Lymphocyte % : x  Auto Monocyte % : x  Auto Eosinophil % : x  Auto Basophil % : x    03-01    138  |  105  |  36<H>  ----------------------------<  83  4.3   |  21<L>  |  1.40<H>    Ca    9.5      01 Mar 2023 05:50  Phos  3.1     03-01  Mg     2.10     03-01      PT/INR - ( 01 Mar 2023 05:50 )   PT: 14.0 sec;   INR: 1.20 ratio         PTT - ( 01 Mar 2023 05:50 )  PTT:32.9 sec    Rad:    Assessment/Plan   PLACIDO GOLDMAN  MRN-[id number]    Patient is a 62y old  Male who presents with a chief complaint of Heart failure     (01 Mar 2023 16:01)      Review of System    Resting comfortably  no new events overnight as per rn at bedside    Current Meds  MEDICATIONS  (STANDING):  budesonide 160 MICROgram(s)/formoterol 4.5 MICROgram(s) Inhaler 2 Puff(s) Inhalation two times a day  buMETAnide 3 milliGRAM(s) Oral daily  enoxaparin Injectable 40 milliGRAM(s) SubCutaneous every 24 hours  metoprolol succinate ER 50 milliGRAM(s) Oral daily  sacubitril 49 mG/valsartan 51 mG 1 Tablet(s) Oral two times a day  spironolactone 25 milliGRAM(s) Oral daily    MEDICATIONS  (PRN):  acetaminophen     Tablet .. 650 milliGRAM(s) Oral every 6 hours PRN Temp greater or equal to 38C (100.4F), Mild Pain (1 - 3)  albuterol    90 MICROgram(s) HFA Inhaler 1 Puff(s) Inhalation three times a day PRN Shortness of Breath and/or Wheezing  aluminum hydroxide/magnesium hydroxide/simethicone Suspension 30 milliLiter(s) Oral every 4 hours PRN Dyspepsia  melatonin 3 milliGRAM(s) Oral at bedtime PRN Insomnia  ondansetron Injectable 4 milliGRAM(s) IV Push every 8 hours PRN Nausea and/or Vomiting      Vitals  Vital Signs Last 24 Hrs  T(C): 36.7 (01 Mar 2023 23:36), Max: 36.8 (01 Mar 2023 05:20)  T(F): 98 (01 Mar 2023 23:36), Max: 98.2 (01 Mar 2023 05:20)  HR: 105 (01 Mar 2023 23:36) (88 - 106)  BP: 100/69 (01 Mar 2023 23:36) (90/68 - 103/67)  BP(mean): --  RR: 19 (01 Mar 2023 23:36) (17 - 19)  SpO2: 97% (01 Mar 2023 23:36) (95% - 97%)    Parameters below as of 01 Mar 2023 23:36  Patient On (Oxygen Delivery Method): nasal cannula  O2 Flow (L/min): 2    PHYSICAL EXAM:      NAD      Lab  CBC Full  -  ( 01 Mar 2023 05:50 )  WBC Count : 9.91 K/uL  RBC Count : 3.92 M/uL  Hemoglobin : 11.5 g/dL  Hematocrit : 38.1 %  Platelet Count - Automated : 271 K/uL  Mean Cell Volume : 97.2 fL  Mean Cell Hemoglobin : 29.3 pg  Mean Cell Hemoglobin Concentration : 30.2 gm/dL  Auto Neutrophil # : x  Auto Lymphocyte # : x  Auto Monocyte # : x  Auto Eosinophil # : x  Auto Basophil # : x  Auto Neutrophil % : x  Auto Lymphocyte % : x  Auto Monocyte % : x  Auto Eosinophil % : x  Auto Basophil % : x    03-01    138  |  105  |  36<H>  ----------------------------<  83  4.3   |  21<L>  |  1.40<H>    Ca    9.5      01 Mar 2023 05:50  Phos  3.1     03-01  Mg     2.10     03-01      PT/INR - ( 01 Mar 2023 05:50 )   PT: 14.0 sec;   INR: 1.20 ratio         PTT - ( 01 Mar 2023 05:50 )  PTT:32.9 sec    Rad:    Assessment/Plan

## 2023-03-02 NOTE — PROGRESS NOTE ADULT - ASSESSMENT
Monoclonal protein, anemia, CKD. For a bone marrow biopsy with IR, but low suspicion for MM/amyloid. Hgb adequate and can monitor for now.     s/p BMBx , 3/1 . results are pending.    inpt mgmt as per med, cv

## 2023-03-02 NOTE — PROVIDER CONTACT NOTE (OTHER) - DATE AND TIME:
01-Mar-2023 18:03
28-Feb-2023 08:00
25-Feb-2023 13:44
25-Feb-2023 19:45
26-Feb-2023 06:30
02-Mar-2023 09:13
26-Feb-2023 18:25
27-Feb-2023 01:10
02-Mar-2023 11:40

## 2023-03-02 NOTE — PROVIDER CONTACT NOTE (OTHER) - NAME OF MD/NP/PA/DO NOTIFIED:
Katrina Portillo
Oli Toure
Oli Toure
Venkat Angel ACP
ACP Janice VICTORIA
Mery Crenshaw, ACP
NICKIE Solis
TREVON [place] [id number]
Unique Osman

## 2023-03-02 NOTE — DISCHARGE NOTE PROVIDER - NSDCFUADDAPPT_GEN_ALL_CORE_FT
Please follow up with your primary care provider in 1-2 weeks following discharge from the hospital for continued monitoring and management.

## 2023-03-02 NOTE — PROGRESS NOTE ADULT - PROBLEM SELECTOR PLAN 1
Appears not volume overloaded on exam, low normal JVP, but still has AVALOS.   The cough is also persistent, and he does not believe it started after Entresto. He says he had it in the same severity prior to Entresto as well.   Scr 1.4 -->1.5    I&O 445/1315.  Neg 930, ? no daily standing weights   Transitioned to PO Bumex 3mg on Sunday, 2/26  Continue Toprol 50mg QD (hold SBP<90)  Continue Entresto 49/51 mg BID (hold SBP<90)  Continue spironolactone 25mg daily   Follow up BM biopsy result    Strict I/O  Daily standing weights   Monitor lytes replete to keep k 4.0-5.0 and Mg>2.0  EP/ICD interrogation completed- no events noted    May need cardiac biopsy at [facility] -to r/o sarcoid and amyloid.

## 2023-03-02 NOTE — DISCHARGE NOTE PROVIDER - NSDCCPCAREPLAN_GEN_ALL_CORE_FT
PRINCIPAL DISCHARGE DIAGNOSIS  Diagnosis: Heart failure  Assessment and Plan of Treatment: - You were admitted for an acute exacerbation of your chronic heart failure. You are being transferred to [facility] for evaluation of other causes of this heart failure, which may require a cardiac biopsy. Follow up with the heart failure team and cardiology specialists      SECONDARY DISCHARGE DIAGNOSES  Diagnosis: Hypertension  Assessment and Plan of Treatment: Continue blood pressure medication regimen as directed. Monitor for any visual changes, headaches or dizziness.  Monitor blood pressure regularly.  Follow up with your PCP for further management for high blood pressure.    Diagnosis: Stage 3a chronic kidney disease  Assessment and Plan of Treatment: Please continue your current medication regimen and follow up with your primary care provider for continued monitoring and care.

## 2023-03-03 ENCOUNTER — INPATIENT (INPATIENT)
Facility: HOSPITAL | Age: 63
LOS: 8 days | Discharge: HOME HEALTH | DRG: 264 | End: 2023-03-12
Attending: GENERAL PRACTICE | Admitting: GENERAL PRACTICE
Payer: COMMERCIAL

## 2023-03-03 VITALS
HEART RATE: 109 BPM | TEMPERATURE: 97 F | OXYGEN SATURATION: 97 % | RESPIRATION RATE: 18 BRPM | SYSTOLIC BLOOD PRESSURE: 91 MMHG | DIASTOLIC BLOOD PRESSURE: 63 MMHG

## 2023-03-03 VITALS
SYSTOLIC BLOOD PRESSURE: 95 MMHG | HEART RATE: 111 BPM | TEMPERATURE: 98 F | DIASTOLIC BLOOD PRESSURE: 71 MMHG | RESPIRATION RATE: 18 BRPM | OXYGEN SATURATION: 96 %

## 2023-03-03 DIAGNOSIS — Z98.890 OTHER SPECIFIED POSTPROCEDURAL STATES: Chronic | ICD-10-CM

## 2023-03-03 DIAGNOSIS — I50.810 RIGHT HEART FAILURE, UNSPECIFIED: ICD-10-CM

## 2023-03-03 LAB
ANION GAP SERPL CALC-SCNC: 11 MMOL/L — SIGNIFICANT CHANGE UP (ref 7–14)
APTT BLD: 32.9 SEC — SIGNIFICANT CHANGE UP (ref 27–36.3)
BUN SERPL-MCNC: 44 MG/DL — HIGH (ref 7–23)
CALCIUM SERPL-MCNC: 9.9 MG/DL — SIGNIFICANT CHANGE UP (ref 8.4–10.5)
CHLORIDE SERPL-SCNC: 102 MMOL/L — SIGNIFICANT CHANGE UP (ref 98–107)
CO2 SERPL-SCNC: 24 MMOL/L — SIGNIFICANT CHANGE UP (ref 22–31)
CREAT SERPL-MCNC: 1.63 MG/DL — HIGH (ref 0.5–1.3)
EGFR: 47 ML/MIN/1.73M2 — LOW
GLUCOSE SERPL-MCNC: 88 MG/DL — SIGNIFICANT CHANGE UP (ref 70–99)
HCT VFR BLD CALC: 38.4 % — LOW (ref 39–50)
HGB BLD-MCNC: 11.9 G/DL — LOW (ref 13–17)
MAGNESIUM SERPL-MCNC: 2.1 MG/DL — SIGNIFICANT CHANGE UP (ref 1.6–2.6)
MCHC RBC-ENTMCNC: 29.6 PG — SIGNIFICANT CHANGE UP (ref 27–34)
MCHC RBC-ENTMCNC: 31 GM/DL — LOW (ref 32–36)
MCV RBC AUTO: 95.5 FL — SIGNIFICANT CHANGE UP (ref 80–100)
NRBC # BLD: 0 /100 WBCS — SIGNIFICANT CHANGE UP (ref 0–0)
NRBC # FLD: 0 K/UL — SIGNIFICANT CHANGE UP (ref 0–0)
PHOSPHATE SERPL-MCNC: 4.1 MG/DL — SIGNIFICANT CHANGE UP (ref 2.5–4.5)
PLATELET # BLD AUTO: 283 K/UL — SIGNIFICANT CHANGE UP (ref 150–400)
POTASSIUM SERPL-MCNC: 4.3 MMOL/L — SIGNIFICANT CHANGE UP (ref 3.5–5.3)
POTASSIUM SERPL-SCNC: 4.3 MMOL/L — SIGNIFICANT CHANGE UP (ref 3.5–5.3)
RBC # BLD: 4.02 M/UL — LOW (ref 4.2–5.8)
RBC # FLD: 14.6 % — HIGH (ref 10.3–14.5)
SODIUM SERPL-SCNC: 137 MMOL/L — SIGNIFICANT CHANGE UP (ref 135–145)
TM INTERPRETATION: SIGNIFICANT CHANGE UP
WBC # BLD: 9.64 K/UL — SIGNIFICANT CHANGE UP (ref 3.8–10.5)
WBC # FLD AUTO: 9.64 K/UL — SIGNIFICANT CHANGE UP (ref 3.8–10.5)

## 2023-03-03 PROCEDURE — 99232 SBSQ HOSP IP/OBS MODERATE 35: CPT

## 2023-03-03 RX ORDER — SODIUM CHLORIDE 0.65 %
1 AEROSOL, SPRAY (ML) NASAL
Qty: 0 | Refills: 0 | DISCHARGE
Start: 2023-03-03

## 2023-03-03 RX ORDER — SODIUM CHLORIDE 0.65 %
1 AEROSOL, SPRAY (ML) NASAL DAILY
Refills: 0 | Status: DISCONTINUED | OUTPATIENT
Start: 2023-03-03 | End: 2023-03-03

## 2023-03-03 RX ADMIN — BUDESONIDE AND FORMOTEROL FUMARATE DIHYDRATE 2 PUFF(S): 160; 4.5 AEROSOL RESPIRATORY (INHALATION) at 21:37

## 2023-03-03 RX ADMIN — SACUBITRIL AND VALSARTAN 1 TABLET(S): 24; 26 TABLET, FILM COATED ORAL at 06:43

## 2023-03-03 RX ADMIN — BUDESONIDE AND FORMOTEROL FUMARATE DIHYDRATE 2 PUFF(S): 160; 4.5 AEROSOL RESPIRATORY (INHALATION) at 08:46

## 2023-03-03 RX ADMIN — SPIRONOLACTONE 25 MILLIGRAM(S): 25 TABLET, FILM COATED ORAL at 06:44

## 2023-03-03 RX ADMIN — ENOXAPARIN SODIUM 40 MILLIGRAM(S): 100 INJECTION SUBCUTANEOUS at 14:02

## 2023-03-03 RX ADMIN — Medication 1 SPRAY(S): at 17:56

## 2023-03-03 RX ADMIN — BUMETANIDE 3 MILLIGRAM(S): 0.25 INJECTION INTRAMUSCULAR; INTRAVENOUS at 06:43

## 2023-03-03 RX ADMIN — SACUBITRIL AND VALSARTAN 1 TABLET(S): 24; 26 TABLET, FILM COATED ORAL at 17:03

## 2023-03-03 NOTE — PROGRESS NOTE ADULT - ASSESSMENT
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 Patient sent to ED by Dr. Cuellar for ADHF.       Currently appears to have normal JVP, No SOB and less coughing.

## 2023-03-03 NOTE — PROGRESS NOTE ADULT - NS ATTEND OPT1 GEN_ALL_CORE
no edema,  no murmurs,  regular rate and rhythm , no edema.
I independently performed the documented:
I attest my time as attending is greater than 50% of the total combined time spent on qualifying patient care activities by the PA/NP and attending.

## 2023-03-03 NOTE — PROGRESS NOTE ADULT - NS ATTEND AMEND GEN_ALL_CORE FT
K/L may be normal in the setting of significant renal disease. Await hem-onc input and results of BM biopsy.  Recheck NTproBNP at next blood draw.  Pt slated for transfer to [facility] for consideration of repeat RHC and EM biopsy.

## 2023-03-03 NOTE — PROGRESS NOTE ADULT - SUBJECTIVE AND OBJECTIVE BOX
Matt Lott NP  CCU Service  Cardiology   Spect: [id number] ([facility])     PATIENT: PLACIDO GOLDMAN, MRN: [id number]    CHIEF COMPLAINT: Patient is a 62y old  Male who presents with a chief complaint of SOB, likely CHF exacerbation (03 Mar 2023 07:28)      INTERVAL HISTORY/OVERNIGHT EVENTS: No overnight events. At bedside, patient has been sleeping and eating well. Denies N/V/D/C. Last BM x1 regular yesterday. Denies abdominal pain. Denies chest pain or SOB, cough. Has been ambulating with assistance. Oriented to person, place, and time. Breathing comfortably on room air.    REVIEW OF SYSTEMS:    Constitutional:     [ ] negative [ ] fevers [ ] chills [ ] weight loss [ ] weight gain  HEENT:                  [ ] negative [ ] dry eyes [ ] eye irritation [ ] postnasal drip [ ] nasal congestion  CV:                         [ ] negative  [ ] chest pain [ ] orthopnea [ ] palpitations [ ] murmur  Resp:                     [ ] negative [ ] cough [ ] shortness of breath [ ] dyspnea [ ] wheezing [ ] sputum [ ] hemoptysis  GI:                          [ ] negative [ ] nausea [ ] vomiting [ ] diarrhea [ ] constipation [ ] abd pain [ ] dysphagia   :                        [ ] negative [ ] dysuria [ ] nocturia [ ] hematuria [ ] increased urinary frequency  Musculoskeletal: [ ] negative [ ] back pain [ ] myalgias [ ] arthralgias [ ] fracture  Skin:                       [ ] negative [ ] rash [ ] itch  Neurological:        [ ] negative [ ] headache [ ] dizziness [ ] syncope [ ] weakness [ ] numbness  Psychiatric:           [ ] negative [ ] anxiety [ ] depression  Endocrine:            [ ] negative [ ] diabetes [ ] thyroid problem  Heme/Lymph:      [ ] negative [ ] anemia [ ] bleeding problem  Allergic/Immune: [ ] negative [ ] itchy eyes [ ] nasal discharge [ ] hives [ ] angioedema    [ ] All other systems negative  [ ] Unable to assess ROS because ________.    MEDICATIONS:  MEDICATIONS  (STANDING):  budesonide 160 MICROgram(s)/formoterol 4.5 MICROgram(s) Inhaler 2 Puff(s) Inhalation two times a day  buMETAnide 3 milliGRAM(s) Oral daily  enoxaparin Injectable 40 milliGRAM(s) SubCutaneous every 24 hours  metoprolol succinate ER 50 milliGRAM(s) Oral daily  sacubitril 49 mG/valsartan 51 mG 1 Tablet(s) Oral two times a day  spironolactone 25 milliGRAM(s) Oral daily    MEDICATIONS  (PRN):  acetaminophen     Tablet .. 650 milliGRAM(s) Oral every 6 hours PRN Temp greater or equal to 38C (100.4F), Mild Pain (1 - 3)  albuterol    90 MICROgram(s) HFA Inhaler 1 Puff(s) Inhalation three times a day PRN Shortness of Breath and/or Wheezing  aluminum hydroxide/magnesium hydroxide/simethicone Suspension 30 milliLiter(s) Oral every 4 hours PRN Dyspepsia  melatonin 3 milliGRAM(s) Oral at bedtime PRN Insomnia  ondansetron Injectable 4 milliGRAM(s) IV Push every 8 hours PRN Nausea and/or Vomiting      ALLERGIES: Allergies    No Known Allergies    Intolerances        OBJECTIVE:  ICU Vital Signs Last 24 Hrs  T(C): 36.7 (03 Mar 2023 11:30), Max: 37.2 (02 Mar 2023 21:15)  T(F): 98.1 (03 Mar 2023 11:30), Max: 98.9 (02 Mar 2023 21:15)  HR: 107 (03 Mar 2023 11:30) (95 - 110)  BP: 95/60 (03 Mar 2023 11:30) (90/64 - 95/60)  BP(mean): --  ABP: --  ABP(mean): --  RR: 18 (03 Mar 2023 11:30) (18 - 18)  SpO2: 96% (03 Mar 2023 11:30) (95% - 100%)    O2 Parameters below as of 03 Mar 2023 11:30  Patient On (Oxygen Delivery Method): room air            Adult Advanced Hemodynamics Last 24 Hrs  CVP(mm Hg): --  CVP(cm H2O): --  CO: --  CI: --  PA: --  PA(mean): --  PCWP: --  SVR: --  SVRI: --  PVR: --  PVRI: --  CAPILLARY BLOOD GLUCOSE        CAPILLARY BLOOD GLUCOSE        I&O's Summary    02 Mar 2023 07:01  -  03 Mar 2023 07:00  --------------------------------------------------------  IN: 723 mL / OUT: 2000 mL / NET: -1277 mL    03 Mar 2023 07:01  -  03 Mar 2023 13:44  --------------------------------------------------------  IN: 395 mL / OUT: 540 mL / NET: -145 mL      Daily     Daily     PHYSICAL EXAMINATION:  General: Comfortable, no acute distress, cooperative with exam.  HEENT: PERRLA, EOMI, moist mucous membranes.  Respiratory: CTAB, normal respiratory effort, no coughing, wheezes, crackles, or rales.  CV: RRR, S1S2, no murmurs, rubs or gallops. No JVD. Distal pulses intact.  Abdominal: Soft, nontender, nondistended, no rebound or guarding, normal bowel sounds.  Neurology: AOx3, no focal neuro defects, MCINTOSH x 4.  Extremities: No pitting edema, + Peripheral pulses.  Incisions:   Tubes:    LABS:                          11.9   9.64  )-----------( 283      ( 03 Mar 2023 07:25 )             38.4     03-03    137  |  102  |  44<H>  ----------------------------<  88  4.3   |  24  |  1.63<H>    Ca    9.9      03 Mar 2023 07:25  Phos  4.1     03-03  Mg     2.10     03-03        PTT - ( 03 Mar 2023 07:25 )  PTT:32.9 sec            TELEMETRY:     EKG:     IMAGING:  Matt Lott NP  CCU Service  Cardiology   Spect: [id number] ([facility])     PATIENT: PLACIDO GOLDMAN, MRN: [id number]    CHIEF COMPLAINT: Patient is a 62y old  Male who presents with a chief complaint of SOB, likely CHF exacerbation (03 Mar 2023 07:28)      INTERVAL HISTORY/OVERNIGHT EVENTS: No overnight events. At bedside, patient has been sleeping and eating well. Denies N/V/D/C. Denies abdominal pain, chest pain or SOB, cough. Has been ambulating with assistance. Oriented to person, place, and time. Breathing comfortably on room air.    REVIEW OF SYSTEMS:    Constitutional:     [ ] negative [ ] fevers [ ] chills [ ] weight loss [ ] weight gain  HEENT:                  [ ] negative [ ] dry eyes [ ] eye irritation [ ] postnasal drip [ ] nasal congestion  CV:                         [ ] negative  [ ] chest pain [ ] orthopnea [ ] palpitations [ ] murmur  Resp:                     [ ] negative [ ] cough [ ] shortness of breath [ ] dyspnea [ ] wheezing [ ] sputum [ ] hemoptysis  GI:                          [ ] negative [ ] nausea [ ] vomiting [ ] diarrhea [ ] constipation [ ] abd pain [ ] dysphagia   :                        [ ] negative [ ] dysuria [ ] nocturia [ ] hematuria [ ] increased urinary frequency  Musculoskeletal: [ ] negative [ ] back pain [ ] myalgias [ ] arthralgias [ ] fracture  Skin:                       [ ] negative [ ] rash [ ] itch  Neurological:        [ ] negative [ ] headache [ ] dizziness [ ] syncope [ ] weakness [ ] numbness  Psychiatric:           [ ] negative [ ] anxiety [ ] depression  Endocrine:            [ ] negative [ ] diabetes [ ] thyroid problem  Heme/Lymph:      [ ] negative x[ ] anemia [ ] bleeding problem  Allergic/Immune: [ ] negative [ ] itchy eyes [ ] nasal discharge [ ] hives [ ] angioedema    [x ] All other systems negative  [ ] Unable to assess ROS because ________.    MEDICATIONS:  MEDICATIONS  (STANDING):  budesonide 160 MICROgram(s)/formoterol 4.5 MICROgram(s) Inhaler 2 Puff(s) Inhalation two times a day  buMETAnide 3 milliGRAM(s) Oral daily  enoxaparin Injectable 40 milliGRAM(s) SubCutaneous every 24 hours  metoprolol succinate ER 50 milliGRAM(s) Oral daily  sacubitril 49 mG/valsartan 51 mG 1 Tablet(s) Oral two times a day  spironolactone 25 milliGRAM(s) Oral daily    MEDICATIONS  (PRN):  acetaminophen     Tablet .. 650 milliGRAM(s) Oral every 6 hours PRN Temp greater or equal to 38C (100.4F), Mild Pain (1 - 3)  albuterol    90 MICROgram(s) HFA Inhaler 1 Puff(s) Inhalation three times a day PRN Shortness of Breath and/or Wheezing  aluminum hydroxide/magnesium hydroxide/simethicone Suspension 30 milliLiter(s) Oral every 4 hours PRN Dyspepsia  melatonin 3 milliGRAM(s) Oral at bedtime PRN Insomnia  ondansetron Injectable 4 milliGRAM(s) IV Push every 8 hours PRN Nausea and/or Vomiting      ALLERGIES: Allergies    No Known Allergies    Intolerances        OBJECTIVE:  ICU Vital Signs Last 24 Hrs  T(C): 36.7 (03 Mar 2023 11:30), Max: 37.2 (02 Mar 2023 21:15)  T(F): 98.1 (03 Mar 2023 11:30), Max: 98.9 (02 Mar 2023 21:15)  HR: 107 (03 Mar 2023 11:30) (95 - 110)  BP: 95/60 (03 Mar 2023 11:30) (90/64 - 95/60)  RR: 18 (03 Mar 2023 11:30) (18 - 18)  SpO2: 96% (03 Mar 2023 11:30) (95% - 100%)    O2 Parameters below as of 03 Mar 2023 11:30  Patient On (Oxygen Delivery Method): room air    CAPILLARY BLOOD GLUCOSE        I&O's Summary    02 Mar 2023 07:01  -  03 Mar 2023 07:00  --------------------------------------------------------  IN: 723 mL / OUT: 2000 mL / NET: -1277 mL    03 Mar 2023 07:01  -  03 Mar 2023 13:44  --------------------------------------------------------  IN: 395 mL / OUT: 540 mL / NET: -145 mL    Daily     PHYSICAL EXAMINATION:  General: Comfortable, no acute distress, cooperative with exam.  HEENT: Moist mucous membranes.  Respiratory: CTAB, normal respiratory effort, no coughing, wheezes, crackles, or rales.  CV: RRR, S1S2, no murmurs, rubs or gallops. No JVD. Distal pulses intact.  Abdominal: Soft, nontender, nondistended, no rebound or guarding, normal bowel sounds.  Neurology: AOx3, no focal neuro defects, MCINTOSH x 4.  Extremities: No pitting edema, + Peripheral pulses. Warm   Incisions:   Tubes:    LABS:                          11.9   9.64  )-----------( 283      ( 03 Mar 2023 07:25 )             38.4     03-03    137  |  102  |  44<H>  ----------------------------<  88  4.3   |  24  |  1.63<H>    Ca    9.9      03 Mar 2023 07:25  Phos  4.1     03-03  Mg     2.10     03-03        PTT - ( 03 Mar 2023 07:25 )  PTT:32.9 sec      TELEMETRY: Sinus tachycardia     EKG:     IMAGING:   Transthoracic Echocardiogram (02.24.23 @ 16:42)  CONCLUSIONS:  1. Mitral annular calcification, otherwise normal mitral  valve. Mild mitral regurgitation.  2. Increased relative wall thickness with normal left  ventricular mass index, consistent with concentric left  ventricular remodeling.  3. Endocardium not well visualized; grossly normal left  ventricular systolic function.  4. Severe right atrial enlargement.  5. Right ventricular enlargement with decreased right  ventricular systolic function. A device wire is noted in  the right heart.  Systolic flattening of the  interventricular septum, consistent with RV pressure  overload.  6. Estimated right ventricular systolic pressure equals 60  mm Hg, assuming right atrial pressure equals 10 mm Hg,  consistent with moderate pulmonary hypertension.  7. Global longitudinal strain (GLS) analysis was performed  with the [employer] X-Change Vivid E95 (/minute; BP 99/65); global  longitudinal strain equals -12.3%, which is reduced.

## 2023-03-03 NOTE — PROGRESS NOTE ADULT - SUBJECTIVE AND OBJECTIVE BOX
Patient is a 62y old  Male who presents with a chief complaint of SOB, likely CHF exacerbation (03 Mar 2023 13:44)    says that he was walking earlier and breathing is improving. No pain.     Medication:   acetaminophen     Tablet .. 650 milliGRAM(s) Oral every 6 hours PRN  albuterol    90 MICROgram(s) HFA Inhaler 1 Puff(s) Inhalation three times a day PRN  aluminum hydroxide/magnesium hydroxide/simethicone Suspension 30 milliLiter(s) Oral every 4 hours PRN  budesonide 160 MICROgram(s)/formoterol 4.5 MICROgram(s) Inhaler 2 Puff(s) Inhalation two times a day  buMETAnide 3 milliGRAM(s) Oral daily  enoxaparin Injectable 40 milliGRAM(s) SubCutaneous every 24 hours  melatonin 3 milliGRAM(s) Oral at bedtime PRN  metoprolol succinate ER 50 milliGRAM(s) Oral daily  ondansetron Injectable 4 milliGRAM(s) IV Push every 8 hours PRN  sacubitril 49 mG/valsartan 51 mG 1 Tablet(s) Oral two times a day  spironolactone 25 milliGRAM(s) Oral daily      Physical exam    T(C): 36.7 (03-03-23 @ 11:30), Max: 37.2 (03-02-23 @ 21:15)  HR: 107 (03-03-23 @ 11:30) (95 - 110)  BP: 95/60 (03-03-23 @ 11:30) (90/64 - 95/60)  RR: 18 (03-03-23 @ 11:30) (18 - 18)  SpO2: 96% (03-03-23 @ 11:30) (95% - 100%)  Wt(kg): --    alert NAD  EOMI anicteric sclera  Cv s1 S2 RRR  Lungs clear B/L  abd soft NT ND +BS  No LE edema or tenderness    Labs                              11.9   9.64  )-----------( 283      ( 03 Mar 2023 07:25 )             38.4       03-03    137  |  102  |  44<H>  ----------------------------<  88  4.3   |  24  |  1.63<H>    Ca    9.9      03 Mar 2023 07:25  Phos  4.1     03-03  Mg     2.10     03-03            [id number]

## 2023-03-03 NOTE — PROGRESS NOTE ADULT - PROVIDER SPECIALTY LIST ADULT
Cardiology
Cardiology
Internal Medicine
Cardiology
Heart Failure
Heme/Onc
Internal Medicine
Internal Medicine
Cardiology
Heart Failure
Heme/Onc
Heme/Onc
Internal Medicine
Internal Medicine
Heme/Onc
Heme/Onc
Internal Medicine
Heart Failure
Heart Failure

## 2023-03-03 NOTE — PROGRESS NOTE ADULT - REASON FOR ADMISSION
SOB, likely CHF exacerbation

## 2023-03-03 NOTE — PROGRESS NOTE ADULT - PROBLEM SELECTOR PLAN 1
Appears not volume overloaded on exam, low normal JVP, less coughing.   The cough is also persistent, and he does not believe it started after Entresto. He says he had it in the same severity prior to Entresto as well.   Scr 1.4 -->1.5-->1.6    I&O 723/2000.  Neg 1.2, ? no daily standing weights   Transitioned to PO Bumex 3mg on Sunday, 2/26  Continue Toprol 50mg QD (hold SBP<90)  Continue Entresto 49/51 mg BID (hold SBP<90)  Continue spironolactone 25mg daily   Follow up BM biopsy result    Strict I/O  Daily standing weights   Monitor lytes replete to keep k 4.0-5.0 and Mg>2.0  EP/ICD interrogation completed- no events noted    Pending transfer to [facility] for cardiac biopsy r/o sarcoid and amyloid.

## 2023-03-03 NOTE — PROGRESS NOTE ADULT - ASSESSMENT
_________________________________________________________________________________________  ========>>  M E D I C A L   A T T E N D I N G    F O L L O W  U P  N O T E  <<=========  -----------------------------------------------------------------------------------------------------    - Patient evaluated by me, chart reviewed.   - In summary,  PLACIDO GOLDMAN is a 62y year old man admitted with CHF exacerbation  - Patient today overall doing ok, comfortable, eating OK.     Patient overall feeling okay, no shortness of breath at rest, no chest pain. encouraged out of bed to chair with assistance as needed.     Discuss with The heart failure team, patient shortness of breath with ambulation. Plan is for transfer to [facility] for right heart cath and biopsy of the heart of the same time.    ==================>> REVIEW OF SYSTEM <<=================    GEN: no fever, no chills, no pain  RESP: no SOB at rest , no cough, no sputum  CVS: no chest pain, no palpitations, no edema  GI: no abdominal pain, no nausea,  : no dysuria, no frequency  Neuro: no headache, no dizziness  Derm : no itching, no rash    ==================>> PHYSICAL EXAM <<=================    GEN: A&O X 3 , NAD , comfortable, pleasant, calm in bed.. encouraged out of bed to chair with assistance as needed.   HEENT: NCAT, PERRL, MMM, hearing intact, not on O2   Neck: supple , no JVD appreciated  CVS: S1S2 , regular , Tachycardic.  Sinus tachycardia on telemetry  PULM: CTA B/L,  no W/R/R appreciated  ABD.: soft. non tender, non distended,  bowel sounds present  Extrem: intact pulses , no signif edema   PSYCH : normal mood,  not anxious                               ( Note written / Date of service 03-03-23 )    ==================>> MEDICATIONS <<====================    budesonide 160 MICROgram(s)/formoterol 4.5 MICROgram(s) Inhaler 2 Puff(s) Inhalation two times a day  buMETAnide 3 milliGRAM(s) Oral daily  enoxaparin Injectable 40 milliGRAM(s) SubCutaneous every 24 hours  metoprolol succinate ER 50 milliGRAM(s) Oral daily  sacubitril 49 mG/valsartan 51 mG 1 Tablet(s) Oral two times a day  spironolactone 25 milliGRAM(s) Oral daily    MEDICATIONS  (PRN):  acetaminophen     Tablet .. 650 milliGRAM(s) Oral every 6 hours PRN Temp greater or equal to 38C (100.4F), Mild Pain (1 - 3)  albuterol    90 MICROgram(s) HFA Inhaler 1 Puff(s) Inhalation three times a day PRN Shortness of Breath and/or Wheezing  aluminum hydroxide/magnesium hydroxide/simethicone Suspension 30 milliLiter(s) Oral every 4 hours PRN Dyspepsia  melatonin 3 milliGRAM(s) Oral at bedtime PRN Insomnia  ondansetron Injectable 4 milliGRAM(s) IV Push every 8 hours PRN Nausea and/or Vomiting  sodium chloride 0.65% Nasal 1 Spray(s) Both Nostrils daily PRN Nasal Congestion    ___________  Active diet:  Diet, Regular:   DASH/TLC Sodium & Cholesterol Restricted (DASH)  ___________________    ==================>> VITAL SIGNS <<==================    Vital Signs Last 24 HrsT(C): 36.7 (03-03-23 @ 17:00)  T(F): 98 (03-03-23 @ 17:00), Max: 98.9 (03-02-23 @ 21:15)  HR: 104 (03-03-23 @ 17:00) (95 - 110)  BP: 97/61 (03-03-23 @ 17:00)  RR: 18 (03-03-23 @ 17:00) (18 - 18)  SpO2: 96% (03-03-23 @ 17:00) (95% - 100%)      CAPILLARY BLOOD GLUCOSE         ==================>> LAB AND IMAGING <<==================                        11.9   9.64  )-----------( 283      ( 03 Mar 2023 07:25 )             38.4        03-03    137  |  102  |  44<H>  ----------------------------<  88  4.3   |  24  |  1.63<H>    Ca    9.9      03 Mar 2023 07:25  Phos  4.1     03-03  Mg     2.10     03-03      WBC count:   9.64 <<== ,  10.83 <<== ,  9.91 <<== ,  10.99 <<== ,  8.75 <<==   Hemoglobin:   11.9 <<==,  11.5 <<==,  11.5 <<==,  11.8 <<==,  11.8 <<==  platelets:  283 <==, 236 <==, 271 <==, 301 <==, 296 <==, 317 <==    Creatinine:  1.63  <<==, 1.51  <<==, 1.40  <<==, 1.42  <<==, 1.41  <<==, 1.64  <<==  Sodium:   137  <==, 135  <==, 138  <==, 136  <==, 135  <==, 139  <==       AST:          23 <== , 22 <== , 23 <==      ALT:        18  <== , 19  <== , 19  <==      AP:        134  <=, 144  <=, 153  <=     Bili:        0.5  <=, 0.4  <=, 0.5  <=    ____________________________    M I C R O B I O L O G Y :      COVID-19 PCR: NotDete (02-28-23 @ 15:03)  SARS-CoV-2: NotDete (02-22-23 @ 20:42)    __________________________________________________________________________________  ===============>>  A S S E S S M E N T   A N D   P L A N <<===============  ------------------------------------------------------------------------------------------    · Assessment	  Pt is a 61 yo man with PMH of HTN, RA, RCC s/p R partial nephrectomy, CKD, HFrEF/NICM EF 25-30%, ICD, plasma cell dyscrasia, h/o H-pylori, treated, seen at HF clinic and sent to ED for worsening heart failure.   pt admits to progressively increasing SOB, AVALOS with cough, worsening at night over past 6 months with leg edema,  however increasingly worse over past month.   pt on bumex and entresto.   Pt using O2 at home as needed.   Pt denies any chest pain, fevers, recent surgeries, dizziness, bloody stools.       Problem/Plan - 1:  ·  Problem: Acute on chronic systolic heart failure.   Cardiology and heart failure following management appreciated.    diuretics s needed as per cardiology  monitor Ins and outs, weight, labs, and vitals closely  O2 as needed   medical optimization    BP better but still having tachycardia  patient post AICD in December  Continue Current medications otherwise and monitor.  supportive care.    Problem/Plan - 2:  ·  Problem: Acute on chronic respiratory failure with hypoxia.   ·  Plan: as above  diuresis  wean off O2 as able  patient has PRN O2 at home.      As above, plan for transfer to [facility] for right heart cath abd Bx    Problem/Plan - 3:  ·  Problem: history of  Hypertension.   Patient currently with low blood pressures.    Appreciated heart failure recommendations.    judicial adjustment of medications as tolerated.  monitor and adjust as needed    Problem/Plan - 4:  ·  Problem: Stage 3a chronic kidney disease.   ·  Plan: currently appears stable  monitor  Avoid nephrotoxic medications.    Problem/Plan - 5:  ·  Problem: history of Plasma cell dyscrasia.   Oncology consulted and appreciated.    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  Recommending a bone marrow biopsy.  Interventional radiology following for BX>> planned for today   ?? may need cardiac bx as well .. ? this admission or as outpatient ?     -GI/DVT Prophylaxis per protocol.    Discharge planning on holdpending above     --------------------------------------------  Case discussed With patient and ACP, CHF team.  Education given on findings and plan of care  ___________________________  H. JEANETH Vu.  Pager: [phone]       _________________________________________________________________________________________  ========>>  M E D I C A L   A T T E N D I N G    F O L L O W  U P  N O T E  <<=========  -----------------------------------------------------------------------------------------------------    - Patient evaluated by me, chart reviewed.   - In summary,  PLACIDO GOLDMAN is a 62y year old man admitted with CHF exacerbation  - Patient today overall doing ok, comfortable, eating OK.     Patient overall feeling okay, no shortness of breath at rest, no chest pain. encouraged out of bed to chair with assistance as needed.     Discuss with The heart failure team, patient shortness of breath with ambulation. Plan is for transfer to [facility] for right heart cath and biopsy of the heart of the same time.    ==================>> REVIEW OF SYSTEM <<=================    GEN: no fever, no chills, no pain  RESP: no SOB at rest , no cough, no sputum  CVS: no chest pain, no palpitations, no edema  GI: no abdominal pain, no nausea,  : no dysuria, no frequency  Neuro: no headache, no dizziness  Derm : no itching, no rash    ==================>> PHYSICAL EXAM <<=================    GEN: A&O X 3 , NAD , comfortable, pleasant, calm in bed.. encouraged out of bed to chair with assistance as needed.   HEENT: NCAT, PERRL, MMM, hearing intact, not on O2   Neck: supple , no JVD appreciated  CVS: S1S2 , regular , Tachycardic.  Sinus tachycardia on telemetry  PULM: CTA B/L,  no W/R/R appreciated  ABD.: soft. non tender, non distended,  bowel sounds present  Extrem: intact pulses , no signif edema   PSYCH : normal mood,  not anxious                               ( Note written / Date of service 03-03-23 )    ==================>> MEDICATIONS <<====================    budesonide 160 MICROgram(s)/formoterol 4.5 MICROgram(s) Inhaler 2 Puff(s) Inhalation two times a day  buMETAnide 3 milliGRAM(s) Oral daily  enoxaparin Injectable 40 milliGRAM(s) SubCutaneous every 24 hours  metoprolol succinate ER 50 milliGRAM(s) Oral daily  sacubitril 49 mG/valsartan 51 mG 1 Tablet(s) Oral two times a day  spironolactone 25 milliGRAM(s) Oral daily    MEDICATIONS  (PRN):  acetaminophen     Tablet .. 650 milliGRAM(s) Oral every 6 hours PRN Temp greater or equal to 38C (100.4F), Mild Pain (1 - 3)  albuterol    90 MICROgram(s) HFA Inhaler 1 Puff(s) Inhalation three times a day PRN Shortness of Breath and/or Wheezing  aluminum hydroxide/magnesium hydroxide/simethicone Suspension 30 milliLiter(s) Oral every 4 hours PRN Dyspepsia  melatonin 3 milliGRAM(s) Oral at bedtime PRN Insomnia  ondansetron Injectable 4 milliGRAM(s) IV Push every 8 hours PRN Nausea and/or Vomiting  sodium chloride 0.65% Nasal 1 Spray(s) Both Nostrils daily PRN Nasal Congestion    ___________  Active diet:  Diet, Regular:   DASH/TLC Sodium & Cholesterol Restricted (DASH)  ___________________    ==================>> VITAL SIGNS <<==================    Vital Signs Last 24 HrsT(C): 36.7 (03-03-23 @ 17:00)  T(F): 98 (03-03-23 @ 17:00), Max: 98.9 (03-02-23 @ 21:15)  HR: 104 (03-03-23 @ 17:00) (95 - 110)  BP: 97/61 (03-03-23 @ 17:00)  RR: 18 (03-03-23 @ 17:00) (18 - 18)  SpO2: 96% (03-03-23 @ 17:00) (95% - 100%)       ==================>> LAB AND IMAGING <<==================                        11.9   9.64  )-----------( 283      ( 03 Mar 2023 07:25 )             38.4        03-03    137  |  102  |  44<H>  ----------------------------<  88  4.3   |  24  |  1.63<H>    Ca    9.9      03 Mar 2023 07:25  Phos  4.1     03-03  Mg     2.10     03-03    WBC count:   9.64 <<== ,  10.83 <<== ,  9.91 <<== ,  10.99 <<== ,  8.75 <<==   Hemoglobin:   11.9 <<==,  11.5 <<==,  11.5 <<==,  11.8 <<==,  11.8 <<==  platelets:  283 <==, 236 <==, 271 <==, 301 <==, 296 <==, 317 <==    Creatinine:  1.63  <<==, 1.51  <<==, 1.40  <<==, 1.42  <<==, 1.41  <<==, 1.64  <<==  Sodium:   137  <==, 135  <==, 138  <==, 136  <==, 135  <==, 139  <==       AST:          23 <== , 22 <== , 23 <==      ALT:        18  <== , 19  <== , 19  <==      AP:        134  <=, 144  <=, 153  <=     Bili:        0.5  <=, 0.4  <=, 0.5  <=    __________________________________________________________________________________  ===============>>  A S S E S S M E N T   A N D   P L A N <<===============  ------------------------------------------------------------------------------------------    · Assessment	  Pt is a 63 yo man with PMH of HTN, RA, RCC s/p R partial nephrectomy, CKD, HFrEF/NICM EF 25-30%, ICD, plasma cell dyscrasia, h/o H-pylori, treated, seen at HF clinic and sent to ED for worsening heart failure.   pt admits to progressively increasing SOB, AVALOS with cough, worsening at night over past 6 months with leg edema,  however increasingly worse over past month.   pt on bumex and entresto.   Pt using O2 at home as needed.   Pt denies any chest pain, fevers, recent surgeries, dizziness, bloody stools.       Problem/Plan - 1:  ·  Problem: Acute on chronic systolic heart failure.   Cardiology and heart failure following management appreciated.    diuretics s needed as per cardiology  monitor Ins and outs, weight, labs, and vitals closely  O2 as needed   medical optimization    BP better but still having tachycardia  patient post AICD in December  Continue Current medications otherwise and monitor.  supportive care.    Problem/Plan - 2:  ·  Problem: Acute on chronic respiratory failure with hypoxia.   ·  Plan: as above  diuresis  wean off O2 as able  patient has PRN O2 at home.      As above, plan for transfer to [facility] for right heart cath abd Bx    Problem/Plan - 3:  ·  Problem: history of  Hypertension.   Patient currently with low blood pressures.    Appreciated heart failure recommendations.    judicial adjustment of medications as tolerated.  monitor and adjust as needed    Problem/Plan - 4:  ·  Problem: Stage 3a chronic kidney disease.   ·  Plan: currently appears stable  monitor  Avoid nephrotoxic medications.    Problem/Plan - 5:  ·  Problem: history of Plasma cell dyscrasia.   Oncology consulted and appreciated.    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  Recommending a bone marrow biopsy.  Interventional radiology following for BX>> planned for today   ?? may need cardiac bx as well .. ? this admission or as outpatient ?     -GI/DVT Prophylaxis per protocol.    Discharge planning on hold pending above      Awaiting transfer to [facility] for right heart cath and cardiac biopsy    --------------------------------------------  Case discussed With patient and ACP, CHF team.  Education given on findings and plan of care  ___________________________  H. JEANETH Vu.  Pager: [phone]

## 2023-03-03 NOTE — PROGRESS NOTE ADULT - ASSESSMENT
Anemia, CKD, monoclonal protein. Await bone marrow results for further hem/onc recommendations. Hgb adequate at this time and can monitor.

## 2023-03-03 NOTE — PROGRESS NOTE ADULT - SUBJECTIVE AND OBJECTIVE BOX
Cardiovascular Disease Progress Note    Overnight events: No acute events overnight.  no new cardiac sx  Otherwise review of systems negative    Objective Findings:  T(C): 36.4 (03-03-23 @ 06:40), Max: 37.2 (03-02-23 @ 21:15)  HR: 95 (03-03-23 @ 06:40) (95 - 110)  BP: 90/64 (03-03-23 @ 06:40) (90/64 - 95/57)  RR: 18 (03-03-23 @ 06:40) (18 - 18)  SpO2: 100% (03-03-23 @ 06:40) (95% - 100%)  Wt(kg): --  Daily     Daily       Physical Exam:  Gen: NAD  HEENT: EOMI  CV: RRR, normal S1 + S2, no m/r/g  Lungs: CTAB  Abd: soft, non-tender  Ext: No edema    Telemetry:    Laboratory Data:                        11.5   10.83 )-----------( 236      ( 02 Mar 2023 06:30 )             38.9     03-02    135  |  101  |  43<H>  ----------------------------<  109<H>  4.2   |  18<L>  |  1.51<H>    Ca    9.4      02 Mar 2023 06:30  Phos  3.6     03-02  Mg     2.10     03-02                Inpatient Medications:  MEDICATIONS  (STANDING):  budesonide 160 MICROgram(s)/formoterol 4.5 MICROgram(s) Inhaler 2 Puff(s) Inhalation two times a day  buMETAnide 3 milliGRAM(s) Oral daily  enoxaparin Injectable 40 milliGRAM(s) SubCutaneous every 24 hours  metoprolol succinate ER 50 milliGRAM(s) Oral daily  sacubitril 49 mG/valsartan 51 mG 1 Tablet(s) Oral two times a day  spironolactone 25 milliGRAM(s) Oral daily      Assessment:  62yoM with PMH of HTN, RA, RCC s/p R partial nephrectomy, CKD, HFrEF/NICM EF 25-30%, ICD, plasma cell dyscrasia, seen at HF clinic today, sent to ED for worsening heart failure. pt admits to progressively increasing SOB, AVALOS with cough, worsening at night over past 6 months, however increasingly worse over past month    Recs:  diuresis per CHF  cont GDMT, lvef improved  heme f/u for plasma cell dyscrasia  s/p BM biopsy on 3/1/23  eventual cardiac bx at [facility] per CHF, pending possible txf  tele monitoring   will follow        Over 25 minutes spent on total encounter; more than 50% of the visit was spent counseling and/or coordinating care by the attending physician.      Juarez Carver MD   Cardiovascular Disease  [phone]

## 2023-03-04 DIAGNOSIS — E88.09 OTHER DISORDERS OF PLASMA-PROTEIN METABOLISM, NOT ELSEWHERE CLASSIFIED: ICD-10-CM

## 2023-03-04 DIAGNOSIS — Z29.9 ENCOUNTER FOR PROPHYLACTIC MEASURES, UNSPECIFIED: ICD-10-CM

## 2023-03-04 DIAGNOSIS — I10 ESSENTIAL (PRIMARY) HYPERTENSION: ICD-10-CM

## 2023-03-04 DIAGNOSIS — R09.89 OTHER SPECIFIED SYMPTOMS AND SIGNS INVOLVING THE CIRCULATORY AND RESPIRATORY SYSTEMS: ICD-10-CM

## 2023-03-04 DIAGNOSIS — I50.23 ACUTE ON CHRONIC SYSTOLIC (CONGESTIVE) HEART FAILURE: ICD-10-CM

## 2023-03-04 DIAGNOSIS — J96.01 ACUTE RESPIRATORY FAILURE WITH HYPOXIA: ICD-10-CM

## 2023-03-04 DIAGNOSIS — J96.10 CHRONIC RESPIRATORY FAILURE, UNSPECIFIED WHETHER WITH HYPOXIA OR HYPERCAPNIA: ICD-10-CM

## 2023-03-04 LAB
ALBUMIN SERPL ELPH-MCNC: 3.8 G/DL — SIGNIFICANT CHANGE UP (ref 3.3–5)
ALP SERPL-CCNC: 131 U/L — HIGH (ref 40–120)
ALT FLD-CCNC: 14 U/L — SIGNIFICANT CHANGE UP (ref 10–45)
ANION GAP SERPL CALC-SCNC: 13 MMOL/L — SIGNIFICANT CHANGE UP (ref 5–17)
AST SERPL-CCNC: 22 U/L — SIGNIFICANT CHANGE UP (ref 10–40)
BILIRUB SERPL-MCNC: 0.3 MG/DL — SIGNIFICANT CHANGE UP (ref 0.2–1.2)
BUN SERPL-MCNC: 54 MG/DL — HIGH (ref 7–23)
CALCIUM SERPL-MCNC: 9.5 MG/DL — SIGNIFICANT CHANGE UP (ref 8.4–10.5)
CHLORIDE SERPL-SCNC: 103 MMOL/L — SIGNIFICANT CHANGE UP (ref 96–108)
CO2 SERPL-SCNC: 22 MMOL/L — SIGNIFICANT CHANGE UP (ref 22–31)
CREAT SERPL-MCNC: 1.66 MG/DL — HIGH (ref 0.5–1.3)
EGFR: 46 ML/MIN/1.73M2 — LOW
GLUCOSE SERPL-MCNC: 92 MG/DL — SIGNIFICANT CHANGE UP (ref 70–99)
HCT VFR BLD CALC: 37.5 % — LOW (ref 39–50)
HGB BLD-MCNC: 11.5 G/DL — LOW (ref 13–17)
MAGNESIUM SERPL-MCNC: 2.1 MG/DL — SIGNIFICANT CHANGE UP (ref 1.6–2.6)
MCHC RBC-ENTMCNC: 29.3 PG — SIGNIFICANT CHANGE UP (ref 27–34)
MCHC RBC-ENTMCNC: 30.7 GM/DL — LOW (ref 32–36)
MCV RBC AUTO: 95.4 FL — SIGNIFICANT CHANGE UP (ref 80–100)
NRBC # BLD: 0 /100 WBCS — SIGNIFICANT CHANGE UP (ref 0–0)
NT-PROBNP SERPL-SCNC: 2951 PG/ML — HIGH (ref 0–300)
PLATELET # BLD AUTO: 287 K/UL — SIGNIFICANT CHANGE UP (ref 150–400)
POTASSIUM SERPL-MCNC: 4.6 MMOL/L — SIGNIFICANT CHANGE UP (ref 3.5–5.3)
POTASSIUM SERPL-SCNC: 4.6 MMOL/L — SIGNIFICANT CHANGE UP (ref 3.5–5.3)
PROT SERPL-MCNC: 7.9 G/DL — SIGNIFICANT CHANGE UP (ref 6–8.3)
RBC # BLD: 3.93 M/UL — LOW (ref 4.2–5.8)
RBC # FLD: 14.4 % — SIGNIFICANT CHANGE UP (ref 10.3–14.5)
SODIUM SERPL-SCNC: 138 MMOL/L — SIGNIFICANT CHANGE UP (ref 135–145)
WBC # BLD: 9.75 K/UL — SIGNIFICANT CHANGE UP (ref 3.8–10.5)
WBC # FLD AUTO: 9.75 K/UL — SIGNIFICANT CHANGE UP (ref 3.8–10.5)

## 2023-03-04 PROCEDURE — 99233 SBSQ HOSP IP/OBS HIGH 50: CPT

## 2023-03-04 PROCEDURE — 99223 1ST HOSP IP/OBS HIGH 75: CPT

## 2023-03-04 RX ORDER — BUMETANIDE 0.25 MG/ML
2 INJECTION INTRAMUSCULAR; INTRAVENOUS
Refills: 0 | Status: DISCONTINUED | OUTPATIENT
Start: 2023-03-04 | End: 2023-03-06

## 2023-03-04 RX ORDER — BUMETANIDE 0.25 MG/ML
3 INJECTION INTRAMUSCULAR; INTRAVENOUS
Refills: 0 | Status: DISCONTINUED | OUTPATIENT
Start: 2023-03-04 | End: 2023-03-04

## 2023-03-04 RX ORDER — ONDANSETRON 8 MG/1
4 TABLET, FILM COATED ORAL EVERY 8 HOURS
Refills: 0 | Status: DISCONTINUED | OUTPATIENT
Start: 2023-03-04 | End: 2023-03-10

## 2023-03-04 RX ORDER — METOPROLOL TARTRATE 50 MG
50 TABLET ORAL
Refills: 0 | Status: DISCONTINUED | OUTPATIENT
Start: 2023-03-04 | End: 2023-03-10

## 2023-03-04 RX ORDER — ACETAMINOPHEN 500 MG
650 TABLET ORAL EVERY 6 HOURS
Refills: 0 | Status: DISCONTINUED | OUTPATIENT
Start: 2023-03-04 | End: 2023-03-10

## 2023-03-04 RX ORDER — CHLORHEXIDINE GLUCONATE 213 G/1000ML
1 SOLUTION TOPICAL DAILY
Refills: 0 | Status: DISCONTINUED | OUTPATIENT
Start: 2023-03-04 | End: 2023-03-10

## 2023-03-04 RX ORDER — SPIRONOLACTONE 25 MG/1
25 TABLET, FILM COATED ORAL DAILY
Refills: 0 | Status: DISCONTINUED | OUTPATIENT
Start: 2023-03-04 | End: 2023-03-07

## 2023-03-04 RX ORDER — BUDESONIDE AND FORMOTEROL FUMARATE DIHYDRATE 160; 4.5 UG/1; UG/1
2 AEROSOL RESPIRATORY (INHALATION)
Refills: 0 | Status: DISCONTINUED | OUTPATIENT
Start: 2023-03-04 | End: 2023-03-10

## 2023-03-04 RX ORDER — SACUBITRIL AND VALSARTAN 24; 26 MG/1; MG/1
1 TABLET, FILM COATED ORAL
Refills: 0 | Status: DISCONTINUED | OUTPATIENT
Start: 2023-03-04 | End: 2023-03-07

## 2023-03-04 RX ORDER — LANOLIN ALCOHOL/MO/W.PET/CERES
3 CREAM (GRAM) TOPICAL AT BEDTIME
Refills: 0 | Status: DISCONTINUED | OUTPATIENT
Start: 2023-03-04 | End: 2023-03-10

## 2023-03-04 RX ORDER — ENOXAPARIN SODIUM 100 MG/ML
40 INJECTION SUBCUTANEOUS EVERY 24 HOURS
Refills: 0 | Status: DISCONTINUED | OUTPATIENT
Start: 2023-03-04 | End: 2023-03-05

## 2023-03-04 RX ADMIN — ENOXAPARIN SODIUM 40 MILLIGRAM(S): 100 INJECTION SUBCUTANEOUS at 06:10

## 2023-03-04 RX ADMIN — BUDESONIDE AND FORMOTEROL FUMARATE DIHYDRATE 2 PUFF(S): 160; 4.5 AEROSOL RESPIRATORY (INHALATION) at 21:00

## 2023-03-04 RX ADMIN — SACUBITRIL AND VALSARTAN 1 TABLET(S): 24; 26 TABLET, FILM COATED ORAL at 06:10

## 2023-03-04 RX ADMIN — SPIRONOLACTONE 25 MILLIGRAM(S): 25 TABLET, FILM COATED ORAL at 06:10

## 2023-03-04 RX ADMIN — SACUBITRIL AND VALSARTAN 1 TABLET(S): 24; 26 TABLET, FILM COATED ORAL at 17:40

## 2023-03-04 RX ADMIN — BUMETANIDE 2 MILLIGRAM(S): 0.25 INJECTION INTRAMUSCULAR; INTRAVENOUS at 17:40

## 2023-03-04 RX ADMIN — BUMETANIDE 3 MILLIGRAM(S): 0.25 INJECTION INTRAMUSCULAR; INTRAVENOUS at 12:04

## 2023-03-04 RX ADMIN — BUDESONIDE AND FORMOTEROL FUMARATE DIHYDRATE 2 PUFF(S): 160; 4.5 AEROSOL RESPIRATORY (INHALATION) at 08:51

## 2023-03-04 RX ADMIN — CHLORHEXIDINE GLUCONATE 1 APPLICATION(S): 213 SOLUTION TOPICAL at 12:03

## 2023-03-04 RX ADMIN — Medication 50 MILLIGRAM(S): at 08:50

## 2023-03-04 NOTE — CONSULT NOTE ADULT - ASSESSMENT
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 diuresed at [facility] and transferred to [facility] for RH biopsy.     #HFrEF  Warm and well perfused on exam.   Appears near/euvolemic on exam.   We presented to [facility] at the end of February volume overloaded and SOB where he responded well to IV diuresis.   Overall stage C HF, NYHA class III with suspicion that there may be an infiltrative process (e.g. amyloid vs. sarcoid)   (He was recently evaluated at [facility] were TTR amyloid which was negative but never underwent biopsy. Also noted to have R hilar LAD and axillary LAD on CT chest in 12/22 (negative for PE) and RVH)  Serum DELVIS reported a faint band in igG and kappa and quantitatively they were high, but mild.  He underwent BM biopsy at [facility] awaiting results.   Recommendations:   - Bumex 3mg daily  - Metoprolol XL 50mg daily  - Entresto 49/51mg BID  - Spironolactone 25mg daily  - Follow up BM biopsy  - Strict I&O's and daily standing WT's  - Strict Lytes  - Tele  - Cardiac biopsy likely Monday will need to be NPO sunday night, active covid test, hold am AC.     Note incomplete until cosinged by attending.        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 diuresed at [facility] and transferred to [facility] for RH biopsy.     #HFrEF  Warm and well perfused on exam.   Appears near euvolemia on exam, JVD is slightly elevated.   We presented to [facility] at the end of February volume overloaded and SOB where he responded well to IV diuresis.   Overall stage C HF, NYHA class III with suspicion that there may be an infiltrative process (e.g. amyloid vs. sarcoid)   (He was recently evaluated at [facility] were TTR amyloid which was negative but never underwent biopsy. Also noted to have R hilar LAD and axillary LAD on CT chest in 12/22 (negative for PE) and RVH)  Serum DELVIS reported a faint band in igG and kappa and quantitatively they were high, but mild.  He underwent BM biopsy at [facility] awaiting results.   Recommendations:   - Give 2mg of IV Bumex now  - Start 4mg IV bumex daily tm.   - Metoprolol XL 50mg daily  - Entresto 49/51mg BID  - Spironolactone 25mg daily  - Follow up BM biopsy  - Strict I&O's and daily standing WT's  - Strict Lytes  - Tele  - Cardiac biopsy likely Monday will need to be NPO sunday night, active covid test, hold am AC.     Note incomplete until cosigned by attending.        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 diuresed at [facility] and transferred to [facility] for RH biopsy.     #HFrEF  Warm and well perfused on exam.   Appears near euvolemia on exam, JVD is slightly elevated.   We presented to [facility] at the end of February volume overloaded and SOB where he responded well to IV diuresis.   Overall stage C HF, NYHA class III with suspicion that there may be an infiltrative process (e.g. amyloid vs. sarcoid)   (He was recently evaluated at [facility] were TTR amyloid which was negative but never underwent biopsy. Also noted to have R hilar LAD and axillary LAD on CT chest in 12/22 (negative for PE) and RVH)  Serum DELVIS reported a faint band in igG and kappa and quantitatively they were high, but mild.  He underwent BM biopsy at [facility] awaiting results.   Recommendations:   - Give 2mg of IV Bumex now  - Start 4mg IV bumex daily tm.   - Metoprolol XL 50mg daily  - Entresto 49/51mg BID  - Spironolactone 25mg daily  - Follow up BM biopsy  - Strict I&O's and daily standing WT's  - Strict Lytes  - Tele  - Cardiac biopsy likely Monday will need to be NPO sunday night, active covid test, hold am AC.     Note incomplete until cosigned by attending.        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 diuresed at [facility] and transferred to [facility] for RH biopsy.     #HFrEF  Warm and well perfused on exam.   Appears near euvolemia on exam, JVD is slightly elevated.   We presented to [facility] at the end of February volume overloaded and SOB where he responded well to IV diuresis.   Overall stage C HF, NYHA class III with suspicion that there may be an infiltrative process (e.g. amyloid vs. sarcoid)   (He was recently evaluated at [facility] were TTR amyloid which was negative but never underwent biopsy. Also noted to have R hilar LAD and axillary LAD on CT chest in 12/22 (negative for PE) and RVH)  Serum DELVIS reported a faint band in igG and kappa and quantitatively they were high, but mild.  He underwent BM biopsy at [facility] awaiting results.   Recommendations:   - Give 2mg of IV Bumex now  - start bumex 2mg IV BID   - Metoprolol XL 50mg daily  - Entresto 49/51mg BID  - Spironolactone 25mg daily  - Follow up BM biopsy  - Strict I&O's and daily standing WT's  - Strict Lytes  - Tele  - Cardiac biopsy likely Monday will need to be NPO sunday night, active covid test, hold am AC.     Note incomplete until cosigned by attending.

## 2023-03-04 NOTE — H&P ADULT - NSHPLABSRESULTS_GEN_ALL_CORE
I have reviewed the labs, imaging and ekg. 2/22 CXR w/o focal consolidations. EKG with NSR 96 QTc 477, concern for inferior infarct

## 2023-03-04 NOTE — H&P ADULT - HISTORY OF PRESENT ILLNESS
62M w/ PMHx of HTN, RA, RCC s/p R partial nephrectomy, CKD, HFrEF/NICM EF 25-30%, ICD, plasma cell dyscrasia, h/o H-pylori, treated, seen at HF clinic w/ recent extended admission on [facility] now transferred to [facility] for likely RHC and cardiac biopsy. Pt admitted to [facility] around 8 days ago with acute on chronic CHF exacerbation. Followed by HF, cardiology and hem/onc. ICD interrogated. Was diuresed with IV Bumex and eventually transitioned to PO. Had repeat TTE. Bone marrow biopsy by IR on 3/1 to evaluate for MM and MGUS. TTE repeated as well. Eventual plan to transfer patient to [facility] for likely RHC and cardiac biopsy to further evaluation for amyloid. Pt currently denies any chest pain, SOB or specific complaints.

## 2023-03-04 NOTE — H&P ADULT - PROBLEM SELECTOR PLAN 1
Now appears relatively euvolemic. Transferred to [facility] for possible RHC and cardiac biopsy  -Cont. Bumex 3mg daily with hold parameters  -HF consult as per Dr. Vu  -Cont. Toprol daily with hold parameters  -Cont. Entresto BID with hold parameters  -Cont. spironolactone daily  -Daily weights and strict I/Os  -Telemetry  -Cardiology consult regarding RHC and cardiac biopsy for amyloid/ sarcoid eval.

## 2023-03-04 NOTE — PROGRESS NOTE ADULT - SUBJECTIVE AND OBJECTIVE BOX
Cardiovascular Disease Progress Note    Overnight events: No acute events overnight. no cp/sob/palps   Otherwise review of systems negative    Objective Findings:  T(C): 36.3 (23 @ 04:50), Max: 36.7 (23 @ 11:30)  HR: 103 (23 @ 08:47) (73 - 111)  BP: 98/67 (23 @ 08:47) (91/61 - 98/67)  RR: 18 (23 @ 04:50) (18 - 18)  SpO2: 99% (23 @ 04:50) (96% - 99%)  Wt(kg): --  Daily Height in cm: 167.64 (04 Mar 2023 03:19)    Daily Weight in k.8 (04 Mar 2023 08:15)      Physical Exam:  Gen: NAD  HEENT: EOMI  CV: RRR, normal S1 + S2, no m/r/g  Lungs: CTAB  Abd: soft, non-tender  Ext: No edema    Telemetry:    Laboratory Data:                        11.5   9.75  )-----------( 287      ( 04 Mar 2023 06:43 )             37.5     03-04    138  |  103  |  54<H>  ----------------------------<  92  4.6   |  22  |  1.66<H>    Ca    9.5      04 Mar 2023 06:43  Phos  4.1     03-03  Mg     2.1     -    TPro  7.9  /  Alb  3.8  /  TBili  0.3  /  DBili  x   /  AST  22  /  ALT  14  /  AlkPhos  131<H>  03-04    PTT - ( 03 Mar 2023 07:25 )  PTT:32.9 sec          Inpatient Medications:  MEDICATIONS  (STANDING):  budesonide 160 MICROgram(s)/formoterol 4.5 MICROgram(s) Inhaler 2 Puff(s) Inhalation two times a day  buMETAnide 3 milliGRAM(s) Oral <User Schedule>  chlorhexidine 2% Cloths 1 Application(s) Topical daily  enoxaparin Injectable 40 milliGRAM(s) SubCutaneous every 24 hours  metoprolol succinate ER 50 milliGRAM(s) Oral <User Schedule>  sacubitril 49 mG/valsartan 51 mG 1 Tablet(s) Oral two times a day  spironolactone 25 milliGRAM(s) Oral daily      Assessment:  62yoM with PMH of HTN, RA, RCC s/p R partial nephrectomy, CKD, HFrEF/NICM EF 25-30%, ICD, plasma cell dyscrasia, seen at HF clinic today, sent to ED for worsening heart failure. pt admits to progressively increasing SOB, AVALOS with cough, worsening at night over past 6 months, however increasingly worse over past month    Recs:  diuresis per CHF  cont GDMT, lvef improved  heme f/u for plasma cell dyscrasia  s/p BM biopsy on 3/1/23  pending cardiac bx   tele monitoring   will follow        Over 25 minutes spent on total encounter; more than 50% of the visit was spent counseling and/or coordinating care by the attending physician.      Juarez Carver MD   Cardiovascular Disease  [phone]

## 2023-03-04 NOTE — H&P ADULT - NSHPADDITIONALINFOADULT_GEN_ALL_CORE
I was asked to see this patient by the hospitalist in charge. Dr. Vu to assume care for patient in AM and thereafter

## 2023-03-04 NOTE — H&P ADULT - ASSESSMENT
62M w/ PMHx of HTN, RA, RCC s/p R partial nephrectomy, CKD, HFrEF/NICM EF 25-30%, ICD, plasma cell dyscrasia, h/o H-pylori, treated, seen at HF clinic w/ recent extended CHF admission on [facility] now transferred to [facility] for likely RHC and cardiac biopsy

## 2023-03-04 NOTE — CONSULT NOTE ADULT - SUBJECTIVE AND OBJECTIVE BOX
Patient seen and evaluated at bedside    Reason for consult: RH biopsy        HPI per primary team:  62M w/ PMHx of HTN, RA, RCC s/p R partial nephrectomy, CKD, HFrEF/NICM EF 25-30%, ICD, plasma cell dyscrasia, h/o H-pylori, treated, seen at HF clinic w/ recent extended admission on [facility] now transferred to [facility] for likely RHC and cardiac biopsy. Pt admitted to [facility] around 8 days ago with acute on chronic CHF exacerbation. Followed by HF, cardiology and hem/onc. ICD interrogated. Was diuresed with IV Bumex and eventually transitioned to PO. Had repeat TTE. Bone marrow biopsy by IR on 3/1 to evaluate for MM and MGUS. TTE repeated as well. Eventual plan to transfer patient to [facility] for likely RHC and cardiac biopsy to further evaluation for amyloid. Pt currently denies any chest pain, SOB or specific complaints.   (04 Mar 2023 02:32)      PMHx:   Hypertension    Rheumatoid arthritis    Neoplasm of uncertain behavior of kidney, right        PSHx:   No significant past surgical history    History of cardiac cath        Allergies:  No Known Allergies      Home Meds:    Current Medications:   acetaminophen     Tablet .. 650 milliGRAM(s) Oral every 6 hours PRN  aluminum hydroxide/magnesium hydroxide/simethicone Suspension 30 milliLiter(s) Oral every 4 hours PRN  budesonide 160 MICROgram(s)/formoterol 4.5 MICROgram(s) Inhaler 2 Puff(s) Inhalation two times a day  buMETAnide 3 milliGRAM(s) Oral <User Schedule>  chlorhexidine 2% Cloths 1 Application(s) Topical daily  enoxaparin Injectable 40 milliGRAM(s) SubCutaneous every 24 hours  melatonin 3 milliGRAM(s) Oral at bedtime PRN  metoprolol succinate ER 50 milliGRAM(s) Oral <User Schedule>  ondansetron Injectable 4 milliGRAM(s) IV Push every 8 hours PRN  sacubitril 49 mG/valsartan 51 mG 1 Tablet(s) Oral two times a day  spironolactone 25 milliGRAM(s) Oral daily      FAMILY HISTORY:  FH: diabetes mellitus (Mother)        REVIEW OF SYSTEMS:  CONSTITUTIONAL: No weakness, fevers or chills  EYES/ENT: No visual changes;  No dysphagia  NECK: No pain or stiffness  RESPIRATORY: SOB considerably improved.   CARDIOVASCULAR: No chest pain or palpitations; No lower extremity edema  GASTROINTESTINAL: No abdominal or epigastric pain. No nausea, vomiting  BACK: No back pain  GENITOURINARY: No dysuria, frequency or hematuria  NEUROLOGICAL: No numbness or weakness  SKIN: No itching, burning, rashes, or lesions   All other review of systems is negative unless indicated above.      Physical Exam:  T(F): 97.4 (03-04), Max: 98.1 (03-03)  HR: 103 (03-04) (73 - 111)  BP: 98/67 (03-04) (91/61 - 98/67)  RR: 18 (03-04)  SpO2: 99% (03-04)  GENERAL: No acute distress, well-developed  HEAD:  Atraumatic, Normocephalic  ENT: EOMI, PERRLA, conjunctiva and sclera clear, Neck supple. JVD elevated to lower 1/3 neck at 45 degrees with HJR to mid neck   CHEST/LUNG: Clear to auscultation bilaterally  BACK: No spinal tenderness  HEART: Tachy Regular rate and rhythm s3  ABDOMEN: Soft, Nontender, Nondistended; Bowel sounds present  EXTREMITIES:  No clubbing, cyanosis, or edema  PSYCH: Nl behavior, nl affect  NEUROLOGY: AAOx3, non-focal, cranial nerves intact  SKIN: Normal color, No rashes or lesions    CXR: Personally reviewed    Labs: Personally reviewed                        11.5   9.75  )-----------( 287      ( 04 Mar 2023 06:43 )             37.5     03-04    138  |  103  |  54<H>  ----------------------------<  92  4.6   |  22  |  1.66<H>    Ca    9.5      04 Mar 2023 06:43  Phos  4.1     03-03  Mg     2.1     03-04    TPro  7.9  /  Alb  3.8  /  TBili  0.3  /  DBili  x   /  AST  22  /  ALT  14  /  AlkPhos  131<H>  03-04    PTT - ( 03 Mar 2023 07:25 )  PTT:32.9 sec  Serum Pro-Brain Natriuretic Peptide: 2951 pg/mL (03-04 @ 06:43)      Transthoracic Echocardiogram (02.24.23 @ 16:42)  CONCLUSIONS:  1. Mitral annular calcification, otherwise normal mitral  valve. Mild mitral regurgitation.  2. Increased relative wall thickness with normal left  ventricular mass index, consistent with concentric left  ventricular remodeling.  3. Endocardium not well visualized; grossly normal left  ventricular systolic function.  4. Severe right atrial enlargement.  5. Right ventricular enlargement with decreased right  ventricular systolic function. A device wire is noted in  the right heart.  Systolic flattening of the  interventricular septum, consistent with RV pressure  overload.  6. Estimated right ventricular systolic pressure equals 60  mm Hg, assuming right atrial pressure equals 10 mm Hg,  consistent with moderate pulmonary hypertension.  7. Global longitudinal strain (GLS) analysis was performed  with the [place] Vivid E95 (/minute; BP 99/65); global  longitudinal strain equals -12.3%, which is reduced.        MR Cardiac w/wo IV Cont (12.16.22 @ 11:15)   FINDINGS:      MORPHOLOGY:    PERICARDIUM: The pericardium is normal in thickness (less than 4mm).   There is no pericardial effusion.    RIGHT ATRIUM: The right atrium may be enlarged. The inflow the IVC and   SVC are unremarkable.    RIGHT VENTRICLE: Right ventricle is unremarkable. There is no scalloping   or thickening of the free wall the right ventricle.    LEFT ATRIUM: Left atrium measures 3.0 cm the 3 chamber plane. There are   bilateral superior and inferior pulmonary veins.    LEFT VENTRICLE: Concentric thickening of the left ventricular myocardium.   Left ventricle measures 4.1 cm at end diastole and 3.5 cm end systole   (anteroseptal/inferolateral). There is no convincing focus of increased   T2 signal.    There is no convincing first pass perfusion abnormality. At the inferior   hinge point of the right ventricle, there is subtle late gadolinium   enhancement. There are no other convincing foci of late gadolinium   enhancement shown on the early or late phases of late gadolinium enhanced   imaging.    FUNCTION: No segmental wall motion abnormality. No global wall motion   abnormality.    LEFT VENTRICLE:  Unindexed:  EF: 36.04 %  EDV: 117.09 mL  ESV: 74.89 mL  SV: 42.20 mL  CO: 3.96 L/min    Indexed:  EDVi: 69.40 mL/m2  ESVi: 44.38 mL/m2  SV: 25.01 mL/m2  CO: 2.35 L/min/m2      VALVES:    MITRAL VALVE: Mitral regurgitation, not quantified  AORTIC VALVE: Aortic regurgitation, not quantified.      AORTA: Three-vessel left-sided aortic arch and left-sided descending   thoracic aorta.      NONCARDIAC FINDINGS: The other visualized thoracoabdominal structures are   unremarkable.      IMPRESSION:    1.  Subtle late gadolinium enhancement along the inferior hinge point of   the right ventricle can occur in a variety of clinical settings including   altered ventricular mechanics.  2.  Concentric thickening of the ventricular myocardium.  3.  The estimated left ventricular ejection fraction is 36.04%.

## 2023-03-04 NOTE — H&P ADULT - NSHPPHYSICALEXAM_GEN_ALL_CORE
Vital Signs Last 24 Hrs  T(C): 36.3 (03-03-23 @ 23:43), Max: 36.7 (03-03-23 @ 11:30)  T(F): 97.3 (03-03-23 @ 23:43), Max: 98.1 (03-03-23 @ 11:30)  HR: 109 (03-03-23 @ 23:43) (95 - 111)  BP: 91/63 (03-03-23 @ 23:43) (90/64 - 97/61)  BP(mean): --  RR: 18 (03-03-23 @ 23:43) (18 - 18)  SpO2: 97% (03-03-23 @ 23:43) (96% - 100%)

## 2023-03-04 NOTE — PATIENT PROFILE ADULT - FALL HARM RISK - HARM RISK INTERVENTIONS

## 2023-03-05 LAB
ANION GAP SERPL CALC-SCNC: 14 MMOL/L — SIGNIFICANT CHANGE UP (ref 5–17)
BUN SERPL-MCNC: 58 MG/DL — HIGH (ref 7–23)
CALCIUM SERPL-MCNC: 9.9 MG/DL — SIGNIFICANT CHANGE UP (ref 8.4–10.5)
CHLORIDE SERPL-SCNC: 99 MMOL/L — SIGNIFICANT CHANGE UP (ref 96–108)
CO2 SERPL-SCNC: 23 MMOL/L — SIGNIFICANT CHANGE UP (ref 22–31)
CREAT SERPL-MCNC: 1.64 MG/DL — HIGH (ref 0.5–1.3)
EGFR: 47 ML/MIN/1.73M2 — LOW
GLUCOSE SERPL-MCNC: 83 MG/DL — SIGNIFICANT CHANGE UP (ref 70–99)
HCT VFR BLD CALC: 40.9 % — SIGNIFICANT CHANGE UP (ref 39–50)
HGB BLD-MCNC: 12.5 G/DL — LOW (ref 13–17)
MAGNESIUM SERPL-MCNC: 2.2 MG/DL — SIGNIFICANT CHANGE UP (ref 1.6–2.6)
MCHC RBC-ENTMCNC: 29.8 PG — SIGNIFICANT CHANGE UP (ref 27–34)
MCHC RBC-ENTMCNC: 30.6 GM/DL — LOW (ref 32–36)
MCV RBC AUTO: 97.4 FL — SIGNIFICANT CHANGE UP (ref 80–100)
MRSA PCR RESULT.: SIGNIFICANT CHANGE UP
NRBC # BLD: 0 /100 WBCS — SIGNIFICANT CHANGE UP (ref 0–0)
PLATELET # BLD AUTO: 312 K/UL — SIGNIFICANT CHANGE UP (ref 150–400)
POTASSIUM SERPL-MCNC: 4.4 MMOL/L — SIGNIFICANT CHANGE UP (ref 3.5–5.3)
POTASSIUM SERPL-SCNC: 4.4 MMOL/L — SIGNIFICANT CHANGE UP (ref 3.5–5.3)
RBC # BLD: 4.2 M/UL — SIGNIFICANT CHANGE UP (ref 4.2–5.8)
RBC # FLD: 14.4 % — SIGNIFICANT CHANGE UP (ref 10.3–14.5)
S AUREUS DNA NOSE QL NAA+PROBE: SIGNIFICANT CHANGE UP
SODIUM SERPL-SCNC: 136 MMOL/L — SIGNIFICANT CHANGE UP (ref 135–145)
WBC # BLD: 11.58 K/UL — HIGH (ref 3.8–10.5)
WBC # FLD AUTO: 11.58 K/UL — HIGH (ref 3.8–10.5)

## 2023-03-05 RX ORDER — LORATADINE 10 MG/1
10 TABLET ORAL ONCE
Refills: 0 | Status: COMPLETED | OUTPATIENT
Start: 2023-03-05 | End: 2023-03-05

## 2023-03-05 RX ORDER — LORATADINE 10 MG/1
10 TABLET ORAL DAILY
Refills: 0 | Status: DISCONTINUED | OUTPATIENT
Start: 2023-03-05 | End: 2023-03-05

## 2023-03-05 RX ADMIN — BUDESONIDE AND FORMOTEROL FUMARATE DIHYDRATE 2 PUFF(S): 160; 4.5 AEROSOL RESPIRATORY (INHALATION) at 09:59

## 2023-03-05 RX ADMIN — BUMETANIDE 2 MILLIGRAM(S): 0.25 INJECTION INTRAMUSCULAR; INTRAVENOUS at 14:16

## 2023-03-05 RX ADMIN — BUMETANIDE 2 MILLIGRAM(S): 0.25 INJECTION INTRAMUSCULAR; INTRAVENOUS at 05:50

## 2023-03-05 RX ADMIN — ENOXAPARIN SODIUM 40 MILLIGRAM(S): 100 INJECTION SUBCUTANEOUS at 05:50

## 2023-03-05 RX ADMIN — BUDESONIDE AND FORMOTEROL FUMARATE DIHYDRATE 2 PUFF(S): 160; 4.5 AEROSOL RESPIRATORY (INHALATION) at 21:34

## 2023-03-05 RX ADMIN — SACUBITRIL AND VALSARTAN 1 TABLET(S): 24; 26 TABLET, FILM COATED ORAL at 17:07

## 2023-03-05 RX ADMIN — CHLORHEXIDINE GLUCONATE 1 APPLICATION(S): 213 SOLUTION TOPICAL at 11:08

## 2023-03-05 RX ADMIN — LORATADINE 10 MILLIGRAM(S): 10 TABLET ORAL at 03:09

## 2023-03-05 NOTE — PROGRESS NOTE ADULT - ASSESSMENT
Anemia, CKD, and monoclonal protein (IgG kappa.) Bone marrow biopsy was done to assess for MM/amyloid. Most results still pending. Flow cytometry reports polytypic plasma cells, but that can underestimate. Await final pathology, but low suspicion for amyloid. CKD is stable. Calcium is normal. Anemia is mild and no need for Epo at this time. Care per medicine/cardiology.

## 2023-03-05 NOTE — PROGRESS NOTE ADULT - SUBJECTIVE AND OBJECTIVE BOX
Patient is a 62y old  Male who presents with a chief complaint of Need for cardiac biopsy (04 Mar 2023 11:22)  Sister at bedside. Says that he is breathing okay. No CP. Eating and no N/V/D or abd pain. No fever. No HA or dizziness.     Medication:   acetaminophen     Tablet .. 650 milliGRAM(s) Oral every 6 hours PRN  aluminum hydroxide/magnesium hydroxide/simethicone Suspension 30 milliLiter(s) Oral every 4 hours PRN  budesonide 160 MICROgram(s)/formoterol 4.5 MICROgram(s) Inhaler 2 Puff(s) Inhalation two times a day  buMETAnide Injectable 2 milliGRAM(s) IV Push two times a day  chlorhexidine 2% Cloths 1 Application(s) Topical daily  melatonin 3 milliGRAM(s) Oral at bedtime PRN  metoprolol succinate ER 50 milliGRAM(s) Oral <User Schedule>  ondansetron Injectable 4 milliGRAM(s) IV Push every 8 hours PRN  sacubitril 49 mG/valsartan 51 mG 1 Tablet(s) Oral two times a day  spironolactone 25 milliGRAM(s) Oral daily      Physical exam    T(C): 36.7 (03-05-23 @ 12:53), Max: 36.7 (03-04-23 @ 21:25)  HR: 107 (03-05-23 @ 12:53) (105 - 112)  BP: 95/65 (03-05-23 @ 12:53) (87/50 - 102/68)  RR: 18 (03-05-23 @ 12:53) (18 - 18)  SpO2: 98% (03-05-23 @ 12:53) (95% - 98%)  Wt(kg): --    alert NAD  EOMI anicteric sclera  Cv s1 S2 RRR   Lungs clear B/L anteriorly  abd soft NT ND +BS  No LE edema or tenderness    Labs                        12.5   11.58 )-----------( 312      ( 05 Mar 2023 07:17 )             40.9       03-05    136  |  99  |  58<H>  ----------------------------<  83  4.4   |  23  |  1.64<H>    Ca    9.9      05 Mar 2023 07:16  Mg     2.2     03-05    TPro  7.9  /  Alb  3.8  /  TBili  0.3  /  DBili  x   /  AST  22  /  ALT  14  /  AlkPhos  131<H>  03-04      LIVER FUNCTIONS - ( 04 Mar 2023 06:43 )  Alb: 3.8 g/dL / Pro: 7.9 g/dL / ALK PHOS: 131 U/L / ALT: 14 U/L / AST: 22 U/L / GGT: x             [id number]

## 2023-03-05 NOTE — PROGRESS NOTE ADULT - ASSESSMENT
_________________________________________________________________________________________  ========>>  M E D I C A L   A T T E N D I N G    F O L L O W  U P  N O T E  <<=========  -----------------------------------------------------------------------------------------------------    - Patient seen and examined by me earlier today.   - In summary,  PLACIDO GOLDMAN is a 62y year old man admitted with CHF exacerbation  - Patient today overall doing ok, comfortable, eating OK.     Patient overall feeling okay, no shortness of breath at rest, no chest pain. encouraged out of bed to chair with assistance as needed. ( reports mild SOB with ambulation )      ==================>> REVIEW OF SYSTEM <<=================    GEN: no fever, no chills, no pain  RESP: no SOB at rest , no cough, no sputum  CVS: no chest pain, no palpitations, no edema  GI: no abdominal pain, no nausea,  : no dysuria, no frequency  Neuro: no headache, no dizziness  Derm : no itching, no rash    ==================>> PHYSICAL EXAM <<=================    GEN: A&O X 3 , NAD , comfortable, pleasant, calm in recliner chair    HEENT: NCAT, PERRL, MMM, hearing intact, not on O2   Neck: supple , no JVD appreciated  CVS: S1S2 , regular , Tachycardic.  Sinus tachycardia on telemetry  PULM: CTA B/L,  no W/R/R appreciated  ABD.: soft. non tender, non distended,  bowel sounds present  Extrem: intact pulses , no signif edema   PSYCH : normal mood,  not anxious                             ( Note written / Date of service 03-05-23 )    ==================>> MEDICATIONS <<====================    budesonide 160 MICROgram(s)/formoterol 4.5 MICROgram(s) Inhaler 2 Puff(s) Inhalation two times a day  buMETAnide Injectable 2 milliGRAM(s) IV Push two times a day  chlorhexidine 2% Cloths 1 Application(s) Topical daily  metoprolol succinate ER 50 milliGRAM(s) Oral <User Schedule>  sacubitril 49 mG/valsartan 51 mG 1 Tablet(s) Oral two times a day  spironolactone 25 milliGRAM(s) Oral daily    MEDICATIONS  (PRN):  acetaminophen     Tablet .. 650 milliGRAM(s) Oral every 6 hours PRN Temp greater or equal to 38C (100.4F), Mild Pain (1 - 3)  aluminum hydroxide/magnesium hydroxide/simethicone Suspension 30 milliLiter(s) Oral every 4 hours PRN Dyspepsia  melatonin 3 milliGRAM(s) Oral at bedtime PRN Insomnia  ondansetron Injectable 4 milliGRAM(s) IV Push every 8 hours PRN Nausea and/or Vomiting    ___________  Active diet:  Diet, NPO after Midnight:      NPO Start Date: 05-Mar-2023,   NPO Start Time: 23:59  Diet, Regular:   Low Sodium  ___________________    ==================>> VITAL SIGNS <<==================    Height (cm): 167.6  Weight (kg): 56.6  BMI (kg/m2): 20.1  Vital Signs Last 24 HrsT(C): 36.7 (03-05-23 @ 12:53)  T(F): 98 (03-05-23 @ 12:53), Max: 98 (03-04-23 @ 21:25)  HR: 108 (03-05-23 @ 17:05) (106 - 112)  BP: 98/72 (03-05-23 @ 17:05)  RR: 18 (03-05-23 @ 12:53) (18 - 18)  SpO2: 98% (03-05-23 @ 12:53) (95% - 98%)       ==================>> LAB AND IMAGING <<==================                        12.5   11.58 )-----------( 312      ( 05 Mar 2023 07:17 )             40.9        03-05    136  |  99  |  58<H>  ----------------------------<  83  4.4   |  23  |  1.64<H>    Ca    9.9      05 Mar 2023 07:16  Mg     2.2     03-05    TPro  7.9  /  Alb  3.8  /  TBili  0.3  /  DBili  x   /  AST  22  /  ALT  14  /  AlkPhos  131<H>  03-04    WBC count:   11.58 <<== ,  9.75 <<== ,  9.64 <<== ,  10.83 <<== ,  9.91 <<==   Hemoglobin:   12.5 <<==,  11.5 <<==,  11.9 <<==,  11.5 <<==,  11.5 <<==  platelets:  312 <==, 287 <==, 283 <==, 236 <==, 271 <==, 301 <==    Creatinine:  1.64  <<==, 1.66  <<==, 1.63  <<==, 1.51  <<==, 1.40  <<==, 1.42  <<==  Sodium:   136  <==, 138  <==, 137  <==, 135  <==, 138  <==, 136  <==       AST:          22 <== , 23 <==      ALT:        14  <== , 18  <==      AP:        131  <=, 134  <=     Bili:        0.3  <=, 0.5  <=    __________________________________________________________________________________  ===============>>  A S S E S S M E N T   A N D   P L A N <<===============  ------------------------------------------------------------------------------------------    · Assessment	  Pt is a 63 yo man with PMH of HTN, RA, RCC s/p R partial nephrectomy, CKD, HFrEF/NICM EF 25-30%, ICD, plasma cell dyscrasia, h/o H-pylori, treated, seen at HF clinic and sent to ED for worsening heart failure.   pt admits to progressively increasing SOB, AVALOS with cough, worsening at night over past 6 months with leg edema,  however increasingly worse over past month.   Pt using O2 at home as needed.   Pt denies any chest pain, fevers, recent surgeries, dizziness, bloody stools.     Problem/Plan - 1:  ·  Problem: Acute on chronic systolic heart failure.   Cardiology and heart failure following management appreciated.    diuretics s needed as per cardiology  monitor Ins and outs, weight, labs, and vitals closely  O2 as needed   medical optimization  patient post AICD in December  Continue Current medications otherwise and monitor.  supportive care.    Problem/Plan - 2:  ·  Problem: Acute on chronic respiratory failure with hypoxia.   ·  Plan: as above  diuresis  wean off O2 as able  patient has PRN O2 at home.    patient post transfer to [facility] for right heart cath and cardiac  Bx    Problem/Plan - 3:  ·  Problem: history of  Hypertension.   Patient currently with low blood pressures.    Appreciated heart failure recommendations.    adjustment of medications as tolerated per specialty   monitor and adjust as needed    Problem/Plan - 4:  ·  Problem: Stage 3a chronic kidney disease.   ·  Plan: currently appears stable  monitor  Avoid nephrotoxic medications.    Problem/Plan - 5:  ·  Problem: history of Plasma cell dyscrasia.   Oncology consulted and appreciated.    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  Recommending a bone marrow biopsy.  post bone bx  cardiac bx with Rt heart cath as planned        --------------------------------------------  Case discussed With patient , family at bedside.. NP  Education given on findings and plan of care  ___________________________  H. JEANETH Vu.  Pager: [phone]

## 2023-03-06 DIAGNOSIS — I50.23 ACUTE ON CHRONIC SYSTOLIC (CONGESTIVE) HEART FAILURE: ICD-10-CM

## 2023-03-06 LAB
ALBUMIN SERPL ELPH-MCNC: 3.9 G/DL — SIGNIFICANT CHANGE UP (ref 3.3–5)
ALP SERPL-CCNC: 134 U/L — HIGH (ref 40–120)
ALT FLD-CCNC: 14 U/L — SIGNIFICANT CHANGE UP (ref 10–45)
ANION GAP SERPL CALC-SCNC: 15 MMOL/L — SIGNIFICANT CHANGE UP (ref 5–17)
APTT BLD: 32.6 SEC — SIGNIFICANT CHANGE UP (ref 27.5–35.5)
AST SERPL-CCNC: 23 U/L — SIGNIFICANT CHANGE UP (ref 10–40)
BILIRUB SERPL-MCNC: 0.4 MG/DL — SIGNIFICANT CHANGE UP (ref 0.2–1.2)
BLD GP AB SCN SERPL QL: NEGATIVE — SIGNIFICANT CHANGE UP
BUN SERPL-MCNC: 67 MG/DL — HIGH (ref 7–23)
CALCIUM SERPL-MCNC: 9.7 MG/DL — SIGNIFICANT CHANGE UP (ref 8.4–10.5)
CHLORIDE SERPL-SCNC: 100 MMOL/L — SIGNIFICANT CHANGE UP (ref 96–108)
CHROM ANALY INTERPHASE BLD FISH-IMP: SIGNIFICANT CHANGE UP
CO2 SERPL-SCNC: 21 MMOL/L — LOW (ref 22–31)
CREAT SERPL-MCNC: 1.79 MG/DL — HIGH (ref 0.5–1.3)
EGFR: 42 ML/MIN/1.73M2 — LOW
GLUCOSE SERPL-MCNC: 86 MG/DL — SIGNIFICANT CHANGE UP (ref 70–99)
HCT VFR BLD CALC: 37.5 % — LOW (ref 39–50)
HEMATOPATHOLOGY REPORT: SIGNIFICANT CHANGE UP
HGB BLD-MCNC: 11.7 G/DL — LOW (ref 13–17)
HGB FLD-MCNC: 12.2 G/DL — LOW (ref 12.6–17.4)
HGB FLD-MCNC: 12.3 G/DL — LOW (ref 12.6–17.4)
HGB FLD-MCNC: 12.3 G/DL — LOW (ref 12.6–17.4)
HGB FLD-MCNC: 12.4 G/DL — LOW (ref 12.6–17.4)
HGB FLD-MCNC: 12.5 G/DL — LOW (ref 12.6–17.4)
HGB FLD-MCNC: 12.6 G/DL — SIGNIFICANT CHANGE UP (ref 12.6–17.4)
HGB FLD-MCNC: 12.6 G/DL — SIGNIFICANT CHANGE UP (ref 12.6–17.4)
INR BLD: 1.19 RATIO — HIGH (ref 0.88–1.16)
MCHC RBC-ENTMCNC: 29.6 PG — SIGNIFICANT CHANGE UP (ref 27–34)
MCHC RBC-ENTMCNC: 31.2 GM/DL — LOW (ref 32–36)
MCV RBC AUTO: 94.9 FL — SIGNIFICANT CHANGE UP (ref 80–100)
NRBC # BLD: 0 /100 WBCS — SIGNIFICANT CHANGE UP (ref 0–0)
OXYHGB MFR BLDMV: 68 % — LOW (ref 90–95)
OXYHGB MFR BLDMV: 68.1 % — LOW (ref 90–95)
OXYHGB MFR BLDMV: 68.9 % — LOW (ref 90–95)
OXYHGB MFR BLDMV: 69.4 % — LOW (ref 90–95)
OXYHGB MFR BLDMV: 69.8 % — LOW (ref 90–95)
OXYHGB MFR BLDMV: 70.7 % — LOW (ref 90–95)
OXYHGB MFR BLDMV: 72.8 % — LOW (ref 90–95)
PLATELET # BLD AUTO: 292 K/UL — SIGNIFICANT CHANGE UP (ref 150–400)
POTASSIUM SERPL-MCNC: 4.4 MMOL/L — SIGNIFICANT CHANGE UP (ref 3.5–5.3)
POTASSIUM SERPL-SCNC: 4.4 MMOL/L — SIGNIFICANT CHANGE UP (ref 3.5–5.3)
PROT SERPL-MCNC: 8.1 G/DL — SIGNIFICANT CHANGE UP (ref 6–8.3)
PROTHROM AB SERPL-ACNC: 13.7 SEC — HIGH (ref 10.5–13.4)
RBC # BLD: 3.95 M/UL — LOW (ref 4.2–5.8)
RBC # FLD: 14.3 % — SIGNIFICANT CHANGE UP (ref 10.3–14.5)
RH IG SCN BLD-IMP: NEGATIVE — SIGNIFICANT CHANGE UP
SAO2 % BLD: 69.4 % — SIGNIFICANT CHANGE UP (ref 60–90)
SAO2 % BLD: 69.6 % — SIGNIFICANT CHANGE UP (ref 60–90)
SAO2 % BLD: 70.4 % — SIGNIFICANT CHANGE UP (ref 60–90)
SAO2 % BLD: 71 % — SIGNIFICANT CHANGE UP (ref 60–90)
SAO2 % BLD: 71.4 % — SIGNIFICANT CHANGE UP (ref 60–90)
SAO2 % BLD: 72.1 % — SIGNIFICANT CHANGE UP (ref 60–90)
SAO2 % BLD: 74.5 % — SIGNIFICANT CHANGE UP (ref 60–90)
SARS-COV-2 RNA SPEC QL NAA+PROBE: SIGNIFICANT CHANGE UP
SODIUM SERPL-SCNC: 136 MMOL/L — SIGNIFICANT CHANGE UP (ref 135–145)
WBC # BLD: 10.26 K/UL — SIGNIFICANT CHANGE UP (ref 3.8–10.5)
WBC # FLD AUTO: 10.26 K/UL — SIGNIFICANT CHANGE UP (ref 3.8–10.5)

## 2023-03-06 PROCEDURE — 99233 SBSQ HOSP IP/OBS HIGH 50: CPT | Mod: 25

## 2023-03-06 PROCEDURE — 88307 TISSUE EXAM BY PATHOLOGIST: CPT | Mod: 26

## 2023-03-06 PROCEDURE — 93308 TTE F-UP OR LMTD: CPT | Mod: 26

## 2023-03-06 PROCEDURE — 88313 SPECIAL STAINS GROUP 2: CPT | Mod: 26

## 2023-03-06 PROCEDURE — 93505 ENDOMYOCARDIAL BIOPSY: CPT | Mod: 26

## 2023-03-06 PROCEDURE — 93321 DOPPLER ECHO F-UP/LMTD STD: CPT | Mod: 26

## 2023-03-06 RX ADMIN — SACUBITRIL AND VALSARTAN 1 TABLET(S): 24; 26 TABLET, FILM COATED ORAL at 07:23

## 2023-03-06 RX ADMIN — SPIRONOLACTONE 25 MILLIGRAM(S): 25 TABLET, FILM COATED ORAL at 07:23

## 2023-03-06 RX ADMIN — SACUBITRIL AND VALSARTAN 1 TABLET(S): 24; 26 TABLET, FILM COATED ORAL at 20:38

## 2023-03-06 RX ADMIN — BUDESONIDE AND FORMOTEROL FUMARATE DIHYDRATE 2 PUFF(S): 160; 4.5 AEROSOL RESPIRATORY (INHALATION) at 09:36

## 2023-03-06 RX ADMIN — BUMETANIDE 2 MILLIGRAM(S): 0.25 INJECTION INTRAMUSCULAR; INTRAVENOUS at 07:23

## 2023-03-06 RX ADMIN — CHLORHEXIDINE GLUCONATE 1 APPLICATION(S): 213 SOLUTION TOPICAL at 11:06

## 2023-03-06 RX ADMIN — BUDESONIDE AND FORMOTEROL FUMARATE DIHYDRATE 2 PUFF(S): 160; 4.5 AEROSOL RESPIRATORY (INHALATION) at 20:39

## 2023-03-06 NOTE — PROGRESS NOTE ADULT - ASSESSMENT
Monoclonal protein - s/p bone marrow biopsy to assess for MM/amyloid. Await final results. Flow cytometry was negative for clonal plasma cells.    AOCD due to CKD. It is has been mild and can monitor. No need for Epo at this time.

## 2023-03-06 NOTE — PROGRESS NOTE ADULT - SUBJECTIVE AND OBJECTIVE BOX
Interval History:  reports cough when sits upright  awaiting procedure     Medications:  acetaminophen     Tablet .. 650 milliGRAM(s) Oral every 6 hours PRN  aluminum hydroxide/magnesium hydroxide/simethicone Suspension 30 milliLiter(s) Oral every 4 hours PRN  budesonide 160 MICROgram(s)/formoterol 4.5 MICROgram(s) Inhaler 2 Puff(s) Inhalation two times a day  chlorhexidine 2% Cloths 1 Application(s) Topical daily  melatonin 3 milliGRAM(s) Oral at bedtime PRN  metoprolol succinate ER 50 milliGRAM(s) Oral <User Schedule>  ondansetron Injectable 4 milliGRAM(s) IV Push every 8 hours PRN  sacubitril 49 mG/valsartan 51 mG 1 Tablet(s) Oral two times a day  spironolactone 25 milliGRAM(s) Oral daily      Vitals:  T(C): 36.6 (23 @ 22:41), Max: 36.6 (23 @ 15:01)  HR: 108 (23 @ 22:41) (98 - 112)  BP: 95/65 (23 @ 22:41) (88/58 - 102/71)  BP(mean): --  RR: 18 (23 @ 22:41) (14 - 18)  SpO2: 100% (23 @ 22:41) (92% - 100%)    Daily     Daily Weight in k.8 (06 Mar 2023 10:23)        I&O's Summary    06 Mar 2023 07:01  -  06 Mar 2023 23:11  --------------------------------------------------------  IN: 380 mL / OUT: 850 mL / NET: -470 mL    Physical Exam:  Appearance: No Acute Distress; bitemporal wasting   HEENT: PERRL  Neck: No JVD  Cardiovascular: Normal S1 S2, No murmurs/rubs/gallops  Respiratory: Clear to auscultation bilaterally  Gastrointestinal: Soft, Non-tender	  Skin: No cyanosis	  Neurologic: Non-focal  Extremities: No LE edema  Psychiatry: A & O x 3, Mood & affect appropriate    Labs:                        11.7   10.26 )-----------( 292      ( 06 Mar 2023 07:10 )             37.5     03-06    136  |  100  |  67<H>  ----------------------------<  86  4.4   |  21<L>  |  1.79<H>    Ca    9.7      06 Mar 2023 07:12  Mg     2.2     03-05    TPro  8.1  /  Alb  3.9  /  TBili  0.4  /  DBili  x   /  AST  23  /  ALT  14  /  AlkPhos  134<H>  03-06    PT/INR - ( 06 Mar 2023 07:10 )   PT: 13.7 sec;   INR: 1.19 ratio         PTT - ( 06 Mar 2023 07:10 )  PTT:32.6 sec

## 2023-03-06 NOTE — PROGRESS NOTE ADULT - SUBJECTIVE AND OBJECTIVE BOX
Patient is a 62y old  Male who presents with a chief complaint of Need for cardiac biopsy (05 Mar 2023 17:27)      Medication:   acetaminophen     Tablet .. 650 milliGRAM(s) Oral every 6 hours PRN  aluminum hydroxide/magnesium hydroxide/simethicone Suspension 30 milliLiter(s) Oral every 4 hours PRN  budesonide 160 MICROgram(s)/formoterol 4.5 MICROgram(s) Inhaler 2 Puff(s) Inhalation two times a day  buMETAnide Injectable 2 milliGRAM(s) IV Push two times a day  chlorhexidine 2% Cloths 1 Application(s) Topical daily  melatonin 3 milliGRAM(s) Oral at bedtime PRN  metoprolol succinate ER 50 milliGRAM(s) Oral <User Schedule>  ondansetron Injectable 4 milliGRAM(s) IV Push every 8 hours PRN  sacubitril 49 mG/valsartan 51 mG 1 Tablet(s) Oral two times a day  spironolactone 25 milliGRAM(s) Oral daily      Physical exam    T(C): 36.2 (03-06-23 @ 01:26), Max: 36.7 (03-05-23 @ 12:53)  HR: 112 (03-06-23 @ 01:26) (106 - 115)  BP: 95/69 (03-06-23 @ 01:26) (87/50 - 98/72)  RR: 18 (03-06-23 @ 01:26) (18 - 18)  SpO2: 95% (03-06-23 @ 01:26) (95% - 98%)  Wt(kg): --    resting NAD  Resp non labored    Labs                        12.5   11.58 )-----------( 312      ( 05 Mar 2023 07:17 )             40.9       03-05    136  |  99  |  58<H>  ----------------------------<  83  4.4   |  23  |  1.64<H>    Ca    9.9      05 Mar 2023 07:16  Mg     2.2     03-05            [id number]

## 2023-03-06 NOTE — PROGRESS NOTE ADULT - ASSESSMENT
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 Of note, he had been hospitalized at [facility] as well as [place] in past 1-2 months. Was evaluated for TTR amyloid which was negative but never underwent biopsy. Also noted to have R hilar LAD and axillary LAD on CT chest in 12/22 (negative for PE). Was given IV diuretics with good response. States cough improved. Transferred from [facility] for RHC/biopsy scheduled today. Labs reviewed - trop 80s, BUN/Cr 67/1.79 (b/l 1.5) BNP 6k. Overall stage C HF, NYHA class III with suspicion that there may be an infiltrative process (e.g. amyloid vs. sarcoid) and requires expedited workup. In addition, sarcoid is a possibility as well given RVH and hilar LAD.    - appreciate heme c/s re: BM biopsy  - will consider cardiac biopsy in which case will need to be transferred to [facility] for further workup however can do as outpatient   - increase hydral to 75 mg q8h; escalate as tolerated   - standing weights daily   - c/w toprol xl 50 mg daily  - c/w belkis 25 mg daily.

## 2023-03-06 NOTE — PROGRESS NOTE ADULT - ASSESSMENT
_________________________________________________________________________________________  ========>>  M E D I C A L   A T T E N D I N G    F O L L O W  U P  N O T E  <<=========  -----------------------------------------------------------------------------------------------------    - Patient seen and examined by me earlier today.   - In summary,  PLACIDO GOLDMAN is a 62y year old man admitted with CHF exacerbation  - Patient today overall doing ok, comfortable, eating OK.     Patient overall feeling okay, no shortness of breath at rest, no chest pain. encouraged out of bed to chair with assistance as needed. ( reports mild SOB with ambulation )      ==================>> REVIEW OF SYSTEM <<=================    GEN: no fever, no chills, no pain  RESP: no SOB at rest , no cough, no sputum  CVS: no chest pain, no palpitations, no edema  GI: no abdominal pain, no nausea,  : no dysuria, no frequency  Neuro: no headache, no dizziness  Derm : no itching, no rash    ==================>> PHYSICAL EXAM <<=================    GEN: A&O X 3 , NAD , comfortable, pleasant, calm in recliner chair    HEENT: NCAT, PERRL, MMM, hearing intact, not on O2   Neck: supple , no JVD appreciated  CVS: S1S2 , regular , Tachycardic.  Sinus tachycardia on telemetry  PULM: CTA B/L,  no W/R/R appreciated  ABD.: soft. non tender, non distended,  bowel sounds present  Extrem: intact pulses , no signif edema   PSYCH : normal mood,  not anxious                              ( Note written / Date of service 03-06-23 )    ==================>> MEDICATIONS <<====================    budesonide 160 MICROgram(s)/formoterol 4.5 MICROgram(s) Inhaler 2 Puff(s) Inhalation two times a day  buMETAnide Injectable 2 milliGRAM(s) IV Push two times a day  chlorhexidine 2% Cloths 1 Application(s) Topical daily  metoprolol succinate ER 50 milliGRAM(s) Oral <User Schedule>  sacubitril 49 mG/valsartan 51 mG 1 Tablet(s) Oral two times a day  spironolactone 25 milliGRAM(s) Oral daily    MEDICATIONS  (PRN):  acetaminophen     Tablet .. 650 milliGRAM(s) Oral every 6 hours PRN Temp greater or equal to 38C (100.4F), Mild Pain (1 - 3)  aluminum hydroxide/magnesium hydroxide/simethicone Suspension 30 milliLiter(s) Oral every 4 hours PRN Dyspepsia  melatonin 3 milliGRAM(s) Oral at bedtime PRN Insomnia  ondansetron Injectable 4 milliGRAM(s) IV Push every 8 hours PRN Nausea and/or Vomiting    ___________  Active diet:  Diet, NPO after Midnight:      NPO Start Date: 05-Mar-2023,   NPO Start Time: 23:59  Diet, Regular:   Low Sodium  ___________________    ==================>> VITAL SIGNS <<==================  Height (cm): 167.6  Weight (kg): 56.6  BMI (kg/m2): 20.1  Vital Signs Last 24 HrsT(C): 36.3 (03-06-23 @ 10:27)  T(F): 97.3 (03-06-23 @ 10:27), Max: 98 (03-05-23 @ 12:53)  HR: 109 (03-06-23 @ 10:27) (106 - 115)  BP: 90/60 (03-06-23 @ 10:27)  RR: 18 (03-06-23 @ 10:27) (18 - 18)  SpO2: 95% (03-06-23 @ 10:27) (95% - 98%)      CAPILLARY BLOOD GLUCOSE         ==================>> LAB AND IMAGING <<==================                        11.7   10.26 )-----------( 292      ( 06 Mar 2023 07:10 )             37.5        03-06    136  |  100  |  67<H>  ----------------------------<  86  4.4   |  21<L>  |  1.79<H>    Ca    9.7      06 Mar 2023 07:12  Mg     2.2     03-05    TPro  8.1  /  Alb  3.9  /  TBili  0.4  /  DBili  x   /  AST  23  /  ALT  14  /  AlkPhos  134<H>  03-06    WBC count:   10.26 <<== ,  11.58 <<== ,  9.75 <<== ,  9.64 <<== ,  10.83 <<==   Hemoglobin:   11.7 <<==,  12.5 <<==,  11.5 <<==,  11.9 <<==,  11.5 <<==  platelets:  292 <==, 312 <==, 287 <==, 283 <==, 236 <==, 271 <==    Creatinine:  1.79  <<==, 1.64  <<==, 1.66  <<==, 1.63  <<==, 1.51  <<==, 1.40  <<==  Sodium:   136  <==, 136  <==, 138  <==, 137  <==, 135  <==, 138  <==       AST:          23 <== , 22 <==      ALT:        14  <== , 14  <==      AP:        134  <=, 131  <=     Bili:        0.4  <=, 0.3  <=    ____________________________    M I C R O B I O L O G Y :      COVID-19 PCR: NotDetec (03-05-23 @ 10:05)  COVID-19 PCR: NotDetec (02-28-23 @ 15:03)  SARS-CoV-2: NotDetec (02-22-23 @ 20:42)    __________________________________________________________________________________  ===============>>  A S S E S S M E N T   A N D   P L A N <<===============  ------------------------------------------------------------------------------------------    · Assessment	  Pt is a 61 yo man with PMH of HTN, RA, RCC s/p R partial nephrectomy, CKD, HFrEF/NICM EF 25-30%, ICD, plasma cell dyscrasia, h/o H-pylori, treated, seen at HF clinic and sent to ED for worsening heart failure.   pt admits to progressively increasing SOB, AVALOS with cough, worsening at night over past 6 months with leg edema,  however increasingly worse over past month.   Pt using O2 at home as needed.   Pt denies any chest pain, fevers, recent surgeries, dizziness, bloody stools.     Problem/Plan - 1:  ·  Problem: Acute on chronic systolic heart failure.   Cardiology and heart failure following management appreciated.    diuretics s needed as per cardiology  monitor Ins and outs, weight, labs, and vitals closely  O2 as needed   medical optimization  patient post AICD in December  Continue Current medications otherwise and monitor.  supportive care.    Problem/Plan - 2:  ·  Problem: Acute on chronic respiratory failure with hypoxia.   ·  Plan: as above  diuresis  wean off O2 as able  patient has PRN O2 at home.    patient post transfer to [facility] for right heart cath and cardiac  Bx    Problem/Plan - 3:  ·  Problem: history of  Hypertension.   Patient currently with low blood pressures.    Appreciated heart failure recommendations.    adjustment of medications as tolerated per specialty   monitor and adjust as needed    Problem/Plan - 4:  ·  Problem: Stage 3a chronic kidney disease.   ·  Plan: currently appears stable  monitor  Avoid nephrotoxic medications.    Problem/Plan - 5:  ·  Problem: history of Plasma cell dyscrasia.   Oncology consulted and appreciated.    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  Recommending a bone marrow biopsy.  post bone bx  cardiac bx with Rt heart cath as planned        --------------------------------------------  Case discussed With patient , family at bedside.. NP  Education given on findings and plan of care  ___________________________  H. JEANETH Vu.  Pager: [phone]       _________________________________________________________________________________________  ========>>  M E D I C A L   A T T E N D I N G    F O L L O W  U P  N O T E  <<=========  -----------------------------------------------------------------------------------------------------    - Patient seen and examined by me earlier today.   - In summary,  PLACIDO GOLDMAN is a 62y year old man admitted with CHF exacerbation  - Patient today overall doing ok, comfortable, eating OK.  ( NPO for procedure)     Patient overall feeling okay, no shortness of breath at rest, no chest pain.     ==================>> REVIEW OF SYSTEM <<=================    GEN: no fever, no chills, no pain  RESP: no SOB at rest , no cough, no sputum  CVS: no chest pain, no palpitations, no edema  GI: no abdominal pain, no nausea,  : no dysuria, no frequency  Neuro: no headache, no dizziness  Derm : no itching, no rash    ==================>> PHYSICAL EXAM <<=================    GEN: A&O X 3 , NAD , comfortable, pleasant, calm in recliner chair    HEENT: NCAT, PERRL, MMM, hearing intact, not on O2   Neck: supple , no JVD appreciated  CVS: S1S2 , regular , Tachycardic.  Sinus tachycardia on telemetry  PULM: CTA B/L,  no W/R/R appreciated  ABD.: soft. non tender, non distended,  bowel sounds present  Extrem: intact pulses , no signif edema   PSYCH : normal mood,  not anxious                              ( Note written / Date of service 03-06-23 )    ==================>> MEDICATIONS <<====================    budesonide 160 MICROgram(s)/formoterol 4.5 MICROgram(s) Inhaler 2 Puff(s) Inhalation two times a day  buMETAnide Injectable 2 milliGRAM(s) IV Push two times a day  chlorhexidine 2% Cloths 1 Application(s) Topical daily  metoprolol succinate ER 50 milliGRAM(s) Oral <User Schedule>  sacubitril 49 mG/valsartan 51 mG 1 Tablet(s) Oral two times a day  spironolactone 25 milliGRAM(s) Oral daily    MEDICATIONS  (PRN):  acetaminophen     Tablet .. 650 milliGRAM(s) Oral every 6 hours PRN Temp greater or equal to 38C (100.4F), Mild Pain (1 - 3)  aluminum hydroxide/magnesium hydroxide/simethicone Suspension 30 milliLiter(s) Oral every 4 hours PRN Dyspepsia  melatonin 3 milliGRAM(s) Oral at bedtime PRN Insomnia  ondansetron Injectable 4 milliGRAM(s) IV Push every 8 hours PRN Nausea and/or Vomiting    ___________  Active diet:  Diet, NPO after Midnight:      NPO Start Date: 05-Mar-2023,   NPO Start Time: 23:59  Diet, Regular:   Low Sodium  ___________________    ==================>> VITAL SIGNS <<==================    Height (cm): 167.6  Weight (kg): 56.6  BMI (kg/m2): 20.1  Vital Signs Last 24 HrsT(C): 36.3 (03-06-23 @ 10:27)  T(F): 97.3 (03-06-23 @ 10:27), Max: 98 (03-05-23 @ 12:53)  HR: 109 (03-06-23 @ 10:27) (106 - 115)  BP: 90/60 (03-06-23 @ 10:27)  RR: 18 (03-06-23 @ 10:27) (18 - 18)  SpO2: 95% (03-06-23 @ 10:27) (95% - 98%)       ==================>> LAB AND IMAGING <<==================                        11.7   10.26 )-----------( 292      ( 06 Mar 2023 07:10 )             37.5        03-06    136  |  100  |  67<H>  ----------------------------<  86  4.4   |  21<L>  |  1.79<H>    Ca    9.7      06 Mar 2023 07:12  Mg     2.2     03-05    TPro  8.1  /  Alb  3.9  /  TBili  0.4  /  DBili  x   /  AST  23  /  ALT  14  /  AlkPhos  134<H>  03-06    WBC count:   10.26 <<== ,  11.58 <<== ,  9.75 <<== ,  9.64 <<== ,  10.83 <<==   Hemoglobin:   11.7 <<==,  12.5 <<==,  11.5 <<==,  11.9 <<==,  11.5 <<==  platelets:  292 <==, 312 <==, 287 <==, 283 <==, 236 <==, 271 <==    Creatinine:  1.79  <<==, 1.64  <<==, 1.66  <<==, 1.63  <<==, 1.51  <<==, 1.40  <<==  Sodium:   136  <==, 136  <==, 138  <==, 137  <==, 135  <==, 138  <==       AST:          23 <== , 22 <==      ALT:        14  <== , 14  <==      AP:        134  <=, 131  <=     Bili:        0.4  <=, 0.3  <=    ____________________________    M I C R O B I O L O G Y :      COVID-19 PCR: NotDetec (03-05-23 @ 10:05)  COVID-19 PCR: NotDetec (02-28-23 @ 15:03)  SARS-CoV-2: NotDetec (02-22-23 @ 20:42)    __________________________________________________________________________________  ===============>>  A S S E S S M E N T   A N D   P L A N <<===============  ------------------------------------------------------------------------------------------    · Assessment	  Pt is a 63 yo man with PMH of HTN, RA, RCC s/p R partial nephrectomy, CKD, HFrEF/NICM EF 25-30%, ICD, plasma cell dyscrasia, h/o H-pylori, treated, seen at HF clinic and sent to ED for worsening heart failure.   pt admits to progressively increasing SOB, AVALOS with cough, worsening at night over past 6 months with leg edema,  however increasingly worse over past month.   Pt using O2 at home as needed.   Pt denies any chest pain, fevers, recent surgeries, dizziness, bloody stools.     Problem/Plan - 1:  ·  Problem: Acute on chronic systolic heart failure.   Cardiology and heart failure following management appreciated.    diuretics s needed as per cardiology  monitor Ins and outs, weight, labs, and vitals closely ( Holding diuretic today as creatinine elevated ( as per HF team ) )   O2 as needed   medical optimization  patient post AICD in December  Continue Current medications otherwise and monitor.  supportive care.    Problem/Plan - 2:  ·  Problem: Acute on chronic respiratory failure with hypoxia.   ·  Plan: as above  diuresis  wean off O2 as able  patient has PRN O2 at home.    patient post transfer to [facility] for right heart cath and cardiac  Bx    Problem/Plan - 3:  ·  Problem: history of  Hypertension.   Patient currently with low blood pressures.    Appreciated heart failure recommendations.    adjustment of medications as tolerated per specialty   monitor and adjust as needed    Problem/Plan - 4:  ·  Problem: Stage 3a chronic kidney disease.   ·  Plan: currently appears stable  monitor  Avoid nephrotoxic medications.    Problem/Plan - 5:  ·  Problem: history of Plasma cell dyscrasia.   Oncology consulted and appreciated.    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  Recommending a bone marrow biopsy.  post bone bx  cardiac bx with Rt heart cath as planned        --------------------------------------------  Case discussed With patient , NP  Education given on findings and plan of care  ___________________________  H. JEANETH Vu.  Pager: [phone]

## 2023-03-06 NOTE — PROGRESS NOTE ADULT - SUBJECTIVE AND OBJECTIVE BOX
Cardiovascular Disease Progress Note    Overnight events: No acute events overnight.  no new cardiac sx  Otherwise review of systems negative    Objective Findings:  T(C): 36.2 (03-06-23 @ 01:26), Max: 36.7 (03-05-23 @ 12:53)  HR: 112 (03-06-23 @ 01:26) (106 - 115)  BP: 95/69 (03-06-23 @ 01:26) (87/50 - 98/72)  RR: 18 (03-06-23 @ 01:26) (18 - 18)  SpO2: 95% (03-06-23 @ 01:26) (95% - 98%)  Wt(kg): --  Daily     Daily       Physical Exam:  Gen: NAD  HEENT: EOMI  CV: RRR, normal S1 + S2, no m/r/g  Lungs: CTAB  Abd: soft, non-tender  Ext: No edema    Telemetry:    Laboratory Data:                        12.5   11.58 )-----------( 312      ( 05 Mar 2023 07:17 )             40.9     03-05    136  |  99  |  58<H>  ----------------------------<  83  4.4   |  23  |  1.64<H>    Ca    9.9      05 Mar 2023 07:16  Mg     2.2     03-05                Inpatient Medications:  MEDICATIONS  (STANDING):  budesonide 160 MICROgram(s)/formoterol 4.5 MICROgram(s) Inhaler 2 Puff(s) Inhalation two times a day  buMETAnide Injectable 2 milliGRAM(s) IV Push two times a day  chlorhexidine 2% Cloths 1 Application(s) Topical daily  metoprolol succinate ER 50 milliGRAM(s) Oral <User Schedule>  sacubitril 49 mG/valsartan 51 mG 1 Tablet(s) Oral two times a day  spironolactone 25 milliGRAM(s) Oral daily      Assessment:  62yoM with PMH of HTN, RA, RCC s/p R partial nephrectomy, CKD, HFrEF/NICM EF 25-30%, ICD, plasma cell dyscrasia, seen at HF clinic today, sent to ED for worsening heart failure. pt admits to progressively increasing SOB, AVALOS with cough, worsening at night over past 6 months, however increasingly worse over past month    Recs:  diuresis per CHF  cont GDMT, lvef improved  heme f/u for possible plasma cell dyscrasia  s/p BM biopsy on 3/1/23, results pending  pending cardiac bx   tele monitoring   will follow      Over 25 minutes spent on total encounter; more than 50% of the visit was spent counseling and/or coordinating care by the attending physician.      Juarez Carver MD   Cardiovascular Disease  [phone]

## 2023-03-06 NOTE — CHART NOTE - NSCHARTNOTEFT_GEN_A_CORE
Rise in creatinine - 1.79 today  IV Bumex held  Monitor BMP  Pt was NPO for RHC with cardiac Bx, now ok for pt to have a light meal per HF discussion with Dr. Oliveira.    [id number]

## 2023-03-07 LAB
ANION GAP SERPL CALC-SCNC: 15 MMOL/L — SIGNIFICANT CHANGE UP (ref 5–17)
BUN SERPL-MCNC: 72 MG/DL — HIGH (ref 7–23)
CALCIUM SERPL-MCNC: 10 MG/DL — SIGNIFICANT CHANGE UP (ref 8.4–10.5)
CHLORIDE SERPL-SCNC: 102 MMOL/L — SIGNIFICANT CHANGE UP (ref 96–108)
CO2 SERPL-SCNC: 23 MMOL/L — SIGNIFICANT CHANGE UP (ref 22–31)
CREAT SERPL-MCNC: 1.69 MG/DL — HIGH (ref 0.5–1.3)
DNA PLOIDY SPEC FC-IMP: SIGNIFICANT CHANGE UP
EGFR: 45 ML/MIN/1.73M2 — LOW
GLUCOSE SERPL-MCNC: 88 MG/DL — SIGNIFICANT CHANGE UP (ref 70–99)
LACTATE SERPL-SCNC: 1.1 MMOL/L — SIGNIFICANT CHANGE UP (ref 0.5–2)
MAGNESIUM SERPL-MCNC: 2.2 MG/DL — SIGNIFICANT CHANGE UP (ref 1.6–2.6)
POTASSIUM SERPL-MCNC: 4.8 MMOL/L — SIGNIFICANT CHANGE UP (ref 3.5–5.3)
POTASSIUM SERPL-SCNC: 4.8 MMOL/L — SIGNIFICANT CHANGE UP (ref 3.5–5.3)
SODIUM SERPL-SCNC: 140 MMOL/L — SIGNIFICANT CHANGE UP (ref 135–145)

## 2023-03-07 PROCEDURE — 99222 1ST HOSP IP/OBS MODERATE 55: CPT

## 2023-03-07 PROCEDURE — 99233 SBSQ HOSP IP/OBS HIGH 50: CPT

## 2023-03-07 RX ORDER — ENOXAPARIN SODIUM 100 MG/ML
40 INJECTION SUBCUTANEOUS EVERY 24 HOURS
Refills: 0 | Status: DISCONTINUED | OUTPATIENT
Start: 2023-03-07 | End: 2023-03-10

## 2023-03-07 RX ADMIN — ENOXAPARIN SODIUM 40 MILLIGRAM(S): 100 INJECTION SUBCUTANEOUS at 21:26

## 2023-03-07 RX ADMIN — SACUBITRIL AND VALSARTAN 1 TABLET(S): 24; 26 TABLET, FILM COATED ORAL at 08:26

## 2023-03-07 RX ADMIN — CHLORHEXIDINE GLUCONATE 1 APPLICATION(S): 213 SOLUTION TOPICAL at 11:23

## 2023-03-07 RX ADMIN — BUDESONIDE AND FORMOTEROL FUMARATE DIHYDRATE 2 PUFF(S): 160; 4.5 AEROSOL RESPIRATORY (INHALATION) at 08:26

## 2023-03-07 RX ADMIN — BUDESONIDE AND FORMOTEROL FUMARATE DIHYDRATE 2 PUFF(S): 160; 4.5 AEROSOL RESPIRATORY (INHALATION) at 21:26

## 2023-03-07 RX ADMIN — SPIRONOLACTONE 25 MILLIGRAM(S): 25 TABLET, FILM COATED ORAL at 05:14

## 2023-03-07 RX ADMIN — Medication 50 MILLIGRAM(S): at 08:26

## 2023-03-07 NOTE — PHARMACOTHERAPY INTERVENTION NOTE - COMMENTS
Performed medication reconciliation and home medication list updated in outpatient medication review. Medications verified with patient and Pharmacy. Please refer to specifics in home medication list (outpatient medication review).    Advair Diskus 250-50 1 puff twice daily  Albuterol 90mcg 1 puff three times daily  Bumetanide 1mg take 3 tabs PO daily  Melatonin 3mg take 1 tab PO daily  Metoprolol Succinate 50mg ER take 1 tablet PO daily  Sacubitril-Valsartan (Entresto) 49-51mg take 1 tablet PO daily  Spironolactone 25mg take 1 tablet PO daily  Hydralazine 50mg take 1 tablet PO three times daily    Lorene Cid PharmD Candidate  Franny Laura, PharmD, [facility]  Clinical Pharmacy Specialist  [phone] or Teams     Performed medication reconciliation and home medication list updated in outpatient medication review. Medications verified with patient and Pharmacy. Please refer to specifics in home medication list (outpatient medication review).    Advair Diskus 250-50 1 puff twice daily  Albuterol 90mcg 1 puff three times daily  Bumetanide 1mg take 3 tabs PO daily  Melatonin 3mg take 1 tab PO daily  Metoprolol Succinate 50mg ER take 1 tablet PO daily  Sacubitril-Valsartan (Entresto) 49-51mg take 1 tablet PO daily  Spironolactone 25mg take 1 tablet PO daily  Hydralazine 50mg take 1 tablet PO three times daily    All medications are reconciled.    Lorene Cid PharmD Candidate  Franny Laura, PharmD, [facility]  Clinical Pharmacy Specialist  [phone] or Teams     Performed medication reconciliation and home medication list updated in outpatient medication review. Medications verified with patient and Pharmacy. Please refer to specifics in home medication list (outpatient medication review).    Acetaminophen 325mg 2 tablets PO every 6hrs PRN  Advair Diskus 250-50 1 puff twice daily  Albuterol 90mcg 1 puff three times daily  Bumetanide 1mg take 3 tabs PO daily  Melatonin 3mg take 1 tab PO daily  Metoprolol Succinate 50mg ER take 1 tablet PO daily  Sacubitril-Valsartan (Entresto) 49-51mg take 1 tablet PO daily  Spironolactone 25mg take 1 tablet PO daily  Hydralazine 50mg take 1 tablet PO three times daily    All medications are reconciled.    Lorene Cid PharmD Candidate  Franny Laura, PharmD, [facility]  Clinical Pharmacy Specialist  [phone] or Teams

## 2023-03-07 NOTE — PROGRESS NOTE ADULT - ASSESSMENT
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 Of note, he had been hospitalized at [facility] as well as [facility] in past 1-2 months. Was evaluated for TTR amyloid which was negative but never underwent biopsy. Also noted to have R hilar LAD and axillary LAD on CT chest in 12/22 (negative for PE). Was given IV diuretics with good response. States cough improved. Transferred from [facility] for RHC/biopsy scheduled today. Labs reviewed - trop 80s, BUN/Cr 67/1.79 (b/l 1.5) BNP 6k. Overall stage C HF, NYHA class III with suspicion that there may be an infiltrative process (e.g. amyloid vs. sarcoid) and requires expedited workup. In addition, sarcoid is a possibility as well given RVH and hilar LAD.    3/7/23 RHC RA 1, PA 44/17/30, PCW 3, CO/CI 4.39/2.7; PVR 6

## 2023-03-07 NOTE — PROGRESS NOTE ADULT - SUBJECTIVE AND OBJECTIVE BOX
Cardiovascular Disease Progress Note    Overnight events: No acute events overnight.  s/p cardiac bx. no new cardiac sx  Otherwise review of systems negative    Objective Findings:  T(C): 36.3 (23 @ 04:53), Max: 36.6 (23 @ 15:01)  HR: 108 (23 @ 04:53) (98 - 109)  BP: 93/65 (23 @ 04:53) (88/58 - 102/71)  RR: 18 (23 @ 04:53) (14 - 18)  SpO2: 95% (23 @ 04:53) (92% - 100%)  Wt(kg): --  Daily     Daily Weight in k.8 (06 Mar 2023 10:23)      Physical Exam:  Gen: NAD  HEENT: EOMI  CV: RRR, normal S1 + S2, no m/r/g  Lungs: CTAB  Abd: soft, non-tender  Ext: No edema    Telemetry:    Laboratory Data:                        11.7   10.26 )-----------( 292      ( 06 Mar 2023 07:10 )             37.5     03-    136  |  100  |  67<H>  ----------------------------<  86  4.4   |  21<L>  |  1.79<H>    Ca    9.7      06 Mar 2023 07:12    TPro  8.1  /  Alb  3.9  /  TBili  0.4  /  DBili  x   /  AST  23  /  ALT  14  /  AlkPhos  134<H>  03-06    PT/INR - ( 06 Mar 2023 07:10 )   PT: 13.7 sec;   INR: 1.19 ratio         PTT - ( 06 Mar 2023 07:10 )  PTT:32.6 sec          Inpatient Medications:  MEDICATIONS  (STANDING):  budesonide 160 MICROgram(s)/formoterol 4.5 MICROgram(s) Inhaler 2 Puff(s) Inhalation two times a day  chlorhexidine 2% Cloths 1 Application(s) Topical daily  metoprolol succinate ER 50 milliGRAM(s) Oral <User Schedule>  sacubitril 49 mG/valsartan 51 mG 1 Tablet(s) Oral two times a day  spironolactone 25 milliGRAM(s) Oral daily      Assessment:  62yoM with PMH of HTN, RA, RCC s/p R partial nephrectomy, CKD, HFrEF/NICM EF 25-30%, ICD, plasma cell dyscrasia, seen at HF clinic today, sent to ED for worsening heart failure. pt admits to progressively increasing SOB, AVALOS with cough, worsening at night over past 6 months, however increasingly worse over past month    Recs:  diuresis per CHF  cont GDMT, lvef improved  heme f/u for possible plasma cell dyscrasia  s/p BM biopsy on 3/1/23, f/u path  s/p cardiac bx, results pending. plans for possible mediastinal LN bx as well   tele monitoring   will follow          Over 25 minutes spent on total encounter; more than 50% of the visit was spent counseling and/or coordinating care by the attending physician.      Juarez Carver MD   Cardiovascular Disease  [phone]

## 2023-03-07 NOTE — PROGRESS NOTE ADULT - ASSESSMENT
_________________________________________________________________________________________  ========>>  M E D I C A L   A T T E N D I N G    F O L L O W  U P  N O T E  <<=========  -----------------------------------------------------------------------------------------------------    - Patient seen and examined by me earlier today.   - In summary,  PLACIDO GOLDMAN is a 62y year old man admitted with CHF exacerbation  - Patient today overall doing ok, comfortable, eating OK.  ( NPO for procedure)     Patient overall feeling okay, no shortness of breath at rest, no chest pain.     ==================>> REVIEW OF SYSTEM <<=================    GEN: no fever, no chills, no pain  RESP: no SOB at rest , no cough, no sputum  CVS: no chest pain, no palpitations, no edema  GI: no abdominal pain, no nausea,  : no dysuria, no frequency  Neuro: no headache, no dizziness  Derm : no itching, no rash    ==================>> PHYSICAL EXAM <<=================    GEN: A&O X 3 , NAD , comfortable, pleasant, calm in recliner chair    HEENT: NCAT, PERRL, MMM, hearing intact, not on O2   Neck: supple , no JVD appreciated  CVS: S1S2 , regular , Tachycardic.  Sinus tachycardia on telemetry  PULM: CTA B/L,  no W/R/R appreciated  ABD.: soft. non tender, non distended,  bowel sounds present  Extrem: intact pulses , no signif edema   PSYCH : normal mood,  not anxious                             ( Note written / Date of service 03-07-23 )    ==================>> MEDICATIONS <<====================    budesonide 160 MICROgram(s)/formoterol 4.5 MICROgram(s) Inhaler 2 Puff(s) Inhalation two times a day  chlorhexidine 2% Cloths 1 Application(s) Topical daily  metoprolol succinate ER 50 milliGRAM(s) Oral <User Schedule>    MEDICATIONS  (PRN):  acetaminophen     Tablet .. 650 milliGRAM(s) Oral every 6 hours PRN Temp greater or equal to 38C (100.4F), Mild Pain (1 - 3)  aluminum hydroxide/magnesium hydroxide/simethicone Suspension 30 milliLiter(s) Oral every 4 hours PRN Dyspepsia  melatonin 3 milliGRAM(s) Oral at bedtime PRN Insomnia  ondansetron Injectable 4 milliGRAM(s) IV Push every 8 hours PRN Nausea and/or Vomiting    ___________  Active diet:  Diet, Regular:   Low Sodium  ___________________    ==================>> VITAL SIGNS <<==================    Vital Signs Last 24 HrsT(C): 36.4 (03-07-23 @ 11:31)  T(F): 97.6 (03-07-23 @ 11:31), Max: 97.9 (03-06-23 @ 22:41)  HR: 103 (03-07-23 @ 15:45) (98 - 108)  BP: 93/63 (03-07-23 @ 15:45)  RR: 18 (03-07-23 @ 11:31) (14 - 18)  SpO2: 98% (03-07-23 @ 11:31) (92% - 100%)      CAPILLARY BLOOD GLUCOSE         ==================>> LAB AND IMAGING <<==================                        11.7   10.26 )-----------( 292      ( 06 Mar 2023 07:10 )             37.5        03-07    140  |  102  |  72<H>  ----------------------------<  88  4.8   |  23  |  1.69<H>    Ca    10.0      07 Mar 2023 07:03  Mg     2.2     03-07    TPro  8.1  /  Alb  3.9  /  TBili  0.4  /  DBili  x   /  AST  23  /  ALT  14  /  AlkPhos  134<H>  03-06    WBC count:   10.26 <<== ,  11.58 <<== ,  9.75 <<== ,  9.64 <<==   Hemoglobin:   11.7 <<==,  12.5 <<==,  11.5 <<==,  11.9 <<==  platelets:  292 <==, 312 <==, 287 <==, 283 <==, 236 <==    Creatinine:  1.69  <<==, 1.79  <<==, 1.64  <<==, 1.66  <<==, 1.63  <<==, 1.51  <<==  Sodium:   140  <==, 136  <==, 136  <==, 138  <==, 137  <==, 135  <==       AST:          23 <== , 22 <==      ALT:        14  <== , 14  <==      AP:        134  <=, 131  <=     Bili:        0.4  <=, 0.3  <=    ____________________________    M I C R O B I O L O G Y :      COVID-19 PCR: NotDetec (03-05-23 @ 10:05)  COVID-19 PCR: NotDetec (02-28-23 @ 15:03)  SARS-CoV-2: NotDetec (02-22-23 @ 20:42)      __________________________________________________________________________________  ===============>>  A S S E S S M E N T   A N D   P L A N <<===============  ------------------------------------------------------------------------------------------    · Assessment	  Pt is a 61 yo man with PMH of HTN, RA, RCC s/p R partial nephrectomy, CKD, HFrEF/NICM EF 25-30%, ICD, plasma cell dyscrasia, h/o H-pylori, treated, seen at HF clinic and sent to ED for worsening heart failure.   pt admits to progressively increasing SOB, AVALOS with cough, worsening at night over past 6 months with leg edema,  however increasingly worse over past month.   Pt using O2 at home as needed.   Pt denies any chest pain, fevers, recent surgeries, dizziness, bloody stools.     Problem/Plan - 1:  ·  Problem: Acute on chronic systolic heart failure.   Cardiology and heart failure following management appreciated.    diuretics s needed as per cardiology  monitor Ins and outs, weight, labs, and vitals closely ( Holding diuretic today as creatinine elevated ( as per HF team ) )   O2 as needed   medical optimization  patient post AICD in December  Continue Current medications otherwise and monitor.  supportive care.    Problem/Plan - 2:  ·  Problem: Acute on chronic respiratory failure with hypoxia.   ·  Plan: as above  diuresis  wean off O2 as able  patient has PRN O2 at home.    patient post transfer to [facility] for right heart cath and cardiac  Bx    Problem/Plan - 3:  ·  Problem: history of  Hypertension.   Patient currently with low blood pressures.    Appreciated heart failure recommendations.    adjustment of medications as tolerated per specialty   monitor and adjust as needed    Problem/Plan - 4:  ·  Problem: Stage 3a chronic kidney disease.   ·  Plan: currently appears stable  monitor  Avoid nephrotoxic medications.    Problem/Plan - 5:  ·  Problem: history of Plasma cell dyscrasia.   Oncology consulted and appreciated.    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  Recommending a bone marrow biopsy.  post bone bx  cardiac bx with Rt heart cath as planned        --------------------------------------------  Case discussed With patient , NP  Education given on findings and plan of care  ___________________________  H. JEANETH Vu.  Pager: [phone]       _________________________________________________________________________________________  ========>>  M E D I C A L   A T T E N D I N G    F O L L O W  U P  N O T E  <<=========  -----------------------------------------------------------------------------------------------------    - Patient seen and examined by me earlier today.   - In summary,  PLACIDO GOLDMAN is a 62y year old man admitted with CHF exacerbation  - Patient today overall doing ok, comfortable, eating OK.      Patient overall feeling okay, no shortness of breath at rest, no chest pain.   Patient inquiring about going home.    Being assessed for possible mediastinal lymph node biopsy, I am not sure why we need to do this now as patient after negative bone marrow biopsy, and intracardiac biopsy yesterday, pending results    ==================>> REVIEW OF SYSTEM <<=================    GEN: no fever, no chills, no pain  RESP: no SOB at rest , no cough, no sputum  CVS: no chest pain, no palpitations, no edema  GI: no abdominal pain, no nausea,  : no dysuria, no frequency  Neuro: no headache, no dizziness  Derm : no itching, no rash    ==================>> PHYSICAL EXAM <<=================    GEN: A&O X 3 , NAD , comfortable, pleasant, calm in recliner chair    HEENT: NCAT, PERRL, MMM, hearing intact, not on O2   Neck: supple , no JVD appreciated  CVS: S1S2 , regular , Tachycardic.  Sinus tachycardia on telemetry  PULM: CTA B/L,  no W/R/R appreciated  ABD.: soft. non tender, non distended,  bowel sounds present  Extrem: intact pulses , no signif edema   PSYCH : normal mood,  not anxious                             ( Note written / Date of service 03-07-23 )    ==================>> MEDICATIONS <<====================    budesonide 160 MICROgram(s)/formoterol 4.5 MICROgram(s) Inhaler 2 Puff(s) Inhalation two times a day  chlorhexidine 2% Cloths 1 Application(s) Topical daily  metoprolol succinate ER 50 milliGRAM(s) Oral <User Schedule>    MEDICATIONS  (PRN):  acetaminophen     Tablet .. 650 milliGRAM(s) Oral every 6 hours PRN Temp greater or equal to 38C (100.4F), Mild Pain (1 - 3)  aluminum hydroxide/magnesium hydroxide/simethicone Suspension 30 milliLiter(s) Oral every 4 hours PRN Dyspepsia  melatonin 3 milliGRAM(s) Oral at bedtime PRN Insomnia  ondansetron Injectable 4 milliGRAM(s) IV Push every 8 hours PRN Nausea and/or Vomiting    ___________  Active diet:  Diet, Regular:   Low Sodium  ___________________    ==================>> VITAL SIGNS <<==================    Vital Signs Last 24 HrsT(C): 36.4 (03-07-23 @ 11:31)  T(F): 97.6 (03-07-23 @ 11:31), Max: 97.9 (03-06-23 @ 22:41)  HR: 103 (03-07-23 @ 15:45) (98 - 108)  BP: 93/63 (03-07-23 @ 15:45)  RR: 18 (03-07-23 @ 11:31) (14 - 18)  SpO2: 98% (03-07-23 @ 11:31) (92% - 100%)      CAPILLARY BLOOD GLUCOSE         ==================>> LAB AND IMAGING <<==================                        11.7   10.26 )-----------( 292      ( 06 Mar 2023 07:10 )             37.5        03-07    140  |  102  |  72<H>  ----------------------------<  88  4.8   |  23  |  1.69<H>    Ca    10.0      07 Mar 2023 07:03  Mg     2.2     03-07    TPro  8.1  /  Alb  3.9  /  TBili  0.4  /  DBili  x   /  AST  23  /  ALT  14  /  AlkPhos  134<H>  03-06    WBC count:   10.26 <<== ,  11.58 <<== ,  9.75 <<== ,  9.64 <<==   Hemoglobin:   11.7 <<==,  12.5 <<==,  11.5 <<==,  11.9 <<==  platelets:  292 <==, 312 <==, 287 <==, 283 <==, 236 <==    Creatinine:  1.69  <<==, 1.79  <<==, 1.64  <<==, 1.66  <<==, 1.63  <<==, 1.51  <<==  Sodium:   140  <==, 136  <==, 136  <==, 138  <==, 137  <==, 135  <==       AST:          23 <== , 22 <==      ALT:        14  <== , 14  <==      AP:        134  <=, 131  <=     Bili:        0.4  <=, 0.3  <=  __________________________________________________________________________________  ===============>>  A S S E S S M E N T   A N D   P L A N <<===============  ------------------------------------------------------------------------------------------    · Assessment	  Pt is a 63 yo man with PMH of HTN, RA, RCC s/p R partial nephrectomy, CKD, HFrEF/NICM EF 25-30%, ICD, plasma cell dyscrasia, h/o H-pylori, treated, seen at HF clinic and sent to ED for worsening heart failure.   pt admits to progressively increasing SOB, AVALOS with cough, worsening at night over past 6 months with leg edema,  however increasingly worse over past month.   Pt using O2 at home as needed.   Pt denies any chest pain, fevers, recent surgeries, dizziness, bloody stools.     Problem/Plan - 1:  ·  Problem: Acute on chronic systolic heart failure.   Cardiology and heart failure following management appreciated.    diuretics s needed as per cardiology  monitor Ins and outs, weight, labs, and vitals closely ( Holding diuretic today as creatinine elevated ( as per HF team ) )   O2 as needed   medical optimization  patient post AICD in December  Continue Current medications otherwise and monitor.  supportive care.    Problem/Plan - 2:  ·  Problem: Acute on chronic respiratory failure with hypoxia.   ·  Plan: as above  diuresis  wean off O2 as able  patient has PRN O2 at home.    Problem/Plan - 3:  ·  Problem: history of  Hypertension.   Patient currently with low blood pressures.    Appreciated heart failure recommendations.    adjustment of medications as tolerated per specialty   monitor and adjust as needed    Problem/Plan - 4:  ·  Problem: Stage 3a chronic kidney disease.   ·  Plan: currently appears stable  monitor  Avoid nephrotoxic medications.    Problem/Plan - 5:  ·  Problem: history of Plasma cell dyscrasia.   Oncology consulted and appreciated.    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  Recommending a bone marrow biopsy.  post bone bx  cardiac bx with Rt heart cath as planned      Discharge planning pending clearance by cardiology/heart failure team    --------------------------------------------  Case discussed With patient , NP  Education given on findings and plan of care  ___________________________  SALIMA Vu D.O.  Pager: [phone]

## 2023-03-07 NOTE — CONSULT NOTE ADULT - SUBJECTIVE AND OBJECTIVE BOX
THORACIC SURGERY CONSULT NOTE  --------------------------------------------------------------------------------------------    HPI:   62M w/ PMHx of HTN, RA, RCC s/p R partial nephrectomy, CKD, HFrEF/NICM EF 25-30%, ICD, plasma cell dyscrasia, h/o H-pylori, treated, seen at HF clinic w/ recent extended admission on [facility] now transferred to [facility] for likely RHC and cardiac biopsy. Pt admitted to [facility] around 8 days ago with acute on chronic CHF exacerbation. Followed by HF, cardiology and hem/onc. ICD interrogated. Was diuresed with IV Bumex and eventually transitioned to PO. Had repeat TTE. Bone marrow biopsy by IR on 3/1 to evaluate for MM and MGUS. TTE repeated as well. Eventual plan to transfer patient to [facility] for likely RHC and cardiac biopsy to further evaluation for amyloid. Pt currently denies any chest pain, SOB or specific complaints.  Thoracic surgery consulted for right hilar adenopathy, weight loss and nonproductive cough have been present for about a year, thinks he lost about 45 pounds.  Denies smoking hx. No prior radiation therapy.  Denies chest pain.     ROS: 10-system review is otherwise negative except HPI above.      PAST MEDICAL & SURGICAL HISTORY:  Hypertension      Rheumatoid arthritis      Neoplasm of uncertain behavior of kidney, right      History of cardiac cath  jan 2022, at [facility] no stent        FAMILY HISTORY:  FH: diabetes mellitus (Mother)        SOCIAL HISTORY:      ALLERGIES: No Known Allergies      HOME MEDICATIONS:     CURRENT MEDICATIONS  MEDICATIONS (STANDING): budesonide 160 MICROgram(s)/formoterol 4.5 MICROgram(s) Inhaler 2 Puff(s) Inhalation two times a day  enoxaparin Injectable 40 milliGRAM(s) SubCutaneous every 24 hours  metoprolol succinate ER 50 milliGRAM(s) Oral <User Schedule>    MEDICATIONS (PRN):acetaminophen     Tablet .. 650 milliGRAM(s) Oral every 6 hours PRN Temp greater or equal to 38C (100.4F), Mild Pain (1 - 3)  aluminum hydroxide/magnesium hydroxide/simethicone Suspension 30 milliLiter(s) Oral every 4 hours PRN Dyspepsia  melatonin 3 milliGRAM(s) Oral at bedtime PRN Insomnia  ondansetron Injectable 4 milliGRAM(s) IV Push every 8 hours PRN Nausea and/or Vomiting    --------------------------------------------------------------------------------------------    Vitals:   T(C): 36.4 (03-07-23 @ 11:31), Max: 36.6 (03-06-23 @ 22:41)  HR: 103 (03-07-23 @ 15:45) (98 - 108)  BP: 93/63 (03-07-23 @ 15:45) (93/63 - 102/71)  RR: 18 (03-07-23 @ 11:31) (14 - 18)  SpO2: 98% (03-07-23 @ 11:31) (92% - 100%)  CAPILLARY BLOOD GLUCOSE    03-06 @ 07:01  -  03-07 @ 07:00  --------------------------------------------------------  IN:    Oral Fluid: 380 mL  Total IN: 380 mL    OUT:    Voided (mL): 1000 mL  Total OUT: 1000 mL    Total NET: -620 mL      03-07 @ 07:01  -  03-07 @ 17:24  --------------------------------------------------------  IN:    Oral Fluid: 600 mL  Total IN: 600 mL    OUT:    Voided (mL): 425 mL  Total OUT: 425 mL    Total NET: 175 mL    Height (cm): 167.6 (03-04 @ 03:19)  Weight (kg): 56.6 (03-04 @ 03:19)  BMI (kg/m2): 20.1 (03-04 @ 03:19)  BSA (m2): 1.64 (03-04 @ 03:19)    PHYSICAL EXAM:   General: NAD, Lying in bed comfortably, thin  Neuro: A+Ox3  HEENT: NC/AT, EOMI  Neck: Soft, supple  Chest: no wounds, no rash, nontender   Cardio: generally well perfused   Resp: Good effort, room air   GI/Abd: Soft, NT/ND, no rebound/guarding, no masses palpated  --------------------------------------------------------------------------------------------    LABS  CBC (03-06 @ 07:10)                              11.7<L>                         10.26   )----------------(  292        --    % Neutrophils, --    % Lymphocytes, ANC: --                                  37.5<L>    BMP (03-07 @ 07:03)             140     |  102     |  72<H> 		Ca++ --      Ca 10.0               ---------------------------------( 88    		Mg 2.2                4.8     |  23      |  1.69<H>			Ph --      BMP (03-06 @ 07:12)             136     |  100     |  67<H> 		Ca++ --      Ca 9.7                ---------------------------------( 86    		Mg --                 4.4     |  21<L>   |  1.79<H>			Ph --        LFTs (03-06 @ 07:12)      TPro 8.1 / Alb 3.9 / TBili 0.4 / DBili -- / AST 23 / ALT 14 / AlkPhos 134<H>    Coags (03-06 @ 07:10)  aPTT 32.6 / INR 1.19<H> / PT 13.7<H>      ABG (03-07 @ 07:03)      /  /  /  /  / %     Lactate:  1.1      --------------------------------------------------------------------------------------------    MICROBIOLOGY      --------------------------------------------------------------------------------------------    IMAGING

## 2023-03-07 NOTE — CONSULT NOTE ADULT - ASSESSMENT
Assessment: 62M w/ PMHx of HTN, RA, RCC s/p R partial nephrectomy, CKD, HFrEF/NICM EF 25-30%, ICD, plasma cell dyscrasia, yesterday 3/6 had cath and heart biopsy, also found to have right hilar adenopathy.      Recs:  - Please repeat CT chest noncon to assess if hilar adenopathy is amenable to endobronchial biopsy  - Will follow   - Care per primary team  - D/w Attending Thoracic Surgeon Dr. GILDARDO Cullen MD, PGY2  Thoracic Surgery   [id number]

## 2023-03-07 NOTE — PROGRESS NOTE ADULT - SUBJECTIVE AND OBJECTIVE BOX
Interval History:  underwent RHC/biopsy yesterday w/o issues  reports cough still present  urinating well  discussed possible need for LN biopsy if cardiac biopsy is inconclusive    Medications:  acetaminophen     Tablet .. 650 milliGRAM(s) Oral every 6 hours PRN  aluminum hydroxide/magnesium hydroxide/simethicone Suspension 30 milliLiter(s) Oral every 4 hours PRN  budesonide 160 MICROgram(s)/formoterol 4.5 MICROgram(s) Inhaler 2 Puff(s) Inhalation two times a day  chlorhexidine 2% Cloths 1 Application(s) Topical daily  enoxaparin Injectable 40 milliGRAM(s) SubCutaneous every 24 hours  melatonin 3 milliGRAM(s) Oral at bedtime PRN  metoprolol succinate ER 50 milliGRAM(s) Oral <User Schedule>  ondansetron Injectable 4 milliGRAM(s) IV Push every 8 hours PRN      Vitals:  T(C): 36.3 (23 @ 21:07), Max: 36.6 (23 @ 22:41)  HR: 111 (23 @ 21:07) (103 - 111)  BP: 92/63 (23 @ 21:07) (92/63 - 98/67)  BP(mean): --  RR: 18 (23 @ 21:07) (18 - 18)  SpO2: 93% (23 @ 21:07) (92% - 100%)    Daily     Daily Weight in k (07 Mar 2023 08:52)        I&O's Summary    06 Mar 2023 07:  -  07 Mar 2023 07:00  --------------------------------------------------------  IN: 380 mL / OUT: 1000 mL / NET: -620 mL    07 Mar 2023 07:01  -  07 Mar 2023 21:37  --------------------------------------------------------  IN: 600 mL / OUT: 425 mL / NET: 175 mL        Physical Exam:  Appearance: No Acute Distress  HEENT: PERRL  Neck: No JVD  Cardiovascular: Normal S1 S2, No murmurs/rubs/gallops  Respiratory: Clear to auscultation bilaterally  Gastrointestinal: Soft, Non-tender	  Skin: No cyanosis	  Neurologic: Non-focal  Extremities: No LE edema  Psychiatry: A & O x 3, Mood & affect appropriate    Labs:                        11.7   10.26 )-----------( 292      ( 06 Mar 2023 07:10 )             37.5     03-07    140  |  102  |  72<H>  ----------------------------<  88  4.8   |  23  |  1.69<H>    Ca    10.0      07 Mar 2023 07:03  Mg     2.2     03-07    TPro  8.1  /  Alb  3.9  /  TBili  0.4  /  DBili  x   /  AST  23  /  ALT  14  /  AlkPhos  134<H>  03-06    PT/INR - ( 06 Mar 2023 07:10 )   PT: 13.7 sec;   INR: 1.19 ratio         PTT - ( 06 Mar 2023 07:10 )  PTT:32.6 sec        TELEMETRY:    Echocardiogram:

## 2023-03-08 ENCOUNTER — APPOINTMENT (OUTPATIENT)
Dept: CT IMAGING | Facility: CLINIC | Age: 63
End: 2023-03-08

## 2023-03-08 DIAGNOSIS — R59.0 LOCALIZED ENLARGED LYMPH NODES: ICD-10-CM

## 2023-03-08 DIAGNOSIS — I27.20 PULMONARY HYPERTENSION, UNSPECIFIED: ICD-10-CM

## 2023-03-08 LAB
ANION GAP SERPL CALC-SCNC: 14 MMOL/L — SIGNIFICANT CHANGE UP (ref 5–17)
ANION GAP SERPL CALC-SCNC: 14 MMOL/L — SIGNIFICANT CHANGE UP (ref 5–17)
BLD GP AB SCN SERPL QL: NEGATIVE — SIGNIFICANT CHANGE UP
BUN SERPL-MCNC: 55 MG/DL — HIGH (ref 7–23)
BUN SERPL-MCNC: 64 MG/DL — HIGH (ref 7–23)
CALCIUM SERPL-MCNC: 9.6 MG/DL — SIGNIFICANT CHANGE UP (ref 8.4–10.5)
CALCIUM SERPL-MCNC: 9.6 MG/DL — SIGNIFICANT CHANGE UP (ref 8.4–10.5)
CHLORIDE SERPL-SCNC: 101 MMOL/L — SIGNIFICANT CHANGE UP (ref 96–108)
CHLORIDE SERPL-SCNC: 102 MMOL/L — SIGNIFICANT CHANGE UP (ref 96–108)
CO2 SERPL-SCNC: 21 MMOL/L — LOW (ref 22–31)
CO2 SERPL-SCNC: 21 MMOL/L — LOW (ref 22–31)
CREAT SERPL-MCNC: 1.39 MG/DL — HIGH (ref 0.5–1.3)
CREAT SERPL-MCNC: 1.5 MG/DL — HIGH (ref 0.5–1.3)
EGFR: 52 ML/MIN/1.73M2 — LOW
EGFR: 57 ML/MIN/1.73M2 — LOW
GLUCOSE SERPL-MCNC: 98 MG/DL — SIGNIFICANT CHANGE UP (ref 70–99)
GLUCOSE SERPL-MCNC: 98 MG/DL — SIGNIFICANT CHANGE UP (ref 70–99)
MAGNESIUM SERPL-MCNC: 2 MG/DL — SIGNIFICANT CHANGE UP (ref 1.6–2.6)
POTASSIUM SERPL-MCNC: 4 MMOL/L — SIGNIFICANT CHANGE UP (ref 3.5–5.3)
POTASSIUM SERPL-MCNC: 4.2 MMOL/L — SIGNIFICANT CHANGE UP (ref 3.5–5.3)
POTASSIUM SERPL-SCNC: 4 MMOL/L — SIGNIFICANT CHANGE UP (ref 3.5–5.3)
POTASSIUM SERPL-SCNC: 4.2 MMOL/L — SIGNIFICANT CHANGE UP (ref 3.5–5.3)
RH IG SCN BLD-IMP: NEGATIVE — SIGNIFICANT CHANGE UP
SODIUM SERPL-SCNC: 136 MMOL/L — SIGNIFICANT CHANGE UP (ref 135–145)
SODIUM SERPL-SCNC: 137 MMOL/L — SIGNIFICANT CHANGE UP (ref 135–145)

## 2023-03-08 PROCEDURE — 99231 SBSQ HOSP IP/OBS SF/LOW 25: CPT

## 2023-03-08 PROCEDURE — 99233 SBSQ HOSP IP/OBS HIGH 50: CPT

## 2023-03-08 PROCEDURE — 71250 CT THORAX DX C-: CPT | Mod: 26

## 2023-03-08 PROCEDURE — 99223 1ST HOSP IP/OBS HIGH 75: CPT | Mod: GC

## 2023-03-08 PROCEDURE — 93306 TTE W/DOPPLER COMPLETE: CPT | Mod: 26

## 2023-03-08 RX ADMIN — BUDESONIDE AND FORMOTEROL FUMARATE DIHYDRATE 2 PUFF(S): 160; 4.5 AEROSOL RESPIRATORY (INHALATION) at 09:30

## 2023-03-08 RX ADMIN — ENOXAPARIN SODIUM 40 MILLIGRAM(S): 100 INJECTION SUBCUTANEOUS at 21:47

## 2023-03-08 RX ADMIN — BUDESONIDE AND FORMOTEROL FUMARATE DIHYDRATE 2 PUFF(S): 160; 4.5 AEROSOL RESPIRATORY (INHALATION) at 21:47

## 2023-03-08 RX ADMIN — CHLORHEXIDINE GLUCONATE 1 APPLICATION(S): 213 SOLUTION TOPICAL at 11:26

## 2023-03-08 NOTE — PROGRESS NOTE ADULT - SUBJECTIVE AND OBJECTIVE BOX
Patient is a 62y old  Male who presents with a chief complaint of Need for cardiac biopsy (08 Mar 2023 07:25)      Medication:   acetaminophen     Tablet .. 650 milliGRAM(s) Oral every 6 hours PRN  aluminum hydroxide/magnesium hydroxide/simethicone Suspension 30 milliLiter(s) Oral every 4 hours PRN  budesonide 160 MICROgram(s)/formoterol 4.5 MICROgram(s) Inhaler 2 Puff(s) Inhalation two times a day  chlorhexidine 2% Cloths 1 Application(s) Topical daily  enoxaparin Injectable 40 milliGRAM(s) SubCutaneous every 24 hours  melatonin 3 milliGRAM(s) Oral at bedtime PRN  metoprolol succinate ER 50 milliGRAM(s) Oral <User Schedule>  ondansetron Injectable 4 milliGRAM(s) IV Push every 8 hours PRN      Physical exam    T(C): 36.7 (03-08-23 @ 09:35), Max: 36.7 (03-08-23 @ 09:35)  HR: 109 (03-08-23 @ 09:35) (103 - 111)  BP: 95/62 (03-08-23 @ 09:35) (91/52 - 96/66)  RR: 18 (03-08-23 @ 09:35) (18 - 18)  SpO2: 94% (03-08-23 @ 09:35) (93% - 98%)  Wt(kg): --    alert NAD  EOMI anicteric sclera  Neck Supple No LNA  Cv s1 S2 RRR  Lungs clear B/L  abd soft NT ND +BS  No LE edema or tenderness    Labs            03-08    136  |  101  |  64<H>  ----------------------------<  98  4.2   |  21<L>  |  1.50<H>    Ca    9.6      08 Mar 2023 06:06  Mg     2.0     03-08            [id number]   Patient is a 62y old  Male who presents with a chief complaint of Need for cardiac biopsy (08 Mar 2023 07:25)    says that he feels about the same. Some SOB at times. No CP. Appetite fair and no N/V/D. No bleeding. No fever    Medication:   acetaminophen     Tablet .. 650 milliGRAM(s) Oral every 6 hours PRN  aluminum hydroxide/magnesium hydroxide/simethicone Suspension 30 milliLiter(s) Oral every 4 hours PRN  budesonide 160 MICROgram(s)/formoterol 4.5 MICROgram(s) Inhaler 2 Puff(s) Inhalation two times a day  chlorhexidine 2% Cloths 1 Application(s) Topical daily  enoxaparin Injectable 40 milliGRAM(s) SubCutaneous every 24 hours  melatonin 3 milliGRAM(s) Oral at bedtime PRN  metoprolol succinate ER 50 milliGRAM(s) Oral <User Schedule>  ondansetron Injectable 4 milliGRAM(s) IV Push every 8 hours PRN      Physical exam    T(C): 36.7 (03-08-23 @ 09:35), Max: 36.7 (03-08-23 @ 09:35)  HR: 109 (03-08-23 @ 09:35) (103 - 111)  BP: 95/62 (03-08-23 @ 09:35) (91/52 - 96/66)  RR: 18 (03-08-23 @ 09:35) (18 - 18)  SpO2: 94% (03-08-23 @ 09:35) (93% - 98%)  Wt(kg): --    alert NAD  EOMI anicteric sclera  Cv s1 S2 RRR  Lungs clear B/L  abd soft NT ND +BS  No LE edema or tenderness    Labs      03-08    136  |  101  |  64<H>  ----------------------------<  98  4.2   |  21<L>  |  1.50<H>    Ca    9.6      08 Mar 2023 06:06  Mg     2.0     03-08            [id number]

## 2023-03-08 NOTE — CONSULT NOTE ADULT - TIME BILLING
- Generation of cardiovascular treatment plan.  - Coordination of care with primary team.
Personal review of data, images, old chart and coordinating care with radiology and medical team.

## 2023-03-08 NOTE — PROGRESS NOTE ADULT - ASSESSMENT
_________________________________________________________________________________________  ========>>  M E D I C A L   A T T E N D I N G    F O L L O W  U P  N O T E  <<=========  -----------------------------------------------------------------------------------------------------    - Patient seen and examined by me earlier today.   - In summary,  PLACIDO GOLDMAN is a 62y year old man admitted with CHF exacerbation  - Patient today overall doing ok, comfortable, eating OK.      Patient overall feeling okay, no shortness of breath at rest, no chest pain.   Patient inquiring about going home.    Being assessed for possible mediastinal lymph node biopsy, I am not sure why we need to do this now as patient after negative bone marrow biopsy, and intracardiac biopsy yesterday, pending results    ==================>> REVIEW OF SYSTEM <<=================    GEN: no fever, no chills, no pain  RESP: no SOB at rest , no cough, no sputum  CVS: no chest pain, no palpitations, no edema  GI: no abdominal pain, no nausea,  : no dysuria, no frequency  Neuro: no headache, no dizziness  Derm : no itching, no rash    ==================>> PHYSICAL EXAM <<=================    GEN: A&O X 3 , NAD , comfortable, pleasant, calm in recliner chair    HEENT: NCAT, PERRL, MMM, hearing intact, not on O2   Neck: supple , no JVD appreciated  CVS: S1S2 , regular , Tachycardic.  Sinus tachycardia on telemetry  PULM: CTA B/L,  no W/R/R appreciated  ABD.: soft. non tender, non distended,  bowel sounds present  Extrem: intact pulses , no signif edema   PSYCH : normal mood,  not anxious                                 ( Note written / Date of service 03-08-23 )    ==================>> MEDICATIONS <<====================    budesonide 160 MICROgram(s)/formoterol 4.5 MICROgram(s) Inhaler 2 Puff(s) Inhalation two times a day  chlorhexidine 2% Cloths 1 Application(s) Topical daily  enoxaparin Injectable 40 milliGRAM(s) SubCutaneous every 24 hours  metoprolol succinate ER 50 milliGRAM(s) Oral <User Schedule>    MEDICATIONS  (PRN):  acetaminophen     Tablet .. 650 milliGRAM(s) Oral every 6 hours PRN Temp greater or equal to 38C (100.4F), Mild Pain (1 - 3)  aluminum hydroxide/magnesium hydroxide/simethicone Suspension 30 milliLiter(s) Oral every 4 hours PRN Dyspepsia  melatonin 3 milliGRAM(s) Oral at bedtime PRN Insomnia  ondansetron Injectable 4 milliGRAM(s) IV Push every 8 hours PRN Nausea and/or Vomiting    ___________  Active diet:  Diet, Regular:   Low Sodium  ___________________    ==================>> VITAL SIGNS <<==================    Vital Signs Last 24 HrsT(C): 36.4 (03-08-23 @ 12:54)  T(F): 97.5 (03-08-23 @ 12:54), Max: 98.1 (03-08-23 @ 09:35)  HR: 104 (03-08-23 @ 12:54) (103 - 111)  BP: 99/69 (03-08-23 @ 12:54)  RR: 18 (03-08-23 @ 12:54) (18 - 18)  SpO2: 94% (03-08-23 @ 12:54) (93% - 95%)      CAPILLARY BLOOD GLUCOSE         ==================>> LAB AND IMAGING <<==================       03-08    136  |  101  |  64<H>  ----------------------------<  98  4.2   |  21<L>  |  1.50<H>    Ca    9.6      08 Mar 2023 06:06  Mg     2.0     03-08      WBC count:   10.26 <<== ,  11.58 <<== ,  9.75 <<==   Hemoglobin:   11.7 <<==,  12.5 <<==,  11.5 <<==  platelets:  292 <==, 312 <==, 287 <==, 283 <==    Creatinine:  1.50  <<==, 1.69  <<==, 1.79  <<==, 1.64  <<==, 1.66  <<==, 1.63  <<==  Sodium:   136  <==, 140  <==, 136  <==, 136  <==, 138  <==, 137  <==       AST:          23 <== , 22 <==      ALT:        14  <== , 14  <==      AP:        134  <=, 131  <=     Bili:        0.4  <=, 0.3  <=    ____________________________    M I C R O B I O L O G Y :      COVID-19 PCR: NotDetec (03-05-23 @ 10:05)  COVID-19 PCR: NotDetec (02-28-23 @ 15:03)  SARS-CoV-2: NotDetec (02-22-23 @ 20:42)  __________________________________________________________________________________  ===============>>  A S S E S S M E N T   A N D   P L A N <<===============  ------------------------------------------------------------------------------------------    · Assessment	  Pt is a 63 yo man with PMH of HTN, RA, RCC s/p R partial nephrectomy, CKD, HFrEF/NICM EF 25-30%, ICD, plasma cell dyscrasia, h/o H-pylori, treated, seen at HF clinic and sent to ED for worsening heart failure.   pt admits to progressively increasing SOB, AVALOS with cough, worsening at night over past 6 months with leg edema,  however increasingly worse over past month.   Pt using O2 at home as needed.   Pt denies any chest pain, fevers, recent surgeries, dizziness, bloody stools.     Problem/Plan - 1:  ·  Problem: Acute on chronic systolic heart failure.   Cardiology and heart failure following management appreciated.    diuretics s needed as per cardiology  monitor Ins and outs, weight, labs, and vitals closely ( Holding diuretic today as creatinine elevated ( as per HF team ) )   O2 as needed   medical optimization  patient post AICD in December  Continue Current medications otherwise and monitor.  supportive care.    Problem/Plan - 2:  ·  Problem: Acute on chronic respiratory failure with hypoxia.   ·  Plan: as above  diuresis  wean off O2 as able  patient has PRN O2 at home.    Problem/Plan - 3:  ·  Problem: history of  Hypertension.   Patient currently with low blood pressures.    Appreciated heart failure recommendations.    adjustment of medications as tolerated per specialty   monitor and adjust as needed    Problem/Plan - 4:  ·  Problem: Stage 3a chronic kidney disease.   ·  Plan: currently appears stable  monitor  Avoid nephrotoxic medications.    Problem/Plan - 5:  ·  Problem: history of Plasma cell dyscrasia.   Oncology consulted and appreciated.    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  Recommending a bone marrow biopsy.  post bone bx  cardiac bx with Rt heart cath as planned      Discharge planning pending clearance by cardiology/heart failure team    --------------------------------------------  Case discussed With patient , NP  Education given on findings and plan of care  ___________________________  H. JEANETH Vu.  Pager: [phone]       _________________________________________________________________________________________  ========>>  M E D I C A L   A T T E N D I N G    F O L L O W  U P  N O T E  <<=========  -----------------------------------------------------------------------------------------------------    - Patient seen and examined by me earlier today.   - In summary,  PLACIDO GOLDMAN is a 62y year old man admitted with CHF exacerbation  - Patient today overall doing ok, comfortable, eating OK.      Patient overall feeling okay, no shortness of breath at rest, no chest pain.   Patient inquiring about going home.  patient hesitant about further biopsies ( patient already with negative bone marrow biopsy and cardiac biopsy )     ==================>> REVIEW OF SYSTEM <<=================    GEN: no fever, no chills, no pain  RESP: no SOB at rest , no cough, no sputum  CVS: no chest pain, no palpitations, no edema  GI: no abdominal pain, no nausea,  : no dysuria, no frequency  Neuro: no headache, no dizziness  Derm : no itching, no rash    ==================>> PHYSICAL EXAM <<=================    GEN: A&O X 3 , NAD , comfortable, pleasant, calm in recliner chair    HEENT: NCAT, PERRL, MMM, hearing intact, not on O2   Neck: supple , no JVD appreciated  CVS: S1S2 , regular , Tachycardic.  Sinus tachycardia on telemetry  PULM: CTA B/L,  no W/R/R appreciated  ABD.: soft. non tender, non distended,  bowel sounds present  Extrem: intact pulses , no signif edema   PSYCH : normal mood,  not anxious                                 ( Note written / Date of service 03-08-23 )    ==================>> MEDICATIONS <<====================    budesonide 160 MICROgram(s)/formoterol 4.5 MICROgram(s) Inhaler 2 Puff(s) Inhalation two times a day  chlorhexidine 2% Cloths 1 Application(s) Topical daily  enoxaparin Injectable 40 milliGRAM(s) SubCutaneous every 24 hours  metoprolol succinate ER 50 milliGRAM(s) Oral <User Schedule>    MEDICATIONS  (PRN):  acetaminophen     Tablet .. 650 milliGRAM(s) Oral every 6 hours PRN Temp greater or equal to 38C (100.4F), Mild Pain (1 - 3)  aluminum hydroxide/magnesium hydroxide/simethicone Suspension 30 milliLiter(s) Oral every 4 hours PRN Dyspepsia  melatonin 3 milliGRAM(s) Oral at bedtime PRN Insomnia  ondansetron Injectable 4 milliGRAM(s) IV Push every 8 hours PRN Nausea and/or Vomiting    ___________  Active diet:  Diet, Regular:   Low Sodium  ___________________    ==================>> VITAL SIGNS <<==================    Vital Signs Last 24 HrsT(C): 36.4 (03-08-23 @ 12:54)  T(F): 97.5 (03-08-23 @ 12:54), Max: 98.1 (03-08-23 @ 09:35)  HR: 104 (03-08-23 @ 12:54) (103 - 111)  BP: 99/69 (03-08-23 @ 12:54)  RR: 18 (03-08-23 @ 12:54) (18 - 18)  SpO2: 94% (03-08-23 @ 12:54) (93% - 95%)       ==================>> LAB AND IMAGING <<==================       03-08    136  |  101  |  64<H>  ----------------------------<  98  4.2   |  21<L>  |  1.50<H>    Ca    9.6      08 Mar 2023 06:06  Mg     2.0     03-08      WBC count:   10.26 <<== ,  11.58 <<== ,  9.75 <<==   Hemoglobin:   11.7 <<==,  12.5 <<==,  11.5 <<==  platelets:  292 <==, 312 <==, 287 <==, 283 <==    Creatinine:  1.50  <<==, 1.69  <<==, 1.79  <<==, 1.64  <<==, 1.66  <<==, 1.63  <<==  Sodium:   136  <==, 140  <==, 136  <==, 136  <==, 138  <==, 137  <==       AST:          23 <== , 22 <==      ALT:        14  <== , 14  <==      AP:        134  <=, 131  <=     Bili:        0.4  <=, 0.3  <=    < from: TTE with Doppler (w/Cont) (03.08.23 @ 08:28) >  Conclusions:  1. Normal mitral valve. Minimal mitral regurgitation.  2. Normal trileaflet aortic valve. No aortic valve regurgitation seen.  3. Endocardial visualization enhanced with intravenous  injection of Ultrasonic Enhancing Agent (Definity). Left  ventricle suboptimally visualized despite intravenous  ultrasonic enhancing agent; grossly normal left ventricular  systolic function. Septal flattening consistent with right ventricular overload.  4. Right ventricular enlargement with decreased right  ventricular systolic function. Basal RV diameter 5.3 cm.  Right ventricular systolic dysfunction with preserved  apical wall motion is seen consistent with Juan's  sign. A device wire is noted in the right heart.  5. Estimated right ventricular systolic pressure equals 60  mm Hg, assuming right atrial pressure equals 8 mm Hg,  consistent with moderate pulmonary hypertension.  6. Tricuspid annular dilatation with normal leaflet  opening. Moderate tricuspid regurgitation.  7. Trace anterior pericardial effusion.  *** Compared to prior echocardiograms, left ventricular  systolic function has improved compared to study performed  12/2022. The right ventricle appears similar to study  performed 3/6/2023.  ------------------------------------------------------------------------  Confirmed on  3/8/2023 - 14:49:44 by Cornelius Ecsudero M.D.  < end of copied text >    __________________________________________________________________________________  ===============>>  A S S E S S M E N T   KAVIN BLUM D   P L A N <<===============  ------------------------------------------------------------------------------------------    · Assessment	  Pt is a 61 yo man with PMH of HTN, RA, RCC s/p R partial nephrectomy, CKD, HFrEF/NICM EF 25-30%, ICD, plasma cell dyscrasia, h/o H-pylori, treated, seen at HF clinic and sent to ED for worsening heart failure.   pt admits to progressively increasing SOB, AVALOS with cough, worsening at night over past 6 months with leg edema,  however increasingly worse over past month.   Pt using O2 at home as needed.   Pt denies any chest pain, fevers, recent surgeries, dizziness, bloody stools.     Problem/Plan - 1:  ·  Problem: Acute on chronic systolic heart failure.   Cardiology and heart failure following management appreciated.    diureticss as needed as per cardiology  monitor Ins and outs, weight, labs, and vitals closely   medical optimization  patient post AICD in December  Continue Current medications otherwise and monitor.  supportive care.  LVEF improved on current Echo !!    bone marrow biopsy not consistent with amyloidosis  cardiac biopsy not consistent with amyloidosis or sarcoidosis   ? plan for LN biopsy ? ?? pending CT scan    Problem/Plan - 2:  ·  Problem: Acute on chronic respiratory failure with hypoxia.   resolved / off O2   patient has PRN O2 at home.  being assessed by pulm for PAH    Problem/Plan - 3:  ·  Problem: history of  Hypertension.   Patient currently with low blood pressures.    Appreciated heart failure recommendations.    adjustment of medications as tolerated per specialty   monitor and adjust as needed    Problem/Plan - 4:  ·  Problem: Stage 3a chronic kidney disease.   ·  Plan: currently appears stable  monitor  Avoid nephrotoxic medications.    Problem/Plan - 5:  ·  Problem: history of Plasma cell dyscrasia.   Oncology consulted and appreciated.    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  Recommending a bone marrow biopsy.  post bone bx as above     Discharge planning pending clearance by cardiology/heart failure team    --------------------------------------------  Case discussed With patient , NP  Education given on findings and plan of care  ___________________________  H. JEANETH Vu.  Pager: [phone]

## 2023-03-08 NOTE — PROGRESS NOTE ADULT - SUBJECTIVE AND OBJECTIVE BOX
Interval History:  frustrated by workup  discussed with him and his wife the importance of finding a diagnosis and discussed finding of pulm HTN  cough improved     Medications:  acetaminophen     Tablet .. 650 milliGRAM(s) Oral every 6 hours PRN  aluminum hydroxide/magnesium hydroxide/simethicone Suspension 30 milliLiter(s) Oral every 4 hours PRN  budesonide 160 MICROgram(s)/formoterol 4.5 MICROgram(s) Inhaler 2 Puff(s) Inhalation two times a day  chlorhexidine 2% Cloths 1 Application(s) Topical daily  enoxaparin Injectable 40 milliGRAM(s) SubCutaneous every 24 hours  melatonin 3 milliGRAM(s) Oral at bedtime PRN  metoprolol succinate ER 50 milliGRAM(s) Oral <User Schedule>  ondansetron Injectable 4 milliGRAM(s) IV Push every 8 hours PRN      Vitals:  T(C): 37.2 (23 @ 21:09), Max: 37.2 (23 @ 21:09)  HR: 114 (23 @ 21:09) (103 - 114)  BP: 100/70 (23 @ 21:09) (91/52 - 100/70)  BP(mean): --  RR: 18 (23 @ 21:09) (18 - 18)  SpO2: 94% (23 @ 21:09) (93% - 95%)    Daily     Daily Weight in k.7 (08 Mar 2023 09:08)        I&O's Summary    07 Mar 2023 07:01  -  08 Mar 2023 07:00  --------------------------------------------------------  IN: 600 mL / OUT: 875 mL / NET: -275 mL    Physical Exam:  Appearance: No Acute Distress  HEENT: PERRL; ? L sided facial droop; bitemporal wasting  Neck: No JVD  Cardiovascular: Normal S1 S2, No murmurs/rubs/gallops  Respiratory: Clear to auscultation bilaterally  Gastrointestinal: Soft, Non-tender	  Skin: No cyanosis	  Neurologic: Non-focal  Extremities: No LE edema  Psychiatry: A & O x 3, Mood & affect appropriate    Labs:        137  |  102  |  55<H>  ----------------------------<  98  4.0   |  21<L>  |  1.39<H>    Ca    9.6      08 Mar 2023 19:10  Mg     2.0

## 2023-03-08 NOTE — PROGRESS NOTE ADULT - ASSESSMENT
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 Of note, he had been hospitalized at [facility] as well as [facility] over past year. Was evaluated for TTR amyloid which was negative but never underwent biopsy. Also noted to have R hilar LAD and axillary LAD on CT chest in 12/22 (negative for PE). Prior axillary LN biopsy negative. Was given IV diuretics with good response. States cough improved. Transferred from [facility] for RHC/biopsy which was done showed low filling pressures but with pulmonary HTN (PVR 6). Labs reviewed - trop 80s, BUN/Cr 64/1.5 (b/l 1.5) BNP 6k. Overall stage C HF, NYHA class III with suspicion that there may be an infiltrative process (e.g. amyloid vs. sarcoid) and requires expedited workup. Myocardial biopsy negative for amyloid/sarcoid. In addition, sarcoid is a possibility as well given RVH and hilar LAD and given prior PET scan showing FDG-avid LNs, will need to pursue LN biopsy.     3/7/23 RHC RA 1, PA 44/17/30, PCW 3, CO/CI 4.39/2.7; PVR 6

## 2023-03-08 NOTE — PROGRESS NOTE ADULT - ASSESSMENT
CKD, anemia AOCD, and monoclonal protein. Hgb has been adequate and he does not require Epo at this time. Bone marrow biopsy and cardiac biopsy negative for MM and amyloid and findings more c/w MGUS as suspected. Monitor CBC. CKD is stable.

## 2023-03-08 NOTE — CONSULT NOTE ADULT - ATTENDING COMMENTS
patient with scan in Dec demonstrating hilar lad, asked to evaluate for possible mediastinal ln biopsy - would need repeat ct chest as last one nearly 3 months prior. mediastinal ln not sig enlarged but hilar possibly accessible with ebus. pending cardiac biopsy to r/o sarcoid/amyloidosis
62 year old Male with PMH of HTN, RA, plasma cell dyscrasia (Kappa/Lambda 3.23),  RCC s/p R partial nephrectomy,  CKD (baseline 1.4) and, HFrEF (28%; LVIDd 4.1 moderate TR/PH)   who came to the hospital with worsening AVALOS and cough. He was transferred from [facility] to [facility] for RHC and biopsy to rule out an infiltrative cardiomyopathy  On exam patient appears warm wit  c/w metoprolol, entresto, and spironolactone  Keep NPO after midnight on sunday for RHC and biopsy
Attending Attestation:    Patient seen and examined with resident/fellow.  Agree with above except as noted.  61 yo [place] male  with HFrEF25%,,  with concern for cardiomyopathy from sarcoidosis.  Myocardial bx  negative for sarcoidosis  and amyloidosis.   Pt had collagen vascular work up in 12/22 resulting in only elevated LUIS FERNANDO.  RHC on this admission with mild-moderate Precapillary  Pulm HTN. Etiology still unclear.    PET CT 4/22 shows mildly average uptake of  R hilar nodes and axillary nodes.   Lung Parenchyma normal. Axillary node bx wnl.  Recent CT scan on this admission shows no evidence of abnormal lymph nodes and normal parenchyma.  ROS 40lb wt loss in 1 year.  Renal cell cancer with partial R nephrectomy.    A/P 61 yo male with HFrEF,  and  precapillary Pulmonary HTN of unclear Etiology.  In my opinion there is no evidence of Sarcoidosis. In addition mediastinal lymph nodes are normal in size.  Very low suspicion  of Sarcoidosis.  Recommend:  1.Repeat collagen vascular screen as above.  2.HTLV antibodies  3. Once work up is finished will consider adding Sildenafil for pulmonary HTN.

## 2023-03-08 NOTE — CONSULT NOTE ADULT - SUBJECTIVE AND OBJECTIVE BOX
CHIEF COMPLAINT: SOB    HPI:  63YO Male never smoker immigrated from [address] HTN, reported RA (RF and anti-CCP neg), CKD, HFrEF/NICM EF 25-30% s/p AICD, plasma cell dyscrasia, hx treated H-pylori, and RCC found on renal mass 4/2022 s/p R partial nephrectomy who was initially admitted to [facility] 2/23/23 for heart failure exacerbation who was transferred to [facility] for RHC 3/3.     Pt has had long course over the last year which started 1/2022 when he reported SOB and had a cath which showed reported clean coronaries and reportedly saw a pulmonologist who Rx home o2 which he was using intermittently. Around 4/2022 he was found to have Right sided renal mass for which he had a Right sided partial nephrectomy which returned as RCC. CT and bone scan was negative for metastatic disease. No documentation suggesting he got chemotherapy. Since the 4/22 he has had multiple admissions    Was diuresed with IV Bumex and eventually transitioned to PO. Had repeat TTE. Bone marrow biopsy by IR on 3/1 to evaluate for MM and MGUS.     TTE repeated as well. Eventual plan to transfer patient to [facility] for likely RHC and cardiac biopsy to further evaluation for amyloid. Pt currently denies any chest pain, SOB or specific complaints.       PAST MEDICAL & SURGICAL HISTORY:  Hypertension  Rheumatoid arthritis  Neoplasm of uncertain behavior of kidney, right  History of cardiac cath jan 2022, at [place] no stent    FAMILY HISTORY:  FH: diabetes mellitus (Mother)        SOCIAL HISTORY:  Smoking: [ ] Never Smoked [ ] Former Smoker (__ packs x ___ years) [ ] Current Smoker  (__ packs x ___ years)  Substance Use: [ ] Never Used [ ] Used ____  EtOH Use:  Marital Status: [ ] Single [ ]  [ ]  [ ]   Sexual History:   Occupation:  Recent Travel:  Country of Birth:  Advance Directives:    Allergies    No Known Allergies    Intolerances        HOME MEDICATIONS:  Home Medications:  acetaminophen 325 mg oral tablet: 2 tab(s) orally every 6 hours, As needed, Temp greater or equal to 38C (100.4F), Mild Pain (1 - 3) (06 Mar 2023 11:23)  albuterol 90 mcg/inh inhalation aerosol: 1 puff(s) inhaled 3 times a day, As needed, Shortness of Breath and/or Wheezing (06 Mar 2023 11:23)  aluminum hydroxide-magnesium hydroxide 200 mg-200 mg/5 mL oral suspension: 30 milliliter(s) orally every 4 hours, As needed, Dyspepsia (06 Mar 2023 11:23)  bumetanide 1 mg oral tablet: 3 tab(s) orally once a day (06 Mar 2023 11:23)  hydrALAZINE 50 mg oral tablet: 1 tab(s) orally 3 times a day (06 Mar 2023 11:23)  melatonin 3 mg oral tablet: 1 tab(s) orally once a day (at bedtime), As needed, Insomnia (06 Mar 2023 11:23)  metoprolol succinate 50 mg oral tablet, extended release: 1 tab(s) orally once a day (06 Mar 2023 11:23)  sacubitril-valsartan 49 mg-51 mg oral tablet: 1 tab(s) orally 2 times a day (06 Mar 2023 11:23)  spironolactone 25 mg oral tablet: 1 tab(s) orally once a day (06 Mar 2023 11:23)      REVIEW OF SYSTEMS:  Constitutional: [ ] negative [ ] fevers [ ] chills [ ] weight loss [ ] weight gain  HEENT: [ ] negative [ ] dry eyes [ ] eye irritation [ ] postnasal drip [ ] nasal congestion  CV: [ ] negative  [ ] chest pain [ ] orthopnea [ ] palpitations [ ] murmur  Resp: [ ] negative [ ] cough [ ] shortness of breath [ ] dyspnea [ ] wheezing [ ] sputum [ ] hemoptysis  GI: [ ] negative [ ] nausea [ ] vomiting [ ] diarrhea [ ] constipation [ ] abd pain [ ] dysphagia   : [ ] negative [ ] dysuria [ ] nocturia [ ] hematuria [ ] increased urinary frequency  Musculoskeletal: [ ] negative [ ] back pain [ ] myalgias [ ] arthralgias [ ] fracture  Skin: [ ] negative [ ] rash [ ] itch  Neurological: [ ] negative [ ] headache [ ] dizziness [ ] syncope [ ] weakness [ ] numbness  Psychiatric: [ ] negative [ ] anxiety [ ] depression  Endocrine: [ ] negative [ ] diabetes [ ] thyroid problem  Hematologic/Lymphatic: [ ] negative [ ] anemia [ ] bleeding problem  Allergic/Immunologic: [ ] negative [ ] itchy eyes [ ] nasal discharge [ ] hives [ ] angioedema  [ ] All other systems negative  [ ] Unable to assess ROS because ________    OBJECTIVE:  ICU Vital Signs Last 24 Hrs  T(C): 36.4 (08 Mar 2023 12:54), Max: 36.7 (08 Mar 2023 09:35)  T(F): 97.5 (08 Mar 2023 12:54), Max: 98.1 (08 Mar 2023 09:35)  HR: 104 (08 Mar 2023 12:54) (103 - 111)  BP: 99/69 (08 Mar 2023 12:54) (91/52 - 99/69)  BP(mean): --  ABP: --  ABP(mean): --  RR: 18 (08 Mar 2023 12:54) (18 - 18)  SpO2: 94% (08 Mar 2023 12:54) (93% - 95%)    O2 Parameters below as of 08 Mar 2023 12:54  Patient On (Oxygen Delivery Method): room air              03-07 @ 07:01  -  03-08 @ 07:00  --------------------------------------------------------  IN: 600 mL / OUT: 875 mL / NET: -275 mL      CAPILLARY BLOOD GLUCOSE          PHYSICAL EXAM:  General:   HEENT:   Lymph Nodes:  Neck:   Respiratory:   Cardiovascular:   Abdomen:   Extremities:   Skin:   Neurological:  Psychiatry:    LINES:     HOSPITAL MEDICATIONS:  Standing Meds:  budesonide 160 MICROgram(s)/formoterol 4.5 MICROgram(s) Inhaler 2 Puff(s) Inhalation two times a day  chlorhexidine 2% Cloths 1 Application(s) Topical daily  enoxaparin Injectable 40 milliGRAM(s) SubCutaneous every 24 hours  metoprolol succinate ER 50 milliGRAM(s) Oral <User Schedule>      PRN Meds:  acetaminophen     Tablet .. 650 milliGRAM(s) Oral every 6 hours PRN  aluminum hydroxide/magnesium hydroxide/simethicone Suspension 30 milliLiter(s) Oral every 4 hours PRN  melatonin 3 milliGRAM(s) Oral at bedtime PRN  ondansetron Injectable 4 milliGRAM(s) IV Push every 8 hours PRN      LABS:    Hgb Trend: 11.7<--, 12.5<--, 11.5<--, 11.9<--, 11.5<--  03-08    136  |  101  |  64<H>  ----------------------------<  98  4.2   |  21<L>  |  1.50<H>    Ca    9.6      08 Mar 2023 06:06  Mg     2.0     03-08      Creatinine Trend: 1.50<--, 1.69<--, 1.79<--, 1.64<--, 1.66<--, 1.63<--            MICROBIOLOGY:       RADIOLOGY:  [ ] Reviewed and interpreted by me    PULMONARY FUNCTION TESTS:    EKG: CHIEF COMPLAINT: SOB    HPI:  63YO Male never smoker immigrated from [address] PMH HTN, reported RA (RF and anti-CCP neg), CKD, HFrEF/NICM EF 25-30% s/p AICD, plasma cell dyscrasia, hx treated H-pylori, and RCC found on renal mass 4/2022 s/p R partial nephrectomy who was initially admitted to [facility] 2/23/23 for heart failure exacerbation who was transferred to [facility] for RHC 3/3.     Pt has had long course over the last year which started 1/2022 when he reported SOB and had a cath which showed reported clean coronaries and reportedly saw a pulmonologist who Rx home o2 which he was using intermittently. Outpatient PFTs reportedly showed normal spirometry and volumes with severe reduced dlco (Dr. Hill had records). Around 4/2022 he was found to have Right sided renal mass for which he had a Right sided partial nephrectomy which returned as RCC. CT and bone scan was negative for metastatic disease. No documentation suggesting he got chemotherapy. Since the 4/22 he has had multiple admissions for heart failure. During those admissions he was worked up for ILD and possible rheumatologic conditions by Rheumatology. No ILD was noted on CT. Anti-rosanna, SCL-70/centromere/RNA polymerase 3, CCP, RF, SSA/SSB and Myomarker panel were negative. LUIS FERNANDO was markedly elevated to 1:1280.   He had a Cardiac MRI with subtle late gadolinium enhancement which may be present in sarcoidosis. He however did not have any mediastinal adenopathy. He had a Left axillary lymph node biopsy 7/29/22 for adenopathy which was negative.    TTE 12/15/22 which showed reduced EF 28% with elevated PASP to 60mmHg.    He has been noting SOB, weight loss, raynaud's like symptoms and skin hypopigmentation occurring over the past year.     This admission he was admitted 2/23 for volume overload for which he was diuresed. Bone marrow biopsy by IR on 3/1 to evaluate for MM and MGUS which was negative. He was transferred to [facility] for RHC which he had done 3/3 which showed PA 44/17, Mean PA 30mmHg, PCWP 3mmHg which was consistent with Precapillary PH      TTE repeated as well. Eventual plan to transfer patient to [facility] for likely RHC and cardiac biopsy to further evaluation for amyloid. Pt currently denies any chest pain, SOB or specific complaints.       PAST MEDICAL & SURGICAL HISTORY:  Hypertension  Rheumatoid arthritis  Neoplasm of uncertain behavior of kidney, right  History of cardiac cath jan 2022, at [facility] no stent    FAMILY HISTORY:  FH: diabetes mellitus (Mother)        SOCIAL HISTORY:  Smoking: [ ] Never Smoked [ ] Former Smoker (__ packs x ___ years) [ ] Current Smoker  (__ packs x ___ years)  Substance Use: [ ] Never Used [ ] Used ____  EtOH Use:  Marital Status: [ ] Single [ ]  [ ]  [ ]   Sexual History:   Occupation:  Recent Travel:  Country of Birth:  Advance Directives:    Allergies    No Known Allergies    Intolerances        HOME MEDICATIONS:  Home Medications:  acetaminophen 325 mg oral tablet: 2 tab(s) orally every 6 hours, As needed, Temp greater or equal to 38C (100.4F), Mild Pain (1 - 3) (06 Mar 2023 11:23)  albuterol 90 mcg/inh inhalation aerosol: 1 puff(s) inhaled 3 times a day, As needed, Shortness of Breath and/or Wheezing (06 Mar 2023 11:23)  aluminum hydroxide-magnesium hydroxide 200 mg-200 mg/5 mL oral suspension: 30 milliliter(s) orally every 4 hours, As needed, Dyspepsia (06 Mar 2023 11:23)  bumetanide 1 mg oral tablet: 3 tab(s) orally once a day (06 Mar 2023 11:23)  hydrALAZINE 50 mg oral tablet: 1 tab(s) orally 3 times a day (06 Mar 2023 11:23)  melatonin 3 mg oral tablet: 1 tab(s) orally once a day (at bedtime), As needed, Insomnia (06 Mar 2023 11:23)  metoprolol succinate 50 mg oral tablet, extended release: 1 tab(s) orally once a day (06 Mar 2023 11:23)  sacubitril-valsartan 49 mg-51 mg oral tablet: 1 tab(s) orally 2 times a day (06 Mar 2023 11:23)  spironolactone 25 mg oral tablet: 1 tab(s) orally once a day (06 Mar 2023 11:23)      REVIEW OF SYSTEMS:  Constitutional: [ ] negative [ ] fevers [ ] chills [ ] weight loss [ ] weight gain  HEENT: [ ] negative [ ] dry eyes [ ] eye irritation [ ] postnasal drip [ ] nasal congestion  CV: [ ] negative  [ ] chest pain [ ] orthopnea [ ] palpitations [ ] murmur  Resp: [ ] negative [ ] cough [ ] shortness of breath [ ] dyspnea [ ] wheezing [ ] sputum [ ] hemoptysis  GI: [ ] negative [ ] nausea [ ] vomiting [ ] diarrhea [ ] constipation [ ] abd pain [ ] dysphagia   : [ ] negative [ ] dysuria [ ] nocturia [ ] hematuria [ ] increased urinary frequency  Musculoskeletal: [ ] negative [ ] back pain [ ] myalgias [ ] arthralgias [ ] fracture  Skin: [ ] negative [ ] rash [ ] itch  Neurological: [ ] negative [ ] headache [ ] dizziness [ ] syncope [ ] weakness [ ] numbness  Psychiatric: [ ] negative [ ] anxiety [ ] depression  Endocrine: [ ] negative [ ] diabetes [ ] thyroid problem  Hematologic/Lymphatic: [ ] negative [ ] anemia [ ] bleeding problem  Allergic/Immunologic: [ ] negative [ ] itchy eyes [ ] nasal discharge [ ] hives [ ] angioedema  [ ] All other systems negative  [ ] Unable to assess ROS because ________    OBJECTIVE:  ICU Vital Signs Last 24 Hrs  T(C): 36.4 (08 Mar 2023 12:54), Max: 36.7 (08 Mar 2023 09:35)  T(F): 97.5 (08 Mar 2023 12:54), Max: 98.1 (08 Mar 2023 09:35)  HR: 104 (08 Mar 2023 12:54) (103 - 111)  BP: 99/69 (08 Mar 2023 12:54) (91/52 - 99/69)  BP(mean): --  ABP: --  ABP(mean): --  RR: 18 (08 Mar 2023 12:54) (18 - 18)  SpO2: 94% (08 Mar 2023 12:54) (93% - 95%)    O2 Parameters below as of 08 Mar 2023 12:54  Patient On (Oxygen Delivery Method): room air              03-07 @ 07:01  -  03-08 @ 07:00  --------------------------------------------------------  IN: 600 mL / OUT: 875 mL / NET: -275 mL      CAPILLARY BLOOD GLUCOSE          PHYSICAL EXAM:  General:   HEENT:   Lymph Nodes:  Neck:   Respiratory:   Cardiovascular:   Abdomen:   Extremities:   Skin:   Neurological:  Psychiatry:    LINES:     HOSPITAL MEDICATIONS:  Standing Meds:  budesonide 160 MICROgram(s)/formoterol 4.5 MICROgram(s) Inhaler 2 Puff(s) Inhalation two times a day  chlorhexidine 2% Cloths 1 Application(s) Topical daily  enoxaparin Injectable 40 milliGRAM(s) SubCutaneous every 24 hours  metoprolol succinate ER 50 milliGRAM(s) Oral <User Schedule>      PRN Meds:  acetaminophen     Tablet .. 650 milliGRAM(s) Oral every 6 hours PRN  aluminum hydroxide/magnesium hydroxide/simethicone Suspension 30 milliLiter(s) Oral every 4 hours PRN  melatonin 3 milliGRAM(s) Oral at bedtime PRN  ondansetron Injectable 4 milliGRAM(s) IV Push every 8 hours PRN      LABS:    Hgb Trend: 11.7<--, 12.5<--, 11.5<--, 11.9<--, 11.5<--  03-08    136  |  101  |  64<H>  ----------------------------<  98  4.2   |  21<L>  |  1.50<H>    Ca    9.6      08 Mar 2023 06:06  Mg     2.0     03-08      Creatinine Trend: 1.50<--, 1.69<--, 1.79<--, 1.64<--, 1.66<--, 1.63<--            Mild pre-capillary pulmonary hypertension (sPAP 44mmHg, dPAP 17mmHG,  mPAP 30mmHg)  PCWP = 3mmHg with a V wave of 5mmHg  CHIEF COMPLAINT: SOB    HPI:  61YO Male never smoker immigrated from [address] PMH HTN, reported RA (RF and anti-CCP neg), CKD, HFrEF/NICM EF 25-30% s/p AICD, plasma cell dyscrasia, hx treated H-pylori, and RCC found on renal mass 4/2022 s/p R partial nephrectomy who was initially admitted to [facility] 2/23/23 for heart failure exacerbation who was transferred to [facility] for RHC 3/3.     Pt has had long course over the last year which started 1/2022 when he reported SOB and had a cath which showed reported clean coronaries and reportedly saw a pulmonologist who Rx home o2 which he was using intermittently. Outpatient PFTs reportedly showed normal spirometry and volumes with severe reduced dlco (Dr. Hill had records). Around 4/2022 he was found to have Right sided renal mass for which he had a Right sided partial nephrectomy which returned as RCC. CT and bone scan was negative for metastatic disease. PET scan was done 4/2022 which showed no evidence of metastatic disease however it was limited for RCC. It did show mildly enlarged FDG avid axillary lymph nodes (SUV 4.8, 3.7) and small FDG avid mediastinal and bilateral hilar lymph nodes (SUV 2.4, 2.9).    No documentation suggesting he got chemotherapy. Since the 4/22 he has had multiple admissions for heart failure. During those admissions he was worked up for ILD and possible rheumatologic conditions by Rheumatology. No ILD was noted on CT. Anti-rosanna, SCL-70/centromere/RNA polymerase 3, CCP, RF, SSA/SSB, ACE level, and Myomarker panel were negative. LUIS FERNANDO was markedly elevated to 1:1280.   He had a Cardiac MRI with subtle late gadolinium enhancement which may be present in sarcoidosis. He however did not have any mediastinal adenopathy. He had a Left axillary lymph node biopsy 7/29/22 for adenopathy which was negative.    TTE 12/15/22 which showed reduced EF 28% with elevated PASP to 60mmHg.    He has been noting SOB, weight loss, raynaud's like symptoms and skin hypopigmentation occurring over the past year.     This admission he was admitted 2/23 for volume overload for which he was diuresed. Bone marrow biopsy by IR on 3/1 to evaluate for MM and MGUS which was negative however it did show increased iron stores. He was transferred to [facility] for RHC which he had done 3/3 which showed PA 44/17, Mean PA 30mmHg, PCWP 3mmHg which was consistent with Precapillary PH. Endomyocardial biopsy was negative for myocarditis, sarcoidosis and amyloidosis. Repeat echo 3/8 showed improved EF 55-60% with persistently elevated PASP to 60mmHg RV enlargement and Juan's sign. He was then evaluated by Thoracic for possible EBUS for lymph adenopathy with concern for Sarcoid.    Pulmonary consulted for PH management in the setting of questionable sarcoid.    He denies wheezing, SOB or cough.     PAST MEDICAL & SURGICAL HISTORY:  Hypertension  Rheumatoid arthritis  Neoplasm of uncertain behavior of kidney, right  History of cardiac cath jan 2022, at [facility] no stent    FAMILY HISTORY:  FH: diabetes mellitus (Mother)        SOCIAL HISTORY:  Smoking: [ x] Never Smoked [ ] Former Smoker (__ packs x ___ years) [ ] Current Smoker  (__ packs x ___ years)  Substance Use: [ ] Never Used [ ] Used ____  EtOH Use:  Marital Status: [ ] Single [ ]  [ ]  [ ]   Sexual History:   Occupation: Worked as a doorman and    Recent Travel:  Country of Birth:  Advance Directives:    Allergies    No Known Allergies    Intolerances        HOME MEDICATIONS:  Home Medications:  acetaminophen 325 mg oral tablet: 2 tab(s) orally every 6 hours, As needed, Temp greater or equal to 38C (100.4F), Mild Pain (1 - 3) (06 Mar 2023 11:23)  albuterol 90 mcg/inh inhalation aerosol: 1 puff(s) inhaled 3 times a day, As needed, Shortness of Breath and/or Wheezing (06 Mar 2023 11:23)  aluminum hydroxide-magnesium hydroxide 200 mg-200 mg/5 mL oral suspension: 30 milliliter(s) orally every 4 hours, As needed, Dyspepsia (06 Mar 2023 11:23)  bumetanide 1 mg oral tablet: 3 tab(s) orally once a day (06 Mar 2023 11:23)  hydrALAZINE 50 mg oral tablet: 1 tab(s) orally 3 times a day (06 Mar 2023 11:23)  melatonin 3 mg oral tablet: 1 tab(s) orally once a day (at bedtime), As needed, Insomnia (06 Mar 2023 11:23)  metoprolol succinate 50 mg oral tablet, extended release: 1 tab(s) orally once a day (06 Mar 2023 11:23)  sacubitril-valsartan 49 mg-51 mg oral tablet: 1 tab(s) orally 2 times a day (06 Mar 2023 11:23)  spironolactone 25 mg oral tablet: 1 tab(s) orally once a day (06 Mar 2023 11:23)      REVIEW OF SYSTEMS:  Constitutional: [ ] negative [ ] fevers [ ] chills [ ] weight loss [ ] weight gain  HEENT: [ ] negative [ ] dry eyes [ ] eye irritation [ ] postnasal drip [ ] nasal congestion  CV: [ ] negative  [ ] chest pain [ ] orthopnea [ ] palpitations [ ] murmur  Resp: [ ] negative [ ] cough [ ] shortness of breath [ ] dyspnea [ ] wheezing [ ] sputum [ ] hemoptysis  GI: [ ] negative [ ] nausea [ ] vomiting [ ] diarrhea [ ] constipation [ ] abd pain [ ] dysphagia   : [ ] negative [ ] dysuria [ ] nocturia [ ] hematuria [ ] increased urinary frequency  Musculoskeletal: [ ] negative [ ] back pain [ ] myalgias [ ] arthralgias [ ] fracture  Skin: [ ] negative [ ] rash [ ] itch  Neurological: [ ] negative [ ] headache [ ] dizziness [ ] syncope [ ] weakness [ ] numbness  Psychiatric: [ ] negative [ ] anxiety [ ] depression  Endocrine: [ ] negative [ ] diabetes [ ] thyroid problem  Hematologic/Lymphatic: [ ] negative [ ] anemia [ ] bleeding problem  Allergic/Immunologic: [ ] negative [ ] itchy eyes [ ] nasal discharge [ ] hives [ ] angioedema  [ x] All other systems negative  [ ] Unable to assess ROS because ________    OBJECTIVE:  ICU Vital Signs Last 24 Hrs  T(C): 36.4 (08 Mar 2023 12:54), Max: 36.7 (08 Mar 2023 09:35)  T(F): 97.5 (08 Mar 2023 12:54), Max: 98.1 (08 Mar 2023 09:35)  HR: 104 (08 Mar 2023 12:54) (103 - 111)  BP: 99/69 (08 Mar 2023 12:54) (91/52 - 99/69)  BP(mean): --  ABP: --  ABP(mean): --  RR: 18 (08 Mar 2023 12:54) (18 - 18)  SpO2: 94% (08 Mar 2023 12:54) (93% - 95%)    O2 Parameters below as of 08 Mar 2023 12:54  Patient On (Oxygen Delivery Method): room air              03-07 @ 07:01  -  03-08 @ 07:00  --------------------------------------------------------  IN: 600 mL / OUT: 875 mL / NET: -275 mL      CAPILLARY BLOOD GLUCOSE    PHYSICAL EXAM:  General: Cathectic male laying in bed in no acute distress  HEENT: Temporal wasting  Respiratory: CTA  Cardiovascular: Regular rate and rhythm no m/r/g  Abdomen: Nontender nondistended  Extremities: No peripheral edema  Skin: Hypopigmented skin changes noted in his upper chest  Neurological: No appreciable neurologic deficits  Psychiatry: Appropriate mood and affect      LINES:     HOSPITAL MEDICATIONS:  Standing Meds:  budesonide 160 MICROgram(s)/formoterol 4.5 MICROgram(s) Inhaler 2 Puff(s) Inhalation two times a day  chlorhexidine 2% Cloths 1 Application(s) Topical daily  enoxaparin Injectable 40 milliGRAM(s) SubCutaneous every 24 hours  metoprolol succinate ER 50 milliGRAM(s) Oral <User Schedule>      PRN Meds:  acetaminophen     Tablet .. 650 milliGRAM(s) Oral every 6 hours PRN  aluminum hydroxide/magnesium hydroxide/simethicone Suspension 30 milliLiter(s) Oral every 4 hours PRN  melatonin 3 milliGRAM(s) Oral at bedtime PRN  ondansetron Injectable 4 milliGRAM(s) IV Push every 8 hours PRN      LABS:    Hgb Trend: 11.7<--, 12.5<--, 11.5<--, 11.9<--, 11.5<--  03-08    136  |  101  |  64<H>  ----------------------------<  98  4.2   |  21<L>  |  1.50<H>    Ca    9.6      08 Mar 2023 06:06  Mg     2.0     03-08      Creatinine Trend: 1.50<--, 1.69<--, 1.79<--, 1.64<--, 1.66<--, 1.63<--            Mild pre-capillary pulmonary hypertension (sPAP 44mmHg, dPAP 17mmHG, mPAP 30mmHg)  PCWP = 3mmHg with a V wave of 5mmHg

## 2023-03-08 NOTE — PROGRESS NOTE ADULT - SUBJECTIVE AND OBJECTIVE BOX
Cardiovascular Disease Progress Note    Overnight events: No acute events overnight.  no cp/sob/palps  Otherwise review of systems negative    Objective Findings:  T(C): 36.5 (23 @ 04:24), Max: 36.5 (23 @ 04:24)  HR: 105 (23 @ 05:25) (103 - 111)  BP: 92/58 (23 @ 05:25) (91/52 - 98/67)  RR: 18 (23 @ 04:24) (18 - 18)  SpO2: 93% (23 @ 04:24) (93% - 98%)  Wt(kg): --  Daily     Daily Weight in k (07 Mar 2023 08:52)      Physical Exam:  Gen: NAD  HEENT: EOMI  CV: RRR, normal S1 + S2, no m/r/g  Lungs: CTAB  Abd: soft, non-tender  Ext: No edema    Telemetry:    Laboratory Data:        136  |  101  |  64<H>  ----------------------------<  98  4.2   |  21<L>  |  1.50<H>    Ca    9.6      08 Mar 2023 06:06  Mg     2.0                     Inpatient Medications:  MEDICATIONS  (STANDING):  budesonide 160 MICROgram(s)/formoterol 4.5 MICROgram(s) Inhaler 2 Puff(s) Inhalation two times a day  chlorhexidine 2% Cloths 1 Application(s) Topical daily  enoxaparin Injectable 40 milliGRAM(s) SubCutaneous every 24 hours  metoprolol succinate ER 50 milliGRAM(s) Oral <User Schedule>      Assessment:  62yoM with PMH of HTN, RA, RCC s/p R partial nephrectomy, CKD, HFrEF/NICM EF 25-30%, ICD, plasma cell dyscrasia, seen at HF clinic today, sent to ED for worsening heart failure. pt admits to progressively increasing SOB, AVALOS with cough, worsening at night over past 6 months, however increasingly worse over past month    Recs:  diuresis per CHF  cont GDMT, lvef improved  heme f/u for possible plasma cell dyscrasia  s/p BM biopsy on 3/1/23,  s/p cardiac bx, results pending. plans for possible mediastinal LN bx as well - thoracic surgery following   tele monitoring   will follow          Over 25 minutes spent on total encounter; more than 50% of the visit was spent counseling and/or coordinating care by the attending physician.      Juarez Cavrer MD   Cardiovascular Disease  [phone]

## 2023-03-08 NOTE — PROGRESS NOTE ADULT - ASSESSMENT
62M w/ PMHx of HTN, RA, RCC s/p R partial nephrectomy, CKD, HFrEF/NICM EF 25-30%, ICD, plasma cell dyscrasia, h/o H-pylori, treated, seen at HF clinic w/ recent extended admission on [facility] now transferred to [facility] for likely RHC and cardiac biopsy. Pt admitted to [facility] around 8 days ago with acute on chronic CHF exacerbation. Followed by HF, cardiology and hem/onc. ICD interrogated. Was diuresed with IV Bumex and eventually transitioned to PO. Had repeat TTE. Bone marrow biopsy by IR on 3/1 to evaluate for MM and MGUS. TTE repeated as well. Eventual plan to transfer patient to [facility] for likely RHC and cardiac biopsy to further evaluation for amyloid. Pt currently denies any chest pain, SOB or specific complaints.  Thoracic surgery consulted for right hilar adenopathy, weight loss and nonproductive cough have been present for about a year, thinks he lost about 45 pounds  3/8    vss

## 2023-03-08 NOTE — PROGRESS NOTE ADULT - SUBJECTIVE AND OBJECTIVE BOX
VITAL SIGNS    Vital Signs Last 24 Hrs  T(C): 36.7 (23 @ 09:35), Max: 36.7 (23 @ 09:35)  T(F): 98.1 (23 @ 09:35), Max: 98.1 (23 @ 09:35)  HR: 109 (23 @ 09:35) (103 - 111)  BP: 95/62 (23 @ 09:35) (91/52 - 96/66)  RR: 18 (23 @ 09:35) (18 - 18)  SpO2: 94% (23 @ 09:35) (93% - 95%)                   Daily     Daily Weight in k.7 (08 Mar 2023 09:08)        CAPILLARY BLOOD GLUC                    PHYSICAL EXAM  s"    some  son  No CP"  Neurology: alert and oriented x 3, moves all extremities with no defecits  CV :  RRR    Lungs:     diminished bl throughout  Abdomen: soft, nontender, nondistended, positive bowel sounds  Extremities:     no edema

## 2023-03-08 NOTE — CONSULT NOTE ADULT - ASSESSMENT
61YO Male never smoker immigrated from [address] PMH HTN, CKD, HFrEF/NICM EF 25-30% s/p AICD, plasma cell dyscrasia, hx treated H-pylori, and RCC found on renal mass 4/2022 s/p R partial nephrectomy who was initially admitted to [facility] 2/23/23 for heart failure exacerbation who was transferred to [facility] for RHC 3/3.     #Mild Precapillary PH  #? Sarcoid  - PA 44/17, Mean 30mmHg, PCWP 3mmHg TPG 27, DPG 14 suggestive of Precapillary PH  - As pt is on room air in no acute distress will hold off on treating for now  - Pts physical and history are concerning for a insidious process that has caused him to lose a significant amount of weight and become debilitated  - Concerning for a rheumatologic process  - Significant Rheumatologic workup negative: Anti-rosanna, SCL-70/centromere/RNA polymerase 3, CCP, RF, SSA/SSB, ACE level, and Myomarker panel were negative  - LUIS FERNANDO was markedly elevated to 1:1280  - Please send dsDNA  - Please send ANCA  - Pt's hilar adenopathy is minimal and endomyocardial biopsy and previous axillary lymph node biopsy were negative  - HIV 1 and 2 negative  - Please sent HTLV antibodies    Case discussed with Dr. Veronica and Dr. Villeda   63YO Male never smoker immigrated from [address] PMH HTN, CKD, HFrEF/NICM EF 25-30% s/p AICD, plasma cell dyscrasia, hx treated H-pylori, and RCC found on renal mass 4/2022 s/p R partial nephrectomy who was initially admitted to [facility] 2/23/23 for heart failure exacerbation who was transferred to [facility] for RHC 3/3.     #Mild Precapillary PH  #? Sarcoidosis  - PA 44/17, Mean 30mmHg, PCWP 3mmHg TPG 27, DPG 14 suggestive of Precapillary PH  - As pt is on room air in no acute distress will hold off on treating for now  - Pts physical and history are concerning for a insidious process that has caused him to lose a significant amount of weight and become debilitated  - Concerning for a rheumatologic process  - Significant Rheumatologic workup negative: Anti-rosanna, SCL-70/centromere/RNA polymerase 3, CCP, RF, SSA/SSB, ACE level, and Myomarker panel were negative  - LUIS FERNANDO was markedly elevated to 1:1280  - Please send dsDNA  - Please send ANCA  - Pt's hilar adenopathy is minimal and endomyocardial biopsy and previous axillary lymph node biopsy were negative  - HIV 1 and 2 negative  - Please sent HTLV antibodies    Case discussed with Dr. Veronica and Dr. Villeda

## 2023-03-09 ENCOUNTER — TRANSCRIPTION ENCOUNTER (OUTPATIENT)
Age: 63
End: 2023-03-09

## 2023-03-09 LAB
ANION GAP SERPL CALC-SCNC: 13 MMOL/L — SIGNIFICANT CHANGE UP (ref 5–17)
BLD GP AB SCN SERPL QL: NEGATIVE — SIGNIFICANT CHANGE UP
BUN SERPL-MCNC: 56 MG/DL — HIGH (ref 7–23)
CALCIUM SERPL-MCNC: 9.3 MG/DL — SIGNIFICANT CHANGE UP (ref 8.4–10.5)
CHLORIDE SERPL-SCNC: 104 MMOL/L — SIGNIFICANT CHANGE UP (ref 96–108)
CO2 SERPL-SCNC: 20 MMOL/L — LOW (ref 22–31)
CREAT SERPL-MCNC: 1.4 MG/DL — HIGH (ref 0.5–1.3)
EGFR: 56 ML/MIN/1.73M2 — LOW
GLUCOSE SERPL-MCNC: 74 MG/DL — SIGNIFICANT CHANGE UP (ref 70–99)
POTASSIUM SERPL-MCNC: 4.3 MMOL/L — SIGNIFICANT CHANGE UP (ref 3.5–5.3)
POTASSIUM SERPL-SCNC: 4.3 MMOL/L — SIGNIFICANT CHANGE UP (ref 3.5–5.3)
RH IG SCN BLD-IMP: NEGATIVE — SIGNIFICANT CHANGE UP
SARS-COV-2 RNA SPEC QL NAA+PROBE: SIGNIFICANT CHANGE UP
SODIUM SERPL-SCNC: 137 MMOL/L — SIGNIFICANT CHANGE UP (ref 135–145)

## 2023-03-09 PROCEDURE — 99233 SBSQ HOSP IP/OBS HIGH 50: CPT | Mod: 57

## 2023-03-09 PROCEDURE — 99233 SBSQ HOSP IP/OBS HIGH 50: CPT

## 2023-03-09 PROCEDURE — 99233 SBSQ HOSP IP/OBS HIGH 50: CPT | Mod: GC

## 2023-03-09 PROCEDURE — 99222 1ST HOSP IP/OBS MODERATE 55: CPT

## 2023-03-09 RX ADMIN — BUDESONIDE AND FORMOTEROL FUMARATE DIHYDRATE 2 PUFF(S): 160; 4.5 AEROSOL RESPIRATORY (INHALATION) at 22:30

## 2023-03-09 RX ADMIN — CHLORHEXIDINE GLUCONATE 1 APPLICATION(S): 213 SOLUTION TOPICAL at 11:17

## 2023-03-09 RX ADMIN — Medication 50 MILLIGRAM(S): at 11:16

## 2023-03-09 RX ADMIN — BUDESONIDE AND FORMOTEROL FUMARATE DIHYDRATE 2 PUFF(S): 160; 4.5 AEROSOL RESPIRATORY (INHALATION) at 11:17

## 2023-03-09 NOTE — DISCHARGE NOTE PROVIDER - NSDCFUSCHEDAPPT_GEN_ALL_CORE_FT
Ruth Jenkins  [facility]  THORSUR[address]  Scheduled Appointment: 03/10/2023    Alcides Gardner  [facility]  UROLOGY [address] [facility]  Scheduled Appointment: 04/10/2023    [facility]  ELECTROPH [address]  Scheduled Appointment: 04/12/2023     Alcides Gardner  [facility]  UROLOGY [address] [facility]  Scheduled Appointment: 04/10/2023    [facility]  ELECTROPH [address]  Scheduled Appointment: 04/12/2023     Shruti Villeda  [facility]  PULMMED [address]  Scheduled Appointment: 03/20/2023    Michael Cuellar  [facility]  CARDIOLOGY [address]  Scheduled Appointment: 03/29/2023    Alcides Gardner  [facility]  UROLOGY [address]  Scheduled Appointment: 04/10/2023    [facility]  ELECTROPH [address]  Scheduled Appointment: 04/12/2023     Shruti Villeda  [facility]  PULMMED [address]  Scheduled Appointment: 05/02/2023    [facility]  HEARTFAIL [address]  Scheduled Appointment: 05/10/2023    [facility]  UROLOGY [address]  Scheduled Appointment: 05/16/2023    Alcides Gardner  [facility]  UROLOGY [address]  Scheduled Appointment: 05/16/2023    Michael Cuellar  [facility]  HEARTFAIL [address]  Scheduled Appointment: 06/30/2023    [facility]  ELECTROPH [address]  Scheduled Appointment: 07/12/2023

## 2023-03-09 NOTE — PROGRESS NOTE ADULT - ASSESSMENT
_________________________________________________________________________________________  ========>>  M E D I C A L   A T T E N D I N G    F O L L O W  U P  N O T E  <<=========  -----------------------------------------------------------------------------------------------------    - Patient seen and examined by me earlier today.   - In summary,  PLACIDO GOLDMAN is a 62y year old man admitted with CHF exacerbation  - Patient today overall doing ok, comfortable, eating OK.      Patient overall feeling okay, no shortness of breath at rest, no chest pain.   Patient inquiring about going home.  patient hesitant about further biopsies ( patient already with negative bone marrow biopsy and cardiac biopsy )     ==================>> REVIEW OF SYSTEM <<=================    GEN: no fever, no chills, no pain  RESP: no SOB at rest , no cough, no sputum  CVS: no chest pain, no palpitations, no edema  GI: no abdominal pain, no nausea,  : no dysuria, no frequency  Neuro: no headache, no dizziness  Derm : no itching, no rash    ==================>> PHYSICAL EXAM <<=================    GEN: A&O X 3 , NAD , comfortable, pleasant, calm in recliner chair    HEENT: NCAT, PERRL, MMM, hearing intact, not on O2   Neck: supple , no JVD appreciated  CVS: S1S2 , regular , Tachycardic.  Sinus tachycardia on telemetry  PULM: CTA B/L,  no W/R/R appreciated  ABD.: soft. non tender, non distended,  bowel sounds present  Extrem: intact pulses , no signif edema   PSYCH : normal mood,  not anxious                              ( Note written / Date of service 03-09-23 )    ==================>> MEDICATIONS <<====================    budesonide 160 MICROgram(s)/formoterol 4.5 MICROgram(s) Inhaler 2 Puff(s) Inhalation two times a day  chlorhexidine 2% Cloths 1 Application(s) Topical daily  enoxaparin Injectable 40 milliGRAM(s) SubCutaneous every 24 hours  metoprolol succinate ER 50 milliGRAM(s) Oral <User Schedule>    MEDICATIONS  (PRN):  acetaminophen     Tablet .. 650 milliGRAM(s) Oral every 6 hours PRN Temp greater or equal to 38C (100.4F), Mild Pain (1 - 3)  aluminum hydroxide/magnesium hydroxide/simethicone Suspension 30 milliLiter(s) Oral every 4 hours PRN Dyspepsia  melatonin 3 milliGRAM(s) Oral at bedtime PRN Insomnia  ondansetron Injectable 4 milliGRAM(s) IV Push every 8 hours PRN Nausea and/or Vomiting    ___________  Active diet:  Diet, NPO after Midnight:      NPO Start Date: 09-Mar-2023,   NPO Start Time: 23:59  Diet, Regular:   Low Sodium  ___________________    ==================>> VITAL SIGNS <<==================    Vital Signs Last 24 HrsT(C): 36.4 (03-09-23 @ 11:54)  T(F): 97.5 (03-09-23 @ 11:54), Max: 99 (03-08-23 @ 21:09)  HR: 89 (03-09-23 @ 11:54) (89 - 118)  BP: 123/75 (03-09-23 @ 11:54)  RR: 18 (03-09-23 @ 13:46) (18 - 20)  SpO2: 94% (03-09-23 @ 13:46) (87% - 95%)      CAPILLARY BLOOD GLUCOSE         ==================>> LAB AND IMAGING <<==================       03-09    137  |  104  |  56<H>  ----------------------------<  74  4.3   |  20<L>  |  1.40<H>    Ca    9.3      09 Mar 2023 06:33  Mg     2.0     03-08      WBC count:   10.26 <<== ,  11.58 <<==   Hemoglobin:   11.7 <<==,  12.5 <<==  platelets:  292 <==, 312 <==, 287 <==    Creatinine:  1.40  <<==, 1.39  <<==, 1.50  <<==, 1.69  <<==, 1.79  <<==, 1.64  <<==  Sodium:   137  <==, 137  <==, 136  <==, 140  <==, 136  <==, 136  <==, 138  <==       AST:          23 <== , 22 <==      ALT:        14  <== , 14  <==      AP:        134  <=, 131  <=     Bili:        0.4  <=, 0.3  <=    ____________________________    M I C R O B I O L O G Y :      COVID-19 PCR: NotDetec (03-08-23 @ 19:09)  COVID-19 PCR: NotDetec (03-05-23 @ 10:05)  COVID-19 PCR: NotDetec (02-28-23 @ 15:03)      < from: TTE with Doppler (w/Cont) (03.08.23 @ 08:28) >  Conclusions:  1. Normal mitral valve. Minimal mitral regurgitation.  2. Normal trileaflet aortic valve. No aortic valve regurgitation seen.  3. Endocardial visualization enhanced with intravenous  injection of Ultrasonic Enhancing Agent (Definity). Left  ventricle suboptimally visualized despite intravenous  ultrasonic enhancing agent; grossly normal left ventricular  systolic function. Septal flattening consistent with right ventricular overload.  4. Right ventricular enlargement with decreased right  ventricular systolic function. Basal RV diameter 5.3 cm.  Right ventricular systolic dysfunction with preserved  apical wall motion is seen consistent with Juan's  sign. A device wire is noted in the right heart.  5. Estimated right ventricular systolic pressure equals 60  mm Hg, assuming right atrial pressure equals 8 mm Hg,  consistent with moderate pulmonary hypertension.  6. Tricuspid annular dilatation with normal leaflet  opening. Moderate tricuspid regurgitation.  7. Trace anterior pericardial effusion.  *** Compared to prior echocardiograms, left ventricular  systolic function has improved compared to study performed  12/2022. The right ventricle appears similar to study  performed 3/6/2023.  ------------------------------------------------------------------------  Confirmed on  3/8/2023 - 14:49:44 by Cornelius Escudero M.D.  < end of copied text >    __________________________________________________________________________________  ===============>>  A S S E S S M E N T   A N D   P L A N <<===============  ------------------------------------------------------------------------------------------    · Assessment	  Pt is a 61 yo man with PMH of HTN, RA, RCC s/p R partial nephrectomy, CKD, HFrEF/NICM EF 25-30%, ICD, plasma cell dyscrasia, h/o H-pylori, treated, seen at HF clinic and sent to ED for worsening heart failure.   pt admits to progressively increasing SOB, AVALOS with cough, worsening at night over past 6 months with leg edema,  however increasingly worse over past month.   Pt using O2 at home as needed.   Pt denies any chest pain, fevers, recent surgeries, dizziness, bloody stools.     Problem/Plan - 1:  ·  Problem: Acute on chronic systolic heart failure.   Cardiology and heart failure following management appreciated.    diureticss as needed as per cardiology  monitor Ins and outs, weight, labs, and vitals closely   medical optimization  patient post AICD in December  Continue Current medications otherwise and monitor.  supportive care.  LVEF improved on current Echo !!    bone marrow biopsy not consistent with amyloidosis  cardiac biopsy not consistent with amyloidosis or sarcoidosis   ? plan for LN biopsy ? ?? pending CT scan    Problem/Plan - 2:  ·  Problem: Acute on chronic respiratory failure with hypoxia.   resolved / off O2   patient has PRN O2 at home.  being assessed by pulm for PAH    Problem/Plan - 3:  ·  Problem: history of  Hypertension.   Patient currently with low blood pressures.    Appreciated heart failure recommendations.    adjustment of medications as tolerated per specialty   monitor and adjust as needed    Problem/Plan - 4:  ·  Problem: Stage 3a chronic kidney disease.   ·  Plan: currently appears stable  monitor  Avoid nephrotoxic medications.    Problem/Plan - 5:  ·  Problem: history of Plasma cell dyscrasia.   Oncology consulted and appreciated.    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  Recommending a bone marrow biopsy.  post bone bx as above     Discharge planning pending clearance by cardiology/heart failure team    --------------------------------------------  Case discussed With patient , NP  Education given on findings and plan of care  ___________________________  HEvangelist Vu D.O.  Pager: [phone]       _________________________________________________________________________________________  ========>>  M E D I C A L   A T T E N D I N G    F O L L O W  U P  N O T E  <<=========  -----------------------------------------------------------------------------------------------------    - Patient seen and examined by me earlier today.   - In summary,  PLACIDO GOLDMAN is a 62y year old man admitted with CHF exacerbation  - Patient today overall doing ok, comfortable, eating OK.      Patient overall feeling okay, no shortness of breath at rest, no chest pain.     ==================>> REVIEW OF SYSTEM <<=================    GEN: no fever, no chills, no pain  RESP: no SOB at rest , no cough, no sputum  CVS: no chest pain, no palpitations, no edema  GI: no abdominal pain, no nausea,  : no dysuria, no frequency  Neuro: no headache, no dizziness  Derm : no itching, no rash    ==================>> PHYSICAL EXAM <<=================    GEN: A&O X 3 , NAD , comfortable, pleasant, calm in recliner chair    HEENT: NCAT, PERRL, MMM, hearing intact, not on O2   Neck: supple , no JVD appreciated  CVS: S1S2 , regular , Tachycardic.  Sinus tachycardia on telemetry  PULM: CTA B/L,  no W/R/R appreciated  ABD.: soft. non tender, non distended,  bowel sounds present  Extrem: intact pulses , no signif edema   PSYCH : normal mood,  not anxious                              ( Note written / Date of service 03-09-23 )    ==================>> MEDICATIONS <<====================    budesonide 160 MICROgram(s)/formoterol 4.5 MICROgram(s) Inhaler 2 Puff(s) Inhalation two times a day  chlorhexidine 2% Cloths 1 Application(s) Topical daily  enoxaparin Injectable 40 milliGRAM(s) SubCutaneous every 24 hours  metoprolol succinate ER 50 milliGRAM(s) Oral <User Schedule>    MEDICATIONS  (PRN):  acetaminophen     Tablet .. 650 milliGRAM(s) Oral every 6 hours PRN Temp greater or equal to 38C (100.4F), Mild Pain (1 - 3)  aluminum hydroxide/magnesium hydroxide/simethicone Suspension 30 milliLiter(s) Oral every 4 hours PRN Dyspepsia  melatonin 3 milliGRAM(s) Oral at bedtime PRN Insomnia  ondansetron Injectable 4 milliGRAM(s) IV Push every 8 hours PRN Nausea and/or Vomiting    ___________  Active diet:  Diet, NPO after Midnight:      NPO Start Date: 09-Mar-2023,   NPO Start Time: 23:59  Diet, Regular:   Low Sodium  ___________________    ==================>> VITAL SIGNS <<==================    Vital Signs Last 24 HrsT(C): 36.4 (03-09-23 @ 11:54)  T(F): 97.5 (03-09-23 @ 11:54), Max: 99 (03-08-23 @ 21:09)  HR: 89 (03-09-23 @ 11:54) (89 - 118)  BP: 123/75 (03-09-23 @ 11:54)  RR: 18 (03-09-23 @ 13:46) (18 - 20)  SpO2: 94% (03-09-23 @ 13:46) (87% - 95%)       ==================>> LAB AND IMAGING <<==================       03-09    137  |  104  |  56<H>  ----------------------------<  74  4.3   |  20<L>  |  1.40<H>    Ca    9.3      09 Mar 2023 06:33  Mg     2.0     03-08      WBC count:   10.26 <<== ,  11.58 <<==   Hemoglobin:   11.7 <<==,  12.5 <<==  platelets:  292 <==, 312 <==, 287 <==    Creatinine:  1.40  <<==, 1.39  <<==, 1.50  <<==, 1.69  <<==, 1.79  <<==, 1.64  <<==  Sodium:   137  <==, 137  <==, 136  <==, 140  <==, 136  <==, 136  <==, 138  <==       AST:          23 <== , 22 <==      ALT:        14  <== , 14  <==      AP:        134  <=, 131  <=     Bili:        0.4  <=, 0.3  <=    < from: TTE with Doppler (w/Cont) (03.08.23 @ 08:28) >  Conclusions:  1. Normal mitral valve. Minimal mitral regurgitation.  2. Normal trileaflet aortic valve. No aortic valve regurgitation seen.  3. Endocardial visualization enhanced with intravenous  injection of Ultrasonic Enhancing Agent (Definity). Left  ventricle suboptimally visualized despite intravenous  ultrasonic enhancing agent; grossly normal left ventricular  systolic function. Septal flattening consistent with right ventricular overload.  4. Right ventricular enlargement with decreased right  ventricular systolic function. Basal RV diameter 5.3 cm.  Right ventricular systolic dysfunction with preserved  apical wall motion is seen consistent with Juan's  sign. A device wire is noted in the right heart.  5. Estimated right ventricular systolic pressure equals 60  mm Hg, assuming right atrial pressure equals 8 mm Hg,  consistent with moderate pulmonary hypertension.  6. Tricuspid annular dilatation with normal leaflet  opening. Moderate tricuspid regurgitation.  7. Trace anterior pericardial effusion.  *** Compared to prior echocardiograms, left ventricular  systolic function has improved compared to study performed  12/2022. The right ventricle appears similar to study  performed 3/6/2023.  ------------------------------------------------------------------------  Confirmed on  3/8/2023 - 14:49:44 by Cornelius Escudero M.D.  < end of copied text >    __________________________________________________________________________________  ===============>>  A S S E S S M E N T   KAVIN CHARLES   P L A N <<===============  ------------------------------------------------------------------------------------------    · Assessment	  Pt is a 63 yo man with PMH of HTN, RA, RCC s/p R partial nephrectomy, CKD, HFrEF/NICM EF 25-30%, ICD, plasma cell dyscrasia, h/o H-pylori, treated, seen at HF clinic and sent to ED for worsening heart failure.   pt admits to progressively increasing SOB, AVALOS with cough, worsening at night over past 6 months with leg edema,  however increasingly worse over past month.   Pt using O2 at home as needed.   Pt denies any chest pain, fevers, recent surgeries, dizziness, bloody stools.     Problem/Plan - 1:  ·  Problem: Acute on chronic systolic heart failure. PAH  all specialty follow up and management appreciated.    diureticss as needed as per cardiology : on hold   monitor Ins and outs, weight, labs, and vitals closely   medical optimization  patient post AICD in December  Continue Current medications otherwise and monitor.  supportive care.  LVEF improved on current Echo !!    bone marrow biopsy not consistent with amyloidosis  cardiac biopsy not consistent with amyloidosis or sarcoidosis   ? plan for LN biopsy tomorrow ( CT chest noted: stable small lymph nodes)     Problem/Plan - 2:  ·  Problem: Acute on chronic respiratory failure with hypoxia.   resolved / off O2   patient has PRN O2 at home.  being assessed by pulm for PAH    Problem/Plan - 3:  ·  Problem: history of  Hypertension.   Patient currently with low blood pressures.    Appreciated heart failure recommendations.    adjustment of medications as tolerated per specialty   monitor and adjust as needed    Problem/Plan - 4:  ·  Problem: Stage 3a chronic kidney disease.   ·  Plan: currently appears stable  monitor  Avoid nephrotoxic medications.    Problem/Plan - 5:  ·  Problem: history of Plasma cell dyscrasia.   Oncology consulted and appreciated.    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  Recommending a bone marrow biopsy.  post bone bx as above     Discharge planning pending clearance by cardiology/heart failure team    --------------------------------------------  Case discussed With patient , family at bedside, NP, HF attending..   Education given on findings and plan of care  ___________________________  H. JEANETH Vu.  Pager: [phone]

## 2023-03-09 NOTE — DISCHARGE NOTE PROVIDER - CARE PROVIDER_API CALL
Ruth Jenkins)  Surgery; Thoracic Surgery  [address] [place] [address], [place] [address]  Phone: [phone]  Fax: [phone]  Follow Up Time: 1 week    Shruti Villeda)  Critical Care Medicine; Internal Medicine; Pulmonary Disease; Sleep Medicine  [address] [facility], [address]  [place], [place] [address]  Phone: [phone]  Fax: [phone]  Follow Up Time: 1 week    Michael Cuellar)  Adv Heart Fail Trnsplnt Cardio; Cardiovascular Disease; Internal Medicine  [address] [place] [address]  Phone: [phone]  Fax: [phone]  Follow Up Time: 1 week    Ayaan Morrison)  Hematology; Internal Medicine; Medical Oncology  [address] [facility], [address]  [place] [address]  Phone: [phone]  Fax: [phone]  Follow Up Time: 1 week   Ruth Jenkins)  Surgery; Thoracic Surgery  [address] [place] [address], [place] [address]  Phone: [phone]  Fax: [phone]  Follow Up Time: 1 week    Shruti Villeda)  Critical Care Medicine; Internal Medicine; Pulmonary Disease; Sleep Medicine  [address] [facility], [address]  [place], [place] [address]  Phone: [phone]  Fax: [phone]  Follow Up Time: 1 week    Michael Cuellar)  Adv Heart Fail Trnsplnt Cardio; Cardiovascular Disease; Internal Medicine  [address] [place] [address]  Phone: [phone]  Fax: [phone]  Follow Up Time: 1 week    Ayaan Morrison)  Hematology; Internal Medicine; Medical Oncology  [address] [facility], [address]  [place] [address]  Phone: [phone]  Fax: [phone]  Follow Up Time: 1 week    Jose Daniel Carver  CARDIOVASCULAR DISEASE  [address] [place] [address]  Phone: [phone]  Fax: [phone]  Follow Up Time: 2 weeks

## 2023-03-09 NOTE — PROGRESS NOTE ADULT - SUBJECTIVE AND OBJECTIVE BOX
Interval History:  discussed biopsy with him and wife and they are agreeable  concern raised by other teams that biopsy is unlikely to be obtained and low suspcion of sarcoid    Medications:  acetaminophen     Tablet .. 650 milliGRAM(s) Oral every 6 hours PRN  aluminum hydroxide/magnesium hydroxide/simethicone Suspension 30 milliLiter(s) Oral every 4 hours PRN  budesonide 160 MICROgram(s)/formoterol 4.5 MICROgram(s) Inhaler 2 Puff(s) Inhalation two times a day  chlorhexidine 2% Cloths 1 Application(s) Topical daily  enoxaparin Injectable 40 milliGRAM(s) SubCutaneous every 24 hours  melatonin 3 milliGRAM(s) Oral at bedtime PRN  metoprolol succinate ER 50 milliGRAM(s) Oral <User Schedule>  ondansetron Injectable 4 milliGRAM(s) IV Push every 8 hours PRN      Vitals:  T(C): 36.7 (03-09-23 @ 20:57), Max: 36.7 (03-09-23 @ 20:57)  HR: 104 (03-09-23 @ 20:57) (89 - 118)  BP: 108/65 (03-09-23 @ 20:57) (94/64 - 123/75)  BP(mean): --  RR: 18 (03-09-23 @ 20:57) (18 - 20)  SpO2: 97% (03-09-23 @ 20:57) (87% - 97%)    Daily     Daily         I&O's Summary    08 Mar 2023 07:01  -  09 Mar 2023 07:00  --------------------------------------------------------  IN: 0 mL / OUT: 350 mL / NET: -350 mL    Physical Exam:  Appearance: No Acute Distress; bitemporal wasting  HEENT: PERRL  Neck: No JVD  Cardiovascular: Normal S1 S2, No murmurs/rubs/gallops; tachycardic  Respiratory: Clear to auscultation bilaterally  Gastrointestinal: Soft, Non-tender	  Skin: No cyanosis	  Neurologic: Non-focal  Extremities: No LE edema  Psychiatry: A & O x 3, Mood & affect appropriate    Labs:    03-09    137  |  104  |  56<H>  ----------------------------<  74  4.3   |  20<L>  |  1.40<H>    Ca    9.3      09 Mar 2023 06:33  Mg     2.0     03-08

## 2023-03-09 NOTE — DISCHARGE NOTE PROVIDER - HOSPITAL COURSE
61 yo man with PMH of HTN, RA, RCC s/p R partial nephrectomy, CKD, HFrEF/NICM EF 25-30%, ICD, plasma cell dyscrasia, h/o H-pylori, treated, seen at HF clinic and sent to ED for worsening heart failure. Pt admits to progressively increasing SOB, AVALOS with cough, worsening at night over past 6 months with leg edema,  however increasingly worse over past month. Pt using O2 at home as needed. Pt denies any chest pain, fevers, recent surgeries, dizziness, bloody stools.      Problem/Plan - 1:  ·  Problem: Acute on chronic systolic heart failure.   Cardiology and heart failure following management appreciated.    diuretics as needed as per cardiology  monitor Ins and outs, weight, labs, and vitals closely   medical optimization  patient post AICD in December  Continue Current medications otherwise and monitor.  supportive care.  LVEF improved on current Echo.     bone marrow biopsy not consistent with amyloidosis  cardiac biopsy not consistent with amyloidosis or sarcoidosis   Plan for LN biopsy, pt currently refusing, outpatient follow-up with thoracic surgery.      Problem/Plan - 2:  ·  Problem: Acute on chronic respiratory failure with hypoxia.   resolved /off O2   patient has PRN O2 at home.  being assessed by pulm for PAH     Problem/Plan - 3:  ·  Problem: history of  Hypertension.   Patient currently with low blood pressures.    Appreciated heart failure recommendations.    adjustment of medications as tolerated per specialty   monitor and adjust as needed     Problem/Plan - 4:  ·  Problem: Stage 3a chronic kidney disease.   ·  Plan: currently appears stable  monitor  Avoid nephrotoxic medications.     Problem/Plan - 5:  ·  Problem: history of Plasma cell dyscrasia.   Oncology consulted and appreciated.    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  Recommending a bone marrow biopsy.  post bone bx as above     Medically cleared for discharge with outpatient follow-up    63 yo man with PMH of HTN, RA, RCC s/p R partial nephrectomy, CKD, HFrEF/NICM EF 25-30%, ICD, plasma cell dyscrasia, h/o H-pylori, treated, seen at HF clinic and sent to ED for worsening heart failure. Pt admits to progressively increasing SOB, AVALOS with cough, worsening at night over past 6 months with leg edema,  however increasingly worse over past month. Pt using O2 at home as needed. Pt denies any chest pain, fevers, recent surgeries, dizziness, bloody stools.      Problem/Plan - 1:  ·  Problem: Acute on chronic systolic heart failure.   Cardiology and heart failure following management appreciated.    diuretics as needed as per cardiology  monitor Ins and outs, weight, labs, and vitals closely   medical optimization  patient post AICD in December  Continue Current medications otherwise and monitor.  supportive care.  LVEF improved on current Echo.   - pcp f/u in 1-2 weeks    bone marrow biopsy not consistent with amyloidosis  cardiac biopsy not consistent with amyloidosis or sarcoidosis   - s/p LN biopsy with EBUS mediastinoscopy with CTS, CTS outpt f/u     Problem/Plan - 2:  ·  Problem: Acute on chronic respiratory failure with hypoxia.   resolved /off O2   patient has PRN O2 at home.  being assessed by pulm for PAH     Problem/Plan - 3:  ·  Problem: history of  Hypertension.   Patient currently with low blood pressures.    Appreciated heart failure recommendations.    adjustment of medications as tolerated per specialty   monitor and adjust as needed     Problem/Plan - 4:  ·  Problem: Stage 3a chronic kidney disease.   ·  Plan: currently appears stable  monitor  Avoid nephrotoxic medications.     Problem/Plan - 5:  ·  Problem: history of Plasma cell dyscrasia.   Oncology consulted and appreciated.    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  Recommending a bone marrow biopsy.  post bone bx as above   - follow up pathology outpatient with heme/onc    Dispo: Home, pt declining home care, will continue home O2    Patient seen and evaluated, no acute somatic complaints. Reviewed discharge medications with patient; All new medications requiring new prescriptions were sent to the pharmacy of patient's choice. Reviewed need for prescription for previous home medications and new prescriptions sent if requested. Medically cleared/stable for discharge as per Dr. Vu on 3/12/2023 with close outpatient follow up within 1-2 weeks of discharge. Patient understands and agrees with plan of care.    63 yo man with PMH of HTN, RA, RCC s/p R partial nephrectomy, CKD, HFrEF/NICM EF 25-30%, ICD, plasma cell dyscrasia, h/o H-pylori, treated, seen at HF clinic and sent to ED for worsening heart failure. Pt admits to progressively increasing SOB, AVALOS with cough, worsening at night over past 6 months with leg edema,  however increasingly worse over past month. Pt using O2 at home as needed. Pt denies any chest pain, fevers, recent surgeries, dizziness, bloody stools.      Problem/Plan - 1:  ·  Problem: Acute on chronic systolic heart failure.   Cardiology and heart failure following management appreciated.    diuretics as needed as per cardiology  monitor Ins and outs, weight, labs, and vitals closely   medical optimization  patient post AICD in December  Continue Current medications otherwise and monitor.  supportive care.  LVEF improved on current Echo.   - pcp f/u in 1-2 weeks    bone marrow biopsy not consistent with amyloidosis  cardiac biopsy not consistent with amyloidosis or sarcoidosis   - s/p LN biopsy with EBUS mediastinoscopy with CTS, CTS outpt f/u     Problem/Plan - 2:  ·  Problem: Acute on chronic respiratory failure with hypoxia.   resolved /off O2   patient has PRN O2 at home.  being assessed by pulm for PAH     Problem/Plan - 3:  ·  Problem: history of  Hypertension.   Patient currently with low blood pressures.    Appreciated heart failure recommendations.    adjustment of medications as tolerated per specialty   monitor and adjust as needed     Problem/Plan - 4:  ·  Problem: Stage 3a chronic kidney disease.   ·  Plan: currently appears stable  monitor  Avoid nephrotoxic medications.     Problem/Plan - 5:  ·  Problem: history of Plasma cell dyscrasia.   Oncology consulted and appreciated.    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  Recommending a bone marrow biopsy.  post bone bx as above   - follow up pathology outpatient with heme/onc    Dispo: Home with home care, will continue home O2    Patient seen and evaluated, no acute somatic complaints. Reviewed discharge medications with patient; All new medications requiring new prescriptions were sent to the pharmacy of patient's choice. Reviewed need for prescription for previous home medications and new prescriptions sent if requested. Medically cleared/stable for discharge as per Dr. Vu on 3/12/2023 with close outpatient follow up within 1-2 weeks of discharge. Patient understands and agrees with plan of care.    61 yo man with PMH of HTN, RA, RCC s/p R partial nephrectomy, CKD, HFrEF/NICM EF 25-30%, ICD, plasma cell dyscrasia, h/o H-pylori, treated, seen at HF clinic and sent to ED for worsening heart failure. Pt admits to progressively increasing SOB, AVALOS with cough, worsening at night over past 6 months with leg edema,  however increasingly worse over past month. Pt using O2 at home as needed. Pt denies any chest pain, fevers, recent surgeries, dizziness, bloody stools.     Summery of hospital course:  Patient was seen and evaluated and followed by multiple specialists throughout the stay and treated medically with improvement.  Patient was otherwise stable and had no other complications. See chart for further details.  Patient was discharged to home in stable condition to follow up with primary doctor as outpatient for follow up and further care.    Dispo: Home with home care, will continue home O2    Patient seen and evaluated, no acute somatic complaints. Reviewed discharge medications with patient; All new medications requiring new prescriptions were sent to the pharmacy of patient's choice. Reviewed need for prescription for previous home medications and new prescriptions sent if requested. Medically cleared/stable for discharge as per Dr. Vu on 3/12/2023 with close outpatient follow up within 1-2 weeks of discharge. Patient understands and agrees with plan of care.

## 2023-03-09 NOTE — CONSULT NOTE ADULT - SUBJECTIVE AND OBJECTIVE BOX
PLACIDO GOLDMAN  [id number]    HISTORY OF PRESENT ILLNESS:    PAST MEDICAL & SURGICAL HISTORY:  Hypertension      Rheumatoid arthritis      Neoplasm of uncertain behavior of kidney, right      History of cardiac cath  jan 2022, at [place] no stent          Review of Systems:  Gen:  No fevers/chills, weight loss  HEENT: No blurry vision, no difficulty swallowing  CVS: No chest pain/palpitations  Resp: No SOB/wheezing  GI: No N/V/C/D/abdominal pain  MSK:  Skin: No new rashes  Neuro: No headaches    MEDICATIONS  (STANDING):  budesonide 160 MICROgram(s)/formoterol 4.5 MICROgram(s) Inhaler 2 Puff(s) Inhalation two times a day  chlorhexidine 2% Cloths 1 Application(s) Topical daily  enoxaparin Injectable 40 milliGRAM(s) SubCutaneous every 24 hours  metoprolol succinate ER 50 milliGRAM(s) Oral <User Schedule>    MEDICATIONS  (PRN):  acetaminophen     Tablet .. 650 milliGRAM(s) Oral every 6 hours PRN Temp greater or equal to 38C (100.4F), Mild Pain (1 - 3)  aluminum hydroxide/magnesium hydroxide/simethicone Suspension 30 milliLiter(s) Oral every 4 hours PRN Dyspepsia  melatonin 3 milliGRAM(s) Oral at bedtime PRN Insomnia  ondansetron Injectable 4 milliGRAM(s) IV Push every 8 hours PRN Nausea and/or Vomiting      Allergies    No Known Allergies    Intolerances        PERTINENT MEDICATION HISTORY:    SOCIAL HISTORY:  OCCUPATION:  TRAVEL HISTORY:    FAMILY HISTORY:  FH: diabetes mellitus (Mother)        Vital Signs Last 24 Hrs  T(C): 36.4 (09 Mar 2023 11:54), Max: 37.2 (08 Mar 2023 21:09)  T(F): 97.5 (09 Mar 2023 11:54), Max: 99 (08 Mar 2023 21:09)  HR: 89 (09 Mar 2023 11:54) (89 - 118)  BP: 123/75 (09 Mar 2023 11:54) (94/64 - 123/75)  BP(mean): --  RR: 18 (09 Mar 2023 13:46) (18 - 20)  SpO2: 94% (09 Mar 2023 13:46) (87% - 95%)    Parameters below as of 09 Mar 2023 13:46  Patient On (Oxygen Delivery Method): nasal cannula  O2 Flow (L/min): 3      Daily     Daily     Physical Exam:  General:   HEENT:    CVS:    Resp:    GI:    MSK:  Skin: no skin tightening, no sclerodactyly, but areas of vitiligo like depigmentation on V are and along some joints    LABS:    03-09    137  |  104  |  56<H>  ----------------------------<  74  4.3   |  20<L>  |  1.40<H>    Ca    9.3      09 Mar 2023 06:33  Mg     2.0     03-08      RADIOLOGY & ADDITIONAL STUDIES:    < from: CT Chest No Cont (03.08.23 @ 19:44) >  ACC: [id number] EXAM:  CT CHEST   ORDERED BY: EDWIN JONES     PROCEDURE DATE:  03/08/2023          INTERPRETATION:  CLINICAL INFORMATION: Lymphadenopathy; interval   evaluation.    COMPARISON: CT chest pulmonary artery study 12/14/2022. PET/CT4/28/2022.    CONTRAST/COMPLICATIONS:  IV Contrast: NONE  Oral Contrast: NONE  Complications: None reported at time of study completion    PROCEDURE:  CT of the Chest was performed.  Sagittal and coronal reformats were performed.    FINDINGS:    LUNGS AND AIRWAYS: Patent central airways.  Mild groundglass   abnormalities of the centrilobular pattern; nonspecific and unchanged   when compared to prior  PLEURA: No pleural effusion.  MEDIASTINUM AND SEA: Redemonstrated prominent bilateral axillary lymph   nodes measuring up to 1.7 x 1.4 cm, unchanged when compared to PET/CT   4/28/2022.  VESSELS: Within normal limits.  HEART: Heart size is normal. No pericardial effusion.  CHEST WALL AND LOWER NECK: Within normal limits.  VISUALIZED UPPER ABDOMEN: Within normal limits.  BONES: Within normal limits.    IMPRESSION:  Prominent bilateral axillary lymph nodes measuring up to 1.7 x 1.4 cm and   the left axilla; stable when compared to PET/CT 4/28/2022.        --- End of Report ---           MICAELA LATHAM MD; Resident Radiologist  This document has been electronically signed.  COLIN DE LA ROSA MD; Attending Radiologist  This document has been electronically signed. Mar  9 2023 11:32AM    < end of copied text >  < from: NM PET/CT Onc FDG Skull to Thigh, Inital (04.28.22 @ 15:08) >  EXAM: [id number] - PETCT [facility] ONC FDG INIT  - ORDERED BY: UNIQUE TERESABRYAN      PROCEDURE DATE:  04/28/2022           INTERPRETATION:  PROCEDURE:  PET/CT SKULL BASE-MID THIGH IMAGING    CLINICAL INFORMATION: 61-year-old male with neoplasm of uncertain   behavior of right kidney and enlarged lymph nodes. PET/CT is done as part   of the initial treatment strategy evaluation.    RADIOPHARMACEUTICAL:  10.73 mCi F-18, FDG, I.V.    TECHNIQUE:  Fasting blood sugar measured prior to injection of   radiopharmaceutical was 91 mg/dl. Following intravenous injection of the   radiopharmaceutical and an uptake period of approximately 60 minutes,   FDG-PET/CT was obtained on a [employer] Discovery 710 from skull base to mid   thigh. Oral contrast was given during the uptake period. CT was performed   during shallow respiration. The CT protocol was optimized for PET   attenuation correction and anatomic localization. The CT protocol was not   designed to produce and cannot replace state-of-the-art diagnostic CT   images with specific imaging protocols for different body parts and   indications. Images were reviewed on a dedicated workstation using   multiplanar reconstruction.    The standardized uptake values (SUV) are normalized to patient body   weight and indicate the highest activity concentration (SUVmax) in a   given disease site. All image numbers refer to axial image number.    COMPARISON:  No prior PET/CT.    OTHER STUDIES USED FOR CORRELATION: Report of CTA of chest dated 3/8/2022.    FINDINGS:    HEAD/NECK: Physiologic FDG activity in visualized brain, head, and neck.    CHEST: There are a a few mildly enlarged FDG-avid bilateral axillary   lymph nodes, larger on the left. For reference, a left axillary lymph   node measures 1.8 x 1.5 cm,SUV 4.8 (image 65). A representative right   axillary node measures 1.6 x 0.8 cm, SUV 3.7 (image 70). An FDG-avid left   retropectoral node measures 0.7 x 0.6 cm, SUV 2.7 (image 65).    There are a few small FDG-avid mediastinal and bilateral hilar lymph   nodes which are difficult to delineate on CT. For reference, a subcarinal   lymph node demonstrates SUV 2.4 (image 86) and a right perihilar lymph   node demonstrates SUV 2.9 (image 85).    LUNGS: No abnormal FDG activity. Non-FDG-avid biapicalscarring.    PLEURA/PERICARDIUM: No abnormal FDG activity. No effusion.    HEPATOBILIARY/PANCREAS:  Physiologic FDG activity. Liver SUV mean is    SPLEEN: Physiologic FDG activity. Normal in size.    ADRENAL GLANDS: No abnormal FDG activity. No nodule.    KIDNEYS/URINARY BLADDER: Physiologic excreted FDG activity. No renal mass   is identified, however, evaluation is limited in the absence of   intravenous contrast.    REPRODUCTIVE ORGANS: No abnormal FDG activity.    ABDOMINOPELVIC NODES: No enlarged or FDG-avid lymph node.    BOWEL/PERITONEUM/MESENTERY: No abnormal FDG activity.    BONES/SOFT TISSUES: No abnormal FDG activity.    IMPRESSION: Abnormal FDG-PET/CT scan.    1. Few mildly enlarged FDG-avid bilateral axillary lymph nodes and small,   mildly FDG-avid left retropectoral, mediastinal, and bilateral hilar   lymph nodes are nonspecific. Axillary lymph nodes may be further   evaluated with ultrasound and percutaneous needle biopsy, as indicated.    2. Remainder of study demonstrates no evidence of FDG-avid disease.    3. No renal mass is identified, however, evaluation is limited in the   absence of intravenous contrast. Physiologic FDG activity in the kidneys   limits detection of FDG-avid renal lesion.    --- End of Report ---               JANNY GRANDA MD; Attending Nuclear Medicine   This document has been electronically signed. Apr 28 2022  4:40PM    < end of copied text >   [facility]  [id number]    HISTORY OF PRESENT ILLNESS:    The consult is called to evaluate for systemic inflammatory disease as a cause of progressive nonischemic cardiomyopathy and pulmonary hypertension.     Patient is never smoker immigrated from [facility] wit nonischemic cardiomyopathy  HFrEF/NICM EF 25-30% s/p AICD, who was initially admitted to [facility] 2/23/23 for heart failure exacerbation who was transferred to [facility] for RHC  that was performed on 3/3.   RHC showed: PA 44/17, Mean 30mmHg, PCWP 3mmHg TPG 27, DPG 14 suggestive of Precapillary PH     Patient denies any fevers, chills, but reports progressive worsening of shortness of breath, progressive weight loss and getting more difficulty with daily function.   Denies any history of skin tightening, no digital ulcers, no Raynaud's     He has been seen by rheumatologist Dr. Hussain Frederick , has chronic arthritis of left shoulder , but no diagnosis of rheumatoid arthritis. In 12/2022 had rheumatological evaluation , that showed high positive LUIS FERNANDO 1:1280, but all subserology was negative negative: Anti-rosanna, SCL-70/centromere/RNA polymerase 3, CCP, RF, SSA/SSB, ACE level, and Myomarker panel were negative .     PET scan at the time of RCC dx 4/2022 showed mildly FDG-avid nonspecific left retropectoral, mediastinal, and bilateral hilar lymph nodes are nonspecific. NO cardiac uptake was reported  Cardiac MRI in 12/2022 showed subtle late gadolinium enhancement along the inferior hinge point of the right ventricle can occur in a variety of clinical settings including altered ventricular mechanics.  Concentric thickening of the ventricular myocardium.  The estimated left ventricular ejectin fraction is 36.04%.   CT chest 12/2022 ; Enlargement of the main pulmonary artery up to 4.2 cm may . Nonspecific diffuse bilateral ground- glass opacities may represent   pulmonary edema versus mosaic attenuation consistent with small airway or cysts small vessel disease. Axillary lymphadenopathy in comparison with 5/1/2022.   CT chest 3/2023 : MEDIASTINUM AND SEA- re- demonstration of prominent bilateral axillary lymph nodes measuring up to 1.7 x 1.4 cm, unchanged when compared to PET/CT .  Reviewed images - minimally enlarged mediastinal/ hilar LN    LN core bx 7/2022 : no malignant maxi, + reactive follicular hyperplasia                                                                                                   Endomyocardial bx 3/6/2023 - Myocardial biopsy with myocyte hypertrophy. No evidence of myocarditis, sarcoidosis or amyloidosis. Congo red stain is negative for amyloid.  Bone marrow bx 3/2023 - normocellular , not diagnostic   PMH :  plasma cell dyscrasia, hx treated H-pylori, and RCC found on renal mass 4/2022 s/p R partial nephrectomy   Hypertension  reported history of Rheumatoid arthritis, but not confirmed on further eval  Renal Cell CA  History of cardiac cath  jan 2022, at [place] no stent    Review of Systems:  Gen: +weight loss  HEENT: poor po intake  CVS: HPI  Resp: HPI  GI: neg  MSK: left shoulder pain/ restriction of ROM  Skin: No rash, but skin discoloration around neck/ chest , LE  Neuro: neg    MEDICATIONS  (STANDING):  budesonide 160 MICROgram(s)/formoterol 4.5 MICROgram(s) Inhaler 2 Puff(s) Inhalation two times a day  chlorhexidine 2% Cloths 1 Application(s) Topical daily  enoxaparin Injectable 40 milliGRAM(s) SubCutaneous every 24 hours  metoprolol succinate ER 50 milliGRAM(s) Oral <User Schedule>    MEDICATIONS  (PRN):  acetaminophen     Tablet .. 650 milliGRAM(s) Oral every 6 hours PRN Temp greater or equal to 38C (100.4F), Mild Pain (1 - 3)  aluminum hydroxide/magnesium hydroxide/simethicone Suspension 30 milliLiter(s) Oral every 4 hours PRN Dyspepsia  melatonin 3 milliGRAM(s) Oral at bedtime PRN Insomnia  ondansetron Injectable 4 milliGRAM(s) IV Push every 8 hours PRN Nausea and/or Vomiting      Allergies  No Known Allergies  Intolerances    FAMILY HISTORY:  FH: diabetes mellitus (Mother)    Vital Signs Last 24 Hrs  T(C): 36.4 (09 Mar 2023 11:54), Max: 37.2 (08 Mar 2023 21:09)  T(F): 97.5 (09 Mar 2023 11:54), Max: 99 (08 Mar 2023 21:09)  HR: 89 (09 Mar 2023 11:54) (89 - 118)  BP: 123/75 (09 Mar 2023 11:54) (94/64 - 123/75)  BP(mean): --  RR: 18 (09 Mar 2023 13:46) (18 - 20)  SpO2: 94% (09 Mar 2023 13:46) (87% - 95%)    Parameters below as of 09 Mar 2023 13:46  Patient On (Oxygen Delivery Method): nasal cannula  O2 Flow (L/min): 3      Daily     Daily     Physical Exam:  General: undernourished  CVS:  S1S2  Resp:  poor a/e  MSK: no synovitis  Skin: no skin tightening, no sclerodactyly, but areas of vitiligo like depigmentation on V are and along some joints  + acanthosis nigricans    LABS:    03-09    137  |  104  |  56<H>  ----------------------------<  74  4.3   |  20<L>  |  1.40<H>    Ca    9.3      09 Mar 2023 06:33  Mg     2.0     03-08      RADIOLOGY & ADDITIONAL STUDIES:    < from: CT Chest No Cont (03.08.23 @ 19:44) >  ACC: [id number] EXAM:  CT CHEST   ORDERED BY: EDWIN JONES     PROCEDURE DATE:  03/08/2023          INTERPRETATION:  CLINICAL INFORMATION: Lymphadenopathy; interval   evaluation.    COMPARISON: CT chest pulmonary artery study 12/14/2022. PET/CT4/28/2022.    CONTRAST/COMPLICATIONS:  IV Contrast: NONE  Oral Contrast: NONE  Complications: None reported at time of study completion    PROCEDURE:  CT of the Chest was performed.  Sagittal and coronal reformats were performed.    FINDINGS:    LUNGS AND AIRWAYS: Patent central airways.  Mild groundglass   abnormalities of the centrilobular pattern; nonspecific and unchanged   when compared to prior  PLEURA: No pleural effusion.  MEDIASTINUM AND SEA: Redemonstrated prominent bilateral axillary lymph   nodes measuring up to 1.7 x 1.4 cm, unchanged when compared to PET/CT   4/28/2022.  VESSELS: Within normal limits.  HEART: Heart size is normal. No pericardial effusion.  CHEST WALL AND LOWER NECK: Within normal limits.  VISUALIZED UPPER ABDOMEN: Within normal limits.  BONES: Within normal limits.    IMPRESSION:  Prominent bilateral axillary lymph nodes measuring up to 1.7 x 1.4 cm and   the left axilla; stable when compared to PET/CT 4/28/2022.        --- End of Report ---           MICAELA LATHAM MD; Resident Radiologist  This document has been electronically signed.  COLIN DE LA ROSA MD; Attending Radiologist  This document has been electronically signed. Mar  9 2023 11:32AM    < end of copied text >    < from: Cardiac Catheterization (03.06.23 @ 17:05) >    Low right atrial pressure (mRA 1mmHg with a V wave of 2mmHg)   Mild pre-capillary pulmonary hypertension (sPAP 44mmHg, dPAP 17mmHG,  mPAP 30mmHg)  PCWP = 3mmHg with a V wave of 5mmHg   No step-up observed in saturations   PAsat = 71% // RAsat = 100%(2L NC)   Devin CO // CI = 4.39l/min // 2.70l/min/m2   SBP = 95/57/71     Status post right ventricular endomyocardial biopsy using TTE and  fluoroscopic guidance  --> A total of 4 right ventricular endomyocardial biopsy samples were  taken    < end of copied text >    < from: NM PET/CT Onc FDG Skull to Thigh, Inital (04.28.22 @ 15:08) >  EXAM: [id number] - PETCT [facility] ONC FDG INIT  - ORDERED BY: UNIQUE COSTA      PROCEDURE DATE:  04/28/2022           INTERPRETATION:  PROCEDURE:  PET/CT SKULL BASE-MID THIGH IMAGING    CLINICAL INFORMATION: 61-year-old male with neoplasm of uncertain   behavior of right kidney and enlarged lymph nodes. PET/CT is done as part   of the initial treatment strategy evaluation.    RADIOPHARMACEUTICAL:  10.73 mCi F-18, FDG, I.V.    TECHNIQUE:  Fasting blood sugar measured prior to injection of   radiopharmaceutical was 91 mg/dl. Following intravenous injection of the   radiopharmaceutical and an uptake period of approximately 60 minutes,   FDG-PET/CT was obtained on a [employer] Discovery 710 from skull base to mid   thigh. Oral contrast was given during the uptake period. CT was performed   during shallow respiration. The CT protocol was optimized for PET   attenuation correction and anatomic localization. The CT protocol was not   designed to produce and cannot replace state-of-the-art diagnostic CT   images with specific imaging protocols for different body parts and   indications. Images were reviewed on a dedicated workstation using   multiplanar reconstruction.    The standardized uptake values (SUV) are normalized to patient body   weight and indicate the highest activity concentration (SUVmax) in a   given disease site. All image numbers refer to axial image number.    COMPARISON:  No prior PET/CT.    OTHER STUDIES USED FOR CORRELATION: Report of CTA of chest dated 3/8/2022.    FINDINGS:    HEAD/NECK: Physiologic FDG activity in visualized brain, head, and neck.    CHEST: There are a a few mildly enlarged FDG-avid bilateral axillary   lymph nodes, larger on the left. For reference, a left axillary lymph   node measures 1.8 x 1.5 cm, SUV 4.8 (image 65). A representative right   axillary node measures 1.6 x 0.8 cm, SUV 3.7 (image 70). An FDG-avid left   retropectoral node measures 0.7 x 0.6 cm, SUV 2.7 (image 65).    There are a few small FDG-avid mediastinal and bilateral hilar lymph   nodes which are difficult to delineate on CT. For reference, a subcarinal   lymph node demonstrates SUV 2.4 (image 86) and a right perihilar lymph   node demonstrates SUV 2.9 (image 85).    LUNGS: No abnormal FDG activity. Non-FDG-avid biapicalscarring.    PLEURA/PERICARDIUM: No abnormal FDG activity. No effusion.    HEPATOBILIARY/PANCREAS:  Physiologic FDG activity. Liver SUV mean is    SPLEEN: Physiologic FDG activity. Normal in size.    ADRENAL GLANDS: No abnormal FDG activity. No nodule.    KIDNEYS/URINARY BLADDER: Physiologic excreted FDG activity. No renal mass   is identified, however, evaluation is limited in the absence of   intravenous contrast.    REPRODUCTIVE ORGANS: No abnormal FDG activity.    ABDOMINOPELVIC NODES: No enlarged or FDG-avid lymph node.    BOWEL/PERITONEUM/MESENTERY: No abnormal FDG activity.    BONES/SOFT TISSUES: No abnormal FDG activity.    IMPRESSION: Abnormal FDG-PET/CT scan.    1. Few mildly enlarged FDG-avid bilateral axillary lymph nodes and small,   mildly FDG-avid left retropectoral, mediastinal, and bilateral hilar   lymph nodes are nonspecific. Axillary lymph nodes may be further   evaluated with ultrasound and percutaneous needle biopsy, as indicated.    2. Remainder of study demonstrates no evidence of FDG-avid disease.    3. No renal mass is identified, however, evaluation is limited in the   absence of intravenous contrast. Physiologic FDG activity in the kidneys   limits detection of FDG-avid renal lesion.    --- End of Report ---        JANNY GRANDA MD; Attending Nuclear Medicine   This document has been electronically signed. Apr 28 2022  4:40PM    < end of copied text >   [facility]  [id number]    HISTORY OF PRESENT ILLNESS:    The consult is called to evaluate for systemic inflammatory disease as a cause of progressive nonischemic cardiomyopathy and pulmonary hypertension.     Patient is never smoker immigrated from [facility] 1970[facility] wit nonischemic cardiomyopathy  HFrEF/NICM EF 25-30% s/p AICD, who was initially admitted to [facility] 2/23/23 for heart failure exacerbation who was transferred to [facility] for RHC  that was performed on 3/3.   RHC showed: PA 44/17, Mean 30mmHg, PCWP 3mmHg TPG 27, DPG 14 suggestive of Precapillary PH     Patient denies any fevers, chills, but reports progressive worsening of shortness of breath, progressive weight loss and getting more difficulty with daily function.   Denies any history of skin tightening, no digital ulcers, no Raynaud's     He has been seen by rheumatologist Dr. Hussain Frederick , has chronic arthritis of left shoulder , but no diagnosis of rheumatoid arthritis. In 12/2022 had rheumatological evaluation , that showed high positive LUIS FERNANDO 1:1280, but all subserology was negative negative: Anti-rosanna, SCL-70/centromere/RNA polymerase 3, CCP, RF, SSA/SSB, ACE level, and Myomarker panel were negative .     PET scan at the time of RCC dx 4/2022 showed mildly FDG-avid nonspecific left retropectoral, mediastinal, and bilateral hilar lymph nodes are nonspecific. NO cardiac uptake was reported  Cardiac MRI in 12/2022 showed subtle late gadolinium enhancement along the inferior hinge point of the right ventricle can occur in a variety of clinical settings including altered ventricular mechanics.  Concentric thickening of the ventricular myocardium.  The estimated left ventricular ejectin fraction is 36.04%.   CT chest 12/2022 ; Enlargement of the main pulmonary artery up to 4.2 cm may . Nonspecific diffuse bilateral ground- glass opacities may represent   pulmonary edema versus mosaic attenuation consistent with small airway or cysts small vessel disease. Axillary lymphadenopathy in comparison with 5/1/2022.   CT chest 3/2023 : MEDIASTINUM AND SEA- re- demonstration of prominent bilateral axillary lymph nodes measuring up to 1.7 x 1.4 cm, unchanged when compared to PET/CT .  Reviewed images - minimally enlarged mediastinal/ hilar LN    LN core bx 7/2022 : no malignant maxi, + reactive follicular hyperplasia                                                                                                   Endomyocardial bx 3/6/2023 - Myocardial biopsy with myocyte hypertrophy. No evidence of myocarditis, sarcoidosis or amyloidosis. Congo red stain is negative for amyloid.  Bone marrow bx 3/2023 - normocellular , not diagnostic   PMH :  plasma cell dyscrasia, hx treated H-pylori, and RCC found on renal mass 4/2022 s/p R partial nephrectomy   Hypertension  reported history of Rheumatoid arthritis, but not confirmed on further eval  Renal Cell CA  History of cardiac cath  jan 2022, at [place] no stent    Review of Systems:  Gen: +weight loss  HEENT: poor po intake  CVS: HPI  Resp: HPI  GI: neg  MSK: left shoulder pain/ restriction of ROM  Skin: No rash, but skin discoloration around neck/ chest , LE  Neuro: neg    MEDICATIONS  (STANDING):  budesonide 160 MICROgram(s)/formoterol 4.5 MICROgram(s) Inhaler 2 Puff(s) Inhalation two times a day  chlorhexidine 2% Cloths 1 Application(s) Topical daily  enoxaparin Injectable 40 milliGRAM(s) SubCutaneous every 24 hours  metoprolol succinate ER 50 milliGRAM(s) Oral <User Schedule>    MEDICATIONS  (PRN):  acetaminophen     Tablet .. 650 milliGRAM(s) Oral every 6 hours PRN Temp greater or equal to 38C (100.4F), Mild Pain (1 - 3)  aluminum hydroxide/magnesium hydroxide/simethicone Suspension 30 milliLiter(s) Oral every 4 hours PRN Dyspepsia  melatonin 3 milliGRAM(s) Oral at bedtime PRN Insomnia  ondansetron Injectable 4 milliGRAM(s) IV Push every 8 hours PRN Nausea and/or Vomiting      Allergies  No Known Allergies  Intolerances    FAMILY HISTORY:  FH: diabetes mellitus (Mother)    Vital Signs Last 24 Hrs  T(C): 36.4 (09 Mar 2023 11:54), Max: 37.2 (08 Mar 2023 21:09)  T(F): 97.5 (09 Mar 2023 11:54), Max: 99 (08 Mar 2023 21:09)  HR: 89 (09 Mar 2023 11:54) (89 - 118)  BP: 123/75 (09 Mar 2023 11:54) (94/64 - 123/75)  BP(mean): --  RR: 18 (09 Mar 2023 13:46) (18 - 20)  SpO2: 94% (09 Mar 2023 13:46) (87% - 95%)    Parameters below as of 09 Mar 2023 13:46  Patient On (Oxygen Delivery Method): nasal cannula  O2 Flow (L/min): 3      Daily     Daily     Physical Exam:  General: undernourished  CVS:  S1S2  Resp:  poor a/e  MSK: no synovitis; Left shoulder abduction is restricted to 30 degress  Skin: no skin tightening, no sclerodactyly, but areas of vitiligo like depigmentation on V are and along some joints  + acanthosis nigricans    LABS:    03-09    137  |  104  |  56<H>  ----------------------------<  74  4.3   |  20<L>  |  1.40<H>    Ca    9.3      09 Mar 2023 06:33  Mg     2.0     03-08      RADIOLOGY & ADDITIONAL STUDIES:    < from: CT Chest No Cont (03.08.23 @ 19:44) >  ACC: [id number] EXAM:  CT CHEST   ORDERED BY: EDWIN JONES     PROCEDURE DATE:  03/08/2023          INTERPRETATION:  CLINICAL INFORMATION: Lymphadenopathy; interval   evaluation.    COMPARISON: CT chest pulmonary artery study 12/14/2022. PET/CT4/28/2022.    CONTRAST/COMPLICATIONS:  IV Contrast: NONE  Oral Contrast: NONE  Complications: None reported at time of study completion    PROCEDURE:  CT of the Chest was performed.  Sagittal and coronal reformats were performed.    FINDINGS:    LUNGS AND AIRWAYS: Patent central airways.  Mild groundglass   abnormalities of the centrilobular pattern; nonspecific and unchanged   when compared to prior  PLEURA: No pleural effusion.  MEDIASTINUM AND SEA: Redemonstrated prominent bilateral axillary lymph   nodes measuring up to 1.7 x 1.4 cm, unchanged when compared to PET/CT   4/28/2022.  VESSELS: Within normal limits.  HEART: Heart size is normal. No pericardial effusion.  CHEST WALL AND LOWER NECK: Within normal limits.  VISUALIZED UPPER ABDOMEN: Within normal limits.  BONES: Within normal limits.    IMPRESSION:  Prominent bilateral axillary lymph nodes measuring up to 1.7 x 1.4 cm and   the left axilla; stable when compared to PET/CT 4/28/2022.        --- End of Report ---           MICAELA LATHAM MD; Resident Radiologist  This document has been electronically signed.  COLIN DE LA ROSA MD; Attending Radiologist  This document has been electronically signed. Mar  9 2023 11:32AM    < end of copied text >    < from: Cardiac Catheterization (03.06.23 @ 17:05) >    Low right atrial pressure (mRA 1mmHg with a V wave of 2mmHg)   Mild pre-capillary pulmonary hypertension (sPAP 44mmHg, dPAP 17mmHG,  mPAP 30mmHg)  PCWP = 3mmHg with a V wave of 5mmHg   No step-up observed in saturations   PAsat = 71% // RAsat = 100%(2L NC)   Devin CO // CI = 4.39l/min // 2.70l/min/m2   SBP = 95/57/71     Status post right ventricular endomyocardial biopsy using TTE and  fluoroscopic guidance  --> A total of 4 right ventricular endomyocardial biopsy samples were  taken    < end of copied text >    < from: NM PET/CT Onc FDG Skull to Thigh, Inital (04.28.22 @ 15:08) >  EXAM: [id number] - PETCT [facility] ONC FDG INIT  - ORDERED BY: UNIQUE COSTA      PROCEDURE DATE:  04/28/2022           INTERPRETATION:  PROCEDURE:  PET/CT SKULL BASE-MID THIGH IMAGING    CLINICAL INFORMATION: 61-year-old male with neoplasm of uncertain   behavior of right kidney and enlarged lymph nodes. PET/CT is done as part   of the initial treatment strategy evaluation.    RADIOPHARMACEUTICAL:  10.73 mCi F-18, FDG, I.V.    TECHNIQUE:  Fasting blood sugar measured prior to injection of   radiopharmaceutical was 91 mg/dl. Following intravenous injection of the   radiopharmaceutical and an uptake period of approximately 60 minutes,   FDG-PET/CT was obtained on a [employer] Discovery 710 from skull base to mid   thigh. Oral contrast was given during the uptake period. CT was performed   during shallow respiration. The CT protocol was optimized for PET   attenuation correction and anatomic localization. The CT protocol was not   designed to produce and cannot replace state-of-the-art diagnostic CT   images with specific imaging protocols for different body parts and   indications. Images were reviewed on a dedicated workstation using   multiplanar reconstruction.    The standardized uptake values (SUV) are normalized to patient body   weight and indicate the highest activity concentration (SUVmax) in a   given disease site. All image numbers refer to axial image number.    COMPARISON:  No prior PET/CT.    OTHER STUDIES USED FOR CORRELATION: Report of CTA of chest dated 3/8/2022.    FINDINGS:    HEAD/NECK: Physiologic FDG activity in visualized brain, head, and neck.    CHEST: There are a a few mildly enlarged FDG-avid bilateral axillary   lymph nodes, larger on the left. For reference, a left axillary lymph   node measures 1.8 x 1.5 cm, SUV 4.8 (image 65). A representative right   axillary node measures 1.6 x 0.8 cm, SUV 3.7 (image 70). An FDG-avid left   retropectoral node measures 0.7 x 0.6 cm, SUV 2.7 (image 65).    There are a few small FDG-avid mediastinal and bilateral hilar lymph   nodes which are difficult to delineate on CT. For reference, a subcarinal   lymph node demonstrates SUV 2.4 (image 86) and a right perihilar lymph   node demonstrates SUV 2.9 (image 85).    LUNGS: No abnormal FDG activity. Non-FDG-avid biapicalscarring.    PLEURA/PERICARDIUM: No abnormal FDG activity. No effusion.    HEPATOBILIARY/PANCREAS:  Physiologic FDG activity. Liver SUV mean is    SPLEEN: Physiologic FDG activity. Normal in size.    ADRENAL GLANDS: No abnormal FDG activity. No nodule.    KIDNEYS/URINARY BLADDER: Physiologic excreted FDG activity. No renal mass   is identified, however, evaluation is limited in the absence of   intravenous contrast.    REPRODUCTIVE ORGANS: No abnormal FDG activity.    ABDOMINOPELVIC NODES: No enlarged or FDG-avid lymph node.    BOWEL/PERITONEUM/MESENTERY: No abnormal FDG activity.    BONES/SOFT TISSUES: No abnormal FDG activity.    IMPRESSION: Abnormal FDG-PET/CT scan.    1. Few mildly enlarged FDG-avid bilateral axillary lymph nodes and small,   mildly FDG-avid left retropectoral, mediastinal, and bilateral hilar   lymph nodes are nonspecific. Axillary lymph nodes may be further   evaluated with ultrasound and percutaneous needle biopsy, as indicated.    2. Remainder of study demonstrates no evidence of FDG-avid disease.    3. No renal mass is identified, however, evaluation is limited in the   absence of intravenous contrast. Physiologic FDG activity in the kidneys   limits detection of FDG-avid renal lesion.    --- End of Report ---        JANNY GRANDA MD; Attending Nuclear Medicine   This document has been electronically signed. Apr 28 2022  4:40PM    < end of copied text >

## 2023-03-09 NOTE — PROGRESS NOTE ADULT - TIME BILLING
Gregory
Personal review of data, images, old chart and coordinating care with radiology, thoracic surgery, rheumatology and medical team.

## 2023-03-09 NOTE — CONSULT NOTE ADULT - ASSESSMENT
Patient with  progressive constitutional sms, nonischemic cardiomypathy , pulmonary hypertension, on home 02 (dx 2021)  with mild pre-capillary pulmonary hypertension (sPAP 44mmHg, dPAP 17mmHG, mPAP 30mmHg), s/p  endomyocardial biopsy  with  myocyte hypertrophy. No evidence of myocarditis, sarcoidosis or amyloidosis, negative Congo red stain .     #  Mild pre-capillary Pulmonary hypertension   #  Nonischemic cardiomyopathy.  # Stable 1.7 Axillary and minimal perihilar and subcarinal ln , prior axillary LN biopsy nondiagnostic with mild uptake on PET scan in 4/2022  # constitutional c/o with progressive weight loss  # MGUS  # History of renal cell CA dx in 4/2022, s/p partial nephrectomy  # High titer positive LUIS FERNANDO, nucleolar , but all sub- serology is negative as of 12/2022. Exam shows no stigmata of scleroderma or related condition. Skin depigmentation of V area and over MCP joints, but no evidence of skin tightening , no sclerodactyly  # Work up for sarcoidosis is negative so far with negative LN and endomyocardial biopsy.      -  Hilar and pericarinal ln are quite small and bx may no yield sufficient tissue, but without tissue to establish dx of sracoidosis is not possible  without a tissue - discussed difficulty of EBUS for LN bx of this small size with pulmonologist   - would consider cardiac PET     Discussed with pulmonary and cardiology teams   Patient with  progressive constitutional sms, nonischemic cardiomypathy , pulmonary hypertension, on home 02 (dx 2021)  with mild pre-capillary pulmonary hypertension (sPAP 44mmHg, dPAP 17mmHG, mPAP 30mmHg), s/p  endomyocardial biopsy  with  myocyte hypertrophy. No evidence of myocarditis, sarcoidosis or amyloidosis, negative Congo red stain .     #  Mild pre-capillary Pulmonary hypertension   #  Nonischemic cardiomyopathy.  # Stable 1.7 Axillary and minimal perihilar and subcarinal ln , prior axillary LN biopsy nondiagnostic with mild uptake on PET scan in 4/2022  # constitutional c/o with progressive weight loss  # MGUS  # History of renal cell CA dx in 4/2022, s/p partial nephrectomy  # High titer positive LUIS FERNANDO, nucleolar , but all sub- serology is negative as of 12/2022. Exam shows no stigmata of scleroderma or related condition. Skin depigmentation of V area and over MCP joints, but no evidence of skin tightening , no sclerodactyly  # Work up for sarcoidosis is negative so far with negative LN and endomyocardial biopsy.  # Frozen left shoulder/ likely underlying rotator cuff tear      -  Hilar and pericarinal ln are quite small and bx may no yield sufficient tissue, but without tissue to establish dx of sarcoidosis is not possible  without a tissue - discussed difficulty of EBUS for LN bx of this small size with pulmonologist   - would consider cardiac PET     Discussed with pulmonary and cardiology teams   Patient with  progressive constitutional sms of weight loss, nonischemic cardiomypathy , pulmonary hypertension, on home 02 (dx 2021)  with mild pre-capillary pulmonary hypertension , s/p  endomyocardial biopsy .  HIstory of MGUS and renal cell CA dx in 4/2022, s/p partial nephrectomy      #  Mild pre-capillary Pulmonary hypertension   -  on 3/2023 [facility] : sPAP 44mmHg, dPAP 17mmHG, mPAP 30mmHg   #  Nonischemic cardiomyopathy, s/p  endomyocardial biopsy    -  myocyte hypertrophy. No evidence of myocarditis, sarcoidosis or amyloidosis, negative Congo red stain .   # Stable 1.7 Axillary and minimal perihilar and subcarinal ln , prior axillary LN biopsy nondiagnostic with mild uptake on PET scan in 4/2022  # High titer positive LUIS FERNANDO, nucleolar , but all sub- serology is negative as of 12/2022  - exam shows no stigmata of scleroderma or related condition. Skin depigmentation of V area and over MCP joints, but no evidence of skin tightening , no sclerodactyly  # Work up for sarcoidosis is negative so far with negative LN and endomyocardial biopsy.  # Frozen left shoulder/ likely underlying rotator cuff tear    -  Hilar and pericarinal ln are quite small and bx may no yield sufficient tissue, but without tissue to establish dx of sarcoidosis is not possible  without a tissue - discussed difficulty of EBUS for LN bx of this small size with pulmonologist   - would consider cardiac PET     Discussed with pulmonary and cardiology teams

## 2023-03-09 NOTE — DISCHARGE NOTE PROVIDER - PROVIDER TOKENS
PROVIDER:[TOKEN:[[id number]],FOLLOWUP:[1 week]],PROVIDER:[TOKEN:[[id number]],FOLLOWUP:[1 week]],PROVIDER:[TOKEN:[[id number]],FOLLOWUP:[1 week]],PROVIDER:[TOKEN:[[id number]],FOLLOWUP:[1 week]] PROVIDER:[TOKEN:[[id number]],FOLLOWUP:[1 week]],PROVIDER:[TOKEN:[[id number]],FOLLOWUP:[1 week]],PROVIDER:[TOKEN:[[id number]],FOLLOWUP:[1 week]],PROVIDER:[TOKEN:[[id number]],FOLLOWUP:[1 week]],PROVIDER:[TOKEN:[[id number]],FOLLOWUP:[2 weeks]]

## 2023-03-09 NOTE — DISCHARGE NOTE PROVIDER - NSDCCPCAREPLAN_GEN_ALL_CORE_FT
PRINCIPAL DISCHARGE DIAGNOSIS  Diagnosis: Acute on chronic systolic congestive heart failure  Assessment and Plan of Treatment: Weigh yourself daily.  If you gain 3lbs in 3 days, or 5lbs in a week call your Health Care Provider.  Do not eat or drink foods containing more than 2000mg of salt (sodium) in your diet every day.  Call your Health Care Provider if you have any swelling or increased swelling in your feet, ankles, and/or stomach.  Take all of your medication as directed.  If you become dizzy call your Health Care Provider.        SECONDARY DISCHARGE DIAGNOSES  Diagnosis: Chronic kidney disease, unspecified  Assessment and Plan of Treatment: Avoid taking (NSAIDs) - (ex: Ibuprofen, Advil, Celebrex, Naprosyn)  Avoid taking any nephrotoxic agents (can harm kidneys) - Intravenous contrast for diagnostic testing, combination cold medications.  Have all medications adjusted for your renal function by your Health Care Provider.  Blood pressure control is important.  Take all medication as prescribed.      Diagnosis: Pulmonary hypertension  Assessment and Plan of Treatment: Take medication as directed  Follow-up with Dr. Villeda    Diagnosis: Plasma cell dyscrasia  Assessment and Plan of Treatment: s/p bone marrow biopsy and negative   Follow-up with oncology     PRINCIPAL DISCHARGE DIAGNOSIS  Diagnosis: Acute on chronic systolic congestive heart failure  Assessment and Plan of Treatment: Weigh yourself daily.  If you gain 3lbs in 3 days, or 5lbs in a week call your Health Care Provider.  Do not eat or drink foods containing more than 2000mg of salt (sodium) in your diet every day.  Call your Health Care Provider if you have any swelling or increased swelling in your feet, ankles, and/or stomach.  Take all of your medication as directed.  If you become dizzy call your Health Care Provider.        SECONDARY DISCHARGE DIAGNOSES  Diagnosis: Chronic kidney disease, unspecified  Assessment and Plan of Treatment: Avoid taking (NSAIDs) - (ex: Ibuprofen, Advil, Celebrex, Naprosyn)  Avoid taking any nephrotoxic agents (can harm kidneys) - Intravenous contrast for diagnostic testing, combination cold medications.  Have all medications adjusted for your renal function by your Health Care Provider.  Blood pressure control is important.  Take all medication as prescribed.      Diagnosis: Plasma cell dyscrasia  Assessment and Plan of Treatment: s/p bone marrow biopsy and negative   Follow-up with oncology    Diagnosis: Pulmonary hypertension  Assessment and Plan of Treatment: Take medication as directed. You had a lymph node biopsy in your chest performed by thoracic surgery.  Follow-up with Dr. Villeda and thoracic surgery

## 2023-03-09 NOTE — DISCHARGE NOTE PROVIDER - NSDCMRMEDTOKEN_GEN_ALL_CORE_FT
acetaminophen 325 mg oral tablet: 2 tab(s) orally every 6 hours, As needed, Temp greater or equal to 38C (100.4F), Mild Pain (1 - 3)  Advair Diskus 250 mcg-50 mcg inhalation powder: 1 puff(s) inhaled 2 times a day  albuterol 90 mcg/inh inhalation aerosol: 1 puff(s) inhaled 3 times a day, As needed, Shortness of Breath and/or Wheezing  aluminum hydroxide-magnesium hydroxide 200 mg-200 mg/5 mL oral suspension: 30 milliliter(s) orally every 4 hours, As needed, Dyspepsia  bumetanide 1 mg oral tablet: 3 tab(s) orally once a day  hydrALAZINE 50 mg oral tablet: 1 tab(s) orally 3 times a day  melatonin 3 mg oral tablet: 1 tab(s) orally once a day (at bedtime), As needed, Insomnia  metoprolol succinate 50 mg oral tablet, extended release: 1 tab(s) orally once a day  sacubitril-valsartan 49 mg-51 mg oral tablet: 1 tab(s) orally 2 times a day  spironolactone 25 mg oral tablet: 1 tab(s) orally once a day   acetaminophen 325 mg oral tablet: 2 tab(s) orally every 6 hours, As needed, Temp greater or equal to 38C (100.4F), Mild Pain (1 - 3)  Advair Diskus 250 mcg-50 mcg inhalation powder: 1 puff(s) inhaled 2 times a day  bumetanide 1 mg oral tablet: 1 tab(s) orally once a day  melatonin 3 mg oral tablet: 1 tab(s) orally once a day (at bedtime), As needed, Insomnia  metoprolol succinate 25 mg oral tablet, extended release: 1 tab(s) orally once a day  sildenafil 20 mg oral tablet: 0.5 tab(s) orally once a day

## 2023-03-09 NOTE — PROGRESS NOTE ADULT - SUBJECTIVE AND OBJECTIVE BOX
Thoracis Surgery Pre-Op Note             Diagnosis  Hilar adenopathy    Procedure  EBUS vs Mediastinoscopy    Attending  Gregory          MEDICATIONS  acetaminophen     Tablet .. 650 milliGRAM(s) Oral every 6 hours PRN  aluminum hydroxide/magnesium hydroxide/simethicone Suspension 30 milliLiter(s) Oral every 4 hours PRN  budesonide 160 MICROgram(s)/formoterol 4.5 MICROgram(s) Inhaler 2 Puff(s) Inhalation two times a day  chlorhexidine 2% Cloths 1 Application(s) Topical daily  enoxaparin Injectable 40 milliGRAM(s) SubCutaneous every 24 hours  melatonin 3 milliGRAM(s) Oral at bedtime PRN  metoprolol succinate ER 50 milliGRAM(s) Oral <User Schedule>  ondansetron Injectable 4 milliGRAM(s) IV Push every 8 hours PRN          LABS  03-09    137  |  104  |  56<H>  ----------------------------<  74  4.3   |  20<L>  |  1.40<H>    Ca    9.3      09 Mar 2023 06:33  Mg     2.0     03-08                                      NPO AFTER MN  Type & Screen  Covid neg

## 2023-03-09 NOTE — DISCHARGE NOTE PROVIDER - NSDCFUADDAPPT_GEN_ALL_CORE_FT
APPTS ARE READY TO BE MADE: [ X] YES    Best Family or Patient Contact (if needed):    Additional Information about above appointments (if needed):    1: Dr. Cuellar  2: Dr. Villeda  3: Dr. Jenkins    Other comments or requests:    APPTS ARE READY TO BE MADE: [ X] YES    Best Family or Patient Contact (if needed):    Additional Information about above appointments (if needed):    1: Dr. Cuellar  2: Dr. Villeda  3: Dr. Jenkins    Other comments or requests:   Patient was previously scheduled with Shruti Finn on (3/20/2023 12:30 PM) at [address] [place] [address] [address] [address].  Patient was previously scheduled with Michael Gandara on (3/29/2023 12 PM) at [facility] [place] [address].  Patient was provided with follow up request details for Ayaan Rangel and was warm transferred to the office to schedule the appointment on their own. Patient is scheduled on (3/15/2023 12:15 PM) at [address] [place] [address] [address] [address].  Patient was scheduled with Jose Daniel Krueger on (3/27/2023 @ 12 PM) at [address] [address] [place] [address] through the provider's office. Patient advised of appointment details.    Patient was advised of follow up requests and asked for scheduling assistance. Will await a call back from Ruth Newby's office to confirm appointment details.

## 2023-03-09 NOTE — PROGRESS NOTE ADULT - ASSESSMENT
Anemia due to CKD and it is an AOCD process. No CBC today but Hgb has been adequate and he does not require Epo.    Monoclonal protein - bone marrow biopsy negative for MM and amyloid and cardiac biopsy negative for amyloid.    He says that he will be having a lymph node biopsy tomorrow. Prior biopsy of axillary lymph node was negative for malignancy.

## 2023-03-09 NOTE — PROGRESS NOTE ADULT - ASSESSMENT
62M w/ PMHx of HTN, RA, RCC s/p R partial nephrectomy, CKD, HFrEF/NICM EF 25-30%, ICD, plasma cell dyscrasia, h/o H-pylori, treated, seen at HF clinic w/ recent extended admission on [facility] now transferred to [facility] for likely RHC and cardiac biopsy. Pt admitted to [facility] around 8 days ago with acute on chronic CHF exacerbation. Followed by HF, cardiology and hem/onc. ICD interrogated. Was diuresed with IV Bumex and eventually transitioned to PO. Had repeat TTE. Bone marrow biopsy by IR on 3/1 to evaluate for MM and MGUS. TTE repeated as well. Eventual plan to transfer patient to [facility] for likely RHC and cardiac biopsy to further evaluation for amyloid. Pt currently denies any chest pain, SOB or specific complaints.  Thoracic surgery consulted for right hilar adenopathy, weight loss and nonproductive cough have been present for about a year, thinks he lost about 45 pounds  3/8    vss  3/9 VSS  poss ebus mediastinoscopy Friday.  Keep NPO after MN

## 2023-03-09 NOTE — PROGRESS NOTE ADULT - SUBJECTIVE AND OBJECTIVE BOX
Patient is a 62y old  Male who presents with a chief complaint of Need for cardiac biopsy (09 Mar 2023 18:06)    has a cough, dry. Breathing is far. He is tired. Eats a little. No N/V. No CP.     Medication:   acetaminophen     Tablet .. 650 milliGRAM(s) Oral every 6 hours PRN  aluminum hydroxide/magnesium hydroxide/simethicone Suspension 30 milliLiter(s) Oral every 4 hours PRN  budesonide 160 MICROgram(s)/formoterol 4.5 MICROgram(s) Inhaler 2 Puff(s) Inhalation two times a day  chlorhexidine 2% Cloths 1 Application(s) Topical daily  enoxaparin Injectable 40 milliGRAM(s) SubCutaneous every 24 hours  melatonin 3 milliGRAM(s) Oral at bedtime PRN  metoprolol succinate ER 50 milliGRAM(s) Oral <User Schedule>  ondansetron Injectable 4 milliGRAM(s) IV Push every 8 hours PRN      Physical exam    T(C): 36.4 (03-09-23 @ 11:54), Max: 37.2 (03-08-23 @ 21:09)  HR: 89 (03-09-23 @ 11:54) (89 - 118)  BP: 123/75 (03-09-23 @ 11:54) (94/64 - 123/75)  RR: 18 (03-09-23 @ 13:46) (18 - 20)  SpO2: 94% (03-09-23 @ 13:46) (87% - 95%)  Wt(kg): --    alert NAD  EOMI anicteric sclera  Cv s1 S2 RRR  Lungs clear B/L anteriorly  abd soft NT ND +BS  No LE edema or tenderness    Labs            03-09    137  |  104  |  56<H>  ----------------------------<  74  4.3   |  20<L>  |  1.40<H>    Ca    9.3      09 Mar 2023 06:33  Mg     2.0     03-08            [id number]

## 2023-03-09 NOTE — PROGRESS NOTE ADULT - SUBJECTIVE AND OBJECTIVE BOX
CHIEF COMPLAINT: SOB    Interval Events: Pt continues to report fatigue    REVIEW OF SYSTEMS:  Constitutional: [ ] negative [ ] fevers [ ] chills [x ] weight loss [ ] weight gain  HEENT: [ ] negative [ ] dry eyes [ ] eye irritation [ ] postnasal drip [ ] nasal congestion  CV: [ ] negative  [ ] chest pain [ ] orthopnea [ ] palpitations [ ] murmur  Resp: [ ] negative [ ] cough [ ] shortness of breath [ ] dyspnea [ ] wheezing [ ] sputum [ ] hemoptysis  GI: [ ] negative [ ] nausea [ ] vomiting [ ] diarrhea [ ] constipation [ ] abd pain [ ] dysphagia   : [ ] negative [ ] dysuria [ ] nocturia [ ] hematuria [ ] increased urinary frequency  Musculoskeletal: [ ] negative [ ] back pain [ ] myalgias [ ] arthralgias [ ] fracture  Skin: [ ] negative [ ] rash [ ] itch  Neurological: [ ] negative [ ] headache [ ] dizziness [ ] syncope [ ] weakness [ ] numbness  Psychiatric: [ ] negative [ ] anxiety [ ] depression  Endocrine: [ ] negative [ ] diabetes [ ] thyroid problem  Hematologic/Lymphatic: [ ] negative [ ] anemia [ ] bleeding problem  Allergic/Immunologic: [ ] negative [ ] itchy eyes [ ] nasal discharge [ ] hives [ ] angioedema  [x ] All other systems negative  [ ] Unable to assess ROS because ________    OBJECTIVE:  ICU Vital Signs Last 24 Hrs  T(C): 36.4 (09 Mar 2023 11:54), Max: 37.2 (08 Mar 2023 21:09)  T(F): 97.5 (09 Mar 2023 11:54), Max: 99 (08 Mar 2023 21:09)  HR: 89 (09 Mar 2023 11:54) (89 - 118)  BP: 123/75 (09 Mar 2023 11:54) (94/64 - 123/75)  BP(mean): --  ABP: --  ABP(mean): --  RR: 18 (09 Mar 2023 13:46) (18 - 20)  SpO2: 94% (09 Mar 2023 13:46) (87% - 95%)    O2 Parameters below as of 09 Mar 2023 13:46  Patient On (Oxygen Delivery Method): nasal cannula  O2 Flow (L/min): 3            03-08 @ 07:01  -  03-09 @ 07:00  --------------------------------------------------------  IN: 0 mL / OUT: 350 mL / NET: -350 mL      CAPILLARY BLOOD GLUCOSE      PHYSICAL EXAM:  General: Cathectic male laying in bed in no acute distress  HEENT: Temporal wasting  Respiratory: No respiratory distress  Cardiovascular: Regular rate and rhythm no m/r/g  Abdomen: Nontender nondistended  Extremities: No peripheral edema  Skin: Hypopigmented skin changes noted in his upper chest  Neurological: No appreciable neurologic deficits  Psychiatry: Appropriate mood and affect    HOSPITAL MEDICATIONS:  MEDICATIONS  (STANDING):  budesonide 160 MICROgram(s)/formoterol 4.5 MICROgram(s) Inhaler 2 Puff(s) Inhalation two times a day  chlorhexidine 2% Cloths 1 Application(s) Topical daily  enoxaparin Injectable 40 milliGRAM(s) SubCutaneous every 24 hours  metoprolol succinate ER 50 milliGRAM(s) Oral <User Schedule>    MEDICATIONS  (PRN):  acetaminophen     Tablet .. 650 milliGRAM(s) Oral every 6 hours PRN Temp greater or equal to 38C (100.4F), Mild Pain (1 - 3)  aluminum hydroxide/magnesium hydroxide/simethicone Suspension 30 milliLiter(s) Oral every 4 hours PRN Dyspepsia  melatonin 3 milliGRAM(s) Oral at bedtime PRN Insomnia  ondansetron Injectable 4 milliGRAM(s) IV Push every 8 hours PRN Nausea and/or Vomiting      LABS:    Hgb Trend: 11.7<--, 12.5<--, 11.5<--, 11.9<--  03-09    137  |  104  |  56<H>  ----------------------------<  74  4.3   |  20<L>  |  1.40<H>    Ca    9.3      09 Mar 2023 06:33  Mg     2.0     03-08      Creatinine Trend: 1.40<--, 1.39<--, 1.50<--, 1.69<--, 1.79<--, 1.64<--            MICROBIOLOGY:       RADIOLOGY:  [ ] Reviewed and interpreted by me    PULMONARY FUNCTION TESTS:    EKG:

## 2023-03-09 NOTE — PROGRESS NOTE ADULT - ASSESSMENT
Briefly, 61 y/o [place] M w/ h/o HTN, RCC s/p partial R nephrectomy (4/22), CKD (bl Cr 1.5), HFrecEF/NICM (EF prev 25-30%, LVEDD 4.1 cm, septum 1.3 cm/PW 1.3 cm improved to 55-60%) s/p ICD (at [place] for positive EP study), plasma cell dyscrasia (Kappa/lambda 3.23; s/p recent BM biopsy c/w MGUS) who was referred to ER on 2/25 for persistent dyspnea and cough for past 6 weeks. Of note, he had been hospitalized at [facility] well as [place] over past year. Was evaluated for TTR amyloid which was negative but never underwent biopsy. Also noted to have R hilar LAD and axillary LAD on CT chest in 12/22 (negative for PE). Prior axillary LN biopsy negative. Was given IV diuretics with good response. States cough still present. Transferred from [facility] for RHC/biopsy which was done showed low filling pressures but with pulmonary HTN (PVR 6). Labs reviewed - trop 80s, BUN/Cr 56/1.4 (b/l 1.5) BNP 6k. Overall stage C HF, NYHA class III with suspicion that there may be an infiltrative process (e.g. amyloid vs. sarcoid) and requires expedited workup. Myocardial biopsy negative for amyloid/sarcoid. In addition, sarcoid is a possibility as well given RVH and hilar LAD and given prior PET scan showing FDG-avid LNs, will need to pursue LN biopsy and PET scan as outpatient     3/7/23 RHC RA 1, PA 44/17/30, PCW 3, CO/CI 4.39/2.7; PVR 6

## 2023-03-09 NOTE — DISCHARGE NOTE PROVIDER - NPI NUMBER (FOR SYSADMIN USE ONLY) :
[[id number]] [[id number]]

## 2023-03-09 NOTE — DISCHARGE NOTE PROVIDER - NSDCCAREPROVSEEN_GEN_ALL_CORE_FT
Gema Vu Gema Vu  Ordering Physician  Jessie Carver  Team [facility] Pulmonary  Team [facility] Rheumatology  Advance PracticeTeam [facility] Medicine  Heart Failure Svc [facility] Cardiology      [ Greater than 35 min spent for discharge services.   SALIMA Vu ]

## 2023-03-09 NOTE — PROGRESS NOTE ADULT - SUBJECTIVE AND OBJECTIVE BOX
Cardiovascular Disease Progress Note    Overnight events: No acute events overnight.  no new cardiac sx  Otherwise review of systems negative    Objective Findings:  T(C): 36.6 (23 @ 04:59), Max: 37.2 (23 @ 21:09)  HR: 118 (23 @ 04:59) (104 - 118)  BP: 101/69 (23 @ 04:59) (94/64 - 101/69)  RR: 18 (23 @ 04:59) (18 - 18)  SpO2: 95% (23 @ 04:59) (94% - 95%)  Wt(kg): --  Daily     Daily Weight in k.7 (08 Mar 2023 09:08)      Physical Exam:  Gen: NAD  HEENT: EOMI  CV: RRR, normal S1 + S2, no m/r/g  Lungs: CTAB  Abd: soft, non-tender  Ext: No edema    Telemetry:    Laboratory Data:        137  |  104  |  56<H>  ----------------------------<  74  4.3   |  20<L>  |  1.40<H>    Ca    9.3      09 Mar 2023 06:33  Mg     2.0                     Inpatient Medications:  MEDICATIONS  (STANDING):  budesonide 160 MICROgram(s)/formoterol 4.5 MICROgram(s) Inhaler 2 Puff(s) Inhalation two times a day  chlorhexidine 2% Cloths 1 Application(s) Topical daily  enoxaparin Injectable 40 milliGRAM(s) SubCutaneous every 24 hours  metoprolol succinate ER 50 milliGRAM(s) Oral <User Schedule>      Assessment:  62yoM with PMH of HTN, RA, RCC s/p R partial nephrectomy, CKD, HFrEF/NICM EF 25-30%, ICD, plasma cell dyscrasia, seen at HF clinic today, sent to ED for worsening heart failure. pt admits to progressively increasing SOB, AVALOS with cough, worsening at night over past 6 months, however increasingly worse over past month    Recs:  diuresis prn per CHF  cont GDMT, lvef improved  s/p BM biopsy on 3/1/23, neg for plasma celll dyscrasia  s/p RHC and cardiac bx, path pending.  f/u pulm re insidious pulmonary process. specialty labs to be obtained  tele monitoring   will follow      Over 25 minutes spent on total encounter; more than 50% of the visit was spent counseling and/or coordinating care by the attending physician.      Juarez Carver MD   Cardiovascular Disease  [phone]

## 2023-03-09 NOTE — PROGRESS NOTE ADULT - ASSESSMENT
63YO Male never smoker immigrated from [address] PMH HTN, CKD, HFrEF/NICM EF 25-30% s/p AICD, plasma cell dyscrasia, hx treated H-pylori, and RCC found on renal mass 4/2022 s/p R partial nephrectomy who was initially admitted to [facility] 2/23/23 for heart failure exacerbation who was transferred to [facility] for RHC 3/3.     #Mild Precapillary PH  #? Sarcoidosis  - PA 44/17, Mean 30mmHg, PCWP 3mmHg TPG 27, DPG 14 suggestive of Precapillary PH  - Pts physical and history are concerning for a insidious process that has caused him to lose a significant amount of weight and become debilitated  - Concerning for a rheumatologic process- Appreciate Rheumatology consult  - Significant Rheumatologic workup negative: Anti-rosanna, SCL-70/centromere/RNA polymerase 3, CCP, RF, SSA/SSB, ACE level, and Myomarker panel were negative  - LUIS FERNANDO was markedly elevated to 1:1280  - F/u dsDNA  - F/u ANCA  - HIV 1 and 2 negative  - F/u HTLV antibodies  - Doubt sarcoidosis as pt's hilar adenopathy is minimal and endomyocardial biopsy and previous axillary lymph node biopsy were negative  - Repeat CT chest performed 3/8 reviewed with Chest Radiologist who confirmed no nodules or mediastinal lymphadenopathy to suggest sarcoidosis    Case discussed with Dr. Veronica and Dr. Villeda

## 2023-03-10 ENCOUNTER — TRANSCRIPTION ENCOUNTER (OUTPATIENT)
Age: 63
End: 2023-03-10

## 2023-03-10 ENCOUNTER — APPOINTMENT (OUTPATIENT)
Dept: THORACIC SURGERY | Facility: HOSPITAL | Age: 63
End: 2023-03-10

## 2023-03-10 LAB
ANA PAT FLD IF-IMP: ABNORMAL
ANA TITR SER: ABNORMAL
ANION GAP SERPL CALC-SCNC: 12 MMOL/L — SIGNIFICANT CHANGE UP (ref 5–17)
BUN SERPL-MCNC: 42 MG/DL — HIGH (ref 7–23)
CALCIUM SERPL-MCNC: 9.8 MG/DL — SIGNIFICANT CHANGE UP (ref 8.4–10.5)
CHLORIDE SERPL-SCNC: 103 MMOL/L — SIGNIFICANT CHANGE UP (ref 96–108)
CO2 SERPL-SCNC: 21 MMOL/L — LOW (ref 22–31)
CREAT SERPL-MCNC: 1.31 MG/DL — HIGH (ref 0.5–1.3)
EGFR: 61 ML/MIN/1.73M2 — SIGNIFICANT CHANGE UP
GLUCOSE SERPL-MCNC: 91 MG/DL — SIGNIFICANT CHANGE UP (ref 70–99)
POTASSIUM SERPL-MCNC: 4.3 MMOL/L — SIGNIFICANT CHANGE UP (ref 3.5–5.3)
POTASSIUM SERPL-SCNC: 4.3 MMOL/L — SIGNIFICANT CHANGE UP (ref 3.5–5.3)
SODIUM SERPL-SCNC: 136 MMOL/L — SIGNIFICANT CHANGE UP (ref 135–145)

## 2023-03-10 PROCEDURE — 71045 X-RAY EXAM CHEST 1 VIEW: CPT | Mod: 26

## 2023-03-10 PROCEDURE — 88342 IMHCHEM/IMCYTCHM 1ST ANTB: CPT | Mod: 26

## 2023-03-10 PROCEDURE — 88173 CYTOPATH EVAL FNA REPORT: CPT | Mod: 26

## 2023-03-10 PROCEDURE — 99233 SBSQ HOSP IP/OBS HIGH 50: CPT

## 2023-03-10 PROCEDURE — 31645 BRNCHSC W/THER ASPIR 1ST: CPT

## 2023-03-10 PROCEDURE — 88307 TISSUE EXAM BY PATHOLOGIST: CPT | Mod: 26

## 2023-03-10 PROCEDURE — 88305 TISSUE EXAM BY PATHOLOGIST: CPT | Mod: 26

## 2023-03-10 PROCEDURE — 88341 IMHCHEM/IMCYTCHM EA ADD ANTB: CPT | Mod: 26

## 2023-03-10 PROCEDURE — 31652 BRONCH EBUS SAMPLNG 1/2 NODE: CPT

## 2023-03-10 PROCEDURE — 88189 FLOWCYTOMETRY/READ 16 & >: CPT | Mod: 59

## 2023-03-10 PROCEDURE — 31624 DX BRONCHOSCOPE/LAVAGE: CPT

## 2023-03-10 PROCEDURE — 39402 MEDIASTINOSCPY W/LMPH NOD BX: CPT | Mod: 22

## 2023-03-10 DEVICE — SURGICEL NU-KNIT 6 X 9": Type: IMPLANTABLE DEVICE | Status: FUNCTIONAL

## 2023-03-10 DEVICE — KIT A-LINE 1LUM 20G X 12CM SAFE KIT: Type: IMPLANTABLE DEVICE | Status: FUNCTIONAL

## 2023-03-10 DEVICE — SURGICEL NU-KNIT 3 X 4": Type: IMPLANTABLE DEVICE | Status: FUNCTIONAL

## 2023-03-10 RX ORDER — ONDANSETRON 8 MG/1
4 TABLET, FILM COATED ORAL ONCE
Refills: 0 | Status: DISCONTINUED | OUTPATIENT
Start: 2023-03-10 | End: 2023-03-10

## 2023-03-10 RX ORDER — BUDESONIDE AND FORMOTEROL FUMARATE DIHYDRATE 160; 4.5 UG/1; UG/1
2 AEROSOL RESPIRATORY (INHALATION)
Refills: 0 | Status: DISCONTINUED | OUTPATIENT
Start: 2023-03-10 | End: 2023-03-12

## 2023-03-10 RX ORDER — ONDANSETRON 8 MG/1
4 TABLET, FILM COATED ORAL ONCE
Refills: 0 | Status: DISCONTINUED | OUTPATIENT
Start: 2023-03-10 | End: 2023-03-12

## 2023-03-10 RX ORDER — SODIUM CHLORIDE 9 MG/ML
1000 INJECTION, SOLUTION INTRAVENOUS
Refills: 0 | Status: DISCONTINUED | OUTPATIENT
Start: 2023-03-10 | End: 2023-03-10

## 2023-03-10 RX ORDER — CHLORHEXIDINE GLUCONATE 213 G/1000ML
1 SOLUTION TOPICAL DAILY
Refills: 0 | Status: DISCONTINUED | OUTPATIENT
Start: 2023-03-10 | End: 2023-03-12

## 2023-03-10 RX ORDER — ACETAMINOPHEN 500 MG
650 TABLET ORAL EVERY 6 HOURS
Refills: 0 | Status: DISCONTINUED | OUTPATIENT
Start: 2023-03-10 | End: 2023-03-12

## 2023-03-10 RX ORDER — LANOLIN ALCOHOL/MO/W.PET/CERES
3 CREAM (GRAM) TOPICAL AT BEDTIME
Refills: 0 | Status: DISCONTINUED | OUTPATIENT
Start: 2023-03-10 | End: 2023-03-12

## 2023-03-10 RX ORDER — METOPROLOL TARTRATE 50 MG
50 TABLET ORAL
Refills: 0 | Status: DISCONTINUED | OUTPATIENT
Start: 2023-03-10 | End: 2023-03-12

## 2023-03-10 RX ORDER — FENTANYL CITRATE 50 UG/ML
25 INJECTION INTRAVENOUS
Refills: 0 | Status: DISCONTINUED | OUTPATIENT
Start: 2023-03-10 | End: 2023-03-12

## 2023-03-10 RX ORDER — FENTANYL CITRATE 50 UG/ML
25 INJECTION INTRAVENOUS
Refills: 0 | Status: DISCONTINUED | OUTPATIENT
Start: 2023-03-10 | End: 2023-03-10

## 2023-03-10 RX ORDER — SODIUM CHLORIDE 9 MG/ML
1000 INJECTION, SOLUTION INTRAVENOUS
Refills: 0 | Status: DISCONTINUED | OUTPATIENT
Start: 2023-03-10 | End: 2023-03-12

## 2023-03-10 RX ADMIN — SODIUM CHLORIDE 75 MILLILITER(S): 9 INJECTION, SOLUTION INTRAVENOUS at 21:04

## 2023-03-10 RX ADMIN — FENTANYL CITRATE 25 MICROGRAM(S): 50 INJECTION INTRAVENOUS at 21:15

## 2023-03-10 RX ADMIN — FENTANYL CITRATE 25 MICROGRAM(S): 50 INJECTION INTRAVENOUS at 21:00

## 2023-03-10 RX ADMIN — Medication 650 MILLIGRAM(S): at 08:30

## 2023-03-10 RX ADMIN — Medication 650 MILLIGRAM(S): at 09:00

## 2023-03-10 RX ADMIN — CHLORHEXIDINE GLUCONATE 1 APPLICATION(S): 213 SOLUTION TOPICAL at 13:24

## 2023-03-10 RX ADMIN — BUDESONIDE AND FORMOTEROL FUMARATE DIHYDRATE 2 PUFF(S): 160; 4.5 AEROSOL RESPIRATORY (INHALATION) at 08:30

## 2023-03-10 RX ADMIN — Medication 50 MILLIGRAM(S): at 13:24

## 2023-03-10 NOTE — PROGRESS NOTE ADULT - ASSESSMENT
CKD (Creatinine stable to improving,) anemia (AOCD - no CBC in a few days, but he has not been needing Epo,) and monoclonal protein. Bone marrow biopsy and cardiac biopsy negative for MM and amyloid. Monitor CBC.    LN biopsy for adenopathy  and follow up pathology results.

## 2023-03-10 NOTE — PROGRESS NOTE ADULT - ASSESSMENT
_________________________________________________________________________________________  ========>>  M E D I C A L   A T T E N D I N G    F O L L O W  U P  N O T E  <<=========  -----------------------------------------------------------------------------------------------------    - Patient seen and examined by me earlier today.   - In summary,  PLACIDO GOLDMAN is a 62y year old man admitted with CHF exacerbation  - Patient today overall doing ok, comfortable, NPO for procedure     Patient overall feeling okay, no shortness of breath at rest, no chest pain.     ==================>> REVIEW OF SYSTEM <<=================    GEN: no fever, no chills, no pain  RESP: no SOB at rest , no cough, no sputum  CVS: no chest pain, no palpitations, no edema  GI: no abdominal pain, no nausea,  : no dysuria, no frequency  Neuro: no headache, no dizziness  Derm : no itching, no rash    ==================>> PHYSICAL EXAM <<=================    GEN: A&O X 3 , NAD , comfortable, pleasant, calm in bed   HEENT: NCAT, PERRL, MMM, hearing intact, not on O2   Neck: supple , no JVD appreciated  CVS: S1S2 , regular , Tachycardic.  Sinus tachycardia on telemetry  PULM: CTA B/L,  no W/R/R appreciated  ABD.: soft. non tender, non distended,  bowel sounds present  Extrem: intact pulses , no signif edema   PSYCH : normal mood,  not anxious                             ( Note written / Date of service 03-10-23 )    ==================>> MEDICATIONS <<====================    budesonide 160 MICROgram(s)/formoterol 4.5 MICROgram(s) Inhaler 2 Puff(s) Inhalation two times a day  chlorhexidine 2% Cloths 1 Application(s) Topical daily  metoprolol succinate ER 50 milliGRAM(s) Oral <User Schedule>    MEDICATIONS  (PRN):  acetaminophen     Tablet .. 650 milliGRAM(s) Oral every 6 hours PRN Temp greater or equal to 38C (100.4F), Mild Pain (1 - 3)  aluminum hydroxide/magnesium hydroxide/simethicone Suspension 30 milliLiter(s) Oral every 4 hours PRN Dyspepsia  melatonin 3 milliGRAM(s) Oral at bedtime PRN Insomnia  ondansetron Injectable 4 milliGRAM(s) IV Push every 8 hours PRN Nausea and/or Vomiting    ___________  Active diet:  Diet, NPO after Midnight:      NPO Start Date: 09-Mar-2023,   NPO Start Time: 23:59  Diet, Regular:   Low Sodium  ___________________    ==================>> VITAL SIGNS <<==================    Vital Signs Last 24 HrsT(C): 36.3 (03-10-23 @ 11:46)  T(F): 97.3 (03-10-23 @ 11:46), Max: 98.1 (03-09-23 @ 20:57)  HR: 104 (03-10-23 @ 11:46) (104 - 112)  BP: 100/71 (03-10-23 @ 11:46)  RR: 18 (03-10-23 @ 11:46) (18 - 18)  SpO2: 96% (03-10-23 @ 11:46) (93% - 97%)       ==================>> LAB AND IMAGING <<==================       03-10    136  |  103  |  42<H>  ----------------------------<  91  4.3   |  21<L>  |  1.31<H>    Ca    9.8      10 Mar 2023 07:17    WBC count:   10.26 <<==   Hemoglobin:   11.7 <<==  platelets:  292 <==, 312 <==    Creatinine:  1.31  <<==, 1.40  <<==, 1.39  <<==, 1.50  <<==, 1.69  <<==, 1.79  <<==  Sodium:   136  <==, 137  <==, 137  <==, 136  <==, 140  <==, 136  <==, 136  <==       AST:          23 <== , 22 <==      ALT:        14  <== , 14  <==      AP:        134  <=, 131  <=     Bili:        0.4  <=, 0.3  <=    < from: TTE with Doppler (w/Cont) (03.08.23 @ 08:28) >  Conclusions:  1. Normal mitral valve. Minimal mitral regurgitation.  2. Normal trileaflet aortic valve. No aortic valve regurgitation seen.  3. Endocardial visualization enhanced with intravenous  injection of Ultrasonic Enhancing Agent (Definity). Left  ventricle suboptimally visualized despite intravenous  ultrasonic enhancing agent; grossly normal left ventricular  systolic function. Septal flattening consistent with right ventricular overload.  4. Right ventricular enlargement with decreased right  ventricular systolic function. Basal RV diameter 5.3 cm.  Right ventricular systolic dysfunction with preserved  apical wall motion is seen consistent with Juan's  sign. A device wire is noted in the right heart.  5. Estimated right ventricular systolic pressure equals 60  mm Hg, assuming right atrial pressure equals 8 mm Hg,  consistent with moderate pulmonary hypertension.  6. Tricuspid annular dilatation with normal leaflet  opening. Moderate tricuspid regurgitation.  7. Trace anterior pericardial effusion.  *** Compared to prior echocardiograms, left ventricular  systolic function has improved compared to study performed  12/2022. The right ventricle appears similar to study  performed 3/6/2023.  ------------------------------------------------------------------------  Confirmed on  3/8/2023 - 14:49:44 by Cornelius Escudero M.D.  < end of copied text >    __________________________________________________________________________________  ===============>>  A S S E S S M E N T   A N D   P L A N <<===============  ------------------------------------------------------------------------------------------    · Assessment	  Pt is a 63 yo man with PMH of HTN, RA, RCC s/p R partial nephrectomy, CKD, HFrEF/NICM EF 25-30%, ICD, plasma cell dyscrasia, h/o H-pylori, treated, seen at HF clinic and sent to ED for worsening heart failure.   pt admits to progressively increasing SOB, AVALOS with cough, worsening at night over past 6 months with leg edema,  however increasingly worse over past month.   Pt using O2 at home as needed.   Pt denies any chest pain, fevers, recent surgeries, dizziness, bloody stools.     Problem/Plan - 1:  ·  Problem: Acute on chronic systolic heart failure. PAH  all specialty follow up and management appreciated.    diureticss as needed as per cardiology : on hold   monitor Ins and outs, weight, labs, and vitals closely   medical optimization  patient post AICD in December  Continue Current medications otherwise and monitor.  supportive care.  LVEF improved on current Echo !!    bone marrow biopsy not consistent with amyloidosis  cardiac biopsy not consistent with amyloidosis or sarcoidosis   for mediastinal LN biopsy today     Problem/Plan - 2:  ·  Problem: Acute on chronic respiratory failure with hypoxia.   resolved / off O2   patient has PRN O2 at home.  being assessed by pulm for PAH    Problem/Plan - 3:  ·  Problem: history of  Hypertension.   Patient currently with low blood pressures.    Appreciated heart failure recommendations.    adjustment of medications as tolerated per specialty   monitor and adjust as needed    Problem/Plan - 4:  ·  Problem: Stage 3a chronic kidney disease.   ·  Plan: currently appears stable  monitor  Avoid nephrotoxic medications.    Problem/Plan - 5:  ·  Problem: history of Plasma cell dyscrasia.   Oncology consulted and appreciated.    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  Recommending a bone marrow biopsy.  post bone bx as above     Discharge planning pending clearance by cardiology/heart failure team   likely in 24-48 hrs if stable.. with follow up of path as outpatient    --------------------------------------------  Case discussed With patient NP,   Education given on findings and plan of care  ___________________________  H. JEANETH Vu.  Pager: [phone]

## 2023-03-10 NOTE — PROGRESS NOTE ADULT - SUBJECTIVE AND OBJECTIVE BOX
Cardiovascular Disease Progress Note    Overnight events: No acute events overnight.  no cp/sob/palps  Otherwise review of systems negative    Objective Findings:  T(C): 36.6 (03-10-23 @ 05:56), Max: 36.7 (03-09-23 @ 20:57)  HR: 112 (03-10-23 @ 05:56) (89 - 112)  BP: 90/58 (03-10-23 @ 05:56) (90/58 - 123/75)  RR: 18 (03-10-23 @ 05:56) (18 - 20)  SpO2: 93% (03-10-23 @ 05:56) (87% - 97%)  Wt(kg): --  Daily     Daily       Physical Exam:  Gen: NAD  HEENT: EOMI  CV: RRR, normal S1 + S2, no m/r/g  Lungs: CTAB  Abd: soft, non-tender  Ext: No edema    Telemetry:    Laboratory Data:    03-09    137  |  104  |  56<H>  ----------------------------<  74  4.3   |  20<L>  |  1.40<H>    Ca    9.3      09 Mar 2023 06:33                Inpatient Medications:  MEDICATIONS  (STANDING):  budesonide 160 MICROgram(s)/formoterol 4.5 MICROgram(s) Inhaler 2 Puff(s) Inhalation two times a day  chlorhexidine 2% Cloths 1 Application(s) Topical daily  metoprolol succinate ER 50 milliGRAM(s) Oral <User Schedule>      Assessment:  62yoM with PMH of HTN, RA, RCC s/p R partial nephrectomy, CKD, HFrEF/NICM EF 25-30%, ICD, plasma cell dyscrasia, seen at HF clinic today, sent to ED for worsening heart failure. pt admits to progressively increasing SOB, AVALOS with cough, worsening at night over past 6 months, however increasingly worse over past month    Recs:  diuresis prn per CHF  cont GDMT, lvef improved  s/p BM biopsy on 3/1/23, neg for plasma celll dyscrasia  s/p RHC and cardiac bx  f/u pulm re insidious pulmonary process. specialty labs to be obtained, rheum helping  tele monitoring   will follow        Over 25 minutes spent on total encounter; more than 50% of the visit was spent counseling and/or coordinating care by the attending physician.      Juarez Carver MD   Cardiovascular Disease  [phone]

## 2023-03-10 NOTE — BRIEF OPERATIVE NOTE - NSICDXBRIEFPROCEDURE_GEN_ALL_CORE_FT
PROCEDURES:  Flexible bronchoscopy 10-Mar-2023 21:07:06  Eugenia Briscoe  EBUS, intraoperative 10-Mar-2023 21:09:40  Eugenia Briscoe  Mediastinoscopy with lymph node biopsy 10-Mar-2023 21:10:01  Eugenia Briscoe

## 2023-03-10 NOTE — PROGRESS NOTE ADULT - SUBJECTIVE AND OBJECTIVE BOX
Patient is a 62y old  Male who presents with a chief complaint of Need for cardiac biopsy (10 Mar 2023 07:07)    says that he is having a LN biopsy today and then he may go home. Breathing is fair. No CP. No N/V.     Medication:   acetaminophen     Tablet .. 650 milliGRAM(s) Oral every 6 hours PRN  aluminum hydroxide/magnesium hydroxide/simethicone Suspension 30 milliLiter(s) Oral every 4 hours PRN  budesonide 160 MICROgram(s)/formoterol 4.5 MICROgram(s) Inhaler 2 Puff(s) Inhalation two times a day  chlorhexidine 2% Cloths 1 Application(s) Topical daily  melatonin 3 milliGRAM(s) Oral at bedtime PRN  metoprolol succinate ER 50 milliGRAM(s) Oral <User Schedule>  ondansetron Injectable 4 milliGRAM(s) IV Push every 8 hours PRN      Physical exam    T(C): 36.3 (03-10-23 @ 11:46), Max: 36.7 (03-09-23 @ 20:57)  HR: 104 (03-10-23 @ 11:46) (89 - 112)  BP: 100/71 (03-10-23 @ 11:46) (90/58 - 123/75)  RR: 18 (03-10-23 @ 11:46) (18 - 20)  SpO2: 96% (03-10-23 @ 11:46) (87% - 97%)  Wt(kg): --    alert NAD  EOMI anicteric sclera  Cv s1 S2 RRR  Lungs clear anteriorly  No LE edema    Labs            03-10    136  |  103  |  42<H>  ----------------------------<  91  4.3   |  21<L>  |  1.31<H>    Ca    9.8      10 Mar 2023 07:17            [phone]

## 2023-03-10 NOTE — PROGRESS NOTE ADULT - ASSESSMENT
Briefly, 61 y/o [place] M w/ h/o HTN, RCC s/p partial R nephrectomy (4/22), CKD (bl Cr 1.5), HFrecEF/NICM (EF prev 25-30%, LVEDD 4.1 cm, septum 1.3 cm/PW 1.3 cm improved to 55-60%) s/p ICD (at [facility] for positive EP study), plasma cell dyscrasia (Kappa/lambda 3.23; s/p recent BM biopsy c/w MGUS) who was referred to ER on 2/25 for persistent dyspnea and cough for past 6 weeks. Of note, he had been hospitalized at [facility] well as [facility] over past year. Was evaluated for TTR amyloid which was negative but never underwent biopsy. Also noted to have R hilar LAD and axillary LAD on CT chest in 12/22 (negative for PE). Prior axillary LN biopsy negative. Was given IV diuretics with good response. States cough still present. Transferred from [facility] for RHC/biopsy which was done showed low filling pressures but with pulmonary HTN (PVR 6). Labs reviewed - trop 80s, BUN/Cr 43/1.31, BNP 6k. Overall stage C HF, NYHA class III with suspicion that there may be an infiltrative process (e.g. sarcoid; amyloid ruled out) and requires expedited workup. Myocardial biopsy negative for amyloid/sarcoid. Sarcoid still a possibility given RVH and hilar LAD and given prior PET scan showing FDG-avid LNs, will need to pursue LN biopsy and PET scan as outpatient. Plan for EBUS +/- mediastinoscopy     3/7/23 RHC RA 1, PA 44/17/30, PCW 3, CO/CI 4.39/2.7; PVR 6

## 2023-03-10 NOTE — BRIEF OPERATIVE NOTE - OPERATION/FINDINGS
S/p Flex bronch and EBUS, unable to visualize LN on EBUS. Converted to mediastinoscopy with biopsy of R Level 4 LN, L Level 4 LN, and Level 7 LN. Hemostasis achieved with electrocautery and NuKnit.

## 2023-03-10 NOTE — PROGRESS NOTE ADULT - SUBJECTIVE AND OBJECTIVE BOX
Interval History:  continues to report cough  amenable to procedure today  would like to be discharged tomorrow    Medications:  acetaminophen     Tablet .. 650 milliGRAM(s) Oral every 6 hours PRN  aluminum hydroxide/magnesium hydroxide/simethicone Suspension 30 milliLiter(s) Oral every 4 hours PRN  budesonide 160 MICROgram(s)/formoterol 4.5 MICROgram(s) Inhaler 2 Puff(s) Inhalation two times a day  chlorhexidine 2% Cloths 1 Application(s) Topical daily  fentaNYL    Injectable 25 MICROGram(s) IV Push every 5 minutes PRN  lactated ringers. 1000 milliLiter(s) IV Continuous <Continuous>  melatonin 3 milliGRAM(s) Oral at bedtime PRN  metoprolol succinate ER 50 milliGRAM(s) Oral <User Schedule>  ondansetron Injectable 4 milliGRAM(s) IV Push once PRN      Vitals:  T(C): 36 (03-10-23 @ 20:35), Max: 36.6 (03-10-23 @ 05:56)  HR: 92 (03-10-23 @ 20:35) (71 - 112)  BP: 114/74 (03-10-23 @ 20:35) (90/58 - 121/85)  BP(mean): 89 (03-10-23 @ 20:35) (89 - 89)  RR: 16 (03-10-23 @ 20:35) (16 - 18)  SpO2: 100% (03-10-23 @ 20:35) (93% - 100%)    Daily Height in cm: 167.64 (10 Mar 2023 16:54)    Daily     Weight (kg): 56.6 (03-10 @ 16:54)    I&O's Summary    09 Mar 2023 07:01  -  10 Mar 2023 07:00  --------------------------------------------------------  IN: 0 mL / OUT: 300 mL / NET: -300 mL    10 Mar 2023 07:01  -  10 Mar 2023 21:07  --------------------------------------------------------  IN: 0 mL / OUT: 0 mL / NET: 0 mL    Physical Exam:  Appearance: No Acute Distress  HEENT: PERRL  Neck: No JVD  Cardiovascular: Normal S1 S2, No murmurs/rubs/gallops  Respiratory: Clear to auscultation bilaterally  Gastrointestinal: Soft, Non-tender	  Skin: No cyanosis	  Neurologic: Non-focal  Extremities: No LE edema  Psychiatry: A & O x 3, Mood & affect appropriate    Labs:    03-10    136  |  103  |  42<H>  ----------------------------<  91  4.3   |  21<L>  |  1.31<H>    Ca    9.8      10 Mar 2023 07:17

## 2023-03-11 LAB
ANION GAP SERPL CALC-SCNC: 17 MMOL/L — SIGNIFICANT CHANGE UP (ref 5–17)
BUN SERPL-MCNC: 39 MG/DL — HIGH (ref 7–23)
CALCIUM SERPL-MCNC: 10.2 MG/DL — SIGNIFICANT CHANGE UP (ref 8.4–10.5)
CHLORIDE SERPL-SCNC: 104 MMOL/L — SIGNIFICANT CHANGE UP (ref 96–108)
CO2 SERPL-SCNC: 18 MMOL/L — LOW (ref 22–31)
CREAT SERPL-MCNC: 1.4 MG/DL — HIGH (ref 0.5–1.3)
EGFR: 56 ML/MIN/1.73M2 — LOW
GLUCOSE SERPL-MCNC: 113 MG/DL — HIGH (ref 70–99)
GRAM STN FLD: SIGNIFICANT CHANGE UP
HCT VFR BLD CALC: 35.2 % — LOW (ref 39–50)
HGB BLD-MCNC: 11 G/DL — LOW (ref 13–17)
MCHC RBC-ENTMCNC: 30.1 PG — SIGNIFICANT CHANGE UP (ref 27–34)
MCHC RBC-ENTMCNC: 31.3 GM/DL — LOW (ref 32–36)
MCV RBC AUTO: 96.4 FL — SIGNIFICANT CHANGE UP (ref 80–100)
NIGHT BLUE STAIN TISS: SIGNIFICANT CHANGE UP
NRBC # BLD: 0 /100 WBCS — SIGNIFICANT CHANGE UP (ref 0–0)
PLATELET # BLD AUTO: 236 K/UL — SIGNIFICANT CHANGE UP (ref 150–400)
POTASSIUM SERPL-MCNC: 4.8 MMOL/L — SIGNIFICANT CHANGE UP (ref 3.5–5.3)
POTASSIUM SERPL-SCNC: 4.8 MMOL/L — SIGNIFICANT CHANGE UP (ref 3.5–5.3)
RBC # BLD: 3.65 M/UL — LOW (ref 4.2–5.8)
RBC # FLD: 14.3 % — SIGNIFICANT CHANGE UP (ref 10.3–14.5)
SODIUM SERPL-SCNC: 139 MMOL/L — SIGNIFICANT CHANGE UP (ref 135–145)
SPECIMEN SOURCE: SIGNIFICANT CHANGE UP
SPECIMEN SOURCE: SIGNIFICANT CHANGE UP
WBC # BLD: 11.72 K/UL — HIGH (ref 3.8–10.5)
WBC # FLD AUTO: 11.72 K/UL — HIGH (ref 3.8–10.5)

## 2023-03-11 PROCEDURE — 99232 SBSQ HOSP IP/OBS MODERATE 35: CPT

## 2023-03-11 PROCEDURE — 71045 X-RAY EXAM CHEST 1 VIEW: CPT | Mod: 26

## 2023-03-11 RX ORDER — HEPARIN SODIUM 5000 [USP'U]/ML
5000 INJECTION INTRAVENOUS; SUBCUTANEOUS EVERY 12 HOURS
Refills: 0 | Status: DISCONTINUED | OUTPATIENT
Start: 2023-03-11 | End: 2023-03-12

## 2023-03-11 RX ADMIN — HEPARIN SODIUM 5000 UNIT(S): 5000 INJECTION INTRAVENOUS; SUBCUTANEOUS at 18:20

## 2023-03-11 RX ADMIN — BUDESONIDE AND FORMOTEROL FUMARATE DIHYDRATE 2 PUFF(S): 160; 4.5 AEROSOL RESPIRATORY (INHALATION) at 05:17

## 2023-03-11 RX ADMIN — BUDESONIDE AND FORMOTEROL FUMARATE DIHYDRATE 2 PUFF(S): 160; 4.5 AEROSOL RESPIRATORY (INHALATION) at 18:20

## 2023-03-11 RX ADMIN — Medication 650 MILLIGRAM(S): at 19:46

## 2023-03-11 RX ADMIN — Medication 50 MILLIGRAM(S): at 09:51

## 2023-03-11 RX ADMIN — CHLORHEXIDINE GLUCONATE 1 APPLICATION(S): 213 SOLUTION TOPICAL at 11:57

## 2023-03-11 RX ADMIN — Medication 650 MILLIGRAM(S): at 18:23

## 2023-03-11 NOTE — PROGRESS NOTE ADULT - PROBLEM SELECTOR PROBLEM 1
Acute on chronic systolic congestive heart failure
Acute on chronic systolic congestive heart failure
Hilar adenopathy
Hilar adenopathy
Acute on chronic systolic congestive heart failure
Hilar adenopathy

## 2023-03-11 NOTE — PROGRESS NOTE ADULT - PROBLEM SELECTOR PLAN 2
s/p BM biopsy without any concerning findings; likely MGUS
s/p BM biopsy without any concerning findings; likely MGUS
s/p BM biopsy without any concernign findings
s/p BM biopsy without any concerning findings; likely MGUS
s/p BM biopsy without any concerning findings; likely MGUS
s/p BM biopsy without any concernign findings

## 2023-03-11 NOTE — PROGRESS NOTE ADULT - PROBLEM SELECTOR PLAN 4
unclear etiology  ? sarcoid however workup for this has been negative thus far (? facial droop may be a finding of sarcoid causing [place] palsy)  PVR 6 with TTE more consistent with pre-capillary PH with septal flattening and RV dysfunctino/dilation   pulm consult appreciated  of note LUIS FERNANDO prev markedly elevated\
unclear etiology  ? sarcoid however workup for this has been negative thus far (? facial droop may be a finding of sarcoid causing [place] palsy)  PVR 6 with TTE more consistent with pre-capillary PH with septal flattening and RV dysfunction/dilation   pulm consult appreciated  of note LUIS FERNANDO prev markedly elevated  appreciate rheum input  plan for EBUS/biopsy today   PET scan as outpatient  would start sildenafil 10 mg qhr
unclear etiology  ? sarcoid however workup for this has been negative thus far (? facial droop may be a finding of sarcoid causing [place] palsy)  PVR 6 with TTE more consistent with pre-capillary PH with septal flattening and RV dysfunction/dilation   pulm consult appreciated  of note LUIS FERNANDO prev markedly elevated  appreciate rheum input  plan for EBUS/biopsy today   PET scan as outpatient  would start sildenafil 10 mg qhr
unclear etiology  ? sarcoid however workup for this has been negative thus far (? facial droop may be a finding of sarcoid causing [place] palsy)  PVR 6 with TTE more consistent with pre-capillary PH with septal flattening and RV dysfunction/dilation   pulm consult appreciated  of note LUIS FERNANDO prev markedly elevated  appreciate rheum input  plan for EBUS/biopsy tomorrow  PET scan as outpatient  would sildenafil 10 mg qhr

## 2023-03-11 NOTE — PROGRESS NOTE ADULT - ASSESSMENT
61 y/o [place] M      w/ h/o HTN,    RCC s/p partial R nephrectomy (4/22),      CKD  .,    HFrec    EF/NICM (EF prev 25-30%,    s/p ICD (at [facility] for positive EP study)       s/p recent BM biopsy ,  MGUS       c/o   persistent dyspnea and cough for past 6 weeks.   imaging,   R hilar LAD and axillary LAD on CT chest in 12/22 , negative for PE,  . Prior axillary LN biopsy negative     Transferred from [facility] for RHC/biopsy,   pulmonary HTN       ?   infiltrative process per  dr lopez,   Myocardial biopsy negative for amyloid/sarcoid     EBUS +/- mediastinoscopy    bp  on lower  side/  card  and  heart   failure  following     ef  now,  improved,  is  55  to  60,  RV  dysfunction       rad< from: TTE with Doppler (w/Cont) (03.08.23 @ 08:28) >  Conclusions:  1. Normal mitral valve. Minimal mitral regurgitation.  2. Normal trileaflet aortic valve. No aortic valve  regurgitation seen.  3. Endocardial visualization enhanced with intravenous  injection of Ultrasonic Enhancing Agent (Definity). Left  ventricle suboptimally visualized despite intravenous  ultrasonic enhancing agent; grossly normal left ventricular  systolic function. Septal flattening consistent with right  ventricular overload.  4. Right ventricular enlargement with decreased right  ventricular systolic function. Basal RV diameter 5.3 cm.  Right ventricular systolic dysfunction with preserved  apical wall motion is seen consistent with Juan's  sign. A device wire is noted in the right heart.  5. Estimated right ventricular systolic pressure equals 60  mm Hg, assuming right atrial pressure equals 8 mm Hg,  consistent with moderate pulmonary hypertension.  6. Tricuspid annular dilatation with normal leaflet  opening. Moderate tricuspid regurgitation.  7. Trace anterior pericardial effusion.  *** Compared to prior echocardiograms, left ventricular  systolic function has improved compared to study performed  12/2022. The right ventricle appears similar to study  performed 3/6/2023.  ------------------------------------------------------------------------    < end of copied text >

## 2023-03-11 NOTE — PROGRESS NOTE ADULT - ASSESSMENT
62M w/ PMHx of HTN, RA, RCC s/p R partial nephrectomy, CKD, HFrEF/NICM EF 25-30%, ICD, plasma cell dyscrasia, h/o H-pylori, treated, seen at HF clinic w/ recent extended admission on [facility] now transferred to [facility] for likely RHC and cardiac biopsy. Pt admitted to [facility] around 8 days ago with acute on chronic CHF exacerbation. Followed by HF, cardiology and hem/onc. ICD interrogated. Was diuresed with IV Bumex and eventually transitioned to PO. Had repeat TTE. Bone marrow biopsy by IR on 3/1 to evaluate for MM and MGUS. TTE repeated as well. Eventual plan to transfer patient to [facility] for likely RHC and cardiac biopsy to further evaluation for amyloid. Pt currently denies any chest pain, SOB or specific complaints.  Thoracic surgery consulted for right hilar adenopathy, weight loss and nonproductive cough have been present for about a year, thinks he lost about 45 pounds  3/8    vss  3/9 VSS  poss ebus mediastinoscopy Friday.  Keep NPO after MN  3/10 Mediastinoscopy/BX  3/11 Thoracic will sign off>reconsult if needs arise  #[id number]

## 2023-03-11 NOTE — PROGRESS NOTE ADULT - PROBLEM SELECTOR PLAN 1
S/  Mediastinoscopy   Path pending  Will sign off  Reconsult Thoracic if needs arise #[id number]
appears euvolemic  resume bumex at 1 mg PO daily tomorrow; goal weight 124 pounds  c/w toprol 50 mg daily  will pursue biopsy of R hilar LAD w/ Dr. Jenkins  stable for d/c tomorrow; will arrange close f/u
possible EBUS Mediastinoscopy Friday  Keep NPO after MN
appears euvolemic  resume bumex at 1 mg PO daily tomorrow; goal weight 124 pounds  c/w toprol 50 mg daily  will pursue biopsy of R hilar LAD w/ Dr. Jenkins  stable for d/c tomorrow; will arrange close f/u
possible EBUS next week  need repeat Chest ct
appears euvolemic/dry  hold diuretics  c/w toprol 50 mg daily  c/w [place]rest[place] 49/51 twice/day  c/w [place] 25 mg daily   f/u cardiac biopsy  may need to consider biopsy of R hilar LAD
appears euvolemic/dry  hold diuretics  c/w toprol 50 mg daily  continue holding Entresto 49/51 twice/day and belkis  will pursue biopsy of R hilar LAD w/ Dr. Jenkins
appears euvolemic/dry  hold diuretics  c/w toprol 50 mg daily  on Entresto 49/51 twice/day; d/c for now  on [place] 25 mg daily; d/c for now  f/u cardiac biopsy  may need to consider biopsy of R hilar LAD; will ask Dr. Jenkins to review CT scan
appears euvolemic/dry  hold diuretics  c/w toprol 50 mg daily  continue holding Entresto 49/51 twice/day and belkis  will pursue biopsy of R hilar LAD w/ Dr. Jenkins

## 2023-03-11 NOTE — PROGRESS NOTE ADULT - PROBLEM SELECTOR PROBLEM 2
Plasma cell dyscrasia

## 2023-03-11 NOTE — PROGRESS NOTE ADULT - SUBJECTIVE AND OBJECTIVE BOX
Subjective: "Hello"  OOB       V/S  T(C): 37.1 (03-11-23 @ 05:02), Max: 37.1 (03-11-23 @ 05:02)  HR: 107 (03-11-23 @ 05:02) (71 - 116)  BP: 99/70 (03-11-23 @ 05:02) (99/70 - 121/85)  ABP: -25/-25 (03-10-23 @ 22:00) (-25/-25 - 124/62)  ABP(mean): -25 (03-10-23 @ 22:00) (-25 - 88)  RR: 16 (03-11-23 @ 05:02) (16 - 18)  SpO2: 93% (03-11-23 @ 05:02) (93% - 100%)     I&O's Detail    10 Mar 2023 07:01  -  11 Mar 2023 07:00  --------------------------------------------------------  IN:    Lactated Ringers: 150 mL    Oral Fluid: 50 mL  Total IN: 200 mL    OUT:    Voided (mL): 100 mL  Total OUT: 100 mL    Total NET: 100 mL      11 Mar 2023 06:01  -  11 Mar 2023 09:56  --------------------------------------------------------  IN:    Oral Fluid: 240 mL  Total IN: 240 mL    OUT:  Total OUT: 0 mL    Total NET: 240 mL          03-10-23 @ 07:01  -  03-11-23 @ 07:00  --------------------------------------------------------  IN: 200 mL / OUT: 100 mL / NET: 100 mL    03-11-23 @ 06:01  -  03-11-23 @ 09:56  --------------------------------------------------------  IN: 240 mL / OUT: 0 mL / NET: 240 mL      MEDICATIONS  (STANDING):  budesonide 160 MICROgram(s)/formoterol 4.5 MICROgram(s) Inhaler 2 Puff(s) Inhalation two times a day  chlorhexidine 2% Cloths 1 Application(s) Topical daily  lactated ringers. 1000 milliLiter(s) (75 mL/Hr) IV Continuous <Continuous>  metoprolol succinate ER 50 milliGRAM(s) Oral <User Schedule>      03-11    139  |  104  |  39<H>  ----------------------------<  113<H>  4.8   |  18<L>  |  1.40<H>    Ca    10.2      11 Mar 2023 07:00                                 11.0   11.72 )-----------( 236      ( 11 Mar 2023 07:01 )             35.2             Physical Exam:    Appearance: Normal	    Cardiovascular: Normal S1 S2    Respiratory: Lungs clear to auscultation  	  Psychiatry: A & O x 3, Mood & affect appropriate    Gastrointestinal:  Soft, Non-tender, + BS	  	  Extremities: No edema  Trans cervical incision>dermabond  JENN                  PAST MEDICAL & SURGICAL HISTORY:  Hypertension      Rheumatoid arthritis      Neoplasm of uncertain behavior of kidney, right      History of cardiac cath  jan 2022, at [facility] no stent

## 2023-03-11 NOTE — PROGRESS NOTE ADULT - SUBJECTIVE AND OBJECTIVE BOX
Cardiovascular Disease Progress Note    Overnight events: No acute events overnight.  no cp/sob/palps  Otherwise review of systems negative    Objective Findings:  T(C): 37.1 (03-11-23 @ 05:02), Max: 37.1 (03-11-23 @ 05:02)  HR: 107 (03-11-23 @ 05:02) (71 - 116)  BP: 99/70 (03-11-23 @ 05:02) (99/70 - 121/85)  RR: 16 (03-11-23 @ 05:02) (16 - 18)  SpO2: 93% (03-11-23 @ 05:02) (93% - 100%)  Wt(kg): --  Daily Height in cm: 167.64 (10 Mar 2023 16:54)    Daily       Physical Exam:  Gen: NAD  HEENT: EOMI  CV: RRR, normal S1 + S2, no m/r/g  Lungs: CTAB  Abd: soft, non-tender  Ext: No edema    Telemetry:    Laboratory Data:                        11.0   11.72 )-----------( 236      ( 11 Mar 2023 07:01 )             35.2     03-11    139  |  104  |  39<H>  ----------------------------<  113<H>  4.8   |  18<L>  |  1.40<H>    Ca    10.2      11 Mar 2023 07:00                Inpatient Medications:  MEDICATIONS  (STANDING):  budesonide 160 MICROgram(s)/formoterol 4.5 MICROgram(s) Inhaler 2 Puff(s) Inhalation two times a day  chlorhexidine 2% Cloths 1 Application(s) Topical daily  lactated ringers. 1000 milliLiter(s) (75 mL/Hr) IV Continuous <Continuous>  metoprolol succinate ER 50 milliGRAM(s) Oral <User Schedule>      Assessment:  62yoM with PMH of HTN, RA, RCC s/p R partial nephrectomy, CKD, HFrEF/NICM EF 25-30%, ICD, plasma cell dyscrasia, seen at HF clinic today, sent to ED for worsening heart failure. pt admits to progressively increasing SOB, AVALOS with cough, worsening at night over past 6 months, however increasingly worse over past month    Recs:  diuresis prn per CHF  cont GDMT, lvef improved  s/p BM biopsy on 3/1/23, neg for plasma celll dyscrasia  s/p RHC and cardiac bx --> bx neg  f/u pulm re insidious pulmonary process. specialty labs to be obtained, rheum helping  s/p mediastiinal LN biopsy, f/u path  tele monitoring   will follow        Over 25 minutes spent on total encounter; more than 50% of the visit was spent counseling and/or coordinating care by the attending physician.      Juarez Carver MD   Cardiovascular Disease  [phone]

## 2023-03-11 NOTE — PROGRESS NOTE ADULT - SUBJECTIVE AND OBJECTIVE BOX
afebrile.  sinus  tach  REVIEW OF SYSTEMS:  CONSTITUTIONAL: No fever,  no  weight loss  ENT:  No  tinnitus,   no   vertigo  NECK: No pain or stiffness  RESPIRATORY: No cough, wheezing, chills or hemoptysis;    No Shortness of Breath  CARDIOVASCULAR: No chest pain, palpitations, dizziness  GASTROINTESTINAL: No abdominal or epigastric pain. No nausea, vomiting, or hematemesis; No diarrhea  No melena or hematochezia.  GENITOURINARY: No dysuria, frequency, hematuria, or incontinence  NEUROLOGICAL: No headaches  SKIN: No itching,  no   rash  LYMPH Nodes: No enlarged glands  ENDOCRINE: No heat or cold intolerance  MUSCULOSKELETAL: No joint pain or swelling  PSYCHIATRIC: No depression, anxiety  HEME/LYMPH: No easy bruising, or bleeding gums  ALLERGY AND IMMUNOLOGIC: No hives or eczema	    MEDICATIONS  (STANDING):  budesonide 160 MICROgram(s)/formoterol 4.5 MICROgram(s) Inhaler 2 Puff(s) Inhalation two times a day  chlorhexidine 2% Cloths 1 Application(s) Topical daily  lactated ringers. 1000 milliLiter(s) (75 mL/Hr) IV Continuous <Continuous>  metoprolol succinate ER 50 milliGRAM(s) Oral <User Schedule>    MEDICATIONS  (PRN):  acetaminophen     Tablet .. 650 milliGRAM(s) Oral every 6 hours PRN Temp greater or equal to 38C (100.4F), Mild Pain (1 - 3)  aluminum hydroxide/magnesium hydroxide/simethicone Suspension 30 milliLiter(s) Oral every 4 hours PRN Dyspepsia  fentaNYL    Injectable 25 MICROGram(s) IV Push every 5 minutes PRN Moderate Pain (4 - 6)  melatonin 3 milliGRAM(s) Oral at bedtime PRN Insomnia  ondansetron Injectable 4 milliGRAM(s) IV Push once PRN Nausea and/or Vomiting      Vital Signs Last 24 Hrs  T(C): 37.1 (11 Mar 2023 05:02), Max: 37.1 (11 Mar 2023 05:02)  T(F): 98.8 (11 Mar 2023 05:02), Max: 98.8 (11 Mar 2023 05:02)  HR: 107 (11 Mar 2023 05:02) (71 - 116)  BP: 99/70 (11 Mar 2023 05:02) (99/70 - 121/85)  BP(mean): 90 (10 Mar 2023 23:00) (83 - 97)  RR: 16 (11 Mar 2023 05:02) (16 - 18)  SpO2: 93% (11 Mar 2023 05:02) (93% - 100%)    Parameters below as of 11 Mar 2023 05:02  Patient On (Oxygen Delivery Method): room air      CAPILLARY BLOOD GLUCOSE        I&O's Summary    10 Mar 2023 07:01  -  11 Mar 2023 07:00  --------------------------------------------------------  IN: 200 mL / OUT: 100 mL / NET: 100 mL    11 Mar 2023 06:01  -  11 Mar 2023 09:49  --------------------------------------------------------  IN: 240 mL / OUT: 0 mL / NET: 240 mL          Appearance: Normal	  HEENT:   Normal oral mucosa, PERRL, EOMI	  Lymphatic: No lymphadenopathy  Cardiovascular: Normal S1 S2, No JVD  Respiratory: Lungs clear to auscultation	  Psychiatry: A & O x 3, Mood & affect appropriate  Gastrointestinal:  Soft, Non-tender, + BS	  Skin: No rash, No ecchymoses	  Extremities: Normal range of motion  Vascular: Peripheral pulses palpable bilaterally    LABS:                        11.0   11.72 )-----------( 236      ( 11 Mar 2023 07:01 )             35.2     03-11    139  |  104  |  39<H>  ----------------------------<  113<H>  4.8   |  18<L>  |  1.40<H>    Ca    10.2      11 Mar 2023 07:00                      Thyroid Stimulating Hormone, Serum: 4.55 uIU/mL (12-18 @ 06:35)        Culture - Tissue with Gram Stain (collected 03-10-23 @ 23:22)  Source: .Tissue Other  Gram Stain (03-11-23 @ 05:12):    No polymorphonuclear leukocytes seen per low power field    No organisms seen per oil power field        Consultant(s) Notes Reviewed:      Care Discussed with Consultants/Other Providers:

## 2023-03-12 ENCOUNTER — TRANSCRIPTION ENCOUNTER (OUTPATIENT)
Age: 63
End: 2023-03-12

## 2023-03-12 VITALS
DIASTOLIC BLOOD PRESSURE: 69 MMHG | TEMPERATURE: 98 F | HEART RATE: 107 BPM | SYSTOLIC BLOOD PRESSURE: 100 MMHG | RESPIRATION RATE: 19 BRPM | OXYGEN SATURATION: 91 %

## 2023-03-12 LAB
AUTO DIFF PNL BLD: ABNORMAL
C-ANCA SER-ACNC: NEGATIVE — SIGNIFICANT CHANGE UP
P-ANCA SER-ACNC: NEGATIVE — SIGNIFICANT CHANGE UP

## 2023-03-12 PROCEDURE — 83516 IMMUNOASSAY NONANTIBODY: CPT

## 2023-03-12 PROCEDURE — 93321 DOPPLER ECHO F-UP/LMTD STD: CPT

## 2023-03-12 PROCEDURE — C1887: CPT

## 2023-03-12 PROCEDURE — 87206 SMEAR FLUORESCENT/ACID STAI: CPT

## 2023-03-12 PROCEDURE — 83605 ASSAY OF LACTIC ACID: CPT

## 2023-03-12 PROCEDURE — C9399: CPT

## 2023-03-12 PROCEDURE — C1769: CPT

## 2023-03-12 PROCEDURE — 86900 BLOOD TYPING SEROLOGIC ABO: CPT

## 2023-03-12 PROCEDURE — C1889: CPT

## 2023-03-12 PROCEDURE — 87635 SARS-COV-2 COVID-19 AMP PRB: CPT

## 2023-03-12 PROCEDURE — 71045 X-RAY EXAM CHEST 1 VIEW: CPT

## 2023-03-12 PROCEDURE — 87070 CULTURE OTHR SPECIMN AEROBIC: CPT

## 2023-03-12 PROCEDURE — 85610 PROTHROMBIN TIME: CPT

## 2023-03-12 PROCEDURE — 87015 SPECIMEN INFECT AGNT CONCNTJ: CPT

## 2023-03-12 PROCEDURE — 87205 SMEAR GRAM STAIN: CPT

## 2023-03-12 PROCEDURE — 87102 FUNGUS ISOLATION CULTURE: CPT

## 2023-03-12 PROCEDURE — 88341 IMHCHEM/IMCYTCHM EA ADD ANTB: CPT

## 2023-03-12 PROCEDURE — 88305 TISSUE EXAM BY PATHOLOGIST: CPT

## 2023-03-12 PROCEDURE — 87641 MR-STAPH DNA AMP PROBE: CPT

## 2023-03-12 PROCEDURE — 85730 THROMBOPLASTIN TIME PARTIAL: CPT

## 2023-03-12 PROCEDURE — C1894: CPT

## 2023-03-12 PROCEDURE — 86790 VIRUS ANTIBODY NOS: CPT

## 2023-03-12 PROCEDURE — 88307 TISSUE EXAM BY PATHOLOGIST: CPT

## 2023-03-12 PROCEDURE — 87640 STAPH A DNA AMP PROBE: CPT

## 2023-03-12 PROCEDURE — 83735 ASSAY OF MAGNESIUM: CPT

## 2023-03-12 PROCEDURE — 87116 MYCOBACTERIA CULTURE: CPT

## 2023-03-12 PROCEDURE — 93308 TTE F-UP OR LMTD: CPT

## 2023-03-12 PROCEDURE — 85025 COMPLETE CBC W/AUTO DIFF WBC: CPT

## 2023-03-12 PROCEDURE — 82803 BLOOD GASES ANY COMBINATION: CPT

## 2023-03-12 PROCEDURE — U0005: CPT

## 2023-03-12 PROCEDURE — 87075 CULTR BACTERIA EXCEPT BLOOD: CPT

## 2023-03-12 PROCEDURE — 80053 COMPREHEN METABOLIC PANEL: CPT

## 2023-03-12 PROCEDURE — 88313 SPECIAL STAINS GROUP 2: CPT

## 2023-03-12 PROCEDURE — 94640 AIRWAY INHALATION TREATMENT: CPT

## 2023-03-12 PROCEDURE — 88173 CYTOPATH EVAL FNA REPORT: CPT

## 2023-03-12 PROCEDURE — 80048 BASIC METABOLIC PNL TOTAL CA: CPT

## 2023-03-12 PROCEDURE — U0003: CPT

## 2023-03-12 PROCEDURE — 88237 TISSUE CULTURE BONE MARROW: CPT

## 2023-03-12 PROCEDURE — 85027 COMPLETE CBC AUTOMATED: CPT

## 2023-03-12 PROCEDURE — 93505 ENDOMYOCARDIAL BIOPSY: CPT

## 2023-03-12 PROCEDURE — 36415 COLL VENOUS BLD VENIPUNCTURE: CPT

## 2023-03-12 PROCEDURE — 83880 ASSAY OF NATRIURETIC PEPTIDE: CPT

## 2023-03-12 PROCEDURE — C8929: CPT

## 2023-03-12 PROCEDURE — 86038 ANTINUCLEAR ANTIBODIES: CPT

## 2023-03-12 PROCEDURE — 88185 FLOWCYTOMETRY/TC ADD-ON: CPT

## 2023-03-12 PROCEDURE — 71250 CT THORAX DX C-: CPT

## 2023-03-12 PROCEDURE — 86850 RBC ANTIBODY SCREEN: CPT

## 2023-03-12 PROCEDURE — 86901 BLOOD TYPING SEROLOGIC RH(D): CPT

## 2023-03-12 PROCEDURE — 88172 CYTP DX EVAL FNA 1ST EA SITE: CPT

## 2023-03-12 PROCEDURE — 86036 ANCA SCREEN EACH ANTIBODY: CPT

## 2023-03-12 PROCEDURE — 86255 FLUORESCENT ANTIBODY SCREEN: CPT

## 2023-03-12 RX ORDER — BUMETANIDE 0.25 MG/ML
1 INJECTION INTRAMUSCULAR; INTRAVENOUS
Qty: 30 | Refills: 0
Start: 2023-03-12 | End: 2023-04-10

## 2023-03-12 RX ORDER — FLUTICASONE PROPIONATE AND SALMETEROL 50; 250 UG/1; UG/1
1 POWDER ORAL; RESPIRATORY (INHALATION)
Qty: 1 | Refills: 0
Start: 2023-03-12 | End: 2023-04-10

## 2023-03-12 RX ORDER — HYDRALAZINE HCL 50 MG
1 TABLET ORAL
Qty: 0 | Refills: 0 | DISCHARGE

## 2023-03-12 RX ORDER — METOPROLOL TARTRATE 50 MG
1 TABLET ORAL
Qty: 30 | Refills: 0
Start: 2023-03-12 | End: 2023-04-10

## 2023-03-12 RX ORDER — SPIRONOLACTONE 25 MG/1
1 TABLET, FILM COATED ORAL
Qty: 0 | Refills: 0 | DISCHARGE

## 2023-03-12 RX ORDER — BUMETANIDE 0.25 MG/ML
1 INJECTION INTRAMUSCULAR; INTRAVENOUS DAILY
Refills: 0 | Status: DISCONTINUED | OUTPATIENT
Start: 2023-03-12 | End: 2023-03-12

## 2023-03-12 RX ORDER — METOPROLOL TARTRATE 50 MG
1.5 TABLET ORAL
Qty: 30 | Refills: 0 | DISCHARGE
Start: 2023-03-12 | End: 2023-04-10

## 2023-03-12 RX ORDER — METOPROLOL TARTRATE 50 MG
25 TABLET ORAL DAILY
Refills: 0 | Status: DISCONTINUED | OUTPATIENT
Start: 2023-03-12 | End: 2023-03-12

## 2023-03-12 RX ADMIN — Medication 25 MILLIGRAM(S): at 11:31

## 2023-03-12 RX ADMIN — CHLORHEXIDINE GLUCONATE 1 APPLICATION(S): 213 SOLUTION TOPICAL at 11:31

## 2023-03-12 RX ADMIN — Medication 10 MILLIGRAM(S): at 16:12

## 2023-03-12 RX ADMIN — BUDESONIDE AND FORMOTEROL FUMARATE DIHYDRATE 2 PUFF(S): 160; 4.5 AEROSOL RESPIRATORY (INHALATION) at 05:13

## 2023-03-12 NOTE — CHART NOTE - NSCHARTNOTEFT_GEN_A_CORE
Discussed with Heart Failure Dr. Cuellar, as BP soft, will decrease toprol XL from 50 mg daily to 25 mg daily. Dr. Vu made aware.    Vital Signs Last 24 Hrs  T(C): 36.8 (12 Mar 2023 04:51), Max: 36.8 (12 Mar 2023 04:51)  T(F): 98.2 (12 Mar 2023 04:51), Max: 98.2 (12 Mar 2023 04:51)  HR: 105 (12 Mar 2023 09:30) (98 - 108)  BP: 92/63 (12 Mar 2023 09:30) (92/60 - 106/74)  BP(mean): --  RR: 18 (12 Mar 2023 09:30) (18 - 18)  SpO2: 94% (12 Mar 2023 09:30) (91% - 94%)    Parameters below as of 12 Mar 2023 09:30  Patient On (Oxygen Delivery Method): room air

## 2023-03-12 NOTE — PROGRESS NOTE ADULT - PROVIDER SPECIALTY LIST ADULT
Cardiology
Heme/Onc
Heme/Onc
Internal Medicine
Internal Medicine
Pulmonology
Thoracic Surgery
Thoracic Surgery
Cardiology
Internal Medicine
Internal Medicine
Cardiology
Cardiology
Heart Failure
Heme/Onc
Heme/Onc
Internal Medicine
Heme/Onc
Thoracic Surgery
Heart Failure
Thoracic Surgery
Heart Failure
Internal Medicine

## 2023-03-12 NOTE — PROGRESS NOTE ADULT - REASON FOR ADMISSION
Need for cardiac biopsy
Hilar adenopathy
Need for cardiac biopsy
hilar adenopathy
Need for cardiac biopsy
Need for cardiac biopsy

## 2023-03-12 NOTE — DISCHARGE NOTE NURSING/CASE MANAGEMENT/SOCIAL WORK - NSDCFUADDAPPT_GEN_ALL_CORE_FT
APPTS ARE READY TO BE MADE: [ X] YES    Best Family or Patient Contact (if needed):    Additional Information about above appointments (if needed):    1: Dr. Cuellar  2: Dr. Villeda  3: Dr. Jenkins    Other comments or requests:

## 2023-03-12 NOTE — DISCHARGE NOTE NURSING/CASE MANAGEMENT/SOCIAL WORK - NSDCPEFALRISK_GEN_ALL_CORE
For information on Fall & Injury Prevention, visit: [email or web address] OR  [email or web address] OR  https://www.cdc.gov/steadi/patient.html

## 2023-03-12 NOTE — DISCHARGE NOTE NURSING/CASE MANAGEMENT/SOCIAL WORK - PATIENT PORTAL LINK FT
You can access the FollowMyHealth Patient Portal offered by [facility] by registering at the following website: [email or web address]. By joining [facility]’s FollowMyHealth portal, you will also be able to view your health information using other applications (apps) compatible with our system.

## 2023-03-12 NOTE — PROGRESS NOTE ADULT - ASSESSMENT
M E D I C A L   A T T E N D I N G    F O L L O W    U P   N O T E  (03-12-23 )                                     ------------------------------------------------------------------------------------------------    patient evaluated by me, case discussed with team, chart, medications, and physical exam reviewed, labs / tests  and vitals reviewed by me, as bellow.   Patient is stable for discharge today. patient cleared by HD / Cardio.. to follow up results of mediastinal LN biopsy as outpatient   Patient to follow up with  PMD, Cardio, HF team, pulm..   See discharge document for full note.  [Greater than 35 min spent for these services. ]                             ( note written / Date of service  03-12-23 )    ==================>> MEDICATIONS <<====================    budesonide 160 MICROgram(s)/formoterol 4.5 MICROgram(s) Inhaler 2 Puff(s) Inhalation two times a day  buMETAnide 1 milliGRAM(s) Oral daily  chlorhexidine 2% Cloths 1 Application(s) Topical daily  heparin   Injectable 5000 Unit(s) SubCutaneous every 12 hours  lactated ringers. 1000 milliLiter(s) IV Continuous <Continuous>  metoprolol succinate ER 25 milliGRAM(s) Oral daily  sildenafil (REVATIO) 10 milliGRAM(s) Oral <User Schedule>    MEDICATIONS  (PRN):  acetaminophen     Tablet .. 650 milliGRAM(s) Oral every 6 hours PRN Temp greater or equal to 38C (100.4F), Mild Pain (1 - 3)  aluminum hydroxide/magnesium hydroxide/simethicone Suspension 30 milliLiter(s) Oral every 4 hours PRN Dyspepsia  fentaNYL    Injectable 25 MICROGram(s) IV Push every 5 minutes PRN Moderate Pain (4 - 6)  melatonin 3 milliGRAM(s) Oral at bedtime PRN Insomnia  ondansetron Injectable 4 milliGRAM(s) IV Push once PRN Nausea and/or Vomiting    ___________  Active diet:  Diet, Regular:   Low Sodium  ___________________    ==================>> VITAL SIGNS <<==================    T(C): 36.7 (03-12-23 @ 11:00), Max: 36.8 (03-12-23 @ 04:51)  HR: 107 (03-12-23 @ 11:00) (98 - 107)  BP: 100/69 (03-12-23 @ 11:00) (92/60 - 100/69)  BP(mean): --  RR: 19 (03-12-23 @ 11:00) (18 - 19)  SpO2: 91% (03-12-23 @ 11:00) (91% - 94%)     I&O's Summary    11 Mar 2023 06:01  -  12 Mar 2023 07:00  --------------------------------------------------------  IN: 840 mL / OUT: 900 mL / NET: -60 mL    12 Mar 2023 07:01  -  12 Mar 2023 15:42  --------------------------------------------------------  IN: 480 mL / OUT: 300 mL / NET: 180 mL       ==================>> LAB AND IMAGING <<==================                        11.0   11.72 )-----------( 236      ( 11 Mar 2023 07:01 )             35.2        03-11    139  |  104  |  39<H>  ----------------------------<  113<H>  4.8   |  18<L>  |  1.40<H>    Ca    10.2      11 Mar 2023 07:00     TSH:      4.55   (12-18-22)           Lipid profile:  (02-24-23)     Total: 139     LDL  : (p)     HDL  :30     TG   :138     Creatinine:  1.40  <<==, 1.31  <<==, 1.40  <<==, 1.39  <<==, 1.50  <<==, 1.69  <<==  Sodium:   139  <==, 136  <==, 137  <==, 137  <==, 136  <==, 140  <==

## 2023-03-13 LAB
CHROM ANALY OVERALL INTERP SPEC-IMP: SIGNIFICANT CHANGE UP
DSDNA AB SER QL CLIF: NEGATIVE — SIGNIFICANT CHANGE UP
HTLV I+II AB PATRN SER RIPA-IMP: SIGNIFICANT CHANGE UP
NON-GYNECOLOGICAL CYTOLOGY STUDY: SIGNIFICANT CHANGE UP
TM INTERPRETATION: SIGNIFICANT CHANGE UP

## 2023-03-15 LAB
CHROM ANALY OVERALL INTERP SPEC-IMP: SIGNIFICANT CHANGE UP
MYELOPEROXIDASE AB SER-ACNC: <5 UNITS — SIGNIFICANT CHANGE UP
MYELOPEROXIDASE CELLS FLD QL: NEGATIVE — SIGNIFICANT CHANGE UP
PROTEINASE3 AB FLD-ACNC: <5 UNITS — SIGNIFICANT CHANGE UP
PROTEINASE3 AB SER-ACNC: NEGATIVE — SIGNIFICANT CHANGE UP

## 2023-03-16 ENCOUNTER — NON-APPOINTMENT (OUTPATIENT)
Age: 63
End: 2023-03-16

## 2023-03-16 LAB
CULTURE RESULTS: SIGNIFICANT CHANGE UP
HEMATOPATHOLOGY REPORT: SIGNIFICANT CHANGE UP
SPECIMEN SOURCE: SIGNIFICANT CHANGE UP

## 2023-03-17 NOTE — REASON FOR VISIT
[Follow-Up - From Hospitalization] : a follow-up visit after a recent hospitalization [Pulmonary Hypertension] : pulmonary hypertension

## 2023-03-19 ENCOUNTER — NON-APPOINTMENT (OUTPATIENT)
Age: 63
End: 2023-03-19

## 2023-03-20 ENCOUNTER — APPOINTMENT (OUTPATIENT)
Dept: PULMONOLOGY | Facility: CLINIC | Age: 63
End: 2023-03-20
Payer: COMMERCIAL

## 2023-03-20 VITALS
HEIGHT: 66 IN | RESPIRATION RATE: 17 BRPM | HEART RATE: 103 BPM | SYSTOLIC BLOOD PRESSURE: 106 MMHG | WEIGHT: 128 LBS | TEMPERATURE: 98.1 F | DIASTOLIC BLOOD PRESSURE: 76 MMHG | OXYGEN SATURATION: 97 % | BODY MASS INDEX: 20.57 KG/M2

## 2023-03-20 PROCEDURE — ZZZZZ: CPT

## 2023-03-20 PROCEDURE — 82803 BLOOD GASES ANY COMBINATION: CPT

## 2023-03-20 PROCEDURE — 99215 OFFICE O/P EST HI 40 MIN: CPT | Mod: 25

## 2023-03-20 PROCEDURE — 36600 WITHDRAWAL OF ARTERIAL BLOOD: CPT | Mod: 59

## 2023-03-20 RX ORDER — SACUBITRIL AND VALSARTAN 97; 103 MG/1; MG/1
97-103 TABLET, FILM COATED ORAL
Qty: 60 | Refills: 0 | Status: DISCONTINUED | COMMUNITY
Start: 2023-01-13 | End: 2023-03-20

## 2023-03-20 NOTE — HISTORY OF PRESENT ILLNESS
[Never] : never [TextBox_4] : This letter  is regarding your patient  who  attended pulmonary out patient office today.  I have reviewed  patient's  past history, social history, family history and medication list. I also  reviewed nurse practitioners/ and fellows  notes and assessment and agree with it.  \par The patient was referred by Dr.Samit ADAN\par \par Mr. Cain is a 63 y/o [place] M w/ h/o HTN, ? RA, RCC s/p partial R nephrectomy (4/22), CKD\par Pt also has anemia with some suspicion of plasma cell dyscrasia with plan for outpatient BM biopsy. Kappa/lambda ratio of 3.23 (10.7/3.31) with serum IF revealing weak [place] band. Was to see Dr. Schmidt (hematology) for a BM biopsy but did not make appointment. \par \par ------No history of , fever, chills , rigors, chest pain, or hemoptysis. Questionable history of Raynaud's phenomenon. No h/o significant weight loss in last few months. No history of liver dysfunction , collagen vascular disorder or chronic thromboembolic disease. I would classify the patient's dyspnea as WHO  FUNCTIONAL CLASS II--------\par \par --ECG: \par 2/22/23 sinus tach, , APCs, incomplete RBBB, inferior Q waves; relatively low voltage\par 2/8/23 sinus tachycardia, , PRWP, incomplete RBBB, inferior Q waves\par 1/25/23 sinus tachycardia, , PRWP, inferior Q waves, incomplete RBBB\par 12/14/22 - sinus, , PRWP, inferior Q waves \par Stress Test: \par 7/26/21 - treadmill stress test - EF 71%, no ECG changes; negative for ischemia/infarct \par \par ECHO -----12/15/22 - septum 1.5 cm, PW 1.3 cm, LVEDD 4.1 cm, EF 28%, highly trabeculated apex, mild-mod TR, RVSP 60, RV dysfunction/enlargement, DT 78 msec, E/e' 28, LVOT VTI 12 cm, IVC 1.5 cm \par \par CT/MRI: \par 12/16/22 - nl pericardium; concentric LVH; EF 36%, subtle LGE along inferior hinge point of RV\par \par 3/8/22 - Chest CT PE protocol - dilated PA (4 cm), mildly enlarged subcarinal LN (1.1 cm), mildly enlarged R hilar LN (1.2 cm), clear lungs; mildly enlarged bilateral axillary LN (L>R); no PE \par Viability/Other Nuclear: \par 1/3/22 ([place]) Tc-Pyp scan - negative for TTR amyloid \par Cardiac Cath/PCI: \par 12/16/22 - RHC - RA 5, RV 48/11, PA 46/24/33, PCWP9, CO/CI 4.9/2.94, PVR 4.8\par \par EBUS MARCH 2023----mediastinal lymph nodes----reactive lymphadenopathy\par \par ENDOMYOCARDIAL BIOPSY----2023----no evidence of an infiltrative disorder like sarcoidosis or amyloidosis\par \par Desaturates ON WALKING====NEEDS  PORTABLE OXYGEN - - -

## 2023-03-20 NOTE — REVIEW OF SYSTEMS
[Dry Eyes] : dry eyes [Cough] : cough [Fever] : no fever [Chest Discomfort] : no chest discomfort [Hay Fever] : no hay fever [GERD] : no gerd [Nocturia] : no nocturia [Raynaud] : no raynaud [Anemia] : no anemia [Headache] : no headache [Depression] : no depression [Obesity] : no obesity

## 2023-03-20 NOTE — PHYSICAL EXAM
[No Acute Distress] : no acute distress [Normal Oropharynx] : normal oropharynx [III] : Mallampati Class: III [No Neck Mass] : no neck mass [Normal S1, S2] : normal s1, s2 [Clear to Auscultation Bilaterally] : clear to auscultation bilaterally [No Abnormalities] : no abnormalities [Benign] : benign [Normal Gait] : normal gait [Non-Pitting] : non-pitting [Normal Color/ Pigmentation] : normal color/ pigmentation [No Focal Deficits] : no focal deficits [Oriented x3] : oriented x3

## 2023-03-20 NOTE — DISCUSSION/SUMMARY
[FreeTextEntry1] : ---Assessment plan----------The patient has been referred here for further opinion regarding pulmonary problem,\par \par  a 61 y/o [place] M w/ h/o HTN, ? RA, RCC s/p partial R nephrectomy (4/22), CKD\par Pt also has anemia with some suspicion of plasma cell dyscrasia with plan for outpatient BM biopsy. Kappa/lambda ratio of 3.23 (10.7/3.31) with serum IF revealing weak [place] band.  Following up with hematology------ has been worked up for secondary pulmonary hypertension----ECHO -----12/15/22 - , EF 28% -----patient already started on sildenafil for secondary pulm hypertension\par =----- patient does not have features of sarcoidosis or amyloidosis\par Cardiac Cath----12/16/22 - RHC - RA 5, RV 48/11, PA 46/24/33, PCWP9, CO/CI 4.9/2.94, PVR 4.8\par \par 1 HYPOXEMIA-----he is clearly secondary to his left ventricular problem---- low ejection fraction need to keep him euvolemic-----heart rate control\par \par 2-repeat PFT----\par 3--- from ABG it appears that she needs supplemental oxygen advised that she use around-the-clock\par -4-secondary pulm hypertension-----patient definitely has left ventricular low ejection fraction which is contributing to his hypoxeIA------ surprisingly low wedge pressure on Remains unexplained or it is possible it reflects overdiuresis prior to cardiac catheterization--------- patient is already on sildenafil will slowly increase dose to see if that helps------\par 5-repeat labs\par 6 Home exercises\par 7??RA--referred to rheumatology\par 8 H/O -, RCC s/p partial R nephrectomy (4/22), CKD --REFD TO UROLOGY =- - - -- \par Follow-up in 6 weeks\par \par Thanks for allowing  me to participate  in the care of this patient.  Patient at this time  will follow  the above mentioned recommendations and return back for follow up visit. If you have any questions  I can be reached  at # [phone] (office).\par \par Shr[facility]illeda MD, [facility] \par

## 2023-03-21 LAB
CD16+CD56+ CELLS # BLD: 165 CELLS/UL
CD16+CD56+ CELLS NFR BLD: 5 %
CD19 CELLS NFR BLD: 598 CELLS/UL
CD3 CELLS # BLD: 2192 CELLS/UL
CD3 CELLS NFR BLD: 75 %
CD3+CD4+ CELLS # BLD: 1573 CELLS/UL
CD3+CD4+ CELLS NFR BLD: 55 %
CD3+CD4+ CELLS/CD3+CD8+ CLL SPEC: 2.73 RATIO
CD3+CD8+ CELLS # SPEC: 577 CELLS/UL
CD3+CD8+ CELLS NFR BLD: 20 %
CELLS.CD3-CD19+/CELLS IN BLOOD: 20 %
FERRITIN SERPL-MCNC: 316 NG/ML
HAV IGM SER QL: NONREACTIVE
HBV CORE IGM SER QL: NONREACTIVE
HBV SURFACE AG SER QL: NONREACTIVE
HCV AB SER QL: NONREACTIVE
HCV S/CO RATIO: 0.12 S/CO
HIV1+2 AB SPEC QL IA.RAPID: NONREACTIVE

## 2023-03-22 ENCOUNTER — NON-APPOINTMENT (OUTPATIENT)
Age: 63
End: 2023-03-22

## 2023-03-29 ENCOUNTER — NON-APPOINTMENT (OUTPATIENT)
Age: 63
End: 2023-03-29

## 2023-03-29 ENCOUNTER — APPOINTMENT (OUTPATIENT)
Dept: CARDIOLOGY | Facility: CLINIC | Age: 63
End: 2023-03-29
Payer: COMMERCIAL

## 2023-03-29 VITALS
HEART RATE: 112 BPM | BODY MASS INDEX: 21.05 KG/M2 | DIASTOLIC BLOOD PRESSURE: 85 MMHG | HEIGHT: 66 IN | WEIGHT: 131 LBS | TEMPERATURE: 98.1 F | SYSTOLIC BLOOD PRESSURE: 132 MMHG

## 2023-03-29 PROCEDURE — 93000 ELECTROCARDIOGRAM COMPLETE: CPT

## 2023-03-29 PROCEDURE — 99214 OFFICE O/P EST MOD 30 MIN: CPT | Mod: 25

## 2023-03-29 RX ORDER — FLUTICASONE PROPIONATE AND SALMETEROL 50; 250 UG/1; UG/1
250-50 POWDER RESPIRATORY (INHALATION)
Refills: 0 | Status: ACTIVE | COMMUNITY
Start: 2023-03-20

## 2023-03-29 RX ORDER — HYDRALAZINE HYDROCHLORIDE 50 MG/1
50 TABLET ORAL
Qty: 90 | Refills: 3 | Status: DISCONTINUED | COMMUNITY
Start: 2023-01-13 | End: 2023-03-29

## 2023-03-30 NOTE — ASSESSMENT
[FreeTextEntry1] : Briefly, Mr. Cain is a 63 y/o [place] M w/ h/o HTN, ? RA, RCC s/p partial R nephrectomy (4/22), CKD (b/l Cr 1.5; 0.9 5/22), HFrecEF/NICM (EF prev 25-30%, LVEDD 4.1 cm improved to 55% with predominant RV dysfunction) of unclear etiology s/p ICD, group I/III pulmonary HTN, MGUS (ruled out for cardiac amyloid w/ myocardial biopsy) who presents for f/u of care. Unclear etiology of CM however given LVH, relative low voltage, neuropathy, was concerned about amyloid which was ruled out with biopsy. Other consideration was sarcoid despite reassuring cardiac MRI as has LAD on CT imaging, incomplete RBBB however several LN biopsies have been negative. Has significant precapillary PHTN for which he was started on PDEi. Unclear etiology of systemic process however has elevated LUIS FERNANDO (1:2560) and may have features of scleroderma (however seronegative). Otherwise, mildly volume overloaded and normotensive. \par \par 1. HFrEF - rapidly progressive over past 1-2 years\par - on bumex 1 mg daily; advised to take 1 mg tonight; goal weight 120-122 pounds\par - prev on hydral 50 mg three times/day but discontinued in hospital due to low BP; will hold for now\par - prev on Entresto 97/103 twice/day but discontinued d/t fluctuating renal dysfunction; no change with cough with discontinuation \par - continue Toprol 25 mg qhs (prev on 100 mg but reduced d/t BP issues)\par - will resume belkis 12.5 mg daily \par - monitor weight/BP at home\par - counseled extensively on disease process\par - S/P ICD implantation 12/29/22\par - would repeat TTE in 1-2 months (while off prior neurohormonal agents) to ensure LV function remains improved\par \par 2. CKD - Cr aguust 1.3; recently 1.3-1.5\par - may benefit from renal consult\par - c/w meds as above\par \par 3. Hypertension- improved\par - meds as above\par \par 4. Pulmonary HTN - appears c/w group 1 with functional WHO class III\par - increase sildenafil to 10 mg twice/day\par - appreciate Dr. Villeda's input \par \par RTC in 6 weeks with NP, 3 months with me

## 2023-03-30 NOTE — HISTORY OF PRESENT ILLNESS
[FreeTextEntry1] : Briefly, Mr. Cain is a 61 y/o Mongolian M w/ h/o HTN, ? RA, RCC s/p partial R nephrectomy (4/22), CKD (b/l Cr 1.5; 0.9 5/22), HFrecEF/NICM (EF prev 25-30%, LVEDD 4.1 cm improved to 55% with predominant RV dysfunction) of unclear etiology s/p ICD, group I/III pulmonary HTN, MGUS (ruled out for cardiac amyloid w/ myocardial biopsy) who presents for f/u of care. Referred by Dr. Manuelito Carver. Accompanied by sister (Cesar). \par \par For full initial details, please refer to note from 1/13/23\par \par Since last visit, was admitted for worsening dyspnea and volume overload to [facility] on 2/23. Was diuresed to weight of 122-124 pounds. Was seen by hematology for plasma cell dyscrasia and underwent BM biopsy which was consistent with MGUS and negative for amyloid. Given concern of infiltrative cardiomyopathy, was transferred to [facility] on 3/3 for RHC/myocardial biopsy. Underwent procedure well and results were negative for amyloid/sarcoid. RHC demonstrated significant pulmonary HTN (PVR 6) for which pulmonary was consulted. Underwent mediastinoscopy for LN biopsy with Dr. Jenkins which were negative for sarcoidosis as well. Was started on sildenafil which he has tolerated. Discharged on 3/12 with home oxygen. \par \par Since discharge, continues to feel poorly with persistent dry cough during day and night; worse with talking and unrelated to position. No real improvement with supplemental oxygen. ET has been limited due to leg fatigue/instability. \par \par States he can walk up 5 steps and then rests before continuing. Appetite has improved (no longer has early satiety). \par \par Was seen by Dr. Villeda where was noted to desaturate to 86% with walking. Was started on prednisone 10 mg daily (unclear reasons) but denies any change in cough. Uses oxygen at home depending on how he feels. Uses approximately 3L/NC intermittently. \par \par Reports normal BMs. Had a screening colonoscopy 1 year prior which was reportedly normal; found to have 2 polyps. \par \par Monitors BP at home which has been in the range of 103-120. Weight range 122-124 lbs. Takes bumex 1 mg daily with extra as needed with good response. \par \par He denies chest pain, palpitations, dizziness/LH, syncope and no ICD shocks. \par \par Denies prior Covid illness. Received Covid vaccines.

## 2023-03-30 NOTE — CARDIOLOGY SUMMARY
[de-identified] : \par 3/29/23 - sinus tach, incomplete RBBB, inferior Q waves\par 2/22/23 sinus tach, , APCs, incomplete RBBB, inferior Q waves; relatively low voltage\par 2/8/23 sinus tachycardia, , PRWP, incomplete RBBB, inferior Q waves\par 1/25/23 sinus tachycardia, , PRWP, inferior Q waves, incomplete RBBB\par 12/14/22 - sinus, , PRWP, inferior Q waves [de-identified] : \par 7/26/21 - treadmill stress test - 7minutes 18 seconds; stage 3 Nelson; EF 71%, no ECG changes; negative for ischemia/infarct [de-identified] : \par 3/8/23 - septum 1 cm, PW 1.3 cm, LVEDD 3 cm, EF 50%, severe RV dysfunction/dilatation, D-shaped in systole, mod TR, RVSP 60-65, IVC 1.7 cm (collapsible)\par \par 12/15/22 - septum 1.5 cm, PW 1.3 cm, LVEDD 4.1 cm, EF 28%, highly trabeculated apex, mild-mod TR, RVSP 60, RV dysfunction/enlargement, DT 78 msec, E/e' 28, LVOT VTI 12 cm, IVC 1.5 cm [de-identified] : \par 3/8/23 - chest CT - mild GGO centrilobular pattern, nonspecific; b/l axillary LN 1.7 x 1.4 cm (unchanged from 4/28); no pericardial effusion; \par \par 12/16/22 - nl pericardium; concentric LVH; EF 36%, subtle LGE along inferior hinge point of RV\par \par 3/8/22 - Chest CT PE protocol - dilated PA (4 cm), mildly enlarged subcarinal LN (1.1 cm), mildly enlarged R hilar LN (1.2 cm), clear lungs; mildly enlarged bilateral axillary LN (L>R); no PE [de-identified] : \par 4/28/22 - PET scan - physiologic FDG activity throughout; few mildly enlarged FDG-avid b/l axillary LN (L>R); few small FDG-avid mediasinal and b/l hilar LN (difficult to delineate); no abnormal FDG activity in lungs or pericardium (myocardium not commented on); \par \par 1/3/22 ([facility] [facility]) Tc-Pyp scan - negative for TTR amyloid [de-identified] : \par 3/6/23 RHC - RA 1, PA 44/17/30, PCW 3, CO/CI 4.39/2.7; PVR 6; TPG 27; DPG 14; negative shunt run\par \par 12/16/22 - RHC - RA 5, RV 48/11, PA 46/24/33, PCWP 9, CO/CI 4.9/2.94, PVR 4.8; TPG 24; DPG 15\par \par January 2022 ([place]) - reportedly normal LHC [de-identified] : \par 3/10/23 - EBUS guided FA - no granulomas seen; reactive process favored\par \par 3/6/23 - myocardial biopsy - myocyte hypertrophy; Congo red stain negative\par \par 7/29/22 - L axillar LN core biopsy and FNA - reactive follicular hyperplasia\par \par \par 4/5/22 - PFTs FEV1 2.46 (89%), FVC 2.97 (86%), FEV1/FVC 83%; DLCO 11.4 (43%); DLCO/VA 51% - normal spirometry but reduced DLCO

## 2023-03-30 NOTE — PHYSICAL EXAM
[Well Developed] : well developed [Well Nourished] : well nourished [No Acute Distress] : no acute distress [Normal Conjunctiva] : normal conjunctiva [Normal Venous Pressure] : normal venous pressure [No Carotid Bruit] : no carotid bruit [Normal S1, S2] : normal S1, S2 [No Murmur] : no murmur [No Rub] : no rub [No Gallop] : no gallop [Clear Lung Fields] : clear lung fields [Good Air Entry] : good air entry [No Respiratory Distress] : no respiratory distress  [Soft] : abdomen soft [Non Tender] : non-tender [No Masses/organomegaly] : no masses/organomegaly [Normal Bowel Sounds] : normal bowel sounds [Normal Gait] : normal gait [No Cyanosis] : no cyanosis [No Clubbing] : no clubbing [No Varicosities] : no varicosities [No Rash] : no rash [No Skin Lesions] : no skin lesions [Moves all extremities] : moves all extremities [No Focal Deficits] : no focal deficits [Normal Speech] : normal speech [Normal] : alert and oriented, normal memory [Alert and Oriented] : alert and oriented [Normal memory] : normal memory [de-identified] : mildly dyspneic; appears uncomfortable [de-identified] : JVP approx 8 cm (best seen on L) [de-identified] : tachycardic, Left chest ICD [de-identified] : trace lower extremity edema bilaterally [de-identified] : possible taut skin on hands; hypopigmented skin along neck/upper chest

## 2023-04-03 ENCOUNTER — LABORATORY RESULT (OUTPATIENT)
Age: 63
End: 2023-04-03

## 2023-04-03 ENCOUNTER — APPOINTMENT (OUTPATIENT)
Dept: PULMONOLOGY | Facility: CLINIC | Age: 63
End: 2023-04-03
Payer: COMMERCIAL

## 2023-04-03 ENCOUNTER — NON-APPOINTMENT (OUTPATIENT)
Age: 63
End: 2023-04-03

## 2023-04-03 VITALS
TEMPERATURE: 97.4 F | HEART RATE: 111 BPM | DIASTOLIC BLOOD PRESSURE: 74 MMHG | RESPIRATION RATE: 15 BRPM | SYSTOLIC BLOOD PRESSURE: 120 MMHG | WEIGHT: 131 LBS | BODY MASS INDEX: 21.05 KG/M2 | OXYGEN SATURATION: 91 % | HEIGHT: 66 IN

## 2023-04-03 PROCEDURE — ZZZZZ: CPT

## 2023-04-03 PROCEDURE — 99215 OFFICE O/P EST HI 40 MIN: CPT | Mod: 25

## 2023-04-03 PROCEDURE — 94618 PULMONARY STRESS TESTING: CPT

## 2023-04-03 RX ORDER — IPRATROPIUM BROMIDE 0.5 MG/2.5ML
0.02 SOLUTION RESPIRATORY (INHALATION)
Qty: 120 | Refills: 5 | Status: ACTIVE | COMMUNITY
Start: 2023-04-03 | End: 1900-01-01

## 2023-04-03 RX ORDER — ALBUTEROL SULFATE 90 UG/1
108 (90 BASE) INHALANT RESPIRATORY (INHALATION) EVERY 4 HOURS
Qty: 1 | Refills: 3 | Status: DISCONTINUED | COMMUNITY
Start: 2022-03-15 | End: 2023-04-03

## 2023-04-03 RX ORDER — IPRATROPIUM BROMIDE AND ALBUTEROL SULFATE 2.5; .5 MG/3ML; MG/3ML
0.5-2.5 (3) SOLUTION RESPIRATORY (INHALATION) 4 TIMES DAILY
Qty: 1 | Refills: 3 | Status: DISCONTINUED | COMMUNITY
Start: 2023-04-03 | End: 2023-04-03

## 2023-04-03 NOTE — HISTORY OF PRESENT ILLNESS
[Never] : never [TextBox_4] : This letter  is regarding your patient  who  attended pulmonary out patient office today.  I have reviewed  patient's  past history, social history, family history and medication list. I also  reviewed nurse practitioners/ and fellows  notes and assessment and agree with it.  \par The patient was referred by Dr.Samit ADAN\par \par Mr. Cain is a 61 y/o [place] M w/ h/o HTN, ? RA, RCC s/p partial R nephrectomy (4/22), CKD\par Pt also has anemia with some suspicion of plasma cell dyscrasia with plan for outpatient BM biopsy. Kappa/lambda ratio of 3.23 (10.7/3.31) with serum IF revealing weak Pingree band. Was to see Dr. Schmidt (hematology) for a BM biopsy but did not make appointment. \par \par ------No history of , fever, chills , rigors, chest pain, or hemoptysis. Questionable history of Raynaud's phenomenon. No h/o significant weight loss in last few months. No history of liver dysfunction , collagen vascular disorder or chronic thromboembolic disease. I would classify the patient's dyspnea as WHO  FUNCTIONAL CLASS II--------\par \par --ECG: \par 2/22/23 sinus tach, , APCs, incomplete RBBB, inferior Q waves; relatively low voltage\par 2/8/23 sinus tachycardia, , PRWP, incomplete RBBB, inferior Q waves\par 1/25/23 sinus tachycardia, , PRWP, inferior Q waves, incomplete RBBB\par 12/14/22 - sinus, , PRWP, inferior Q waves \par Stress Test: \par 7/26/21 - treadmill stress test - EF 71%, no ECG changes; negative for ischemia/infarct \par \par ECHO -----12/15/22 - septum 1.5 cm, PW 1.3 cm, LVEDD 4.1 cm, EF 28%, highly trabeculated apex, mild-mod TR, RVSP 60, RV dysfunction/enlargement, DT 78 msec, E/e' 28, LVOT VTI 12 cm, IVC 1.5 cm \par \par CT/MRI: \par 12/16/22 - nl pericardium; concentric LVH; EF 36%, subtle LGE along inferior hinge point of RV\par \par 3/8/22 - Chest CT PE protocol - dilated PA (4 cm), mildly enlarged subcarinal LN (1.1 cm), mildly enlarged R hilar LN (1.2 cm), clear lungs; mildly enlarged bilateral axillary LN (L>R); no PE \par Viability/Other Nuclear: \par 1/3/22 ([place]) Tc-Pyp scan - negative for TTR amyloid \par Cardiac Cath/PCI: \par 12/16/22 - RHC - RA 5, RV 48/11, PA 46/24/33, PCWP9, CO/CI 4.9/2.94, PVR 4.8\par \par EBUS MARCH 2023----mediastinal lymph nodes----reactive lymphadenopathy\par \par ENDOMYOCARDIAL BIOPSY----2023----no evidence of an infiltrative disorder like sarcoidosis or amyloidosis\par \par Desaturates ON WALKING====NEEDS  PORTABLE OXYGEN - - - \par \par MARCH 2023------ on sildenafil----oxygen as needed has dry cough----has dilated esophagus ---??REFLUX----we will add omeprazole and Flonase nasal spray for his postnasal drip to see if that helps with his cough ------- needs PFT 6-minute walk test ------ also needs rheumatology opinion to rule out any underlying rheumatological condition ? SCLERODERMA SINE  SCLEROSIS------

## 2023-04-03 NOTE — DISCUSSION/SUMMARY
[FreeTextEntry1] : ---Assessment plan----------The patient has been referred here for further opinion regarding pulmonary problem,\par \par  a 63 y/o English M w/ h/o HTN, ? RA, RCC s/p partial R nephrectomy (4/22), CKD\par Pt also has anemia with some suspicion of plasma cell dyscrasia BUT BM biopsy WAS NEGATIVE-. Kappa/lambda ratio of 3.23 (10.7/3.31) with serum IF revealing weak Kappa band.  Following up with hematology------ has been worked up for secondary pulmonary hypertension----ECHO -----12/15/22 - , EF 28% -----patient already started on sildenafil for secondary pulm hypertension\par =----- patient does not have features of sarcoidosis or amyloidosis\par Cardiac Cath----12/16/22 - RHC - RA 5, RV 48/11, PA 46/24/33, PCWP9, CO/CI 4.9/2.94, PVR 4.8\par \par 1 HYPOXEMIA-----he is clearly secondary to his left ventricular problem---- low ejection fraction need to keep him euvolemic-----heart rate control\par \par 2-repeat PFT----6MWT\par 3--- from ABG it appears that she needs supplemental oxygen advised that she use around-the-clock\par -4-secondary pulm hypertension-----patient definitely has left ventricular low ejection fraction which is contributing to his hypoxeIA------ surprisingly low wedge pressure on Remains unexplained or it is possible it reflects overdiuresis prior to cardiac catheterization--------- patient is already on sildenafil will slowly increase dose to see if that helps------ADD ERA  \par 5-repeat labs\par 6 Home exercises\par 7??RA--referred to rheumatology\par 8 H/O -, RCC s/p partial R nephrectomy (4/22), CKD --REFD TO UROLOGY =- - - -- \par 9 CH COUGH---possibly related to her reflux has dilated esophagus--refer to Dr. Cavazos in GI----------- also add Flonase nasal spray---continue low-dose prednisone for now---continue nebulizer--\par \par -Follow-up in 6 weeks\par \par Thanks for allowing  me to participate  in the care of this patient.  Patient at this time  will follow  the above mentioned recommendations and return back for follow up visit. If you have any questions  I can be reached  at # [phone] (office).\par \par Shruti Villeda MD, [facility] \par

## 2023-04-05 ENCOUNTER — NON-APPOINTMENT (OUTPATIENT)
Age: 63
End: 2023-04-05

## 2023-04-08 LAB
CULTURE RESULTS: SIGNIFICANT CHANGE UP
SPECIMEN SOURCE: SIGNIFICANT CHANGE UP

## 2023-04-09 LAB
ANA PAT FLD IF-IMP: ABNORMAL
ANACR T: ABNORMAL

## 2023-04-10 ENCOUNTER — APPOINTMENT (OUTPATIENT)
Dept: UROLOGY | Facility: CLINIC | Age: 63
End: 2023-04-10

## 2023-04-12 ENCOUNTER — APPOINTMENT (OUTPATIENT)
Dept: ELECTROPHYSIOLOGY | Facility: CLINIC | Age: 63
End: 2023-04-12
Payer: COMMERCIAL

## 2023-04-12 VITALS — SYSTOLIC BLOOD PRESSURE: 124 MMHG | DIASTOLIC BLOOD PRESSURE: 79 MMHG | RESPIRATION RATE: 14 BRPM | HEART RATE: 112 BPM

## 2023-04-12 PROCEDURE — 93282 PRGRMG EVAL IMPLANTABLE DFB: CPT

## 2023-04-12 RX ORDER — METOPROLOL SUCCINATE 100 MG/1
100 TABLET, EXTENDED RELEASE ORAL
Qty: 30 | Refills: 0 | Status: DISCONTINUED | COMMUNITY
Start: 2023-01-25

## 2023-04-12 RX ORDER — SACUBITRIL AND VALSARTAN 24; 26 MG/1; MG/1
24-26 TABLET, FILM COATED ORAL
Qty: 60 | Refills: 0 | Status: DISCONTINUED | COMMUNITY
Start: 2022-12-27

## 2023-04-12 RX ORDER — HYDRALAZINE HYDROCHLORIDE 25 MG/1
25 TABLET ORAL
Qty: 90 | Refills: 0 | Status: DISCONTINUED | COMMUNITY
Start: 2023-01-13

## 2023-04-12 RX ORDER — SACUBITRIL AND VALSARTAN 49; 51 MG/1; MG/1
49-51 TABLET, FILM COATED ORAL
Qty: 60 | Refills: 0 | Status: DISCONTINUED | COMMUNITY
Start: 2023-01-27

## 2023-04-12 RX ORDER — METOPROLOL SUCCINATE 50 MG/1
50 TABLET, EXTENDED RELEASE ORAL
Qty: 90 | Refills: 0 | Status: DISCONTINUED | COMMUNITY
Start: 2023-01-02

## 2023-04-12 RX ORDER — BUMETANIDE 2 MG/1
2 TABLET ORAL
Qty: 30 | Refills: 0 | Status: DISCONTINUED | COMMUNITY
Start: 2023-01-13

## 2023-04-13 NOTE — DISCHARGE NOTE NURSING/CASE MANAGEMENT/SOCIAL WORK - NSDCPEFALRISK_GEN_ALL_CORE
For information on Fall & Injury Prevention, visit: [email or web address] OR  [email or web address] OR  https://www.cdc.gov/steadi/patient.html No

## 2023-04-17 ENCOUNTER — NON-APPOINTMENT (OUTPATIENT)
Age: 63
End: 2023-04-17

## 2023-04-20 LAB
EJ AB SER QL: NEGATIVE
ENA JO1 AB SER IA-ACNC: <20 UNITS
ENA PM/SCL AB SER-ACNC: <20 UNITS
ENA SM+RNP AB SER IA-ACNC: <20 UNITS
ENA SS-A IGG SER QL: <20 UNITS
FIBRILLARIN AB SER QL: NEGATIVE
KU AB SER QL: NEGATIVE
MDA-5 (P140)(CADM-140): <20 UNITS
MI2 AB SER QL: NEGATIVE
NXP-2 (P140): <20 UNITS
OJ AB SER QL: NEGATIVE
PL12 AB SER QL: NEGATIVE
PL7 AB SER QL: NEGATIVE
SRP AB SERPL QL: NEGATIVE
TIF GAMMA (P155/140): <20 UNITS
U2 SNRNP AB SER QL: NEGATIVE

## 2023-04-22 NOTE — DISCHARGE NOTE NURSING/CASE MANAGEMENT/SOCIAL WORK - NSTRANSFERBELONGINGSRESP_GEN_A_NUR
yes Complex Repair And Burow's Graft Text: The defect edges were debeveled with a #15 scalpel blade.  The primary defect was closed partially with a complex linear closure.  Given the location of the defect, shape of the defect, the proximity to free margins and the presence of a standing cone deformity a Burow's graft was deemed most appropriate to repair the remaining defect.  The graft was trimmed to fit the size of the remaining defect.  The graft was then placed in the primary defect, oriented appropriately, and sutured into place.

## 2023-04-25 ENCOUNTER — RX CHANGE (OUTPATIENT)
Age: 63
End: 2023-04-25

## 2023-04-25 RX ORDER — LEVALBUTEROL HYDROCHLORIDE 0.63 MG/3ML
0.63 SOLUTION RESPIRATORY (INHALATION)
Qty: 10800 | Refills: 4 | Status: DISCONTINUED | COMMUNITY
Start: 2023-04-03 | End: 2023-04-25

## 2023-04-26 ENCOUNTER — RX CHANGE (OUTPATIENT)
Age: 63
End: 2023-04-26

## 2023-04-26 LAB
CULTURE RESULTS: SIGNIFICANT CHANGE UP
SPECIMEN SOURCE: SIGNIFICANT CHANGE UP

## 2023-04-26 RX ORDER — SILDENAFIL 20 MG/1
20 TABLET ORAL TWICE DAILY
Qty: 30 | Refills: 2 | Status: DISCONTINUED | COMMUNITY
Start: 2023-03-29 | End: 2023-04-26

## 2023-04-26 RX ORDER — BUMETANIDE 1 MG/1
1 TABLET ORAL
Qty: 60 | Refills: 2 | Status: DISCONTINUED | COMMUNITY
Start: 2023-01-13 | End: 2023-04-26

## 2023-05-01 ENCOUNTER — APPOINTMENT (OUTPATIENT)
Dept: PULMONOLOGY | Facility: CLINIC | Age: 63
End: 2023-05-01

## 2023-05-02 ENCOUNTER — APPOINTMENT (OUTPATIENT)
Dept: PULMONOLOGY | Facility: CLINIC | Age: 63
End: 2023-05-02

## 2023-05-05 ENCOUNTER — NON-APPOINTMENT (OUTPATIENT)
Age: 63
End: 2023-05-05

## 2023-05-09 ENCOUNTER — NON-APPOINTMENT (OUTPATIENT)
Age: 63
End: 2023-05-09

## 2023-05-10 ENCOUNTER — APPOINTMENT (OUTPATIENT)
Dept: HEART FAILURE | Facility: CLINIC | Age: 63
End: 2023-05-10
Payer: COMMERCIAL

## 2023-05-10 ENCOUNTER — NON-APPOINTMENT (OUTPATIENT)
Age: 63
End: 2023-05-10

## 2023-05-10 VITALS
SYSTOLIC BLOOD PRESSURE: 144 MMHG | WEIGHT: 133 LBS | BODY MASS INDEX: 21.47 KG/M2 | HEART RATE: 116 BPM | DIASTOLIC BLOOD PRESSURE: 96 MMHG

## 2023-05-10 VITALS — SYSTOLIC BLOOD PRESSURE: 141 MMHG | HEART RATE: 103 BPM | DIASTOLIC BLOOD PRESSURE: 97 MMHG

## 2023-05-10 PROCEDURE — 99214 OFFICE O/P EST MOD 30 MIN: CPT | Mod: 25

## 2023-05-10 PROCEDURE — 93000 ELECTROCARDIOGRAM COMPLETE: CPT

## 2023-05-10 PROCEDURE — 36415 COLL VENOUS BLD VENIPUNCTURE: CPT

## 2023-05-10 RX ORDER — OMEPRAZOLE MAGNESIUM, AMOXICILLIN AND RIFABUTIN 10; 250; 12.5 MG/1; MG/1; MG/1
250-12.5-1 CAPSULE, DELAYED RELEASE ORAL
Qty: 168 | Refills: 0 | Status: DISCONTINUED | COMMUNITY
Start: 2021-11-11 | End: 2023-05-10

## 2023-05-11 ENCOUNTER — NON-APPOINTMENT (OUTPATIENT)
Age: 63
End: 2023-05-11

## 2023-05-11 LAB
ALBUMIN SERPL ELPH-MCNC: 4.4 G/DL
ALP BLD-CCNC: 140 U/L
ALT SERPL-CCNC: 19 U/L
ANION GAP SERPL CALC-SCNC: 17 MMOL/L
AST SERPL-CCNC: 22 U/L
BILIRUB SERPL-MCNC: 0.3 MG/DL
BUN SERPL-MCNC: 28 MG/DL
CALCIUM SERPL-MCNC: 10.1 MG/DL
CHLORIDE SERPL-SCNC: 100 MMOL/L
CO2 SERPL-SCNC: 24 MMOL/L
CREAT SERPL-MCNC: 1.4 MG/DL
EGFR: 56 ML/MIN/1.73M2
ESTIMATED AVERAGE GLUCOSE: 97 MG/DL
HBA1C MFR BLD HPLC: 5 %
NT-PROBNP SERPL-MCNC: 2644 PG/ML
POTASSIUM SERPL-SCNC: 4.9 MMOL/L
PROT SERPL-MCNC: 7.8 G/DL
SODIUM SERPL-SCNC: 142 MMOL/L

## 2023-05-11 NOTE — ASSESSMENT
[FreeTextEntry1] : Briefly, Mr. Cain is a 61 y/o [place] M w/ h/o HTN, ? RA, RCC s/p partial R nephrectomy (4/22), CKD (b/l Cr 1.5; 0.9 5/22), HFrecEF/NICM (EF prev 25-30%, LVEDD 4.1 cm improved to 55% with predominant RV dysfunction) of unclear etiology s/p ICD, group I/III pulmonary HTN, MGUS (ruled out for cardiac amyloid w/ myocardial biopsy) who presents for f/u of care. Unclear etiology of CM however given LVH, relative low voltage, neuropathy, was concerned about amyloid which was ruled out with biopsy. Other consideration was sarcoid despite reassuring cardiac MRI as has LAD on CT imaging, incomplete RBBB however several LN biopsies have been negative. Has significant precapillary PHTN for which he was started on PDEi. Unclear etiology of systemic process however has elevated LUIS FERNANDO (1:2560) and may have features of scleroderma (however seronegative). Otherwise, compensated but with elevated B/P 141/97 in office with home range 116//83.. \par \par 1. HFrEF - rapidly progressive over past 1-2 years\par - on bumex 1 mg daily; will continue; goal weight 120-122 pounds and is 128-129 lbs at home\par - prev on hydral 50 mg three times/day but discontinued in hospital due to low BP; may consider restarting\par - prev on Entresto 97/103 twice/day but discontinued d/t fluctuating renal dysfunction; no change with cough with discontinuation Will recheck labs today including CMP, serum pro BNP \par - increase Toprol to 37.5 mg daily 25 mg qhs (prev on 100 mg but reduced d/t BP issues)\par - will continue belkis 12.5 mg daily \par - monitor weight/BP at home\par - counseled extensively on disease process\par - S/P ICD implantation 12/29/22\par - labs today, will call with  results. Will get scleroderma labs from pul Dr. Oli Capone [phone]\par - would repeat TTE in 1-2 months (while off prior neurohormonal agents) to ensure LV function remains improved\par \par 2. CKD - Cr august 1.3; recently 1.3-1.5\par - may benefit from renal consult\par - c/w meds as above\par \par 3. Hypertension- improved\par - meds as above\par \par 4. Pulmonary HTN - appears c/w group 1 with functional WHO class III\par - continue sildenafil  20 mg twice/day\par - appreciate Dr. Villeda's input \par \par RTC in 6 weeks with Dr. Cuellar, will call with labs

## 2023-05-11 NOTE — PHYSICAL EXAM
[Well Developed] : well developed [Well Nourished] : well nourished [No Acute Distress] : no acute distress [Normal Conjunctiva] : normal conjunctiva [Normal Venous Pressure] : normal venous pressure [No Carotid Bruit] : no carotid bruit [Normal S1, S2] : normal S1, S2 [No Murmur] : no murmur [No Rub] : no rub [No Gallop] : no gallop [Clear Lung Fields] : clear lung fields [Good Air Entry] : good air entry [No Respiratory Distress] : no respiratory distress  [Soft] : abdomen soft [Non Tender] : non-tender [No Masses/organomegaly] : no masses/organomegaly [Normal Bowel Sounds] : normal bowel sounds [Normal Gait] : normal gait [No Cyanosis] : no cyanosis [No Clubbing] : no clubbing [No Varicosities] : no varicosities [No Rash] : no rash [No Skin Lesions] : no skin lesions [Moves all extremities] : moves all extremities [No Focal Deficits] : no focal deficits [Normal Speech] : normal speech [Normal] : alert and oriented, normal memory [Alert and Oriented] : alert and oriented [Normal memory] : normal memory [de-identified] : appears uncomfortable [de-identified] : JVP approx 8 cm (best seen on L) [de-identified] : tachycardic 100's, Left chest ICD [de-identified] : trace lower extremity edema bilaterally [de-identified] : possible taut skin on hands; hypopigmented skin along neck/upper chest

## 2023-05-11 NOTE — HISTORY OF PRESENT ILLNESS
[FreeTextEntry1] : Briefly, Mr. Cain is a 61 y/o Lebanese M w/ h/o HTN, ? RA, RCC s/p partial R nephrectomy (4/22), CKD (b/l Cr 1.5; 0.9 5/22), HFrecEF/NICM (EF prev 25-30%, LVEDD 4.1 cm improved to 55% with predominant RV dysfunction) of unclear etiology s/p ICD, group I/III pulmonary HTN, MGUS (ruled out for cardiac amyloid w/ myocardial biopsy) who presents for f/u of care with his sister. Referred by Dr. Manuelito Carver. Accompanied by sister (Cesar). \par \par For full initial details, please refer to note from 1/13/23\par \par Since last visit, was admitted for worsening dyspnea and volume overload to [facility] on 2/23 through 3/2/23. Was diuresed to weight of 122-124 pounds. Was seen by hematology for plasma cell dyscrasia and underwent BM biopsy which was consistent with MGUS and negative for amyloid. Given concern of infiltrative cardiomyopathy, was transferred to [facility] on 3/3 for RHC/myocardial biopsy. Underwent procedure well and results were negative for amyloid/sarcoid. RHC demonstrated significant pulmonary HTN (PVR 6) for which pulmonary was consulted. Underwent mediastinoscopy for LN biopsy with Dr. Jenkins which were negative for sarcoidosis as well. Was started on sildenafil which he has tolerated. Discharged on 3/12 with home oxygen.\par \par Since last appt, states he has had improvement in the dry cough and is not waking up at night with orthopnea.   ET has been limited due to leg fatigue/instability but he was able to walk around a block.. \par \par States he can walk up 5 steps and then rests before continuing. Appetite has improved (no longer has early satiety). \par \par Was seen by Dr. Villeda where was noted to desaturate to 86% with walking. Was started on prednisone 10 mg daily (unclear reasons). Uses oxygen at home depending on how he feels. Uses approximately 4L/NC intermittently with activity and uses qhs. \par \par Reports normal BMs. Had a screening colonoscopy 1 year prior which was reportedly normal; found to have 2 polyps. He and his sister state he followed up with pulmonary previously Dr. Oli Capone ( [phone]) and had blood work positive for systemic sclerosis/scleroderma but had labs with Dr. Villeda 4/10/23 with negative scleroderma antibodies with elevated LUIS FERNANDO 1:25. \par \par Monitors BP at home which has been in the range of 116/84 to 125//83 but is 141/97 in office with  bpm, unable to get pulse oximetry in office . Weight at home increased to 128 lbs and B/P range from prior 122-124 lbs. Takes bumex 1 mg daily with extra as needed with good response. \par \par He denies chest pain, palpitations, dizziness/LH, syncope and no ICD shocks. \par \par Denies prior Covid illness. Received Covid vaccines.

## 2023-05-11 NOTE — CARDIOLOGY SUMMARY
[de-identified] : 5/10/23 sinus tach, incomplete RBBB, inferior Q waves, rate 101 bpm\par 3/29/23 - sinus tach, incomplete RBBB, inferior Q waves\par 2/22/23 sinus tach, , APCs, incomplete RBBB, inferior Q waves; relatively low voltage\par 2/8/23 sinus tachycardia, , PRWP, incomplete RBBB, inferior Q waves\par 1/25/23 sinus tachycardia, , PRWP, inferior Q waves, incomplete RBBB\par 12/14/22 - sinus, , PRWP, inferior Q waves [de-identified] : \par 7/26/21 - treadmill stress test - 7minutes 18 seconds; stage 3 Nelson; EF 71%, no ECG changes; negative for ischemia/infarct [de-identified] : \par 3/8/23 - septum 1 cm, PW 1.3 cm, LVEDD 3 cm, EF 50%, severe RV dysfunction/dilatation, D-shaped in systole, mod TR, RVSP 60-65, IVC 1.7 cm (collapsible)\par \par 12/15/22 - septum 1.5 cm, PW 1.3 cm, LVEDD 4.1 cm, EF 28%, highly trabeculated apex, mild-mod TR, RVSP 60, RV dysfunction/enlargement, DT 78 msec, E/e' 28, LVOT VTI 12 cm, IVC 1.5 cm [de-identified] : \par 3/8/23 - chest CT - mild GGO centrilobular pattern, nonspecific; b/l axillary LN 1.7 x 1.4 cm (unchanged from 4/28); no pericardial effusion; \par \par 12/16/22 - nl pericardium; concentric LVH; EF 36%, subtle LGE along inferior hinge point of RV\par \par 3/8/22 - Chest CT PE protocol - dilated PA (4 cm), mildly enlarged subcarinal LN (1.1 cm), mildly enlarged R hilar LN (1.2 cm), clear lungs; mildly enlarged bilateral axillary LN (L>R); no PE [de-identified] : \par 4/28/22 - PET scan - physiologic FDG activity throughout; few mildly enlarged FDG-avid b/l axillary LN (L>R); few small FDG-avid mediasinal and b/l hilar LN (difficult to delineate); no abnormal FDG activity in lungs or pericardium (myocardium not commented on); \par \par 1/3/22 ([place]) Tc-Pyp scan - negative for TTR amyloid [de-identified] : \par 3/6/23 RHC - RA 1, PA 44/17/30, PCW 3, CO/CI 4.39/2.7; PVR 6; TPG 27; DPG 14; negative shunt run\par \par 12/16/22 - RHC - RA 5, RV 48/11, PA 46/24/33, PCWP 9, CO/CI 4.9/2.94, PVR 4.8; TPG 24; DPG 15\par \par January 2022 ([place]) - reportedly normal LHC [de-identified] : \par 3/10/23 - EBUS guided FA - no granulomas seen; reactive process favored\par \par 3/6/23 - myocardial biopsy - myocyte hypertrophy; Congo red stain negative\par \par 7/29/22 - L axillar LN core biopsy and FNA - reactive follicular hyperplasia\par \par \par 4/5/22 - PFTs FEV1 2.46 (89%), FVC 2.97 (86%), FEV1/FVC 83%; DLCO 11.4 (43%); DLCO/VA 51% - normal spirometry but reduced DLCO

## 2023-05-11 NOTE — ADDENDUM
[FreeTextEntry1] : Labs reviewed notable for improvement in serum pro BNP 2644 from 6422. WIll continue current meds.

## 2023-05-16 ENCOUNTER — OUTPATIENT (OUTPATIENT)
Dept: OUTPATIENT SERVICES | Facility: HOSPITAL | Age: 63
LOS: 1 days | End: 2023-05-16
Payer: COMMERCIAL

## 2023-05-16 ENCOUNTER — APPOINTMENT (OUTPATIENT)
Dept: RHEUMATOLOGY | Facility: CLINIC | Age: 63
End: 2023-05-16
Payer: COMMERCIAL

## 2023-05-16 ENCOUNTER — APPOINTMENT (OUTPATIENT)
Dept: UROLOGY | Facility: CLINIC | Age: 63
End: 2023-05-16
Payer: COMMERCIAL

## 2023-05-16 VITALS
WEIGHT: 133 LBS | SYSTOLIC BLOOD PRESSURE: 138 MMHG | HEIGHT: 66 IN | DIASTOLIC BLOOD PRESSURE: 94 MMHG | BODY MASS INDEX: 21.38 KG/M2 | TEMPERATURE: 98.3 F | HEART RATE: 85 BPM

## 2023-05-16 DIAGNOSIS — R59.0 LOCALIZED ENLARGED LYMPH NODES: ICD-10-CM

## 2023-05-16 DIAGNOSIS — Z85.528 PERSONAL HISTORY OF OTHER MALIGNANT NEOPLASM OF KIDNEY: ICD-10-CM

## 2023-05-16 DIAGNOSIS — R35.0 FREQUENCY OF MICTURITION: ICD-10-CM

## 2023-05-16 DIAGNOSIS — Z98.890 OTHER SPECIFIED POSTPROCEDURAL STATES: Chronic | ICD-10-CM

## 2023-05-16 PROCEDURE — 99213 OFFICE O/P EST LOW 20 MIN: CPT

## 2023-05-16 PROCEDURE — 99215 OFFICE O/P EST HI 40 MIN: CPT | Mod: GC

## 2023-05-16 PROCEDURE — 76775 US EXAM ABDO BACK WALL LIM: CPT | Mod: 26

## 2023-05-16 PROCEDURE — 76775 US EXAM ABDO BACK WALL LIM: CPT

## 2023-05-17 ENCOUNTER — NON-APPOINTMENT (OUTPATIENT)
Age: 63
End: 2023-05-17

## 2023-05-17 PROBLEM — R59.0 AXILLARY LYMPHADENOPATHY: Status: ACTIVE | Noted: 2022-05-31

## 2023-05-17 NOTE — PHYSICAL EXAM
[General Appearance - Alert] : alert [General Appearance - Well Nourished] : well nourished [General Appearance - Well Developed] : well developed [Sclera] : the sclera and conjunctiva were normal [PERRL With Normal Accommodation] : pupils were equal in size, round, and reactive to light [] : no respiratory distress [Heart Rate And Rhythm] : heart rate was normal and rhythm regular [Heart Sounds] : normal S1 and S2 [Edema] : there was no peripheral edema [Bowel Sounds] : normal bowel sounds [Abdomen Soft] : soft [Abdomen Tenderness] : non-tender [Cervical Lymph Nodes Enlarged Posterior Bilaterally] : posterior cervical [Cervical Lymph Nodes Enlarged Anterior Bilaterally] : anterior cervical [Supraclavicular Lymph Nodes Enlarged Bilaterally] : supraclavicular [Nail Clubbing] : no clubbing  or cyanosis of the fingernails [Musculoskeletal - Swelling] : no joint swelling seen [Motor Tone] : muscle strength and tone were normal [No Focal Deficits] : no focal deficits [FreeTextEntry1] : Salt and pepper like changes with depigmentation on anterior chest wall V area, minimal tightening. Depigmentation b/l ears. Mild skin tightening of V area and ? of skin UE and LE proximal to the wrist and ankle. Chest wall skin tightness [Oriented To Time, Place, And Person] : oriented to person, place, and time [Impaired Insight] : insight and judgment were intact

## 2023-05-17 NOTE — CONSULT LETTER
[Dear  ___] : Dear  [unfilled], [Consult Letter:] : I had the pleasure of evaluating your patient, [unfilled]. [Please see my note below.] : Please see my note below. [Consult Closing:] : Thank you very much for allowing me to participate in the care of this patient.  If you have any questions, please do not hesitate to contact me. [Sincerely,] : Sincerely, [FreeTextEntry3] : Mitzy Larson MD\par Director, Vasculitis [facility] Myos[facility], \par Rheumatology Division, Department of Medicine\par , \par [employer] \par at [facility]\par \par [address], [address]\par [place] [address]\par Tel: [phone]\par

## 2023-05-17 NOTE — HISTORY OF PRESENT ILLNESS
[FreeTextEntry1] : 61 yo Caridad M PMH group I/III pulm HTN, HFrEF/NICM s/p ICD, R RCC s/p partial nephrectomy (4/2022), CKD, OA, lymphadenopathy, MGUS, positive LUIS FERNANDO presenting for evaluation of possible autoimmune disease \par \par Hx: \par Rheumatology consulted in the hospital for progressive SOB in 12/2022 - work up noted CT chest with nonspecific GGO and cardiac MRI with subtle LGE. Prior axillary LN biopsy negative. Serologies notable for +LUIS FERNANDO 1:1280(nucleolar pattern) but negative subserologies, myomarker panel, scleroderma labs. Noted to have paraproteinemia. Consideration for Cardiac PET outpatient with rheumatologist Dr. Hussain Frederick to evaluate for cardiac sarcoidosis \par \par Patient readmitted to [facility] in March, 2023 for SOB s/p diuresis.  s/p BMBx (consistent with MGUS), endomyocardial biopsy (negative for amyloidosis/sarcoidosis). Had RHC 3/3/2023 demonstrated significant pulm HTN (PVR 6). s/p Mediastinoscopy for LN biopsy which was also negative for sarcoidosis. \par =3/2023 [facility] : sPAP 44mmHg, dPAP 17mmHG, mPAP 30mmHg\par =endomyocardial biopsy\par  -  myocyte hypertrophy. No evidence of myocarditis, sarcoidosis or amyloidosis, negative Congo red stain .\par =Stable 1.7 Axillary and minimal perihilar and subcarinal ln , prior axillary LN biopsy nondiagnostic with mild uptake on PET scan in 4/2022\par \par Initiated on PO prednisone 10mg/day 4/2023 by  pulm [de-identified] : Uses home O2 as much as possible at home. Does not have a portable machine so does not use it when he leaves the house, although does not feel as though he needs it all the time. \par Possible improvement but mostly stable SOB since started prednisone 10mg in April. Cannot climb stairs 2/2 SOB. Reports SOB with putting on clothes but not all the time. \par Occasional CP in center of chest, occurs at rest, pressure, no pleurisy or reproduction to palpation. Lasts about 1 hour spontaneously resolves. This has been present since around April. \par Has cough - present since initial SOB issue 1.5 years ago, dry, improving. No hemoptysis. \par Has some sensation of food getting stuck in the chest\par Denies fevers, night sweats, weight loss, rashes, eye pain, eye redness, vision changes, photosensitive, nasal/oral ulcers\par No dyspepsia, dysphagia, odynophagia, abdominal pain, n/v, bloody stools, black stools, changes in urinary freq, dysuria, hematuria, foamy urine, DVT/PE hx, autoimmune dx in family, Raynauds\par LE swelling improving over 2-3 weeks ago

## 2023-05-17 NOTE — ASSESSMENT
[FreeTextEntry1] : 63 yo Caridad M PMH group I/III pulm HTN, HFrEF/NICM s/p ICD, R RCC s/p partial nephrectomy (4/2022), CKD, OA, lymphadenopathy, MGUS, positive LUIS FERNANDO presenting for evaluation of possible autoimmune disease \par \par # Scleroderma phenotype with skin tightening, salt and pepper skin changes in V area and telangiectasias\par - Positive LUIS FERNANDO (nucleolar pattern) with negative sub- serologies   \par -  pulm HTN\par - cardiomyopathy - unclear at this time if LGE on cardiac MRI is also 2/2 same process however systemic sclerosis is known to also cause cardiac involvement. \par - CT chest GGO \par # In the setting of  MGUS  - systemic sclerosis in males is describes as paraneoplastic process in patients with MGUS\par \par - hospital admissions, records from outside pulm reviewed \par - c/w PO pred 10 mg/day for now\par - consider to add  MMF if ok with Heme/ onc given MGUS \par - no need for repeat labs - recently done \par - cardiology follow up- consider to get cardiac PET\par - consider cardiac PET\par \par \par  discussed with pulm and cardiology\par DW Dr. Larson\par Roberto Arteaga Rheumatology Fellow PGY5

## 2023-05-18 ENCOUNTER — NON-APPOINTMENT (OUTPATIENT)
Age: 63
End: 2023-05-18

## 2023-05-19 RX ORDER — LEVALBUTEROL HYDROCHLORIDE 0.63 MG/3ML
0.63 SOLUTION RESPIRATORY (INHALATION)
Qty: 10800 | Refills: 4 | Status: DISCONTINUED | COMMUNITY
Start: 2023-04-25 | End: 2023-05-19

## 2023-05-30 ENCOUNTER — NON-APPOINTMENT (OUTPATIENT)
Age: 63
End: 2023-05-30

## 2023-05-30 ENCOUNTER — APPOINTMENT (OUTPATIENT)
Dept: RHEUMATOLOGY | Facility: CLINIC | Age: 63
End: 2023-05-30
Payer: COMMERCIAL

## 2023-05-30 VITALS
SYSTOLIC BLOOD PRESSURE: 135 MMHG | TEMPERATURE: 97.3 F | HEIGHT: 66 IN | DIASTOLIC BLOOD PRESSURE: 89 MMHG | BODY MASS INDEX: 20.89 KG/M2 | RESPIRATION RATE: 16 BRPM | WEIGHT: 130 LBS | HEART RATE: 118 BPM

## 2023-05-30 PROCEDURE — 99214 OFFICE O/P EST MOD 30 MIN: CPT

## 2023-05-30 NOTE — PHYSICAL EXAM
[General Appearance - Alert] : alert [General Appearance - Well Nourished] : well nourished [General Appearance - Well Developed] : well developed [Sclera] : the sclera and conjunctiva were normal [PERRL With Normal Accommodation] : pupils were equal in size, round, and reactive to light [] : no respiratory distress [Heart Rate And Rhythm] : heart rate was normal and rhythm regular [Heart Sounds] : normal S1 and S2 [Edema] : there was no peripheral edema [Bowel Sounds] : normal bowel sounds [Abdomen Soft] : soft [Abdomen Tenderness] : non-tender [Cervical Lymph Nodes Enlarged Posterior Bilaterally] : posterior cervical [Cervical Lymph Nodes Enlarged Anterior Bilaterally] : anterior cervical [Supraclavicular Lymph Nodes Enlarged Bilaterally] : supraclavicular [Nail Clubbing] : no clubbing  or cyanosis of the fingernails [Musculoskeletal - Swelling] : no joint swelling seen [Motor Tone] : muscle strength and tone were normal [FreeTextEntry1] : skin tightening of the skin on the face; +Salt and pepper like changes with depigmentation on anterior chest wall V area, minimal tightening. Depigmentation b/l ears. Mild skin tightening of V area and ? of skin UE and LE proximal to the wrist and ankle, no sclerodactyly [No Focal Deficits] : no focal deficits [Oriented To Time, Place, And Person] : oriented to person, place, and time [Impaired Insight] : insight and judgment were intact

## 2023-05-30 NOTE — ASSESSMENT
[FreeTextEntry1] : # Scleroderma like phenotype with minimal skin tightening, salt and pepper skin changes in V area and telangiectasias ( sine scleroderma)\par - Positive LUIS FERNANDO (nucleolar pattern) with negative sub- serologies \par - pulm HTN\par - cardiomyopathy - unclear at this time if LGE on cardiac MRI is also 2/2 inflammatory process but PET scan showed no uptaje\par - CT chest GGO \par # In the setting of MGUS - systemic sclerosis in males is describes as paraneoplastic process in patients with MGUS\par \par - would try to taper steroids\par - after reviewed full medical records and absence of ILD and no response to prednisone - it appears that there is no indications for MMF for tx of pulmonary HTN  \par - main focus on tx  is management of pulmonary HTN\par \par \par RTO 6 months or earlier if needed

## 2023-05-30 NOTE — HISTORY OF PRESENT ILLNESS
[de-identified] : Last was seen on May, 2023 [FreeTextEntry1] : INterval history :\par ---------------------  \par on prednisone 10 mg a day \par has exertional SOB , cough, mostly dry or colorless mucous\par again reviewed PET with the patient : hilar and pericarinal ln are quite small and bx may no yield sufficient, no pulmonary ground glass opacities, no ILD\par discussed with pulmonary difficulty of EBUS for LN bx of this small size\par

## 2023-06-01 NOTE — HISTORY OF PRESENT ILLNESS
[FreeTextEntry1] : Patient is a 61 year old man here today for follow up for kidney cancer\par S/P right partial nephrectomy April 29th 2022\par PATH: ccRCC, [id number], margins negative. \par \par No new complaints.  Denies gross hematuria, flank pain.  Urinary function well controlled.\par Remainder of medical history unchanged.

## 2023-06-01 NOTE — ASSESSMENT
[FreeTextEntry1] : Office renal ultrasound performed today.  No evidence of local or new lesion.\par Labs reviewed.\par Follow-up in 1 year with CT abdomen with and without contrast.

## 2023-06-14 NOTE — PROGRESS NOTE ADULT - PROBLEM SELECTOR PROBLEM 1
Triage & Transition Services, [address]     Steph Drew  June 14, 2023    PLAN: Patient to return to Outpatient Treatment program at [facility], to follow-up with scheduled Psychiatry and Therapy appointments, as well as to follow-up on [facility] Case Management referral.     Steph is currently admitted to [facility]. Please see H&P for complete assessment information and therapist Progress Notes for additional clinical details.      worked with [facility], and Mayda Coffman [facility], regarding patient's care today.     This writer spoke with the patient's [facility] , Anitha Jaramillo, regarding the patient's discharge plans. They stated they will provide Outpatient Care Coordination following discharge of 3A for the patient. Anitha provided the last name and location of Janie, the patient's therapist and confirmed they were also the Outpatient Program leader. Janie practices out of [facility].     CONSUELO Philip - Outpatient program Leader and Therapist  Location: [facility], [address] [place] [id number]  Phone: [phone]  Website: [email or web address]    Anitha asked this writer to follow-up once discharge plans were made to confirm the plan so that they could continue to support the patient following discharge.    Anitha stated the patient had an appointment already scheduled with Dr. Brody Casarez MD, the patient's Psychiatrist at [facility] and provided that appointment information.     Dr. Brody Casarez MD - Psychiatry  Location: [address], [place] [address]   ~6.2 mi  Phone: [phone]  Website: [email or web address]    This writer called to speak with Janie to confirm whether the patient was able to return to their Outpatient Program and left a message with staff in the Mental Health Outpatient Treatment department, as Janie did not have a direct phone number per staff on 
site. This writer provided information regarding the call and call-back information.     This writer requested assistance from Corinne Romitti, LICSW in contacting [facility] and reconnecting the patient to their Case Management Intake Coordination. Corinne requested this writer to fax the assessment completed on 2/03/2023 to [facility] at [phone]. This writer completed sending this fax on this date.    This writer added requested resources to patient AVS of [facility] and meetings, Psychiatry appointment information, and follow-up information for [facility] Outpatient Services and Therapy scheduling, as well as [facility] Case Management services follow-up information.    This writer called [facility] a second time requesting to schedule an appointment for the patient with their therapist Janie, as well as to see if anyone else could assist to confirm enrollment in Outpatient Treatment there. It was confirmed at this time by Yolie in scheduling that the patient was still enrolled in and could continue participation in OP and that they would send the patient information via text to reconnect them to group meetings. This writer scheduled the patient at this time for follow-up therapy, as well.      Information added to patient's AVS.    This writer called to speak with Anitha to confirm the patient's plan. This writer provided information on patient upcoming appointments and Anitha confirmed they would call tomorrow and follow-up with the patient on [facility] Case Management and return to Outpatient Treatment.     This writer then met with the patient and provided all information above and informed that they should save their text from Outpatient Treatment with the link for programming, as well as informed regarding their need to keep an eye out for a phone call from Anitha tomorrow to assist with following up on the referral for Case 
Acute on chronic systolic heart failure
Management through [facility]. The patient stated they were ready and this writer requested the Health Unit Coordinator to schedule a medical cab and they stated they would.     Clinical care team and client have agreed on an aftercare plan that includes the following level of care at discharge: Patient to discharge to home and continue participation in Outpatient Treatment, to utilize resource of [facility]  for any additional Outpatient Treatment Care Coordination, and to follow-up with resource of Case Management with [facility].     Referrals offered to patient: [facility] Case Management, [facility].     Roxie Corcoran  Triage & Transition Services - Mental Health and Addiction Service Line  [facility] - Adult Inpatient Addiction Psychiatry Unit                     
Acute on chronic systolic heart failure

## 2023-06-15 ENCOUNTER — APPOINTMENT (OUTPATIENT)
Dept: PULMONOLOGY | Facility: CLINIC | Age: 63
End: 2023-06-15
Payer: COMMERCIAL

## 2023-06-15 VITALS
SYSTOLIC BLOOD PRESSURE: 141 MMHG | RESPIRATION RATE: 16 BRPM | BODY MASS INDEX: 20.89 KG/M2 | HEART RATE: 101 BPM | OXYGEN SATURATION: 97 % | HEIGHT: 66 IN | WEIGHT: 130 LBS | DIASTOLIC BLOOD PRESSURE: 91 MMHG | TEMPERATURE: 98.2 F

## 2023-06-15 PROCEDURE — 99215 OFFICE O/P EST HI 40 MIN: CPT

## 2023-06-15 NOTE — END OF VISIT
[Time Spent: ___ minutes] : I have spent [unfilled] minutes of time on the encounter. [FreeTextEntry3] : \par  I, Dr. Shruti Villeda  personally performed the evaluation and management (E/M) services for this established patient who presents today with (a) new problem(s)/exacerbation of (an) existing condition(s). That E/M includes conducting the clinically appropriate interval history &/or exam, assessing all new/exacerbated conditions, and establishing a new plan of care. Today, my NIESHA, Mray Samuels NP, , was here to observe my evaluation and management service for this new problem/exacerbated condition and follow the plan of care established by me going forward.\par

## 2023-06-15 NOTE — DISCUSSION/SUMMARY
[FreeTextEntry1] : ---Assessment plan----------The patient has been referred here for further opinion regarding pulmonary problem,\par \par  a 63 y/o [place] M w/ h/o HTN, SCLERODERMA SINE SCLEROSIS--------\par -has features of scleroderma on hands---SCELRODERMA SINE SCLEROSIS----LUIS FERNANDO   NUCLEOAR PATTERN ---OTHER ANTIBODIES NEGATIVE-----\par ,H/O MGUS ---H/O   RCC s/p partial R nephrectomy (4/22), CKD\par Pt also has anemia with some suspicion of plasma cell dyscrasia BUT BM biopsy WAS NEGATIVE-. Kappa/lambda ratio of 3.23 (10.7/3.31) with serum IF revealing weak Kappa band.  Following up with hematology------ has been worked up for secondary pulmonary hypertension----ECHO -----12/15/22 - , EF 28% -----patient already started on sildenafil for secondary pulm hypertension\par =----- patient does not have features of sarcoidosis or amyloidosis\par Cardiac Cath----12/16/22 - RHC - RA 5, RV 48/11, PA 46/24/33, PCWP9, CO/CI 4.9/2.94, PVR 4.8\par \par 1 HYPOXEMIA---- NICM  --ASLO HAS EVIIDENCE  OF PULM HTN -0- ---S/P AICD---FOLLOWS CARDIOLOGY --  ------ low ejection fraction need to keep him euvolemic-----heart rate control\par \par 2-repeat PFT----6MWT\par 3--- from ABG it appears that she needs supplemental oxygen advised that she use around-the-clock\par -4-secondary pulm hypertension-----patient definitely has left ventricular low ejection fraction which is contributing to his hypoxeIA------ surprisingly low wedge pressure on Remains unexplained or it is possible it reflects overdiuresis prior to cardiac catheterization--------- patient is already on sildenafil will slowly increase dose to see if that helps---\par 5 Home exercises\par 6- scleroderma-SIEN SCELROSIS----- follows rheum DR Larson-taper pred 7.5mg \par 7 H/O -, RCC s/p partial R nephrectomy (4/22), CKD --REFD TO UROLOGY =- - - -- \par 8 CH COUGH---possibly related to her reflux has dilated esophagus--refer to Dr. Cavazos in GI----------- on  Flonase nasal spray---continue low-dose prednisone for now---continue nebulizer--\par 9 MGUS---REFD  TO HEMATOLOGY - - - -\par 10-------STABLE AXILLARY LN, SUBCARINAL LN ---EBUS  ---NON DIAGNOSTIC----------------MIILD UPTAKE ON PET IN 4/2022---\par -Follow-up in 3m\par \par Thanks for allowing  me to participate  in the care of this patient.  Patient at this time  will follow  the above mentioned recommendations and return back for follow up visit. If you have any questions  I can be reached  at # [phone] (office).\par \par Shruti Villeda MD, [facility] \par

## 2023-06-15 NOTE — HISTORY OF PRESENT ILLNESS
[Never] : never [TextBox_4] : This letter  is regarding your patient  who  attended pulmonary out patient office today.  I have reviewed  patient's  past history, social history, family history and medication list. I also  reviewed nurse practitioners/ and fellows  notes and assessment and agree with it.  \par The patient was referred by Dr.Samit ADAN\par \par Mr. Cain is a 63 y/o [place] M w/ h/o HTN, ? RA, RCC s/p partial R nephrectomy (4/22), CKD\par Pt also has anemia with some suspicion of plasma cell dyscrasia with plan for outpatient BM biopsy. Kappa/lambda ratio of 3.23 (10.7/3.31) with serum IF revealing weak Marvin band. Was to see Dr. Schmidt (hematology) for a BM biopsy but did not make appointment. \par \par ------No history of , fever, chills , rigors, chest pain, or hemoptysis. Questionable history of Raynaud's phenomenon. No h/o significant weight loss in last few months. No history of liver dysfunction , collagen vascular disorder or chronic thromboembolic disease. I would classify the patient's dyspnea as WHO  FUNCTIONAL CLASS II--------\par \par --ECG: \par 2/22/23 sinus tach, , APCs, incomplete RBBB, inferior Q waves; relatively low voltage\par 2/8/23 sinus tachycardia, , PRWP, incomplete RBBB, inferior Q waves\par 1/25/23 sinus tachycardia, , PRWP, inferior Q waves, incomplete RBBB\par 12/14/22 - sinus, , PRWP, inferior Q waves \par Stress Test: \par 7/26/21 - treadmill stress test - EF 71%, no ECG changes; negative for ischemia/infarct \par \par ECHO -----12/15/22 - septum 1.5 cm, PW 1.3 cm, LVEDD 4.1 cm, EF 28%, highly trabeculated apex, mild-mod TR, RVSP 60, RV dysfunction/enlargement, DT 78 msec, E/e' 28, LVOT VTI 12 cm, IVC 1.5 cm \par \par CT/MRI: \par 12/16/22 - nl pericardium; concentric LVH; EF 36%, subtle LGE along inferior hinge point of RV\par \par 3/8/22 - Chest CT PE protocol - dilated PA (4 cm), mildly enlarged subcarinal LN (1.1 cm), mildly enlarged R hilar LN (1.2 cm), clear lungs; mildly enlarged bilateral axillary LN (L>R); no PE \par Viability/Other Nuclear: \par 1/3/22 ([facility]) Tc-Pyp scan - negative for TTR amyloid \par \par Cardiac Cath/PCI: CARDIAC CATH - - ------2023-------- ----------\par 12/16/22 - RHC - RA 5, RV 48/11, PA 46/24/33, PCWP9, CO/CI 4.9/2.94, PVR 4.8\par \par EBUS MARCH 2023----mediastinal lymph nodes----reactive lymphadenopathy\par \par ENDOMYOCARDIAL BIOPSY----2023----no evidence of an infiltrative disorder like sarcoidosis or amyloidosis\par \par Desaturates ON WALKING====NEEDS  PORTABLE OXYGEN - - - \par \par MARCH 2023------ on sildenafil----oxygen as needed has dry cough----has dilated esophagus ---??REFLUX----we will add omeprazole and Flonase nasal spray for his postnasal drip to see if that helps with his cough ------- needs PFT 6-minute walk test ------ also needs rheumatology opinion to rule out any underlying rheumatological condition ? SCLERODERMA SINE  SCLEROSIS------\par \par 6/2023----has features of scleroderma on hands---SCELRODERMA SINE SCLEROSIS----LUIS FERNANDO   NUCLEOAR PATTERN ---OTHER ANTIBODIES NEGATIVE-------- pulm htn on sildenafil 20mg bid, diuretics and as needed oxygen\par Scleroderma- s/p rheum consult- no role for MMF- remains on pred 10mg\par GI--DR MELTON\par EBUS MARCH 2023----mediastinal lymph nodes----reactive lymphadenopathy\par NONISCHAEMIC CARDIOMYOPATHY/HFrEF --follows cardiology - - -     \par HAS MGUS ---PAST H/O RENAL CA   4/2022----S/P   PARTIAL NEPHRECTOMY - - \par \par accompanied today by wife

## 2023-06-19 ENCOUNTER — RX CHANGE (OUTPATIENT)
Age: 63
End: 2023-06-19

## 2023-06-19 RX ORDER — LEVALBUTEROL HYDROCHLORIDE 0.63 MG/3ML
0.63 SOLUTION RESPIRATORY (INHALATION)
Qty: 10800 | Refills: 4 | Status: DISCONTINUED | COMMUNITY
Start: 2023-05-19 | End: 2023-06-19

## 2023-06-30 ENCOUNTER — APPOINTMENT (OUTPATIENT)
Dept: HEART FAILURE | Facility: CLINIC | Age: 63
End: 2023-06-30

## 2023-07-12 ENCOUNTER — NON-APPOINTMENT (OUTPATIENT)
Age: 63
End: 2023-07-12

## 2023-07-12 ENCOUNTER — APPOINTMENT (OUTPATIENT)
Dept: ELECTROPHYSIOLOGY | Facility: CLINIC | Age: 63
End: 2023-07-12
Payer: COMMERCIAL

## 2023-07-12 ENCOUNTER — RX CHANGE (OUTPATIENT)
Age: 63
End: 2023-07-12

## 2023-07-12 DIAGNOSIS — J98.4 EMPHYSEMA, UNSPECIFIED: ICD-10-CM

## 2023-07-12 DIAGNOSIS — J43.9 EMPHYSEMA, UNSPECIFIED: ICD-10-CM

## 2023-07-12 PROCEDURE — 93296 REM INTERROG EVL PM/IDS: CPT

## 2023-07-12 PROCEDURE — 93295 DEV INTERROG REMOTE 1/2/MLT: CPT

## 2023-07-12 RX ORDER — LEVALBUTEROL HYDROCHLORIDE 0.63 MG/3ML
0.63 SOLUTION RESPIRATORY (INHALATION)
Qty: 10800 | Refills: 4 | Status: DISCONTINUED | COMMUNITY
Start: 2023-06-19 | End: 2023-07-12

## 2023-07-14 ENCOUNTER — NON-APPOINTMENT (OUTPATIENT)
Age: 63
End: 2023-07-14

## 2023-07-14 ENCOUNTER — APPOINTMENT (OUTPATIENT)
Dept: HEART FAILURE | Facility: CLINIC | Age: 63
End: 2023-07-14
Payer: COMMERCIAL

## 2023-07-14 VITALS
BODY MASS INDEX: 21.12 KG/M2 | WEIGHT: 131.4 LBS | SYSTOLIC BLOOD PRESSURE: 118 MMHG | HEART RATE: 108 BPM | DIASTOLIC BLOOD PRESSURE: 83 MMHG | HEIGHT: 66 IN

## 2023-07-14 PROCEDURE — 93000 ELECTROCARDIOGRAM COMPLETE: CPT

## 2023-07-14 PROCEDURE — 99214 OFFICE O/P EST MOD 30 MIN: CPT | Mod: 25

## 2023-07-20 ENCOUNTER — NON-APPOINTMENT (OUTPATIENT)
Age: 63
End: 2023-07-20

## 2023-07-20 NOTE — HISTORY OF PRESENT ILLNESS
[FreeTextEntry1] : Briefly, Mr. Cain is a 63 y/o [place] M w/ h/o HTN, ? RA, RCC s/p partial R nephrectomy (4/22), CKD (b/l Cr 1.4; 5/10/23), HFrecEF/NICM (EF prev 25-30%, LVEDD 4.1 cm improved to 55% with predominant RV dysfunction) of unclear etiology s/p ICD, group I/III pulmonary HTN, MGUS (ruled out for cardiac amyloid w/ myocardial biopsy), chronic dyspnea (on intermittent O2) who presents for f/u of care with his wife. Referred by Dr. Manuelito Carver. \par \par For full initial details, please refer to note from 1/13/23\par \par Since last visit, has been doing fairly well. Continues to have dyspnea with exertion particularly with doing small errands. \par \par Hasn't been to the ER or hospital since March. \par \par Reports some dysphagia with solids/liquids w/o pain. Believes he had an endoscopy but unsure of results approx 2-3 years prior. \par \par States he can walk up 8-10 steps limited by leg fatigue.  Appetite has improved (no longer has early satiety). \par \par Has been followed by Dr. Villeda. Was on prednisone 10 mg daily now reduced to 7.5 mg daily. Uses oxygen at home depending on how he feels. Uses approximately 4L/NC intermittently with activity and uses qhs. \par \par Reports normal BMs. \par \par For PAH, workup has been negative. Given significantly elevated LUIS FERNANDO, seen by Dr. Larson (rheumatology) where further serologic testing was negative and recommended to taper steroids with primary focus on treatment of pulmonary hypertension. \par \par Monitors BP at home which has been in the range of 102-120. Weight at home increased to 130 lbs. Takes bumex 1 mg daily with extra as needed with good response. \par \par He denies chest pain, palpitations, dizziness/LH, syncope and no ICD shocks. \par \par Denies prior Covid illness. Received Covid vaccines.

## 2023-07-20 NOTE — CARDIOLOGY SUMMARY
[de-identified] : \par 7/14/23 - sinus tach, RVH, TWI anteriorly, inferior Q waves\par 5/10/23 sinus tach, incomplete RBBB, inferior Q waves, rate 101 bpm\par 3/29/23 - sinus tach, incomplete RBBB, inferior Q waves\par 2/22/23 sinus tach, , APCs, incomplete RBBB, inferior Q waves; relatively low voltage\par 2/8/23 sinus tachycardia, , PRWP, incomplete RBBB, inferior Q waves\par 1/25/23 sinus tachycardia, , PRWP, inferior Q waves, incomplete RBBB\par 12/14/22 - sinus, , PRWP, inferior Q waves [de-identified] : \par 7/26/21 - treadmill stress test - 7minutes 18 seconds; stage 3 Nelson; EF 71%, no ECG changes; negative for ischemia/infarct [de-identified] : \par 4/20/23 - EF read as 40-45% (more c/w 55-60%), LVEDD 3.2 cm, ? normal RV size/function (severe RV dysfunction/dilatation), mod TR; RVSP 68\par \par 3/8/23 - septum 1 cm, PW 1.3 cm, LVEDD 3 cm, EF 50%, severe RV dysfunction/dilatation, D-shaped in systole, mod TR, RVSP 60-65, IVC 1.7 cm (collapsible)\par \par 12/15/22 - septum 1.5 cm, PW 1.3 cm, LVEDD 4.1 cm, EF 28%, highly trabeculated apex, mild-mod TR, RVSP 60, RV dysfunction/enlargement, DT 78 msec, E/e' 28, LVOT VTI 12 cm, IVC 1.5 cm [de-identified] : \par 3/8/23 - chest CT - mild GGO centrilobular pattern, nonspecific; b/l axillary LN 1.7 x 1.4 cm (unchanged from 4/28); no pericardial effusion; \par \par 12/16/22 - nl pericardium; concentric LVH; EF 36%, subtle LGE along inferior hinge point of RV\par \par 3/8/22 - Chest CT PE protocol - dilated PA (4 cm), mildly enlarged subcarinal LN (1.1 cm), mildly enlarged R hilar LN (1.2 cm), clear lungs; mildly enlarged bilateral axillary LN (L>R); no PE [de-identified] : \par 4/28/22 - PET scan - physiologic FDG activity throughout; few mildly enlarged FDG-avid b/l axillary LN (L>R); few small FDG-avid mediastinal and b/l hilar LN (difficult to delineate); no abnormal FDG activity in lungs or pericardium (myocardium not commented on); \par \par 1/3/22 ([place]) Tc-Pyp scan - negative for TTR amyloid [de-identified] : \par 3/6/23 RHC - RA 1, PA 44/17/30, PCW 3, CO/CI 4.39/2.7; PVR 6; TPG 27; DPG 14; negative shunt run\par \par 12/16/22 - RHC - RA 5, RV 48/11, PA 46/24/33, PCWP 9, CO/CI 4.9/2.94, PVR 4.8; TPG 24; DPG 15\par \par January 2022 ([place]) - reportedly normal LHC [de-identified] : \par 3/10/23 - EBUS guided FA - no granulomas seen; reactive process favored\par \par 3/6/23 - myocardial biopsy - myocyte hypertrophy; Congo red stain negative\par \par 7/29/22 - L axillar LN core biopsy and FNA - reactive follicular hyperplasia\par \par \par 4/5/22 - PFTs FEV1 2.46 (89%), FVC 2.97 (86%), FEV1/FVC 83%; DLCO 11.4 (43%); DLCO/VA 51% - normal spirometry but reduced DLCO

## 2023-07-20 NOTE — ASSESSMENT
[FreeTextEntry1] : Briefly, Mr. Cain is a 61 y/o [place] citizen M w/ h/o HTN, ? RA, RCC s/p partial R nephrectomy (4/22), CKD (b/l Cr 1.5; 0.9 5/22), HFrecEF/NICM (EF prev 25-30%, LVEDD 4.1 cm improved to 55% with predominant RV dysfunction) of unclear etiology s/p ICD, group I/III pulmonary HTN, MGUS (ruled out for cardiac amyloid w/ myocardial biopsy) who presents for f/u of care. Unclear etiology of CM however given LVH, relative low voltage, neuropathy, was concerned about amyloid which was ruled out with biopsy. Other consideration was sarcoid despite reassuring cardiac MRI as has LAD on CT imaging, incomplete RBBB however several LN biopsies have been negative. Has significant precapillary PHTN for which he was started on PDEi. Unclear etiology of systemic process however has elevated LUIS FERNANDO (1:2560) and may have features of scleroderma (however seronegative). Otherwise, compensated. \par \par 1. HFrEF - rapidly progressive over past 1-2 years\par - on bumex 1 mg daily; will continue; goal weight 130 lbs\par - prev on hydral 50 mg three times/day but discontinued in hospital due to low BP; may consider restarting\par - prev on Entresto 97/103 twice/day but discontinued d/t fluctuating renal dysfunction; no change with cough with discontinuation. prior EF had recovered so defer on reinitiating \par - c/w Toprol 37.5 mg daily; given normal EF will refrain from uptitrating\par - continue belkis 12.5 mg daily \par - monitor weight/BP at home\par - counseled extensively on disease process\par \par 2. CKD - Cr august 1.3; recently 1.3-1.5\par - may benefit from renal consult\par - c/w meds as above\par \par 3. Hypertension- improved\par - meds as above\par \par 4. Pulmonary HTN - appears c/w group 1 with functional WHO class III\par - on sildenafil  20 mg twice/day; increase to three times/day\par - appreciate Dr. Villeda's input \par \par 5. Hypopigmentation - unclear etiology of loss of pigmentation in upper chest\par - will refer to dermatology \par \par RTC 3 months with NP and me in 6 months

## 2023-07-20 NOTE — PHYSICAL EXAM
[Well Developed] : well developed [Well Nourished] : well nourished [No Acute Distress] : no acute distress [Normal Conjunctiva] : normal conjunctiva [Normal Venous Pressure] : normal venous pressure [No Carotid Bruit] : no carotid bruit [Normal S1, S2] : normal S1, S2 [No Murmur] : no murmur [No Rub] : no rub [No Gallop] : no gallop [Clear Lung Fields] : clear lung fields [Good Air Entry] : good air entry [No Respiratory Distress] : no respiratory distress  [Soft] : abdomen soft [Non Tender] : non-tender [No Masses/organomegaly] : no masses/organomegaly [Normal Bowel Sounds] : normal bowel sounds [Normal Gait] : normal gait [No Cyanosis] : no cyanosis [No Clubbing] : no clubbing [No Varicosities] : no varicosities [No Rash] : no rash [No Skin Lesions] : no skin lesions [Moves all extremities] : moves all extremities [No Focal Deficits] : no focal deficits [Normal Speech] : normal speech [Normal] : alert and oriented, normal memory [Alert and Oriented] : alert and oriented [Normal memory] : normal memory [de-identified] : NAD [de-identified] : JVP approx 8 cm w/o HJR [de-identified] : tachycardic 100's, Left chest ICD [de-identified] : no lower extremity edema bilaterally [de-identified] : possible taut skin on hands; hypopigmented skin along neck/upper chest

## 2023-07-26 ENCOUNTER — RX CHANGE (OUTPATIENT)
Age: 63
End: 2023-07-26

## 2023-07-26 RX ORDER — LEVALBUTEROL HYDROCHLORIDE 0.63 MG/3ML
0.63 SOLUTION RESPIRATORY (INHALATION)
Qty: 10800 | Refills: 4 | Status: DISCONTINUED | COMMUNITY
Start: 2023-07-12 | End: 2023-07-26

## 2023-07-26 RX ORDER — LEVALBUTEROL HYDROCHLORIDE 0.63 MG/3ML
0.63 SOLUTION RESPIRATORY (INHALATION)
Qty: 810 | Refills: 67 | Status: ACTIVE | COMMUNITY
Start: 2023-07-26 | End: 1900-01-01

## 2023-07-27 ENCOUNTER — NON-APPOINTMENT (OUTPATIENT)
Age: 63
End: 2023-07-27

## 2023-07-28 NOTE — PROGRESS NOTE ADULT - ASSESSMENT
62M w/ pmhx of HTN, OA, ?RA, chronic idiopathic SOB/dyspnea on intermittent home O2 found to have right kidney mass s/p RAL R partial nephrectomy   DON on top of CKD stage 3a   New LV dysfunction  Hx of MGUS as well per Onc notation     1 CVS-   RHC last week did not show volume overloaded state   The Entresto started on the 16th  Tachycardic despite being on bblockers    2Renal- No obstruction noted and the CT was done without contrast   Labs pending ;  Check urine for bence alfaro (- test goes to [facility] and there the system is cancelling?-- I called out lab and although the urine electrophoresis says "results available: it is still testiing )     Loop diuretics on hold  3 GI-Start Ensure   4 Pulm-   CXR   clear  but remains on oxygen       Sayed [facility]   [id number]    62M w/ pmhx of HTN, OA, ?RA, chronic idiopathic SOB/dyspnea on intermittent home O2 found to have right kidney mass s/p RAL R partial nephrectomy   DON on top of CKD stage 3a   New LV dysfunction  Hx of MGUS as well per Onc notation     1 CVS-   RHC last week did not show volume overloaded state   The Entresto started on the 16th  Tachycardic despite being on bblockers    2Renal- No obstruction noted and the CT was done without contrast   Labs pending ;  Check urine for bence alfaro (- test goes to [facility] and there the system is cancelling?-- I called out lab and although the urine electrophoresis says "results available: it is still testing )     Loop diuretics on hold  Will start gentle IVF  3 GI-Start Ensure   4 Pulm-   CXR   clear  but remains on oxygen       Sayed [facility]   [id number]    No

## 2023-07-31 ENCOUNTER — NON-APPOINTMENT (OUTPATIENT)
Age: 63
End: 2023-07-31

## 2023-08-18 ENCOUNTER — RX RENEWAL (OUTPATIENT)
Age: 63
End: 2023-08-18

## 2023-09-22 ENCOUNTER — NON-APPOINTMENT (OUTPATIENT)
Age: 63
End: 2023-09-22

## 2023-09-22 ENCOUNTER — APPOINTMENT (OUTPATIENT)
Dept: PULMONOLOGY | Facility: CLINIC | Age: 63
End: 2023-09-22
Payer: COMMERCIAL

## 2023-09-22 ENCOUNTER — MED ADMIN CHARGE (OUTPATIENT)
Age: 63
End: 2023-09-22

## 2023-09-22 VITALS
BODY MASS INDEX: 21.38 KG/M2 | HEIGHT: 66 IN | DIASTOLIC BLOOD PRESSURE: 84 MMHG | SYSTOLIC BLOOD PRESSURE: 151 MMHG | OXYGEN SATURATION: 95 % | WEIGHT: 133 LBS | HEART RATE: 83 BPM

## 2023-09-22 VITALS
HEART RATE: 75 BPM | OXYGEN SATURATION: 96 % | BODY MASS INDEX: 21.4 KG/M2 | SYSTOLIC BLOOD PRESSURE: 147 MMHG | WEIGHT: 133.13 LBS | DIASTOLIC BLOOD PRESSURE: 96 MMHG | HEIGHT: 66 IN | TEMPERATURE: 98.2 F

## 2023-09-22 DIAGNOSIS — Z23 ENCOUNTER FOR IMMUNIZATION: ICD-10-CM

## 2023-09-22 DIAGNOSIS — R59.1 GENERALIZED ENLARGED LYMPH NODES: ICD-10-CM

## 2023-09-22 PROCEDURE — 94729 DIFFUSING CAPACITY: CPT

## 2023-09-22 PROCEDURE — ZZZZZ: CPT

## 2023-09-22 PROCEDURE — 36415 COLL VENOUS BLD VENIPUNCTURE: CPT

## 2023-09-22 PROCEDURE — 90686 IIV4 VACC NO PRSV 0.5 ML IM: CPT

## 2023-09-22 PROCEDURE — 94726 PLETHYSMOGRAPHY LUNG VOLUMES: CPT

## 2023-09-22 PROCEDURE — 94010 BREATHING CAPACITY TEST: CPT

## 2023-09-22 PROCEDURE — G0008: CPT

## 2023-09-22 PROCEDURE — 99215 OFFICE O/P EST HI 40 MIN: CPT | Mod: 25

## 2023-09-25 LAB — B2 MICROGLOB SERPL-MCNC: 3.1 MG/L

## 2023-10-09 ENCOUNTER — INPATIENT (INPATIENT)
Facility: HOSPITAL | Age: 63
LOS: 6 days | Discharge: HOME HEALTH | End: 2023-10-16
Attending: GENERAL PRACTICE | Admitting: GENERAL PRACTICE
Payer: COMMERCIAL

## 2023-10-09 VITALS
DIASTOLIC BLOOD PRESSURE: 81 MMHG | OXYGEN SATURATION: 93 % | SYSTOLIC BLOOD PRESSURE: 115 MMHG | RESPIRATION RATE: 24 BRPM | HEART RATE: 134 BPM | TEMPERATURE: 99 F

## 2023-10-09 DIAGNOSIS — Z29.9 ENCOUNTER FOR PROPHYLACTIC MEASURES, UNSPECIFIED: ICD-10-CM

## 2023-10-09 DIAGNOSIS — A41.9 SEPSIS, UNSPECIFIED ORGANISM: ICD-10-CM

## 2023-10-09 DIAGNOSIS — N18.9 CHRONIC KIDNEY DISEASE, UNSPECIFIED: Chronic | ICD-10-CM

## 2023-10-09 DIAGNOSIS — E87.20 ACIDOSIS, UNSPECIFIED: ICD-10-CM

## 2023-10-09 DIAGNOSIS — R06.02 SHORTNESS OF BREATH: ICD-10-CM

## 2023-10-09 DIAGNOSIS — I50.20 UNSPECIFIED SYSTOLIC (CONGESTIVE) HEART FAILURE: ICD-10-CM

## 2023-10-09 DIAGNOSIS — N39.0 URINARY TRACT INFECTION, SITE NOT SPECIFIED: ICD-10-CM

## 2023-10-09 DIAGNOSIS — Z95.810 PRESENCE OF AUTOMATIC (IMPLANTABLE) CARDIAC DEFIBRILLATOR: Chronic | ICD-10-CM

## 2023-10-09 DIAGNOSIS — Z98.890 OTHER SPECIFIED POSTPROCEDURAL STATES: Chronic | ICD-10-CM

## 2023-10-09 DIAGNOSIS — Z90.5 ACQUIRED ABSENCE OF KIDNEY: Chronic | ICD-10-CM

## 2023-10-09 LAB
ALBUMIN SERPL ELPH-MCNC: 4 G/DL — SIGNIFICANT CHANGE UP (ref 3.3–5)
ALP SERPL-CCNC: 138 U/L — HIGH (ref 40–120)
ALT FLD-CCNC: 35 U/L — SIGNIFICANT CHANGE UP (ref 4–41)
ANION GAP SERPL CALC-SCNC: 16 MMOL/L — HIGH (ref 7–14)
ANION GAP SERPL CALC-SCNC: 20 MMOL/L — HIGH (ref 7–14)
ANISOCYTOSIS BLD QL: SLIGHT — SIGNIFICANT CHANGE UP
APPEARANCE UR: ABNORMAL
APTT BLD: 40.3 SEC — HIGH (ref 24.5–35.6)
AST SERPL-CCNC: 94 U/L — HIGH (ref 4–40)
B PERT DNA SPEC QL NAA+PROBE: SIGNIFICANT CHANGE UP
B PERT+PARAPERT DNA PNL SPEC NAA+PROBE: SIGNIFICANT CHANGE UP
BACTERIA # UR AUTO: ABNORMAL /HPF
BASE EXCESS BLDV CALC-SCNC: -3.9 MMOL/L — LOW (ref -2–3)
BASOPHILS # BLD AUTO: 0 K/UL — SIGNIFICANT CHANGE UP (ref 0–0.2)
BASOPHILS NFR BLD AUTO: 0 % — SIGNIFICANT CHANGE UP (ref 0–2)
BILIRUB SERPL-MCNC: 1.6 MG/DL — HIGH (ref 0.2–1.2)
BILIRUB UR-MCNC: NEGATIVE — SIGNIFICANT CHANGE UP
BLOOD GAS VENOUS COMPREHENSIVE RESULT: SIGNIFICANT CHANGE UP
BORDETELLA PARAPERTUSSIS (RAPRVP): SIGNIFICANT CHANGE UP
BUN SERPL-MCNC: 25 MG/DL — HIGH (ref 7–23)
BUN SERPL-MCNC: 26 MG/DL — HIGH (ref 7–23)
C PNEUM DNA SPEC QL NAA+PROBE: SIGNIFICANT CHANGE UP
CALCIUM SERPL-MCNC: 9 MG/DL — SIGNIFICANT CHANGE UP (ref 8.4–10.5)
CALCIUM SERPL-MCNC: 9.3 MG/DL — SIGNIFICANT CHANGE UP (ref 8.4–10.5)
CAST: 4 /LPF — SIGNIFICANT CHANGE UP (ref 0–4)
CHLORIDE BLDV-SCNC: 101 MMOL/L — SIGNIFICANT CHANGE UP (ref 96–108)
CHLORIDE SERPL-SCNC: 102 MMOL/L — SIGNIFICANT CHANGE UP (ref 98–107)
CHLORIDE SERPL-SCNC: 103 MMOL/L — SIGNIFICANT CHANGE UP (ref 98–107)
CO2 BLDV-SCNC: 20.6 MMOL/L — LOW (ref 22–26)
CO2 SERPL-SCNC: 15 MMOL/L — LOW (ref 22–31)
CO2 SERPL-SCNC: 16 MMOL/L — LOW (ref 22–31)
COLOR SPEC: YELLOW — SIGNIFICANT CHANGE UP
CREAT SERPL-MCNC: 1.57 MG/DL — HIGH (ref 0.5–1.3)
CREAT SERPL-MCNC: 1.61 MG/DL — HIGH (ref 0.5–1.3)
DIFF PNL FLD: ABNORMAL
EGFR: 47 ML/MIN/1.73M2 — LOW
EGFR: 49 ML/MIN/1.73M2 — LOW
EOSINOPHIL # BLD AUTO: 0 K/UL — SIGNIFICANT CHANGE UP (ref 0–0.5)
EOSINOPHIL NFR BLD AUTO: 0 % — SIGNIFICANT CHANGE UP (ref 0–6)
FLUAV SUBTYP SPEC NAA+PROBE: SIGNIFICANT CHANGE UP
FLUBV RNA SPEC QL NAA+PROBE: SIGNIFICANT CHANGE UP
GAS PNL BLDV: 135 MMOL/L — LOW (ref 136–145)
GAS PNL BLDV: SIGNIFICANT CHANGE UP
GLUCOSE BLDV-MCNC: 110 MG/DL — HIGH (ref 70–99)
GLUCOSE SERPL-MCNC: 85 MG/DL — SIGNIFICANT CHANGE UP (ref 70–99)
GLUCOSE SERPL-MCNC: 98 MG/DL — SIGNIFICANT CHANGE UP (ref 70–99)
GLUCOSE UR QL: NEGATIVE MG/DL — SIGNIFICANT CHANGE UP
HADV DNA SPEC QL NAA+PROBE: SIGNIFICANT CHANGE UP
HCO3 BLDV-SCNC: 20 MMOL/L — LOW (ref 22–29)
HCOV 229E RNA SPEC QL NAA+PROBE: SIGNIFICANT CHANGE UP
HCOV HKU1 RNA SPEC QL NAA+PROBE: SIGNIFICANT CHANGE UP
HCOV NL63 RNA SPEC QL NAA+PROBE: SIGNIFICANT CHANGE UP
HCOV OC43 RNA SPEC QL NAA+PROBE: SIGNIFICANT CHANGE UP
HCT VFR BLD CALC: 43.6 % — SIGNIFICANT CHANGE UP (ref 39–50)
HCT VFR BLDA CALC: 43 % — SIGNIFICANT CHANGE UP (ref 39–51)
HGB BLD CALC-MCNC: 14.2 G/DL — SIGNIFICANT CHANGE UP (ref 12.6–17.4)
HGB BLD-MCNC: 13.8 G/DL — SIGNIFICANT CHANGE UP (ref 13–17)
HMPV RNA SPEC QL NAA+PROBE: SIGNIFICANT CHANGE UP
HPIV1 RNA SPEC QL NAA+PROBE: SIGNIFICANT CHANGE UP
HPIV2 RNA SPEC QL NAA+PROBE: SIGNIFICANT CHANGE UP
HPIV3 RNA SPEC QL NAA+PROBE: SIGNIFICANT CHANGE UP
HPIV4 RNA SPEC QL NAA+PROBE: SIGNIFICANT CHANGE UP
IANC: 23.86 K/UL — HIGH (ref 1.8–7.4)
INR BLD: 1.32 RATIO — HIGH (ref 0.85–1.18)
KETONES UR-MCNC: NEGATIVE MG/DL — SIGNIFICANT CHANGE UP
LACTATE BLDV-MCNC: 4.3 MMOL/L — CRITICAL HIGH (ref 0.5–2)
LEUKOCYTE ESTERASE UR-ACNC: ABNORMAL
LYMPHOCYTES # BLD AUTO: 2.42 K/UL — SIGNIFICANT CHANGE UP (ref 1–3.3)
LYMPHOCYTES # BLD AUTO: 8.6 % — LOW (ref 13–44)
M PNEUMO DNA SPEC QL NAA+PROBE: SIGNIFICANT CHANGE UP
MACROCYTES BLD QL: SLIGHT — SIGNIFICANT CHANGE UP
MAGNESIUM SERPL-MCNC: 1.8 MG/DL — SIGNIFICANT CHANGE UP (ref 1.6–2.6)
MAGNESIUM SERPL-MCNC: 1.9 MG/DL — SIGNIFICANT CHANGE UP (ref 1.6–2.6)
MCHC RBC-ENTMCNC: 30.1 PG — SIGNIFICANT CHANGE UP (ref 27–34)
MCHC RBC-ENTMCNC: 31.7 GM/DL — LOW (ref 32–36)
MCV RBC AUTO: 95.2 FL — SIGNIFICANT CHANGE UP (ref 80–100)
MONOCYTES # BLD AUTO: 1.69 K/UL — HIGH (ref 0–0.9)
MONOCYTES NFR BLD AUTO: 6 % — SIGNIFICANT CHANGE UP (ref 2–14)
NEUTROPHILS # BLD AUTO: 23.54 K/UL — HIGH (ref 1.8–7.4)
NEUTROPHILS NFR BLD AUTO: 80.3 % — HIGH (ref 43–77)
NEUTS BAND # BLD: 3.4 % — SIGNIFICANT CHANGE UP (ref 0–6)
NITRITE UR-MCNC: NEGATIVE — SIGNIFICANT CHANGE UP
NT-PROBNP SERPL-SCNC: HIGH PG/ML
PCO2 BLDV: 31 MMHG — LOW (ref 42–55)
PH BLDV: 7.41 — SIGNIFICANT CHANGE UP (ref 7.32–7.43)
PH UR: 5.5 — SIGNIFICANT CHANGE UP (ref 5–8)
PHOSPHATE SERPL-MCNC: 4.2 MG/DL — SIGNIFICANT CHANGE UP (ref 2.5–4.5)
PLAT MORPH BLD: NORMAL — SIGNIFICANT CHANGE UP
PLATELET # BLD AUTO: 223 K/UL — SIGNIFICANT CHANGE UP (ref 150–400)
PLATELET COUNT - ESTIMATE: NORMAL — SIGNIFICANT CHANGE UP
PO2 BLDV: 47 MMHG — HIGH (ref 25–45)
POTASSIUM BLDV-SCNC: 4.6 MMOL/L — SIGNIFICANT CHANGE UP (ref 3.5–5.1)
POTASSIUM SERPL-MCNC: 4.7 MMOL/L — SIGNIFICANT CHANGE UP (ref 3.5–5.3)
POTASSIUM SERPL-MCNC: SIGNIFICANT CHANGE UP MMOL/L (ref 3.5–5.3)
POTASSIUM SERPL-SCNC: 4.7 MMOL/L — SIGNIFICANT CHANGE UP (ref 3.5–5.3)
POTASSIUM SERPL-SCNC: SIGNIFICANT CHANGE UP MMOL/L (ref 3.5–5.3)
PROT SERPL-MCNC: 7.7 G/DL — SIGNIFICANT CHANGE UP (ref 6–8.3)
PROT UR-MCNC: 300 MG/DL
PROTHROM AB SERPL-ACNC: 14.8 SEC — HIGH (ref 9.5–13)
RAPID RVP RESULT: SIGNIFICANT CHANGE UP
RBC # BLD: 4.58 M/UL — SIGNIFICANT CHANGE UP (ref 4.2–5.8)
RBC # FLD: 14.8 % — HIGH (ref 10.3–14.5)
RBC BLD AUTO: ABNORMAL
RBC CASTS # UR COMP ASSIST: 23 /HPF — HIGH (ref 0–4)
REVIEW: SIGNIFICANT CHANGE UP
RSV RNA SPEC QL NAA+PROBE: SIGNIFICANT CHANGE UP
RV+EV RNA SPEC QL NAA+PROBE: SIGNIFICANT CHANGE UP
SAO2 % BLDV: 68.6 % — SIGNIFICANT CHANGE UP (ref 67–88)
SARS-COV-2 RNA SPEC QL NAA+PROBE: SIGNIFICANT CHANGE UP
SODIUM SERPL-SCNC: 134 MMOL/L — LOW (ref 135–145)
SODIUM SERPL-SCNC: 138 MMOL/L — SIGNIFICANT CHANGE UP (ref 135–145)
SP GR SPEC: 1.02 — SIGNIFICANT CHANGE UP (ref 1–1.03)
SQUAMOUS # UR AUTO: 1 /HPF — SIGNIFICANT CHANGE UP (ref 0–5)
TROPONIN T, HIGH SENSITIVITY RESULT: 81 NG/L — CRITICAL HIGH
TROPONIN T, HIGH SENSITIVITY RESULT: 86 NG/L — CRITICAL HIGH
UROBILINOGEN FLD QL: 1 MG/DL — SIGNIFICANT CHANGE UP (ref 0.2–1)
VARIANT LYMPHS # BLD: 1.7 % — SIGNIFICANT CHANGE UP (ref 0–6)
WBC # BLD: 28.12 K/UL — HIGH (ref 3.8–10.5)
WBC # FLD AUTO: 28.12 K/UL — HIGH (ref 3.8–10.5)
WBC UR QL: 440 /HPF — HIGH (ref 0–5)

## 2023-10-09 PROCEDURE — 99497 ADVNCD CARE PLAN 30 MIN: CPT | Mod: 25

## 2023-10-09 PROCEDURE — 99223 1ST HOSP IP/OBS HIGH 75: CPT

## 2023-10-09 PROCEDURE — 99291 CRITICAL CARE FIRST HOUR: CPT

## 2023-10-09 PROCEDURE — 71045 X-RAY EXAM CHEST 1 VIEW: CPT | Mod: 26

## 2023-10-09 RX ORDER — PIPERACILLIN AND TAZOBACTAM 4; .5 G/20ML; G/20ML
3.38 INJECTION, POWDER, LYOPHILIZED, FOR SOLUTION INTRAVENOUS EVERY 8 HOURS
Refills: 0 | Status: DISCONTINUED | OUTPATIENT
Start: 2023-10-09 | End: 2023-10-10

## 2023-10-09 RX ORDER — VANCOMYCIN HCL 1 G
1000 VIAL (EA) INTRAVENOUS ONCE
Refills: 0 | Status: DISCONTINUED | OUTPATIENT
Start: 2023-10-09 | End: 2023-10-09

## 2023-10-09 RX ORDER — LANOLIN ALCOHOL/MO/W.PET/CERES
3 CREAM (GRAM) TOPICAL AT BEDTIME
Refills: 0 | Status: DISCONTINUED | OUTPATIENT
Start: 2023-10-09 | End: 2023-10-16

## 2023-10-09 RX ORDER — SODIUM CHLORIDE 9 MG/ML
500 INJECTION INTRAMUSCULAR; INTRAVENOUS; SUBCUTANEOUS ONCE
Refills: 0 | Status: COMPLETED | OUTPATIENT
Start: 2023-10-09 | End: 2023-10-09

## 2023-10-09 RX ORDER — HEPARIN SODIUM 5000 [USP'U]/ML
5000 INJECTION INTRAVENOUS; SUBCUTANEOUS EVERY 8 HOURS
Refills: 0 | Status: DISCONTINUED | OUTPATIENT
Start: 2023-10-09 | End: 2023-10-16

## 2023-10-09 RX ORDER — ACETAMINOPHEN 500 MG
1000 TABLET ORAL ONCE
Refills: 0 | Status: COMPLETED | OUTPATIENT
Start: 2023-10-09 | End: 2023-10-09

## 2023-10-09 RX ORDER — PIPERACILLIN AND TAZOBACTAM 4; .5 G/20ML; G/20ML
3.38 INJECTION, POWDER, LYOPHILIZED, FOR SOLUTION INTRAVENOUS ONCE
Refills: 0 | Status: COMPLETED | OUTPATIENT
Start: 2023-10-09 | End: 2023-10-09

## 2023-10-09 RX ORDER — ACETAMINOPHEN 500 MG
650 TABLET ORAL EVERY 6 HOURS
Refills: 0 | Status: DISCONTINUED | OUTPATIENT
Start: 2023-10-09 | End: 2023-10-16

## 2023-10-09 RX ADMIN — Medication 400 MILLIGRAM(S): at 14:27

## 2023-10-09 RX ADMIN — HEPARIN SODIUM 5000 UNIT(S): 5000 INJECTION INTRAVENOUS; SUBCUTANEOUS at 23:32

## 2023-10-09 RX ADMIN — SODIUM CHLORIDE 500 MILLILITER(S): 9 INJECTION INTRAMUSCULAR; INTRAVENOUS; SUBCUTANEOUS at 14:27

## 2023-10-09 RX ADMIN — PIPERACILLIN AND TAZOBACTAM 200 GRAM(S): 4; .5 INJECTION, POWDER, LYOPHILIZED, FOR SOLUTION INTRAVENOUS at 14:39

## 2023-10-09 NOTE — H&P ADULT - PROBLEM SELECTOR PLAN 2
- turbid urine with 44 RBC, moderate blood, moderate leucocyte esterase, 440 WBC  - with report of severe pain post void and frequency with oliguria x 2 days  - has suprapubic tenderness.  No CVA tenderness B/L  - started on zosyn; will continue for now  - ensure optimal hydration (s/p 500 mL NaCl); caution w/ fluid, in the context of heart failure, pulmonary HTN  - f/u urine culture (in progress), blood cultures x 2 (in progress)  - f/u hemoglobin trend (currently 13.8)

## 2023-10-09 NOTE — H&P ADULT - NSICDXFAMILYHX_GEN_ALL_CORE_FT
FAMILY HISTORY:  Mother  Still living? Unknown  FH: diabetes mellitus, Age at diagnosis: Age Unknown     FAMILY HISTORY:  Father  Still living? Unknown  Family history of heart disease, Age at diagnosis: Age Unknown    Mother  Still living? Unknown  FH: diabetes mellitus, Age at diagnosis: Age Unknown

## 2023-10-09 NOTE — ED PROVIDER NOTE - ATTENDING CONTRIBUTION TO CARE
I have personally performed a history and physical examination of the patient and discussed management with the resident as well as the patient.  I reviewed the resident's note and agree with the documented findings and plan of care.  I have authored and modified critical sections of the Provider Note, including but not limited to HPI, Physical Exam and MDM.    62-year-old man with history of hypertension, RA, RCC status post right partial nephrectomy, CKD, heart failure with reduced ejection fraction EF 25 to 30%, ICD, plasma cell dyscrasia, presents to the ED with 1 day of shortness of breath, increased oxygen requirement from baseline 3 L to 6 L today, generalized weakness, cough, urinary frequency, hematuria, and dysuria.  Symptoms started yesterday and progressively worsened and was advised by cardiologist Dr. Cuellar to come in to the ER.  Likely sepsis due to PNA versus urosepsis, low suspicion ACS versus CHF exacerbation.  Patient tachycardic to 133 on arrival.  Will cover with empiric antibiotics.  In the setting of reduced ejection fraction patient unlikely to tolerate 30 cc/kg, will start with gentle IV hydration and continue treatment as tolerated.  Likely admission.  Will obtain CBC, CMP, UA, UC, CXR, TNI, EKG, VBG with lactate, RVP. I have personally performed a history and physical examination of the patient and discussed management with the resident as well as the patient.  I reviewed the resident's note and agree with the documented findings and plan of care.  I have authored and modified critical sections of the Provider Note, including but not limited to HPI, Physical Exam and MDM.    62-year-old man with history of hypertension, RA, RCC status post right partial nephrectomy, CKD, heart failure with reduced ejection fraction EF 25 to 30%, ICD, plasma cell dyscrasia, presents to the ED with 1 day of shortness of breath, increased oxygen requirement from baseline 3 L to 6 L today, generalized weakness, cough, urinary frequency, hematuria, and dysuria.  Symptoms started yesterday and progressively worsened and was advised by cardiologist Dr. Cuellar to come in to the ER.  Likely sepsis due to PNA versus urosepsis, low suspicion ACS versus CHF exacerbation.  Patient tachycardic to 133 on arrival.  Will cover with empiric antibiotics.  In the setting of reduced ejection fraction patient unlikely to tolerate 30 cc/kg, will start with gentle IV hydration and continue treatment as tolerated.  Likely admission.  Will obtain CBC, CMP, UA, UC, CXR, TNI, EKG, VBG with lactate, RVP.    Progress note 1530: Independent review of lab results shows elevated white count to 28.12 with lactate of 4.3 at pH 7.41 and bicarb 20.  Consistent with metabolic acidosis from septic infection.  Continue with Zosyn at this time, currently holding vancomycin for broad-spectrum coverage due to renal disease and low suspicion for gram-positive infection given clinical history.  Troponin elevated at 86, likely due to demand ischemia.  Independent review of EKG shows sinus tachycardia without STEMI or TWI, 109 bpm, , QRS 94, QTc 490.  Will repeat troponin.

## 2023-10-09 NOTE — ED ADULT TRIAGE NOTE - CHIEF COMPLAINT QUOTE
Pt complaining off SOB. Pt arrives on 5L NC from home. Pt states he wears oxygen as needed. Pt appears SOB in triage. pt brought to tr A.

## 2023-10-09 NOTE — ED ADULT NURSE NOTE - OBJECTIVE STATEMENT
Patient received TrA, a*ox4, ambulatory/ c/o worsening sob, dysuria, urinary frequency and hematuria x1 since yesterday. hx: HTN. As per pts wife, pt wears 2-3L NC at home for pulmonary hypertension, but since yesterday has been using 5L. Pt visibly sob, but able to speak in full sentences. Pt denies chest pain, n+v, headache, dizziness, fever, chills. breathing even, unlabored. 20g IV placed to right wrist, labs collected and sent.

## 2023-10-09 NOTE — H&P ADULT - NSHPPHYSICALEXAM_GEN_ALL_CORE
Vital Signs Last 24 Hrs  T(C): 37.2 (09 Oct 2023 20:23), Max: 38.1 (09 Oct 2023 14:15)  T(F): 98.9 (09 Oct 2023 20:23), Max: 100.6 (09 Oct 2023 14:15)  HR: 109 (09 Oct 2023 20:23) (105 - 134)  BP: 118/77 (09 Oct 2023 20:23) (112/91 - 119/87)  BP(mean): --  RR: 18 (09 Oct 2023 20:23) (18 - 24)  SpO2: 100% (09 Oct 2023 20:23) (93% - 100%)    Parameters below as of 09 Oct 2023 20:23  Patient On (Oxygen Delivery Method): nasal cannula  O2 Flow (L/min): 5  =================================================== Vital Signs Last 24 Hrs  T(C): 37.2 (09 Oct 2023 20:23), Max: 38.1 (09 Oct 2023 14:15)  T(F): 98.9 (09 Oct 2023 20:23), Max: 100.6 (09 Oct 2023 14:15)  HR: 109 (09 Oct 2023 20:23) (105 - 134)  BP: 118/77 (09 Oct 2023 20:23) (112/91 - 119/87)  BP(mean): --  RR: 18 (09 Oct 2023 20:23) (18 - 24)  SpO2: 100% (09 Oct 2023 20:23) (93% - 100%)    Parameters below as of 09 Oct 2023 20:23  Patient On (Oxygen Delivery Method): nasal cannula  O2 Flow (L/min): 5  ===================================================    PHYSICAL EXAMINATION:    APPEARANCE: Adequately groomed, slim male, ill appearing, restless (appears not being able to attain a comfortable position), lying propped up in stretcher in NAD  HEENT: Nasal cannula in place to nares.  Mildly dry oral mucosa.  Pupils reactive to light.  EOMI  LYMPHATIC: No lymphadenopathy appreciated  CARDIOVASCULAR: (+) S1 S2.  No JVD.  No gross murmurs appreciated.  No edema  RESPIRATORY: No wheezing, rhonchi, crackles appreciated  GASTROINTESTINAL: Soft.  Non-tender.  (+) BS  GENITOURINARY: (+) suprapubic tenderness.  No CVA tenderness B/L  EXTREMITIES: Normal range of motion.  No clubbing, cyanosis or edema  MUSCULOSKELETAL: No atrophy.  No asymmetry.  Good ROM  SKIN: Very dry skin.  No ecchymoses.  No cyanosis  PSYCHIATRIC: A&O x 3.  Mood & affect appropriate to situation  NEUROLOGICAL: Non-focal, MCINTOSH x 4 against gravity  VASCULAR: Peripheral pulses palpable

## 2023-10-09 NOTE — H&P ADULT - PROBLEM SELECTOR PLAN 1
- tachycardic ( to 134), Tachypneic (24), Febrile (38.1 C/100.6 F), Hypoxic on 5L NC (93%) and w/ elevated WBC (28.12).  lactate = 4.3 --> 2.2.  AG = 16.  Troponin = 86 --81  - RVP, COVID-19 PCR negative.  CXR w/o gross signs of infection  - with report of frequency with oliguria, and severe pain post micturition.  Also w/ sudden onset of shortness of breath  - UA is turbid, and with sign of hematuria and infection  - prescribed zosyn in the ED; will continue  - Tylenol PRN mild pain/fever  - on nasal cannula 5 liters, with O2 sat to 100%  - f/u blood cultures x 2, urine culture (all in progress)  - ensure optimal hydration (s/p 500 mL NaCl); caution w/ fluid, in the context of heart failure, pulmonary HTN  - other supportive care as needed

## 2023-10-09 NOTE — H&P ADULT - HISTORY OF PRESENT ILLNESS
63 year old male, with past history significant for Rheumatoid arthritis, HTN, HFrEF (25 to 30%), CKD, Renal cell carcinoma - s/p Partial nephrectomy, Plasma cell dyscrasia, and AICD placement, presented to the ED secondary to shortness of breath.  Seen and evaluated at bedside;    Vital signs upon ED presentation as follows: BP = 115/81, HR = 134, RR = 24, T = 37/4 C to 38.1 C (99.3 F to 100.6 F), O2 Sat = 93% on 5L NC (to 100% on 5L NC).  Diagnosed with Sepsis and prescribed zosyn, vancomycin, NaCl 500 mL and Tylenol 1 gram IV in the ED. 63 year old male, with past history significant for Rheumatoid arthritis, HTN, HFrEF (25 to 30%), CKD, Renal cell carcinoma - s/p Partial nephrectomy, Plasma cell dyscrasia, and AICD placement, presented to the ED secondary to shortness of breath.  Seen and evaluated at bedside; restless, but in NAD.  Patient reports    Vital signs upon ED presentation as follows: BP = 115/81, HR = 134, RR = 24, T = 37/4 C to 38.1 C (99.3 F to 100.6 F), O2 Sat = 93% on 5L NC (to 100% on 5L NC).  Diagnosed with Sepsis and prescribed zosyn, vancomycin, NaCl 500 mL and Tylenol 1 gram IV in the ED. 63 year old male, with past history significant for Rheumatoid arthritis, HTN, HFrEF (55 to 60% - TTE of 03/08/2023), Home oxygen 5 liters, CKD, Renal cell carcinoma - s/p Partial nephrectomy, Plasma cell dyscrasia, and AICD placement, presented to the ED secondary to shortness of breath.  Seen and evaluated at bedside; restless, and complaining of feeling extremely cold, despite being covered with ~ 4 blankets and socks in place to feet.  AD.  Patient reports frequency in micturition with oliguria yesterday.  At approximately 7:00 PM, patient noted blood in the urine.  No associated burning or itching, however, has severe pain (10/10 intensity) post void.  Reports subjective fever.  No chills or sweating.  Has continued with hematuria - dark red.  Woke up with significant shortness of breath this morning and significant generalized weakness; could hardly get out of bed.  No chest pain, palpitations.  Wife called PMD who advised that patient be taken to the ED.      At baseline, patient drinks about 1.5 to 3.5 liters of water, in addition to ~ 500 mLs of other fluid (apple juice) daily.  Has not had any leg or pedal edema for the past ~ 1 to 2 weeks.    Vital signs upon ED presentation as follows: BP = 115/81, HR = 134, RR = 24, T = 37/4 C to 38.1 C (99.3 F to 100.6 F), O2 Sat = 93% on 5L NC (to 100% on 5L NC).  Diagnosed with Sepsis and prescribed zosyn, NaCl 500 mL and Tylenol 1 gram IV in the ED.

## 2023-10-09 NOTE — H&P ADULT - ASSESSMENT
[ x ]  Lab studies personally reviewed  [ x ]  Radiology personally reviewed  [ x ]  Old records personally reviewed    63 year old male, with past history significant for Rheumatoid arthritis, HTN, HFrEF (25 to 30%), CKD, Renal cell carcinoma - s/p Partial nephrectomy, Plasma cell dyscrasia, and AICD placement, presented to the ED secondary to shortness of breath.  Diagnosed with Sepsis in the ED. [ x ]  Lab studies personally reviewed  [ x ]  Radiology personally reviewed  [ x ]  Old records personally reviewed    63 year old male, with past history significant for Rheumatoid arthritis, HTN, HFrEF (55 to 60% - TTE of 03/08/2023), Home oxygen 5 liters, CKD, Renal cell carcinoma - s/p Partial nephrectomy, Plasma cell dyscrasia, and AICD placement, presented to the ED secondary to shortness of breath.  Diagnosed with Sepsis in the ED.

## 2023-10-09 NOTE — H&P ADULT - NSHPLABSRESULTS_GEN_ALL_CORE
13.8   28.12 )-----------( 223      ( 09 Oct 2023 15:00 )             43.6     134<L>  |  102  |  26<H>  ----------------------------<  98     10  TNP   |  16<L>  |  1.61<H>    Ca    9.3      09 Oct 2023 15:00  Mg     1.90     10-09    TPro  7.7  /  Alb  4.0  /  TBili  1.6<H>  /  DBili  x   /  AST  94<H>  /  ALT  35  /  AlkPhos  138<H>  10-09    PT/INR: 14.8/1.32 (10-09-23 @ 15:00)  PTT: 40.3 (10-09-23 @ 15:00)    15:00 - VBG - pH: 7.41  | pCO2: 31    | pO2: 47    | Lactate: 4.3      hs Troponin, T - 81 ng/L (10-09-23 @ 15:59)  hs Troponin, T - 86 ng/L (10-09-23 @ 15:00)        Urinalysis Basic - ( 09 Oct 2023 16:28 )  Color: Yellow / Appearance: Turbid / S.023 / pH: 5.5  Gluc: Negative mg/dL / Ketone: Negative mg/dL  / Bili: Negative / Urobili: 1.0 mg/dL   Blood: Moderate / Protein: 300 mg/dL / Nitrite: Negative   Leuk Esterase: Moderate / RBC: 23 /HPF /  /HPF   Sq Epi: x / Non Sq Epi: x / Bacteria: Few /HPF    ======================================================================        ======================================================================  . 13.8   28.12 )-----------( 223      ( 09 Oct 2023 15:00 )             43.6     134<L>  |  102  |  26<H>  ----------------------------<  98     10-09  TNP   |  16<L>  |  1.61<H>    Ca    9.3      09 Oct 2023 15:00  Mg     1.90     10-09    TPro  7.7  /  Alb  4.0  /  TBili  1.6<H>  /  DBili  x   /  AST  94<H>  /  ALT  35  /  AlkPhos  138<H>  10-09    PT/INR: 14.8/1.32 (10-09-23 @ 15:00)  PTT: 40.3 (10-09-23 @ 15:00)    15:00 - VBG - pH: 7.41  | pCO2: 31    | pO2: 47    | Lactate: 4.3      hs Troponin, T - 81 ng/L (10-09-23 @ 15:59)  hs Troponin, T - 86 ng/L (10-09-23 @ 15:00)        Urinalysis Basic - ( 09 Oct 2023 16:28 )  Color: Yellow / Appearance: Turbid / S.023 / pH: 5.5  Gluc: Negative mg/dL / Ketone: Negative mg/dL  / Bili: Negative / Urobili: 1.0 mg/dL   Blood: Moderate / Protein: 300 mg/dL / Nitrite: Negative   Leuk Esterase: Moderate / RBC: 23 /HPF /  /HPF   Sq Epi: x / Non Sq Epi: x / Bacteria: Few /HPF    ======================================================================    ECG = sinus tachycardia w/ SVCx at 109 bpm, QTc =  490,   incomplete RBB, TWI in V3, V4, V5    ======================================================================  .

## 2023-10-09 NOTE — H&P ADULT - PROBLEM SELECTOR PLAN 5
- per history, and uses 5 liters NC at home  - history of CHF w/ EF 25 to 30%, but TTE of 03/18/2023 w/ improved EF = 55 to 60 %)  - s/p NaCl 500 mL x one in the ED - in the context of sepsis  - on sildenafil 20 mg daily (at 10:00 PM); continuing  - also on bumetanide 1 mg daily (has been on varied doses in the past, per chart review)  - pro-BNP currently 08369; giving bumetanide 1 mg PO x one now  - follow up pulse oximetry  - intake/output Q4H, weight daily, HOB elevation  - fluid restriction of 1.2 liters daily (please re-evaluate for more liberal fluid intake)

## 2023-10-09 NOTE — ED PROVIDER NOTE - CLINICAL SUMMARY MEDICAL DECISION MAKING FREE TEXT BOX
Lulu Lugo DO PGY-3   62-year-old man with history of hypertension, RA, RCC status post right partial nephrectomy, CKD, heart failure with reduced ejection fraction EF 25 to 30%, ICD, plasma cell dyscrasia, presents to the ED with 1 day of shortness of breath, increased oxygen requirement from baseline 3 L to 6 L today, generalized weakness, cough, urinary frequency, hematuria, and dysuria.  Symptoms started yesterday and progressively worsened and was advised by cardiologist Dr. Cuellar to come in to the ER.  Denies fever, chest pain, pleuritic pain, nausea, vomiting, diarrhea. Concern for sepsis, differential includes pneumonia, UTI, viral syndrome, atypical ACS, CHF exacerbation. Will obtain EKG, labs, CXR, UA, give abx, and patient will require admission. Lulu Lugo DO PGY-3   62-year-old man with history of hypertension, RA, RCC status post right partial nephrectomy, CKD, heart failure with reduced ejection fraction EF 25 to 30%, ICD, plasma cell dyscrasia, presents to the ED with 1 day of shortness of breath, increased oxygen requirement from baseline 3 L to 6 L today, generalized weakness, cough, urinary frequency, hematuria, and dysuria.  Symptoms started yesterday and progressively worsened and was advised by cardiologist Dr. Cuellar to come in to the ER.  Denies fever, chest pain, pleuritic pain, nausea, vomiting, diarrhea. Concern for sepsis, differential includes pneumonia, UTI, viral syndrome, atypical ACS, CHF exacerbation. Will obtain EKG, labs, CXR, UA, give empiric coverage with zosyn (less likely to be MRSA given lack of recent hospitalization/instrumentation/procedures) and patient will require admission.

## 2023-10-09 NOTE — H&P ADULT - PROBLEM/PLAN-1

## 2023-10-09 NOTE — ED PROVIDER NOTE - PHYSICAL EXAMINATION
PHYSICAL EXAM:  CONSTITUTIONAL: Tachypneic appearing, awake, alert, oriented to person, place, time/situation.  HEAD: Atraumatic  EYES: Clear bilaterally, pupils equal, round and reactive to light.  ENMT: Airway patent, Nasal mucosa clear. Mouth with normal mucosa. Uvula is midline.   CARDIAC: Normal rate, regular rhythm. +S1/S2. No murmurs, rubs or gallops.  RESPIRATORY: Breathing unlabored. Breath sounds clear and equal bilaterally.  ABDOMEN:  Soft, nontender, nondistended. No rebound tenderness or guarding.  FLANK: No CVA tenderness B/L.   NEUROLOGICAL: Alert and oriented, no focal deficits, no motor or sensory deficits.   MSK: No clubbing, cyanosis, or edema. Full range of motion of all extremities. No tenderness to palpation. No midline or paraspinal tenderness. No spinal step-offs.  SKIN: Skin warm and dry. No evidence of rashes or lesions.

## 2023-10-09 NOTE — H&P ADULT - PROBLEM SELECTOR PLAN 3
- acute onset in the AM - requiring increased FiO2 from baseline of 5 liters via NC  - O2 Sat = 93% initially in the ED; now ~ 100% on 5L NC  - lab-work w/ metabolic acidosis and respiratory alkalosis  - lactic acidosis (4.3 --> 3.3)  - pro-BNP = 99585; CXR (personally reviewed) = enlarged cardiac silhouette, L-pleural effusion (small to moderate), prominent pulmonary artery and possible biatrial enlargement  - shortness of breath is possibly multifactorial; sequela of sepsis as well as due to fluid overload state (consumes ~ about 1.5 to 3.5 liters of water, in addition to ~ 500 mLs of other fluid (apple juice) daily)  - HOB elevation  - aspiration precautions  - continues on supplemental oxygen via NC (5 liters)  - follow pulse oximetry - acute onset in the AM - requiring increased FiO2 from baseline of 5 liters via NC  - O2 Sat = 93% initially in the ED; now ~ 100% on 5L NC  - lab-work w/ metabolic acidosis and respiratory alkalosis  - lactic acidosis (4.3 --> 3.3)  - pro-BNP = 59571; CXR (personally reviewed) = enlarged cardiac silhouette, L-pleural effusion (small to moderate), prominent pulmonary artery and possible biatrial enlargement  - shortness of breath is possibly multifactorial; sequela of sepsis as well as due to fluid overload state (consumes ~ about 1.5 to 3.5 liters of water, in addition to ~ 500 mLs of other fluid (apple juice) daily)  - HOB elevation  - aspiration precautions  - continues on supplemental oxygen via NC (5 liters)  - follow pulse oximetry  - Cardiology consult in the AM (Juarez Carver MD); please call

## 2023-10-09 NOTE — H&P ADULT - NSHPREVIEWOFSYSTEMS_GEN_ALL_CORE
REVIEW OF SYSTEMS:    CONSTITUTIONAL: Profound generalized weakness since waking up in the AM.  Subjective fever.  No chills or sweating  EYES/ENT: No visual changes.  No dysphagia  NECK: No pain or stiffness  RESPIRATORY: Sudden onset of shortness of breath in the AM - requiring more than the baseline 5 liters oxygen via NC.  No cough or hemoptysis  CARDIOVASCULAR: No chest pain or palpitations.  No lower extremity edema x ~ 1.5 weeks  GASTROINTESTINAL: No epigastric or abdominal pain. No nausea, vomiting or hematemesis.  No diarrhea or constipation. No melena or hematochezia.  GENITOURINARY: Severe pain post void - to extent that patient cries.  Frequency with oliguria and hematuria x 2 days  MUSCULOSKELETAL: No joint pain, swelling, decreased ROM, erythema, warmth  NEUROLOGICAL: No numbness or weakness  PSYCHIATRY: No anxiety, or depression.  SKIN: No itching, burning, rashes, or lesions   All other review of systems is negative unless indicated above.

## 2023-10-09 NOTE — H&P ADULT - NSICDXPASTMEDICALHX_GEN_ALL_CORE_FT
PAST MEDICAL HISTORY:  Chronic kidney disease (CKD)     Chronic systolic congestive heart failure     Hypertension     Neoplasm of uncertain behavior of kidney, right     Plasma cell dyscrasia     Renal cell carcinoma     Rheumatoid arthritis      PAST MEDICAL HISTORY:  Chronic kidney disease (CKD)     Chronic systolic congestive heart failure     Hypertension     Neoplasm of uncertain behavior of kidney, right     On home oxygen therapy     Plasma cell dyscrasia     Pulmonary hypertension     Renal cell carcinoma     Rheumatoid arthritis

## 2023-10-09 NOTE — H&P ADULT - CONVERSATION DETAILS
Discussed with patient current findings, diagnosis, treatment and prognosis.  Discussed also the need for a health care proxy (HCP); patient indicates that wife is his HCP and they had already completed a form.  Asked patient to speak with wife about submitting a copy of the completed document for his chart; indicates intent to do same.  Also introduced discussed about completing [facility] form discussed with him the different areas contained within.  Patient indicates that he will address this at a later date.  Writer facilitated questions and gave detailed answers as appropriate.  Also spoke with wife, Aliya, via telephone - updating her on patient's current status - per patient's request.

## 2023-10-09 NOTE — H&P ADULT - NSICDXPASTSURGICALHX_GEN_ALL_CORE_FT
PAST SURGICAL HISTORY:  AICD (automatic cardioverter/defibrillator) present     H/O partial nephrectomy     History of cardiac cath jan 2022, at [facility] no stent    
Unable to assess

## 2023-10-09 NOTE — ED PROVIDER NOTE - NSICDXPASTSURGICALHX_GEN_ALL_CORE_FT
PAST SURGICAL HISTORY:  History of cardiac cath jan 2022, at [facility]

## 2023-10-09 NOTE — H&P ADULT - PROBLEM SELECTOR PLAN 4
- lactate = 4.3 --> 3.3  - in the context of sepsis  - ensure optimal hydration  - s/p 500 mL of NaCl in the ED; holding off on additional fluids for now  - f/u complete VBG in the AM

## 2023-10-10 DIAGNOSIS — I27.20 PULMONARY HYPERTENSION, UNSPECIFIED: ICD-10-CM

## 2023-10-10 LAB
-  K. PNEUMONIAE GROUP: SIGNIFICANT CHANGE UP
24R-OH-CALCIDIOL SERPL-MCNC: 24 NG/ML — LOW (ref 30–80)
ALBUMIN SERPL ELPH-MCNC: 3.8 G/DL — SIGNIFICANT CHANGE UP (ref 3.3–5)
ALP SERPL-CCNC: 134 U/L — HIGH (ref 40–120)
ALT FLD-CCNC: 20 U/L — SIGNIFICANT CHANGE UP (ref 4–41)
ANION GAP SERPL CALC-SCNC: 16 MMOL/L — HIGH (ref 7–14)
AST SERPL-CCNC: 26 U/L — SIGNIFICANT CHANGE UP (ref 4–40)
BASE EXCESS BLDV CALC-SCNC: -1.1 MMOL/L — SIGNIFICANT CHANGE UP (ref -2–3)
BASOPHILS # BLD AUTO: 0.06 K/UL — SIGNIFICANT CHANGE UP (ref 0–0.2)
BASOPHILS NFR BLD AUTO: 0.2 % — SIGNIFICANT CHANGE UP (ref 0–2)
BILIRUB SERPL-MCNC: 2.3 MG/DL — HIGH (ref 0.2–1.2)
BLOOD GAS VENOUS COMPREHENSIVE RESULT: SIGNIFICANT CHANGE UP
BUN SERPL-MCNC: 26 MG/DL — HIGH (ref 7–23)
CALCIUM SERPL-MCNC: 9.3 MG/DL — SIGNIFICANT CHANGE UP (ref 8.4–10.5)
CHLORIDE BLDV-SCNC: 104 MMOL/L — SIGNIFICANT CHANGE UP (ref 96–108)
CHLORIDE SERPL-SCNC: 104 MMOL/L — SIGNIFICANT CHANGE UP (ref 98–107)
CO2 BLDV-SCNC: 23.6 MMOL/L — SIGNIFICANT CHANGE UP (ref 22–26)
CO2 SERPL-SCNC: 20 MMOL/L — LOW (ref 22–31)
CREAT SERPL-MCNC: 1.8 MG/DL — HIGH (ref 0.5–1.3)
EGFR: 42 ML/MIN/1.73M2 — LOW
EOSINOPHIL # BLD AUTO: 0 K/UL — SIGNIFICANT CHANGE UP (ref 0–0.5)
EOSINOPHIL NFR BLD AUTO: 0 % — SIGNIFICANT CHANGE UP (ref 0–6)
GAS PNL BLDV: 137 MMOL/L — SIGNIFICANT CHANGE UP (ref 136–145)
GLUCOSE BLDV-MCNC: 100 MG/DL — HIGH (ref 70–99)
GLUCOSE SERPL-MCNC: 98 MG/DL — SIGNIFICANT CHANGE UP (ref 70–99)
GRAM STN FLD: SIGNIFICANT CHANGE UP
GRAM STN FLD: SIGNIFICANT CHANGE UP
HCO3 BLDV-SCNC: 23 MMOL/L — SIGNIFICANT CHANGE UP (ref 22–29)
HCT VFR BLD CALC: 41 % — SIGNIFICANT CHANGE UP (ref 39–50)
HCT VFR BLDA CALC: 40 % — SIGNIFICANT CHANGE UP (ref 39–51)
HGB BLD CALC-MCNC: 13.3 G/DL — SIGNIFICANT CHANGE UP (ref 12.6–17.4)
HGB BLD-MCNC: 13 G/DL — SIGNIFICANT CHANGE UP (ref 13–17)
IANC: 23.77 K/UL — HIGH (ref 1.8–7.4)
IMM GRANULOCYTES NFR BLD AUTO: 0.7 % — SIGNIFICANT CHANGE UP (ref 0–0.9)
LACTATE BLDV-MCNC: 2.8 MMOL/L — HIGH (ref 0.5–2)
LACTATE SERPL-SCNC: 1.9 MMOL/L — SIGNIFICANT CHANGE UP (ref 0.5–2)
LYMPHOCYTES # BLD AUTO: 10.7 % — LOW (ref 13–44)
LYMPHOCYTES # BLD AUTO: 3.02 K/UL — SIGNIFICANT CHANGE UP (ref 1–3.3)
MAGNESIUM SERPL-MCNC: 1.9 MG/DL — SIGNIFICANT CHANGE UP (ref 1.6–2.6)
MCHC RBC-ENTMCNC: 30.2 PG — SIGNIFICANT CHANGE UP (ref 27–34)
MCHC RBC-ENTMCNC: 31.7 GM/DL — LOW (ref 32–36)
MCV RBC AUTO: 95.3 FL — SIGNIFICANT CHANGE UP (ref 80–100)
METHOD TYPE: SIGNIFICANT CHANGE UP
MONOCYTES # BLD AUTO: 1.07 K/UL — HIGH (ref 0–0.9)
MONOCYTES NFR BLD AUTO: 3.8 % — SIGNIFICANT CHANGE UP (ref 2–14)
NEUTROPHILS # BLD AUTO: 23.77 K/UL — HIGH (ref 1.8–7.4)
NEUTROPHILS NFR BLD AUTO: 84.6 % — HIGH (ref 43–77)
NRBC # BLD: 0 /100 WBCS — SIGNIFICANT CHANGE UP (ref 0–0)
NRBC # FLD: 0 K/UL — SIGNIFICANT CHANGE UP (ref 0–0)
PCO2 BLDV: 34 MMHG — LOW (ref 42–55)
PH BLDV: 7.43 — SIGNIFICANT CHANGE UP (ref 7.32–7.43)
PHOSPHATE SERPL-MCNC: 4.1 MG/DL — SIGNIFICANT CHANGE UP (ref 2.5–4.5)
PLATELET # BLD AUTO: 193 K/UL — SIGNIFICANT CHANGE UP (ref 150–400)
PO2 BLDV: 58 MMHG — HIGH (ref 25–45)
POTASSIUM BLDV-SCNC: 4.1 MMOL/L — SIGNIFICANT CHANGE UP (ref 3.5–5.1)
POTASSIUM SERPL-MCNC: 3.9 MMOL/L — SIGNIFICANT CHANGE UP (ref 3.5–5.3)
POTASSIUM SERPL-SCNC: 3.9 MMOL/L — SIGNIFICANT CHANGE UP (ref 3.5–5.3)
PROT SERPL-MCNC: 6.9 G/DL — SIGNIFICANT CHANGE UP (ref 6–8.3)
RBC # BLD: 4.3 M/UL — SIGNIFICANT CHANGE UP (ref 4.2–5.8)
RBC # FLD: 14.6 % — HIGH (ref 10.3–14.5)
SAO2 % BLDV: 87.4 % — SIGNIFICANT CHANGE UP (ref 67–88)
SODIUM SERPL-SCNC: 140 MMOL/L — SIGNIFICANT CHANGE UP (ref 135–145)
SPECIMEN SOURCE: SIGNIFICANT CHANGE UP
SPECIMEN SOURCE: SIGNIFICANT CHANGE UP
TROPONIN T, HIGH SENSITIVITY RESULT: 85 NG/L — CRITICAL HIGH
TSH SERPL-MCNC: 2.19 UIU/ML — SIGNIFICANT CHANGE UP (ref 0.27–4.2)
WBC # BLD: 28.13 K/UL — HIGH (ref 3.8–10.5)
WBC # FLD AUTO: 28.13 K/UL — HIGH (ref 3.8–10.5)

## 2023-10-10 PROCEDURE — 99222 1ST HOSP IP/OBS MODERATE 55: CPT

## 2023-10-10 RX ORDER — METOPROLOL TARTRATE 50 MG
25 TABLET ORAL DAILY
Refills: 0 | Status: DISCONTINUED | OUTPATIENT
Start: 2023-10-10 | End: 2023-10-11

## 2023-10-10 RX ORDER — CHOLECALCIFEROL (VITAMIN D3) 125 MCG
2000 CAPSULE ORAL DAILY
Refills: 0 | Status: DISCONTINUED | OUTPATIENT
Start: 2023-10-10 | End: 2023-10-16

## 2023-10-10 RX ORDER — ACETAMINOPHEN 500 MG
975 TABLET ORAL ONCE
Refills: 0 | Status: COMPLETED | OUTPATIENT
Start: 2023-10-10 | End: 2023-10-10

## 2023-10-10 RX ORDER — BUMETANIDE 0.25 MG/ML
1 INJECTION INTRAMUSCULAR; INTRAVENOUS ONCE
Refills: 0 | Status: COMPLETED | OUTPATIENT
Start: 2023-10-10 | End: 2023-10-10

## 2023-10-10 RX ORDER — SODIUM CHLORIDE 9 MG/ML
1000 INJECTION INTRAMUSCULAR; INTRAVENOUS; SUBCUTANEOUS ONCE
Refills: 0 | Status: COMPLETED | OUTPATIENT
Start: 2023-10-10 | End: 2023-10-10

## 2023-10-10 RX ORDER — BUMETANIDE 0.25 MG/ML
1 INJECTION INTRAMUSCULAR; INTRAVENOUS DAILY
Refills: 0 | Status: DISCONTINUED | OUTPATIENT
Start: 2023-10-10 | End: 2023-10-12

## 2023-10-10 RX ORDER — CEFTRIAXONE 500 MG/1
2000 INJECTION, POWDER, FOR SOLUTION INTRAMUSCULAR; INTRAVENOUS EVERY 24 HOURS
Refills: 0 | Status: DISCONTINUED | OUTPATIENT
Start: 2023-10-10 | End: 2023-10-16

## 2023-10-10 RX ORDER — BUDESONIDE AND FORMOTEROL FUMARATE DIHYDRATE 160; 4.5 UG/1; UG/1
2 AEROSOL RESPIRATORY (INHALATION)
Refills: 0 | Status: DISCONTINUED | OUTPATIENT
Start: 2023-10-10 | End: 2023-10-16

## 2023-10-10 RX ADMIN — HEPARIN SODIUM 5000 UNIT(S): 5000 INJECTION INTRAVENOUS; SUBCUTANEOUS at 06:27

## 2023-10-10 RX ADMIN — Medication 20 MILLIGRAM(S): at 03:04

## 2023-10-10 RX ADMIN — BUMETANIDE 1 MILLIGRAM(S): 0.25 INJECTION INTRAMUSCULAR; INTRAVENOUS at 00:51

## 2023-10-10 RX ADMIN — Medication 1000 MILLIGRAM(S): at 01:48

## 2023-10-10 RX ADMIN — PIPERACILLIN AND TAZOBACTAM 25 GRAM(S): 4; .5 INJECTION, POWDER, LYOPHILIZED, FOR SOLUTION INTRAVENOUS at 16:08

## 2023-10-10 RX ADMIN — CEFTRIAXONE 100 MILLIGRAM(S): 500 INJECTION, POWDER, FOR SOLUTION INTRAMUSCULAR; INTRAVENOUS at 22:13

## 2023-10-10 RX ADMIN — Medication 650 MILLIGRAM(S): at 06:14

## 2023-10-10 RX ADMIN — SODIUM CHLORIDE 1000 MILLILITER(S): 9 INJECTION INTRAMUSCULAR; INTRAVENOUS; SUBCUTANEOUS at 07:28

## 2023-10-10 RX ADMIN — BUDESONIDE AND FORMOTEROL FUMARATE DIHYDRATE 2 PUFF(S): 160; 4.5 AEROSOL RESPIRATORY (INHALATION) at 00:53

## 2023-10-10 RX ADMIN — PIPERACILLIN AND TAZOBACTAM 25 GRAM(S): 4; .5 INJECTION, POWDER, LYOPHILIZED, FOR SOLUTION INTRAVENOUS at 00:51

## 2023-10-10 RX ADMIN — PIPERACILLIN AND TAZOBACTAM 25 GRAM(S): 4; .5 INJECTION, POWDER, LYOPHILIZED, FOR SOLUTION INTRAVENOUS at 06:27

## 2023-10-10 RX ADMIN — BUDESONIDE AND FORMOTEROL FUMARATE DIHYDRATE 2 PUFF(S): 160; 4.5 AEROSOL RESPIRATORY (INHALATION) at 09:35

## 2023-10-10 RX ADMIN — Medication 650 MILLIGRAM(S): at 03:30

## 2023-10-10 RX ADMIN — HEPARIN SODIUM 5000 UNIT(S): 5000 INJECTION INTRAVENOUS; SUBCUTANEOUS at 16:11

## 2023-10-10 NOTE — PROGRESS NOTE ADULT - ASSESSMENT
_________________________________________________________________________________________  ========>>  M E D I C A L   A T T E N D I N G    F O L L O W  U P  N O T E  <<=========  -----------------------------------------------------------------------------------------------------    - Patient seen and examined by me earlier today.   - In summary,  PLACIDO GOLDMAN is a 63y year old man admitted with   - Patient today overall doing ok, comfortable, eating OK.     ==================>> REVIEW OF SYSTEM <<=================    GEN: no fever, no chills, no pain  RESP: no SOB, no cough, no sputum  CVS: no chest pain, no palpitations, no edema  GI: no abdominal pain, no nausea, no constipation, no diarrhea  : no dysuria, no frequency, no hematuria  Neuro: no headache, no dizziness  Derm : no itching, no rash    ==================>> PHYSICAL EXAM <<=================    GEN: A&O X 3 , NAD , comfortable, pleasant, calm   HEENT: NCAT, PERRL, MMM, hearing intact  Neck: supple , no JVD appreciated  CVS: S1S2 , regular , No M/R/G appreciated  PULM: CTA B/L,  no W/R/R appreciated  ABD.: soft. non tender, non distended,  bowel sounds present  Extrem: intact pulses , no edema   PSYCH : normal mood,  not anxious          ( Note written / Date of service 10-10-23 ( This is certified to be the same as "ENTERED" date above ( for billing purposes)))    ==================>> MEDICATIONS <<====================    MEDICATIONS  (STANDING):  budesonide 160 MICROgram(s)/formoterol 4.5 MICROgram(s) Inhaler 2 Puff(s) Inhalation two times a day  buMETAnide 1 milliGRAM(s) Oral daily  heparin   Injectable 5000 Unit(s) SubCutaneous every 8 hours  metoprolol succinate ER 25 milliGRAM(s) Oral daily  piperacillin/tazobactam IVPB.. 3.375 Gram(s) IV Intermittent every 8 hours  sildenafil (REVATIO) 20 milliGRAM(s) Oral <User Schedule>    MEDICATIONS  (PRN):  acetaminophen     Tablet .. 650 milliGRAM(s) Oral every 6 hours PRN Temp greater or equal to 38C (100.4F), Mild Pain (1 - 3)  melatonin 3 milliGRAM(s) Oral at bedtime PRN Insomnia    ___________  Active diet:  Diet, Regular:   Gastroesophageal Reflux Disease (GERD)  DASH/TLC Sodium & Cholesterol Restricted (DASH)  Low Fat (LOWFAT)  1200mL Fluid Restriction ([id number])  ___________________    ==================>> VITAL SIGNS <<==================    T(C): 37.2 (10-10-23 @ 09:36), Max: 38.9 (10-10-23 @ 03:30)  HR: 108 (10-10-23 @ 13:03) (103 - 116)  BP: 104/77 (10-10-23 @ 13:03) (97/70 - 118/77)  BP(mean): --  RR: 18 (10-10-23 @ 13:03) (18 - 24)  SpO2: 99% (10-10-23 @ 13:03) (96% - 100%)     CAPILLARY BLOOD GLUCOSE        I&O's Summary       ==================>> LAB AND IMAGING <<==================                        13.0   28.13 )-----------( 193      ( 10 Oct 2023 06:30 )             41.0        10-10    140  |  104  |  26<H>  ----------------------------<  98  3.9   |  20<L>  |  1.80<H>    Ca    9.3      10 Oct 2023 06:30  Phos  4.1     10-10  Mg     1.90     10-10    TPro  6.9  /  Alb  3.8  /  TBili  2.3<H>  /  DBili  x   /  AST  26  /  ALT  20  /  AlkPhos  134<H>  10-10    PT/INR - ( 09 Oct 2023 15:00 )   PT: 14.8 sec;   INR: 1.32 ratio         PTT - ( 09 Oct 2023 15:00 )  PTT:40.3 sec                 Urinalysis Basic - ( 10 Oct 2023 06:30 )    Color: x / Appearance: x / SG: x / pH: x  Gluc: 98 mg/dL / Ketone: x  / Bili: x / Urobili: x   Blood: x / Protein: x / Nitrite: x   Leuk Esterase: x / RBC: x / WBC x   Sq Epi: x / Non Sq Epi: x / Bacteria: x    TSH:      2.19   (10-10-23)           Lipid profile:    HgA1C:     ___________________________________________________________________________________  ===============>>  A S S E S S M E N T   A N D   P L A N <<===============  ------------------------------------------------------------------------------------------          -GI/DVT Prophylaxis per protocol.    --------------------------------------------  Case discussed with   Education given on findings and plan of care  ___________________________  HEvangelist Vu D.O.  Pager: [phone]       _________________________________________________________________________________________  ========>>  M E D I C A L   A T T E N D I N G    F O L L O W  U P  N O T E  <<=========  -----------------------------------------------------------------------------------------------------    - Patient seen and examined by me earlier today.   - In summary,  PLACIDO GOLDMAN is a 63y year old man admitted with Urosepsis, now with bacteremia  - Patient today overall doing ok, comfortable, eating Fairly, Overall improved as per patient    ==================>> REVIEW OF SYSTEM <<=================    GEN: no fever, no chills, no pain:: Significant pain with urination much improved Now as per patient  RESP: no SOB, no cough, no sputum  CVS: no chest pain, no palpitations, no edema  GI: no abdominal pain, no nausea  : no dysuria, no frequency, hematuria Previously, seems stopped  Neuro: no headache, no dizziness  Derm : no itching, no rash    ==================>> PHYSICAL EXAM <<=================    GEN: A&O X 3 , NAD , comfortable, pleasant, calm , Resting in bed  HEENT: NCAT, PERRL, MMM, hearing intact, On nasal cannula 02  Neck: supple , no JVD appreciated  CVS: S1S2 , regular , No M/R/G appreciated  PULM: CTA B/L,  no W/R/R appreciated  ABD.: soft. non tender, non distended,  bowel sounds present  Extrem: intact pulses , no edema   PSYCH : normal mood,  not anxious          ( Note written / Date of service 10-10-23 ( This is certified to be the same as "ENTERED" date above ( for billing purposes)))    ==================>> MEDICATIONS <<====================    MEDICATIONS  (STANDING):  budesonide 160 MICROgram(s)/formoterol 4.5 MICROgram(s) Inhaler 2 Puff(s) Inhalation two times a day  buMETAnide 1 milliGRAM(s) Oral daily  heparin   Injectable 5000 Unit(s) SubCutaneous every 8 hours  metoprolol succinate ER 25 milliGRAM(s) Oral daily  piperacillin/tazobactam IVPB.. 3.375 Gram(s) IV Intermittent every 8 hours  sildenafil (REVATIO) 20 milliGRAM(s) Oral <User Schedule>    MEDICATIONS  (PRN):  acetaminophen     Tablet .. 650 milliGRAM(s) Oral every 6 hours PRN Temp greater or equal to 38C (100.4F), Mild Pain (1 - 3)  melatonin 3 milliGRAM(s) Oral at bedtime PRN Insomnia    ___________  Active diet:  Diet, Regular:   Gastroesophageal Reflux Disease (GERD)  DASH/TLC Sodium & Cholesterol Restricted (DASH)  Low Fat (LOWFAT)  1200mL Fluid Restriction ([id number])  ___________________    ==================>> VITAL SIGNS <<==================    T(C): 37.2 (10-10-23 @ 09:36), Max: 38.9 (10-10-23 @ 03:30)  HR: 108 (10-10-23 @ 13:03) (103 - 116)  BP: 104/77 (10-10-23 @ 13:03) (97/70 - 118/77)  RR: 18 (10-10-23 @ 13:03) (18 - 24)  SpO2: 99% (10-10-23 @ 13:03) (96% - 100%)      ==================>> LAB AND IMAGING <<==================                        13.0   28.13 )-----------( 193      ( 10 Oct 2023 06:30 )             41.0     WBC count:   28.13 <<== ,  28.12 <<==       10-10    140  |  104  |  26<H>  ----------------------------<  98  3.9   |  20<L>  |  1.80<H>    Ca    9.3      10 Oct 2023 06:30  Phos  4.1     10-10  Mg     1.90     10-10    TPro  6.9  /  Alb  3.8  /  TBili  2.3<H>  /  DBili  x   /  AST  26  /  ALT  20  /  AlkPhos  134<H>  10-10    PT/INR - ( 09 Oct 2023 15:00 )   PT: 14.8 sec;   INR: 1.32 ratio    PTT - ( 09 Oct 2023 15:00 )  PTT:40.3 sec               Urinalysis Basic - ( 10 Oct 2023 06:30 )  Color: x / Appearance: x / SG: x / pH: x  Gluc: 98 mg/dL / Ketone: x  / Bili: x / Urobili: x   Blood: x / Protein: x / Nitrite: x   Leuk Esterase: x / RBC: x / WBC x   Sq Epi: x / Non Sq Epi: x / Bacteria: x    TSH:      2.19   (10-10-23)           ____________________________    M I C R O B I O L O G Y :    Culture - Blood (collected 09 Oct 2023 14:10)  Source: .Blood Blood-Peripheral  Gram Stain (10 Oct 2023 15:17):    Growth in anaerobic bottle: Gram Negative Rods  Preliminary Report (10 Oct 2023 15:18):    Growth in anaerobic bottle: Gram Negative Rods    Culture - Blood (collected 09 Oct 2023 14:00)  Source: .Blood Blood-Peripheral  Gram Stain (10 Oct 2023 10:23):    Growth in aerobic bottle: Gram Negative Rods  Preliminary Report (10 Oct 2023 10:23):    Growth in aerobic bottle: Gram Negative Rods    Direct identification is available within approximately 3-5    hours either by Blood Panel Multiplexed PCR or Direct    MALDI-TOF. Details: [email or web address]  Organism: Blood Culture PCR (10 Oct 2023 12:19)  Organism: Blood Culture PCR (10 Oct 2023 12:19)    Sensitivities:      Method Type: PCR      -  K. pneumoniae group: Detec (K. pneumoniae, K. quasipneumoniae, K. variicola)    SARS-CoV-2: NotDetec (10-09-23 @ 15:20)    __________________________________________________________________________________  ===============>>  A S S E S S M E N T   A N D   P L A N <<===============  ------------------------------------------------------------------------------------------    63 year old male, with past history significant for Rheumatoid arthritis, HTN, HFrEF (55 to 60% - TTE of 03/08/2023), Home oxygen 5 liters, CKD, Renal cell carcinoma - s/p Partial nephrectomy, Plasma cell dyscrasia, and AICD placement, presented to the ED secondary to shortness of breath.  Diagnosed with Sepsis in the ED.     Problem/Plan - 1:  ·  Problem: Urinary tract infection with hematuria, Now with gram-negative bacteremia  Sepsis present on admission  - started on zosyn; will continue for now  - ensure optimal hydration (s/p 500 mL NaCl); caution w/ fluid, in the context of heart failure, pulmonary HTN  - f/u urine culture (in progress), blood cultures x 2 (in progress)  - Infectious disease follow-up  follow blood cultures and sensitivities, repeat blood cultures to negativity     Problem/Plan - 1:  ·  Problem: acute respiratory failure With hypoxemia.   Patient with sepsis on admission  Likely  acute exacerbation of pulmonary hypertension In setting of sepsis  - RVP, COVID-19 PCR negative.  CXR w/o gross signs of infection  - on nasal cannula 5 liters, with O2 sat to 100% >> Wean down as able  - other supportive care as needed.     Problem/Plan - 3:  ·  Problem: Lactic acidosis. Improved  Lactate:  1.9  <<== , 3.3  <<==   - in the context of sepsis  - ensure optimal hydration  Monitor     Problem/Plan - 4:  ·  Problem: Pulmonary hypertension.   ·  Plan: - per history, and uses 5 liters NC at home  - history of CHF w/ EF 25 to 30%, but TTE of 03/18/2023 w/ improved EF = 55 to 60 %)  - s/p NaCl 500 mL x one in the ED - in the context of sepsis  - on sildenafil 20 mg daily (at 10:00 PM); continuing  - also on bumetanide 1 mg daily (has been on varied doses in the past, per chart review)  - pro-BNP currently [id number]; giving bumetanide 1 mg PO x one now  - follow up pulse oximetry  - intake/output Q4H, weight daily, HOB elevation  - fluid restriction of 1.2 liters daily (please re-evaluate for more liberal fluid intake).     Problem/Plan - 5:  ·  Problem: Prophylactic measure.   ·  Plan: - heparin SQ.    Continue Current medications otherwise and monitor.  supportive care  Patient Full code    --------------------------------------------  Case discussed with patient , Nurse Practitioner   Education given on findings and plan of care  ___________________________  SALIMA Vu D.O.  Pager: [phone]       _________________________________________________________________________________________  ========>>  M E D I C A L   A T T E N D I N G    F O L L O W  U P  N O T E  <<=========  -----------------------------------------------------------------------------------------------------    - Patient seen and examined by me earlier today.   - In summary,  PLACIDO GOLDMAN is a 63y year old man admitted with Urosepsis, now with bacteremia  - Patient today overall doing ok, comfortable, eating Fairly, Overall improved as per patient    ==================>> REVIEW OF SYSTEM <<=================    GEN: no fever, no chills, no pain:: Significant pain with urination much improved Now as per patient  RESP: no SOB, no cough, no sputum  CVS: no chest pain, no palpitations, no edema  GI: no abdominal pain, no nausea  : no dysuria, no frequency, hematuria Previously, seems stopped  Neuro: no headache, no dizziness  Derm : no itching, no rash    ==================>> PHYSICAL EXAM <<=================    GEN: A&O X 3 , NAD , comfortable, pleasant, calm , Resting in bed  HEENT: NCAT, PERRL, MMM, hearing intact, On nasal cannula 02  Neck: supple , no JVD appreciated  CVS: S1S2 , regular , No M/R/G appreciated  PULM: CTA B/L,  no W/R/R appreciated  ABD.: soft. non tender, non distended,  bowel sounds present  Extrem: intact pulses , no edema   PSYCH : normal mood,  not anxious          ( Note written / Date of service 10-10-23 ( This is certified to be the same as "ENTERED" date above ( for billing purposes)))    ==================>> MEDICATIONS <<====================    MEDICATIONS  (STANDING):  budesonide 160 MICROgram(s)/formoterol 4.5 MICROgram(s) Inhaler 2 Puff(s) Inhalation two times a day  buMETAnide 1 milliGRAM(s) Oral daily  heparin   Injectable 5000 Unit(s) SubCutaneous every 8 hours  metoprolol succinate ER 25 milliGRAM(s) Oral daily  piperacillin/tazobactam IVPB.. 3.375 Gram(s) IV Intermittent every 8 hours  sildenafil (REVATIO) 20 milliGRAM(s) Oral <User Schedule>    MEDICATIONS  (PRN):  acetaminophen     Tablet .. 650 milliGRAM(s) Oral every 6 hours PRN Temp greater or equal to 38C (100.4F), Mild Pain (1 - 3)  melatonin 3 milliGRAM(s) Oral at bedtime PRN Insomnia    ___________  Active diet:  Diet, Regular:   Gastroesophageal Reflux Disease (GERD)  DASH/TLC Sodium & Cholesterol Restricted (DASH)  Low Fat (LOWFAT)  1200mL Fluid Restriction ([id number])  ___________________    ==================>> VITAL SIGNS <<==================    T(C): 37.2 (10-10-23 @ 09:36), Max: 38.9 (10-10-23 @ 03:30)  HR: 108 (10-10-23 @ 13:03) (103 - 116)  BP: 104/77 (10-10-23 @ 13:03) (97/70 - 118/77)  RR: 18 (10-10-23 @ 13:03) (18 - 24)  SpO2: 99% (10-10-23 @ 13:03) (96% - 100%)      ==================>> LAB AND IMAGING <<==================                        13.0   28.13 )-----------( 193      ( 10 Oct 2023 06:30 )             41.0     WBC count:   28.13 <<== ,  28.12 <<==       10-10    140  |  104  |  26<H>  ----------------------------<  98  3.9   |  20<L>  |  1.80<H>    Ca    9.3      10 Oct 2023 06:30  Phos  4.1     10-10  Mg     1.90     10-10    TPro  6.9  /  Alb  3.8  /  TBili  2.3<H>  /  DBili  x   /  AST  26  /  ALT  20  /  AlkPhos  134<H>  10-10    PT/INR - ( 09 Oct 2023 15:00 )   PT: 14.8 sec;   INR: 1.32 ratio    PTT - ( 09 Oct 2023 15:00 )  PTT:40.3 sec               Urinalysis Basic - ( 10 Oct 2023 06:30 )  Color: x / Appearance: x / SG: x / pH: x  Gluc: 98 mg/dL / Ketone: x  / Bili: x / Urobili: x   Blood: x / Protein: x / Nitrite: x   Leuk Esterase: x / RBC: x / WBC x   Sq Epi: x / Non Sq Epi: x / Bacteria: x    TSH:      2.19   (10-10-23)           ____________________________    M I C R O B I O L O G Y :    Culture - Blood (collected 09 Oct 2023 14:10)  Source: .Blood Blood-Peripheral  Gram Stain (10 Oct 2023 15:17):    Growth in anaerobic bottle: Gram Negative Rods  Preliminary Report (10 Oct 2023 15:18):    Growth in anaerobic bottle: Gram Negative Rods    Culture - Blood (collected 09 Oct 2023 14:00)  Source: .Blood Blood-Peripheral  Gram Stain (10 Oct 2023 10:23):    Growth in aerobic bottle: Gram Negative Rods  Preliminary Report (10 Oct 2023 10:23):    Growth in aerobic bottle: Gram Negative Rods    Direct identification is available within approximately 3-5    hours either by Blood Panel Multiplexed PCR or Direct    MALDI-TOF. Details: [email or web address]  Organism: Blood Culture PCR (10 Oct 2023 12:19)  Organism: Blood Culture PCR (10 Oct 2023 12:19)    Sensitivities:      Method Type: PCR      -  K. pneumoniae group: Detec (K. pneumoniae, K. quasipneumoniae, K. variicola)    SARS-CoV-2: NotDetec (10-09-23 @ 15:20)    __________________________________________________________________________________  ===============>>  A S S E S S M E N T   A N D   P L A N <<===============  ------------------------------------------------------------------------------------------    63 year old male, with past history significant for Rheumatoid arthritis, HTN, HFrEF (55 to 60% - TTE of 03/08/2023), Home oxygen 5 liters, CKD, Renal cell carcinoma - s/p Partial nephrectomy, Plasma cell dyscrasia, and AICD placement, presented to the ED secondary to shortness of breath.  Diagnosed with Sepsis in the ED.     Problem/Plan - 1:  ·  Problem: Urinary tract infection with hematuria, Now with gram-negative bacteremia  Sepsis present on admission  - started on zosyn; will continue for now  - ensure optimal hydration (s/p 500 mL NaCl); caution w/ fluid, in the context of heart failure, pulmonary HTN  - f/u urine culture (in progress), blood cultures x 2 (in progress)  - Infectious disease follow-up  follow blood cultures and sensitivities, repeat blood cultures to negativity  Follow CT scan imaging  follow echocardiogram     Problem/Plan - 1:  ·  Problem: acute respiratory failure With hypoxemia.   Patient with sepsis on admission  Likely  acute exacerbation of pulmonary hypertension In setting of sepsis  - RVP, COVID-19 PCR negative.  CXR w/o gross signs of infection  - on nasal cannula 5 liters, with O2 sat to 100% >> Wean down as able  - other supportive care as needed.     Problem/Plan - 3:  ·  Problem: Lactic acidosis. Improved  Lactate:  1.9  <<== , 3.3  <<==   - in the context of sepsis  - ensure optimal hydration  Monitor     Problem/Plan - 4:  ·  Problem: Pulmonary hypertension.   ·  Plan: - per history, and uses 5 liters NC at home  - history of CHF w/ EF 25 to 30%, but TTE of 03/18/2023 w/ improved EF = 55 to 60 %)  - s/p NaCl 500 mL x one in the ED - in the context of sepsis  - on sildenafil 20 mg daily (at 10:00 PM); continuing  - also on bumetanide 1 mg daily (has been on varied doses in the past, per chart review)  - pro-BNP currently [id number]; giving bumetanide 1 mg PO x one now  - follow up pulse oximetry  - intake/output Q4H, weight daily, HOB elevation  - fluid restriction of 1.2 liters daily (please re-evaluate for more liberal fluid intake).     Problem/Plan - 5:  ·  Problem: Prophylactic measure.   ·  Plan: - heparin SQ.    Continue Current medications otherwise and monitor.  supportive care  Patient Full code    --------------------------------------------  Case discussed with patient , Nurse Practitioner   Education given on findings and plan of care  ___________________________  H. Delshadfar, D.O.  Pager: [phone]

## 2023-10-10 NOTE — CONSULT NOTE ADULT - SUBJECTIVE AND OBJECTIVE BOX
CHIEF COMPLAINT: sob    HISTORY OF PRESENT ILLNESS:  63 year old male, with past history significant for Rheumatoid arthritis, HTN, HFrEF (55 to 60% - TTE of 03/08/2023), Home oxygen 5 liters, CKD, Renal cell carcinoma - s/p Partial nephrectomy, Plasma cell dyscrasia, and AICD placement, presented to the ED secondary to shortness of breath.  Seen and evaluated at bedside; restless, and complaining of feeling extremely cold, despite being covered with ~ 4 blankets and socks in place to feet.  AD.  Patient reports frequency in micturition with oliguria yesterday.  At approximately 7:00 PM, patient noted blood in the urine.  No associated burning or itching, however, has severe pain (10/10 intensity) post void.  Reports subjective fever.  No chills or sweating.  Has continued with hematuria - dark red.  Woke up with significant shortness of breath this morning and significant generalized weakness; could hardly get out of bed.  No chest pain, palpitations.  Wife called PMD who advised that patient be taken to the ED.      At baseline, patient drinks about 1.5 to 3.5 liters of water, in addition to ~ 500 mLs of other fluid (apple juice) daily.  Has not had any leg or pedal edema for the past ~ 1 to 2 weeks.    Vital signs upon ED presentation as follows: BP = 115/81, HR = 134, RR = 24, T = 37/4 C to 38.1 C (99.3 F to 100.6 F), O2 Sat = 93% on 5L NC (to 100% on 5L NC).  Diagnosed with Sepsis and prescribed zosyn, NaCl 500 mL and Tylenol 1 gram IV in the ED.      Allergies    No Known Allergies    Intolerances    	    MEDICATIONS:  buMETAnide 1 milliGRAM(s) Oral daily  heparin   Injectable 5000 Unit(s) SubCutaneous every 8 hours  metoprolol succinate ER 25 milliGRAM(s) Oral daily  sildenafil (REVATIO) 20 milliGRAM(s) Oral <User Schedule>    piperacillin/tazobactam IVPB.. 3.375 Gram(s) IV Intermittent every 8 hours    budesonide 160 MICROgram(s)/formoterol 4.5 MICROgram(s) Inhaler 2 Puff(s) Inhalation two times a day    acetaminophen     Tablet .. 650 milliGRAM(s) Oral every 6 hours PRN  melatonin 3 milliGRAM(s) Oral at bedtime PRN            PAST MEDICAL & SURGICAL HISTORY:  Hypertension      Rheumatoid arthritis      Neoplasm of uncertain behavior of kidney, right      Chronic systolic congestive heart failure      Renal cell carcinoma      Plasma cell dyscrasia      Chronic kidney disease (CKD)      Pulmonary hypertension      On home oxygen therapy      History of cardiac cath  jan 2022, at [facility] no stent      H/O partial nephrectomy      AICD (automatic cardioverter/defibrillator) present          FAMILY HISTORY:  FH: diabetes mellitus (Mother)    Family history of heart disease (Father)        SOCIAL HISTORY:    non smoker. indep in adl      REVIEW OF SYSTEMS:  See HPI, otherwise complete 10 point review of systems negative    [ ] All others negative	      PHYSICAL EXAM:  T(C): 36.7 (10-10-23 @ 06:41), Max: 38.9 (10-10-23 @ 03:30)  HR: 106 (10-10-23 @ 06:41) (105 - 134)  BP: 98/70 (10-10-23 @ 06:41) (98/70 - 119/87)  RR: 20 (10-10-23 @ 06:41) (18 - 24)  SpO2: 99% (10-10-23 @ 06:41) (93% - 100%)  Wt(kg): --  I&O's Summary      Appearance: No Acute Distress	  HEENT:  Normal oral mucosa, PERRL, EOMI	  Cardiovascular: Normal S1 S2, No JVD, No murmurs/rubs/gallops  Respiratory: Lungs clear to auscultation bilaterally  Gastrointestinal:  Soft, Non-tender, + BS	  Skin: No rashes, No ecchymoses, No cyanosis	  Neurologic: Non-focal  Extremities: No clubbing, cyanosis or edema  Vascular: Peripheral pulses palpable 2+ bilaterally  Psychiatry: A & O x 3, Mood & affect appropriate    Laboratory Data:	 	    CBC Full  -  ( 10 Oct 2023 06:30 )  WBC Count : 28.13 K/uL  Hemoglobin : 13.0 g/dL  Hematocrit : 41.0 %  Platelet Count - Automated : 193 K/uL  Mean Cell Volume : 95.3 fL  Mean Cell Hemoglobin : 30.2 pg  Mean Cell Hemoglobin Concentration : 31.7 gm/dL  Auto Neutrophil # : 23.77 K/uL  Auto Lymphocyte # : 3.02 K/uL  Auto Monocyte # : 1.07 K/uL  Auto Eosinophil # : 0.00 K/uL  Auto Basophil # : 0.06 K/uL  Auto Neutrophil % : 84.6 %  Auto Lymphocyte % : 10.7 %  Auto Monocyte % : 3.8 %  Auto Eosinophil % : 0.0 %  Auto Basophil % : 0.2 %    10-10    140  |  104  |  26<H>  ----------------------------<  98  3.9   |  20<L>  |  1.80<H>  10-09    138  |  103  |  25<H>  ----------------------------<  85  4.7   |  15<L>  |  1.57<H>    Ca    9.3      10 Oct 2023 06:30  Ca    9.0      09 Oct 2023 21:31  Phos  4.1     10-10  Phos  4.2     10-09  Mg     1.90     10-10  Mg     1.80     10-09    TPro  6.9  /  Alb  3.8  /  TBili  2.3<H>  /  DBili  x   /  AST  26  /  ALT  20  /  AlkPhos  134<H>  10-10  TPro  7.7  /  Alb  4.0  /  TBili  1.6<H>  /  DBili  x   /  AST  94<H>  /  ALT  35  /  AlkPhos  138<H>  10-09      proBNP:   Lipid Profile:   HgA1c:   TSH: Thyroid Stimulating Hormone, Serum: 2.19 uIU/mL (10-10 @ 06:30)        CARDIAC MARKERS:            Interpretation of Telemetry: 	    ECG:  	  RADIOLOGY:  OTHER: 	    PREVIOUS DIAGNOSTIC TESTING:    [ ] Echocardiogram:  [ ] Catheterization:  [ ] Stress Test:  	    Assessment:  63 year old male, with past history significant for Rheumatoid arthritis, HTN, HFrEF (55 to 60% - TTE of 03/08/2023), Home oxygen 5 liters, CKD, Renal cell carcinoma - s/p Partial nephrectomy, Plasma cell dyscrasia, and AICD placement, presented to the ED secondary to shortness of breath.  Seen and evaluated at bedside; restless, and complaining of feeling extremely cold, despite being covered with ~ 4 blankets and socks in place to feet.  AD.  Patient reports frequency in micturition with oliguria yesterday.  At approximately 7:00 PM, patient noted blood in the urine.  No associated burning or itching, however, has severe pain (10/10 intensity) post void.  Reports subjective fever.  No chills or sweating.  Has continued with hematuria - dark red.  Woke up with significant shortness of breath this morning and significant generalized weakness; could hardly get out of bed.  No chest pain, palpitations.  Wife called PMD who advised that patient be taken to the ED.     Recs:  cardiac stable  vol status appears stable, doubt acute chf as cause of worsening dyspnea/hypoxia  follows with hf dr llanes as op, please notify of admission  suggest ct chest  re-echo  pulmonary consult  infectious workup  icd interrogation  cw lv dysfxn meds  will follow       Advanced care planning forms were discussed. Code status including forceful chest compressions, defibrillation and intubation were discussed. The risks benefits and alternatives to pertinent cardiac procedures and interventions were discussed in detail and all questions were answered. Duration: 15 minutes.  Greater than 90 minutes spent on total encounter; more than 50% of the visit was spent counseling and/or coordinating care by the attending physician.   	  Juarez Carver MD   Cardiovascular Diseases  [phone]

## 2023-10-10 NOTE — CONSULT NOTE ADULT - ASSESSMENT
Impression:  63 year old male, with past history significant for Rheumatoid arthritis, HTN, HF (55 to 60% - TTE of 03/08/2023), Home oxygen 5 liters, CKD, Renal cell carcinoma - s/p Partial nephrectomy, Plasma cell dyscrasia, and AICD placement, presented to the ED secondary to shortness of breath. Work up showed fever  of 102, tachycardia and tachypnea, along with elevated ProBNP, troponin. Positive UA and blood cultures growing Klebsiella.      Antimicrobials:  piperacillin/tazobactam: 10/9 - current  Vanco 1x    Assessment:  *Klebsiella Bacteremia likely secondary to UTI  *Sepsis as evidenced by pyrexia, leukocytosis, tachycardia and tachypnea secondary to above  *Acute on chronic hypoxic respiratory distress with negative RVP and without consolidation on CXR, likely due to HF exacerbation ?Pulm HTN  *Troponemia   *Transaminitis  *RCC s/p partial nephrectomy now with CKD  *AICD placement     Recommendations: PLEASE DEFER ALL CHANGES IN PLAN UNTIL SIGNED BY ATTENDING. All recommendations are tentative pending Attending Attestation.  - d/c piperacillin/tazobactam   - Start Ceftriaxone 2g IV QD, PCR negative for ESBL gene indicative of likely no resistance, will need to complete 10 day course starting from 10/9 (end date 10/18)  - obtain CT abd/pelv to rule out abscess due to the magnitude of the elevation of the WBC  - Urine culture specimen received with results pending    Jd Pedroza DO, PGY-4   ID Fellow  [employer] Teams Preferred  After 5pm/weekends call [phone]  Impression:  63 year old male, with past history significant for Rheumatoid arthritis, HTN, HF (55 to 60% - TTE of 03/08/2023), Home oxygen 5 liters, CKD, Renal cell carcinoma - s/p Partial nephrectomy, Plasma cell dyscrasia, and AICD placement, presented to the ED secondary to shortness of breath. Work up showed fever  of 102, tachycardia and tachypnea, along with elevated ProBNP, troponin. Positive UA and blood cultures growing Klebsiella.      Antimicrobials:  piperacillin/tazobactam: 10/9 - current  Vanco 1x    Assessment:  *Klebsiella Bacteremia likely secondary to UTI  *Sepsis as evidenced by pyrexia, leukocytosis, tachycardia and tachypnea secondary to above  *Acute on chronic hypoxic respiratory distress with negative RVP and without consolidation on CXR, likely due to HF exacerbation ?Pulm HTN  *Troponemia   *Transaminitis  *RCC s/p partial nephrectomy now with CKD  *AICD placement     Recommendations:   - d/c piperacillin/tazobactam   - Start Ceftriaxone 2g IV QD, PCR negative for ESBL gene indicative of likely no resistance, will need to complete 10 day course starting from 10/9 (end date 10/18)  - obtain CT abd/pelv to rule out abscess due to the magnitude of the elevation of the WBC  - Urine culture specimen received with results pending  - repeat blood culture to assure clearance    Jd Pedroza DO, PGY-4   ID Fellow  [employer] Teams Preferred  After 5pm/weekends call [phone]

## 2023-10-10 NOTE — CONSULT NOTE ADULT - SUBJECTIVE AND OBJECTIVE BOX
Patient is a 63y old  Male who presents with a chief complaint of Sepsis due to undetermined organism, Urinary tract infection (10 Oct 2023 08:10)    HPI:  63 year old male, with past history significant for Rheumatoid arthritis, HTN, HF (55 to 60% - TTE of 03/08/2023), Home oxygen 5 liters, CKD, Renal cell carcinoma - s/p Partial nephrectomy, Plasma cell dyscrasia, and AICD placement, presented to the ED secondary to shortness of breath. Patient reports frequency in micturition with oliguria yesterday.  At approximately 7:00 PM on the day before presentation , patient noted blood in the urine.  No associated burning or itching, however, has severe pain (10/10 intensity) post void.  Reports subjective fever. Woke up with significant shortness of breath this morning and significant generalized weakness; could hardly get out of bed.  No chest pain, palpitations.  Wife called PMD who advised that patient be taken to the ED.  Upon work up patient was found to be febrile along with tachycardic and tachypneic, WBC elevated wto 28.12 and BUN/Cr 26/1.61. Patient also found to have [place] 7[address] and troponin 80. UA was positive with mod Leukocyte esterase and 440 wbc. Blood culture growing GNR which speciated to Klebsiella via PCR. UCx still pending        REVIEW OF SYSTEMS  pending full examination      prior hospital charts reviewed [V]  primary team notes reviewed [V]  other consultant notes reviewed [V]    PAST MEDICAL & SURGICAL HISTORY:  Hypertension      Rheumatoid arthritis      Neoplasm of uncertain behavior of kidney, right      Chronic systolic congestive heart failure      Renal cell carcinoma      Plasma cell dyscrasia      Chronic kidney disease (CKD)      Pulmonary hypertension      On home oxygen therapy      History of cardiac cath  jan 2022, at [place] no stent      H/O partial nephrectomy      AICD (automatic cardioverter/defibrillator) present          SOCIAL HISTORY:  - Denied smoking/vaping/alcohol/recreational drug use    FAMILY HISTORY:  FH: diabetes mellitus (Mother)    Family history of heart disease (Father)        Allergies  No Known Allergies        ANTIMICROBIALS:  piperacillin/tazobactam IVPB.. 3.375 every 8 hours      ANTIMICROBIALS (past 90 days):  MEDICATIONS  (STANDING):  piperacillin/tazobactam IVPB..   25 mL/Hr IV Intermittent (10-10-23 @ 06:27)   25 mL/Hr IV Intermittent (10-10-23 @ 00:51)    piperacillin/tazobactam IVPB...   200 mL/Hr IV Intermittent (10-09-23 @ 14:39)        OTHER MEDS:   MEDICATIONS  (STANDING):  acetaminophen     Tablet .. 650 every 6 hours PRN  budesonide 160 MICROgram(s)/formoterol 4.5 MICROgram(s) Inhaler 2 two times a day  buMETAnide 1 daily  heparin   Injectable 5000 every 8 hours  melatonin 3 at bedtime PRN  metoprolol succinate ER 25 daily  sildenafil (REVATIO) 20 <User Schedule>      VITALS:  Vital Signs Last 24 Hrs  T(F): 98.9 (10-10-23 @ 09:36), Max: 102 (10-10-23 @ 03:30)    Vital Signs Last 24 Hrs  HR: 108 (10-10-23 @ 13:03) (103 - 134)  BP: 104/77 (10-10-23 @ 13:03) (97/70 - 119/87)  RR: 18 (10-10-23 @ 13:03)  SpO2: 99% (10-10-23 @ 13:03) (93% - 100%)  Wt(kg): --    EXAM:  pending full examination      Labs:                        13.0   28.13 )-----------( 193      ( 10 Oct 2023 06:30 )             41.0     10-10    140  |  104  |  26<H>  ----------------------------<  98  3.9   |  20<L>  |  1.80<H>    Ca    9.3      10 Oct 2023 06:30  Phos  4.1     10-10  Mg     1.90     10-10    TPro  6.9  /  Alb  3.8  /  TBili  2.3<H>  /  DBili  x   /  AST  26  /  ALT  20  /  AlkPhos  134<H>  10-10      WBC Trend:  WBC Count: 28.13 (10-10-23 @ 06:30)  WBC Count: 28.12 (10-09-23 @ 15:00)      Auto Neutrophil #: 23.77 K/uL (10-10-23 @ 06:30)  Auto Neutrophil #: 23.54 K/uL (10-09-23 @ 15:00)  Band Neutrophils %: 3.4 % (10-09-23 @ 15:00)  Auto Neutrophil #: 5.19 K/uL (02-24-23 @ 03:44)  Auto Neutrophil #: 6.43 K/uL (02-22-23 @ 20:46)      Creatine Trend:  Creatinine: 1.80 (10-10)  Creatinine: 1.57 (10-09)  Creatinine: 1.61 (10-09)      Liver Biochemical Testing Trend:  Alanine Aminotransferase (ALT/SGPT): 20 (10-10)  Alanine Aminotransferase (ALT/SGPT): 35 (10-09)  Alanine Aminotransferase (ALT/SGPT): 14 (03-06)  Alanine Aminotransferase (ALT/SGPT): 14 (03-04)  Alanine Aminotransferase (ALT/SGPT): 18 (02-27)  Aspartate Aminotransferase (AST/SGOT): 26 (10-10-23 @ 06:30)  Aspartate Aminotransferase (AST/SGOT): 94 (10-09-23 @ 15:00)  Aspartate Aminotransferase (AST/SGOT): 23 (03-06-23 @ 07:12)  Aspartate Aminotransferase (AST/SGOT): 22 (03-04-23 @ 06:43)  Aspartate Aminotransferase (AST/SGOT): 23 (02-27-23 @ 06:20)  Bilirubin Total: 2.3 (10-10)  Bilirubin Total: 1.6 (10-09)  Bilirubin Total, Serum: 0.4 (03-06)  Bilirubin Total, Serum: 0.3 (03-04)  Bilirubin Total, Serum: 0.5 (02-27)      Trend LDH  02-25-23 @ 06:55  305<H>      Auto Eosinophil %: 0.0 % (10-10-23 @ 06:30)  Auto Eosinophil %: 0.0 % (10-09-23 @ 15:00)      Urinalysis Basic - ( 10 Oct 2023 06:30 )    Color: x / Appearance: x / SG: x / pH: x  Gluc: 98 mg/dL / Ketone: x  / Bili: x / Urobili: x   Blood: x / Protein: x / Nitrite: x   Leuk Esterase: x / RBC: x / WBC x   Sq Epi: x / Non Sq Epi: x / Bacteria: x        MICROBIOLOGY:    MRSA PCR Result.: Tomer (03-04-23 @ 12:36)      Culture - Blood (collected 09 Oct 2023 14:00)  Source: .Blood Blood-Peripheral  Preliminary Report:    Growth in aerobic bottle: Gram Negative Rods    Direct identification is available within approximately 3-5    hours either by Blood Panel Multiplexed PCR or Direct    MALDI-TOF. Details: [email or web address]  Organism: Blood Culture PCR  Organism: Blood Culture PCR    Sensitivities:      Method Type: PCR      -  K. pneumoniae group: Detec (K. pneumoniae, K. quasipneumoniae, K. variicola)    Culture - Tissue with Gram Stain (collected 10 Mar 2023 23:22)  Source: .Tissue Other  Final Report:    No growth at 5 days    Culture - Fungal, Tissue (collected 10 Mar 2023 23:22)  Source: .Tissue Other  Final Report:    No fungus isolated at 4 weeks.    Culture - Acid Fast - Tissue w/Smear (collected 10 Mar 2023 23:22)  Source: .Tissue Other  Final Report:    No acid-fast bacilli isolated after 6 weeks.    Culture - Urine (collected 01 May 2022 01:37)  Source: Clean Catch Clean Catch (Midstream)  Final Report:    <10,000 CFU/mL Normal Urogenital Bri    Culture - Blood (collected 01 May 2022 00:15)  Source: .Blood Blood-Peripheral  Final Report:    No Growth Final    Culture - Blood (collected 01 May 2022 00:15)  Source: .Blood Blood-Peripheral  Final Report:    No Growth Final      HIV-1/2 Combo Result: Nonreact (12-27-22 @ 07:22)      Rapid RVP Result: NotDetec (10-09 @ 15:20)    Troponin T, High Sensitivity Result: 85 (10-10)  Troponin T, High Sensitivity Result: 81 (10-09)  Troponin T, High Sensitivity Result: 86 (10-09)    Lactate, Blood: 1.9 (10-10 @ 06:30)  Blood Gas Venous - Lactate: 2.8 (10-10 @ 06:30)  Lactate, Blood: 3.3 (10-09 @ 17:25)  Blood Gas Venous - Lactate: 4.3 (10-09 @ 15:00)        RADIOLOGY:  < from: Xray Chest 1 View- PORTABLE-Urgent (10.09.23 @ 14:47) >  IMPRESSION:  Clear lungs.    Mild cardiomegaly. Prominent main pulmonary artery segment, recommend   correlation for possible pulmonary arterial hypertension    < end of copied text >     Patient is a 63y old  Male who presents with a chief complaint of Sepsis due to undetermined organism, Urinary tract infection (10 Oct 2023 08:10)    HPI:  63 year old male, with past history significant for Rheumatoid arthritis, HTN, HF (55 to 60% - TTE of 03/08/2023), Home oxygen 5 liters, CKD, Renal cell carcinoma - s/p Partial nephrectomy, Plasma cell dyscrasia, and AICD placement, presented to the ED secondary to shortness of breath. Patient reports frequency in micturition with oliguria yesterday.  At approximately 7:00 PM on the day before presentation , patient noted blood in the urine.  No associated burning or itching, however, has severe pain (10/10 intensity) post void.  Reports subjective fever. Woke up with significant shortness of breath this morning and significant generalized weakness; could hardly get out of bed.  No chest pain, palpitations.  Wife called PMD who advised that patient be taken to the ED.  Upon work up patient was found to be febrile along with tachycardic and tachypneic, WBC elevated wto 28.12 and BUN/Cr 26/1.61. Patient also found to have an elevated ProBNP of 02100 and troponin 80. UA was positive with mod Leukocyte esterase and 440 wbc. Blood culture growing GNR which speciated to Klebsiella via PCR. UCx still pending. Currently patient states he feels better than when he initially came to the hospital he doesnt feel like he has fevers and doesn't have pain with urination anymore. He does admit to having frequent sneezing.       REVIEW OF SYSTEMS  Constitutional: No fevers, chills, weight loss or fatigue   Skin: No rash, no phlebitis	  Eyes: No discharge	  ENMT: No sore throat, oral thrush, ulcers or exudate  Respiratory: No cough, no SOB. Positive SOB on exertion. Positive sneezing  Cardiovascular:  No chest pain, palpitations or edema   Gastrointestinal: No pain, nausea, vomiting, diarrhea or constipation	  Genitourinary: No dysuria, discharge or flank pain  MSK: No arthralgias or back pain   Neurological: No HA, no weakness, no seizures, no AMS       prior hospital charts reviewed [V]  primary team notes reviewed [V]  other consultant notes reviewed [V]    PAST MEDICAL & SURGICAL HISTORY:  Hypertension      Rheumatoid arthritis      Neoplasm of uncertain behavior of kidney, right      Chronic systolic congestive heart failure      Renal cell carcinoma      Plasma cell dyscrasia      Chronic kidney disease (CKD)      Pulmonary hypertension      On home oxygen therapy      History of cardiac cath  jan 2022, at [place] no stent      H/O partial nephrectomy      AICD (automatic cardioverter/defibrillator) present          SOCIAL HISTORY:  lives with his wife, worked as a , denies smoking/etoh and recreational drugs, denies travel, had a pet dog 5 years ago     FAMILY HISTORY:  FH: diabetes mellitus (Mother)    Family history of heart disease (Father)        Allergies  No Known Allergies        ANTIMICROBIALS:  piperacillin/tazobactam IVPB.. 3.375 every 8 hours      ANTIMICROBIALS (past 90 days):  MEDICATIONS  (STANDING):  piperacillin/tazobactam IVPB..   25 mL/Hr IV Intermittent (10-10-23 @ 06:27)   25 mL/Hr IV Intermittent (10-10-23 @ 00:51)    piperacillin/tazobactam IVPB...   200 mL/Hr IV Intermittent (10-09-23 @ 14:39)        OTHER MEDS:   MEDICATIONS  (STANDING):  acetaminophen     Tablet .. 650 every 6 hours PRN  budesonide 160 MICROgram(s)/formoterol 4.5 MICROgram(s) Inhaler 2 two times a day  buMETAnide 1 daily  heparin   Injectable 5000 every 8 hours  melatonin 3 at bedtime PRN  metoprolol succinate ER 25 daily  sildenafil (REVATIO) 20 <User Schedule>      VITALS:  Vital Signs Last 24 Hrs  T(F): 98.9 (10-10-23 @ 09:36), Max: 102 (10-10-23 @ 03:30)    Vital Signs Last 24 Hrs  HR: 108 (10-10-23 @ 13:03) (103 - 134)  BP: 104/77 (10-10-23 @ 13:03) (97/70 - 119/87)  RR: 18 (10-10-23 @ 13:03)  SpO2: 99% (10-10-23 @ 13:03) (93% - 100%)  Wt(kg): --    EXAM:  General: Patient appears slightly uncomfortable, no acute distress,   HEENT: NCAT, PERRL, anicteric sclera, mucous membranes moist and intact  Neck: Supple, No lymphadenopathy  CV: +S1/S2, RRR, no M/R/G  Lungs: No respiratory distress, CTA b/l, no wheezing, rales or rhonchi  Abd:  BS4+, Soft, NTND  : suprapubic tenderness  Neuro: AAOx3. No focal deficits noted.   Ext: No cyanosis, no edema, 2+ pulses in upper and lower extremities   Msk: freely moving upper and lower extremities  Skin: No rash, no phlebitis, erythema or edema       Labs:                        13.0   28.13 )-----------( 193      ( 10 Oct 2023 06:30 )             41.0     10-10    140  |  104  |  26<H>  ----------------------------<  98  3.9   |  20<L>  |  1.80<H>    Ca    9.3      10 Oct 2023 06:30  Phos  4.1     10-10  Mg     1.90     10-10    TPro  6.9  /  Alb  3.8  /  TBili  2.3<H>  /  DBili  x   /  AST  26  /  ALT  20  /  AlkPhos  134<H>  10-10      WBC Trend:  WBC Count: 28.13 (10-10-23 @ 06:30)  WBC Count: 28.12 (10-09-23 @ 15:00)      Auto Neutrophil #: 23.77 K/uL (10-10-23 @ 06:30)  Auto Neutrophil #: 23.54 K/uL (10-09-23 @ 15:00)  Band Neutrophils %: 3.4 % (10-09-23 @ 15:00)  Auto Neutrophil #: 5.19 K/uL (02-24-23 @ 03:44)  Auto Neutrophil #: 6.43 K/uL (02-22-23 @ 20:46)      Creatine Trend:  Creatinine: 1.80 (10-10)  Creatinine: 1.57 (10-09)  Creatinine: 1.61 (10-09)      Liver Biochemical Testing Trend:  Alanine Aminotransferase (ALT/SGPT): 20 (10-10)  Alanine Aminotransferase (ALT/SGPT): 35 (10-09)  Alanine Aminotransferase (ALT/SGPT): 14 (03-06)  Alanine Aminotransferase (ALT/SGPT): 14 (03-04)  Alanine Aminotransferase (ALT/SGPT): 18 (02-27)  Aspartate Aminotransferase (AST/SGOT): 26 (10-10-23 @ 06:30)  Aspartate Aminotransferase (AST/SGOT): 94 (10-09-23 @ 15:00)  Aspartate Aminotransferase (AST/SGOT): 23 (03-06-23 @ 07:12)  Aspartate Aminotransferase (AST/SGOT): 22 (03-04-23 @ 06:43)  Aspartate Aminotransferase (AST/SGOT): 23 (02-27-23 @ 06:20)  Bilirubin Total: 2.3 (10-10)  Bilirubin Total: 1.6 (10-09)  Bilirubin Total, Serum: 0.4 (03-06)  Bilirubin Total, Serum: 0.3 (03-04)  Bilirubin Total, Serum: 0.5 (02-27)      Trend LDH  02-25-23 @ 06:55  305<H>      Auto Eosinophil %: 0.0 % (10-10-23 @ 06:30)  Auto Eosinophil %: 0.0 % (10-09-23 @ 15:00)      Urinalysis Basic - ( 10 Oct 2023 06:30 )    Color: x / Appearance: x / SG: x / pH: x  Gluc: 98 mg/dL / Ketone: x  / Bili: x / Urobili: x   Blood: x / Protein: x / Nitrite: x   Leuk Esterase: x / RBC: x / WBC x   Sq Epi: x / Non Sq Epi: x / Bacteria: x        MICROBIOLOGY:    MRSA PCR Result.: NotDetec (03-04-23 @ 12:36)      Culture - Blood (collected 09 Oct 2023 14:00)  Source: .Blood Blood-Peripheral  Preliminary Report:    Growth in aerobic bottle: Gram Negative Rods    Direct identification is available within approximately 3-5    hours either by Blood Panel Multiplexed PCR or Direct    MALDI-TOF. Details: [email or web address]  Organism: Blood Culture PCR  Organism: Blood Culture PCR    Sensitivities:      Method Type: PCR      -  K. pneumoniae group: Detec (K. pneumoniae, K. quasipneumoniae, K. variicola)    Culture - Tissue with Gram Stain (collected 10 Mar 2023 23:22)  Source: .Tissue Other  Final Report:    No growth at 5 days    Culture - Fungal, Tissue (collected 10 Mar 2023 23:22)  Source: .Tissue Other  Final Report:    No fungus isolated at 4 weeks.    Culture - Acid Fast - Tissue w/Smear (collected 10 Mar 2023 23:22)  Source: .Tissue Other  Final Report:    No acid-fast bacilli isolated after 6 weeks.    Culture - Urine (collected 01 May 2022 01:37)  Source: Clean Catch Clean Catch (Midstream)  Final Report:    <10,000 CFU/mL Normal Urogenital Bri    Culture - Blood (collected 01 May 2022 00:15)  Source: .Blood Blood-Peripheral  Final Report:    No Growth Final    Culture - Blood (collected 01 May 2022 00:15)  Source: .Blood Blood-Peripheral  Final Report:    No Growth Final      HIV-1/2 Combo Result: Nonreact (12-27-22 @ 07:22)      Rapid RVP Result: NotDetec (10-09 @ 15:20)    Troponin T, High Sensitivity Result: 85 (10-10)  Troponin T, High Sensitivity Result: 81 (10-09)  Troponin T, High Sensitivity Result: 86 (10-09)    Lactate, Blood: 1.9 (10-10 @ 06:30)  Blood Gas Venous - Lactate: 2.8 (10-10 @ 06:30)  Lactate, Blood: 3.3 (10-09 @ 17:25)  Blood Gas Venous - Lactate: 4.3 (10-09 @ 15:00)        RADIOLOGY:  < from: Xray Chest 1 View- PORTABLE-Urgent (10.09.23 @ 14:47) >  IMPRESSION:  Clear lungs.    Mild cardiomegaly. Prominent main pulmonary artery segment, recommend   correlation for possible pulmonary arterial hypertension    < end of copied text >

## 2023-10-10 NOTE — ED ADULT NURSE REASSESSMENT NOTE - NS ED NURSE REASSESS COMMENT FT1
Report received break RN Simi. Pt remains a&ox4, denying chest pain, sob, n+v, headache, dizziness, fever, chills. Breathing even, unlabored; no sign of distress. Pending bed assignment. Safety maintained.
pt a&ox4, denying chest pain, sob, n+v, headache, dizziness, fever, chills. Breathing even, unlabored; no signs of distress. Safety maintained.
received report from flor. pt A&Ox4, vitally stable. denies chest pain, SOB, n/v, headache, dizziness. safety maintained. night time meds given. pt educated on receiving echo in the morning. no toher complaints. vivi chavarria
Pt a&ox4, denying chest pain, sob, n+v, headache, dizziness, fever, chills. Breathing even, unlabored; no signs of distress. Pending bed assignment.
Pt remains a&ox4, denying chest pain, headache, dizziness, fever, chills. Pt endorses mild improvement to sob, and remains on 5L NC. Breathing even, unlabored. Safety maintained.
No difficulties

## 2023-10-10 NOTE — ED ADULT NURSE REASSESSMENT NOTE - PAIN: RESPONSE TO INTERVENTIONS
relief
You can access the FollowMyHealth Patient Portal offered by [facility] by registering at the following website: [email or web address]. By joining [facility]’s FollowMyHealth portal, you will also be able to view your health information using other applications (apps) compatible with our system.

## 2023-10-11 ENCOUNTER — APPOINTMENT (OUTPATIENT)
Dept: ELECTROPHYSIOLOGY | Facility: CLINIC | Age: 63
End: 2023-10-11

## 2023-10-11 LAB
ALBUMIN SERPL ELPH-MCNC: 3.4 G/DL — SIGNIFICANT CHANGE UP (ref 3.3–5)
ALP SERPL-CCNC: 114 U/L — SIGNIFICANT CHANGE UP (ref 40–120)
ALT FLD-CCNC: 15 U/L — SIGNIFICANT CHANGE UP (ref 4–41)
ANION GAP SERPL CALC-SCNC: 14 MMOL/L — SIGNIFICANT CHANGE UP (ref 7–14)
APPEARANCE UR: CLEAR — SIGNIFICANT CHANGE UP
AST SERPL-CCNC: 17 U/L — SIGNIFICANT CHANGE UP (ref 4–40)
BASOPHILS # BLD AUTO: 0.04 K/UL — SIGNIFICANT CHANGE UP (ref 0–0.2)
BASOPHILS NFR BLD AUTO: 0.2 % — SIGNIFICANT CHANGE UP (ref 0–2)
BILIRUB SERPL-MCNC: 1.1 MG/DL — SIGNIFICANT CHANGE UP (ref 0.2–1.2)
BILIRUB UR-MCNC: NEGATIVE — SIGNIFICANT CHANGE UP
BUN SERPL-MCNC: 26 MG/DL — HIGH (ref 7–23)
CALCIUM SERPL-MCNC: 8.9 MG/DL — SIGNIFICANT CHANGE UP (ref 8.4–10.5)
CHLORIDE SERPL-SCNC: 103 MMOL/L — SIGNIFICANT CHANGE UP (ref 98–107)
CO2 SERPL-SCNC: 21 MMOL/L — LOW (ref 22–31)
COLOR SPEC: YELLOW — SIGNIFICANT CHANGE UP
CREAT SERPL-MCNC: 1.62 MG/DL — HIGH (ref 0.5–1.3)
DIFF PNL FLD: ABNORMAL
EGFR: 47 ML/MIN/1.73M2 — LOW
EOSINOPHIL # BLD AUTO: 0.07 K/UL — SIGNIFICANT CHANGE UP (ref 0–0.5)
EOSINOPHIL NFR BLD AUTO: 0.4 % — SIGNIFICANT CHANGE UP (ref 0–6)
GLUCOSE SERPL-MCNC: 79 MG/DL — SIGNIFICANT CHANGE UP (ref 70–99)
GLUCOSE UR QL: NEGATIVE MG/DL — SIGNIFICANT CHANGE UP
HCT VFR BLD CALC: 35.7 % — LOW (ref 39–50)
HGB BLD-MCNC: 11.5 G/DL — LOW (ref 13–17)
IANC: 15.26 K/UL — HIGH (ref 1.8–7.4)
IMM GRANULOCYTES NFR BLD AUTO: 0.5 % — SIGNIFICANT CHANGE UP (ref 0–0.9)
KETONES UR-MCNC: ABNORMAL MG/DL
LEUKOCYTE ESTERASE UR-ACNC: ABNORMAL
LYMPHOCYTES # BLD AUTO: 13.4 % — SIGNIFICANT CHANGE UP (ref 13–44)
LYMPHOCYTES # BLD AUTO: 2.55 K/UL — SIGNIFICANT CHANGE UP (ref 1–3.3)
MAGNESIUM SERPL-MCNC: 2 MG/DL — SIGNIFICANT CHANGE UP (ref 1.6–2.6)
MCHC RBC-ENTMCNC: 30.7 PG — SIGNIFICANT CHANGE UP (ref 27–34)
MCHC RBC-ENTMCNC: 32.2 GM/DL — SIGNIFICANT CHANGE UP (ref 32–36)
MCV RBC AUTO: 95.5 FL — SIGNIFICANT CHANGE UP (ref 80–100)
MONOCYTES # BLD AUTO: 0.96 K/UL — HIGH (ref 0–0.9)
MONOCYTES NFR BLD AUTO: 5.1 % — SIGNIFICANT CHANGE UP (ref 2–14)
NEUTROPHILS # BLD AUTO: 15.26 K/UL — HIGH (ref 1.8–7.4)
NEUTROPHILS NFR BLD AUTO: 80.4 % — HIGH (ref 43–77)
NITRITE UR-MCNC: NEGATIVE — SIGNIFICANT CHANGE UP
NRBC # BLD: 0 /100 WBCS — SIGNIFICANT CHANGE UP (ref 0–0)
NRBC # FLD: 0 K/UL — SIGNIFICANT CHANGE UP (ref 0–0)
PH UR: 6 — SIGNIFICANT CHANGE UP (ref 5–8)
PHOSPHATE SERPL-MCNC: 2.9 MG/DL — SIGNIFICANT CHANGE UP (ref 2.5–4.5)
PLATELET # BLD AUTO: 179 K/UL — SIGNIFICANT CHANGE UP (ref 150–400)
POTASSIUM SERPL-MCNC: 3.6 MMOL/L — SIGNIFICANT CHANGE UP (ref 3.5–5.3)
POTASSIUM SERPL-SCNC: 3.6 MMOL/L — SIGNIFICANT CHANGE UP (ref 3.5–5.3)
PROT SERPL-MCNC: 6.4 G/DL — SIGNIFICANT CHANGE UP (ref 6–8.3)
PROT UR-MCNC: 100 MG/DL
RBC # BLD: 3.74 M/UL — LOW (ref 4.2–5.8)
RBC # FLD: 14.5 % — SIGNIFICANT CHANGE UP (ref 10.3–14.5)
SODIUM SERPL-SCNC: 138 MMOL/L — SIGNIFICANT CHANGE UP (ref 135–145)
SP GR SPEC: 1.02 — SIGNIFICANT CHANGE UP (ref 1–1.03)
UROBILINOGEN FLD QL: 1 MG/DL — SIGNIFICANT CHANGE UP (ref 0.2–1)
WBC # BLD: 18.97 K/UL — HIGH (ref 3.8–10.5)
WBC # FLD AUTO: 18.97 K/UL — HIGH (ref 3.8–10.5)

## 2023-10-11 PROCEDURE — 71250 CT THORAX DX C-: CPT | Mod: 26

## 2023-10-11 PROCEDURE — 74176 CT ABD & PELVIS W/O CONTRAST: CPT | Mod: 26

## 2023-10-11 PROCEDURE — 99223 1ST HOSP IP/OBS HIGH 75: CPT | Mod: GC

## 2023-10-11 PROCEDURE — 93306 TTE W/DOPPLER COMPLETE: CPT | Mod: 26

## 2023-10-11 RX ORDER — METOPROLOL TARTRATE 50 MG
37.5 TABLET ORAL DAILY
Refills: 0 | Status: DISCONTINUED | OUTPATIENT
Start: 2023-10-12 | End: 2023-10-12

## 2023-10-11 RX ORDER — ACETAMINOPHEN 500 MG
975 TABLET ORAL ONCE
Refills: 0 | Status: COMPLETED | OUTPATIENT
Start: 2023-10-11 | End: 2023-10-11

## 2023-10-11 RX ORDER — METOPROLOL TARTRATE 50 MG
12.5 TABLET ORAL ONCE
Refills: 0 | Status: COMPLETED | OUTPATIENT
Start: 2023-10-11 | End: 2023-10-11

## 2023-10-11 RX ADMIN — HEPARIN SODIUM 5000 UNIT(S): 5000 INJECTION INTRAVENOUS; SUBCUTANEOUS at 11:23

## 2023-10-11 RX ADMIN — BUDESONIDE AND FORMOTEROL FUMARATE DIHYDRATE 2 PUFF(S): 160; 4.5 AEROSOL RESPIRATORY (INHALATION) at 11:22

## 2023-10-11 RX ADMIN — Medication 20 MILLIGRAM(S): at 17:06

## 2023-10-11 RX ADMIN — CEFTRIAXONE 100 MILLIGRAM(S): 500 INJECTION, POWDER, FOR SOLUTION INTRAMUSCULAR; INTRAVENOUS at 21:17

## 2023-10-11 RX ADMIN — HEPARIN SODIUM 5000 UNIT(S): 5000 INJECTION INTRAVENOUS; SUBCUTANEOUS at 21:16

## 2023-10-11 RX ADMIN — BUDESONIDE AND FORMOTEROL FUMARATE DIHYDRATE 2 PUFF(S): 160; 4.5 AEROSOL RESPIRATORY (INHALATION) at 21:14

## 2023-10-11 RX ADMIN — Medication 975 MILLIGRAM(S): at 23:41

## 2023-10-11 RX ADMIN — Medication 2000 UNIT(S): at 11:22

## 2023-10-11 RX ADMIN — HEPARIN SODIUM 5000 UNIT(S): 5000 INJECTION INTRAVENOUS; SUBCUTANEOUS at 06:11

## 2023-10-11 RX ADMIN — Medication 12.5 MILLIGRAM(S): at 13:26

## 2023-10-11 RX ADMIN — Medication 25 MILLIGRAM(S): at 11:23

## 2023-10-11 RX ADMIN — Medication 975 MILLIGRAM(S): at 01:03

## 2023-10-11 RX ADMIN — Medication 650 MILLIGRAM(S): at 09:05

## 2023-10-11 RX ADMIN — Medication 975 MILLIGRAM(S): at 00:03

## 2023-10-11 RX ADMIN — Medication 650 MILLIGRAM(S): at 10:05

## 2023-10-11 RX ADMIN — BUMETANIDE 1 MILLIGRAM(S): 0.25 INJECTION INTRAMUSCULAR; INTRAVENOUS at 11:23

## 2023-10-11 NOTE — PROVIDER CONTACT NOTE (OTHER) - ASSESSMENT
rectal temp 102.4  pt asymptomatic, resting comfortably in bed
pt   vitals as per flowsheet  pt asymptomatic resting in bed
pt rectal temp 101  pt asymptomatic resting in bed

## 2023-10-11 NOTE — PROGRESS NOTE ADULT - SUBJECTIVE AND OBJECTIVE BOX
Cardiovascular Disease Progress Note    Overnight events: No acute events overnight.  no new cardiac sx  Otherwise review of systems negative    Objective Findings:  T(C): 36.7 (10-11-23 @ 06:00), Max: 38.3 (10-10-23 @ 23:58)  HR: 107 (10-11-23 @ 06:00) (78 - 116)  BP: 101/71 (10-11-23 @ 06:00) (96/75 - 115/69)  RR: 20 (10-11-23 @ 06:00) (18 - 24)  SpO2: 100% (10-11-23 @ 06:00) (95% - 100%)  Wt(kg): --  Daily     Daily Weight in k (11 Oct 2023 06:00)      Physical Exam:  Gen: NAD  HEENT: EOMI  CV: RRR, normal S1 + S2, no m/r/g  Lungs: CTAB  Abd: soft, non-tender  Ext: No edema    Telemetry:    Laboratory Data:                        13.0   28.13 )-----------( 193      ( 10 Oct 2023 06:30 )             41.0     10-10    140  |  104  |  26<H>  ----------------------------<  98  3.9   |  20<L>  |  1.80<H>    Ca    9.3      10 Oct 2023 06:30  Phos  4.1     10-10  Mg     1.90     10-10    TPro  6.9  /  Alb  3.8  /  TBili  2.3<H>  /  DBili  x   /  AST  26  /  ALT  20  /  AlkPhos  134<H>  10-10    PT/INR - ( 09 Oct 2023 15:00 )   PT: 14.8 sec;   INR: 1.32 ratio         PTT - ( 09 Oct 2023 15:00 )  PTT:40.3 sec          Inpatient Medications:  MEDICATIONS  (STANDING):  budesonide 160 MICROgram(s)/formoterol 4.5 MICROgram(s) Inhaler 2 Puff(s) Inhalation two times a day  buMETAnide 1 milliGRAM(s) Oral daily  cefTRIAXone   IVPB 2000 milliGRAM(s) IV Intermittent every 24 hours  cholecalciferol 2000 Unit(s) Oral daily  heparin   Injectable 5000 Unit(s) SubCutaneous every 8 hours  metoprolol succinate ER 25 milliGRAM(s) Oral daily  sildenafil (REVATIO) 20 milliGRAM(s) Oral <User Schedule>      Assessment:  3 year old male, with past history significant for Rheumatoid arthritis, HTN, HFrEF (55 to 60% - TTE of 2023), Home oxygen 5 liters, CKD, Renal cell carcinoma - s/p Partial nephrectomy, Plasma cell dyscrasia, and AICD placement, presented to the ED secondary to shortness of breath.  Seen and evaluated at bedside; restless, and complaining of feeling extremely cold, despite being covered with ~ 4 blankets and socks in place to feet.  AD.  Patient reports frequency in micturition with oliguria yesterday.  At approximately 7:00 PM, patient noted blood in the urine.  No associated burning or itching, however, has severe pain (10/10 intensity) post void.  Reports subjective fever.  No chills or sweating.  Has continued with hematuria - dark red.  Woke up with significant shortness of breath this morning and significant generalized weakness; could hardly get out of bed.  No chest pain, palpitations.  Wife called PMD who advised that patient be taken to the ED.     Recs:  cardiac stable  vol status appears stable, cont with diuretics as dosed  follows with hf dr llanes as op, notified of admission  f/u ct chest/abd/pelvis  re-echo  pulmonary consult  infectious workup. ID recs appreciated  icd interrogation  cw lv dysfxn meds  will follow           Over 25 minutes spent on total encounter; more than 50% of the visit was spent counseling and/or coordinating care by the attending physician.      Juarez Carver MD   Cardiovascular Disease  [phone] Cardiovascular Disease Progress Note    Overnight events: No acute events overnight.  no new cardiac sx  Otherwise review of systems negative    Objective Findings:  T(C): 36.7 (10-11-23 @ 06:00), Max: 38.3 (10-10-23 @ 23:58)  HR: 107 (10-11-23 @ 06:00) (78 - 116)  BP: 101/71 (10-11-23 @ 06:00) (96/75 - 115/69)  RR: 20 (10-11-23 @ 06:00) (18 - 24)  SpO2: 100% (10-11-23 @ 06:00) (95% - 100%)  Wt(kg): --  Daily     Daily Weight in k (11 Oct 2023 06:00)      Physical Exam:  Gen: NAD  HEENT: EOMI  CV: RRR, normal S1 + S2, no m/r/g  Lungs: CTAB  Abd: soft, non-tender  Ext: No edema    Telemetry:    Laboratory Data:                        13.0   28.13 )-----------( 193      ( 10 Oct 2023 06:30 )             41.0     10-10    140  |  104  |  26<H>  ----------------------------<  98  3.9   |  20<L>  |  1.80<H>    Ca    9.3      10 Oct 2023 06:30  Phos  4.1     10-10  Mg     1.90     10-10    TPro  6.9  /  Alb  3.8  /  TBili  2.3<H>  /  DBili  x   /  AST  26  /  ALT  20  /  AlkPhos  134<H>  10-10    PT/INR - ( 09 Oct 2023 15:00 )   PT: 14.8 sec;   INR: 1.32 ratio         PTT - ( 09 Oct 2023 15:00 )  PTT:40.3 sec          Inpatient Medications:  MEDICATIONS  (STANDING):  budesonide 160 MICROgram(s)/formoterol 4.5 MICROgram(s) Inhaler 2 Puff(s) Inhalation two times a day  buMETAnide 1 milliGRAM(s) Oral daily  cefTRIAXone   IVPB 2000 milliGRAM(s) IV Intermittent every 24 hours  cholecalciferol 2000 Unit(s) Oral daily  heparin   Injectable 5000 Unit(s) SubCutaneous every 8 hours  metoprolol succinate ER 25 milliGRAM(s) Oral daily  sildenafil (REVATIO) 20 milliGRAM(s) Oral <User Schedule>      Assessment:  63 year old male, with past history significant for Rheumatoid arthritis, HTN, HFrEF (55 to 60% - TTE of 2023), Home oxygen 5 liters, CKD, Renal cell carcinoma - s/p Partial nephrectomy, Plasma cell dyscrasia, and AICD placement, presented to the ED secondary to shortness of breath.  Seen and evaluated at bedside; restless, and complaining of feeling extremely cold, despite being covered with ~ 4 blankets and socks in place to feet.  AD.  Patient reports frequency in micturition with oliguria yesterday.  At approximately 7:00 PM, patient noted blood in the urine.  No associated burning or itching, however, has severe pain (10/10 intensity) post void.  Reports subjective fever.  No chills or sweating.  Has continued with hematuria - dark red.  Woke up with significant shortness of breath this morning and significant generalized weakness; could hardly get out of bed.  No chest pain, palpitations.  Wife called PMD who advised that patient be taken to the ED.     Recs:  cardiac stable  vol status appears stable, cont with diuretics as dosed  follows with hf dr llanes as op, notified of admission  f/u ct chest/abd/pelvis  re-echo  pulmonary consult  infectious workup. ID recs appreciated  icd interrogation  cw lv dysfxn meds  will follow           Over 25 minutes spent on total encounter; more than 50% of the visit was spent counseling and/or coordinating care by the attending physician.      Juarez Carver MD   Cardiovascular Disease  [phone]

## 2023-10-11 NOTE — CONSULT NOTE ADULT - SUBJECTIVE AND OBJECTIVE BOX
HPI:    63 year old male, with past history significant for Rheumatoid arthritis, HTN, HFrEF (55 to 60% - TTE of 03/08/2023), Home oxygen 5 liters, CKD, Renal cell carcinoma - s/p Partial nephrectomy, Plasma cell dyscrasia, and AICD placement, presented to the ED secondary to shortness of breath.  Seen and evaluated at bedside; restless, and complaining of feeling extremely cold, despite being covered with ~ 4 blankets and socks in place to feet.  AD.  Patient reports frequency in micturition with oliguria yesterday.  At approximately 7:00 PM, patient noted blood in the urine.  No associated burning or itching, however, has severe pain (10/10 intensity) post void.  Reports subjective fever.  No chills or sweating.  Has continued with hematuria - dark red.  Woke up with significant shortness of breath this morning and significant generalized weakness; could hardly get out of bed.  No chest pain, palpitations.  Wife called PMD who advised that patient be taken to the ED.      At baseline, patient drinks about 1.5 to 3.5 liters of water, in addition to ~ 500 mLs of other fluid (apple juice) daily.  Has not had any leg or pedal edema for the past ~ 1 to 2 weeks.    Vital signs upon ED presentation as follows: BP = 115/81, HR = 134, RR = 24, T = 37/4 C to 38.1 C (99.3 F to 100.6 F), O2 Sat = 93% on 5L NC (to 100% on 5L NC).  Diagnosed with Sepsis and prescribed zosyn, NaCl 500 mL and Tylenol 1 gram IV in the ED.    PUlmonary team consulted for further management of pHTN.   Pt seen and examiend at bedside. he states is shortness of breath has been about stable for the last couple of years. He states he had severe dysuria which has now improved. He has been taking his pulmonary hypertension medications as prescribed and has been having no issues. Currently he has no chest pian, shortness of breath, fevers, chills, nausea, vomiting, numbness, tingling, weakness or dizziness.       PAST MEDICAL & SURGICAL HISTORY:  Hypertension      Rheumatoid arthritis      Neoplasm of uncertain behavior of kidney, right      Chronic systolic congestive heart failure      Renal cell carcinoma      Plasma cell dyscrasia      Chronic kidney disease (CKD)      Pulmonary hypertension      On home oxygen therapy      History of cardiac cath  jan 2022, at [facility] no stent      H/O partial nephrectomy      AICD (automatic cardioverter/defibrillator) present          FAMILY HISTORY:  FH: diabetes mellitus (Mother)    Family history of heart disease (Father)        SOCIAL HISTORY:  Smoking: none0  EtOH Use: none  Marital Status:  Occupation:  Exposures:  Recent Travel:    Allergies    No Known Allergies    Intolerances        HOME MEDICATIONS:    REVIEW OF SYSTEMS:  Constitutional: No fevers or chills. No weight loss. No fatigue or generalised malaise.  Eyes: No itching or discharge from the eyes  ENT: No ear pain. No ear discharge. No nasal congestion. No post nasal drip. No epistaxis. No throat pain. No sore throat. No difficulty swallowing.   CV: No chest pain. No palpitations. No lightheadedness or dizziness.   Resp: No dyspnea at rest. No dyspnea on exertion. No orthopnea. No wheezing. No cough. No stridor. No sputum production. No chest pain with respiration.  GI: No nausea. No vomiting. No diarrhea.  MSK: No joint pain or pain in any extremities  Integumentary: No skin lesions. No pedal edema.  Neurological: No gross motor weakness. No sensory changes.    [ ] All other systems negative  [ ] Unable to assess ROS because ________    OBJECTIVE:  ICU Vital Signs Last 24 Hrs  T(C): 36.5 (11 Oct 2023 11:33), Max: 38.3 (10 Oct 2023 23:58)  T(F): 97.7 (11 Oct 2023 11:33), Max: 101 (10 Oct 2023 23:58)  HR: 101 (11 Oct 2023 13:25) (51 - 112)  BP: 112/70 (11 Oct 2023 13:25) (101/71 - 116/79)  BP(mean): --  ABP: --  ABP(mean): --  RR: 18 (11 Oct 2023 11:33) (18 - 20)  SpO2: 98% (11 Oct 2023 11:33) (95% - 100%)    O2 Parameters below as of 11 Oct 2023 06:00  Patient On (Oxygen Delivery Method): nasal cannula  O2 Flow (L/min): 5            10-10 @ 07:01  -  10-11 @ 07:00  --------------------------------------------------------  IN: 0 mL / OUT: 450 mL / NET: -450 mL    10-11 @ 07:01  -  10-11 @ 17:04  --------------------------------------------------------  IN: 420 mL / OUT: 750 mL / NET: -330 mL      CAPILLARY BLOOD GLUCOSE          PHYSICAL EXAM:  General: Awake, alert, oriented X 3.   HEENT: Atraumatic, normocephalic.                  No tonsillar or pharyngeal exudates.  Lymph Nodes: No palpable lymphadenopathy  Neck: No JVD. No carotid bruit.   Respiratory: Normal chest expansion                         Normal percussion                         Normal and equal air entry                         No wheeze, rhonchi or rales.  Cardiovascular: S1 S2 normal. No murmurs, rubs or gallops.   Abdomen: Soft, non-tender, non-distended. No organomegaly.  Extremities: Warm to touch. Peripheral pulse palpable. No pedal edema.   Skin: No rashes or skin lesions  Neurological: Non-focal  Psychiatry: Appropriate mood and affect.    HOSPITAL MEDICATIONS:  MEDICATIONS  (STANDING):  budesonide 160 MICROgram(s)/formoterol 4.5 MICROgram(s) Inhaler 2 Puff(s) Inhalation two times a day  buMETAnide 1 milliGRAM(s) Oral daily  cefTRIAXone   IVPB 2000 milliGRAM(s) IV Intermittent every 24 hours  cholecalciferol 2000 Unit(s) Oral daily  heparin   Injectable 5000 Unit(s) SubCutaneous every 8 hours  predniSONE   Tablet 7.5 milliGRAM(s) Oral daily  sildenafil (REVATIO) 20 milliGRAM(s) Oral three times a day    MEDICATIONS  (PRN):  acetaminophen     Tablet .. 650 milliGRAM(s) Oral every 6 hours PRN Temp greater or equal to 38C (100.4F), Mild Pain (1 - 3)  melatonin 3 milliGRAM(s) Oral at bedtime PRN Insomnia      LABS:                        11.5   18.97 )-----------( 179      ( 11 Oct 2023 06:51 )             35.7     10-11    138  |  103  |  26<H>  ----------------------------<  79  3.6   |  21<L>  |  1.62<H>    Ca    8.9      11 Oct 2023 06:51  Phos  2.9     10-11  Mg     2.00     10-11    TPro  6.4  /  Alb  3.4  /  TBili  1.1  /  DBili  x   /  AST  17  /  ALT  15  /  AlkPhos  114  10-11      Urinalysis Basic - ( 11 Oct 2023 06:51 )    Color: x / Appearance: x / SG: x / pH: x  Gluc: 79 mg/dL / Ketone: x  / Bili: x / Urobili: x   Blood: x / Protein: x / Nitrite: x   Leuk Esterase: x / RBC: x / WBC x   Sq Epi: x / Non Sq Epi: x / Bacteria: x        Venous Blood Gas:  10-10 @ 06:30  7.43/34/58/23/87.4  VBG Lactate: 2.8      MICROBIOLOGY:     RADIOLOGY:  [ ] Reviewed and interpreted by me    Point of Care Ultrasound Findings;    PFT:    EKG:    < from: CT Chest No Cont (10.11.23 @ 10:26) >    CHEST:  LUNGS AND LARGE AIRWAYS: Patent central airways. Mild dependent left   lower lobe atelectasis.  PLEURA: Enlarged pulmonary artery measuring 4 cm, unchanged.  HEART: Heart size is normal. Trace pericardial effusion, unchanged.   Cardiac device lead in the right ventricle, unchanged.  MEDIASTINUM AND SEA: No lymphadenopathy.  CHEST WALL AND LOWER NECK: Left chest wall cardiac device.    ABDOMEN AND PELVIS:  LIVER: Within normal limits.  BILE DUCTS: Normal caliber.  GALLBLADDER: Within normal limits.  SPLEEN: Within normal limits.  PANCREAS: Within normal limits.  ADRENALS: Within normal limits.  KIDNEYS/URETERS: Right partial nephrectomy. No hydronephrosis.    BLADDER: Mild circumferential bladder wall thickening.  REPRODUCTIVE ORGANS: Prostate is enlarged.    BOWEL: No bowel obstruction. Appendix is normal. Colonic diverticulosis   without evidence of acute diverticulitis.  PERITONEUM: No ascites.  VESSELS: Atherosclerotic changes.  RETROPERITONEUM/LYMPH NODES: No lymphadenopathy.  ABDOMINAL WALL: Tiny fat-containing umbilical hernia.  BONES: Degenerative changes. Grade 1 anterolisthesis of L4 on L5.    IMPRESSION:  Mild circumferential bladder wall thickening which may be due to cystitis   or chronic outlet obstruction. Correlate with urinalysis.    --- End of Report ---    < end of copied text >  < from: TTE W or WO Ultrasound Enhancing Agent (10.11.23 @ 09:24) >   1. The left ventricular cavity is small. The interventricular septum is flattened in systole and diastole consistent with right ventricular pressure and volume overload. Left ventricular systolic function is normal with a calculated ejection fraction of 57 % by the Espino's biplane method of disks. Theleft ventricular diastolic function is indeterminate. There is normal LV mass and concentric remodeling.   2. The left ventricular diastolic function is indeterminate.   3. Right ventricular volume and pressure overload. Left ventricular systolic function is normal with an ejection fraction of 57 % by Espino's method of disks.   4. Severely enlarged right ventricular cavity size and severely reduced systolic function.   5. Device lead is visualized.   6. The right atrium is severely dilated in size.   7. Estimated pulmonary artery systolic pressure is 79 mmHg.   8. The inferior vena cava is dilated (dilated >2.1cm) with abnormal inspiratory collapse (abnormal <50%) consistent with elevated right atrial pressure (~15, range 10-20mmHg).   9. Organized fibrinous vs coagulant material is seen in the pericardial space. There is a trace pericardial effusion noted adjacent to the posterior left ventricle.    ________________________________________________________________________________________    < end of copied text >

## 2023-10-11 NOTE — PROGRESS NOTE ADULT - ASSESSMENT
_________________________________________________________________________________________  ========>>  M E D I C A L   A T T E N D I N G    F O L L O W  U P  N O T E  <<=========  -----------------------------------------------------------------------------------------------------    - Patient seen and examined by me earlier today.   - In summary,  PLACIDO GOLDMAN is a 63y year old man admitted with Urosepsis, now with bacteremia  - Patient today overall doing ok, comfortable, eating better, Overall improved as per patient    ==================>> REVIEW OF SYSTEM <<=================    GEN: no fever, no chills, no pain:: Significant pain with urination much improved Now as per patient  RESP: no SOB, no cough, no sputum  CVS: no chest pain, no palpitations, no edema  GI: no abdominal pain, no nausea  : no dysuria, no frequency, hematuria Previously, improved   Neuro: no headache, no dizziness  Derm : no itching, no rash    ==================>> PHYSICAL EXAM <<=================    GEN: A&O X 3 , NAD , comfortable, pleasant, calm , Resting in bed..   HEENT: NCAT, PERRL, MMM, hearing intact, On nasal cannula 02  Neck: supple , no JVD appreciated  CVS: S1S2 , regular , No M/R/G appreciated  PULM: CTA B/L,  no W/R/R appreciated  ABD.: soft. non tender, non distended,  bowel sounds present  Extrem: intact pulses , no edema   PSYCH : normal mood,  not anxious        ( Note written / Date of service 10-11-23 ( This is certified to be the same as "ENTERED" date above ( for billing purposes)))    ==================>> MEDICATIONS <<====================    budesonide 160 MICROgram(s)/formoterol 4.5 MICROgram(s) Inhaler 2 Puff(s) Inhalation two times a day  buMETAnide 1 milliGRAM(s) Oral daily  cefTRIAXone   IVPB 2000 milliGRAM(s) IV Intermittent every 24 hours  cholecalciferol 2000 Unit(s) Oral daily  heparin   Injectable 5000 Unit(s) SubCutaneous every 8 hours  metoprolol succinate ER 12.5 milliGRAM(s) Oral once  predniSONE   Tablet 7.5 milliGRAM(s) Oral daily  sildenafil (REVATIO) 20 milliGRAM(s) Oral three times a day    MEDICATIONS  (PRN):  acetaminophen     Tablet .. 650 milliGRAM(s) Oral every 6 hours PRN Temp greater or equal to 38C (100.4F), Mild Pain (1 - 3)  melatonin 3 milliGRAM(s) Oral at bedtime PRN Insomnia    ___________  Active diet:  Diet, Regular  ___________________    ==================>> VITAL SIGNS <<==================  Height (cm): 167.6  Weight (kg): 62.5  BMI (kg/m2): 22.3  Vital Signs Last 24 HrsT(C): 36.5 (10-11-23 @ 11:33)  T(F): 97.7 (10-11-23 @ 11:33), Max: 101 (10-10-23 @ 23:58)  HR: 51 (10-11-23 @ 11:33) (51 - 112)  BP: 116/79 (10-11-23 @ 11:33)  RR: 18 (10-11-23 @ 11:33) (18 - 20)  SpO2: 98% (10-11-23 @ 11:33) (95% - 100%)         ==================>> LAB AND IMAGING <<==================                        11.5   18.97 )-----------( 179      ( 11 Oct 2023 06:51 )             35.7        10-11    138  |  103  |  26<H>  ----------------------------<  79  3.6   |  21<L>  |  1.62<H>    Ca    8.9      11 Oct 2023 06:51  Phos  2.9     10-11  Mg     2.00     10-11    TPro  6.4  /  Alb  3.4  /  TBili  1.1  /  DBili  x   /  AST  17  /  ALT  15  /  AlkPhos  114  10-11    WBC count:   18.97 <<== ,  28.13 <<== ,  28.12 <<==   Hemoglobin:   11.5 <<==,  13.0 <<==,  13.8 <<==  platelets:  179 <==, 193 <==, 223 <==    Creatinine:  1.62  <<==, 1.80  <<==, 1.57  <<==, 1.61  <<==  Sodium:   138  <==, 140  <==, 138  <==, 134  <==       AST:          17 <== , 26 <== , 94 <==      ALT:        15  <== , 20  <== , 35  <==      AP:        114  <=, 134  <=, 138  <=     Bili:        1.1  <=, 2.3  <=, 1.6  <=    ____________________________    M I C R O B I O L O G Y :    Culture - Urine (collected 09 Oct 2023 16:28)  Source: Clean Catch Clean Catch (Midstream)  Preliminary Report (10 Oct 2023 23:21):    >100,000 CFU/ml Gram Negative Rods    Culture - Blood (collected 09 Oct 2023 14:10)  Source: .Blood Blood-Peripheral  Gram Stain (10 Oct 2023 15:17):    Growth in anaerobic bottle: Gram Negative Rods  Preliminary Report (10 Oct 2023 15:18):    Growth in anaerobic bottle: Gram Negative Rods    Culture - Blood (collected 09 Oct 2023 14:00)  Source: .Blood Blood-Peripheral  Gram Stain (10 Oct 2023 10:23):    Growth in aerobic bottle: Gram Negative Rods  Preliminary Report (10 Oct 2023 10:23):    Growth in aerobic bottle: Gram Negative Rods    Direct identification is available within approximately 3-5    hours either by Blood Panel Multiplexed PCR or Direct    MALDI-TOF. Details: [email or web address]  Organism: Blood Culture PCR (10 Oct 2023 12:19)  Organism: Blood Culture PCR (10 Oct 2023 12:19)    Sensitivities:      Method Type: PCR      -  K. pneumoniae group: Detec (K. pneumoniae, K. quasipneumoniae, K. variicola)    SARS-CoV-2: NotDetec (10-09-23 @ 15:20)    < from: TTE W or WO Ultrasound Enhancing Agent (10.11.23 @ 09:24) >  CONCLUSIONS:      1. The left ventricular cavity is small. The interventricular septum is flattened in systole and diastole consistent with right ventricular pressure and volume overload. Left ventricular systolic function is normal with a calculated ejection fraction of 57 % by the Espino's biplane method of disks. Theleft ventricular diastolic function is indeterminate. There is normal LV mass and concentric remodeling.   2. The left ventricular diastolic function is indeterminate.   3. Right ventricular volume and pressure overload. Left ventricular systolic function is normal with an ejection fraction of 57 % by Espino's method of disks.   4. Severely enlarged right ventricular cavity size and severely reduced systolic function.   5. Device lead is visualized.   6. The right atrium is severely dilated in size.   7. Estimated pulmonary artery systolic pressure is 79 mmHg.   8. The inferior vena cava is dilated (dilated >2.1cm) with abnormal inspiratory collapse (abnormal <50%) consistent with elevated right atrial pressure (~15, range 10-20mmHg).   9. Organized fibrinous vs coagulant material is seen in the pericardial space. There is a trace pericardial effusion noted adjacent to the posterior left ventricle.  < end of copied text >    __________________________________________________________________________________  ===============>>  A S S E S S M E N T   A N D   P L A N <<===============  ------------------------------------------------------------------------------------------    63 year old male, with past history significant for Rheumatoid arthritis, HTN, HFrEF (55 to 60% - TTE of 03/08/2023), Home oxygen 5 liters, CKD, Renal cell carcinoma - s/p Partial nephrectomy, Plasma cell dyscrasia, and AICD placement, presented to the ED secondary to shortness of breath.  Diagnosed with Sepsis in the ED.     Problem/Plan - :  ·  Problem: Urinary tract infection with hematuria, Now with gram-negative bacteremia  Sepsis present on admission  - antibiotics per ID  - f/u cultures / sensitivities   follow blood cultures and sensitivities, repeat blood cultures to negativity  Follow CT scan imaging results   echocardiogram as above      Problem/Plan - :  ·  Problem: acute respiratory failure With hypoxemia.   Patient with sepsis on admission  Likely  acute exacerbation of pulmonary hypertension In setting of sepsis  - RVP, COVID-19 PCR negative.  CXR w/o gross signs of infection  - on nasal cannula 5 liters, with O2 sat to 100% >> Wean down as able  - other supportive care as needed.  - pulm evaluation re right heart failure and severe PAH ( chronic )     Problem/Plan - :  ·  Problem: Lactic acidosis. Improved  Lactate:  1.9  <<== , 3.3  <<==   - in the context of sepsis  - ensure optimal hydration  Monitor     Problem/Plan - :  ·  Problem: Pulmonary hypertension.   ·  Plan: - per history, and uses 5 liters NC at home  - echo as above with right heart failure   - on sildenafil 20 mg daily (at 10:00 PM); continuing  - also on bumetanide 1 mg daily (has been on varied doses in the past, per chart review)  - monitor vitals, labs / O2   - intake/output Q4H, weight daily, HOB elevation  - cardio / renal follow up      Problem/Plan - :  ·  Problem: Prophylactic measure.   ·  Plan: - heparin SQ.    Continue Current medications otherwise and monitor.  supportive care  Patient Full code    --------------------------------------------  Case discussed with patient , Nurse Practitioner   Education given on findings and plan of care  ___________________________  H. JEANETH Vu.  Pager: [phone]

## 2023-10-11 NOTE — CONSULT NOTE ADULT - ASSESSMENT
We are committed to providing our patients with their test results in a timely manner. If you have access to [employer], you will receive your results within 5 to 7 days. If your results are normal, a member of our office may call you or you may receive a letter in the mail within 10 to 15 days of your testing. If your results have something additional to discuss, a member of our office will contact you by phone. 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   - DVT prophylaxis  - Trend SCr

## 2023-10-11 NOTE — PHARMACOTHERAPY INTERVENTION NOTE - COMMENTS
Current medications reviewed with the patient and his wife. Current medication schedule was discussed in detail including: medication name, indication, dose, administration times, treatment duration, side effects, and special instructions. Confirmed medication list with patient and his wife. He states he no longer takes spironolactone. Patient counseled on heart failure medication indications, administration, and side effects. Also discussed fluid and salt restrictions, and maintaining a log of daily weights. Discussed warning signs of fluid overload (SOB, orthopnea, AVALOS, edema, etc.) and when to seek medical attention. Patient verbalized understanding. Questions and concerns were answered and addressed.     Comfort Hampton, PharmD, BCPS  Clinical Pharmacy Specialist  [phone]

## 2023-10-11 NOTE — CONSULT NOTE ADULT - ATTENDING COMMENTS
63 year old male, with pHTN, mixed group II/III in setting of sclerosis sine scleroderma, rheumatoid arthritis, HFrEF, chronic hypoxemic respiratory failure on home O2 5LPM, with klebsiella UTI and and bacteremia  Stable from pulmonary perspective  Resume home sildenafil   Continue Bumex  Continue prednisone 7.5 mg
63-year-old male with past medical history significant for rheumatoid arthritis, hypertension, heart failure, on chronic oxygen at home, renal cell carcinoma status post partial nephrectomy, AICD who was admitted to the hospital due to shortness of breath.    On day prior to presentation patient noted developing hematuria.  No reported fever.  Morning of admission woke up shortness of breath and weak.  Wife called primary care who referred patient to ED.    In the ED, patient found to be febrile with a white count of 20.1.  Patient also noted with elevated BNP and troponin.  Urinalysis with pyuria.  Blood cultures growing Klebsiella.  Urine culture pending.    #Klebsiella bacteremia, likely urinary source  #Elevated LFTs  #Presence of pacemaker/ICD  #History of RCC status post partial nephrectomy now CKD    Recommendations  Discontinue piperacillin/tazobactam  Start ceftriaxone  Obtain CT abdomen/pelvis  Follow pending urine and blood cultures  Follow fever curve and WBC count    Piero Arredondo MD  Division of Infectious Diseases

## 2023-10-11 NOTE — PROVIDER CONTACT NOTE (OTHER) - ACTION/TREATMENT ORDERED:
ACP notified  no new orders at this time
ACP notified  tylenol given as per ordered  RVR test sent as per ordered
ACP notified  tylenol 975 mg given as per orders

## 2023-10-12 ENCOUNTER — NON-APPOINTMENT (OUTPATIENT)
Age: 63
End: 2023-10-12

## 2023-10-12 ENCOUNTER — APPOINTMENT (OUTPATIENT)
Dept: ELECTROPHYSIOLOGY | Facility: CLINIC | Age: 63
End: 2023-10-12
Payer: COMMERCIAL

## 2023-10-12 DIAGNOSIS — I50.22 CHRONIC SYSTOLIC (CONGESTIVE) HEART FAILURE: ICD-10-CM

## 2023-10-12 LAB
-  AMIKACIN: SIGNIFICANT CHANGE UP
-  AMIKACIN: SIGNIFICANT CHANGE UP
-  AMOXICILLIN/CLAVULANIC ACID: SIGNIFICANT CHANGE UP
-  AMPICILLIN/SULBACTAM: SIGNIFICANT CHANGE UP
-  AMPICILLIN/SULBACTAM: SIGNIFICANT CHANGE UP
-  AMPICILLIN: SIGNIFICANT CHANGE UP
-  AMPICILLIN: SIGNIFICANT CHANGE UP
-  AZTREONAM: SIGNIFICANT CHANGE UP
-  AZTREONAM: SIGNIFICANT CHANGE UP
-  CEFAZOLIN: SIGNIFICANT CHANGE UP
-  CEFAZOLIN: SIGNIFICANT CHANGE UP
-  CEFEPIME: SIGNIFICANT CHANGE UP
-  CEFEPIME: SIGNIFICANT CHANGE UP
-  CEFOXITIN: SIGNIFICANT CHANGE UP
-  CEFOXITIN: SIGNIFICANT CHANGE UP
-  CEFTRIAXONE: SIGNIFICANT CHANGE UP
-  CEFTRIAXONE: SIGNIFICANT CHANGE UP
-  CEFUROXIME: SIGNIFICANT CHANGE UP
-  CIPROFLOXACIN: SIGNIFICANT CHANGE UP
-  CIPROFLOXACIN: SIGNIFICANT CHANGE UP
-  ERTAPENEM: SIGNIFICANT CHANGE UP
-  ERTAPENEM: SIGNIFICANT CHANGE UP
-  GENTAMICIN: SIGNIFICANT CHANGE UP
-  GENTAMICIN: SIGNIFICANT CHANGE UP
-  IMIPENEM: SIGNIFICANT CHANGE UP
-  IMIPENEM: SIGNIFICANT CHANGE UP
-  LEVOFLOXACIN: SIGNIFICANT CHANGE UP
-  LEVOFLOXACIN: SIGNIFICANT CHANGE UP
-  MEROPENEM: SIGNIFICANT CHANGE UP
-  MEROPENEM: SIGNIFICANT CHANGE UP
-  NITROFURANTOIN: SIGNIFICANT CHANGE UP
-  PIPERACILLIN/TAZOBACTAM: SIGNIFICANT CHANGE UP
-  PIPERACILLIN/TAZOBACTAM: SIGNIFICANT CHANGE UP
-  TOBRAMYCIN: SIGNIFICANT CHANGE UP
-  TOBRAMYCIN: SIGNIFICANT CHANGE UP
-  TRIMETHOPRIM/SULFAMETHOXAZOLE: SIGNIFICANT CHANGE UP
-  TRIMETHOPRIM/SULFAMETHOXAZOLE: SIGNIFICANT CHANGE UP
ANION GAP SERPL CALC-SCNC: 16 MMOL/L — HIGH (ref 7–14)
B PERT DNA SPEC QL NAA+PROBE: SIGNIFICANT CHANGE UP
B PERT+PARAPERT DNA PNL SPEC NAA+PROBE: SIGNIFICANT CHANGE UP
BASE EXCESS BLDV CALC-SCNC: -2.4 MMOL/L — LOW (ref -2–3)
BASOPHILS # BLD AUTO: 0.05 K/UL — SIGNIFICANT CHANGE UP (ref 0–0.2)
BASOPHILS NFR BLD AUTO: 0.4 % — SIGNIFICANT CHANGE UP (ref 0–2)
BLOOD GAS VENOUS COMPREHENSIVE RESULT: SIGNIFICANT CHANGE UP
BORDETELLA PARAPERTUSSIS (RAPRVP): SIGNIFICANT CHANGE UP
BUN SERPL-MCNC: 28 MG/DL — HIGH (ref 7–23)
C PNEUM DNA SPEC QL NAA+PROBE: SIGNIFICANT CHANGE UP
CALCIUM SERPL-MCNC: 9.6 MG/DL — SIGNIFICANT CHANGE UP (ref 8.4–10.5)
CHLORIDE BLDV-SCNC: 103 MMOL/L — SIGNIFICANT CHANGE UP (ref 96–108)
CHLORIDE SERPL-SCNC: 102 MMOL/L — SIGNIFICANT CHANGE UP (ref 98–107)
CO2 BLDV-SCNC: 25.1 MMOL/L — SIGNIFICANT CHANGE UP (ref 22–26)
CO2 SERPL-SCNC: 21 MMOL/L — LOW (ref 22–31)
CREAT SERPL-MCNC: 1.74 MG/DL — HIGH (ref 0.5–1.3)
CULTURE RESULTS: SIGNIFICANT CHANGE UP
EGFR: 43 ML/MIN/1.73M2 — LOW
EOSINOPHIL # BLD AUTO: 0.02 K/UL — SIGNIFICANT CHANGE UP (ref 0–0.5)
EOSINOPHIL NFR BLD AUTO: 0.1 % — SIGNIFICANT CHANGE UP (ref 0–6)
FLUAV SUBTYP SPEC NAA+PROBE: SIGNIFICANT CHANGE UP
FLUBV RNA SPEC QL NAA+PROBE: SIGNIFICANT CHANGE UP
GAS PNL BLDV: 137 MMOL/L — SIGNIFICANT CHANGE UP (ref 136–145)
GLUCOSE BLDV-MCNC: 91 MG/DL — SIGNIFICANT CHANGE UP (ref 70–99)
GLUCOSE SERPL-MCNC: 88 MG/DL — SIGNIFICANT CHANGE UP (ref 70–99)
HADV DNA SPEC QL NAA+PROBE: SIGNIFICANT CHANGE UP
HCO3 BLDV-SCNC: 24 MMOL/L — SIGNIFICANT CHANGE UP (ref 22–29)
HCOV 229E RNA SPEC QL NAA+PROBE: SIGNIFICANT CHANGE UP
HCOV HKU1 RNA SPEC QL NAA+PROBE: SIGNIFICANT CHANGE UP
HCOV NL63 RNA SPEC QL NAA+PROBE: SIGNIFICANT CHANGE UP
HCOV OC43 RNA SPEC QL NAA+PROBE: SIGNIFICANT CHANGE UP
HCT VFR BLD CALC: 39.6 % — SIGNIFICANT CHANGE UP (ref 39–50)
HCT VFR BLDA CALC: 37 % — LOW (ref 39–51)
HGB BLD CALC-MCNC: 12.4 G/DL — LOW (ref 12.6–17.4)
HGB BLD-MCNC: 12.3 G/DL — LOW (ref 13–17)
HMPV RNA SPEC QL NAA+PROBE: SIGNIFICANT CHANGE UP
HPIV1 RNA SPEC QL NAA+PROBE: SIGNIFICANT CHANGE UP
HPIV2 RNA SPEC QL NAA+PROBE: SIGNIFICANT CHANGE UP
HPIV3 RNA SPEC QL NAA+PROBE: SIGNIFICANT CHANGE UP
HPIV4 RNA SPEC QL NAA+PROBE: SIGNIFICANT CHANGE UP
IANC: 10.83 K/UL — HIGH (ref 1.8–7.4)
IMM GRANULOCYTES NFR BLD AUTO: 0.8 % — SIGNIFICANT CHANGE UP (ref 0–0.9)
LACTATE BLDV-MCNC: 3.6 MMOL/L — HIGH (ref 0.5–2)
LYMPHOCYTES # BLD AUTO: 14.9 % — SIGNIFICANT CHANGE UP (ref 13–44)
LYMPHOCYTES # BLD AUTO: 2.1 K/UL — SIGNIFICANT CHANGE UP (ref 1–3.3)
M PNEUMO DNA SPEC QL NAA+PROBE: SIGNIFICANT CHANGE UP
MAGNESIUM SERPL-MCNC: 2.2 MG/DL — SIGNIFICANT CHANGE UP (ref 1.6–2.6)
MCHC RBC-ENTMCNC: 30.1 PG — SIGNIFICANT CHANGE UP (ref 27–34)
MCHC RBC-ENTMCNC: 31.1 GM/DL — LOW (ref 32–36)
MCV RBC AUTO: 97.1 FL — SIGNIFICANT CHANGE UP (ref 80–100)
METHOD TYPE: SIGNIFICANT CHANGE UP
METHOD TYPE: SIGNIFICANT CHANGE UP
MONOCYTES # BLD AUTO: 0.97 K/UL — HIGH (ref 0–0.9)
MONOCYTES NFR BLD AUTO: 6.9 % — SIGNIFICANT CHANGE UP (ref 2–14)
NEUTROPHILS # BLD AUTO: 10.83 K/UL — HIGH (ref 1.8–7.4)
NEUTROPHILS NFR BLD AUTO: 76.9 % — SIGNIFICANT CHANGE UP (ref 43–77)
NRBC # BLD: 0 /100 WBCS — SIGNIFICANT CHANGE UP (ref 0–0)
NRBC # FLD: 0 K/UL — SIGNIFICANT CHANGE UP (ref 0–0)
ORGANISM # SPEC MICROSCOPIC CNT: SIGNIFICANT CHANGE UP
PCO2 BLDV: 45 MMHG — SIGNIFICANT CHANGE UP (ref 42–55)
PH BLDV: 7.33 — SIGNIFICANT CHANGE UP (ref 7.32–7.43)
PHOSPHATE SERPL-MCNC: 3.2 MG/DL — SIGNIFICANT CHANGE UP (ref 2.5–4.5)
PLATELET # BLD AUTO: 217 K/UL — SIGNIFICANT CHANGE UP (ref 150–400)
PO2 BLDV: 32 MMHG — SIGNIFICANT CHANGE UP (ref 25–45)
POTASSIUM BLDV-SCNC: 4 MMOL/L — SIGNIFICANT CHANGE UP (ref 3.5–5.1)
POTASSIUM SERPL-MCNC: 4.1 MMOL/L — SIGNIFICANT CHANGE UP (ref 3.5–5.3)
POTASSIUM SERPL-SCNC: 4.1 MMOL/L — SIGNIFICANT CHANGE UP (ref 3.5–5.3)
RAPID RVP RESULT: SIGNIFICANT CHANGE UP
RBC # BLD: 4.08 M/UL — LOW (ref 4.2–5.8)
RBC # FLD: 14.4 % — SIGNIFICANT CHANGE UP (ref 10.3–14.5)
RSV RNA SPEC QL NAA+PROBE: SIGNIFICANT CHANGE UP
RV+EV RNA SPEC QL NAA+PROBE: SIGNIFICANT CHANGE UP
SAO2 % BLDV: 43.3 % — LOW (ref 67–88)
SARS-COV-2 RNA SPEC QL NAA+PROBE: SIGNIFICANT CHANGE UP
SODIUM SERPL-SCNC: 139 MMOL/L — SIGNIFICANT CHANGE UP (ref 135–145)
SPECIMEN SOURCE: SIGNIFICANT CHANGE UP
WBC # BLD: 14.08 K/UL — HIGH (ref 3.8–10.5)
WBC # FLD AUTO: 14.08 K/UL — HIGH (ref 3.8–10.5)

## 2023-10-12 PROCEDURE — 99233 SBSQ HOSP IP/OBS HIGH 50: CPT | Mod: GC

## 2023-10-12 PROCEDURE — 99233 SBSQ HOSP IP/OBS HIGH 50: CPT | Mod: 25

## 2023-10-12 PROCEDURE — 93296 REM INTERROG EVL PM/IDS: CPT

## 2023-10-12 PROCEDURE — 93295 DEV INTERROG REMOTE 1/2/MLT: CPT

## 2023-10-12 PROCEDURE — 99223 1ST HOSP IP/OBS HIGH 75: CPT

## 2023-10-12 PROCEDURE — 99232 SBSQ HOSP IP/OBS MODERATE 35: CPT

## 2023-10-12 RX ORDER — BUMETANIDE 0.25 MG/ML
1 INJECTION INTRAMUSCULAR; INTRAVENOUS
Refills: 0 | Status: COMPLETED | OUTPATIENT
Start: 2023-10-12 | End: 2023-10-14

## 2023-10-12 RX ADMIN — Medication 975 MILLIGRAM(S): at 00:41

## 2023-10-12 RX ADMIN — BUDESONIDE AND FORMOTEROL FUMARATE DIHYDRATE 2 PUFF(S): 160; 4.5 AEROSOL RESPIRATORY (INHALATION) at 22:10

## 2023-10-12 RX ADMIN — BUDESONIDE AND FORMOTEROL FUMARATE DIHYDRATE 2 PUFF(S): 160; 4.5 AEROSOL RESPIRATORY (INHALATION) at 11:30

## 2023-10-12 RX ADMIN — Medication 20 MILLIGRAM(S): at 13:44

## 2023-10-12 RX ADMIN — Medication 2000 UNIT(S): at 11:28

## 2023-10-12 RX ADMIN — HEPARIN SODIUM 5000 UNIT(S): 5000 INJECTION INTRAVENOUS; SUBCUTANEOUS at 05:31

## 2023-10-12 RX ADMIN — BUMETANIDE 1 MILLIGRAM(S): 0.25 INJECTION INTRAMUSCULAR; INTRAVENOUS at 05:31

## 2023-10-12 RX ADMIN — Medication 20 MILLIGRAM(S): at 22:10

## 2023-10-12 RX ADMIN — Medication 20 MILLIGRAM(S): at 05:31

## 2023-10-12 RX ADMIN — BUMETANIDE 1 MILLIGRAM(S): 0.25 INJECTION INTRAMUSCULAR; INTRAVENOUS at 18:43

## 2023-10-12 RX ADMIN — HEPARIN SODIUM 5000 UNIT(S): 5000 INJECTION INTRAVENOUS; SUBCUTANEOUS at 22:06

## 2023-10-12 RX ADMIN — Medication 37.5 MILLIGRAM(S): at 05:32

## 2023-10-12 RX ADMIN — HEPARIN SODIUM 5000 UNIT(S): 5000 INJECTION INTRAVENOUS; SUBCUTANEOUS at 11:28

## 2023-10-12 RX ADMIN — CEFTRIAXONE 100 MILLIGRAM(S): 500 INJECTION, POWDER, FOR SOLUTION INTRAMUSCULAR; INTRAVENOUS at 22:07

## 2023-10-12 RX ADMIN — Medication 7.5 MILLIGRAM(S): at 05:27

## 2023-10-12 NOTE — CONSULT NOTE ADULT - REASON FOR ADMISSION
Sepsis due to undetermined organism, Urinary tract infection

## 2023-10-12 NOTE — PROGRESS NOTE ADULT - ASSESSMENT
63-year-old male with past medical history significant for rheumatoid arthritis, hypertension, heart failure, on chronic oxygen at home, renal cell carcinoma status post partial nephrectomy, AICD who was admitted to the hospital due to shortness of breath.    On day prior to presentation patient noted developing hematuria.  No reported fever.  Morning of admission woke up shortness of breath and weak.  Wife called primary care who referred patient to ED.    In the ED, patient found to be febrile with a white count of 20.1.  Patient also noted with elevated BNP and troponin.  Urinalysis with pyuria.  Blood cultures growing Klebsiella.  Urine culture pending.    #Klebsiella bacteremia, likely urinary source  #Elevated LFTs  #Presence of pacemaker/ICD  #History of RCC status post partial nephrectomy now CKD  Bcx on admission 10/9/23 - klebsiella  Bcx on 10/11/23 ngtd  Ucx on 10/9/23 - klebsiella  CT A/P with no uncontrolled foci of infection      Recommendations  Continue ceftriaxone  Follow blood cultures  Will plan for 10 day therapy with PO transition provided clinical improvement  Follow fever curve and WBC count    Piero Arredondo MD  Division of Infectious Diseases

## 2023-10-12 NOTE — CONSULT NOTE ADULT - NS ATTEND AMEND GEN_ALL_CORE FT
Briefly, Mr. Cain is a 62 y/o [place] M w/ h/o HTN, ? 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 Was found to be febrile with leukocytosis (WBC 28) with lactate of 4.3. Found to have Klebsiella UTI and bacteremia started on antibiotics. Feels better. States SOB is improved from prior. On exam, JVP elevated, tachycardic, prominent P2, CTAB, nontender abdomen, trace pedal edema, cool to palpation. Labs reviewed - WBC 14 (from 28), K 4.1, BUN/Cr 28/1.74 (?b/l 1.6)Tbili 1.1 (improved), BNP 10k (prev 2k), lactate 3.6. TTE 10/11 reviewed - EF nl, D shaped LV, LVOT VTI 10, severe RV dysfunction/dilatation, mod-severe TR (RVSP 70s), severe RAD, dilated IVC, trace pericardial effusion. Overall group I/III PH with WHO class III symptoms with volume overload in setting of sepsis from UTI.   - c/w abx  - d/c toprol (as no clear indication to continue)  - increase bumex 1 mg twice/day as above  - c/w sildenafil 20 mg q8h

## 2023-10-12 NOTE — PROGRESS NOTE ADULT - SUBJECTIVE AND OBJECTIVE BOX
Cardiovascular Disease Progress Note    Overnight events: No acute events overnight.  no cp/sob/palps  Otherwise review of systems negative    Objective Findings:  T(C): 37.2 (10-12-23 @ 05:25), Max: 39.1 (10-11-23 @ 22:53)  HR: 102 (10-12-23 @ 05:25) (51 - 110)  BP: 111/75 (10-12-23 @ 05:25) (109/74 - 118/80)  RR: 19 (10-12-23 @ 05:25) (17 - 19)  SpO2: 100% (10-12-23 @ 05:25) (97% - 100%)  Wt(kg): --  Daily Height in cm: 167.64 (11 Oct 2023 09:05)    Daily Weight in k.4 (12 Oct 2023 05:25)      Physical Exam:  Gen: NAD  HEENT: EOMI  CV: RRR, normal S1 + S2, no m/r/g  Lungs: CTAB  Abd: soft, non-tender  Ext: No edema    Telemetry:    Laboratory Data:                        11.5   18.97 )-----------( 179      ( 11 Oct 2023 06:51 )             35.7     10-11    138  |  103  |  26<H>  ----------------------------<  79  3.6   |  21<L>  |  1.62<H>    Ca    8.9      11 Oct 2023 06:51  Phos  2.9     10-11  Mg     2.00     10-11    TPro  6.4  /  Alb  3.4  /  TBili  1.1  /  DBili  x   /  AST  17  /  ALT  15  /  AlkPhos  114  10-11              Inpatient Medications:  MEDICATIONS  (STANDING):  budesonide 160 MICROgram(s)/formoterol 4.5 MICROgram(s) Inhaler 2 Puff(s) Inhalation two times a day  buMETAnide 1 milliGRAM(s) Oral daily  cefTRIAXone   IVPB 2000 milliGRAM(s) IV Intermittent every 24 hours  cholecalciferol 2000 Unit(s) Oral daily  heparin   Injectable 5000 Unit(s) SubCutaneous every 8 hours  metoprolol succinate ER 37.5 milliGRAM(s) Oral daily  predniSONE   Tablet 7.5 milliGRAM(s) Oral daily  sildenafil (REVATIO) 20 milliGRAM(s) Oral three times a day         CONCLUSIONS:      1. The left ventricular cavity is small. The interventricular septum is flattened in systole and diastole consistent with right ventricular pressure and volume overload. Left ventricular systolic function is normal with a calculated ejection fraction of 57 % by the Espino's biplane method of disks. The left ventricular diastolic function is indeterminate. There is normal LV mass and concentric remodeling.   2. The left ventricular diastolic function is indeterminate.   3. Right ventricular volume and pressure overload. Left ventricular systolic function is normal with an ejection fraction of 57 % by Espino's method of disks.   4. Severely enlarged right ventricular cavity size and severely reduced systolic function.   5. Device lead is visualized.   6. The right atrium is severely dilated in size.   7. Estimated pulmonary artery systolic pressure is 79 mmHg.   8. The inferior vena cava is dilated (dilated >2.1cm) with abnormal inspiratory collapse (abnormal <50%) consistent with elevated right atrial pressure (~15, range 10-20mmHg).   9. Organized fibrinous vs coagulant material is seen in the pericardial space. There is a trace pericardial effusion noted adjacent to the posterior left ventricle.      Assessment:  63 year old male, with past history significant for Rheumatoid arthritis, HTN, HFrEF (55 to 60% - TTE of 2023), Home oxygen 5 liters, CKD, Renal cell carcinoma - s/p Partial nephrectomy, Plasma cell dyscrasia, and AICD placement, presented to the ED secondary to shortness of breath.  Seen and evaluated at bedside; restless, and complaining of feeling extremely cold, despite being covered with ~ 4 blankets and socks in place to feet.  AD.  Patient reports frequency in micturition with oliguria yesterday.  At approximately 7:00 PM, patient noted blood in the urine.  No associated burning or itching, however, has severe pain (10/10 intensity) post void.  Reports subjective fever.  No chills or sweating.  Has continued with hematuria - dark red.  Woke up with significant shortness of breath this morning and significant generalized weakness; could hardly get out of bed.  No chest pain, palpitations.  Wife called PMD who advised that patient be taken to the ED.     Recs:  cardiac stable  vol status appears stable, cont with diuretics as dosed  follows with hf dr llanes as op, notified of admission  f/u ct chest/abd/pelvis  s/p echo, results noted, overall with stable findings. Organized fibrinous vs coagulant material is seen in the pericardial space --> cont to monitor, remains hemodynamically stable, suspect effusion secondary to pulm htn but possibility for IR drainage for diagnostic purposes to be considered being clinical course   pulmonary consult  infectious workup. ID recs appreciated  icd interrogation  cw lv dysfxn meds  will follow         Over 25 minutes spent on total encounter; more than 50% of the visit was spent counseling and/or coordinating care by the attending physician.      Juarez Carver MD   Cardiovascular Disease  [phone]

## 2023-10-12 NOTE — CONSULT NOTE ADULT - PROBLEM SELECTOR RECOMMENDATION 9
Bumex 1mg daily; Please give IV Bumex 1mg BID X 2 days  Toprol 37.5mg daily; on hold  Sildenafil 20mg TID  Strict I/O  Daily standing weights  Monitor lytes replete K>4.0 and Mg>2.0  Trend SCr  Management per primary team (IV Ceftra)  Pending final recommendations from HF attending Bumex 1mg daily; Please give IV Bumex 1mg BID X 2 days  Toprol 37.5mg daily; on hold  Sildenafil 20mg TID  Strict I/O  Daily standing weights  Monitor lytes replete K>4.0 and Mg>2.0  Trend SCr  Please obtain Lactate level today  Management per primary team (IV Ceftra)  Pending final recommendations from HF attending

## 2023-10-12 NOTE — PROGRESS NOTE ADULT - ASSESSMENT
_________________________________________________________________________________________  ========>>  M E D I C A L   A T T E N D I N G    F O L L O W  U P  N O T E  <<=========  -----------------------------------------------------------------------------------------------------    - Patient seen and examined by me earlier today.   - In summary,  PLACIDO GOLDMAN is a 63y year old man admitted with Urosepsis, now with bacteremia  - Patient today overall doing ok, comfortable, eating better, Overall improved as per patient    ==================>> REVIEW OF SYSTEM <<=================    GEN: no fever, no chills, no pain:: Significant pain with urination much improved Now as per patient  RESP: no SOB, no cough, no sputum  CVS: no chest pain, no palpitations, no edema  GI: no abdominal pain, no nausea  : no dysuria, no frequency, hematuria Previously, improved   Neuro: no headache, no dizziness  Derm : no itching, no rash    ==================>> PHYSICAL EXAM <<=================    GEN: A&O X 3 , NAD , comfortable, pleasant, calm , in chair eating..   HEENT: NCAT, PERRL, MMM, hearing intact, On nasal cannula 02  Neck: supple , no JVD appreciated  CVS: S1S2 , regular , No M/R/G appreciated  PULM: CTA B/L,  no W/R/R appreciated  ABD.: soft. non tender, non distended,  bowel sounds present  Extrem: intact pulses , no edema   PSYCH : normal mood,  not anxious       ( Note written / Date of service 10-12-23 ( This is certified to be the same as "ENTERED" date above ( for billing purposes)))    ==================>> MEDICATIONS <<====================    budesonide 160 MICROgram(s)/formoterol 4.5 MICROgram(s) Inhaler 2 Puff(s) Inhalation two times a day  buMETAnide Injectable 1 milliGRAM(s) IV Push two times a day  cefTRIAXone   IVPB 2000 milliGRAM(s) IV Intermittent every 24 hours  cholecalciferol 2000 Unit(s) Oral daily  heparin   Injectable 5000 Unit(s) SubCutaneous every 8 hours  predniSONE   Tablet 7.5 milliGRAM(s) Oral daily  sildenafil (REVATIO) 20 milliGRAM(s) Oral three times a day    MEDICATIONS  (PRN):  acetaminophen     Tablet .. 650 milliGRAM(s) Oral every 6 hours PRN Temp greater or equal to 38C (100.4F), Mild Pain (1 - 3)  melatonin 3 milliGRAM(s) Oral at bedtime PRN Insomnia    ___________  Active diet:  Diet, Regular  ___________________    ==================>> VITAL SIGNS <<==================    Height (cm): 167.6  Weight (kg): 62.5  BMI (kg/m2): 22.3  Vital Signs Last 24 HrsT(C): 37.2 (10-12-23 @ 05:25)  T(F): 98.9 (10-12-23 @ 05:25), Max: 102.4 (10-11-23 @ 22:53)  HR: 102 (10-12-23 @ 05:25) (102 - 110)  BP: 115/82 (10-12-23 @ 13:36)  RR: 19 (10-12-23 @ 05:25) (17 - 19)  SpO2: 100% (10-12-23 @ 05:25) (97% - 100%)       ==================>> LAB AND IMAGING <<==================                        11.5   18.97 )-----------( 179      ( 11 Oct 2023 06:51 )             35.7        10-11    138  |  103  |  26<H>  ----------------------------<  79  3.6   |  21<L>  |  1.62<H>    Ca    8.9      11 Oct 2023 06:51  Phos  2.9     10-11  Mg     2.00     10-11    TPro  6.4  /  Alb  3.4  /  TBili  1.1  /  DBili  x   /  AST  17  /  ALT  15  /  AlkPhos  114  10-11    WBC count:   18.97 <<== ,  28.13 <<== ,  28.12 <<==   Hemoglobin:   11.5 <<==,  13.0 <<==,  13.8 <<==  platelets:  179 <==, 193 <==, 223 <==    Creatinine:  1.62  <<==, 1.80  <<==, 1.57  <<==, 1.61  <<==  Sodium:   138  <==, 140  <==, 138  <==, 134  <==       AST:          17 <== , 26 <== , 94 <==      ALT:        15  <== , 20  <== , 35  <==      AP:        114  <=, 134  <=, 138  <=     Bili:        1.1  <=, 2.3  <=, 1.6  <=    ____________________________    M I C R O B I O L O G Y :    Culture - Urine (collected 11 Oct 2023 03:37)  Source: Clean Catch Clean Catch (Midstream)  Final Report (12 Oct 2023 13:16):    <10,000 CFU/mL Normal Urogenital Bri    Culture - Blood (collected 11 Oct 2023 00:55)  Source: .Blood Blood-Peripheral  Preliminary Report (12 Oct 2023 09:01):    No growth at 24 hours    Culture - Blood (collected 11 Oct 2023 00:40)  Source: .Blood Blood-Peripheral  Preliminary Report (12 Oct 2023 09:01):    No growth at 24 hours    Culture - Urine (collected 09 Oct 2023 16:28)  Source: Clean Catch Clean Catch (Midstream)  Preliminary Report (11 Oct 2023 22:33):    >100,000 CFU/ml Klebsiella pneumoniae    Culture - Blood (collected 09 Oct 2023 14:10)  Source: .Blood Blood-Peripheral  Gram Stain (10 Oct 2023 15:17):    Growth in anaerobic bottle: Gram Negative Rods  Final Report (12 Oct 2023 10:48):    Growth in anaerobic bottle: Klebsiella pneumoniae    See previous culture  [id number]    Culture - Blood (collected 09 Oct 2023 14:00)  Source: .Blood Blood-Peripheral  Gram Stain (10 Oct 2023 10:23):    Growth in aerobic bottle: Gram Negative Rods  Final Report (12 Oct 2023 10:14):    Growth in aerobic bottle: Klebsiella pneumoniae    Direct identification is available within approximately 3-5    hours either by Blood Panel Multiplexed PCR or Direct    MALDI-TOF. Details: [email or web address]  Organism: Blood Culture PCR  Klebsiella pneumoniae (12 Oct 2023 10:14)  Organism: Klebsiella pneumoniae (12 Oct 2023 10:14)    Sensitivities:      -  Levofloxacin: S <=0.5      -  Tobramycin: S <=2      -  Aztreonam: S <=4      -  Gentamicin: S <=2      -  Cefazolin: S <=2      -  Cefepime: S <=2      -  Piperacillin/Tazobactam: S <=8      -  Ciprofloxacin: S <=0.25      -  Imipenem: S <=1      -  Ceftriaxone: S <=1      -  Ampicillin: R >16 These ampicillin results predict results for amoxicillin      Method Type: LOREN      -  Meropenem: S <=1      -  Ampicillin/Sulbactam: S 8/4      -  Cefoxitin: S <=8      -  Amikacin: S <=16      -  Trimethoprim/Sulfamethoxazole: S <=0.5/9.5      -  Ertapenem: S <=0.5  Organism: Blood Culture PCR (12 Oct 2023 10:14)    Sensitivities:      Method Type: PCR      -  K. pneumoniae group: Detec (K. pneumoniae, K. quasipneumoniae, K. variicola)        SARS-CoV-2: NotDetec (10-12-23 @ 00:39)  SARS-CoV-2: NotDetec (10-09-23 @ 15:20)  < from: TTE W or WO Ultrasound Enhancing Agent (10.11.23 @ 09:24) >  CONCLUSIONS:      1. The left ventricular cavity is small. The interventricular septum is flattened in systole and diastole consistent with right ventricular pressure and volume overload. Left ventricular systolic function is normal with a calculated ejection fraction of 57 % by the Espino's biplane method of disks. Theleft ventricular diastolic function is indeterminate. There is normal LV mass and concentric remodeling.   2. The left ventricular diastolic function is indeterminate.   3. Right ventricular volume and pressure overload. Left ventricular systolic function is normal with an ejection fraction of 57 % by Espino's method of disks.   4. Severely enlarged right ventricular cavity size and severely reduced systolic function.   5. Device lead is visualized.   6. The right atrium is severely dilated in size.   7. Estimated pulmonary artery systolic pressure is 79 mmHg.   8. The inferior vena cava is dilated (dilated >2.1cm) with abnormal inspiratory collapse (abnormal <50%) consistent with elevated right atrial pressure (~15, range 10-20mmHg).   9. Organized fibrinous vs coagulant material is seen in the pericardial space. There is a trace pericardial effusion noted adjacent to the posterior left ventricle.  < end of copied text >    __________________________________________________________________________________  ===============>>  A S S E S S M E N T   A N D   P L A N <<===============  ------------------------------------------------------------------------------------------    63 year old male, with past history significant for Rheumatoid arthritis, HTN, HFrEF (55 to 60% - TTE of 03/08/2023), Home oxygen 5 liters, CKD, Renal cell carcinoma - s/p Partial nephrectomy, Plasma cell dyscrasia, and AICD placement, presented to the ED secondary to shortness of breath.  Diagnosed with Sepsis in the ED.     Problem/Plan - :  ·  Problem: Urinary tract infection with hematuria, with gram-negative / klebsiella bacteremia  Sepsis present on admission > resolved   - antibiotics per ID  - f/u cultures / sensitivities   follow blood cultures and sensitivities, repeat blood cultures to negativity  Follow CT scan imaging results   echocardiogram as above      Problem/Plan - :  ·  Problem: acute respiratory failure With hypoxemia.   Likely  acute exacerbation of pulmonary hypertension In setting of sepsis  - RVP, COVID-19 PCR negative.  CXR w/o gross signs of infection  - on nasal cannula O2 >> Wean down as able  - other supportive care as needed.  - pulm and HF teams on board, appreciated      Problem/Plan - :  ·  Problem: Lactic acidosis. Improved  Lactate:  1.9  <<== , 3.3  <<==   - in the context of sepsis  - ensure optimal hydration  Monitor     Problem/Plan - :  ·  Problem: Pulmonary hypertension.   ·  Plan: - per history, and uses 5 liters NC at home  - echo as above with right heart failure   - on sildenafil 20 mg daily (at 10:00 PM); continuing  - also on bumetanide 1 mg daily (has been on varied doses in the past, per chart review)  - monitor vitals, labs / O2   - intake/output Q4H, weight daily, HOB elevation  - multiple specialists on board, appreciated      Problem/Plan - :  ·  Problem: Prophylactic measure.   ·  Plan: - heparin SQ.    Continue Current medications otherwise and monitor.  supportive care  Patient Full code    --------------------------------------------  Case discussed with patient , family at bedside   Education given on findings and plan of care  ___________________________  H. JEANETH Vu.  Pager: [phone]

## 2023-10-12 NOTE — PROGRESS NOTE ADULT - SUBJECTIVE AND OBJECTIVE BOX
Follow Up:  bacteremia    Interval History/ROS:  Overnight: no acute events.    Seen and examind. no complaints.    Allergies  No Known Allergies        ANTIMICROBIALS:  cefTRIAXone   IVPB 2000 every 24 hours      OTHER MEDS:  MEDICATIONS  (STANDING):  acetaminophen     Tablet .. 650 every 6 hours PRN  budesonide 160 MICROgram(s)/formoterol 4.5 MICROgram(s) Inhaler 2 two times a day  buMETAnide Injectable 1 two times a day  heparin   Injectable 5000 every 8 hours  melatonin 3 at bedtime PRN  predniSONE   Tablet 7.5 daily  sildenafil (REVATIO) 20 three times a day      Vital Signs Last 24 Hrs  T(C): 36.5 (12 Oct 2023 18:38), Max: 39.1 (11 Oct 2023 22:53)  T(F): 97.7 (12 Oct 2023 18:38), Max: 102.4 (11 Oct 2023 22:53)  HR: 97 (12 Oct 2023 18:38) (97 - 115)  BP: 109/81 (12 Oct 2023 18:38) (103/68 - 115/82)  BP(mean): --  RR: 19 (12 Oct 2023 18:38) (18 - 19)  SpO2: 97% (12 Oct 2023 18:38) (97% - 100%)    Parameters below as of 12 Oct 2023 18:38  Patient On (Oxygen Delivery Method): nasal cannula        PHYSICAL EXAM:  General: Patient appears slightly uncomfortable, no acute distress,   HEENT: NCAT, PERRL, anicteric sclera, mucous membranes moist and intact  Neck: Supple, No lymphadenopathy  CV: +S1/S2, RRR, no M/R/G  Lungs: No respiratory distress, CTA b/l, no wheezing, rales or rhonchi  Abd:  BS4+, Soft, NTND  : suprapubic tenderness  Neuro: AAOx3. No focal deficits noted.   Ext: No cyanosis, no edema, 2+ pulses in upper and lower extremities   Msk: freely moving upper and lower extremities  Skin: No rash, no phlebitis, erythema or edema                                 12.3   14.08 )-----------( 217      ( 12 Oct 2023 14:33 )             39.6       10-12    139  |  102  |  28<H>  ----------------------------<  88  4.1   |  21<L>  |  1.74<H>    Ca    9.6      12 Oct 2023 14:33  Phos  3.2     10-12  Mg     2.20     10-12    TPro  6.4  /  Alb  3.4  /  TBili  1.1  /  DBili  x   /  AST  17  /  ALT  15  /  AlkPhos  114  10-11      Urinalysis Basic - ( 12 Oct 2023 14:33 )    Color: x / Appearance: x / SG: x / pH: x  Gluc: 88 mg/dL / Ketone: x  / Bili: x / Urobili: x   Blood: x / Protein: x / Nitrite: x   Leuk Esterase: x / RBC: x / WBC x   Sq Epi: x / Non Sq Epi: x / Bacteria: x        MICROBIOLOGY:  v    Culture - Urine (collected 11 Oct 2023 03:37)  Source: Clean Catch Clean Catch (Midstream)  Final Report (12 Oct 2023 13:16):    <10,000 CFU/mL Normal Urogenital Bri    Culture - Blood (collected 11 Oct 2023 00:55)  Source: .Blood Blood-Peripheral  Preliminary Report (12 Oct 2023 09:01):    No growth at 24 hours    Culture - Blood (collected 11 Oct 2023 00:40)  Source: .Blood Blood-Peripheral  Preliminary Report (12 Oct 2023 09:01):    No growth at 24 hours    Culture - Urine (collected 09 Oct 2023 16:28)  Source: Clean Catch Clean Catch (Midstream)  Final Report (12 Oct 2023 17:57):    >100,000 CFU/ml Klebsiella pneumoniae  Organism: Klebsiella pneumoniae (12 Oct 2023 17:57)  Organism: Klebsiella pneumoniae (12 Oct 2023 17:57)      -  Levofloxacin: S <=0.5      -  Tobramycin: S <=2      -  Nitrofurantoin: S <=32 Should not be used to treat pyelonephritis      -  Aztreonam: S <=4      -  Gentamicin: S <=2      -  Cefazolin: S <=2 For uncomplicated UTI with K. pneumoniae, E. coli, or P. mirablis: LOREN <=16 is sensitive and LOREN >=32 is resistant. This also predicts results for oral agents cefaclor, cefdinir, cefpodoxime, cefprozil, cefuroxime axetil, cephalexin and locarbef for uncomplicated UTI. Note that some isolates may be susceptible to these agents while testing resistant to cefazolin.      -  Cefepime: S <=2      -  Piperacillin/Tazobactam: S <=8      -  Ciprofloxacin: S <=0.25      -  Imipenem: S <=1      -  Ceftriaxone: S <=1      -  Ampicillin: R >16 These ampicillin results predict results for amoxicillin      Method Type: LOREN      -  Meropenem: S <=1      -  Ampicillin/Sulbactam: S <=4/2      -  Cefoxitin: S <=8      -  Cefuroxime: S <=4      -  Amikacin: S <=16      -  Amoxicillin/Clavulanic Acid: S <=8/4      -  Trimethoprim/Sulfamethoxazole: S <=0.5/9.5      -  Ertapenem: S <=0.5    Culture - Blood (collected 09 Oct 2023 14:10)  Source: .Blood Blood-Peripheral  Gram Stain (10 Oct 2023 15:17):    Growth in anaerobic bottle: Gram Negative Rods  Final Report (12 Oct 2023 10:48):    Growth in anaerobic bottle: Klebsiella pneumoniae    See previous culture  [id number]    Culture - Blood (collected 09 Oct 2023 14:00)  Source: .Blood Blood-Peripheral  Gram Stain (10 Oct 2023 10:23):    Growth in aerobic bottle: Gram Negative Rods  Final Report (12 Oct 2023 10:14):    Growth in aerobic bottle: Klebsiella pneumoniae    Direct identification is available within approximately 3-5    hours either by Blood Panel Multiplexed PCR or Direct    MALDI-TOF. Details: [email or web address]  Organism: Blood Culture PCR  Klebsiella pneumoniae (12 Oct 2023 10:14)  Organism: Klebsiella pneumoniae (12 Oct 2023 10:14)      -  Levofloxacin: S <=0.5      -  Tobramycin: S <=2      -  Aztreonam: S <=4      -  Gentamicin: S <=2      -  Cefazolin: S <=2      -  Cefepime: S <=2      -  Piperacillin/Tazobactam: S <=8      -  Ciprofloxacin: S <=0.25      -  Imipenem: S <=1      -  Ceftriaxone: S <=1      -  Ampicillin: R >16 These ampicillin results predict results for amoxicillin      Method Type: LOREN      -  Meropenem: S <=1      -  Ampicillin/Sulbactam: S 8/4      -  Cefoxitin: S <=8      -  Amikacin: S <=16      -  Trimethoprim/Sulfamethoxazole: S <=0.5/9.5      -  Ertapenem: S <=0.5  Organism: Blood Culture PCR (12 Oct 2023 10:14)      Method Type: PCR      -  K. pneumoniae group: Detec (K. pneumoniae, K. quasipneumoniae, K. variicola)              Rapid RVP Result: NotDetec (10-12 @ 00:39)  Rapid RVP Result: NotDetec (10-09 @ 15:20)        RADIOLOGY:  Imaging reviewed

## 2023-10-12 NOTE — CONSULT NOTE ADULT - ASSESSMENT
63 year old male, with past history significant for Rheumatoid arthritis, HTN, HFrEF (55 to 60% - TTE of 03/08/2023), Home oxygen 5 liters, CKD, Renal cell carcinoma - s/p Partial nephrectomy, Plasma cell dyscrasia, and AICD placement, presented to the ED secondary to shortness of breath.  Seen and evaluated at bedside; restless, and complaining of feeling extremely cold, despite being covered with ~ 4 blankets and socks in place to feet.  AD.  Patient reports frequency in micturition with oliguria yesterday.  At approximately 7:00 PM, patient noted blood in the urine.  No associated burning or itching, however, has severe pain (10/10 intensity) post void.  Reports subjective fever.  No chills or sweating.  Has continued with hematuria - dark red.  Woke up with significant shortness of breath this morning and significant generalized weakness; could hardly get out of bed.  No chest pain, palpitations.  Wife called PMD who advised that patient be taken to the ED.      63 year old Male, with PMH Rheumatoid arthritis, HTN, home oxygen 5 liters, CKD, Renal cell carcinoma s/p Partial nephrectomy, and HFpEF (60%;LVIDd 3.2 decreased RV function and severe TR) s/p ICD. Patient presented to ED with c/o urinary frequency and pain X 2days with increased SOB and chills.

## 2023-10-12 NOTE — PROGRESS NOTE ADULT - ASSESSMENT
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   - DVT prophylaxis  - Trend SCr

## 2023-10-12 NOTE — PROGRESS NOTE ADULT - SUBJECTIVE AND OBJECTIVE BOX
CHIEF COMPLAINT:    Interval Events: Pt seen and examined at bedside. persistent fever to 102.4 overnight. Otherwise breathing is stable     REVIEW OF SYSTEMS:  Constitutional: No fevers or chills. No weight loss. No fatigue or generalised malaise.  Eyes: No itching or discharge from the eyes  ENT: No ear pain. No ear discharge. No nasal congestion. No post nasal drip. No epistaxis. No throat pain. No sore throat. No difficulty swallowing.   CV: No chest pain. No palpitations. No lightheadedness or dizziness.   Resp: No dyspnea at rest. No dyspnea on exertion. No orthopnea. No wheezing. No cough. No stridor. No sputum production. No chest pain with respiration.  GI: No nausea. No vomiting. No diarrhea.      OBJECTIVE:  ICU Vital Signs Last 24 Hrs  T(C): 37.2 (12 Oct 2023 05:25), Max: 39.1 (11 Oct 2023 22:53)  T(F): 98.9 (12 Oct 2023 05:25), Max: 102.4 (11 Oct 2023 22:53)  HR: 102 (12 Oct 2023 05:25) (51 - 110)  BP: 111/75 (12 Oct 2023 05:25) (109/74 - 118/80)  BP(mean): --  ABP: --  ABP(mean): --  RR: 19 (12 Oct 2023 05:25) (17 - 19)  SpO2: 100% (12 Oct 2023 05:25) (97% - 100%)    O2 Parameters below as of 12 Oct 2023 05:25  Patient On (Oxygen Delivery Method): nasal cannula  O2 Flow (L/min): 5            10-11 @ 07:01  -  10-12 @ 07:00  --------------------------------------------------------  IN: 940 mL / OUT: 1500 mL / NET: -560 mL      CAPILLARY BLOOD GLUCOSE          PHYSICAL EXAM:  General: Awake, alert, oriented X 3.   HEENT: Atraumatic, normocephalic.                  No tonsillar or pharyngeal exudates.  Lymph Nodes: No palpable lymphadenopathy  Neck: No JVD. No carotid bruit.   Respiratory: Normal chest expansion                         Normal percussion                         Normal and equal air entry                         No wheeze, rhonchi or rales.  Cardiovascular: S1 S2 normal. No murmurs, rubs or gallops.   Abdomen: Soft, non-tender, non-distended. No organomegaly.  Extremities: Warm to touch. Peripheral pulse palpable. No pedal edema.   Skin: No rashes or skin lesions  Neurological: Non-focal  Psychiatry: Appropriate mood and affect.      HOSPITAL MEDICATIONS:  MEDICATIONS  (STANDING):  budesonide 160 MICROgram(s)/formoterol 4.5 MICROgram(s) Inhaler 2 Puff(s) Inhalation two times a day  buMETAnide 1 milliGRAM(s) Oral daily  cefTRIAXone   IVPB 2000 milliGRAM(s) IV Intermittent every 24 hours  cholecalciferol 2000 Unit(s) Oral daily  heparin   Injectable 5000 Unit(s) SubCutaneous every 8 hours  metoprolol succinate ER 37.5 milliGRAM(s) Oral daily  predniSONE   Tablet 7.5 milliGRAM(s) Oral daily  sildenafil (REVATIO) 20 milliGRAM(s) Oral three times a day    MEDICATIONS  (PRN):  acetaminophen     Tablet .. 650 milliGRAM(s) Oral every 6 hours PRN Temp greater or equal to 38C (100.4F), Mild Pain (1 - 3)  melatonin 3 milliGRAM(s) Oral at bedtime PRN Insomnia      LABS:                        11.5   18.97 )-----------( 179      ( 11 Oct 2023 06:51 )             35.7     10-11    138  |  103  |  26<H>  ----------------------------<  79  3.6   |  21<L>  |  1.62<H>    Ca    8.9      11 Oct 2023 06:51  Phos  2.9     10-11  Mg     2.00     10-11    TPro  6.4  /  Alb  3.4  /  TBili  1.1  /  DBili  x   /  AST  17  /  ALT  15  /  AlkPhos  114  10-11      Urinalysis Basic - ( 11 Oct 2023 06:51 )    Color: x / Appearance: x / SG: x / pH: x  Gluc: 79 mg/dL / Ketone: x  / Bili: x / Urobili: x   Blood: x / Protein: x / Nitrite: x   Leuk Esterase: x / RBC: x / WBC x   Sq Epi: x / Non Sq Epi: x / Bacteria: x            MICROBIOLOGY:     RADIOLOGY:  [ ] Reviewed and interpreted by me    Point of Care Ultrasound Findings:    PFT:    EKG:

## 2023-10-12 NOTE — CONSULT NOTE ADULT - SUBJECTIVE AND OBJECTIVE BOX
Patient is a 63y old  Male who presents with a chief complaint of Sepsis due to undetermined organism, Urinary tract infection (12 Oct 2023 08:02)      HPI:    PAST MEDICAL & SURGICAL HISTORY:  Hypertension      Rheumatoid arthritis      Neoplasm of uncertain behavior of kidney, right      Chronic systolic congestive heart failure      Renal cell carcinoma      Plasma cell dyscrasia      Chronic kidney disease (CKD)      Pulmonary hypertension      On home oxygen therapy      History of cardiac cath  2022, at [place] no stent      H/O partial nephrectomy      AICD (automatic cardioverter/defibrillator) present          FAMILY HISTORY:  FH: diabetes mellitus (Mother)    Family history of heart disease (Father)        Home Medications:  acetaminophen 325 mg oral tablet: 2 tab(s) orally every 6 hours, As needed, Temp greater or equal to 38C (100.4F), Mild Pain (1 - 3) (10 Oct 2023 00:09)  Albuterol (Eqv-ProAir HFA) 90 mcg/inh inhalation aerosol: 2 puff(s) inhaled 4 times a day as needed for  shortness of breath and/or wheezing (11 Oct 2023 11:57)  melatonin 3 mg oral tablet: 1 tab(s) orally once a day (at bedtime), As needed, Insomnia (10 Oct 2023 00:09)  metoprolol succinate 25 mg oral tablet, extended release: 1.5 tab(s) orally once a day (11 Oct 2023 11:59)  predniSONE 2.5 mg oral tablet: 3 tab(s) orally once a day (11 Oct 2023 11:59)  sildenafil 20 mg oral tablet: 1 tab(s) orally 3 times a day (11 Oct 2023 11:59)      Medications:  acetaminophen     Tablet .. 650 milliGRAM(s) Oral every 6 hours PRN  budesonide 160 MICROgram(s)/formoterol 4.5 MICROgram(s) Inhaler 2 Puff(s) Inhalation two times a day  buMETAnide 1 milliGRAM(s) Oral daily  cefTRIAXone   IVPB 2000 milliGRAM(s) IV Intermittent every 24 hours  cholecalciferol 2000 Unit(s) Oral daily  heparin   Injectable 5000 Unit(s) SubCutaneous every 8 hours  melatonin 3 milliGRAM(s) Oral at bedtime PRN  metoprolol succinate ER 37.5 milliGRAM(s) Oral daily  predniSONE   Tablet 7.5 milliGRAM(s) Oral daily  sildenafil (REVATIO) 20 milliGRAM(s) Oral three times a day      Review of systems:  10 point review of systems completed and are negative unless noted in HPI    Vitals:  T(C): 37.2 (10-12-23 @ 05:25), Max: 39.1 (10-11-23 @ 22:53)  HR: 102 (10-12-23 @ 05:25) (51 - 110)  BP: 111/75 (10-12-23 @ 05:25) (111/75 - 118/80)  BP(mean): --  RR: 19 (10-12-23 @ 05:25) (17 - 19)  SpO2: 100% (10-12-23 @ 05:25) (97% - 100%)    Daily     Daily Weight in k.4 (12 Oct 2023 05:25)    Weight (kg): 62.5 (10-11 @ 09:05)    I&O's Summary    11 Oct 2023 07:  -  12 Oct 2023 07:00  --------------------------------------------------------  IN: 940 mL / OUT: 1500 mL / NET: -560 mL    12 Oct 2023 07:  -  12 Oct 2023 10:42  --------------------------------------------------------  IN: 100 mL / OUT: 150 mL / NET: -50 mL        Physical Exam:  Appearance: No Acute Distress  Neck: JVP  Cardiovascular: Normal S1 S2, No murmurs/rubs/gallops  Respiratory: Clear to auscultation bilaterally  Gastrointestinal: Soft, Non-tender	  Skin: No cyanosis	  Neurologic: Non-focal  Extremities: No LE edema  Psychiatry: A & O x 3, Mood & affect appropriate    Labs:                        11.5   18.97 )-----------( 179      ( 11 Oct 2023 06:51 )             35.7     10-11    138  |  103  |  26<H>  ----------------------------<  79  3.6   |  21<L>  |  1.62<H>    Ca    8.9      11 Oct 2023 06:51  Phos  2.9     10-11  Mg     2.00     10-11    TPro  6.4  /  Alb  3.4  /  TBili  1.1  /  DBili  x   /  AST  17  /  ALT  15  /  AlkPhos  114  10-11      TELEMETRY:    Echocardiogram:  TTE W or WO Ultrasound Enhancing Agent (10.11.23 @ 09:24)   _______________________________________________________________________________________     CONCLUSIONS:      1. The left ventricular cavity is small. The interventricular septum is flattened in systole and diastole consistent with right ventricular pressure and volume overload. Left ventricular systolic function is normal with a calculated ejection fraction of 57 % by the Espino's biplane method of disks. Theleft ventricular diastolic function is indeterminate. There is normal LV mass and concentric remodeling.   2. The left ventricular diastolic function is indeterminate.   3. Right ventricular volume and pressure overload. Left ventricular systolic function is normal with an ejection fraction of 57 % by Espino's method of disks.   4. Severely enlarged right ventricular cavity size and severely reduced systolic function.   5. Device lead is visualized.   6. The right atrium is severely dilated in size.   7. Estimated pulmonary artery systolic pressure is 79 mmHg.   8. The inferior vena cava is dilated (dilated >2.1cm) with abnormal inspiratory collapse (abnormal <50%) consistent with elevated right atrial pressure (~15, range 10-20mmHg).   9. Organized fibrinous vs coagulant material is seen in the pericardial space. There is a trace pericardial effusion noted adjacent to the posterior left ventricle.    ________________________________________________________________________________________  FINDINGS:     Left Ventricle:  The left ventricular cavity is small. The interventricular septum is flattened in systole and diastole consistent with right ventricular pressure and volume overload. Left ventricular systolic function is normal with a calculated ejection fraction of 57 % by the Espino's biplane method of disks. The left ventricular diastolic function is indeterminate. There is normal LV mass and concentric remodeling.     Right Ventricle:  The right ventricular cavity is severely enlarged in size and severely reduced systolic function. A device lead is visualized.     Left Atrium:  The left atrium is normal in size with an indexed volume of 11.71 ml/m².     Right Atrium:  The right atrium is severely dilated in size.     Aortic Valve:  The aortic valve is tricuspid with normal leaflet excursion.     Mitral Valve:  Structurally normal mitral valve with normal leaflet excursion.     Tricuspid Valve:  Structurally normal tricuspid valve with normal leaflet excursion. There is moderate-severe tricuspid regurgitation. Estimated pulmonary artery systolic pressure is 79 mmHg.     Pulmonic Valve:  Structurally normal pulmonic valve with normal leaflet excursion. There is trace pulmonic regurgitation.     Pericardium:  Organized fibrinous vs coagulant material is seen in the pericardial space. There is a trace pericardial effusion noted adjacent to the posterior left ventricle.     Systemic Veins:  The inferior vena cava is dilated (dilated >2.1cm) with abnormal inspiratory collapse (abnormal <50%) consistent with elevated right atrial pressure (~15, range 10-20mmHg).  ____________________________________________________________________  Quantitative Data:  Left Ventricle Measurements: (Indexed to BSA)     IVSd (2D): 1.0 cm  LVPWd (2D):  1.0 cm  LVIDd (2D):  3.2 cm  LVIDs (2D):  1.9 cm  LV Mass:     87 g   51.6 g/m²  LV Vol d, MOD A2C: 34.0 ml 20.19 ml/m²  LV Vol d, MOD A4C: 54.8 ml 32.58 ml/m²  LV Vol d, MOD BP:  45.4 ml 26.98 ml/m²  LV Vol s, MOD A2C: 15.6 ml 9.28 ml/m²  LV Vol s, MOD A4C: 20.3 ml 12.10 ml/m²  LV Vol s, MOD BP:  19.7 ml 11.73 ml/m²  LVOT SV MOD BP:    25.7 ml  LV EF% MOD BP:     57 %     MV E Vmax:    0.79 m/s  MV A Vmax:    0.50 m/s  MV E/A:       1.57  e' lateral:   7.21 cm/s  e' medial:   7.48 cm/s  E/e' lateral: 10.93  E/e' medial:  10.53  E/e' Average: 10.73  MV DT:        103 msec    Aorta Measurements: (normal range) (Indexed to BSA)     Ao Root 3.0 cm       Left Atrium Measurements: (Indexed to BSA)  LA Diam 2D: 2.75 cm    Right Ventricle Measurements:     TV Darlyn. S': 12.53 cm/s       LVOT / RVOT/ Qp/Qs Data: (Indexed to BSA)  LVOT Diameter: 1.92 cm  LVOT Vmax:     0.69 m/s  LVOT VTI:      11.18 cm  LVOT SV:       32.2 ml  19.17 ml/m²        Cardiac Catheterization (23 @ 17:05)     Hemodynamic Pressures:   Phase     Location          [mmHg]               [mmHg]  [mmHg]           HR  Baseline      RV                  s      44  ed      3         87  Baseline      PA                  s      44               d      17  m      30     94  Baseline      PCW          a       9                v       5  m   3         99  Baseline      RA                  a       3                v       2  m   1         95    Oxygen Saturations   Phase          Location        Hb             Sat            pO2  Content     Group    Patient: PLACIDO GOLDMAN            MRN: [id number]  Study Date: 2023   05:05 PM      Page 2 of 4          Baseline        HRA                      13              72  12.36   None  Baseline        SVC                      12     70  11.74   MV  Baseline        SVC                      12              70  11.74   MV  Baseline        SVC                      12              70  11.74   MV  Baseline        AO                       12             100  16.86   SA  Baseline     RA                       12              71  12.04   None  Baseline        IVC                      12              74  12.46   MV  Baseline        PV                       12             100  16.73   PV  Baseline        RA12              70  11.68   None  Baseline        RV                       13              69  11.89   None  Baseline        PA                       12              71  11.88   PA    Oxygen Saturation Average, Phase: Baseline   PV Hb      12.30 g/dL   PV Sat     100.00 %       PV pO2  PV Content     16.73 %  SA Hb      12.40 g/dL    SA Sat     100.00 %       SA pO2  SA Content     16.86 %  PA Hb      12.30 g/dL    PA Sat     71.00 %        PA pO2  PA Content     11.88 %  MV Hb      12.38 g/dL MV Sat     70.82 %        MV pO2  MV Content     11.92 %  Strokework:   Phase:                    Baseline   LVSW:                                            LVSW (index):   RVSW:                     18.77 g*m              RVSW (index):  11.55 g*m/m2  Flow Calculations, Phase: Baseline   CO                    4.39 l/min    HR  O2Insp  CI                 2.70 l/min/m2    PO2  O2Exp  SV                                  VO2                 217.00 ml/min  O2(ExAir)  SVI  A-VO2                   4.95 ml/dl  Hb                 12.60 gm/d  Shunts:   Qs                    4.39 l/min    Qs Index               2.70  l/min/m2  Qp                    4.47 l/min    Qp Index               2.75  l/min/m2    Qp/Qs     1.02  EPBF                  4.51 l/min    EPBF Index             2.78  l/min/m2  L-R                   -0.04 l/min   L-R Index              -0.02  l/min/m2  R-L                   -0.12 l/min   R-L Index              -0.08  l/min/m2  Systemic Resistances, Phase: Baseline   SVR                                               TVR   SVRI                                              TVRI   SVR                                               TVR   SVRI                                              TVRI  Pulmonary Resistances:   PVR              482.76 dyn*s/cm5                 TPR  536.40 dyn*s/cm5  PVRI             784.41 dyn*s*m2/cm5              TPRI  871.57 dyn*s*m2/cm5  PVR              6.04 CANTU                          TPR  6.71 CANTU  PVRI       9.81 CANTU*m2                       TPRI    < end of copied text >     MV EVmax: 0.8 m/s  MV A Vmax: 0.5 m/s  MV E/A:    1.6       Tricuspid Valve Measurements:     TR Vmax:          4.0 m/s  TR Peak Gradient: 63.8 mmHg  RA Pressure:      15 mmHg  PASP:             79 mmHg            EKG:< from: 12 Lead ECG (23 @ 01:31)     Ventricular Rate 120 BPM    Atrial Rate 120 BPM    P-R Interval 172 ms    QRS Duration 86 ms    Q-T Interval 308 ms    QTC Calculation(Bazett) 435 ms    P Axis 63 degrees    R Axis 261 degrees    T Axis 54 degrees    Diagnosis Line Sinus tachycardia with Premature atrial complexes  Possible Left atrial enlargement  Right ventricular hypertrophy  Inferior infarct , age undetermined  Anterolateral infarct , age undetermined  Abnormal ECG    < from: Xray Chest 1 View- PORTABLE-Urgent (10.09.23 @ 14:47)     FINDINGS:  Left sided AICD  The heart is mildly enlarged. Prominent main pulmonary artery segment,   unchanged Prominent vascular markings.  The lungs are clear.  There is no pneumothorax. Trace left pleural effusion. No right pleural   effusion  No acute bony abnormality.    IMPRESSION:  Clear lungs.    Mild cardiomegaly. Prominent main pulmonary artery segment, recommend   correlation for possible pulmonary arterial hypertension           Patient is a 63y old  Male who presents with a chief complaint of Sepsis due to undetermined organism, Urinary tract infection (12 Oct 2023 08:02)      HPI:  63 year old male, with past history significant for Rheumatoid arthritis, HTN, HFrEF (55 to 60% - TTE of 2023), Home oxygen 5 liters, CKD, Renal cell carcinoma - s/p Partial nephrectomy, Plasma cell dyscrasia, and AICD placement, presented to the ED secondary to shortness of breath.  Seen and evaluated at bedside; restless, and complaining of feeling extremely cold, despite being covered with ~ 4 blankets and socks in place to feet.  AD.  Patient reports frequency in micturition with oliguria yesterday.  At approximately 7:00 PM, patient noted blood in the urine.  No associated burning or itching, however, has severe pain (10/10 intensity) post void.  Reports subjective fever.  No chills or sweating.  Has continued with hematuria - dark red.  Woke up with significant shortness of breath this morning and significant generalized weakness; could hardly get out of bed.  No chest pain, palpitations.  Wife called PMD who advised that patient be taken to the ED.       PAST MEDICAL & SURGICAL HISTORY:  Hypertension      Rheumatoid arthritis      Neoplasm of uncertain behavior of kidney, right      Chronic systolic congestive heart failure      Renal cell carcinoma      Plasma cell dyscrasia      Chronic kidney disease (CKD)      Pulmonary hypertension      On home oxygen therapy      History of cardiac cath  2022, at [place] no stent      H/O partial nephrectomy      AICD (automatic cardioverter/defibrillator) present          FAMILY HISTORY:  FH: diabetes mellitus (Mother)    Family history of heart disease (Father)        Home Medications:  acetaminophen 325 mg oral tablet: 2 tab(s) orally every 6 hours, As needed, Temp greater or equal to 38C (100.4F), Mild Pain (1 - 3) (10 Oct 2023 00:09)  Albuterol (Eqv-ProAir HFA) 90 mcg/inh inhalation aerosol: 2 puff(s) inhaled 4 times a day as needed for  shortness of breath and/or wheezing (11 Oct 2023 11:57)  melatonin 3 mg oral tablet: 1 tab(s) orally once a day (at bedtime), As needed, Insomnia (10 Oct 2023 00:09)  metoprolol succinate 25 mg oral tablet, extended release: 1.5 tab(s) orally once a day (11 Oct 2023 11:59)  predniSONE 2.5 mg oral tablet: 3 tab(s) orally once a day (11 Oct 2023 11:59)  sildenafil 20 mg oral tablet: 1 tab(s) orally 3 times a day (11 Oct 2023 11:59)      Medications:  acetaminophen     Tablet .. 650 milliGRAM(s) Oral every 6 hours PRN  budesonide 160 MICROgram(s)/formoterol 4.5 MICROgram(s) Inhaler 2 Puff(s) Inhalation two times a day  buMETAnide 1 milliGRAM(s) Oral daily  cefTRIAXone   IVPB 2000 milliGRAM(s) IV Intermittent every 24 hours  cholecalciferol 2000 Unit(s) Oral daily  heparin   Injectable 5000 Unit(s) SubCutaneous every 8 hours  melatonin 3 milliGRAM(s) Oral at bedtime PRN  metoprolol succinate ER 37.5 milliGRAM(s) Oral daily  predniSONE   Tablet 7.5 milliGRAM(s) Oral daily  sildenafil (REVATIO) 20 milliGRAM(s) Oral three times a day      Review of systems:  10 point review of systems completed and are negative unless noted in HPI    Vitals:  T(C): 37.2 (10-12-23 @ 05:25), Max: 39.1 (10-11-23 @ 22:53)  HR: 102 (10-12-23 @ 05:25) (51 - 110)  BP: 111/75 (10-12-23 @ 05:25) (111/75 - 118/80)  BP(mean): --  RR: 19 (10-12-23 @ 05:25) (17 - 19)  SpO2: 100% (10-12-23 @ 05:25) (97% - 100%)    Daily     Daily Weight in k.4 (12 Oct 2023 05:25)    Weight (kg): 62.5 (10-11 @ 09:05)    I&O's Summary    11 Oct 2023 07:01  -  12 Oct 2023 07:00  --------------------------------------------------------  IN: 940 mL / OUT: 1500 mL / NET: -560 mL    12 Oct 2023 07:01  -  12 Oct 2023 10:42  --------------------------------------------------------  IN: 100 mL / OUT: 150 mL / NET: -50 mL        Physical Exam:  Appearance: No Acute Distress  Neck: JVP  Cardiovascular: Normal S1 S2, No murmurs/rubs/gallops  Respiratory: Clear to auscultation bilaterally  Gastrointestinal: Soft, Non-tender	  Skin: No cyanosis	  Neurologic: Non-focal  Extremities: No LE edema  Psychiatry: A & O x 3, Mood & affect appropriate    Labs:                        11.5   18.97 )-----------( 179      ( 11 Oct 2023 06:51 )             35.7     10-11    138  |  103  |  26<H>  ----------------------------<  79  3.6   |  21<L>  |  1.62<H>    Ca    8.9      11 Oct 2023 06:51  Phos  2.9     10-11  Mg     2.00     10-11    TPro  6.4  /  Alb  3.4  /  TBili  1.1  /  DBili  x   /  AST  17  /  ALT  15  /  AlkPhos  114  10-11      TELEMETRY:    Echocardiogram:  TTE W or WO Ultrasound Enhancing Agent (10.11. @ 09:24)   _______________________________________________________________________________________     CONCLUSIONS:      1. The left ventricular cavity is small. The interventricular septum is flattened in systole and diastole consistent with right ventricular pressure and volume overload. Left ventricular systolic function is normal with a calculated ejection fraction of 57 % by the Espino's biplane method of disks. Theleft ventricular diastolic function is indeterminate. There is normal LV mass and concentric remodeling.   2. The left ventricular diastolic function is indeterminate.   3. Right ventricular volume and pressure overload. Left ventricular systolic function is normal with an ejection fraction of 57 % by Espino's method of disks.   4. Severely enlarged right ventricular cavity size and severely reduced systolic function.   5. Device lead is visualized.   6. The right atrium is severely dilated in size.   7. Estimated pulmonary artery systolic pressure is 79 mmHg.   8. The inferior vena cava is dilated (dilated >2.1cm) with abnormal inspiratory collapse (abnormal <50%) consistent with elevated right atrial pressure (~15, range 10-20mmHg).   9. Organized fibrinous vs coagulant material is seen in the pericardial space. There is a trace pericardial effusion noted adjacent to the posterior left ventricle.    ________________________________________________________________________________________  FINDINGS:     Left Ventricle:  The left ventricular cavity is small. The interventricular septum is flattened in systole and diastole consistent with right ventricular pressure and volume overload. Left ventricular systolic function is normal with a calculated ejection fraction of 57 % by the Espino's biplane method of disks. The left ventricular diastolic function is indeterminate. There is normal LV mass and concentric remodeling.     Right Ventricle:  The right ventricular cavity is severely enlarged in size and severely reduced systolic function. A device lead is visualized.     Left Atrium:  The left atrium is normal in size with an indexed volume of 11.71 ml/m².     Right Atrium:  The right atrium is severely dilated in size.     Aortic Valve:  The aortic valve is tricuspid with normal leaflet excursion.     Mitral Valve:  Structurally normal mitral valve with normal leaflet excursion.     Tricuspid Valve:  Structurally normal tricuspid valve with normal leaflet excursion. There is moderate-severe tricuspid regurgitation. Estimated pulmonary artery systolic pressure is 79 mmHg.     Pulmonic Valve:  Structurally normal pulmonic valve with normal leaflet excursion. There is trace pulmonic regurgitation.     Pericardium:  Organized fibrinous vs coagulant material is seen in the pericardial space. There is a trace pericardial effusion noted adjacent to the posterior left ventricle.     Systemic Veins:  The inferior vena cava is dilated (dilated >2.1cm) with abnormal inspiratory collapse (abnormal <50%) consistent with elevated right atrial pressure (~15, range 10-20mmHg).  ____________________________________________________________________  Quantitative Data:  Left Ventricle Measurements: (Indexed to BSA)     IVSd (2D): 1.0 cm  LVPWd (2D):  1.0 cm  LVIDd (2D):  3.2 cm  LVIDs (2D):  1.9 cm  LV Mass:     87 g   51.6 g/m²  LV Vol d, MOD A2C: 34.0 ml 20.19 ml/m²  LV Vol d, MOD A4C: 54.8 ml 32.58 ml/m²  LV Vol d, MOD BP:  45.4 ml 26.98 ml/m²  LV Vol s, MOD A2C: 15.6 ml 9.28 ml/m²  LV Vol s, MOD A4C: 20.3 ml 12.10 ml/m²  LV Vol s, MOD BP:  19.7 ml 11.73 ml/m²  LVOT SV MOD BP:    25.7 ml  LV EF% MOD BP:     57 %     MV E Vmax:    0.79 m/s  MV A Vmax:    0.50 m/s  MV E/A:       1.57  e' lateral:   7.21 cm/s  e' medial:   7.48 cm/s  E/e' lateral: 10.93  E/e' medial:  10.53  E/e' Average: 10.73  MV DT:        103 msec    Aorta Measurements: (normal range) (Indexed to BSA)     Ao Root 3.0 cm       Left Atrium Measurements: (Indexed to BSA)  LA Diam 2D: 2.75 cm    Right Ventricle Measurements:     TV Darlyn. S': 12.53 cm/s       LVOT / RVOT/ Qp/Qs Data: (Indexed to BSA)  LVOT Diameter: 1.92 cm  LVOT Vmax:     0.69 m/s  LVOT VTI:      11.18 cm  LVOT SV:       32.2 ml  19.17 ml/m²        Cardiac Catheterization (23 @ 17:05)     Hemodynamic Pressures:   Phase     Location          [mmHg]               [mmHg]  [mmHg]           HR  Baseline      RV                  s      44  ed      3         87  Baseline      PA                  s      44               d      17  m      30     94  Baseline      PCW          a       9                v       5  m   3         99  Baseline      RA                  a       3                v       2  m   1         95    Oxygen Saturations   Phase          Location        Hb             Sat            pO2  Content     Group    Patient: PLACIDO GOLDMAN            MRN: [id number]  Study Date: 2023   05:05 PM      Page 2 of 4          Baseline        HRA                      13              72  12.36   None  Baseline        SVC                      12     70  11.74   MV  Baseline        SVC                      12              70  11.74   MV  Baseline        SVC                      12              70  11.74   MV  Baseline        AO                       12             100  16.86   SA  Baseline     RA                       12              71  12.04   None  Baseline        IVC                      12              74  12.46   MV  Baseline        PV                       12             100  16.73   PV  Baseline        RA12              70  11.68   None  Baseline        RV                       13              69  11.89   None  Baseline        PA                       12              71  11.88   PA    Oxygen Saturation Average, Phase: Baseline   PV Hb      12.30 g/dL   PV Sat     100.00 %       PV pO2  PV Content     16.73 %  SA Hb      12.40 g/dL    SA Sat     100.00 %       SA pO2  SA Content     16.86 %  PA Hb      12.30 g/dL    PA Sat     71.00 %        PA pO2  PA Content     11.88 %  MV Hb      12.38 g/dL MV Sat     70.82 %        MV pO2  MV Content     11.92 %  Strokework:   Phase:                    Baseline   LVSW:                                            LVSW (index):   RVSW:                     18.77 g*m              RVSW (index):  11.55 g*m/m2  Flow Calculations, Phase: Baseline   CO                    4.39 l/min    HR  O2Insp  CI                 2.70 l/min/m2    PO2  O2Exp  SV                                  VO2                 217.00 ml/min  O2(ExAir)  SVI  A-VO2                   4.95 ml/dl  Hb                 12.60 gm/d  Shunts:   Qs                    4.39 l/min    Qs Index               2.70  l/min/m2  Qp                    4.47 l/min    Qp Index               2.75  l/min/m2    Qp/Qs     1.02  EPBF                  4.51 l/min    EPBF Index             2.78  l/min/m2  L-R                   -0.04 l/min   L-R Index              -0.02  l/min/m2  R-L                   -0.12 l/min   R-L Index              -0.08  l/min/m2  Systemic Resistances, Phase: Baseline   SVR                                               TVR   SVRI                                              TVRI   SVR                                               TVR   SVRI                                              TVRI  Pulmonary Resistances:   PVR              482.76 dyn*s/cm5                 TPR  536.40 dyn*s/cm5  PVRI             784.41 dyn*s*m2/cm5              TPRI  871.57 dyn*s*m2/cm5  PVR              6.04 CANTU                          TPR  6.71 CANTU  PVRI       9.81 CANTU*m2                       TPRI    < end of copied text >     MV EVmax: 0.8 m/s  MV A Vmax: 0.5 m/s  MV E/A:    1.6       Tricuspid Valve Measurements:     TR Vmax:          4.0 m/s  TR Peak Gradient: 63.8 mmHg  RA Pressure:      15 mmHg  PASP:             79 mmHg            EKG:< from: 12 Lead ECG (23 @ 01:31)     Ventricular Rate 120 BPM    Atrial Rate 120 BPM    P-R Interval 172 ms    QRS Duration 86 ms    Q-T Interval 308 ms    QTC Calculation(Bazett) 435 ms    P Axis 63 degrees    R Axis 261 degrees    T Axis 54 degrees    Diagnosis Line Sinus tachycardia with Premature atrial complexes  Possible Left atrial enlargement  Right ventricular hypertrophy  Inferior infarct , age undetermined  Anterolateral infarct , age undetermined  Abnormal ECG    < from: Xray Chest 1 View- PORTABLE-Urgent (10.09.23 @ 14:47)     FINDINGS:  Left sided AICD  The heart is mildly enlarged. Prominent main pulmonary artery segment,   unchanged Prominent vascular markings.  The lungs are clear.  There is no pneumothorax. Trace left pleural effusion. No right pleural   effusion  No acute bony abnormality.    IMPRESSION:  Clear lungs.    Mild cardiomegaly. Prominent main pulmonary artery segment, recommend   correlation for possible pulmonary arterial hypertension           Patient is a 63y old  Male who presents with a chief complaint of Sepsis due to undetermined organism, Urinary tract infection (12 Oct 2023 08:02)      HPI:  63 year old Male, with PMH Rheumatoid arthritis, HTN, home oxygen 5 liters, CKD, Renal cell carcinoma s/p Partial nephrectomy, and HFpEF (60%;LVIDd 3.2 decreased RV function and severe TR) s/p ICD. Patient presented to ED with c/o urinary frequency and pain X 2days with increased SOB and chills.       PAST MEDICAL & SURGICAL HISTORY:  Hypertension      Rheumatoid arthritis      Neoplasm of uncertain behavior of kidney, right      Chronic systolic congestive heart failure      Renal cell carcinoma      Plasma cell dyscrasia      Chronic kidney disease (CKD)      Pulmonary hypertension      On home oxygen therapy      History of cardiac cath  2022, at [facility] no stent      H/O partial nephrectomy      AICD (automatic cardioverter/defibrillator) present          FAMILY HISTORY:  FH: diabetes mellitus (Mother)    Family history of heart disease (Father)        Home Medications:  acetaminophen 325 mg oral tablet: 2 tab(s) orally every 6 hours, As needed, Temp greater or equal to 38C (100.4F), Mild Pain (1 - 3) (10 Oct 2023 00:09)  Albuterol (Eqv-ProAir HFA) 90 mcg/inh inhalation aerosol: 2 puff(s) inhaled 4 times a day as needed for  shortness of breath and/or wheezing (11 Oct 2023 11:57)  melatonin 3 mg oral tablet: 1 tab(s) orally once a day (at bedtime), As needed, Insomnia (10 Oct 2023 00:09)  metoprolol succinate 25 mg oral tablet, extended release: 1.5 tab(s) orally once a day (11 Oct 2023 11:59)  predniSONE 2.5 mg oral tablet: 3 tab(s) orally once a day (11 Oct 2023 11:59)  sildenafil 20 mg oral tablet: 1 tab(s) orally 3 times a day (11 Oct 2023 11:59)      Medications:  acetaminophen     Tablet .. 650 milliGRAM(s) Oral every 6 hours PRN  budesonide 160 MICROgram(s)/formoterol 4.5 MICROgram(s) Inhaler 2 Puff(s) Inhalation two times a day  buMETAnide 1 milliGRAM(s) Oral daily  cefTRIAXone   IVPB 2000 milliGRAM(s) IV Intermittent every 24 hours  cholecalciferol 2000 Unit(s) Oral daily  heparin   Injectable 5000 Unit(s) SubCutaneous every 8 hours  melatonin 3 milliGRAM(s) Oral at bedtime PRN  metoprolol succinate ER 37.5 milliGRAM(s) Oral daily  predniSONE   Tablet 7.5 milliGRAM(s) Oral daily  sildenafil (REVATIO) 20 milliGRAM(s) Oral three times a day      Review of systems:  10 point review of systems completed and are negative unless noted in HPI    Vitals:  T(C): 37.2 (10-12-23 @ 05:25), Max: 39.1 (10-11-23 @ 22:53)  HR: 102 (10-12-23 @ 05:25) (51 - 110)  BP: 111/75 (10-12-23 @ 05:25) (111/75 - 118/80)  BP(mean): --  RR: 19 (10-12-23 @ 05:25) (17 - 19)  SpO2: 100% (10-12-23 @ 05:25) (97% - 100%)    Daily     Daily Weight in k.4 (12 Oct 2023 05:25)    Weight (kg): 62.5 (10-11 @ 09:05)    I&O's Summary    11 Oct 2023 07:  -  12 Oct 2023 07:00  --------------------------------------------------------  IN: 940 mL / OUT: 1500 mL / NET: -560 mL    12 Oct 2023 07:  -  12 Oct 2023 10:42  --------------------------------------------------------  IN: 100 mL / OUT: 150 mL / NET: -50 mL        Physical Exam:  Appearance: No Acute Distress  Neck: JVP  Cardiovascular: Normal S1 S2, No murmurs/rubs/gallops  Respiratory: Clear to auscultation bilaterally  Gastrointestinal: Soft, Non-tender	  Skin: No cyanosis	  Neurologic: Non-focal  Extremities: No LE edema  Psychiatry: A & O x 3, Mood & affect appropriate    Labs:                        11.5   18.97 )-----------( 179      ( 11 Oct 2023 06:51 )             35.7     10-11    138  |  103  |  26<H>  ----------------------------<  79  3.6   |  21<L>  |  1.62<H>    Ca    8.9      11 Oct 2023 06:51  Phos  2.9     10-11  Mg     2.00     10-11    TPro  6.4  /  Alb  3.4  /  TBili  1.1  /  DBili  x   /  AST  17  /  ALT  15  /  AlkPhos  114  10-11      TELEMETRY:    Echocardiogram:  TTE W or WO Ultrasound Enhancing Agent (10.11.23 @ 09:24)   _______________________________________________________________________________________     CONCLUSIONS:      1. The left ventricular cavity is small. The interventricular septum is flattened in systole and diastole consistent with right ventricular pressure and volume overload. Left ventricular systolic function is normal with a calculated ejection fraction of 57 % by the Espino's biplane method of disks. Theleft ventricular diastolic function is indeterminate. There is normal LV mass and concentric remodeling.   2. The left ventricular diastolic function is indeterminate.   3. Right ventricular volume and pressure overload. Left ventricular systolic function is normal with an ejection fraction of 57 % by Espino's method of disks.   4. Severely enlarged right ventricular cavity size and severely reduced systolic function.   5. Device lead is visualized.   6. The right atrium is severely dilated in size.   7. Estimated pulmonary artery systolic pressure is 79 mmHg.   8. The inferior vena cava is dilated (dilated >2.1cm) with abnormal inspiratory collapse (abnormal <50%) consistent with elevated right atrial pressure (~15, range 10-20mmHg).   9. Organized fibrinous vs coagulant material is seen in the pericardial space. There is a trace pericardial effusion noted adjacent to the posterior left ventricle.    ________________________________________________________________________________________  FINDINGS:     Left Ventricle:  The left ventricular cavity is small. The interventricular septum is flattened in systole and diastole consistent with right ventricular pressure and volume overload. Left ventricular systolic function is normal with a calculated ejection fraction of 57 % by the Espino's biplane method of disks. The left ventricular diastolic function is indeterminate. There is normal LV mass and concentric remodeling.     Right Ventricle:  The right ventricular cavity is severely enlarged in size and severely reduced systolic function. A device lead is visualized.     Left Atrium:  The left atrium is normal in size with an indexed volume of 11.71 ml/m².     Right Atrium:  The right atrium is severely dilated in size.     Aortic Valve:  The aortic valve is tricuspid with normal leaflet excursion.     Mitral Valve:  Structurally normal mitral valve with normal leaflet excursion.     Tricuspid Valve:  Structurally normal tricuspid valve with normal leaflet excursion. There is moderate-severe tricuspid regurgitation. Estimated pulmonary artery systolic pressure is 79 mmHg.     Pulmonic Valve:  Structurally normal pulmonic valve with normal leaflet excursion. There is trace pulmonic regurgitation.     Pericardium:  Organized fibrinous vs coagulant material is seen in the pericardial space. There is a trace pericardial effusion noted adjacent to the posterior left ventricle.     Systemic Veins:  The inferior vena cava is dilated (dilated >2.1cm) with abnormal inspiratory collapse (abnormal <50%) consistent with elevated right atrial pressure (~15, range 10-20mmHg).  ____________________________________________________________________  Quantitative Data:  Left Ventricle Measurements: (Indexed to BSA)     IVSd (2D): 1.0 cm  LVPWd (2D):  1.0 cm  LVIDd (2D):  3.2 cm  LVIDs (2D):  1.9 cm  LV Mass:     87 g   51.6 g/m²  LV Vol d, MOD A2C: 34.0 ml 20.19 ml/m²  LV Vol d, MOD A4C: 54.8 ml 32.58 ml/m²  LV Vol d, MOD BP:  45.4 ml 26.98 ml/m²  LV Vol s, MOD A2C: 15.6 ml 9.28 ml/m²  LV Vol s, MOD A4C: 20.3 ml 12.10 ml/m²  LV Vol s, MOD BP:  19.7 ml 11.73 ml/m²  LVOT SV MOD BP:    25.7 ml  LV EF% MOD BP:     57 %     MV E Vmax:    0.79 m/s  MV A Vmax:    0.50 m/s  MV E/A:       1.57  e' lateral:   7.21 cm/s  e' medial:   7.48 cm/s  E/e' lateral: 10.93  E/e' medial:  10.53  E/e' Average: 10.73  MV DT:        103 msec    Aorta Measurements: (normal range) (Indexed to BSA)     Ao Root 3.0 cm       Left Atrium Measurements: (Indexed to BSA)  LA Diam 2D: 2.75 cm    Right Ventricle Measurements:     TV Darlyn. S': 12.53 cm/s       LVOT / RVOT/ Qp/Qs Data: (Indexed to BSA)  LVOT Diameter: 1.92 cm  LVOT Vmax:     0.69 m/s  LVOT VTI:      11.18 cm  LVOT SV:       32.2 ml  19.17 ml/m²        Cardiac Catheterization (23 @ 17:05)     Hemodynamic Pressures:   Phase     Location          [mmHg]               [mmHg]  [mmHg]           HR  Baseline      RV                  s      44  ed      3         87  Baseline      PA                  s      44               d      17  m      30     94  Baseline      PCW          a       9                v       5  m   3         99  Baseline      RA                  a       3                v       2  m   1         95    Oxygen Saturations   Phase          Location        Hb             Sat            pO2  Content     Group    Patient: PLACIDO GOLDMAN            MRN: [id number]  Study Date: 2023   05:05 PM      Page 2 of 4          Baseline        HRA                      13              72  12.36   None  Baseline        SVC                      12     70  11.74   MV  Baseline        SVC                      12              70  11.74   MV  Baseline        SVC                      12              70  11.74   MV  Baseline        AO                       12             100  16.86   SA  Baseline     RA                       12              71  12.04   None  Baseline        IVC                      12              74  12.46   MV  Baseline        PV                       12             100  16.73   PV  Baseline        RA12              70  11.68   None  Baseline        RV                       13              69  11.89   None  Baseline        PA                       12              71  11.88   PA    Oxygen Saturation Average, Phase: Baseline   PV Hb      12.30 g/dL   PV Sat     100.00 %       PV pO2  PV Content     16.73 %  SA Hb      12.40 g/dL    SA Sat     100.00 %       SA pO2  SA Content     16.86 %  PA Hb      12.30 g/dL    PA Sat     71.00 %        PA pO2  PA Content     11.88 %  MV Hb      12.38 g/dL MV Sat     70.82 %        MV pO2  MV Content     11.92 %  Strokework:   Phase:                    Baseline   LVSW:                                            LVSW (index):   RVSW:                     18.77 g*m              RVSW (index):  11.55 g*m/m2  Flow Calculations, Phase: Baseline   CO                    4.39 l/min    HR  O2Insp  CI                 2.70 l/min/m2    PO2  O2Exp  SV                                  VO2                 217.00 ml/min  O2(ExAir)  SVI  A-VO2                   4.95 ml/dl  Hb                 12.60 gm/d  Shunts:   Qs                    4.39 l/min    Qs Index               2.70  l/min/m2  Qp                    4.47 l/min    Qp Index               2.75  l/min/m2    Qp/Qs     1.02  EPBF                  4.51 l/min    EPBF Index             2.78  l/min/m2  L-R                   -0.04 l/min   L-R Index              -0.02  l/min/m2  R-L                   -0.12 l/min   R-L Index              -0.08  l/min/m2  Systemic Resistances, Phase: Baseline   SVR                                               TVR   SVRI                                              TVRI   SVR                                               TVR   SVRI                                              TVRI  Pulmonary Resistances:   PVR              482.76 dyn*s/cm5                 TPR  536.40 dyn*s/cm5  PVRI             784.41 dyn*s*m2/cm5              TPRI  871.57 dyn*s*m2/cm5  PVR              6.04 CANTU                          TPR  6.71 CANTU  PVRI       9.81 CANTU*m2                       TPRI    < end of copied text >     MV EVmax: 0.8 m/s  MV A Vmax: 0.5 m/s  MV E/A:    1.6       Tricuspid Valve Measurements:     TR Vmax:          4.0 m/s  TR Peak Gradient: 63.8 mmHg  RA Pressure:      15 mmHg  PASP:             79 mmHg            EKG:< from: 12 Lead ECG (23 @ 01:31)     Ventricular Rate 120 BPM    Atrial Rate 120 BPM    P-R Interval 172 ms    QRS Duration 86 ms    Q-T Interval 308 ms    QTC Calculation(Bazett) 435 ms    P Axis 63 degrees    R Axis 261 degrees    T Axis 54 degrees    Diagnosis Line Sinus tachycardia with Premature atrial complexes  Possible Left atrial enlargement  Right ventricular hypertrophy  Inferior infarct , age undetermined  Anterolateral infarct , age undetermined  Abnormal ECG    < from: Xray Chest 1 View- PORTABLE-Urgent (10.09.23 @ 14:47)     FINDINGS:  Left sided AICD  The heart is mildly enlarged. Prominent main pulmonary artery segment,   unchanged Prominent vascular markings.  The lungs are clear.  There is no pneumothorax. Trace left pleural effusion. No right pleural   effusion  No acute bony abnormality.    IMPRESSION:  Clear lungs.    Mild cardiomegaly. Prominent main pulmonary artery segment, recommend   correlation for possible pulmonary arterial hypertension

## 2023-10-13 PROBLEM — I27.20 PULMONARY HYPERTENSION, UNSPECIFIED: Chronic | Status: ACTIVE | Noted: 2023-10-09

## 2023-10-13 PROBLEM — N18.9 CHRONIC KIDNEY DISEASE, UNSPECIFIED: Chronic | Status: ACTIVE | Noted: 2023-10-09

## 2023-10-13 PROBLEM — I50.22 CHRONIC SYSTOLIC (CONGESTIVE) HEART FAILURE: Chronic | Status: ACTIVE | Noted: 2023-10-09

## 2023-10-13 PROBLEM — E88.09 OTHER DISORDERS OF PLASMA-PROTEIN METABOLISM, NOT ELSEWHERE CLASSIFIED: Chronic | Status: ACTIVE | Noted: 2023-10-09

## 2023-10-13 PROBLEM — C64.9 MALIGNANT NEOPLASM OF UNSPECIFIED KIDNEY, EXCEPT RENAL PELVIS: Chronic | Status: ACTIVE | Noted: 2023-10-09

## 2023-10-13 PROBLEM — Z99.81 DEPENDENCE ON SUPPLEMENTAL OXYGEN: Chronic | Status: ACTIVE | Noted: 2023-10-09

## 2023-10-13 LAB
ANION GAP SERPL CALC-SCNC: 13 MMOL/L — SIGNIFICANT CHANGE UP (ref 7–14)
ANISOCYTOSIS BLD QL: SLIGHT — SIGNIFICANT CHANGE UP
BASOPHILS # BLD AUTO: 0 K/UL — SIGNIFICANT CHANGE UP (ref 0–0.2)
BASOPHILS NFR BLD AUTO: 0 % — SIGNIFICANT CHANGE UP (ref 0–2)
BUN SERPL-MCNC: 26 MG/DL — HIGH (ref 7–23)
CALCIUM SERPL-MCNC: 9.4 MG/DL — SIGNIFICANT CHANGE UP (ref 8.4–10.5)
CHLORIDE SERPL-SCNC: 106 MMOL/L — SIGNIFICANT CHANGE UP (ref 98–107)
CO2 SERPL-SCNC: 21 MMOL/L — LOW (ref 22–31)
CREAT SERPL-MCNC: 1.5 MG/DL — HIGH (ref 0.5–1.3)
DACRYOCYTES BLD QL SMEAR: SLIGHT — SIGNIFICANT CHANGE UP
EGFR: 52 ML/MIN/1.73M2 — LOW
EOSINOPHIL # BLD AUTO: 0.08 K/UL — SIGNIFICANT CHANGE UP (ref 0–0.5)
EOSINOPHIL NFR BLD AUTO: 0.9 % — SIGNIFICANT CHANGE UP (ref 0–6)
GIANT PLATELETS BLD QL SMEAR: PRESENT — SIGNIFICANT CHANGE UP
GLUCOSE SERPL-MCNC: 85 MG/DL — SIGNIFICANT CHANGE UP (ref 70–99)
HCT VFR BLD CALC: 35.4 % — LOW (ref 39–50)
HGB BLD-MCNC: 11.1 G/DL — LOW (ref 13–17)
IANC: 5 K/UL — SIGNIFICANT CHANGE UP (ref 1.8–7.4)
LACTATE SERPL-SCNC: 1.2 MMOL/L — SIGNIFICANT CHANGE UP (ref 0.5–2)
LYMPHOCYTES # BLD AUTO: 2.96 K/UL — SIGNIFICANT CHANGE UP (ref 1–3.3)
LYMPHOCYTES # BLD AUTO: 32.2 % — SIGNIFICANT CHANGE UP (ref 13–44)
MACROCYTES BLD QL: SLIGHT — SIGNIFICANT CHANGE UP
MAGNESIUM SERPL-MCNC: 2.1 MG/DL — SIGNIFICANT CHANGE UP (ref 1.6–2.6)
MANUAL SMEAR VERIFICATION: SIGNIFICANT CHANGE UP
MCHC RBC-ENTMCNC: 30 PG — SIGNIFICANT CHANGE UP (ref 27–34)
MCHC RBC-ENTMCNC: 31.4 GM/DL — LOW (ref 32–36)
MCV RBC AUTO: 95.7 FL — SIGNIFICANT CHANGE UP (ref 80–100)
MONOCYTES # BLD AUTO: 0.63 K/UL — SIGNIFICANT CHANGE UP (ref 0–0.9)
MONOCYTES NFR BLD AUTO: 6.9 % — SIGNIFICANT CHANGE UP (ref 2–14)
NEUTROPHILS # BLD AUTO: 5.28 K/UL — SIGNIFICANT CHANGE UP (ref 1.8–7.4)
NEUTROPHILS NFR BLD AUTO: 57.4 % — SIGNIFICANT CHANGE UP (ref 43–77)
PHOSPHATE SERPL-MCNC: 3.3 MG/DL — SIGNIFICANT CHANGE UP (ref 2.5–4.5)
PLAT MORPH BLD: NORMAL — SIGNIFICANT CHANGE UP
PLATELET # BLD AUTO: 247 K/UL — SIGNIFICANT CHANGE UP (ref 150–400)
PLATELET COUNT - ESTIMATE: NORMAL — SIGNIFICANT CHANGE UP
POIKILOCYTOSIS BLD QL AUTO: SLIGHT — SIGNIFICANT CHANGE UP
POLYCHROMASIA BLD QL SMEAR: SLIGHT — SIGNIFICANT CHANGE UP
POTASSIUM SERPL-MCNC: 3.6 MMOL/L — SIGNIFICANT CHANGE UP (ref 3.5–5.3)
POTASSIUM SERPL-SCNC: 3.6 MMOL/L — SIGNIFICANT CHANGE UP (ref 3.5–5.3)
RBC # BLD: 3.7 M/UL — LOW (ref 4.2–5.8)
RBC # FLD: 14.4 % — SIGNIFICANT CHANGE UP (ref 10.3–14.5)
RBC BLD AUTO: ABNORMAL
SODIUM SERPL-SCNC: 140 MMOL/L — SIGNIFICANT CHANGE UP (ref 135–145)
TARGETS BLD QL SMEAR: SLIGHT — SIGNIFICANT CHANGE UP
VARIANT LYMPHS # BLD: 2.6 % — SIGNIFICANT CHANGE UP (ref 0–6)
WBC # BLD: 9.19 K/UL — SIGNIFICANT CHANGE UP (ref 3.8–10.5)
WBC # FLD AUTO: 9.19 K/UL — SIGNIFICANT CHANGE UP (ref 3.8–10.5)

## 2023-10-13 PROCEDURE — 99233 SBSQ HOSP IP/OBS HIGH 50: CPT

## 2023-10-13 PROCEDURE — 99232 SBSQ HOSP IP/OBS MODERATE 35: CPT

## 2023-10-13 PROCEDURE — 99232 SBSQ HOSP IP/OBS MODERATE 35: CPT | Mod: GC

## 2023-10-13 RX ORDER — ACETAMINOPHEN 500 MG
1000 TABLET ORAL ONCE
Refills: 0 | Status: DISCONTINUED | OUTPATIENT
Start: 2023-10-13 | End: 2023-10-13

## 2023-10-13 RX ORDER — SPIRONOLACTONE 25 MG/1
25 TABLET, FILM COATED ORAL DAILY
Refills: 0 | Status: DISCONTINUED | OUTPATIENT
Start: 2023-10-13 | End: 2023-10-16

## 2023-10-13 RX ADMIN — Medication 20 MILLIGRAM(S): at 22:55

## 2023-10-13 RX ADMIN — BUDESONIDE AND FORMOTEROL FUMARATE DIHYDRATE 2 PUFF(S): 160; 4.5 AEROSOL RESPIRATORY (INHALATION) at 22:54

## 2023-10-13 RX ADMIN — HEPARIN SODIUM 5000 UNIT(S): 5000 INJECTION INTRAVENOUS; SUBCUTANEOUS at 05:49

## 2023-10-13 RX ADMIN — BUMETANIDE 1 MILLIGRAM(S): 0.25 INJECTION INTRAMUSCULAR; INTRAVENOUS at 05:49

## 2023-10-13 RX ADMIN — CEFTRIAXONE 100 MILLIGRAM(S): 500 INJECTION, POWDER, FOR SOLUTION INTRAMUSCULAR; INTRAVENOUS at 22:58

## 2023-10-13 RX ADMIN — Medication 7.5 MILLIGRAM(S): at 05:50

## 2023-10-13 RX ADMIN — Medication 3 MILLIGRAM(S): at 22:55

## 2023-10-13 RX ADMIN — Medication 20 MILLIGRAM(S): at 05:50

## 2023-10-13 RX ADMIN — BUDESONIDE AND FORMOTEROL FUMARATE DIHYDRATE 2 PUFF(S): 160; 4.5 AEROSOL RESPIRATORY (INHALATION) at 10:30

## 2023-10-13 RX ADMIN — BUMETANIDE 1 MILLIGRAM(S): 0.25 INJECTION INTRAMUSCULAR; INTRAVENOUS at 14:17

## 2023-10-13 RX ADMIN — Medication 2000 UNIT(S): at 14:16

## 2023-10-13 RX ADMIN — HEPARIN SODIUM 5000 UNIT(S): 5000 INJECTION INTRAVENOUS; SUBCUTANEOUS at 14:15

## 2023-10-13 RX ADMIN — Medication 20 MILLIGRAM(S): at 14:16

## 2023-10-13 NOTE — PROGRESS NOTE ADULT - SUBJECTIVE AND OBJECTIVE BOX
CHIEF COMPLAINT:    Interval Events: Seen and examined at bedside. No acute events overnight.     REVIEW OF SYSTEMS:  Constitutional: No fevers or chills. No weight loss. No fatigue or generalised malaise.  Eyes: No itching or discharge from the eyes  ENT: No ear pain. No ear discharge. No nasal congestion. No post nasal drip. No epistaxis. No throat pain. No sore throat. No difficulty swallowing.   CV: No chest pain. No palpitations. No lightheadedness or dizziness.   Resp: No dyspnea at rest. No dyspnea on exertion. No orthopnea. No wheezing. No cough. No stridor. No sputum production. No chest pain with respiration.    OBJECTIVE:  ICU Vital Signs Last 24 Hrs  T(C): 36.8 (13 Oct 2023 05:47), Max: 36.9 (12 Oct 2023 22:00)  T(F): 98.3 (13 Oct 2023 05:47), Max: 98.5 (12 Oct 2023 22:00)  HR: 96 (13 Oct 2023 05:47) (96 - 115)  BP: 101/70 (13 Oct 2023 05:47) (101/70 - 115/82)  BP(mean): --  ABP: --  ABP(mean): --  RR: 19 (13 Oct 2023 05:47) (18 - 19)  SpO2: 100% (13 Oct 2023 05:47) (97% - 100%)    O2 Parameters below as of 13 Oct 2023 05:47  Patient On (Oxygen Delivery Method): nasal cannula, high flow  O2 Flow (L/min): 5            10-12 @ 07:01  -  10-13 @ 07:00  --------------------------------------------------------  IN: 500 mL / OUT: 1200 mL / NET: -700 mL      CAPILLARY BLOOD GLUCOSE          PHYSICAL EXAM:  General: Awake, alert, oriented X 3.   HEENT: Atraumatic, normocephalic.                  No tonsillar or pharyngeal exudates.  Lymph Nodes: No palpable lymphadenopathy  Neck: No JVD. No carotid bruit.   Respiratory: Normal chest expansion                         Normal percussion                         Normal and equal air entry                         No wheeze, rhonchi or rales.  Cardiovascular: S1 S2 normal. No murmurs, rubs or gallops.   Abdomen: Soft, non-tender, non-distended. No organomegaly.  Extremities: Warm to touch. Peripheral pulse palpable. No pedal edema.   Skin: No rashes or skin lesions  Neurological: Non-focal  Psychiatry: Appropriate mood and affect.      HOSPITAL MEDICATIONS:  MEDICATIONS  (STANDING):  budesonide 160 MICROgram(s)/formoterol 4.5 MICROgram(s) Inhaler 2 Puff(s) Inhalation two times a day  buMETAnide Injectable 1 milliGRAM(s) IV Push two times a day  cefTRIAXone   IVPB 2000 milliGRAM(s) IV Intermittent every 24 hours  cholecalciferol 2000 Unit(s) Oral daily  heparin   Injectable 5000 Unit(s) SubCutaneous every 8 hours  predniSONE   Tablet 7.5 milliGRAM(s) Oral daily  sildenafil (REVATIO) 20 milliGRAM(s) Oral three times a day    MEDICATIONS  (PRN):  acetaminophen     Tablet .. 650 milliGRAM(s) Oral every 6 hours PRN Temp greater or equal to 38C (100.4F), Mild Pain (1 - 3)  melatonin 3 milliGRAM(s) Oral at bedtime PRN Insomnia      LABS:                        12.3   14.08 )-----------( 217      ( 12 Oct 2023 14:33 )             39.6     10-12    139  |  102  |  28<H>  ----------------------------<  88  4.1   |  21<L>  |  1.74<H>    Ca    9.6      12 Oct 2023 14:33  Phos  3.2     10-12  Mg     2.20     10-12        Urinalysis Basic - ( 12 Oct 2023 14:33 )    Color: x / Appearance: x / SG: x / pH: x  Gluc: 88 mg/dL / Ketone: x  / Bili: x / Urobili: x   Blood: x / Protein: x / Nitrite: x   Leuk Esterase: x / RBC: x / WBC x   Sq Epi: x / Non Sq Epi: x / Bacteria: x        Venous Blood Gas:  10-12 @ 14:33  7.33/45/32/24/43.3  VBG Lactate: 3.6      MICROBIOLOGY:     RADIOLOGY:  [ ] Reviewed and interpreted by me    Point of Care Ultrasound Findings:    PFT:    EKG:

## 2023-10-13 NOTE — PROGRESS NOTE ADULT - SUBJECTIVE AND OBJECTIVE BOX
Cardiovascular Disease Progress Note    Overnight events: No acute events overnight.  no new cardiac sx  Otherwise review of systems negative    Objective Findings:  T(C): 36.8 (10-13-23 @ 05:47), Max: 36.9 (10-12-23 @ 22:00)  HR: 96 (10-13-23 @ 05:47) (96 - 115)  BP: 101/70 (10-13-23 @ 05:47) (101/70 - 115/82)  RR: 19 (10-13-23 @ 05:47) (18 - 19)  SpO2: 100% (10-13-23 @ 05:47) (97% - 100%)  Wt(kg): --  Daily     Daily Weight in k.7 (13 Oct 2023 05:47)      Physical Exam:  Gen: NAD  HEENT: EOMI  CV: RRR, normal S1 + S2, no m/r/g  Lungs: CTAB  Abd: soft, non-tender  Ext: No edema    Telemetry:    Laboratory Data:                        11.1   9.19  )-----------( 247      ( 13 Oct 2023 05:45 )             35.4     10-13    140  |  106  |  26<H>  ----------------------------<  85  3.6   |  21<L>  |  1.50<H>    Ca    9.4      13 Oct 2023 05:45  Phos  3.3     10-13  Mg     2.10     10-13                Inpatient Medications:  MEDICATIONS  (STANDING):  budesonide 160 MICROgram(s)/formoterol 4.5 MICROgram(s) Inhaler 2 Puff(s) Inhalation two times a day  buMETAnide Injectable 1 milliGRAM(s) IV Push two times a day  cefTRIAXone   IVPB 2000 milliGRAM(s) IV Intermittent every 24 hours  cholecalciferol 2000 Unit(s) Oral daily  heparin   Injectable 5000 Unit(s) SubCutaneous every 8 hours  predniSONE   Tablet 7.5 milliGRAM(s) Oral daily  sildenafil (REVATIO) 20 milliGRAM(s) Oral three times a day      Assessment:  63 year old male, with past history significant for Rheumatoid arthritis, HTN, HFrEF (55 to 60% - TTE of 2023), Home oxygen 5 liters, CKD, Renal cell carcinoma - s/p Partial nephrectomy, Plasma cell dyscrasia, and AICD placement, presented to the ED secondary to shortness of breath.  Seen and evaluated at bedside; restless, and complaining of feeling extremely cold, despite being covered with ~ 4 blankets and socks in place to feet.  AD.  Patient reports frequency in micturition with oliguria yesterday.  At approximately 7:00 PM, patient noted blood in the urine.  No associated burning or itching, however, has severe pain (10/10 intensity) post void.  Reports subjective fever.  No chills or sweating.  Has continued with hematuria - dark red.  Woke up with significant shortness of breath this morning and significant generalized weakness; could hardly get out of bed.  No chest pain, palpitations.  Wife called PMD who advised that patient be taken to the ED.     Recs:  cardiac stable  vol status appears stable, cont with diuretics as dosed  hr recs/dr llanes appreciated  s/p echo, results noted, overall with stable findings. Organized fibrinous vs coagulant material is seen in the pericardial space --> cont to monitor, remains hemodynamically stable, suspect effusion secondary to pulm htn   pulmonary follow up  infectious workup. ID recs appreciated  icd interrogation  cw lv dysfxn meds  will follow         Over 25 minutes spent on total encounter; more than 50% of the visit was spent counseling and/or coordinating care by the attending physician.      Juarez Carver MD   Cardiovascular Disease  [phone]

## 2023-10-13 NOTE — PROGRESS NOTE ADULT - ASSESSMENT
63 year old Male, with PMH Rheumatoid arthritis, HTN, home oxygen 5 liters, CKD, Renal cell carcinoma s/p Partial nephrectomy, and HFpEF (60%;LVIDd 3.2 decreased RV function and severe TR) s/p ICD. Patient presented to ED with c/o urinary frequency and pain X 2days with increased SOB and chills.  63 year old Male, with PMH Rheumatoid arthritis, HTN, home oxygen 5 liters, CKD, Renal cell carcinoma s/p Partial nephrectomy, and HFpEF (60%;LVIDd 3.2 decreased RV function and severe TR) s/p ICD. Patient presented to ED with c/o urinary frequency and pain X 2days with increased SOB and chills.     Bumex 1mg daily; Please give IV Bumex 1mg BID X 2 days  Toprol 37.5mg daily; on hold  Sildenafil 20mg TID  Strict I/O  Daily standing weights  Monitor lytes replete K>4.0 and Mg>2.0  Trend SCr  Please obtain Lactate level today  Management per primary team (IV Ceftra)  Pending final recommendations from HF attending.   63 year old Male, with PMH Rheumatoid arthritis, HTN, home oxygen 5 liters, CKD, Renal cell carcinoma s/p Partial nephrectomy, and HFpEF (60%;LVIDd 3.2 decreased RV function and severe TR) s/p ICD. Patient presented to ED with c/o urinary frequency and pain X 2days with increased SOB and chills.

## 2023-10-13 NOTE — PROGRESS NOTE ADULT - SUBJECTIVE AND OBJECTIVE BOX
Follow Up:  bacteremia    Interval History/ROS:  Overnight: No acute events.  Patient remains afebrile.  Otherwise hemodynamically stable.  Latest labs show resolved leukocytosis, anemia 11.1/35.4, BMP with elevated creatinine to 1.5, improving.  Reviewed blood cultures on 10/11/2023 are negative to date, RVP negative on 10/12/2023.  CT abdomen pelvis obtained on 10/11/2023 shows no other evidence for infection intra-abdominally.    Patient seen examined at bedside.  No new complaints.Allergies  No Known Allergies        ANTIMICROBIALS:  cefTRIAXone   IVPB 2000 every 24 hours      OTHER MEDS:  MEDICATIONS  (STANDING):  acetaminophen     Tablet .. 650 every 6 hours PRN  budesonide 160 MICROgram(s)/formoterol 4.5 MICROgram(s) Inhaler 2 two times a day  buMETAnide Injectable 1 two times a day  heparin   Injectable 5000 every 8 hours  melatonin 3 at bedtime PRN  predniSONE   Tablet 7.5 daily  sildenafil (REVATIO) 20 three times a day      Vital Signs Last 24 Hrs  T(C): 36.3 (13 Oct 2023 10:30), Max: 36.9 (12 Oct 2023 22:00)  T(F): 97.3 (13 Oct 2023 10:30), Max: 98.5 (12 Oct 2023 22:00)  HR: 110 (13 Oct 2023 10:30) (96 - 110)  BP: 114/77 (13 Oct 2023 10:30) (101/70 - 115/82)  BP(mean): --  RR: 19 (13 Oct 2023 10:30) (18 - 19)  SpO2: 100% (13 Oct 2023 10:30) (97% - 100%)    Parameters below as of 13 Oct 2023 10:30  Patient On (Oxygen Delivery Method): nasal cannula  O2 Flow (L/min): 5      PHYSICAL EXAM:  General: Patient appears slightly uncomfortable, no acute distress,   HEENT: NCAT, PERRL, anicteric sclera, mucous membranes moist and intact  Neck: Supple, No lymphadenopathy  CV: +S1/S2, RRR, no M/R/G  Lungs: No respiratory distress, CTA b/l, no wheezing, rales or rhonchi  Abd:  BS4+, Soft, NTND  : suprapubic tenderness  Neuro: AAOx3. No focal deficits noted.   Ext: No cyanosis, no edema, 2+ pulses in upper and lower extremities                           11.1   9.19  )-----------( 247      ( 13 Oct 2023 05:45 )             35.4       10-13    140  |  106  |  26<H>  ----------------------------<  85  3.6   |  21<L>  |  1.50<H>    Ca    9.4      13 Oct 2023 05:45  Phos  3.3     10-13  Mg     2.10     10-13        Urinalysis Basic - ( 13 Oct 2023 05:45 )    Color: x / Appearance: x / SG: x / pH: x  Gluc: 85 mg/dL / Ketone: x  / Bili: x / Urobili: x   Blood: x / Protein: x / Nitrite: x   Leuk Esterase: x / RBC: x / WBC x   Sq Epi: x / Non Sq Epi: x / Bacteria: x        MICROBIOLOGY:  v    Culture - Urine (collected 11 Oct 2023 03:37)  Source: Clean Catch Clean Catch (Midstream)  Final Report (12 Oct 2023 13:16):    <10,000 CFU/mL Normal Urogenital Bri    Culture - Blood (collected 11 Oct 2023 00:55)  Source: .Blood Blood-Peripheral  Preliminary Report (13 Oct 2023 09:01):    No growth at 48 Hours    Culture - Blood (collected 11 Oct 2023 00:40)  Source: .Blood Blood-Peripheral  Preliminary Report (13 Oct 2023 09:01):    No growth at 48 Hours    Culture - Urine (collected 09 Oct 2023 16:28)  Source: Clean Catch Clean Catch (Midstream)  Final Report (12 Oct 2023 17:57):    >100,000 CFU/ml Klebsiella pneumoniae  Organism: Klebsiella pneumoniae (12 Oct 2023 17:57)  Organism: Klebsiella pneumoniae (12 Oct 2023 17:57)      -  Levofloxacin: S <=0.5      -  Tobramycin: S <=2      -  Nitrofurantoin: S <=32 Should not be used to treat pyelonephritis      -  Aztreonam: S <=4      -  Gentamicin: S <=2      -  Cefazolin: S <=2 For uncomplicated UTI with K. pneumoniae, E. coli, or P. mirablis: LOREN <=16 is sensitive and LOREN >=32 is resistant. This also predicts results for oral agents cefaclor, cefdinir, cefpodoxime, cefprozil, cefuroxime axetil, cephalexin and locarbef for uncomplicated UTI. Note that some isolates may be susceptible to these agents while testing resistant to cefazolin.      -  Cefepime: S <=2      -  Piperacillin/Tazobactam: S <=8      -  Ciprofloxacin: S <=0.25      -  Imipenem: S <=1      -  Ceftriaxone: S <=1      -  Ampicillin: R >16 These ampicillin results predict results for amoxicillin      Method Type: LOREN      -  Meropenem: S <=1      -  Ampicillin/Sulbactam: S <=4/2      -  Cefoxitin: S <=8      -  Cefuroxime: S <=4      -  Amikacin: S <=16      -  Amoxicillin/Clavulanic Acid: S <=8/4      -  Trimethoprim/Sulfamethoxazole: S <=0.5/9.5      -  Ertapenem: S <=0.5    Culture - Blood (collected 09 Oct 2023 14:10)  Source: .Blood Blood-Peripheral  Gram Stain (10 Oct 2023 15:17):    Growth in anaerobic bottle: Gram Negative Rods  Final Report (12 Oct 2023 10:48):    Growth in anaerobic bottle: Klebsiella pneumoniae    See previous culture  [id number]    Culture - Blood (collected 09 Oct 2023 14:00)  Source: .Blood Blood-Peripheral  Gram Stain (10 Oct 2023 10:23):    Growth in aerobic bottle: Gram Negative Rods  Final Report (12 Oct 2023 10:14):    Growth in aerobic bottle: Klebsiella pneumoniae    Direct identification is available within approximately 3-5    hours either by Blood Panel Multiplexed PCR or Direct    MALDI-TOF. Details: [email or web address]  Organism: Blood Culture PCR  Klebsiella pneumoniae (12 Oct 2023 10:14)  Organism: Klebsiella pneumoniae (12 Oct 2023 10:14)      -  Levofloxacin: S <=0.5      -  Tobramycin: S <=2      -  Aztreonam: S <=4      -  Gentamicin: S <=2      -  Cefazolin: S <=2      -  Cefepime: S <=2      -  Piperacillin/Tazobactam: S <=8      -  Ciprofloxacin: S <=0.25      -  Imipenem: S <=1      -  Ceftriaxone: S <=1      -  Ampicillin: R >16 These ampicillin results predict results for amoxicillin      Method Type: LOREN      -  Meropenem: S <=1      -  Ampicillin/Sulbactam: S 8/4      -  Cefoxitin: S <=8      -  Amikacin: S <=16      -  Trimethoprim/Sulfamethoxazole: S <=0.5/9.5      -  Ertapenem: S <=0.5  Organism: Blood Culture PCR (12 Oct 2023 10:14)      Method Type: PCR      -  K. pneumoniae group: Detec (K. pneumoniae, K. quasipneumoniae, K. variicola)              Rapid RVP Result: NotDetec (10-12 @ 00:39)  Rapid RVP Result: NotDetec (10-09 @ 15:20)        RADIOLOGY:  Imaging reviewed

## 2023-10-13 NOTE — PROGRESS NOTE ADULT - NS ATTEND AMEND GEN_ALL_CORE FT
Briefly, Mr. Cain is a 64 y/o [place] M w/ h/o HTN, ? 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 Was found to be febrile with leukocytosis (WBC 28) with lactate of 4.3. Found to have Klebsiella UTI and bacteremia started on antibiotics. Feels better. States SOB is improved from prior. Diuretics increased with good effect. On exam, JVP approx 8-10,  tachycardic, prominent P2, CTAB, nontender abdomen, trace pedal edema, warmer to palpation. Labs reviewed - WBC 9 (from 28), K 4.1, BUN/Cr 26/1.52 (?b/l 1.6)Tbili 1.1 (improved), BNP 10k (prev 2k), lactate 1.2 (improved). TTE 10/11 reviewed - EF nl, D shaped LV, LVOT VTI 10, severe RV dysfunction/dilatation, mod-severe TR (RVSP 70s), severe RAD, dilated IVC, trace pericardial effusion. Overall group I/III PH with WHO class III symptoms with volume overload in setting of sepsis from UTI.   - c/w abx  - would continue holding BB as no indication for it  - c/w bumex 1 mg IV q12 through weekend then bumex 1 PO daily on Monday  - start belkis 25 mg daily   - c/w sildenafil 20 mg q8h  - standing weights daily

## 2023-10-13 NOTE — PROGRESS NOTE ADULT - ASSESSMENT
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   - DVT prophylaxis  - Trend SCr    Prior to discharge:  Please email: [email or web address] to setup an appointment prior to discharge. Include the patient's name, , MRN and contact information in the email.      [facility]  [address] [place] [id number]  [phone]

## 2023-10-13 NOTE — PROGRESS NOTE ADULT - SUBJECTIVE AND OBJECTIVE BOX
Interval History:    Medications:  acetaminophen     Tablet .. 650 milliGRAM(s) Oral every 6 hours PRN  budesonide 160 MICROgram(s)/formoterol 4.5 MICROgram(s) Inhaler 2 Puff(s) Inhalation two times a day  buMETAnide Injectable 1 milliGRAM(s) IV Push two times a day  cefTRIAXone   IVPB 2000 milliGRAM(s) IV Intermittent every 24 hours  cholecalciferol 2000 Unit(s) Oral daily  heparin   Injectable 5000 Unit(s) SubCutaneous every 8 hours  melatonin 3 milliGRAM(s) Oral at bedtime PRN  predniSONE   Tablet 7.5 milliGRAM(s) Oral daily  sildenafil (REVATIO) 20 milliGRAM(s) Oral three times a day      Vitals:  T(C): 36.8 (10-13-23 @ 05:47), Max: 36.9 (10-12-23 @ 22:00)  HR: 96 (10-13-23 @ 05:47) (96 - 115)  BP: 101/70 (10-13-23 @ 05:47) (101/70 - 115/82)  BP(mean): --  RR: 19 (10-13-23 @ 05:47) (18 - 19)  SpO2: 100% (10-13-23 @ 05:47) (97% - 100%)    Daily     Daily Weight in k.7 (13 Oct 2023 05:47)        I&O's Summary    12 Oct 2023 07:01  -  13 Oct 2023 07:00  --------------------------------------------------------  IN: 500 mL / OUT: 1200 mL / NET: -700 mL        Physical Exam:  Appearance: No Acute Distress  HEENT: PERRL  Neck: No JVD  Cardiovascular: Normal S1 S2, No murmurs/rubs/gallops  Respiratory: Clear to auscultation bilaterally  Gastrointestinal: Soft, Non-tender	  Skin: No cyanosis	  Neurologic: Non-focal  Extremities: No LE edema  Psychiatry: A & O x 3, Mood & affect appropriate    Labs:                        11.1   9.19  )-----------( 247      ( 13 Oct 2023 05:45 )             35.4     10    140  |  106  |  26<H>  ----------------------------<  85  3.6   |  21<L>  |  1.50<H>    Ca    9.4      13 Oct 2023 05:45  Phos  3.3     10  Mg     2.10     10-13              TELEMETRY:    Echocardiogram:   Interval History:  Patient resting comfortably in bed   Denies CP/SOB/palpitations/dizziness  No acute events overnight      Medications:  acetaminophen     Tablet .. 650 milliGRAM(s) Oral every 6 hours PRN  budesonide 160 MICROgram(s)/formoterol 4.5 MICROgram(s) Inhaler 2 Puff(s) Inhalation two times a day  buMETAnide Injectable 1 milliGRAM(s) IV Push two times a day  cefTRIAXone   IVPB 2000 milliGRAM(s) IV Intermittent every 24 hours  cholecalciferol 2000 Unit(s) Oral daily  heparin   Injectable 5000 Unit(s) SubCutaneous every 8 hours  melatonin 3 milliGRAM(s) Oral at bedtime PRN  predniSONE   Tablet 7.5 milliGRAM(s) Oral daily  sildenafil (REVATIO) 20 milliGRAM(s) Oral three times a day      Vitals:  T(C): 36.8 (10-13-23 @ 05:47), Max: 36.9 (10-12-23 @ 22:00)  HR: 96 (10-13-23 @ 05:47) (96 - 115)  BP: 101/70 (10-13-23 @ 05:47) (101/70 - 115/82)  BP(mean): --  RR: 19 (10-13-23 @ 05:47) (18 - 19)  SpO2: 100% (10-13-23 @ 05:47) (97% - 100%)    Daily     Daily Weight in k.7 (13 Oct 2023 05:47)        I&O's Summary    12 Oct 2023 07:01  -  13 Oct 2023 07:00  --------------------------------------------------------  IN: 500 mL / OUT: 1200 mL / NET: -700 mL        Physical Exam:  Appearance: No Acute Distress  Neck: JVP  Cardiovascular: Normal S1 S2  Respiratory: Clear to auscultation bilaterally  Gastrointestinal: Soft, Non-tender	  Skin: No cyanosis	  Neurologic: Non-focal  Extremities: No LE edema  Psychiatry: A & O x 3, Mood & affect appropriate    Labs:                        11.1   9.19  )-----------( 247      ( 13 Oct 2023 05:45 )             35.4     10-13    140  |  106  |  26<H>  ----------------------------<  85  3.6   |  21<L>  |  1.50<H>    Ca    9.4      13 Oct 2023 05:45  Phos  3.3     10-  Mg     2.10     10-        TELEMETRY:    Echocardiogram:   TTE W or WO Ultrasound Enhancing Agent (10.11.23 @ 09:24)     _______________________________________________________________________________________     CONCLUSIONS:      1. The left ventricular cavity is small. The interventricular septum is flattened in systole and diastole consistent with right ventricular pressure and volume overload. Left ventricular systolic function is normal with a calculated ejection fraction of 57 % by the Espino's biplane method of disks. Theleft ventricular diastolic function is indeterminate. There is normal LV mass and concentric remodeling.   2. The left ventricular diastolic function is indeterminate.   3. Right ventricular volume and pressure overload. Left ventricular systolic function is normal with an ejection fraction of 57 % by Espino's method of disks.   4. Severely enlarged right ventricular cavity size and severely reduced systolic function.   5. Device lead is visualized.   6. The right atrium is severely dilated in size.   7. Estimated pulmonary artery systolic pressure is 79 mmHg.   8. The inferior vena cava is dilated (dilated >2.1cm) with abnormal inspiratory collapse (abnormal <50%) consistent with elevated right atrial pressure (~15, range 10-20mmHg).   9. Organized fibrinous vs coagulant material is seen in the pericardial space. There is a trace pericardial effusion noted adjacent to the posterior left ventricle.    ________________________________________________________________________________________  FINDINGS:     Left Ventricle:  The left ventricular cavity is small. The interventricular septum is flattened in systole and diastole consistent with right ventricular pressure and volume overload. Left ventricular systolic function is normal with a calculated ejection fraction of 57 % by the Espino's biplane method of disks. The left ventricular diastolic function is indeterminate. There is normal LV mass and concentric remodeling.     Right Ventricle:  The right ventricular cavity is severely enlarged in size and severely reduced systolic function. A device lead is visualized.     Left Atrium:  The left atrium is normal in size with an indexed volume of 11.71 ml/m².     Right Atrium:  The right atrium is severely dilated in size.     Aortic Valve:  The aortic valve is tricuspid with normal leaflet excursion.     Mitral Valve:  Structurally normal mitral valve with normal leaflet excursion.     Tricuspid Valve:  Structurally normal tricuspid valve with normal leaflet excursion. There is moderate-severe tricuspid regurgitation. Estimated pulmonary artery systolic pressure is 79 mmHg.     Pulmonic Valve:  Structurally normal pulmonic valve with normal leaflet excursion. There is trace pulmonic regurgitation.     Pericardium:  Organized fibrinous vs coagulant material is seen in the pericardial space. There is a trace pericardial effusion noted adjacent to the posterior left ventricle.     Systemic Veins:  The inferior vena cava is dilated (dilated >2.1cm) with abnormal inspiratory collapse (abnormal <50%) consistent with elevated right atrial pressure (~15, range 10-20mmHg).  ____________________________________________________________________  Quantitative Data:  Left Ventricle Measurements: (Indexed to BSA)     IVSd (2D): 1.0 cm  LVPWd (2D):  1.0 cm  LVIDd (2D):  3.2 cm  LVIDs (2D):  1.9 cm  LV Mass:     87 g   51.6 g/m²  LV Vol d, MOD A2C: 34.0 ml 20.19 ml/m²  LV Vol d, MOD A4C: 54.8 ml 32.58 ml/m²  LV Vol d, MOD BP:  45.4 ml 26.98 ml/m²  LV Vol s, MOD A2C: 15.6 ml 9.28 ml/m²  LV Vol s, MOD A4C: 20.3 ml 12.10 ml/m²  LV Vol s, MOD BP:  19.7 ml 11.73 ml/m²  LVOT SV MOD BP:    25.7 ml  LV EF% MOD BP:     57 %     MV E Vmax:    0.79 m/s  MV A Vmax:    0.50 m/s  MV E/A:       1.57  e' lateral:   7.21 cm/s  e' medial:   7.48 cm/s  E/e' lateral: 10.93  E/e' medial:  10.53  E/e' Average: 10.73  MV DT:        103 msec    Aorta Measurements: (normal range) (Indexed to BSA)     Ao Root 3.0 cm       Left Atrium Measurements: (Indexed to BSA)  LA Diam 2D: 2.75 cm    Right Ventricle Measurements:     TV Darlyn. S': 12.53 cm/s       LVOT / RVOT/ Qp/Qs Data: (Indexed to BSA)  LVOT Diameter: 1.92 cm  LVOT Vmax:     0.69 m/s  LVOT VTI:      11.18 cm  LVOT SV:       32.2 ml  19.17 ml/m²    Mitral Valve Measurements:     MV EVmax: 0.8 m/s  MV A Vmax: 0.5 m/s  MV E/A:    1.6       Tricuspid Valve Measurements:     TR Vmax:          4.0 m/s  TR Peak Gradient: 63.8 mmHg  RA Pressure:      15 mmHg  PASP:             79 mmHg    ________________________________________________________________________________________  Electronically signed on 10/11/2023 at 10:49:42 AM by Chele Braden MD       Interval History:  Patient resting comfortably in bed   Denies CP/SOB/palpitations/dizziness  No acute events overnight      Medications:  acetaminophen     Tablet .. 650 milliGRAM(s) Oral every 6 hours PRN  budesonide 160 MICROgram(s)/formoterol 4.5 MICROgram(s) Inhaler 2 Puff(s) Inhalation two times a day  buMETAnide Injectable 1 milliGRAM(s) IV Push two times a day  cefTRIAXone   IVPB 2000 milliGRAM(s) IV Intermittent every 24 hours  cholecalciferol 2000 Unit(s) Oral daily  heparin   Injectable 5000 Unit(s) SubCutaneous every 8 hours  melatonin 3 milliGRAM(s) Oral at bedtime PRN  predniSONE   Tablet 7.5 milliGRAM(s) Oral daily  sildenafil (REVATIO) 20 milliGRAM(s) Oral three times a day      Vitals:  T(C): 36.8 (10-13-23 @ 05:47), Max: 36.9 (10-12-23 @ 22:00)  HR: 96 (10-13-23 @ 05:47) (96 - 115)  BP: 101/70 (10-13-23 @ 05:47) (101/70 - 115/82)  BP(mean): --  RR: 19 (10-13-23 @ 05:47) (18 - 19)  SpO2: 100% (10-13-23 @ 05:47) (97% - 100%)    Daily     Daily Weight in k.7 (13 Oct 2023 05:47)        I&O's Summary    12 Oct 2023 07:01  -  13 Oct 2023 07:00  --------------------------------------------------------  IN: 500 mL / OUT: 1200 mL / NET: -700 mL        Physical Exam:  Appearance: No Acute Distress  Neck: JVP  Cardiovascular: Normal S1 S2  Respiratory: Clear to auscultation bilaterally  Gastrointestinal: Soft, Non-tender	  Skin: No cyanosis	  Neurologic: Non-focal  Extremities: No LE edema  Psychiatry: A & O x 3, Mood & affect appropriate    Labs:                        11.1   9.19  )-----------( 247      ( 13 Oct 2023 05:45 )             35.4     10-13    140  |  106  |  26<H>  ----------------------------<  85  3.6   |  21<L>  |  1.50<H>    Ca    9.4      13 Oct 2023 05:45  Phos  3.3     10-  Mg     2.10     10-13          Echocardiogram:   TTE W or WO Ultrasound Enhancing Agent (10.11.23 @ 09:24)     _______________________________________________________________________________________     CONCLUSIONS:      1. The left ventricular cavity is small. The interventricular septum is flattened in systole and diastole consistent with right ventricular pressure and volume overload. Left ventricular systolic function is normal with a calculated ejection fraction of 57 % by the Espino's biplane method of disks. Theleft ventricular diastolic function is indeterminate. There is normal LV mass and concentric remodeling.   2. The left ventricular diastolic function is indeterminate.   3. Right ventricular volume and pressure overload. Left ventricular systolic function is normal with an ejection fraction of 57 % by Espino's method of disks.   4. Severely enlarged right ventricular cavity size and severely reduced systolic function.   5. Device lead is visualized.   6. The right atrium is severely dilated in size.   7. Estimated pulmonary artery systolic pressure is 79 mmHg.   8. The inferior vena cava is dilated (dilated >2.1cm) with abnormal inspiratory collapse (abnormal <50%) consistent with elevated right atrial pressure (~15, range 10-20mmHg).   9. Organized fibrinous vs coagulant material is seen in the pericardial space. There is a trace pericardial effusion noted adjacent to the posterior left ventricle.    ________________________________________________________________________________________  FINDINGS:     Left Ventricle:  The left ventricular cavity is small. The interventricular septum is flattened in systole and diastole consistent with right ventricular pressure and volume overload. Left ventricular systolic function is normal with a calculated ejection fraction of 57 % by the Espino's biplane method of disks. The left ventricular diastolic function is indeterminate. There is normal LV mass and concentric remodeling.     Right Ventricle:  The right ventricular cavity is severely enlarged in size and severely reduced systolic function. A device lead is visualized.     Left Atrium:  The left atrium is normal in size with an indexed volume of 11.71 ml/m².     Right Atrium:  The right atrium is severely dilated in size.     Aortic Valve:  The aortic valve is tricuspid with normal leaflet excursion.     Mitral Valve:  Structurally normal mitral valve with normal leaflet excursion.     Tricuspid Valve:  Structurally normal tricuspid valve with normal leaflet excursion. There is moderate-severe tricuspid regurgitation. Estimated pulmonary artery systolic pressure is 79 mmHg.     Pulmonic Valve:  Structurally normal pulmonic valve with normal leaflet excursion. There is trace pulmonic regurgitation.     Pericardium:  Organized fibrinous vs coagulant material is seen in the pericardial space. There is a trace pericardial effusion noted adjacent to the posterior left ventricle.     Systemic Veins:  The inferior vena cava is dilated (dilated >2.1cm) with abnormal inspiratory collapse (abnormal <50%) consistent with elevated right atrial pressure (~15, range 10-20mmHg).  ____________________________________________________________________  Quantitative Data:  Left Ventricle Measurements: (Indexed to BSA)     IVSd (2D): 1.0 cm  LVPWd (2D):  1.0 cm  LVIDd (2D):  3.2 cm  LVIDs (2D):  1.9 cm  LV Mass:     87 g   51.6 g/m²  LV Vol d, MOD A2C: 34.0 ml 20.19 ml/m²  LV Vol d, MOD A4C: 54.8 ml 32.58 ml/m²  LV Vol d, MOD BP:  45.4 ml 26.98 ml/m²  LV Vol s, MOD A2C: 15.6 ml 9.28 ml/m²  LV Vol s, MOD A4C: 20.3 ml 12.10 ml/m²  LV Vol s, MOD BP:  19.7 ml 11.73 ml/m²  LVOT SV MOD BP:    25.7 ml  LV EF% MOD BP:     57 %     MV E Vmax:    0.79 m/s  MV A Vmax:    0.50 m/s  MV E/A:       1.57  e' lateral:   7.21 cm/s  e' medial:   7.48 cm/s  E/e' lateral: 10.93  E/e' medial:  10.53  E/e' Average: 10.73  MV DT:        103 msec    Aorta Measurements: (normal range) (Indexed to BSA)     Ao Root 3.0 cm       Left Atrium Measurements: (Indexed to BSA)  LA Diam 2D: 2.75 cm    Right Ventricle Measurements:     TV Darlyn. S': 12.53 cm/s       LVOT / RVOT/ Qp/Qs Data: (Indexed to BSA)  LVOT Diameter: 1.92 cm  LVOT Vmax:     0.69 m/s  LVOT VTI:      11.18 cm  LVOT SV:       32.2 ml  19.17 ml/m²    Mitral Valve Measurements:     MV EVmax: 0.8 m/s  MV A Vmax: 0.5 m/s  MV E/A:    1.6       Tricuspid Valve Measurements:     TR Vmax:          4.0 m/s  TR Peak Gradient: 63.8 mmHg  RA Pressure:      15 mmHg  PASP:             79 mmHg    ________________________________________________________________________________________  Electronically signed on 10/11/2023 at 10:49:42 AM by Chele Braden MD

## 2023-10-13 NOTE — PROGRESS NOTE ADULT - ASSESSMENT
63-year-old male with past medical history significant for rheumatoid arthritis, hypertension, heart failure, on chronic oxygen at home, renal cell carcinoma status post partial nephrectomy, AICD who was admitted to the hospital due to shortness of breath.    On day prior to presentation patient noted developing hematuria.  No reported fever.  Morning of admission woke up shortness of breath and weak.  Wife called primary care who referred patient to ED.    In the ED, patient found to be febrile with a white count of 20.1.  Patient also noted with elevated BNP and troponin.  Urinalysis with pyuria.  Blood cultures growing Klebsiella.  Urine culture pending.    #Klebsiella bacteremia, likely urinary source  #Elevated LFTs  #Presence of pacemaker/ICD  #History of RCC status post partial nephrectomy now CKD  Bcx on admission 10/9/23 - klebsiella  Bcx on 10/11/23 ngtd  Ucx on 10/9/23 - klebsiella  CT A/P with no uncontrolled foci of infection    Recommendations  Continue ceftriaxone  Will plan for 10 day therapy with PO transition provided clinical improvement  If otherwise ready for dischage, can transition to PO levofloxacin 750 daily, end date: 10/19/23  Follow fever curve and WBC count    ID to sign off. Please contact as issues arise.    Piero Arredondo MD  Division of Infectious Diseases

## 2023-10-13 NOTE — PROGRESS NOTE ADULT - ASSESSMENT
_________________________________________________________________________________________  ========>>  M E D I C A L   A T T E N D I N G    F O L L O W  U P  N O T E  <<=========  -----------------------------------------------------------------------------------------------------    - Patient seen and examined by me earlier today.   - In summary,  PLACIDO GOLDMAN is a 63y year old man admitted with Urosepsis, now with bacteremia  - Patient today overall doing ok, comfortable, eating better, Overall improved as per patient    ==================>> REVIEW OF SYSTEM <<=================    GEN: no fever, no chills, no pain:  RESP: no SOB, no cough, no sputum  CVS: no chest pain, no palpitations, no edema  GI: no abdominal pain, no nausea  : no dysuria, no frequency, hematuria Previously, improved   Neuro: no headache, no dizziness  Derm : no itching, no rash    ==================>> PHYSICAL EXAM <<=================    GEN: A&O X 3 , NAD , comfortable, pleasant, calm , in chair   HEENT: NCAT, PERRL, MMM, hearing intact, On nasal cannula 02  Neck: supple , no JVD appreciated  CVS: S1S2 , regular , No M/R/G appreciated  PULM: CTA B/L,  no W/R/R appreciated  ABD.: soft. non tender, non distended,  bowel sounds present  Extrem: intact pulses , no edema   PSYCH : normal mood,  not anxious          ( Note written / Date of service 10-13-23 ( This is certified to be the same as "ENTERED" date above ( for billing purposes)))    ==================>> MEDICATIONS <<====================    budesonide 160 MICROgram(s)/formoterol 4.5 MICROgram(s) Inhaler 2 Puff(s) Inhalation two times a day  buMETAnide Injectable 1 milliGRAM(s) IV Push two times a day  cefTRIAXone   IVPB 2000 milliGRAM(s) IV Intermittent every 24 hours  cholecalciferol 2000 Unit(s) Oral daily  heparin   Injectable 5000 Unit(s) SubCutaneous every 8 hours  predniSONE   Tablet 7.5 milliGRAM(s) Oral daily  sildenafil (REVATIO) 20 milliGRAM(s) Oral three times a day    MEDICATIONS  (PRN):  acetaminophen     Tablet .. 650 milliGRAM(s) Oral every 6 hours PRN Temp greater or equal to 38C (100.4F), Mild Pain (1 - 3)  melatonin 3 milliGRAM(s) Oral at bedtime PRN Insomnia    ___________  Active diet:  Diet, Regular  ___________________    ==================>> VITAL SIGNS <<==================  Height (cm): 167.6  Weight (kg): 62.5  BMI (kg/m2): 22.3  Vital Signs Last 24 HrsT(C): 36.3 (10-13-23 @ 10:30)  T(F): 97.3 (10-13-23 @ 10:30), Max: 98.5 (10-12-23 @ 22:00)  HR: 110 (10-13-23 @ 10:30) (96 - 110)  BP: 114/77 (10-13-23 @ 10:30)  RR: 19 (10-13-23 @ 10:30) (18 - 19)  SpO2: 100% (10-13-23 @ 10:30) (97% - 100%)       ==================>> LAB AND IMAGING <<==================                        11.1   9.19  )-----------( 247      ( 13 Oct 2023 05:45 )             35.4        10-13    140  |  106  |  26<H>  ----------------------------<  85  3.6   |  21<L>  |  1.50<H>    Ca    9.4      13 Oct 2023 05:45  Phos  3.3     10-13  Mg     2.10     10-13      WBC count:   9.19 <<== ,  14.08 <<== ,  18.97 <<== ,  28.13 <<== ,  28.12 <<==   Hemoglobin:   11.1 <<==,  12.3 <<==,  11.5 <<==,  13.0 <<==,  13.8 <<==  platelets:  247 <==, 217 <==, 179 <==, 193 <==, 223 <==    Creatinine:  1.50  <<==, 1.74  <<==, 1.62  <<==, 1.80  <<==, 1.57  <<==, 1.61  <<==  Sodium:   140  <==, 139  <==, 138  <==, 140  <==, 138  <==, 134  <==       AST:          17 <== , 26 <== , 94 <==      ALT:        15  <== , 20  <== , 35  <==      AP:        114  <=, 134  <=, 138  <=     Bili:        1.1  <=, 2.3  <=, 1.6  <=    ____________________________    M I C R O B I O L O G Y :    Culture - Urine (collected 11 Oct 2023 03:37)  Source: Clean Catch Clean Catch (Midstream)  Final Report (12 Oct 2023 13:16):    <10,000 CFU/mL Normal Urogenital Bri    Culture - Blood (collected 11 Oct 2023 00:55)  Source: .Blood Blood-Peripheral  Preliminary Report (13 Oct 2023 09:01):    No growth at 48 Hours    Culture - Blood (collected 11 Oct 2023 00:40)  Source: .Blood Blood-Peripheral  Preliminary Report (13 Oct 2023 09:01):    No growth at 48 Hours    Culture - Urine (collected 09 Oct 2023 16:28)  Source: Clean Catch Clean Catch (Midstream)  Final Report (12 Oct 2023 17:57):    >100,000 CFU/ml Klebsiella pneumoniae  Organism: Klebsiella pneumoniae (12 Oct 2023 17:57)  Organism: Klebsiella pneumoniae (12 Oct 2023 17:57)    Sensitivities:      -  Levofloxacin: S <=0.5      -  Tobramycin: S <=2      -  Nitrofurantoin: S <=32 Should not be used to treat pyelonephritis      -  Aztreonam: S <=4      -  Gentamicin: S <=2      -  Cefazolin: S <=2 For uncomplicated UTI with K. pneumoniae, E. coli, or P. mirablis: LOREN <=16 is sensitive and LOREN >=32 is resistant. This also predicts results for oral agents cefaclor, cefdinir, cefpodoxime, cefprozil, cefuroxime axetil, cephalexin and locarbef for uncomplicated UTI. Note that some isolates may be susceptible to these agents while testing resistant to cefazolin.      -  Cefepime: S <=2      -  Piperacillin/Tazobactam: S <=8      -  Ciprofloxacin: S <=0.25      -  Imipenem: S <=1      -  Ceftriaxone: S <=1      -  Ampicillin: R >16 These ampicillin results predict results for amoxicillin      Method Type: LOREN      -  Meropenem: S <=1      -  Ampicillin/Sulbactam: S <=4/2      -  Cefoxitin: S <=8      -  Cefuroxime: S <=4      -  Amikacin: S <=16      -  Amoxicillin/Clavulanic Acid: S <=8/4      -  Trimethoprim/Sulfamethoxazole: S <=0.5/9.5      -  Ertapenem: S <=0.5    Culture - Blood (collected 09 Oct 2023 14:10)  Source: .Blood Blood-Peripheral  Gram Stain (10 Oct 2023 15:17):    Growth in anaerobic bottle: Gram Negative Rods  Final Report (12 Oct 2023 10:48):    Growth in anaerobic bottle: Klebsiella pneumoniae    See previous culture  [id number]    Culture - Blood (collected 09 Oct 2023 14:00)  Source: .Blood Blood-Peripheral  Gram Stain (10 Oct 2023 10:23):    Growth in aerobic bottle: Gram Negative Rods  Final Report (12 Oct 2023 10:14):    Growth in aerobic bottle: Klebsiella pneumoniae    Direct identification is available within approximately 3-5    hours either by Blood Panel Multiplexed PCR or Direct    MALDI-TOF. Details: [email or web address]  Organism: Blood Culture PCR  Klebsiella pneumoniae (12 Oct 2023 10:14)  Organism: Klebsiella pneumoniae (12 Oct 2023 10:14)    Sensitivities:      -  Levofloxacin: S <=0.5      -  Tobramycin: S <=2      -  Aztreonam: S <=4      -  Gentamicin: S <=2      -  Cefazolin: S <=2      -  Cefepime: S <=2      -  Piperacillin/Tazobactam: S <=8      -  Ciprofloxacin: S <=0.25      -  Imipenem: S <=1      -  Ceftriaxone: S <=1      -  Ampicillin: R >16 These ampicillin results predict results for amoxicillin      Method Type: LOREN      -  Meropenem: S <=1      -  Ampicillin/Sulbactam: S 8/4      -  Cefoxitin: S <=8      -  Amikacin: S <=16      -  Trimethoprim/Sulfamethoxazole: S <=0.5/9.5      -  Ertapenem: S <=0.5  Organism: Blood Culture PCR (12 Oct 2023 10:14)    Sensitivities:      Method Type: PCR      -  K. pneumoniae group: Detec (K. pneumoniae, K. quasipneumoniae, K. variicola)      < from: CT Abdomen and Pelvis No Cont (10.11.23 @ 10:28) >  IMPRESSION:  Mild circumferential bladder wall thickening which may be due to cystitis   or chronic outlet obstruction. Correlate with urinalysis.  < end of copied text >    < from: TTE W or WO Ultrasound Enhancing Agent (10.11.23 @ 09:24) >  CONCLUSIONS:      1. The left ventricular cavity is small. The interventricular septum is flattened in systole and diastole consistent with right ventricular pressure and volume overload. Left ventricular systolic function is normal with a calculated ejection fraction of 57 % by the Espino's biplane method of disks. Theleft ventricular diastolic function is indeterminate. There is normal LV mass and concentric remodeling.   2. The left ventricular diastolic function is indeterminate.   3. Right ventricular volume and pressure overload. Left ventricular systolic function is normal with an ejection fraction of 57 % by Espino's method of disks.   4. Severely enlarged right ventricular cavity size and severely reduced systolic function.   5. Device lead is visualized.   6. The right atrium is severely dilated in size.   7. Estimated pulmonary artery systolic pressure is 79 mmHg.   8. The inferior vena cava is dilated (dilated >2.1cm) with abnormal inspiratory collapse (abnormal <50%) consistent with elevated right atrial pressure (~15, range 10-20mmHg).   9. Organized fibrinous vs coagulant material is seen in the pericardial space. There is a trace pericardial effusion noted adjacent to the posterior left ventricle.  < end of copied text >    __________________________________________________________________________________  ===============>>  A S S E S S M E N T   A N D   P L A N <<===============  ------------------------------------------------------------------------------------------    63 year old male, with past history significant for Rheumatoid arthritis, HTN, HFrEF (55 to 60% - TTE of 03/08/2023), Home oxygen 5 liters, CKD, Renal cell carcinoma - s/p Partial nephrectomy, Plasma cell dyscrasia, and AICD placement, presented to the ED secondary to shortness of breath.  Diagnosed with Sepsis in the ED.     Problem/Plan - :  ·  Problem: Urinary tract infection with hematuria, with gram-negative / klebsiella bacteremia  Sepsis present on admission > resolved   - antibiotics per ID >>> Can transition to oral antibiotics on discharge  - f/u cultures / sensitivities   follow blood cultures and sensitivities, repeat blood cultures to negativity     Problem/Plan - :  ·  Problem: acute respiratory failure With hypoxemia.   Likely  acute exacerbation of pulmonary hypertension In setting of sepsis  - RVP, COVID-19 PCR negative.  CXR w/o gross signs of infection  - on nasal cannula O2 >> Wean down as able  - other supportive care as needed.  - pulm and HF teams on board, appreciated      Problem/Plan - :  ·  Problem: Lactic acidosis. Improved  Lactate:  1.2  <<== , 1.9  <<== , 3.3  <<==   - in the context of sepsis  - ensure optimal hydration  Monitor     Problem/Plan - :  ·  Problem: Pulmonary hypertension.   ·  Plan: - per history, and uses 5 liters NC at home  - echo as above with right heart failure   - on sildenafil 20 mg daily (at 10:00 PM); continuing  - also on bumetanide 1 mg daily (has been on varied doses in the past, per chart review)  - monitor vitals, labs / O2   - intake/output Q4H, weight daily, HOB elevation  - multiple specialists on board, appreciated      Problem/Plan - :  ·  Problem: Prophylactic measure.   ·  Plan: - heparin SQ.    Continue Current medications otherwise and monitor.  supportive care  Patient Full code    >> Likely discharge Planning home over The weekend if remains stable/improved    --------------------------------------------  Case discussed with patient ,   Education given on findings and plan of care  ___________________________  HEvangelist Vu D.O.  Pager: [phone]

## 2023-10-13 NOTE — PROGRESS NOTE ADULT - PROBLEM SELECTOR PLAN 1
Bumex 1mg daily (IV Bumex 1mg BID X 2 days; stop on 10/13)  Toprol 37.5mg daily; on hold  Sildenafil 20mg TID  Strict I/O  Daily standing weights  Monitor lytes replete K>4.0 and Mg>2.0  Trend SCr  Please obtain Lactate level today  Management per primary team (IV Ceftra)  Pending final recommendations from HF attending. Bumex 1mg daily (IV Bumex 1mg BID X 2 days; stop on 10/13)  Toprol 37.5mg daily; discontinue   Sildenafil 20mg TID  Please start Spironolactone 25mg daily   Strict I/O  Daily standing weights  Monitor lytes replete K>4.0 and Mg>2.0  Trend SCr  Please obtain Lactate level today  Management per primary team (IV Ceftra)  Pending final recommendations from HF attending.

## 2023-10-14 LAB
ANION GAP SERPL CALC-SCNC: 14 MMOL/L — SIGNIFICANT CHANGE UP (ref 7–14)
ANION GAP SERPL CALC-SCNC: 14 MMOL/L — SIGNIFICANT CHANGE UP (ref 7–14)
BASOPHILS # BLD AUTO: 0.08 K/UL — SIGNIFICANT CHANGE UP (ref 0–0.2)
BASOPHILS NFR BLD AUTO: 0.7 % — SIGNIFICANT CHANGE UP (ref 0–2)
BUN SERPL-MCNC: 25 MG/DL — HIGH (ref 7–23)
BUN SERPL-MCNC: 27 MG/DL — HIGH (ref 7–23)
CALCIUM SERPL-MCNC: 9.6 MG/DL — SIGNIFICANT CHANGE UP (ref 8.4–10.5)
CALCIUM SERPL-MCNC: 9.8 MG/DL — SIGNIFICANT CHANGE UP (ref 8.4–10.5)
CHLORIDE SERPL-SCNC: 104 MMOL/L — SIGNIFICANT CHANGE UP (ref 98–107)
CHLORIDE SERPL-SCNC: 105 MMOL/L — SIGNIFICANT CHANGE UP (ref 98–107)
CO2 SERPL-SCNC: 21 MMOL/L — LOW (ref 22–31)
CO2 SERPL-SCNC: 24 MMOL/L — SIGNIFICANT CHANGE UP (ref 22–31)
CREAT SERPL-MCNC: 1.32 MG/DL — HIGH (ref 0.5–1.3)
CREAT SERPL-MCNC: 1.43 MG/DL — HIGH (ref 0.5–1.3)
EGFR: 55 ML/MIN/1.73M2 — LOW
EGFR: 60 ML/MIN/1.73M2 — SIGNIFICANT CHANGE UP
EOSINOPHIL # BLD AUTO: 0.14 K/UL — SIGNIFICANT CHANGE UP (ref 0–0.5)
EOSINOPHIL NFR BLD AUTO: 1.3 % — SIGNIFICANT CHANGE UP (ref 0–6)
GLUCOSE SERPL-MCNC: 101 MG/DL — HIGH (ref 70–99)
GLUCOSE SERPL-MCNC: 76 MG/DL — SIGNIFICANT CHANGE UP (ref 70–99)
HCT VFR BLD CALC: 37.3 % — LOW (ref 39–50)
HGB BLD-MCNC: 11.8 G/DL — LOW (ref 13–17)
IANC: 5.75 K/UL — SIGNIFICANT CHANGE UP (ref 1.8–7.4)
IMM GRANULOCYTES NFR BLD AUTO: 3.2 % — HIGH (ref 0–0.9)
LACTATE SERPL-SCNC: 2.2 MMOL/L — HIGH (ref 0.5–2)
LACTATE SERPL-SCNC: 3.1 MMOL/L — HIGH (ref 0.5–2)
LYMPHOCYTES # BLD AUTO: 3.61 K/UL — HIGH (ref 1–3.3)
LYMPHOCYTES # BLD AUTO: 32.7 % — SIGNIFICANT CHANGE UP (ref 13–44)
MAGNESIUM SERPL-MCNC: 2.1 MG/DL — SIGNIFICANT CHANGE UP (ref 1.6–2.6)
MAGNESIUM SERPL-MCNC: 2.1 MG/DL — SIGNIFICANT CHANGE UP (ref 1.6–2.6)
MCHC RBC-ENTMCNC: 30.3 PG — SIGNIFICANT CHANGE UP (ref 27–34)
MCHC RBC-ENTMCNC: 31.6 GM/DL — LOW (ref 32–36)
MCV RBC AUTO: 95.6 FL — SIGNIFICANT CHANGE UP (ref 80–100)
MONOCYTES # BLD AUTO: 1.1 K/UL — HIGH (ref 0–0.9)
MONOCYTES NFR BLD AUTO: 10 % — SIGNIFICANT CHANGE UP (ref 2–14)
NEUTROPHILS # BLD AUTO: 5.75 K/UL — SIGNIFICANT CHANGE UP (ref 1.8–7.4)
NEUTROPHILS NFR BLD AUTO: 52.1 % — SIGNIFICANT CHANGE UP (ref 43–77)
NRBC # BLD: 0 /100 WBCS — SIGNIFICANT CHANGE UP (ref 0–0)
NRBC # FLD: 0.03 K/UL — HIGH (ref 0–0)
PHOSPHATE SERPL-MCNC: 3.4 MG/DL — SIGNIFICANT CHANGE UP (ref 2.5–4.5)
PHOSPHATE SERPL-MCNC: 4 MG/DL — SIGNIFICANT CHANGE UP (ref 2.5–4.5)
PLATELET # BLD AUTO: 281 K/UL — SIGNIFICANT CHANGE UP (ref 150–400)
POTASSIUM SERPL-MCNC: 4.4 MMOL/L — SIGNIFICANT CHANGE UP (ref 3.5–5.3)
POTASSIUM SERPL-MCNC: 6 MMOL/L — HIGH (ref 3.5–5.3)
POTASSIUM SERPL-SCNC: 4.4 MMOL/L — SIGNIFICANT CHANGE UP (ref 3.5–5.3)
POTASSIUM SERPL-SCNC: 6 MMOL/L — HIGH (ref 3.5–5.3)
RBC # BLD: 3.9 M/UL — LOW (ref 4.2–5.8)
RBC # FLD: 14.6 % — HIGH (ref 10.3–14.5)
SODIUM SERPL-SCNC: 139 MMOL/L — SIGNIFICANT CHANGE UP (ref 135–145)
SODIUM SERPL-SCNC: 143 MMOL/L — SIGNIFICANT CHANGE UP (ref 135–145)
WBC # BLD: 11.03 K/UL — HIGH (ref 3.8–10.5)
WBC # FLD AUTO: 11.03 K/UL — HIGH (ref 3.8–10.5)

## 2023-10-14 RX ORDER — BUMETANIDE 0.25 MG/ML
1 INJECTION INTRAMUSCULAR; INTRAVENOUS EVERY 12 HOURS
Refills: 0 | Status: COMPLETED | OUTPATIENT
Start: 2023-10-15 | End: 2023-10-15

## 2023-10-14 RX ORDER — BUMETANIDE 0.25 MG/ML
1 INJECTION INTRAMUSCULAR; INTRAVENOUS DAILY
Refills: 0 | Status: DISCONTINUED | OUTPATIENT
Start: 2023-10-16 | End: 2023-10-16

## 2023-10-14 RX ADMIN — HEPARIN SODIUM 5000 UNIT(S): 5000 INJECTION INTRAVENOUS; SUBCUTANEOUS at 23:09

## 2023-10-14 RX ADMIN — Medication 20 MILLIGRAM(S): at 15:59

## 2023-10-14 RX ADMIN — BUDESONIDE AND FORMOTEROL FUMARATE DIHYDRATE 2 PUFF(S): 160; 4.5 AEROSOL RESPIRATORY (INHALATION) at 09:00

## 2023-10-14 RX ADMIN — Medication 20 MILLIGRAM(S): at 23:42

## 2023-10-14 RX ADMIN — BUMETANIDE 1 MILLIGRAM(S): 0.25 INJECTION INTRAMUSCULAR; INTRAVENOUS at 05:31

## 2023-10-14 RX ADMIN — BUMETANIDE 1 MILLIGRAM(S): 0.25 INJECTION INTRAMUSCULAR; INTRAVENOUS at 15:59

## 2023-10-14 RX ADMIN — HEPARIN SODIUM 5000 UNIT(S): 5000 INJECTION INTRAVENOUS; SUBCUTANEOUS at 05:31

## 2023-10-14 RX ADMIN — CEFTRIAXONE 100 MILLIGRAM(S): 500 INJECTION, POWDER, FOR SOLUTION INTRAMUSCULAR; INTRAVENOUS at 23:02

## 2023-10-14 RX ADMIN — SPIRONOLACTONE 25 MILLIGRAM(S): 25 TABLET, FILM COATED ORAL at 05:34

## 2023-10-14 RX ADMIN — Medication 7.5 MILLIGRAM(S): at 05:32

## 2023-10-14 RX ADMIN — Medication 20 MILLIGRAM(S): at 05:33

## 2023-10-14 RX ADMIN — HEPARIN SODIUM 5000 UNIT(S): 5000 INJECTION INTRAVENOUS; SUBCUTANEOUS at 16:00

## 2023-10-14 RX ADMIN — BUDESONIDE AND FORMOTEROL FUMARATE DIHYDRATE 2 PUFF(S): 160; 4.5 AEROSOL RESPIRATORY (INHALATION) at 22:57

## 2023-10-14 RX ADMIN — Medication 2000 UNIT(S): at 12:14

## 2023-10-14 NOTE — PROGRESS NOTE ADULT - SUBJECTIVE AND OBJECTIVE BOX
Date of Service  : 10-14-23     INTERVAL HPI/OVERNIGHT EVENTS: I feel fine.   Vital Signs Last 24 Hrs  T(C): 36.3 (14 Oct 2023 11:01), Max: 36.7 (13 Oct 2023 16:00)  T(F): 97.3 (14 Oct 2023 11:01), Max: 98 (13 Oct 2023 16:00)  HR: 106 (14 Oct 2023 11:01) (99 - 107)  BP: 122/88 (14 Oct 2023 11:01) (110/81 - 127/80)  BP(mean): --  RR: 18 (14 Oct 2023 11:01) (18 - 19)  SpO2: 99% (14 Oct 2023 11:01) (97% - 100%)    Parameters below as of 14 Oct 2023 11:01  Patient On (Oxygen Delivery Method): room air      I&O's Summary    13 Oct 2023 07:01  -  14 Oct 2023 07:00  --------------------------------------------------------  IN: 720 mL / OUT: 903 mL / NET: -183 mL      MEDICATIONS  (STANDING):  budesonide 160 MICROgram(s)/formoterol 4.5 MICROgram(s) Inhaler 2 Puff(s) Inhalation two times a day  buMETAnide Injectable 1 milliGRAM(s) IV Push two times a day  cefTRIAXone   IVPB 2000 milliGRAM(s) IV Intermittent every 24 hours  cholecalciferol 2000 Unit(s) Oral daily  heparin   Injectable 5000 Unit(s) SubCutaneous every 8 hours  predniSONE   Tablet 7.5 milliGRAM(s) Oral daily  sildenafil (REVATIO) 20 milliGRAM(s) Oral three times a day  spironolactone 25 milliGRAM(s) Oral daily    MEDICATIONS  (PRN):  acetaminophen     Tablet .. 650 milliGRAM(s) Oral every 6 hours PRN Temp greater or equal to 38C (100.4F), Mild Pain (1 - 3)  melatonin 3 milliGRAM(s) Oral at bedtime PRN Insomnia    LABS:                        11.8   11.03 )-----------( 281      ( 14 Oct 2023 06:22 )             37.3     10-14    139  |  104  |  27<H>  ----------------------------<  76  6.0<H>   |  21<L>  |  1.32<H>    Ca    9.6      14 Oct 2023 06:22  Phos  4.0     10-14  Mg     2.10     10-14        Urinalysis Basic - ( 14 Oct 2023 06:22 )    Color: x / Appearance: x / SG: x / pH: x  Gluc: 76 mg/dL / Ketone: x  / Bili: x / Urobili: x   Blood: x / Protein: x / Nitrite: x   Leuk Esterase: x / RBC: x / WBC x   Sq Epi: x / Non Sq Epi: x / Bacteria: x      CAPILLARY BLOOD GLUCOSE            Urinalysis Basic - ( 14 Oct 2023 06:22 )    Color: x / Appearance: x / SG: x / pH: x  Gluc: 76 mg/dL / Ketone: x  / Bili: x / Urobili: x   Blood: x / Protein: x / Nitrite: x   Leuk Esterase: x / RBC: x / WBC x   Sq Epi: x / Non Sq Epi: x / Bacteria: x      REVIEW OF SYSTEMS:  CONSTITUTIONAL: No fever, weight loss, or fatigue  EYES: No eye pain, visual disturbances, or discharge  ENMT:  No difficulty hearing, tinnitus, vertigo; No sinus or throat pain  NECK: No pain or stiffness  RESPIRATORY: No cough, wheezing, chills or hemoptysis; No shortness of breath  CARDIOVASCULAR: No chest pain, palpitations, dizziness, or leg swelling  GASTROINTESTINAL: No abdominal or epigastric pain. No nausea, vomiting, or hematemesis; No diarrhea or constipation. No melena or hematochezia.  GENITOURINARY: No dysuria, frequency, hematuria, or incontinence  NEUROLOGICAL: No headaches, memory loss, loss of strength, numbness, or tremors      Consultant(s) Notes Reviewed:  [x ] YES  [ ] NO    PHYSICAL EXAM:  GENERAL: NAD, well-groomed, well-developed,not in any distress ,  HEAD:  Atraumatic, Normocephalic  NECK: Supple, No JVD, Normal thyroid  NERVOUS SYSTEM:  Alert & Oriented X3, No focal deficit   CHEST/LUNG: Good air entry bilateral with no  rales, rhonchi, wheezing, or rubs  HEART: Regular rate and rhythm; No murmurs, rubs, or gallops  ABDOMEN: Soft, Nontender, Nondistended; Bowel sounds present  EXTREMITIES:  2+ Peripheral Pulses, No clubbing, cyanosis, or edema      Care Discussed with Consultants/Other Providers [ x] YES  [ ] NO

## 2023-10-14 NOTE — CHART NOTE - NSCHARTNOTEFT_GEN_A_CORE
house pulm consulted.     Amy Chang PA-C  Department of Internal Medicine  pager [phone], available on [employer] TEAMS
chf currently on iv bumex,  lactate elevated, creatinine trending up, will follow up labs in am may need gentle hydration or hold bumex

## 2023-10-14 NOTE — PROGRESS NOTE ADULT - ASSESSMENT
63 year old male, with past history significant for Rheumatoid arthritis, HTN, HFrEF (55 to 60% - TTE of 03/08/2023), Home oxygen 5 liters, CKD, Renal cell carcinoma - s/p Partial nephrectomy, Plasma cell dyscrasia, and AICD placement, presented to the ED secondary to shortness of breath.  Diagnosed with Sepsis in the ED.     Problem/Plan - :  ·  Problem: Urinary tract infection with hematuria, with gram-negative / klebsiella bacteremia  Sepsis present on admission > resolved   - antibiotics per ID >>> Can transition to oral antibiotics on discharge  - f/u cultures / sensitivities   follow blood cultures and sensitivities, repeat blood cultures to negativity     Problem/Plan - :  ·  Problem: acute respiratory failure With hypoxemia.   Likely  acute exacerbation of pulmonary hypertension In setting of sepsis  - RVP, COVID-19 PCR negative.  CXR w/o gross signs of infection  - on nasal cannula O2 >> Now Off Oxygen   - other supportive care as needed.  - pulm and HF teams on board, appreciated      Problem/Plan - :  ·  Problem: Lactic acidosis. Improved  Lactate:  1.2  <<== , 1.9  <<== , 3.3  <<==   - in the context of sepsis  - ensure optimal hydration  Monitor     Problem/Plan - :  ·  Problem: Pulmonary hypertension.   ·  Plan: - per history, and uses 5 liters NC at home  - echo as above with right heart failure   - on sildenafil 20 mg daily (at 10:00 PM); continuing  - also on bumetanide 1 mg daily (has been on varied doses in the past, per chart review)  - monitor vitals, labs / O2   - intake/output Q4H, weight daily, HOB elevation  - multiple specialists on board, appreciated      Problem/Plan - :  ·  Problem: Prophylactic measure.   ·  Plan: - heparin SQ.    Continue Current medications otherwise and monitor.  supportive care  Patient Full code    >> Likely discharge Planning home over The weekend if remains stable/improved

## 2023-10-14 NOTE — PROGRESS NOTE ADULT - SUBJECTIVE AND OBJECTIVE BOX
Cardiovascular Disease Progress Note    Overnight events: No acute events overnight. no new cardiac sx   Otherwise review of systems negative    Objective Findings:  T(C): 36.5 (10-14-23 @ 05:28), Max: 36.7 (10-13-23 @ 16:00)  HR: 107 (10-14-23 @ 05:28) (99 - 110)  BP: 119/87 (10-14-23 @ 05:28) (110/81 - 127/80)  RR: 19 (10-14-23 @ 05:28) (19 - 19)  SpO2: 97% (10-14-23 @ 05:28) (97% - 100%)  Wt(kg): --  Daily     Daily       Physical Exam:  Gen: NAD  HEENT: EOMI  CV: RRR, normal S1 + S2, no m/r/g  Lungs: CTAB  Abd: soft, non-tender  Ext: No edema    Telemetry:    Laboratory Data:                        11.8   11.03 )-----------( 281      ( 14 Oct 2023 06:22 )             37.3     10-14    139  |  104  |  27<H>  ----------------------------<  76  6.0<H>   |  21<L>  |  1.32<H>    Ca    9.6      14 Oct 2023 06:22  Phos  4.0     10-14  Mg     2.10     10-14                Inpatient Medications:  MEDICATIONS  (STANDING):  budesonide 160 MICROgram(s)/formoterol 4.5 MICROgram(s) Inhaler 2 Puff(s) Inhalation two times a day  buMETAnide Injectable 1 milliGRAM(s) IV Push two times a day  cefTRIAXone   IVPB 2000 milliGRAM(s) IV Intermittent every 24 hours  cholecalciferol 2000 Unit(s) Oral daily  heparin   Injectable 5000 Unit(s) SubCutaneous every 8 hours  predniSONE   Tablet 7.5 milliGRAM(s) Oral daily  sildenafil (REVATIO) 20 milliGRAM(s) Oral three times a day  spironolactone 25 milliGRAM(s) Oral daily      Assessment:  63 year old male, with past history significant for Rheumatoid arthritis, HTN, HFrEF (55 to 60% - TTE of 03/08/2023), Home oxygen 5 liters, CKD, Renal cell carcinoma - s/p Partial nephrectomy, Plasma cell dyscrasia, and AICD placement, presented to the ED secondary to shortness of breath.  Seen and evaluated at bedside; restless, and complaining of feeling extremely cold, despite being covered with ~ 4 blankets and socks in place to feet.  AD.  Patient reports frequency in micturition with oliguria yesterday.  At approximately 7:00 PM, patient noted blood in the urine.  No associated burning or itching, however, has severe pain (10/10 intensity) post void.  Reports subjective fever.  No chills or sweating.  Has continued with hematuria - dark red.  Woke up with significant shortness of breath this morning and significant generalized weakness; could hardly get out of bed.  No chest pain, palpitations.  Wife called PMD who advised that patient be taken to the ED.     Recs:  cardiac stable  vol status improving, cont with diuretics as dosed  hr recs/dr llanes appreciated  s/p echo, results noted, overall with stable findings. Organized fibrinous vs coagulant material is seen in the pericardial space --> cont to monitor, remains hemodynamically stable, suspect effusion secondary to pulm htn   pulmonary follow up  infectious workup. ID recs appreciated  icd interrogation as OP  will follow         Over 25 minutes spent on total encounter; more than 50% of the visit was spent counseling and/or coordinating care by the attending physician.      Juarez Carver MD   Cardiovascular Disease  [phone]

## 2023-10-15 LAB
ANION GAP SERPL CALC-SCNC: 18 MMOL/L — HIGH (ref 7–14)
BASOPHILS # BLD AUTO: 0.09 K/UL — SIGNIFICANT CHANGE UP (ref 0–0.2)
BASOPHILS NFR BLD AUTO: 0.7 % — SIGNIFICANT CHANGE UP (ref 0–2)
BUN SERPL-MCNC: 24 MG/DL — HIGH (ref 7–23)
CALCIUM SERPL-MCNC: 9.1 MG/DL — SIGNIFICANT CHANGE UP (ref 8.4–10.5)
CHLORIDE SERPL-SCNC: 102 MMOL/L — SIGNIFICANT CHANGE UP (ref 98–107)
CO2 SERPL-SCNC: 20 MMOL/L — LOW (ref 22–31)
CREAT SERPL-MCNC: 1.32 MG/DL — HIGH (ref 0.5–1.3)
EGFR: 60 ML/MIN/1.73M2 — SIGNIFICANT CHANGE UP
EOSINOPHIL # BLD AUTO: 0.18 K/UL — SIGNIFICANT CHANGE UP (ref 0–0.5)
EOSINOPHIL NFR BLD AUTO: 1.4 % — SIGNIFICANT CHANGE UP (ref 0–6)
GLUCOSE SERPL-MCNC: 101 MG/DL — HIGH (ref 70–99)
HCT VFR BLD CALC: 38.2 % — LOW (ref 39–50)
HGB BLD-MCNC: 12 G/DL — LOW (ref 13–17)
IANC: 6.67 K/UL — SIGNIFICANT CHANGE UP (ref 1.8–7.4)
IMM GRANULOCYTES NFR BLD AUTO: 3.8 % — HIGH (ref 0–0.9)
LACTATE SERPL-SCNC: 1.6 MMOL/L — SIGNIFICANT CHANGE UP (ref 0.5–2)
LYMPHOCYTES # BLD AUTO: 35.8 % — SIGNIFICANT CHANGE UP (ref 13–44)
LYMPHOCYTES # BLD AUTO: 4.72 K/UL — HIGH (ref 1–3.3)
MAGNESIUM SERPL-MCNC: 2 MG/DL — SIGNIFICANT CHANGE UP (ref 1.6–2.6)
MCHC RBC-ENTMCNC: 30.3 PG — SIGNIFICANT CHANGE UP (ref 27–34)
MCHC RBC-ENTMCNC: 31.4 GM/DL — LOW (ref 32–36)
MCV RBC AUTO: 96.5 FL — SIGNIFICANT CHANGE UP (ref 80–100)
MONOCYTES # BLD AUTO: 1.03 K/UL — HIGH (ref 0–0.9)
MONOCYTES NFR BLD AUTO: 7.8 % — SIGNIFICANT CHANGE UP (ref 2–14)
NEUTROPHILS # BLD AUTO: 6.67 K/UL — SIGNIFICANT CHANGE UP (ref 1.8–7.4)
NEUTROPHILS NFR BLD AUTO: 50.5 % — SIGNIFICANT CHANGE UP (ref 43–77)
NRBC # BLD: 0 /100 WBCS — SIGNIFICANT CHANGE UP (ref 0–0)
NRBC # FLD: 0.02 K/UL — HIGH (ref 0–0)
PHOSPHATE SERPL-MCNC: 3.4 MG/DL — SIGNIFICANT CHANGE UP (ref 2.5–4.5)
PLATELET # BLD AUTO: 351 K/UL — SIGNIFICANT CHANGE UP (ref 150–400)
POTASSIUM SERPL-MCNC: 3.3 MMOL/L — LOW (ref 3.5–5.3)
POTASSIUM SERPL-SCNC: 3.3 MMOL/L — LOW (ref 3.5–5.3)
RBC # BLD: 3.96 M/UL — LOW (ref 4.2–5.8)
RBC # FLD: 15 % — HIGH (ref 10.3–14.5)
SODIUM SERPL-SCNC: 140 MMOL/L — SIGNIFICANT CHANGE UP (ref 135–145)
WBC # BLD: 13.19 K/UL — HIGH (ref 3.8–10.5)
WBC # FLD AUTO: 13.19 K/UL — HIGH (ref 3.8–10.5)

## 2023-10-15 RX ORDER — POTASSIUM CHLORIDE 20 MEQ
20 PACKET (EA) ORAL EVERY 4 HOURS
Refills: 0 | Status: COMPLETED | OUTPATIENT
Start: 2023-10-15 | End: 2023-10-15

## 2023-10-15 RX ADMIN — HEPARIN SODIUM 5000 UNIT(S): 5000 INJECTION INTRAVENOUS; SUBCUTANEOUS at 13:30

## 2023-10-15 RX ADMIN — Medication 20 MILLIEQUIVALENT(S): at 18:50

## 2023-10-15 RX ADMIN — Medication 20 MILLIGRAM(S): at 05:55

## 2023-10-15 RX ADMIN — Medication 20 MILLIEQUIVALENT(S): at 13:30

## 2023-10-15 RX ADMIN — SPIRONOLACTONE 25 MILLIGRAM(S): 25 TABLET, FILM COATED ORAL at 05:55

## 2023-10-15 RX ADMIN — Medication 7.5 MILLIGRAM(S): at 05:55

## 2023-10-15 RX ADMIN — BUDESONIDE AND FORMOTEROL FUMARATE DIHYDRATE 2 PUFF(S): 160; 4.5 AEROSOL RESPIRATORY (INHALATION) at 12:19

## 2023-10-15 RX ADMIN — BUDESONIDE AND FORMOTEROL FUMARATE DIHYDRATE 2 PUFF(S): 160; 4.5 AEROSOL RESPIRATORY (INHALATION) at 21:38

## 2023-10-15 RX ADMIN — Medication 2000 UNIT(S): at 13:30

## 2023-10-15 RX ADMIN — BUMETANIDE 1 MILLIGRAM(S): 0.25 INJECTION INTRAMUSCULAR; INTRAVENOUS at 05:56

## 2023-10-15 RX ADMIN — BUMETANIDE 1 MILLIGRAM(S): 0.25 INJECTION INTRAMUSCULAR; INTRAVENOUS at 18:51

## 2023-10-15 RX ADMIN — CEFTRIAXONE 100 MILLIGRAM(S): 500 INJECTION, POWDER, FOR SOLUTION INTRAMUSCULAR; INTRAVENOUS at 21:38

## 2023-10-15 RX ADMIN — HEPARIN SODIUM 5000 UNIT(S): 5000 INJECTION INTRAVENOUS; SUBCUTANEOUS at 05:56

## 2023-10-15 RX ADMIN — Medication 20 MILLIGRAM(S): at 14:11

## 2023-10-15 RX ADMIN — Medication 20 MILLIGRAM(S): at 21:40

## 2023-10-15 NOTE — PROGRESS NOTE ADULT - ASSESSMENT
_________________________________________________________________________________________  ========>>  M E D I C A L   A T T E N D I N G    F O L L O W  U P  N O T E  <<=========  -----------------------------------------------------------------------------------------------------    - Patient seen and examined by me earlier today.   - In summary,  PLACIDO GOLDMAN is a 63y year old man admitted with Urosepsis, now with bacteremia  - Patient today overall doing ok, comfortable, eating better, Overall improved as per patient    ==================>> REVIEW OF SYSTEM <<=================    GEN: no fever, no chills, no pain:  RESP: no SOB, no cough, no sputum  CVS: no chest pain, no palpitations, no edema  GI: no abdominal pain, no nausea  : no dysuria, no frequency, hematuria Previously, improved   Neuro: no headache, no dizziness  Derm : no itching, no rash    ==================>> PHYSICAL EXAM <<=================    GEN: A&O X 3 , NAD , comfortable, pleasant, calm , in chair   HEENT: NCAT, PERRL, MMM, hearing intact, On nasal cannula 02  Neck: supple , no JVD appreciated  CVS: S1S2 , regular , No M/R/G appreciated  PULM: CTA B/L,  no W/R/R appreciated  ABD.: soft. non tender, non distended,  bowel sounds present  Extrem: intact pulses , no edema   PSYCH : normal mood,  not anxious       ( Note written / Date of service 10-15-23 ( This is certified to be the same as "ENTERED" date above ( for billing purposes)))    ==================>> MEDICATIONS <<====================    budesonide 160 MICROgram(s)/formoterol 4.5 MICROgram(s) Inhaler 2 Puff(s) Inhalation two times a day  buMETAnide Injectable 1 milliGRAM(s) IV Push every 12 hours  cefTRIAXone   IVPB 2000 milliGRAM(s) IV Intermittent every 24 hours  cholecalciferol 2000 Unit(s) Oral daily  heparin   Injectable 5000 Unit(s) SubCutaneous every 8 hours  predniSONE   Tablet 7.5 milliGRAM(s) Oral daily  sildenafil (REVATIO) 20 milliGRAM(s) Oral three times a day  spironolactone 25 milliGRAM(s) Oral daily    MEDICATIONS  (PRN):  acetaminophen     Tablet .. 650 milliGRAM(s) Oral every 6 hours PRN Temp greater or equal to 38C (100.4F), Mild Pain (1 - 3)  melatonin 3 milliGRAM(s) Oral at bedtime PRN Insomnia    ___________  Active diet:  Diet, Regular  ___________________    ==================>> VITAL SIGNS <<==================    Weight (kg): 61.4  Vital Signs Last 24 HrsT(C): 36.3 (10-15-23 @ 12:18)  T(F): 97.4 (10-15-23 @ 12:18), Max: 97.4 (10-14-23 @ 15:50)  HR: 108 (10-15-23 @ 12:18) (101 - 108)  BP: 120/85 (10-15-23 @ 12:18)  RR: 18 (10-15-23 @ 12:18) (18 - 18)  SpO2: 95% (10-15-23 @ 12:18) (95% - 100%)      CAPILLARY BLOOD GLUCOSE         ==================>> LAB AND IMAGING <<==================                        12.0   13.19 )-----------( 351      ( 15 Oct 2023 06:44 )             38.2        10-15    140  |  102  |  24<H>  ----------------------------<  101<H>  3.3<L>   |  20<L>  |  1.32<H>    Ca    9.1      15 Oct 2023 06:44  Phos  3.4     10-15  Mg     2.00     10-15      WBC count:   13.19 <<== ,  11.03 <<== ,  9.19 <<== ,  14.08 <<== ,  18.97 <<==   Hemoglobin:   12.0 <<==,  11.8 <<==,  11.1 <<==,  12.3 <<==,  11.5 <<==  platelets:  351 <==, 281 <==, 247 <==, 217 <==, 179 <==, 193 <==, 223 <==    Creatinine:  1.32  <<==, 1.43  <<==, 1.32  <<==, 1.50  <<==, 1.74  <<==, 1.62  <<==  Sodium:   140  <==, 143  <==, 139  <==, 140  <==, 139  <==, 138  <==, 140  <==       AST:          17 <== , 26 <== , 94 <==      ALT:        15  <== , 20  <== , 35  <==      AP:        114  <=, 134  <=, 138  <=     Bili:        1.1  <=, 2.3  <=, 1.6  <=    ____________________________    M I C R O B I O L O G Y :    Culture - Urine (collected 11 Oct 2023 03:37)  Source: Clean Catch Clean Catch (Midstream)  Final Report (12 Oct 2023 13:16):    <10,000 CFU/mL Normal Urogenital Bri    Culture - Blood (collected 11 Oct 2023 00:55)  Source: .Blood Blood-Peripheral  Preliminary Report (15 Oct 2023 09:00):    No growth at 4 days    Culture - Blood (collected 11 Oct 2023 00:40)  Source: .Blood Blood-Peripheral  Preliminary Report (15 Oct 2023 09:00):    No growth at 4 days    Culture - Urine (collected 09 Oct 2023 16:28)  Source: Clean Catch Clean Catch (Midstream)  Final Report (12 Oct 2023 17:57):    >100,000 CFU/ml Klebsiella pneumoniae  Organism: Klebsiella pneumoniae (12 Oct 2023 17:57)  Organism: Klebsiella pneumoniae (12 Oct 2023 17:57)    Sensitivities:      Method Type: LOREN      -  Amikacin: S <=16      -  Amoxicillin/Clavulanic Acid: S <=8/4      -  Ampicillin: R >16 These ampicillin results predict results for amoxicillin      -  Ampicillin/Sulbactam: S <=4/2      -  Aztreonam: S <=4      -  Cefazolin: S <=2 For uncomplicated UTI with K. pneumoniae, E. coli, or P. mirablis: LOREN <=16 is sensitive and LOREN >=32 is resistant. This also predicts results for oral agents cefaclor, cefdinir, cefpodoxime, cefprozil, cefuroxime axetil, cephalexin and locarbef for uncomplicated UTI. Note that some isolates may be susceptible to these agents while testing resistant to cefazolin.      -  Cefepime: S <=2      -  Cefoxitin: S <=8      -  Ceftriaxone: S <=1      -  Cefuroxime: S <=4      -  Ciprofloxacin: S <=0.25      -  Ertapenem: S <=0.5      -  Gentamicin: S <=2      -  Imipenem: S <=1      -  Levofloxacin: S <=0.5      -  Meropenem: S <=1      -  Nitrofurantoin: S <=32 Should not be used to treat pyelonephritis      -  Piperacillin/Tazobactam: S <=8      -  Tobramycin: S <=2      -  Trimethoprim/Sulfamethoxazole: S <=0.5/9.5    Culture - Blood (collected 09 Oct 2023 14:10)  Source: .Blood Blood-Peripheral  Gram Stain (10 Oct 2023 15:17):    Growth in anaerobic bottle: Gram Negative Rods  Final Report (12 Oct 2023 10:48):    Growth in anaerobic bottle: Klebsiella pneumoniae    See previous culture  [id number]    Culture - Blood (collected 09 Oct 2023 14:00)  Source: .Blood Blood-Peripheral  Gram Stain (10 Oct 2023 10:23):    Growth in aerobic bottle: Gram Negative Rods  Final Report (12 Oct 2023 10:14):    Growth in aerobic bottle: Klebsiella pneumoniae    Direct identification is available within approximately 3-5    hours either by Blood Panel Multiplexed PCR or Direct    MALDI-TOF. Details: [email or web address]  Organism: Blood Culture PCR  Klebsiella pneumoniae (12 Oct 2023 10:14)  Organism: Klebsiella pneumoniae (12 Oct 2023 10:14)    Sensitivities:      Method Type: LOREN      -  Amikacin: S <=16      -  Ampicillin: R >16 These ampicillin results predict results for amoxicillin      -  Ampicillin/Sulbactam: S 8/4      -  Aztreonam: S <=4      -  Cefazolin: S <=2      -  Cefepime: S <=2      -  Cefoxitin: S <=8      -  Ceftriaxone: S <=1      -  Ciprofloxacin: S <=0.25      -  Ertapenem: S <=0.5      -  Gentamicin: S <=2      -  Imipenem: S <=1      -  Levofloxacin: S <=0.5      -  Meropenem: S <=1      -  Piperacillin/Tazobactam: S <=8      -  Tobramycin: S <=2      -  Trimethoprim/Sulfamethoxazole: S <=0.5/9.5  Organism: Blood Culture PCR (12 Oct 2023 10:14)    Sensitivities:      Method Type: PCR      -  K. pneumoniae group: Detec (K. pneumoniae, K. quasipneumoniae, K. variicola)        SARS-CoV-2: NotDetec (10-12-23 @ 00:39)  SARS-CoV-2: NotDetec (10-09-23 @ 15:20)      < from: CT Abdomen and Pelvis No Cont (10.11.23 @ 10:28) >  IMPRESSION:  Mild circumferential bladder wall thickening which may be due to cystitis   or chronic outlet obstruction. Correlate with urinalysis.  < end of copied text >    < from: TTE W or WO Ultrasound Enhancing Agent (10.11.23 @ 09:24) >  CONCLUSIONS:      1. The left ventricular cavity is small. The interventricular septum is flattened in systole and diastole consistent with right ventricular pressure and volume overload. Left ventricular systolic function is normal with a calculated ejection fraction of 57 % by the Espino's biplane method of disks. Theleft ventricular diastolic function is indeterminate. There is normal LV mass and concentric remodeling.   2. The left ventricular diastolic function is indeterminate.   3. Right ventricular volume and pressure overload. Left ventricular systolic function is normal with an ejection fraction of 57 % by Espino's method of disks.   4. Severely enlarged right ventricular cavity size and severely reduced systolic function.   5. Device lead is visualized.   6. The right atrium is severely dilated in size.   7. Estimated pulmonary artery systolic pressure is 79 mmHg.   8. The inferior vena cava is dilated (dilated >2.1cm) with abnormal inspiratory collapse (abnormal <50%) consistent with elevated right atrial pressure (~15, range 10-20mmHg).   9. Organized fibrinous vs coagulant material is seen in the pericardial space. There is a trace pericardial effusion noted adjacent to the posterior left ventricle.  < end of copied text >    __________________________________________________________________________________  ===============>>  A S S E S S M E N T   A N D   P L A N <<===============  ------------------------------------------------------------------------------------------    63 year old male, with past history significant for Rheumatoid arthritis, HTN, HFrEF (55 to 60% - TTE of 03/08/2023), Home oxygen 5 liters, CKD, Renal cell carcinoma - s/p Partial nephrectomy, Plasma cell dyscrasia, and AICD placement, presented to the ED secondary to shortness of breath.  Diagnosed with Sepsis in the ED.     Problem/Plan - :  ·  Problem: Urinary tract infection with hematuria, with gram-negative / klebsiella bacteremia  Sepsis present on admission > resolved   - antibiotics per ID >>> Can transition to oral antibiotics on discharge  - f/u cultures / sensitivities   follow blood cultures and sensitivities, repeat blood cultures to negativity     Problem/Plan - :  ·  Problem: acute respiratory failure With hypoxemia.   Likely  acute exacerbation of pulmonary hypertension In setting of sepsis  - RVP, COVID-19 PCR negative.  CXR w/o gross signs of infection  - on nasal cannula O2 >> Wean down as able  - other supportive care as needed.  - pulm and HF teams on board, appreciated      Problem/Plan - :  ·  Problem: Lactic acidosis. Improved  Lactate:  1.2  <<== , 1.9  <<== , 3.3  <<==   - in the context of sepsis  - ensure optimal hydration  Monitor     Problem/Plan - :  ·  Problem: Pulmonary hypertension.   ·  Plan: - per history, and uses 5 liters NC at home  - echo as above with right heart failure   - on sildenafil 20 mg daily (at 10:00 PM); continuing  - also on bumetanide 1 mg daily (has been on varied doses in the past, per chart review)  - monitor vitals, labs / O2   - intake/output Q4H, weight daily, HOB elevation  - multiple specialists on board, appreciated      Problem/Plan - :  ·  Problem: Prophylactic measure.   ·  Plan: - heparin SQ.    Continue Current medications otherwise and monitor.  supportive care  Patient Full code    >> Likely discharge Planning home over The weekend if remains stable/improved    --------------------------------------------  Case discussed with patient ,   Education given on findings and plan of care  ___________________________  H. JEANETH Vu.  Pager: [phone]       _________________________________________________________________________________________  ========>>  M E D I C A L   A T T E N D I N G    F O L L O W  U P  N O T E  <<=========  -----------------------------------------------------------------------------------------------------    - Patient seen and examined by me earlier today.   - In summary,  PLACIDO GOLDMAN is a 63y year old man admitted with Urosepsis, now with bacteremia  - Patient today overall doing ok, comfortable, eating better, Overall improved as per patient    Patient reports at home using approximately 4-5 L of oxygen, however patient here is using only 2 L :: patient educated to monitor and adjust oxygen and was needed    ==================>> REVIEW OF SYSTEM <<=================    GEN: no fever, no chills, no pain:  RESP: no SOB, no cough, no sputum  CVS: no chest pain, no palpitations, no edema  GI: no abdominal pain, no nausea  : no dysuria, no frequency, no hematuria   Neuro: no headache, no dizziness  Derm : no itching, no rash    ==================>> PHYSICAL EXAM <<=================    GEN: A&O X 3 , NAD , comfortable, pleasant, calm , in chair   HEENT: NCAT, PERRL, MMM, hearing intact, On nasal cannula 02  Neck: supple , no JVD appreciated  CVS: S1S2 , regular , No M/R/G appreciated  PULM: CTA B/L,  no W/R/R appreciated  ABD.: soft. non tender, non distended,  bowel sounds present  Extrem: intact pulses , no edema   PSYCH : normal mood,  not anxious       ( Note written / Date of service 10-15-23 ( This is certified to be the same as "ENTERED" date above ( for billing purposes)))    ==================>> MEDICATIONS <<====================    budesonide 160 MICROgram(s)/formoterol 4.5 MICROgram(s) Inhaler 2 Puff(s) Inhalation two times a day  buMETAnide Injectable 1 milliGRAM(s) IV Push every 12 hours  cefTRIAXone   IVPB 2000 milliGRAM(s) IV Intermittent every 24 hours  cholecalciferol 2000 Unit(s) Oral daily  heparin   Injectable 5000 Unit(s) SubCutaneous every 8 hours  predniSONE   Tablet 7.5 milliGRAM(s) Oral daily  sildenafil (REVATIO) 20 milliGRAM(s) Oral three times a day  spironolactone 25 milliGRAM(s) Oral daily    MEDICATIONS  (PRN):  acetaminophen     Tablet .. 650 milliGRAM(s) Oral every 6 hours PRN Temp greater or equal to 38C (100.4F), Mild Pain (1 - 3)  melatonin 3 milliGRAM(s) Oral at bedtime PRN Insomnia    ___________  Active diet:  Diet, Regular  ___________________    ==================>> VITAL SIGNS <<==================    Weight (kg): 61.4  Vital Signs Last 24 HrsT(C): 36.3 (10-15-23 @ 12:18)  T(F): 97.4 (10-15-23 @ 12:18), Max: 97.4 (10-14-23 @ 15:50)  HR: 108 (10-15-23 @ 12:18) (101 - 108)  BP: 120/85 (10-15-23 @ 12:18)  RR: 18 (10-15-23 @ 12:18) (18 - 18)  SpO2: 95% (10-15-23 @ 12:18) (95% - 100%)       ==================>> LAB AND IMAGING <<==================                        12.0   13.19 )-----------( 351      ( 15 Oct 2023 06:44 )             38.2        10-15    140  |  102  |  24<H>  ----------------------------<  101<H>  3.3<L>   |  20<L>  |  1.32<H>    Ca    9.1      15 Oct 2023 06:44  Phos  3.4     10-15  Mg     2.00     10-15      WBC count:   13.19 <<== ,  11.03 <<== ,  9.19 <<== ,  14.08 <<== ,  18.97 <<==   Hemoglobin:   12.0 <<==,  11.8 <<==,  11.1 <<==,  12.3 <<==,  11.5 <<==  platelets:  351 <==, 281 <==, 247 <==, 217 <==, 179 <==, 193 <==, 223 <==    Creatinine:  1.32  <<==, 1.43  <<==, 1.32  <<==, 1.50  <<==, 1.74  <<==, 1.62  <<==  Sodium:   140  <==, 143  <==, 139  <==, 140  <==, 139  <==, 138  <==, 140  <==       AST:          17 <== , 26 <== , 94 <==      ALT:        15  <== , 20  <== , 35  <==      AP:        114  <=, 134  <=, 138  <=     Bili:        1.1  <=, 2.3  <=, 1.6  <=    ____________________________    M I C R O B I O L O G Y :    Culture - Urine (collected 11 Oct 2023 03:37)  Source: Clean Catch Clean Catch (Midstream)  Final Report (12 Oct 2023 13:16):    <10,000 CFU/mL Normal Urogenital Bri    Culture - Blood (collected 11 Oct 2023 00:55)  Source: .Blood Blood-Peripheral  Preliminary Report (15 Oct 2023 09:00):    No growth at 4 days    Culture - Blood (collected 11 Oct 2023 00:40)  Source: .Blood Blood-Peripheral  Preliminary Report (15 Oct 2023 09:00):    No growth at 4 days    Culture - Urine (collected 09 Oct 2023 16:28)  Source: Clean Catch Clean Catch (Midstream)  Final Report (12 Oct 2023 17:57):    >100,000 CFU/ml Klebsiella pneumoniae  Organism: Klebsiella pneumoniae (12 Oct 2023 17:57)  Organism: Klebsiella pneumoniae (12 Oct 2023 17:57)    Sensitivities:      Method Type: LOREN      -  Amikacin: S <=16      -  Amoxicillin/Clavulanic Acid: S <=8/4      -  Ampicillin: R >16 These ampicillin results predict results for amoxicillin      -  Ampicillin/Sulbactam: S <=4/2      -  Aztreonam: S <=4      -  Cefazolin: S <=2 For uncomplicated UTI with K. pneumoniae, E. coli, or P. mirablis: LOREN <=16 is sensitive and LOREN >=32 is resistant. This also predicts results for oral agents cefaclor, cefdinir, cefpodoxime, cefprozil, cefuroxime axetil, cephalexin and locarbef for uncomplicated UTI. Note that some isolates may be susceptible to these agents while testing resistant to cefazolin.      -  Cefepime: S <=2      -  Cefoxitin: S <=8      -  Ceftriaxone: S <=1      -  Cefuroxime: S <=4      -  Ciprofloxacin: S <=0.25      -  Ertapenem: S <=0.5      -  Gentamicin: S <=2      -  Imipenem: S <=1      -  Levofloxacin: S <=0.5      -  Meropenem: S <=1      -  Nitrofurantoin: S <=32 Should not be used to treat pyelonephritis      -  Piperacillin/Tazobactam: S <=8      -  Tobramycin: S <=2      -  Trimethoprim/Sulfamethoxazole: S <=0.5/9.5    Culture - Blood (collected 09 Oct 2023 14:10)  Source: .Blood Blood-Peripheral  Gram Stain (10 Oct 2023 15:17):    Growth in anaerobic bottle: Gram Negative Rods  Final Report (12 Oct 2023 10:48):    Growth in anaerobic bottle: Klebsiella pneumoniae    See previous culture  [id number]    Culture - Blood (collected 09 Oct 2023 14:00)  Source: .Blood Blood-Peripheral  Gram Stain (10 Oct 2023 10:23):    Growth in aerobic bottle: Gram Negative Rods  Final Report (12 Oct 2023 10:14):    Growth in aerobic bottle: Klebsiella pneumoniae    Direct identification is available within approximately 3-5    hours either by Blood Panel Multiplexed PCR or Direct    MALDI-TOF. Details: [email or web address]  Organism: Blood Culture PCR  Klebsiella pneumoniae (12 Oct 2023 10:14)  Organism: Klebsiella pneumoniae (12 Oct 2023 10:14)    Sensitivities:      Method Type: LOREN      -  Amikacin: S <=16      -  Ampicillin: R >16 These ampicillin results predict results for amoxicillin      -  Ampicillin/Sulbactam: S 8/4      -  Aztreonam: S <=4      -  Cefazolin: S <=2      -  Cefepime: S <=2      -  Cefoxitin: S <=8      -  Ceftriaxone: S <=1      -  Ciprofloxacin: S <=0.25      -  Ertapenem: S <=0.5      -  Gentamicin: S <=2      -  Imipenem: S <=1      -  Levofloxacin: S <=0.5      -  Meropenem: S <=1      -  Piperacillin/Tazobactam: S <=8      -  Tobramycin: S <=2      -  Trimethoprim/Sulfamethoxazole: S <=0.5/9.5  Organism: Blood Culture PCR (12 Oct 2023 10:14)    Sensitivities:      Method Type: PCR      -  K. pneumoniae group: Detec (K. pneumoniae, K. quasipneumoniae, K. variicola)      < from: CT Abdomen and Pelvis No Cont (10.11.23 @ 10:28) >  IMPRESSION:  Mild circumferential bladder wall thickening which may be due to cystitis   or chronic outlet obstruction. Correlate with urinalysis.  < end of copied text >    < from: TTE W or WO Ultrasound Enhancing Agent (10.11.23 @ 09:24) >  CONCLUSIONS:     1. The left ventricular cavity is small. The interventricular septum is flattened in systole and diastole consistent with right ventricular pressure and volume overload. Left ventricular systolic function is normal with a calculated ejection fraction of 57 % by the Espino's biplane method of disks. Theleft ventricular diastolic function is indeterminate. There is normal LV mass and concentric remodeling.   2. The left ventricular diastolic function is indeterminate.   3. Right ventricular volume and pressure overload. Left ventricular systolic function is normal with an ejection fraction of 57 % by Espino's method of disks.   4. Severely enlarged right ventricular cavity size and severely reduced systolic function.   5. Device lead is visualized.   6. The right atrium is severely dilated in size.   7. Estimated pulmonary artery systolic pressure is 79 mmHg.   8. The inferior vena cava is dilated (dilated >2.1cm) with abnormal inspiratory collapse (abnormal <50%) consistent with elevated right atrial pressure (~15, range 10-20mmHg).   9. Organized fibrinous vs coagulant material is seen in the pericardial space. There is a trace pericardial effusion noted adjacent to the posterior left ventricle.  < end of copied text >    __________________________________________________________________________________  ===============>>  A S S E S S M E N T   A N D   P L A N <<===============  ------------------------------------------------------------------------------------------    63 year old male, with past history significant for Rheumatoid arthritis, HTN, HFrEF (55 to 60% - TTE of 03/08/2023), Home oxygen 5 liters, CKD, Renal cell carcinoma - s/p Partial nephrectomy, Plasma cell dyscrasia, and AICD placement, presented to the ED secondary to shortness of breath.  Diagnosed with Sepsis in the ED.     Problem/Plan - :  ·  Problem: Urinary tract infection with hematuria, with gram-negative / klebsiella bacteremia  Sepsis present on admission > resolved   - antibiotics per ID >>> Can transition to oral antibiotics on discharge As noted   cultures / sensitivities  As above  follow blood cultures and sensitivities, repeat blood cultures to negativity     Problem/Plan - :  ·  Problem: acute On chronic respiratory failure With hypoxemia.  Overall improved  Likely  acute exacerbation of pulmonary hypertension In setting of sepsis  - RVP, COVID-19 PCR negative.  CXR w/o gross signs of infection  - other supportive care as needed.  - pulm and HF teams on board, appreciated   >> Patient educated to bring portable oxygen to the hospital for discharge planning, likely tomorrow     Problem/Plan - :  ·  Problem: Lactic acidosis. Improved  Lactate:  1.6  <<== , 3.1  <<== , 2.2  <<== , 1.2  <<== , 1.9  <<== , 3.3  <<==   - in the context of sepsis  - ensure optimal hydration  Monitor     Problem/Plan - :  ·  Problem: Pulmonary hypertension.   ·  Plan: - per history, and uses 5 liters NC at home  - echo as above with right heart failure   - on sildenafil 20 mg daily (at 10:00 PM); continuing  - also on bumetanide 1 mg daily (has been on varied doses in the past, per chart review)  - monitor vitals, labs / O2   - intake/output Q4H, weight daily, HOB elevation  - multiple specialists on board, appreciated      Problem/Plan - :  ·  Problem: Prophylactic measure.   ·  Plan: - heparin SQ.    Continue Current medications otherwise and monitor.  supportive care  Patient Full code    >> discharge Planning home Tomorrow if remains stable/improved    --------------------------------------------  Case discussed with patient ,   Education given on findings and plan of care  ___________________________  H. JEANETH Vu.  Pager: [phone]

## 2023-10-16 ENCOUNTER — TRANSCRIPTION ENCOUNTER (OUTPATIENT)
Age: 63
End: 2023-10-16

## 2023-10-16 ENCOUNTER — NON-APPOINTMENT (OUTPATIENT)
Age: 63
End: 2023-10-16

## 2023-10-16 VITALS — WEIGHT: 137.79 LBS

## 2023-10-16 LAB
ANION GAP SERPL CALC-SCNC: 14 MMOL/L — SIGNIFICANT CHANGE UP (ref 7–14)
ANISOCYTOSIS BLD QL: SLIGHT — SIGNIFICANT CHANGE UP
BASOPHILS # BLD AUTO: 0.12 K/UL — SIGNIFICANT CHANGE UP (ref 0–0.2)
BASOPHILS NFR BLD AUTO: 0.9 % — SIGNIFICANT CHANGE UP (ref 0–2)
BUN SERPL-MCNC: 25 MG/DL — HIGH (ref 7–23)
BURR CELLS BLD QL SMEAR: PRESENT — SIGNIFICANT CHANGE UP
CALCIUM SERPL-MCNC: 9.6 MG/DL — SIGNIFICANT CHANGE UP (ref 8.4–10.5)
CHLORIDE SERPL-SCNC: 103 MMOL/L — SIGNIFICANT CHANGE UP (ref 98–107)
CO2 SERPL-SCNC: 22 MMOL/L — SIGNIFICANT CHANGE UP (ref 22–31)
CREAT SERPL-MCNC: 1.43 MG/DL — HIGH (ref 0.5–1.3)
CULTURE RESULTS: SIGNIFICANT CHANGE UP
CULTURE RESULTS: SIGNIFICANT CHANGE UP
EGFR: 55 ML/MIN/1.73M2 — LOW
EOSINOPHIL # BLD AUTO: 0.36 K/UL — SIGNIFICANT CHANGE UP (ref 0–0.5)
EOSINOPHIL NFR BLD AUTO: 2.7 % — SIGNIFICANT CHANGE UP (ref 0–6)
GIANT PLATELETS BLD QL SMEAR: PRESENT — SIGNIFICANT CHANGE UP
GLUCOSE SERPL-MCNC: 85 MG/DL — SIGNIFICANT CHANGE UP (ref 70–99)
HCT VFR BLD CALC: 37.3 % — LOW (ref 39–50)
HGB BLD-MCNC: 11.5 G/DL — LOW (ref 13–17)
IANC: 7.18 K/UL — SIGNIFICANT CHANGE UP (ref 1.8–7.4)
LACTATE SERPL-SCNC: 1.2 MMOL/L — SIGNIFICANT CHANGE UP (ref 0.5–2)
LYMPHOCYTES # BLD AUTO: 36 % — SIGNIFICANT CHANGE UP (ref 13–44)
LYMPHOCYTES # BLD AUTO: 4.83 K/UL — HIGH (ref 1–3.3)
MACROCYTES BLD QL: SLIGHT — SIGNIFICANT CHANGE UP
MAGNESIUM SERPL-MCNC: 2.1 MG/DL — SIGNIFICANT CHANGE UP (ref 1.6–2.6)
MANUAL SMEAR VERIFICATION: SIGNIFICANT CHANGE UP
MCHC RBC-ENTMCNC: 29.9 PG — SIGNIFICANT CHANGE UP (ref 27–34)
MCHC RBC-ENTMCNC: 30.8 GM/DL — LOW (ref 32–36)
MCV RBC AUTO: 97.1 FL — SIGNIFICANT CHANGE UP (ref 80–100)
MONOCYTES # BLD AUTO: 0.85 K/UL — SIGNIFICANT CHANGE UP (ref 0–0.9)
MONOCYTES NFR BLD AUTO: 6.3 % — SIGNIFICANT CHANGE UP (ref 2–14)
MYELOCYTES NFR BLD: 1.8 % — HIGH (ref 0–0)
NEUTROPHILS # BLD AUTO: 6.78 K/UL — SIGNIFICANT CHANGE UP (ref 1.8–7.4)
NEUTROPHILS NFR BLD AUTO: 50.5 % — SIGNIFICANT CHANGE UP (ref 43–77)
NRBC # BLD: 1 /100 — HIGH (ref 0–0)
PHOSPHATE SERPL-MCNC: 3.8 MG/DL — SIGNIFICANT CHANGE UP (ref 2.5–4.5)
PLAT MORPH BLD: NORMAL — SIGNIFICANT CHANGE UP
PLATELET # BLD AUTO: 327 K/UL — SIGNIFICANT CHANGE UP (ref 150–400)
PLATELET COUNT - ESTIMATE: NORMAL — SIGNIFICANT CHANGE UP
POLYCHROMASIA BLD QL SMEAR: SLIGHT — SIGNIFICANT CHANGE UP
POTASSIUM SERPL-MCNC: 3.9 MMOL/L — SIGNIFICANT CHANGE UP (ref 3.5–5.3)
POTASSIUM SERPL-SCNC: 3.9 MMOL/L — SIGNIFICANT CHANGE UP (ref 3.5–5.3)
RBC # BLD: 3.84 M/UL — LOW (ref 4.2–5.8)
RBC # FLD: 15.3 % — HIGH (ref 10.3–14.5)
RBC BLD AUTO: ABNORMAL
SMUDGE CELLS # BLD: PRESENT — SIGNIFICANT CHANGE UP
SODIUM SERPL-SCNC: 139 MMOL/L — SIGNIFICANT CHANGE UP (ref 135–145)
SPECIMEN SOURCE: SIGNIFICANT CHANGE UP
SPECIMEN SOURCE: SIGNIFICANT CHANGE UP
VARIANT LYMPHS # BLD: 1.8 % — SIGNIFICANT CHANGE UP (ref 0–6)
WBC # BLD: 13.42 K/UL — HIGH (ref 3.8–10.5)
WBC # FLD AUTO: 13.42 K/UL — HIGH (ref 3.8–10.5)

## 2023-10-16 PROCEDURE — 99233 SBSQ HOSP IP/OBS HIGH 50: CPT | Mod: GC

## 2023-10-16 RX ORDER — CHOLECALCIFEROL (VITAMIN D3) 125 MCG
2000 CAPSULE ORAL
Qty: 0 | Refills: 0 | DISCHARGE
Start: 2023-10-16

## 2023-10-16 RX ORDER — SPIRONOLACTONE 25 MG/1
1 TABLET, FILM COATED ORAL
Qty: 30 | Refills: 0
Start: 2023-10-16 | End: 2023-11-14

## 2023-10-16 RX ORDER — LEVOFLOXACIN 5 MG/ML
1 INJECTION, SOLUTION INTRAVENOUS
Qty: 4 | Refills: 0
Start: 2023-10-16 | End: 2023-10-19

## 2023-10-16 RX ORDER — LEVOFLOXACIN 5 MG/ML
1 INJECTION, SOLUTION INTRAVENOUS
Qty: 3 | Refills: 0
Start: 2023-10-16 | End: 2023-10-18

## 2023-10-16 RX ADMIN — Medication 7.5 MILLIGRAM(S): at 05:16

## 2023-10-16 RX ADMIN — SPIRONOLACTONE 25 MILLIGRAM(S): 25 TABLET, FILM COATED ORAL at 05:15

## 2023-10-16 RX ADMIN — Medication 20 MILLIGRAM(S): at 05:15

## 2023-10-16 RX ADMIN — BUMETANIDE 1 MILLIGRAM(S): 0.25 INJECTION INTRAMUSCULAR; INTRAVENOUS at 05:15

## 2023-10-16 RX ADMIN — Medication 20 MILLIGRAM(S): at 14:40

## 2023-10-16 RX ADMIN — Medication 2000 UNIT(S): at 14:18

## 2023-10-16 RX ADMIN — HEPARIN SODIUM 5000 UNIT(S): 5000 INJECTION INTRAVENOUS; SUBCUTANEOUS at 05:15

## 2023-10-16 RX ADMIN — BUDESONIDE AND FORMOTEROL FUMARATE DIHYDRATE 2 PUFF(S): 160; 4.5 AEROSOL RESPIRATORY (INHALATION) at 08:26

## 2023-10-16 NOTE — DIETITIAN INITIAL EVALUATION ADULT - NSICDXPASTMEDICALHX_GEN_ALL_CORE_FT
PAST MEDICAL HISTORY:  Chronic kidney disease (CKD)     Chronic systolic congestive heart failure     Hypertension     Neoplasm of uncertain behavior of kidney, right     On home oxygen therapy     Plasma cell dyscrasia     Pulmonary hypertension     Renal cell carcinoma     Rheumatoid arthritis

## 2023-10-16 NOTE — DISCHARGE NOTE NURSING/CASE MANAGEMENT/SOCIAL WORK - NSDCPEFALRISK_GEN_ALL_CORE
For information on Fall & Injury Prevention, visit: [email or web address] OR  [email or web address] OR  https://www.cdc.gov/steadi/patient.html

## 2023-10-16 NOTE — PROGRESS NOTE ADULT - REASON FOR ADMISSION
Sepsis due to undetermined organism, Urinary tract infection

## 2023-10-16 NOTE — PROGRESS NOTE ADULT - PROVIDER SPECIALTY LIST ADULT
Cardiology
Infectious Disease
Internal Medicine
Pulmonology
Cardiology
Heart Failure
Infectious Disease
Internal Medicine
Internal Medicine
Cardiology
Internal Medicine
Pulmonology
Pulmonology

## 2023-10-16 NOTE — PROGRESS NOTE ADULT - SUBJECTIVE AND OBJECTIVE BOX
CHIEF COMPLAINT:Patient is a 63y old  Male who presents with a chief complaint of Sepsis     (16 Oct 2023 15:00)      Interval Events:  saturating well on 2L NC currently    REVIEW OF SYSTEMS:  [x] All other systems negative except per HPI   [ ] Unable to assess ROS because ________    OBJECTIVE:  ICU Vital Signs Last 24 Hrs  T(C): 36.4 (16 Oct 2023 12:41), Max: 36.7 (16 Oct 2023 05:10)  T(F): 97.5 (16 Oct 2023 12:41), Max: 98.1 (16 Oct 2023 05:10)  HR: 98 (16 Oct 2023 14:35) (97 - 102)  BP: 111/82 (16 Oct 2023 14:35) (110/77 - 120/90)  BP(mean): --  ABP: --  ABP(mean): --  RR: 18 (16 Oct 2023 14:35) (17 - 18)  SpO2: 100% (16 Oct 2023 14:35) (98% - 100%)    O2 Parameters below as of 16 Oct 2023 14:35  Patient On (Oxygen Delivery Method): nasal cannula  O2 Flow (L/min): 2            10-15 @ 07:01  -  10-16 @ 07:00  --------------------------------------------------------  IN: 650 mL / OUT: 1350 mL / NET: -700 mL    10-16 @ 07:01  -  10-16 @ 17:15  --------------------------------------------------------  IN: 300 mL / OUT: 340 mL / NET: -40 mL        PHYSICAL EXAM:  General: Awake, alert, oriented X 3.   HEENT: Atraumatic, normocephalic.                  No tonsillar or pharyngeal exudates.  Lymph Nodes: No palpable lymphadenopathy  Neck: No JVD. No carotid bruit.   Respiratory: Normal chest expansion                         Normal percussion                         Normal and equal air entry                         No wheeze, rhonchi or rales.  Cardiovascular: S1 S2 normal. No murmurs, rubs or gallops.   Abdomen: Soft, non-tender, non-distended. No organomegaly.  Extremities: Warm to touch. Peripheral pulse palpable. No pedal edema.   Skin: No rashes or skin lesions  Neurological: Non-focal  Psychiatry: Appropriate mood and affect.    HOSPITAL MEDICATIONS:  MEDICATIONS  (STANDING):  budesonide 160 MICROgram(s)/formoterol 4.5 MICROgram(s) Inhaler 2 Puff(s) Inhalation two times a day  buMETAnide 1 milliGRAM(s) Oral daily  cefTRIAXone   IVPB 2000 milliGRAM(s) IV Intermittent every 24 hours  cholecalciferol 2000 Unit(s) Oral daily  heparin   Injectable 5000 Unit(s) SubCutaneous every 8 hours  predniSONE   Tablet 7.5 milliGRAM(s) Oral daily  sildenafil (REVATIO) 20 milliGRAM(s) Oral three times a day  spironolactone 25 milliGRAM(s) Oral daily    MEDICATIONS  (PRN):  acetaminophen     Tablet .. 650 milliGRAM(s) Oral every 6 hours PRN Temp greater or equal to 38C (100.4F), Mild Pain (1 - 3)  melatonin 3 milliGRAM(s) Oral at bedtime PRN Insomnia      LABS:    The Labs were reviewed by me   The Radiology was reviewed by me    EKG tracing reviewed by me    10-16    139  |  103  |  25<H>  ----------------------------<  85  3.9   |  22  |  1.43<H>  10-15    140  |  102  |  24<H>  ----------------------------<  101<H>  3.3<L>   |  20<L>  |  1.32<H>  10-14    143  |  105  |  25<H>  ----------------------------<  101<H>  4.4   |  24  |  1.43<H>    Ca    9.6      16 Oct 2023 06:17  Ca    9.1      15 Oct 2023 06:44  Ca    9.8      14 Oct 2023 12:08  Phos  3.8     10-16  Mg     2.10     10-16      Magnesium: 2.10 mg/dL (10-16-23 @ 06:17)  Magnesium: 2.00 mg/dL (10-15-23 @ 06:44)  Magnesium: 2.10 mg/dL (10-14-23 @ 12:08)  Magnesium: 2.10 mg/dL (10-14-23 @ 06:22)    Phosphorus: 3.8 mg/dL (10-16-23 @ 06:17)  Phosphorus: 3.4 mg/dL (10-15-23 @ 06:44)  Phosphorus: 3.4 mg/dL (10-14-23 @ 12:08)  Phosphorus: 4.0 mg/dL (10-14-23 @ 06:22)                    Urinalysis Basic - ( 16 Oct 2023 06:17 )    Color: x / Appearance: x / SG: x / pH: x  Gluc: 85 mg/dL / Ketone: x  / Bili: x / Urobili: x   Blood: x / Protein: x / Nitrite: x   Leuk Esterase: x / RBC: x / WBC x   Sq Epi: x / Non Sq Epi: x / Bacteria: x                              11.5   13.42 )-----------( 327      ( 16 Oct 2023 06:17 )             37.3                         12.0   13.19 )-----------( 351      ( 15 Oct 2023 06:44 )             38.2                         11.8   11.03 )-----------( 281      ( 14 Oct 2023 06:22 )             37.3     CAPILLARY BLOOD GLUCOSE            MICROBIOLOGY:     RADIOLOGY:  [ ] Reviewed and interpreted by me    Point of Care Ultrasound Findings:    PFT:    EKG:

## 2023-10-16 NOTE — DISCHARGE NOTE PROVIDER - CARE PROVIDER_API CALL
Jose Daniel Carver  Cardiovascular Disease  [address] [place] [address]  Phone: [phone]  Fax: [phone]  Follow Up Time:    Jose Daniel Carver  Cardiovascular Disease  [address] [place] [address]  Phone: [phone]  Fax: [phone]  Follow Up Time:     Michael Cuellar  Adv Heart Fail [facility] Cardio  [address] [place] [address]  Phone: [phone]  Fax: [phone]  Follow Up Time:

## 2023-10-16 NOTE — DISCHARGE NOTE PROVIDER - CARE PROVIDERS DIRECT ADDRESSES
,DirectAddress_Unknown ,DirectAddress_Unknown,[email or web address]

## 2023-10-16 NOTE — PROGRESS NOTE ADULT - ATTENDING COMMENTS
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 Remains stable from pulmonary perspective. Tolerating home dose sildenafil. Keep slightly negative fluid balance. Continue prednisone 7.5 mg. Outpatient follow up with Dr. Villeda.     Janice Awad MD
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   Remains stable from pulmonary perspective.  Tolerating home dose sildenafil  Keep slightly negative fluid balance  Continue prednisone 7.5 mg.  Outpatient follow up with Dr. Villeda.
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

## 2023-10-16 NOTE — DISCHARGE NOTE PROVIDER - NSDCCPCAREPLAN_GEN_ALL_CORE_FT
PRINCIPAL DISCHARGE DIAGNOSIS  Diagnosis: Sepsis  Assessment and Plan of Treatment: Urinary tract infection with klebsiella bacteremia  IV antibiotics changed to oral for discharge   if you develop fever, chills, feeling unwell seek immediate medical attention      SECONDARY DISCHARGE DIAGNOSES  Diagnosis: History of medical treatment  Assessment and Plan of Treatment: acute On chronic respiratory failure With hypoxemia.   -Likely  acute exacerbation of pulmonary hypertension In setting of sepsis  -COVID-19 PCR negative.    Chest XRay-  withoutgross signs of infection  follow up with your PCP and pulmonologist call for appointment   Pulmonary hypertension.   - uses 5 liters NC at home  - on sildenafil 20 mg daily   - also on bumetanide 1 mg daily    -  HOB elevation  being discharged on portable oxygen - patients from home   follow up with your PCP, cardiologist and pulmonologist call for appointment   if you become shorth of breath, have difficulty breathing seek immediate medical attention

## 2023-10-16 NOTE — DIETITIAN INITIAL EVALUATION ADULT - PERTINENT LABORATORY DATA
10-16    139  |  103  |  25<H>  ----------------------------<  85  3.9   |  22  |  1.43<H>    Ca    9.6      16 Oct 2023 06:17  Phos  3.8     10-16  Mg     2.10     10-16

## 2023-10-16 NOTE — PROGRESS NOTE ADULT - SUBJECTIVE AND OBJECTIVE BOX
Cardiovascular Disease Progress Note    Overnight events: No acute events overnight.  no new cardiac sx  Otherwise review of systems negative    Objective Findings:  T(C): 36.7 (10-16-23 @ 05:10), Max: 36.7 (10-16-23 @ 05:10)  HR: 97 (10-16-23 @ 05:10) (97 - 108)  BP: 117/82 (10-16-23 @ 05:10) (110/77 - 120/85)  RR: 18 (10-16-23 @ 05:10) (18 - 18)  SpO2: 100% (10-16-23 @ 05:10) (95% - 100%)  Wt(kg): --  Daily     Daily Weight in k.1 (16 Oct 2023 05:10)      Physical Exam:  Gen: NAD  HEENT: EOMI  CV: RRR, normal S1 + S2, no m/r/g  Lungs: CTAB  Abd: soft, non-tender  Ext: No edema    Telemetry:    Laboratory Data:                        11.5   13.42 )-----------( 327      ( 16 Oct 2023 06:17 )             37.3     10-16    139  |  103  |  25<H>  ----------------------------<  85  3.9   |  22  |  1.43<H>    Ca    9.6      16 Oct 2023 06:17  Phos  3.8     10-16  Mg     2.10     10-16                Inpatient Medications:  MEDICATIONS  (STANDING):  budesonide 160 MICROgram(s)/formoterol 4.5 MICROgram(s) Inhaler 2 Puff(s) Inhalation two times a day  buMETAnide 1 milliGRAM(s) Oral daily  cefTRIAXone   IVPB 2000 milliGRAM(s) IV Intermittent every 24 hours  cholecalciferol 2000 Unit(s) Oral daily  heparin   Injectable 5000 Unit(s) SubCutaneous every 8 hours  predniSONE   Tablet 7.5 milliGRAM(s) Oral daily  sildenafil (REVATIO) 20 milliGRAM(s) Oral three times a day  spironolactone 25 milliGRAM(s) Oral daily      Assessment:  63 year old male, with past history significant for Rheumatoid arthritis, HTN, HFrEF (55 to 60% - TTE of 2023), Home oxygen 5 liters, CKD, Renal cell carcinoma - s/p Partial nephrectomy, Plasma cell dyscrasia, and AICD placement, presented to the ED secondary to shortness of breath.  Seen and evaluated at bedside; restless, and complaining of feeling extremely cold, despite being covered with ~ 4 blankets and socks in place to feet.  AD.  Patient reports frequency in micturition with oliguria yesterday.  At approximately 7:00 PM, patient noted blood in the urine.  No associated burning or itching, however, has severe pain (10/10 intensity) post void.  Reports subjective fever.  No chills or sweating.  Has continued with hematuria - dark red.  Woke up with significant shortness of breath this morning and significant generalized weakness; could hardly get out of bed.  No chest pain, palpitations.  Wife called PMD who advised that patient be taken to the ED.     Recs:  cardiac stable  vol status improving, cont with diuretics as dosed  hr recs/dr llanes appreciated  s/p echo, results noted, overall with stable findings. Organized fibrinous vs coagulant material is seen in the pericardial space --> cont to monitor, remains hemodynamically stable, suspect effusion secondary to pulm htn   pulmonary follow up  infectious workup. ID recs appreciated  icd interrogation as OP  will follow       Over 25 minutes spent on total encounter; more than 50% of the visit was spent counseling and/or coordinating care by the attending physician.      Juarez Carver MD   Cardiovascular Disease  [phone]

## 2023-10-16 NOTE — DIETITIAN INITIAL EVALUATION ADULT - NSICDXPASTSURGICALHX_GEN_ALL_CORE_FT
PAST SURGICAL HISTORY:  AICD (automatic cardioverter/defibrillator) present     H/O partial nephrectomy     History of cardiac cath jan 2022, at [facility] no stent

## 2023-10-16 NOTE — DIETITIAN INITIAL EVALUATION ADULT - ADD RECOMMEND
1. Nutrition department to provide Orgain 11oz 1x/day (220kcal, 16 gm protein) for nutrient support.   2. Monitor weight, labs, po intake and tolerance, bowel movement, skin integrity.   3. Encourage PO intake and honor food preferences as able.

## 2023-10-16 NOTE — PROGRESS NOTE ADULT - TIME BILLING
Review of medical records, labs, imaging, coordination of care and did not include time spent teaching.

## 2023-10-16 NOTE — DISCHARGE NOTE PROVIDER - HOSPITAL COURSE
63 year old male, with past history significant for Rheumatoid arthritis, HTN, HFrEF (55 to 60% - TTE of 03/08/2023), Home oxygen 5 liters, CKD, Renal cell carcinoma - s/p Partial nephrectomy, Plasma cell dyscrasia, and AICD placement, presented to the ED secondary to shortness of breath.  Diagnosed with Sepsis in the ED.     Problem/Plan - :  ·  Problem: Urinary tract infection with hematuria, with gram-negative / klebsiella bacteremia  Sepsis present on admission > resolved   - antibiotics per ID >>> Can transition to oral antibiotics on discharge As noted   cultures / sensitivities  As above  follow blood cultures and sensitivities, repeat blood cultures to negativity     Problem/Plan - :  ·  Problem: acute On chronic respiratory failure With hypoxemia.  Overall improved  Likely  acute exacerbation of pulmonary hypertension In setting of sepsis  - RVP, COVID-19 PCR negative.  CXR w/o gross signs of infection  - other supportive care as needed.  - pulm and HF teams on board, appreciated   >> Patient educated to bring portable oxygen to the hospital for discharge planning, likely tomorrow     Problem/Plan - :  ·  Problem: Lactic acidosis. Improved  Lactate:  1.6  <<== , 3.1  <<== , 2.2  <<== , 1.2  <<== , 1.9  <<== , 3.3  <<==   - in the context of sepsis  - ensure optimal hydration  Monitor     Problem/Plan - :  ·  Problem: Pulmonary hypertension.   ·  Plan: - per history, and uses 5 liters NC at home  - echo as above with right heart failure   - on sildenafil 20 mg daily (at 10:00 PM); continuing  - also on bumetanide 1 mg daily (has been on varied doses in the past, per chart review)  - monitor vitals, labs / O2   - intake/output Q4H, weight daily, HOB elevation  - multiple specialists on board, appreciated    63 year old male, with past history significant for Rheumatoid arthritis, HTN, HFrEF (55 to 60% - TTE of 03/08/2023), Home oxygen 5 liters, CKD, Renal cell carcinoma - s/p Partial nephrectomy, Plasma cell dyscrasia, and AICD placement, presented to the ED secondary to shortness of breath.  Diagnosed with Sepsis in the ED.    Urinary tract infection with hematuria, with gram-negative / klebsiella bacteremia  Sepsis   IV antibiotics changed to oral for discharge      acute On chronic respiratory failure With hypoxemia.   -Likely  acute exacerbation of pulmonary hypertension In setting of sepsis  -COVID-19 PCR negative.  CXR w/o gross signs of infection      Pulmonary hypertension.   - per history, and uses 5 liters NC at home  - on sildenafil 20 mg daily   - also on bumetanide 1 mg daily    -  HOB elevation    Discussed with attending ready for discharge today

## 2023-10-16 NOTE — PROGRESS NOTE ADULT - ASSESSMENT
M E D I C A L   A T T E N D I N G    F O L L O W    U P   N O T E  (10-16-23 )                                     ------------------------------------------------------------------------------------------------    patient evaluated by me, case discussed with team, chart, medications, and physical exam reviewed, labs / tests  and vitals reviewed by me, as bellow.   Patient is stable for discharge today. patient overall doing well.. home  / portabl O2 at bedside.. patient wants to go home..   Patient to follow up with  PMD / cardio / HF team, Pulm    See discharge document for full note.  [Greater than 35 min spent for these services. ]          ( Note written / Date of service 10-16-23 ( This is certified to be the same as "ENTERED" date above ( for billing purposes)))    ==================>> MEDICATIONS <<====================    budesonide 160 MICROgram(s)/formoterol 4.5 MICROgram(s) Inhaler 2 Puff(s) Inhalation two times a day  buMETAnide 1 milliGRAM(s) Oral daily  cefTRIAXone   IVPB 2000 milliGRAM(s) IV Intermittent every 24 hours  cholecalciferol 2000 Unit(s) Oral daily  heparin   Injectable 5000 Unit(s) SubCutaneous every 8 hours  predniSONE   Tablet 7.5 milliGRAM(s) Oral daily  sildenafil (REVATIO) 20 milliGRAM(s) Oral three times a day  spironolactone 25 milliGRAM(s) Oral daily    MEDICATIONS  (PRN):  acetaminophen     Tablet .. 650 milliGRAM(s) Oral every 6 hours PRN Temp greater or equal to 38C (100.4F), Mild Pain (1 - 3)  melatonin 3 milliGRAM(s) Oral at bedtime PRN Insomnia    ___________  Active diet:  Diet, Regular  ___________________    ==================>> VITAL SIGNS <<==================    T(C): 36.4 (10-16-23 @ 12:41), Max: 36.7 (10-16-23 @ 05:10)  HR: 102 (10-16-23 @ 12:41) (97 - 102)  BP: 120/90 (10-16-23 @ 12:41) (110/77 - 120/90)  BP(mean): --  RR: 17 (10-16-23 @ 12:41) (17 - 18)  SpO2: 98% (10-16-23 @ 12:41) (98% - 100%)         I&O's Summary    15 Oct 2023 07:01  -  16 Oct 2023 07:00  --------------------------------------------------------  IN: 650 mL / OUT: 1350 mL / NET: -700 mL       ==================>> LAB AND IMAGING <<==================                        11.5   13.42 )-----------( 327      ( 16 Oct 2023 06:17 )             37.3        10-16    139  |  103  |  25<H>  ----------------------------<  85  3.9   |  22  |  1.43<H>    Ca    9.6      16 Oct 2023 06:17  Phos  3.8     10-16  Mg     2.10     10-16      Urinalysis Basic - ( 16 Oct 2023 06:17 )    Color: x / Appearance: x / SG: x / pH: x  Gluc: 85 mg/dL / Ketone: x  / Bili: x / Urobili: x   Blood: x / Protein: x / Nitrite: x   Leuk Esterase: x / RBC: x / WBC x   Sq Epi: x / Non Sq Epi: x / Bacteria: x    TSH:      2.19   (10-10-23)           WBC count:   13.42 <<== ,  13.19 <<== ,  11.03 <<== ,  9.19 <<== ,  14.08 <<==   Hemoglobin:   11.5 <<==,  12.0 <<==,  11.8 <<==,  11.1 <<==,  12.3 <<==  platelets:  327 <==, 351 <==, 281 <==, 247 <==, 217 <==, 179 <==    Creatinine:  1.43  <<==, 1.32  <<==, 1.43  <<==, 1.32  <<==, 1.50  <<==, 1.74  <<==  Sodium:   139  <==, 140  <==, 143  <==, 139  <==, 140  <==, 139  <==, 138  <==       AST:          17 <== , 26 <== , 94 <==      ALT:        15  <== , 20  <== , 35  <==      AP:        114  <=, 134  <=, 138  <=     Bili:        1.1  <=, 2.3  <=, 1.6  <=        ( Note written / Date of service 10-16-23 ( This is certified to be the same as "ENTERED" date above ( for billing Purposes )))

## 2023-10-16 NOTE — DIETITIAN INITIAL EVALUATION ADULT - OTHER INFO
63 year old male, with past history significant for Rheumatoid arthritis, HTN, HFrEF (55 to 60% - TTE of 03/08/2023), Home oxygen 5 liters, CKD, Renal cell carcinoma - s/p Partial nephrectomy, Plasma cell dyscrasia, and AICD placement, presented to the ED secondary to shortness of breath.  Diagnosed with Sepsis in the ED, per chart.     Patient reports good appetite, consuming >/=75% of meals during visit. Patient denies any difficulty chewing or swallowing, any nausea, vomiting, diarrhea, constipation during visit. Reports last bowel movement 10/16. Current weight: 62.5kg/137.7lbs (10/11, dosing weight, per RN flow sheet). Compared with the reported usual body weight 135lbs, patient noted with weight gain of +2.7lbs/+2%BW. Noted patient is on spironolactone, could cause weight change. Continue to monitor weight trend. Noted patient has low vitamin d lab- 24(10/10), on cholecalciferol for micronutrient support. RD provided nutrition education on low sodium diet during visit, including sodium free seasoning tips, avoid food high in sodium, nutrition labels. Patient is receptive to information provided.

## 2023-10-16 NOTE — DIETITIAN INITIAL EVALUATION ADULT - ORAL INTAKE PTA/DIET HISTORY
Patient reports usual body weight 135lbs. Patient denies any appetite or weight changes PTA. Patient's wife cooks at home, follows a regular diet, has no known food allergies.

## 2023-10-16 NOTE — DIETITIAN INITIAL EVALUATION ADULT - PERTINENT MEDS FT
MEDICATIONS  (STANDING):  budesonide 160 MICROgram(s)/formoterol 4.5 MICROgram(s) Inhaler 2 Puff(s) Inhalation two times a day  buMETAnide 1 milliGRAM(s) Oral daily  cefTRIAXone   IVPB 2000 milliGRAM(s) IV Intermittent every 24 hours  cholecalciferol 2000 Unit(s) Oral daily  heparin   Injectable 5000 Unit(s) SubCutaneous every 8 hours  predniSONE   Tablet 7.5 milliGRAM(s) Oral daily  sildenafil (REVATIO) 20 milliGRAM(s) Oral three times a day  spironolactone 25 milliGRAM(s) Oral daily    MEDICATIONS  (PRN):  acetaminophen     Tablet .. 650 milliGRAM(s) Oral every 6 hours PRN Temp greater or equal to 38C (100.4F), Mild Pain (1 - 3)  melatonin 3 milliGRAM(s) Oral at bedtime PRN Insomnia

## 2023-10-16 NOTE — DISCHARGE NOTE PROVIDER - NSDCCAREPROVSEEN_GEN_ALL_CORE_FT
Gema Vu Hoorbod Delshadfar  Hoorbod Delshadfar  Hoorbod Delshadfar  Steven Saunders Nicole Lapinel Derek Lejeune Assaf Holtzman Salvatore Caiola Tatiana Duplessis Nina Legrand  User ADM  Isa Gray  Ordering Physician  Juarez Grayhal  Team [facility] Heart Failure  Team [facility]  Team [facility] ACP      [ Greater than 35 min spent for discharge services.   SALIMA Vu ]       ( Note written / Date of service is the same as last day of patient stay  in the hospital ( for billing purposes)))

## 2023-10-16 NOTE — DISCHARGE NOTE PROVIDER - NSDCMRMEDTOKEN_GEN_ALL_CORE_FT
acetaminophen 325 mg oral tablet: 2 tab(s) orally every 6 hours, As needed, Temp greater or equal to 38C (100.4F), Mild Pain (1 - 3)  Advair Diskus 250 mcg-50 mcg inhalation powder: 1 puff(s) inhaled 2 times a day  Albuterol (Eqv-ProAir HFA) 90 mcg/inh inhalation aerosol: 2 puff(s) inhaled 4 times a day as needed for  shortness of breath and/or wheezing  bumetanide 1 mg oral tablet: 1 tab(s) orally once a day  melatonin 3 mg oral tablet: 1 tab(s) orally once a day (at bedtime), As needed, Insomnia  metoprolol succinate 25 mg oral tablet, extended release: 1.5 tab(s) orally once a day  predniSONE 2.5 mg oral tablet: 3 tab(s) orally once a day  sildenafil 20 mg oral tablet: 1 tab(s) orally 3 times a day   acetaminophen 325 mg oral tablet: 2 tab(s) orally every 6 hours, As needed, Temp greater or equal to 38C (100.4F), Mild Pain (1 - 3)  Advair Diskus 250 mcg-50 mcg inhalation powder: 1 puff(s) inhaled 2 times a day  Albuterol (Eqv-ProAir HFA) 90 mcg/inh inhalation aerosol: 2 puff(s) inhaled 4 times a day as needed for  shortness of breath and/or wheezing  bumetanide 1 mg oral tablet: 1 tab(s) orally once a day  cholecalciferol oral tablet: 2000 unit(s) orally once a day  levoFLOXacin 750 mg oral tablet: 1 tab(s) orally once a day  melatonin 3 mg oral tablet: 1 tab(s) orally once a day (at bedtime), As needed, Insomnia  predniSONE 2.5 mg oral tablet: 3 tab(s) orally once a day  sildenafil 20 mg oral tablet: 1 tab(s) orally 3 times a day  spironolactone 25 mg oral tablet: 1 tab(s) orally once a day

## 2023-10-16 NOTE — DISCHARGE NOTE NURSING/CASE MANAGEMENT/SOCIAL WORK - PATIENT PORTAL LINK FT
You can access the FollowMyHealth Patient Portal offered by [facility] by registering at the following website: [email or web address]. By joining [facility]’s FollowMyHealth portal, you will also be able to view your health information using other applications (apps) compatible with our system.

## 2023-10-16 NOTE — DISCHARGE NOTE PROVIDER - PROVIDER TOKENS
PROVIDER:[TOKEN:[[id number]]] PROVIDER:[TOKEN:[[id number]]],PROVIDER:[TOKEN:[[id number]]]

## 2023-10-16 NOTE — PROGRESS NOTE ADULT - ASSESSMENT
63 year old male, with past history significant for Rheumatoid arthritis, HTN, HFrEF (55 to 60% - TTE of 2023), Home oxygen 5 liters, CKD, Renal cell carcinoma - s/p Partial nephrectomy, Plasma cell dyscrasia, and AICD placement, group I/II pHTN with sclerosis sine scleroderma presented to the ED with hematuria, and course notable for klebsiella bacteremia likely 2/2 passed stone    #pHTN  #Sepsis 2/2 klebsiella bacteremia     CT Chest without acute changes  TTE notable for stable EF, enlarged RV, and PASP is 79 not grossly changed  elevated Leukocytosis and proBNP    Recommendations:  - Continue with home prednisone at 7.5mg  - Continue with home inhalers  - Cardiology team recommended increasing Bumex dosing   - Can continue with sildenafil 20mg TID, home dose   - abx per ID for bacteremia  - Monitor I/O, daily weights.   - DVT prophylaxis  - Trend SCr    Prior to discharge:  Please email: [email or web address] to setup an appointment prior to discharge. Include the patient's name, , MRN and contact information in the email.      Pulmonary/Sleep Clinic  [address] [place] [address]  [phone]

## 2023-10-18 ENCOUNTER — APPOINTMENT (OUTPATIENT)
Dept: HEART FAILURE | Facility: CLINIC | Age: 63
End: 2023-10-18

## 2023-10-28 ENCOUNTER — NON-APPOINTMENT (OUTPATIENT)
Age: 63
End: 2023-10-28

## 2023-10-29 ENCOUNTER — RESULT CHARGE (OUTPATIENT)
Age: 63
End: 2023-10-29

## 2023-10-30 ENCOUNTER — APPOINTMENT (OUTPATIENT)
Dept: PULMONOLOGY | Facility: CLINIC | Age: 63
End: 2023-10-30
Payer: COMMERCIAL

## 2023-10-30 VITALS
TEMPERATURE: 98 F | WEIGHT: 135 LBS | RESPIRATION RATE: 15 BRPM | HEIGHT: 66 IN | BODY MASS INDEX: 21.69 KG/M2 | SYSTOLIC BLOOD PRESSURE: 141 MMHG | HEART RATE: 118 BPM | DIASTOLIC BLOOD PRESSURE: 98 MMHG

## 2023-10-30 PROCEDURE — 99215 OFFICE O/P EST HI 40 MIN: CPT

## 2023-11-15 ENCOUNTER — APPOINTMENT (OUTPATIENT)
Dept: ELECTROPHYSIOLOGY | Facility: CLINIC | Age: 63
End: 2023-11-15

## 2023-11-15 ENCOUNTER — APPOINTMENT (OUTPATIENT)
Dept: HEART FAILURE | Facility: CLINIC | Age: 63
End: 2023-11-15
Payer: COMMERCIAL

## 2023-11-15 ENCOUNTER — NON-APPOINTMENT (OUTPATIENT)
Age: 63
End: 2023-11-15

## 2023-11-15 VITALS
WEIGHT: 138 LBS | SYSTOLIC BLOOD PRESSURE: 147 MMHG | BODY MASS INDEX: 22.18 KG/M2 | DIASTOLIC BLOOD PRESSURE: 97 MMHG | HEIGHT: 66 IN | HEART RATE: 121 BPM

## 2023-11-15 PROCEDURE — 36415 COLL VENOUS BLD VENIPUNCTURE: CPT

## 2023-11-15 PROCEDURE — 93000 ELECTROCARDIOGRAM COMPLETE: CPT

## 2023-11-15 PROCEDURE — 99214 OFFICE O/P EST MOD 30 MIN: CPT | Mod: 25

## 2023-11-15 RX ORDER — METOPROLOL SUCCINATE 25 MG/1
25 TABLET, EXTENDED RELEASE ORAL
Qty: 135 | Refills: 3 | Status: DISCONTINUED | COMMUNITY
Start: 2023-01-13 | End: 2023-11-15

## 2023-11-28 ENCOUNTER — APPOINTMENT (OUTPATIENT)
Dept: RHEUMATOLOGY | Facility: CLINIC | Age: 63
End: 2023-11-28

## 2023-12-01 ENCOUNTER — APPOINTMENT (OUTPATIENT)
Dept: PULMONOLOGY | Facility: CLINIC | Age: 63
End: 2023-12-01

## 2023-12-01 VITALS
DIASTOLIC BLOOD PRESSURE: 97 MMHG | HEART RATE: 114 BPM | BODY MASS INDEX: 22.66 KG/M2 | OXYGEN SATURATION: 98 % | WEIGHT: 141 LBS | TEMPERATURE: 97.9 F | SYSTOLIC BLOOD PRESSURE: 159 MMHG | RESPIRATION RATE: 16 BRPM | HEIGHT: 66 IN

## 2023-12-03 ENCOUNTER — NON-APPOINTMENT (OUTPATIENT)
Age: 63
End: 2023-12-03

## 2023-12-04 RX ORDER — LEVALBUTEROL TARTRATE 45 UG/1
45 AEROSOL, METERED ORAL
Qty: 1 | Refills: 5 | Status: ACTIVE | COMMUNITY
Start: 2023-04-03 | End: 1900-01-01

## 2023-12-05 LAB
ALBUMIN SERPL ELPH-MCNC: 4.8 G/DL
ALP BLD-CCNC: 140 U/L
ALT SERPL-CCNC: 26 U/L
ANION GAP SERPL CALC-SCNC: 18 MMOL/L
AST SERPL-CCNC: 26 U/L
BASOPHILS # BLD AUTO: 0.07 K/UL
BASOPHILS NFR BLD AUTO: 0.5 %
BILIRUB SERPL-MCNC: 0.5 MG/DL
BUN SERPL-MCNC: 28 MG/DL
CALCIUM SERPL-MCNC: 10 MG/DL
CHLORIDE SERPL-SCNC: 101 MMOL/L
CHOLEST SERPL-MCNC: 213 MG/DL
CO2 SERPL-SCNC: 22 MMOL/L
CREAT SERPL-MCNC: 1.44 MG/DL
EGFR: 54 ML/MIN/1.73M2
EOSINOPHIL # BLD AUTO: 0.02 K/UL
EOSINOPHIL NFR BLD AUTO: 0.2 %
GLUCOSE SERPL-MCNC: 64 MG/DL
HCT VFR BLD CALC: 47 %
HDLC SERPL-MCNC: 51 MG/DL
HGB BLD-MCNC: 14.1 G/DL
IMM GRANULOCYTES NFR BLD AUTO: 0.4 %
LDLC SERPL CALC-MCNC: 133 MG/DL
LYMPHOCYTES # BLD AUTO: 1.82 K/UL
LYMPHOCYTES NFR BLD AUTO: 14.1 %
MAN DIFF?: NORMAL
MCHC RBC-ENTMCNC: 30 GM/DL
MCHC RBC-ENTMCNC: 30.6 PG
MCV RBC AUTO: 102 FL
MONOCYTES # BLD AUTO: 0.56 K/UL
MONOCYTES NFR BLD AUTO: 4.3 %
NEUTROPHILS # BLD AUTO: 10.42 K/UL
NEUTROPHILS NFR BLD AUTO: 80.5 %
NONHDLC SERPL-MCNC: 162 MG/DL
NT-PROBNP SERPL-MCNC: 1764 PG/ML
PLATELET # BLD AUTO: 214 K/UL
POTASSIUM SERPL-SCNC: 3.8 MMOL/L
PROT SERPL-MCNC: 8 G/DL
RBC # BLD: 4.61 M/UL
RBC # FLD: 15.3 %
SODIUM SERPL-SCNC: 141 MMOL/L
TRIGL SERPL-MCNC: 161 MG/DL
TSH SERPL-ACNC: 2.29 UIU/ML
WBC # FLD AUTO: 12.94 K/UL

## 2023-12-08 ENCOUNTER — NON-APPOINTMENT (OUTPATIENT)
Age: 63
End: 2023-12-08

## 2023-12-27 ENCOUNTER — OUTPATIENT (OUTPATIENT)
Dept: OUTPATIENT SERVICES | Facility: HOSPITAL | Age: 63
LOS: 1 days | End: 2023-12-27
Payer: COMMERCIAL

## 2023-12-27 ENCOUNTER — APPOINTMENT (OUTPATIENT)
Dept: PULMONOLOGY | Facility: CLINIC | Age: 63
End: 2023-12-27
Payer: COMMERCIAL

## 2023-12-27 ENCOUNTER — NON-APPOINTMENT (OUTPATIENT)
Age: 63
End: 2023-12-27

## 2023-12-27 ENCOUNTER — APPOINTMENT (OUTPATIENT)
Dept: RADIOLOGY | Facility: IMAGING CENTER | Age: 63
End: 2023-12-27
Payer: COMMERCIAL

## 2023-12-27 VITALS
HEART RATE: 86 BPM | OXYGEN SATURATION: 91 % | WEIGHT: 136 LBS | HEIGHT: 66 IN | RESPIRATION RATE: 18 BRPM | SYSTOLIC BLOOD PRESSURE: 124 MMHG | BODY MASS INDEX: 21.86 KG/M2 | TEMPERATURE: 98.1 F | DIASTOLIC BLOOD PRESSURE: 86 MMHG

## 2023-12-27 DIAGNOSIS — Z98.890 OTHER SPECIFIED POSTPROCEDURAL STATES: Chronic | ICD-10-CM

## 2023-12-27 DIAGNOSIS — R09.02 HYPOXEMIA: ICD-10-CM

## 2023-12-27 DIAGNOSIS — R06.09 OTHER FORMS OF DYSPNEA: ICD-10-CM

## 2023-12-27 DIAGNOSIS — R05.9 COUGH, UNSPECIFIED: ICD-10-CM

## 2023-12-27 DIAGNOSIS — I50.20 UNSPECIFIED SYSTOLIC (CONGESTIVE) HEART FAILURE: ICD-10-CM

## 2023-12-27 DIAGNOSIS — Z90.5 ACQUIRED ABSENCE OF KIDNEY: Chronic | ICD-10-CM

## 2023-12-27 PROCEDURE — 94618 PULMONARY STRESS TESTING: CPT

## 2023-12-27 PROCEDURE — 93000 ELECTROCARDIOGRAM COMPLETE: CPT | Mod: 59

## 2023-12-27 PROCEDURE — 36415 COLL VENOUS BLD VENIPUNCTURE: CPT

## 2023-12-27 PROCEDURE — 71046 X-RAY EXAM CHEST 2 VIEWS: CPT | Mod: 26

## 2023-12-27 PROCEDURE — 96372 THER/PROPH/DIAG INJ SC/IM: CPT

## 2023-12-27 PROCEDURE — 82803 BLOOD GASES ANY COMBINATION: CPT

## 2023-12-27 PROCEDURE — ZZZZZ: CPT

## 2023-12-27 PROCEDURE — 71046 X-RAY EXAM CHEST 2 VIEWS: CPT

## 2023-12-27 PROCEDURE — 99215 OFFICE O/P EST HI 40 MIN: CPT | Mod: 25

## 2023-12-27 PROCEDURE — 36600 WITHDRAWAL OF ARTERIAL BLOOD: CPT | Mod: 59

## 2023-12-27 RX ORDER — METHYLPRED ACET/NACL,ISO-OS/PF 40 MG/ML
40 VIAL (ML) INJECTION
Qty: 1 | Refills: 0 | Status: COMPLETED | OUTPATIENT
Start: 2023-12-27

## 2023-12-27 RX ADMIN — Medication 0.5 MG/ML: at 00:00

## 2023-12-27 NOTE — REASON FOR VISIT
[Pulmonary Hypertension] : pulmonary hypertension [Acute] : an acute visit [Shortness of Breath] : shortness of breath

## 2023-12-27 NOTE — PHYSICAL EXAM
[No Acute Distress] : no acute distress [Normal Oropharynx] : normal oropharynx [III] : Mallampati Class: III [No Neck Mass] : no neck mass [Normal S1, S2] : normal s1, s2 [No Abnormalities] : no abnormalities [Benign] : benign [Normal Gait] : normal gait [No Focal Deficits] : no focal deficits [Oriented x3] : oriented x3 [TextBox_68] : diminished at bases [TextBox_105] : no edema [TextBox_125] : Thickened Skin

## 2023-12-27 NOTE — HISTORY OF PRESENT ILLNESS
[Never] : never [TextBox_4] : This letter  is regarding your patient  who  attended pulmonary out patient office today.  I have reviewed  patient's  past history, social history, family history and medication list. I also  reviewed nurse practitioners/ and fellows  notes and assessment and agree with it.   The patient was referred by Dr.Samit ADAN  Mr. Cain is a 61 y/o Bangladeshi M w/ h/o HTN, ? RA, RCC s/p partial R nephrectomy (), CKD Pt also has anemia with some suspicion of plasma cell dyscrasia with plan for outpatient BM biopsy. Kappa/lambda ratio of 3.23 (10.7/3.31) with serum IF revealing weak [place] band. Was to see Dr. Schmidt (hematology) for a BM biopsy but did not make appointment.   ------No history of , fever, chills , rigors, chest pain, or hemoptysis. Questionable history of Raynaud's phenomenon. No h/o significant weight loss in last few months. No history of liver dysfunction , collagen vascular disorder or chronic thromboembolic disease. I would classify the patient's dyspnea as WHO  FUNCTIONAL CLASS II--------  --EC23 sinus tach, , APCs, incomplete RBBB, inferior Q waves; relatively low voltage 23 sinus tachycardia, , PRWP, incomplete RBBB, inferior Q waves 23 sinus tachycardia, , PRWP, inferior Q waves, incomplete RBBB 22 - sinus, , PRWP, inferior Q waves  Stress Test:  21 - treadmill stress test - EF 71%, no ECG changes; negative for ischemia/infarct   ECHO -----12/15/22 - septum 1.5 cm, PW 1.3 cm, LVEDD 4.1 cm, EF 28%, highly trabeculated apex, mild-mod TR, RVSP 60, RV dysfunction/enlargement, DT 78 msec, E/e' 28, LVOT VTI 12 cm, IVC 1.5 cm   CT/MRI:  22 - nl pericardium; concentric LVH; EF 36%, subtle LGE along inferior hinge point of RV  3/8/22 - Chest CT PE protocol - dilated PA (4 cm), mildly enlarged subcarinal LN (1.1 cm), mildly enlarged R hilar LN (1.2 cm), clear lungs; mildly enlarged bilateral axillary LN (L>R); no PE  Viability/Other Nuclear:  1/3/22 ([place]) Tc-Pyp scan - negative for TTR amyloid   Cardiac Cath/PCI: CARDIAC CATH - - -------------- ---------- 22 - RHC - RA 5, RV 48/11, PA 46/24/33, PCWP9, CO/CI 4.9/2.94, PVR 4.8  EBUS 2023----mediastinal lymph nodes----reactive lymphadenopathy  ENDOMYOCARDIAL BIOPSY--------no evidence of an infiltrative disorder like sarcoidosis or amyloidosis  Desaturates ON WALKING====NEEDS  PORTABLE OXYGEN - - -   2023------ on sildenafil----oxygen as needed has dry cough----has dilated esophagus ---??REFLUX----we will add omeprazole and Flonase nasal spray for his postnasal drip to see if that helps with his cough ------- needs PFT 6-minute walk test ------ also needs rheumatology opinion to rule out any underlying rheumatological condition ? SCLERODERMA SINE  SCLEROSIS------  2023----has features of scleroderma on hands---SCELRODERMA SINE SCLEROSIS----LUIS FERNANDO   NUCLEOAR PATTERN ---OTHER ANTIBODIES NEGATIVE-------- pulm htn on sildenafil 20mg bid, diuretics and as needed oxygen Scleroderma- s/p rheum consult- no role for MMF- remains on pred 10mg GI--DR MELTON EBUS 2023----mediastinal lymph nodes----reactive lymphadenopathy NONISCHAEMIC CARDIOMYOPATHY/HFrEF --follows cardiology - - -      HAS MGUS ---PAST H/O RENAL CA   2022----S/P   PARTIAL NEPHRECTOMY - -  accompanied today by wife  2023- MGUS - never followed up with Heme  1 block ET  PAH ----history of scleroderma features 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 Adan[facility] - [facility]  11  10/2023 63 year old male, with past history significant for Rheumatoid arthritis, HTN, HFrEF (55 to 60% - TTE of 2023), Home oxygen 5 liters, CKD, Renal cell carcinoma - s/p Partial nephrectomy, Plasma cell dyscrasia, and AICD placement, presented to the ED secondary to shortness of breath.  ---was hospitalized with /cystitis-------- ----   Ct pelvis 10/2023 IMPRESSION: Mild circumferential bladder wall thickening which may be due to cystitis or chronic outlet obstruction. Correlate with urinalysis. 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 Adan------[facility] PULM REH[facility] - - -  2023 acute visit for worsening AVALOS and cough (mostly nonproductive) x 3 weeks, afebrile. Using oxygen at 2 lpm, compliant to diuretics- bumex 1mg and aldactone 25mg. Wt stable at 136 lbs. On sildenafil 20mg tid for pulm htn . Using nebs and pred 7.5mg. for reactive airway accompanied by wife

## 2023-12-27 NOTE — DISCUSSION/SUMMARY
[FreeTextEntry1] : ---Assessment plan----------The patient has been referred here for further opinion regarding pulmonary problem,   a 64 y/o Somali M w/ h/o HTN, SCLERODERMA SINE SCLEROSIS-------- -has features of scleroderma on hands---SCELRODERMA SINE SCLEROSIS----LUIS FERNANDO   NUCLEOAR PATTERN ---OTHER ANTIBODIES NEGATIVE----- ,H/O MGUS ---H/O   RCC s/p partial R nephrectomy (4/2022), CKD Pt also has anemia with some suspicion of plasma cell dyscrasia BUT BM biopsy WAS NEGATIVE-. Kappa/lambda ratio of 3.23 (10.7/3.31) with serum IF revealing weak Kappa band.  Following up with hematology------ has been worked up for secondary pulmonary hypertension----ECHO -----12/15/22 - , EF 28% -----patient already started on sildenafil for secondary pulm hypertension =----- patient does not have features of sarcoidosis or amyloidosis Cardiac Cath----12/16/22 - RHC - RA 5, RV 48/11, PA 46/24/33, PCWP9, CO/CI 4.9/2.94, PVR 4.8  1 HYPOXEMIA---- NICM  --ASLO HAS EVIIDENCE  OF PULM HTN ----S/P AICD---FOLLOWS CARDIOLOGY --  ------ low ejection fraction need to keep him euvolemic-- - on supplemental oxygen 2 lpm advised that he use around-the-clock. Today's 6mwt showed o2 sat at 86% needed oxygen at 2 lpm 2--secondary pulm hypertension-----patient definitely has left ventricular low ejection fraction which is contributing to his hypoxia- patient is already on sildenafil 20mg tid and diuretics - followed by heart failure - discussed today's visit with No Phelps NP- Pt not in favor of ER and nothing in exam warrants admission at this time. --pt will f/u with heart failure as well -EKG w/sinus tachycardia- which is his baseline - will get CXR today - s/p labs drawn in our office today ? anemia contributing to his symptoms /h/o MGUS --resp swab sent -ABG today  -s/p medrol 20mg IM- will take prednisone 15mg w/taper instructions given  3- scleroderma-Sine SCELROSIS----- follows rheum DR Larson- on prednisone 4- advised ER if symptoms worsen 5- f/u with Dr Villeda next week   Thanks for allowing  me to participate  in the care of this patient.  Patient at this time  will follow  the above mentioned recommendations and return back for follow up visit. If you have any questions  I can be reached  at # [phone] (office).  Shruti Villeda MD, [facility]

## 2023-12-28 LAB
ALBUMIN SERPL ELPH-MCNC: 4.5 G/DL
ALP BLD-CCNC: 143 U/L
ALT SERPL-CCNC: 21 U/L
ANION GAP SERPL CALC-SCNC: 19 MMOL/L
AST SERPL-CCNC: 32 U/L
BASOPHILS # BLD AUTO: 0.08 K/UL
BASOPHILS NFR BLD AUTO: 0.7 %
BILIRUB SERPL-MCNC: 0.6 MG/DL
BUN SERPL-MCNC: 32 MG/DL
CALCIUM SERPL-MCNC: 10 MG/DL
CHLORIDE SERPL-SCNC: 104 MMOL/L
CO2 SERPL-SCNC: 18 MMOL/L
CREAT SERPL-MCNC: 1.56 MG/DL
CRP SERPL-MCNC: 26 MG/L
DEPRECATED D DIMER PPP IA-ACNC: 375 NG/ML DDU
EGFR: 49 ML/MIN/1.73M2
EOSINOPHIL # BLD AUTO: 0.05 K/UL
EOSINOPHIL NFR BLD AUTO: 0.4 %
FERRITIN SERPL-MCNC: 142 NG/ML
GLUCOSE SERPL-MCNC: 92 MG/DL
HCT VFR BLD CALC: 42.5 %
HGB BLD-MCNC: 13.3 G/DL
IMM GRANULOCYTES NFR BLD AUTO: 0.4 %
LYMPHOCYTES # BLD AUTO: 2.29 K/UL
LYMPHOCYTES NFR BLD AUTO: 19.7 %
MAN DIFF?: NORMAL
MCHC RBC-ENTMCNC: 30.9 PG
MCHC RBC-ENTMCNC: 31.3 GM/DL
MCV RBC AUTO: 98.8 FL
MONOCYTES # BLD AUTO: 0.59 K/UL
MONOCYTES NFR BLD AUTO: 5.1 %
NEUTROPHILS # BLD AUTO: 8.59 K/UL
NEUTROPHILS NFR BLD AUTO: 73.7 %
NT-PROBNP SERPL-MCNC: 3094 PG/ML
PLATELET # BLD AUTO: 275 K/UL
POTASSIUM SERPL-SCNC: 4.8 MMOL/L
PROCALCITONIN SERPL-MCNC: 0.09 NG/ML
PROT SERPL-MCNC: 7.9 G/DL
RAPID RVP RESULT: NOT DETECTED
RBC # BLD: 4.3 M/UL
RBC # FLD: 14.7 %
SARS-COV-2 RNA PNL RESP NAA+PROBE: NOT DETECTED
SODIUM SERPL-SCNC: 141 MMOL/L
WBC # FLD AUTO: 11.65 K/UL

## 2024-01-05 ENCOUNTER — APPOINTMENT (OUTPATIENT)
Dept: PULMONOLOGY | Facility: CLINIC | Age: 64
End: 2024-01-05
Payer: COMMERCIAL

## 2024-01-05 VITALS
TEMPERATURE: 98.1 F | BODY MASS INDEX: 21.86 KG/M2 | WEIGHT: 136 LBS | SYSTOLIC BLOOD PRESSURE: 130 MMHG | HEIGHT: 66 IN | DIASTOLIC BLOOD PRESSURE: 83 MMHG

## 2024-01-05 VITALS — OXYGEN SATURATION: 91 %

## 2024-01-05 PROCEDURE — 99214 OFFICE O/P EST MOD 30 MIN: CPT

## 2024-01-05 NOTE — PHYSICAL EXAM
[No Acute Distress] : no acute distress [Normal Oropharynx] : normal oropharynx [III] : Mallampati Class: III [No Neck Mass] : no neck mass [Normal S1, S2] : normal s1, s2 [Clear to Auscultation Bilaterally] : clear to auscultation bilaterally [No Abnormalities] : no abnormalities [Benign] : benign [Normal Gait] : normal gait [No Focal Deficits] : no focal deficits [Oriented x3] : oriented x3 [No Edema] : no edema [TextBox_105] : Thickened skin [TextBox_125] : Thickened Skin

## 2024-01-05 NOTE — DISCUSSION/SUMMARY
[FreeTextEntry1] : ---Assessment plan----------The patient has been referred here for further opinion regarding pulmonary problem,   a 62 y/o Mauritanian M w/ h/o HTN, SCLERODERMA SINE SCLEROSIS-------- -has features of scleroderma on hands---SCELRODERMA SINE SCLEROSIS----LUIS FERNANDO   NUCLEOAR PATTERN ---OTHER ANTIBODIES NEGATIVE----- ,H/O MGUS ---H/O   RCC s/p partial R nephrectomy (4/2022), CKD Pt also has anemia with some suspicion of plasma cell dyscrasia BUT BM biopsy WAS NEGATIVE-. Kappa/lambda ratio of 3.23 (10.7/3.31) with serum IF revealing weak Kappa band.  Following up with hematology------ has been worked up for secondary pulmonary hypertension----ECHO -----12/15/22 - , EF 28% -----patient already started on sildenafil for secondary pulm hypertension =----- patient does not have features of sarcoidosis or amyloidosis Cardiac Cath----12/16/22 - RHC - RA 5, RV 48/11, PA 46/24/33, PCWP9, CO/CI 4.9/2.94, PVR 4.8  1 HYPOXEMIA---SECONDARY  PULM HTN -- ---S/P AICD---FOLLOWS CARDIOLOGY --  --------NEEDS  ECHO WITH BUBBLE STUDY - - - - -  - - 2-  needs supplemental oxygen advised that he use around-the-clock 3-secondary pulm hypertension-----ON SILDENAFIL   -Will start Macitentan 10mg this visit (paperwork to be filled out) 4- referred to lung transplant team 5 Pulmonary Rehab 6- scleroderma-SIEN SCELROSIS----- follows rheum DR Larson- continue with LOW DOSE PREDNISONE----------- 7 H/O -, RCC s/p partial R nephrectomy (4/22), CKD --REFD TO UROLOGY =- - - --  8 MGUS---REFD  TO HEMATOLOGY - - - - referral once again given today 9-H/O RCC --NEED TO F/U WITH NEPHROLOGY  10- Pt is cleared for Pulmonary rehab will need to start  Thanks for allowing  me to participate  in the care of this patient.  Patient at this time  will follow  the above mentioned recommendations and return back for follow up visit. If you have any questions  I can be reached  at # [phone] (office).  Shruti Villeda MD, [facility]

## 2024-01-05 NOTE — HISTORY OF PRESENT ILLNESS
[Never] : never [TextBox_4] : This letter  is regarding your patient  who  attended pulmonary out patient office today.  I have reviewed  patient's  past history, social history, family history and medication list. I also  reviewed nurse practitioners/ and fellows  notes and assessment and agree with it.   The patient was referred by Dr.Samit ADAN  Mr. Cain is a 62 y/o [place] M w/ h/o HTN, ? RA, RCC s/p partial R nephrectomy (), CKD Pt also has anemia with some suspicion of plasma cell dyscrasia with plan for outpatient BM biopsy. Kappa/lambda ratio of 3.23 (10.7/3.31) with serum IF revealing weak [place] band. Was to see Dr. Schmidt (hematology) for a BM biopsy but did not make appointment.   ------No history of , fever, chills , rigors, chest pain, or hemoptysis. Questionable history of Raynaud's phenomenon. No h/o significant weight loss in last few months. No history of liver dysfunction , collagen vascular disorder or chronic thromboembolic disease. I would classify the patient's dyspnea as WHO  FUNCTIONAL CLASS II--------  --EC23 sinus tach, , APCs, incomplete RBBB, inferior Q waves; relatively low voltage 23 sinus tachycardia, , PRWP, incomplete RBBB, inferior Q waves 23 sinus tachycardia, , PRWP, inferior Q waves, incomplete RBBB 22 - sinus, , PRWP, inferior Q waves  Stress Test:  21 - treadmill stress test - EF 71%, no ECG changes; negative for ischemia/infarct   ECHO -----12/15/22 - septum 1.5 cm, PW 1.3 cm, LVEDD 4.1 cm, EF 28%, highly trabeculated apex, mild-mod TR, RVSP 60, RV dysfunction/enlargement, DT 78 msec, E/e' 28, LVOT VTI 12 cm, IVC 1.5 cm   CT/MRI:  22 - nl pericardium; concentric LVH; EF 36%, subtle LGE along inferior hinge point of RV  3/8/22 - Chest CT PE protocol - dilated PA (4 cm), mildly enlarged subcarinal LN (1.1 cm), mildly enlarged R hilar LN (1.2 cm), clear lungs; mildly enlarged bilateral axillary LN (L>R); no PE  Viability/Other Nuclear:  1/3/22 ([place]) Tc-Pyp scan - negative for TTR amyloid   Cardiac Cath/PCI: CARDIAC CATH - - -------------- ---------- 22 - RHC - RA 5, RV 48/11, PA 46/24/33, PCWP9, CO/CI 4.9/2.94, PVR 4.8  EBUS 2023----mediastinal lymph nodes----reactive lymphadenopathy  ENDOMYOCARDIAL BIOPSY--------no evidence of an infiltrative disorder like sarcoidosis or amyloidosis  Desaturates ON WALKING====NEEDS  PORTABLE OXYGEN - - -   2023------ on sildenafil----oxygen as needed has dry cough----has dilated esophagus ---??REFLUX----we will add omeprazole and Flonase nasal spray for his postnasal drip to see if that helps with his cough ------- needs PFT 6-minute walk test ------ also needs rheumatology opinion to rule out any underlying rheumatological condition ? SCLERODERMA SINE  SCLEROSIS------  2023----has features of scleroderma on hands---SCELRODERMA SINE SCLEROSIS----LUIS FERNANDO   NUCLEOAR PATTERN ---OTHER ANTIBODIES NEGATIVE-------- pulm htn on sildenafil 20mg bid, diuretics and as needed oxygen Scleroderma- s/p rheum consult- no role for MMF- remains on pred 10mg GI--DR MELTON EBUS 2023----mediastinal lymph nodes----reactive lymphadenopathy NONISCHAEMIC CARDIOMYOPATHY/HFrEF --follows cardiology - - -      HAS MGUS ---PAST H/O RENAL CA   2022----S/P   PARTIAL NEPHRECTOMY - -  accompanied today by wife  2023- MGUS - never followed up with Heme  1 block ET  PAH ----history of scleroderma features 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 Adan------[facility] PUL REHAB - - -  11  10/2023 63 year old male, with past history significant for Rheumatoid arthritis, HTN, HFrEF (55 to 60% - TTE of 2023), Home oxygen 5 liters, CKD, Renal cell carcinoma - s/p Partial nephrectomy, Plasma cell dyscrasia, and AICD placement, presented to the ED secondary to shortness of breath.  ---was hospitalized with /cystitis-------- ----   Ct pelvis 10/2023 IMPRESSION: Mild circumferential bladder wall thickening which may be due to cystitis or chronic outlet obstruction. Correlate with urinalysis. 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 Adan------[facility] PULM REHAB - - -  24-- had worsening cough  and sob and was given Solumedrol 20mg IM and Prednisone 15mg daily. He is back on his baseline 5mg at this time. He reports feeling somewhat better on the increased steroid dose. He denies weight gain and feels somewhat improved but not qquite back to his baseline. --------OTHERWISE---- has features of scleroderma on hands---SCELRODERMA SINE SCLEROSIS----LUIS FERNANDO   NUCLEOAR PATTERN ---OTHER ANTIBODIES NEGATIVE-------- pulm htn on sildenafil , diuretics and -oxygen --IS NON COMPLIANT TO OXYGEN - - - Scleroderma- s/p rheum consult- no role for MMF- remains on pred 10mg GI--DR LINH MATUTE 2023----mediastinal lymph nodes----reactive lymphadenopathy HAS MGUS --NEEDS HEMATOLOGY - - - --PAST H/O RENAL CA   2022----S/P   PARTIAL NEPHRECTOM

## 2024-01-05 NOTE — END OF VISIT
[FreeTextEntry3] :   I, Dr. Shruti Villeda  personally performed the evaluation and management (E/M) services for this established patient who presents today with (a) new problem(s)/exacerbation of (an) existing condition(s). That E/M includes conducting the clinically appropriate interval history &/or exam, assessing all new/exacerbated conditions, and establishing a new plan of care. Today, my NIESHA, Mary Samuels NP, , was here to observe my evaluation and management service for this new problem/exacerbated condition and follow the plan of care established by me going forward. [Time Spent: ___ minutes] : I have spent [unfilled] minutes of time on the encounter.

## 2024-01-10 ENCOUNTER — INPATIENT (INPATIENT)
Facility: HOSPITAL | Age: 64
LOS: 5 days | Discharge: HOME | DRG: 871 | End: 2024-01-16
Attending: GENERAL PRACTICE | Admitting: GENERAL PRACTICE
Payer: COMMERCIAL

## 2024-01-10 ENCOUNTER — NON-APPOINTMENT (OUTPATIENT)
Age: 64
End: 2024-01-10

## 2024-01-10 ENCOUNTER — APPOINTMENT (OUTPATIENT)
Dept: ELECTROPHYSIOLOGY | Facility: CLINIC | Age: 64
End: 2024-01-10
Payer: COMMERCIAL

## 2024-01-10 VITALS
OXYGEN SATURATION: 99 % | HEIGHT: 66 IN | SYSTOLIC BLOOD PRESSURE: 127 MMHG | RESPIRATION RATE: 20 BRPM | TEMPERATURE: 99 F | DIASTOLIC BLOOD PRESSURE: 88 MMHG | HEART RATE: 75 BPM | WEIGHT: 134.04 LBS

## 2024-01-10 DIAGNOSIS — Z90.5 ACQUIRED ABSENCE OF KIDNEY: Chronic | ICD-10-CM

## 2024-01-10 DIAGNOSIS — I50.810 RIGHT HEART FAILURE, UNSPECIFIED: ICD-10-CM

## 2024-01-10 DIAGNOSIS — R09.89 OTHER SPECIFIED SYMPTOMS AND SIGNS INVOLVING THE CIRCULATORY AND RESPIRATORY SYSTEMS: ICD-10-CM

## 2024-01-10 DIAGNOSIS — U07.1 COVID-19: ICD-10-CM

## 2024-01-10 DIAGNOSIS — Z98.890 OTHER SPECIFIED POSTPROCEDURAL STATES: Chronic | ICD-10-CM

## 2024-01-10 DIAGNOSIS — Z95.810 PRESENCE OF AUTOMATIC (IMPLANTABLE) CARDIAC DEFIBRILLATOR: Chronic | ICD-10-CM

## 2024-01-10 DIAGNOSIS — I50.32 CHRONIC DIASTOLIC (CONGESTIVE) HEART FAILURE: ICD-10-CM

## 2024-01-10 DIAGNOSIS — Z29.9 ENCOUNTER FOR PROPHYLACTIC MEASURES, UNSPECIFIED: ICD-10-CM

## 2024-01-10 DIAGNOSIS — N18.30 CHRONIC KIDNEY DISEASE, STAGE 3 UNSPECIFIED: ICD-10-CM

## 2024-01-10 DIAGNOSIS — J96.21 ACUTE AND CHRONIC RESPIRATORY FAILURE WITH HYPOXIA: ICD-10-CM

## 2024-01-10 DIAGNOSIS — I50.813 ACUTE ON CHRONIC RIGHT HEART FAILURE: ICD-10-CM

## 2024-01-10 DIAGNOSIS — I10 ESSENTIAL (PRIMARY) HYPERTENSION: ICD-10-CM

## 2024-01-10 DIAGNOSIS — A41.9 SEPSIS, UNSPECIFIED ORGANISM: ICD-10-CM

## 2024-01-10 DIAGNOSIS — Z86.79 PERSONAL HISTORY OF OTHER DISEASES OF THE CIRCULATORY SYSTEM: ICD-10-CM

## 2024-01-10 LAB
ALBUMIN SERPL ELPH-MCNC: 4.6 G/DL — SIGNIFICANT CHANGE UP (ref 3.3–5)
ALBUMIN SERPL ELPH-MCNC: 4.6 G/DL — SIGNIFICANT CHANGE UP (ref 3.3–5)
ALP SERPL-CCNC: 132 U/L — HIGH (ref 40–120)
ALP SERPL-CCNC: 132 U/L — HIGH (ref 40–120)
ALT FLD-CCNC: 29 U/L — SIGNIFICANT CHANGE UP (ref 10–45)
ALT FLD-CCNC: 29 U/L — SIGNIFICANT CHANGE UP (ref 10–45)
ANION GAP SERPL CALC-SCNC: 16 MMOL/L — SIGNIFICANT CHANGE UP (ref 5–17)
ANION GAP SERPL CALC-SCNC: 16 MMOL/L — SIGNIFICANT CHANGE UP (ref 5–17)
AST SERPL-CCNC: 46 U/L — HIGH (ref 10–40)
AST SERPL-CCNC: 46 U/L — HIGH (ref 10–40)
BASE EXCESS BLDV CALC-SCNC: -2.9 MMOL/L — LOW (ref -2–3)
BASE EXCESS BLDV CALC-SCNC: -2.9 MMOL/L — LOW (ref -2–3)
BASOPHILS # BLD AUTO: 0.13 K/UL — SIGNIFICANT CHANGE UP (ref 0–0.2)
BASOPHILS # BLD AUTO: 0.13 K/UL — SIGNIFICANT CHANGE UP (ref 0–0.2)
BASOPHILS NFR BLD AUTO: 0.9 % — SIGNIFICANT CHANGE UP (ref 0–2)
BASOPHILS NFR BLD AUTO: 0.9 % — SIGNIFICANT CHANGE UP (ref 0–2)
BILIRUB SERPL-MCNC: 0.6 MG/DL — SIGNIFICANT CHANGE UP (ref 0.2–1.2)
BILIRUB SERPL-MCNC: 0.6 MG/DL — SIGNIFICANT CHANGE UP (ref 0.2–1.2)
BUN SERPL-MCNC: 32 MG/DL — HIGH (ref 7–23)
BUN SERPL-MCNC: 32 MG/DL — HIGH (ref 7–23)
CA-I SERPL-SCNC: 1.19 MMOL/L — SIGNIFICANT CHANGE UP (ref 1.15–1.33)
CA-I SERPL-SCNC: 1.19 MMOL/L — SIGNIFICANT CHANGE UP (ref 1.15–1.33)
CALCIUM SERPL-MCNC: 9.8 MG/DL — SIGNIFICANT CHANGE UP (ref 8.4–10.5)
CALCIUM SERPL-MCNC: 9.8 MG/DL — SIGNIFICANT CHANGE UP (ref 8.4–10.5)
CHLORIDE BLDV-SCNC: 103 MMOL/L — SIGNIFICANT CHANGE UP (ref 96–108)
CHLORIDE BLDV-SCNC: 103 MMOL/L — SIGNIFICANT CHANGE UP (ref 96–108)
CHLORIDE SERPL-SCNC: 104 MMOL/L — SIGNIFICANT CHANGE UP (ref 96–108)
CHLORIDE SERPL-SCNC: 104 MMOL/L — SIGNIFICANT CHANGE UP (ref 96–108)
CO2 BLDV-SCNC: 23 MMOL/L — SIGNIFICANT CHANGE UP (ref 22–26)
CO2 BLDV-SCNC: 23 MMOL/L — SIGNIFICANT CHANGE UP (ref 22–26)
CO2 SERPL-SCNC: 19 MMOL/L — LOW (ref 22–31)
CO2 SERPL-SCNC: 19 MMOL/L — LOW (ref 22–31)
CREAT SERPL-MCNC: 1.32 MG/DL — HIGH (ref 0.5–1.3)
CREAT SERPL-MCNC: 1.32 MG/DL — HIGH (ref 0.5–1.3)
EGFR: 60 ML/MIN/1.73M2 — SIGNIFICANT CHANGE UP
EGFR: 60 ML/MIN/1.73M2 — SIGNIFICANT CHANGE UP
ELLIPTOCYTES BLD QL SMEAR: SLIGHT — SIGNIFICANT CHANGE UP
ELLIPTOCYTES BLD QL SMEAR: SLIGHT — SIGNIFICANT CHANGE UP
EOSINOPHIL # BLD AUTO: 0 K/UL — SIGNIFICANT CHANGE UP (ref 0–0.5)
EOSINOPHIL # BLD AUTO: 0 K/UL — SIGNIFICANT CHANGE UP (ref 0–0.5)
EOSINOPHIL NFR BLD AUTO: 0 % — SIGNIFICANT CHANGE UP (ref 0–6)
EOSINOPHIL NFR BLD AUTO: 0 % — SIGNIFICANT CHANGE UP (ref 0–6)
FLUAV AG NPH QL: SIGNIFICANT CHANGE UP
FLUAV AG NPH QL: SIGNIFICANT CHANGE UP
FLUBV AG NPH QL: SIGNIFICANT CHANGE UP
FLUBV AG NPH QL: SIGNIFICANT CHANGE UP
GAS PNL BLDV: 135 MMOL/L — LOW (ref 136–145)
GAS PNL BLDV: 135 MMOL/L — LOW (ref 136–145)
GAS PNL BLDV: SIGNIFICANT CHANGE UP
GAS PNL BLDV: SIGNIFICANT CHANGE UP
GLUCOSE BLDV-MCNC: 105 MG/DL — HIGH (ref 70–99)
GLUCOSE BLDV-MCNC: 105 MG/DL — HIGH (ref 70–99)
GLUCOSE SERPL-MCNC: 103 MG/DL — HIGH (ref 70–99)
GLUCOSE SERPL-MCNC: 103 MG/DL — HIGH (ref 70–99)
HCO3 BLDV-SCNC: 22 MMOL/L — SIGNIFICANT CHANGE UP (ref 22–29)
HCO3 BLDV-SCNC: 22 MMOL/L — SIGNIFICANT CHANGE UP (ref 22–29)
HCT VFR BLD CALC: 42.9 % — SIGNIFICANT CHANGE UP (ref 39–50)
HCT VFR BLD CALC: 42.9 % — SIGNIFICANT CHANGE UP (ref 39–50)
HCT VFR BLDA CALC: 42 % — SIGNIFICANT CHANGE UP (ref 39–51)
HCT VFR BLDA CALC: 42 % — SIGNIFICANT CHANGE UP (ref 39–51)
HGB BLD CALC-MCNC: 14.1 G/DL — SIGNIFICANT CHANGE UP (ref 12.6–17.4)
HGB BLD CALC-MCNC: 14.1 G/DL — SIGNIFICANT CHANGE UP (ref 12.6–17.4)
HGB BLD-MCNC: 13.6 G/DL — SIGNIFICANT CHANGE UP (ref 13–17)
HGB BLD-MCNC: 13.6 G/DL — SIGNIFICANT CHANGE UP (ref 13–17)
LACTATE BLDV-MCNC: 3.2 MMOL/L — HIGH (ref 0.5–2)
LACTATE BLDV-MCNC: 3.2 MMOL/L — HIGH (ref 0.5–2)
LYMPHOCYTES # BLD AUTO: 1.37 K/UL — SIGNIFICANT CHANGE UP (ref 1–3.3)
LYMPHOCYTES # BLD AUTO: 1.37 K/UL — SIGNIFICANT CHANGE UP (ref 1–3.3)
LYMPHOCYTES # BLD AUTO: 9.6 % — LOW (ref 13–44)
LYMPHOCYTES # BLD AUTO: 9.6 % — LOW (ref 13–44)
MAGNESIUM SERPL-MCNC: 2.1 MG/DL — SIGNIFICANT CHANGE UP (ref 1.6–2.6)
MAGNESIUM SERPL-MCNC: 2.1 MG/DL — SIGNIFICANT CHANGE UP (ref 1.6–2.6)
MANUAL SMEAR VERIFICATION: SIGNIFICANT CHANGE UP
MANUAL SMEAR VERIFICATION: SIGNIFICANT CHANGE UP
MCHC RBC-ENTMCNC: 30.6 PG — SIGNIFICANT CHANGE UP (ref 27–34)
MCHC RBC-ENTMCNC: 30.6 PG — SIGNIFICANT CHANGE UP (ref 27–34)
MCHC RBC-ENTMCNC: 31.7 GM/DL — LOW (ref 32–36)
MCHC RBC-ENTMCNC: 31.7 GM/DL — LOW (ref 32–36)
MCV RBC AUTO: 96.6 FL — SIGNIFICANT CHANGE UP (ref 80–100)
MCV RBC AUTO: 96.6 FL — SIGNIFICANT CHANGE UP (ref 80–100)
MONOCYTES # BLD AUTO: 1.5 K/UL — HIGH (ref 0–0.9)
MONOCYTES # BLD AUTO: 1.5 K/UL — HIGH (ref 0–0.9)
MONOCYTES NFR BLD AUTO: 10.5 % — SIGNIFICANT CHANGE UP (ref 2–14)
MONOCYTES NFR BLD AUTO: 10.5 % — SIGNIFICANT CHANGE UP (ref 2–14)
NEUTROPHILS # BLD AUTO: 11.13 K/UL — HIGH (ref 1.8–7.4)
NEUTROPHILS # BLD AUTO: 11.13 K/UL — HIGH (ref 1.8–7.4)
NEUTROPHILS NFR BLD AUTO: 78.1 % — HIGH (ref 43–77)
NEUTROPHILS NFR BLD AUTO: 78.1 % — HIGH (ref 43–77)
NT-PROBNP SERPL-SCNC: 4321 PG/ML — HIGH (ref 0–300)
NT-PROBNP SERPL-SCNC: 4321 PG/ML — HIGH (ref 0–300)
PCO2 BLDV: 38 MMHG — LOW (ref 42–55)
PCO2 BLDV: 38 MMHG — LOW (ref 42–55)
PH BLDV: 7.37 — SIGNIFICANT CHANGE UP (ref 7.32–7.43)
PH BLDV: 7.37 — SIGNIFICANT CHANGE UP (ref 7.32–7.43)
PLAT MORPH BLD: NORMAL — SIGNIFICANT CHANGE UP
PLAT MORPH BLD: NORMAL — SIGNIFICANT CHANGE UP
PLATELET # BLD AUTO: 211 K/UL — SIGNIFICANT CHANGE UP (ref 150–400)
PLATELET # BLD AUTO: 211 K/UL — SIGNIFICANT CHANGE UP (ref 150–400)
PO2 BLDV: 46 MMHG — HIGH (ref 25–45)
PO2 BLDV: 46 MMHG — HIGH (ref 25–45)
POTASSIUM BLDV-SCNC: 4.6 MMOL/L — SIGNIFICANT CHANGE UP (ref 3.5–5.1)
POTASSIUM BLDV-SCNC: 4.6 MMOL/L — SIGNIFICANT CHANGE UP (ref 3.5–5.1)
POTASSIUM SERPL-MCNC: 5.1 MMOL/L — SIGNIFICANT CHANGE UP (ref 3.5–5.3)
POTASSIUM SERPL-MCNC: 5.1 MMOL/L — SIGNIFICANT CHANGE UP (ref 3.5–5.3)
POTASSIUM SERPL-SCNC: 5.1 MMOL/L — SIGNIFICANT CHANGE UP (ref 3.5–5.3)
POTASSIUM SERPL-SCNC: 5.1 MMOL/L — SIGNIFICANT CHANGE UP (ref 3.5–5.3)
PROT SERPL-MCNC: 8.4 G/DL — HIGH (ref 6–8.3)
PROT SERPL-MCNC: 8.4 G/DL — HIGH (ref 6–8.3)
RBC # BLD: 4.44 M/UL — SIGNIFICANT CHANGE UP (ref 4.2–5.8)
RBC # BLD: 4.44 M/UL — SIGNIFICANT CHANGE UP (ref 4.2–5.8)
RBC # FLD: 14.6 % — HIGH (ref 10.3–14.5)
RBC # FLD: 14.6 % — HIGH (ref 10.3–14.5)
RBC BLD AUTO: SIGNIFICANT CHANGE UP
RBC BLD AUTO: SIGNIFICANT CHANGE UP
RSV RNA NPH QL NAA+NON-PROBE: SIGNIFICANT CHANGE UP
RSV RNA NPH QL NAA+NON-PROBE: SIGNIFICANT CHANGE UP
SAO2 % BLDV: 74.6 % — SIGNIFICANT CHANGE UP (ref 67–88)
SAO2 % BLDV: 74.6 % — SIGNIFICANT CHANGE UP (ref 67–88)
SARS-COV-2 RNA SPEC QL NAA+PROBE: DETECTED
SARS-COV-2 RNA SPEC QL NAA+PROBE: DETECTED
SODIUM SERPL-SCNC: 139 MMOL/L — SIGNIFICANT CHANGE UP (ref 135–145)
SODIUM SERPL-SCNC: 139 MMOL/L — SIGNIFICANT CHANGE UP (ref 135–145)
TROPONIN T, HIGH SENSITIVITY RESULT: 70 NG/L — HIGH (ref 0–51)
TROPONIN T, HIGH SENSITIVITY RESULT: 70 NG/L — HIGH (ref 0–51)
VARIANT LYMPHS # BLD: 0.9 % — SIGNIFICANT CHANGE UP (ref 0–6)
VARIANT LYMPHS # BLD: 0.9 % — SIGNIFICANT CHANGE UP (ref 0–6)
WBC # BLD: 14.25 K/UL — HIGH (ref 3.8–10.5)
WBC # BLD: 14.25 K/UL — HIGH (ref 3.8–10.5)
WBC # FLD AUTO: 14.25 K/UL — HIGH (ref 3.8–10.5)
WBC # FLD AUTO: 14.25 K/UL — HIGH (ref 3.8–10.5)

## 2024-01-10 PROCEDURE — 71045 X-RAY EXAM CHEST 1 VIEW: CPT | Mod: 26

## 2024-01-10 PROCEDURE — 99223 1ST HOSP IP/OBS HIGH 75: CPT | Mod: GC

## 2024-01-10 PROCEDURE — 93296 REM INTERROG EVL PM/IDS: CPT

## 2024-01-10 PROCEDURE — 93295 DEV INTERROG REMOTE 1/2/MLT: CPT

## 2024-01-10 PROCEDURE — 99497 ADVNCD CARE PLAN 30 MIN: CPT | Mod: 25

## 2024-01-10 PROCEDURE — 99223 1ST HOSP IP/OBS HIGH 75: CPT | Mod: 25

## 2024-01-10 PROCEDURE — 99233 SBSQ HOSP IP/OBS HIGH 50: CPT | Mod: 25,GC

## 2024-01-10 PROCEDURE — 99285 EMERGENCY DEPT VISIT HI MDM: CPT

## 2024-01-10 RX ORDER — ACETAMINOPHEN 500 MG
975 TABLET ORAL ONCE
Refills: 0 | Status: COMPLETED | OUTPATIENT
Start: 2024-01-10 | End: 2024-01-10

## 2024-01-10 RX ORDER — LANOLIN ALCOHOL/MO/W.PET/CERES
3 CREAM (GRAM) TOPICAL AT BEDTIME
Refills: 0 | Status: DISCONTINUED | OUTPATIENT
Start: 2024-01-10 | End: 2024-01-16

## 2024-01-10 RX ORDER — ALBUTEROL 90 UG/1
2 AEROSOL, METERED ORAL
Refills: 0 | DISCHARGE

## 2024-01-10 RX ORDER — ACETAMINOPHEN 500 MG
650 TABLET ORAL EVERY 6 HOURS
Refills: 0 | Status: DISCONTINUED | OUTPATIENT
Start: 2024-01-10 | End: 2024-01-16

## 2024-01-10 RX ORDER — REMDESIVIR 5 MG/ML
100 INJECTION INTRAVENOUS EVERY 24 HOURS
Refills: 0 | Status: DISCONTINUED | OUTPATIENT
Start: 2024-01-12 | End: 2024-01-15

## 2024-01-10 RX ORDER — LEVALBUTEROL 1.25 MG/.5ML
0.63 SOLUTION, CONCENTRATE RESPIRATORY (INHALATION) EVERY 8 HOURS
Refills: 0 | Status: DISCONTINUED | OUTPATIENT
Start: 2024-01-10 | End: 2024-01-16

## 2024-01-10 RX ORDER — HEPARIN SODIUM 5000 [USP'U]/ML
5000 INJECTION INTRAVENOUS; SUBCUTANEOUS EVERY 12 HOURS
Refills: 0 | Status: DISCONTINUED | OUTPATIENT
Start: 2024-01-10 | End: 2024-01-16

## 2024-01-10 RX ORDER — SPIRONOLACTONE 25 MG/1
25 TABLET, FILM COATED ORAL DAILY
Refills: 0 | Status: DISCONTINUED | OUTPATIENT
Start: 2024-01-10 | End: 2024-01-15

## 2024-01-10 RX ORDER — BUMETANIDE 0.25 MG/ML
1 INJECTION INTRAMUSCULAR; INTRAVENOUS DAILY
Refills: 0 | Status: DISCONTINUED | OUTPATIENT
Start: 2024-01-10 | End: 2024-01-16

## 2024-01-10 RX ORDER — REMDESIVIR 5 MG/ML
200 INJECTION INTRAVENOUS EVERY 24 HOURS
Refills: 0 | Status: COMPLETED | OUTPATIENT
Start: 2024-01-11 | End: 2024-01-11

## 2024-01-10 RX ORDER — REMDESIVIR 5 MG/ML
INJECTION INTRAVENOUS
Refills: 0 | Status: DISCONTINUED | OUTPATIENT
Start: 2024-01-11 | End: 2024-01-15

## 2024-01-10 RX ADMIN — Medication 975 MILLIGRAM(S): at 17:22

## 2024-01-10 RX ADMIN — Medication 10 MILLIGRAM(S): at 18:39

## 2024-01-10 RX ADMIN — Medication 975 MILLIGRAM(S): at 19:53

## 2024-01-10 RX ADMIN — Medication 20 MILLIGRAM(S): at 21:57

## 2024-01-10 NOTE — H&P ADULT - TIME BILLING
Need to interview and examine patient, obtain pulm recommendations, obtain cardiology recommedations, provide counseling, coordinate care, place orders, document, personally review imaging, ekg and review prior medical records

## 2024-01-10 NOTE — H&P ADULT - NSHPPHYSICALEXAM_GEN_ALL_CORE
Vital Signs Last 24 Hrs  T(C): 36.6 (01-10-24 @ 19:52), Max: 38.8 (01-10-24 @ 17:13)  T(F): 97.9 (01-10-24 @ 19:52), Max: 101.8 (01-10-24 @ 17:13)  HR: 101 (01-10-24 @ 19:52) (75 - 121)  BP: 114/75 (01-10-24 @ 19:52) (114/75 - 127/88)  BP(mean): --  RR: 19 (01-10-24 @ 19:52) (18 - 20)  SpO2: 95% (01-10-24 @ 19:52) (95% - 100%)

## 2024-01-10 NOTE — CONSULT NOTE ADULT - SUBJECTIVE AND OBJECTIVE BOX
Patient seen and evaluated at bedside    Chief Complaint: Cough and shortness of breath    HPI:      PMHx:   Hypertension    Rheumatoid arthritis    Neoplasm of uncertain behavior of kidney, right    Chronic systolic congestive heart failure    Renal cell carcinoma    Plasma cell dyscrasia    Chronic kidney disease (CKD)    Pulmonary hypertension    On home oxygen therapy        PSHx:   No significant past surgical history    History of cardiac cath    H/O partial nephrectomy    AICD (automatic cardioverter/defibrillator) present    Chronic kidney disease (CKD)        Allergies:  No Known Allergies      Home Meds:  Hypertension    Rheumatoid arthritis    Neoplasm of uncertain behavior of kidney, right    Chronic systolic congestive heart failure    Renal cell carcinoma    Plasma cell dyscrasia    Chronic kidney disease (CKD)    Pulmonary hypertension    On home oxygen therapy        Current Medications:       FAMILY HISTORY:  FH: diabetes mellitus (Mother)    Family history of heart disease (Father)        Social History:  Smoking History:  Alcohol Use:  Drug Use:    REVIEW OF SYSTEMS:  Constitutional:     [ ] negative [ ] fevers [ ] chills [ ] weight loss [ ] weight gain  HEENT:                  [ ] negative [ ] dry eyes [ ] eye irritation [ ] postnasal drip [ ] nasal congestion  CV:                         [ ] negative  [ ] chest pain [ ] orthopnea [ ] palpitations [ ] murmur  Resp:                     [ ] negative [ ] cough [ ] shortness of breath [ ] dyspnea [ ] wheezing [ ] sputum [ ]hemoptysis  GI:                          [ ] negative [ ] nausea [ ] vomiting [ ] diarrhea [ ] constipation [ ] abd pain [ ] dysphagia   :                        [ ] negative [ ] dysuria [ ] nocturia [ ] hematuria [ ] increased urinary frequency  Musculoskeletal: [ ] negative [ ] back pain [ ] myalgias [ ] arthralgias [ ] fracture  Skin:                       [ ] negative [ ] rash [ ] itch  Neurological:        [ ] negative [ ] headache [ ] dizziness [ ] syncope [ ] weakness [ ] numbness  Psychiatric:           [ ] negative [ ] anxiety [ ] depression  Endocrine:            [ ] negative [ ] diabetes [ ] thyroid problem  Heme/Lymph:      [ ] negative [ ] anemia [ ] bleeding problem  Allergic/Immune: [ ] negative [ ] itchy eyes [ ] nasal discharge [ ] hives [ ] angioedema    [ ] All other systems negative  [ ] Unable to assess ROS due to      Physical Exam:  T(F): 99.3 (01-10), Max: 99.3 (01-10)  HR: 75 (01-10) (75 - 75)  BP: 127/88 (01-10) (127/88 - 127/88)  RR: 20 (01-10)  SpO2: 99% (01-10)  GENERAL: No acute distress, well-developed  HEAD:  Atraumatic, Normocephalic  ENT: EOMI, PERRLA, conjunctiva and sclera clear, Neck supple, No JVD, moist mucosa  CHEST/LUNG: Clear to auscultation bilaterally; No wheeze, equal breath sounds bilaterally   BACK: No spinal tenderness  HEART: Regular rate and rhythm; No murmurs, rubs, or gallops  ABDOMEN: Soft, Nontender, Nondistended; Bowel sounds present  EXTREMITIES:  No clubbing, cyanosis, or edema  PSYCH: Nl behavior, nl affect  NEUROLOGY: AAOx3, non-focal, cranial nerves intact  SKIN: Normal color, No rashes or lesions  LINES:    Cardiovascular Diagnostic Testing:    ECG: Personally reviewed:    Echo: Personally reviewed:    TRANSTHORACIC ECHOCARDIOGRAM REPORT  ________________________________________________________________________________                                      _______       Pt. Name:       PLACIDO GOLDMAN Study Date:    10/11/2023  MRN:            [id number]     YOB: 1960  Accession #:    [id number]     Age:           63 years  Account#:       [id number]      Gender:        M  Heart Rate:                   Height:        66.00 in (167.64 cm)  Rhythm:                       Weight:        133.00 lb (60.33 kg)  Blood Pressure: 101/71 mmHg   BSA/BMI:       1.68 m² / 21.47 kg/m²  ________________________________________________________________________________________  Referring Physician:    [id number] Gema Vu  Interpreting Physician: Chele Braden MD  Primary Sonographer:    [facility]    CPT:               ECHO TTE WO CON COMP W DOPP - 93429.m  Indication(s):     Shortness of breath - R06.02  Procedure:         Transthoracic echocardiogram with 2-D, M-mode and complete                     spectral and color flow Doppler.  Ordering Location: [facility]    _______________________________________________________________________________________     CONCLUSIONS:      1. The left ventricular cavity is small. The interventricular septum is flattened in systole and diastole consistent with right ventricular pressure and volume overload. Left ventricular systolic function is normal with a calculated ejection fraction of 57 % by the Espino's biplane method of disks. Theleft ventricular diastolic function is indeterminate. There is normal LV mass and concentric remodeling.   2. The left ventricular diastolic function is indeterminate.   3. Right ventricular volume and pressure overload. Left ventricular systolic function is normal with an ejection fraction of 57 % by Espino's method of disks.   4. Severely enlarged right ventricular cavity size and severely reduced systolic function.   5. Device lead is visualized.   6. The right atrium is severely dilated in size.   7. Estimated pulmonary artery systolic pressure is 79 mmHg.   8. The inferior vena cava is dilated (dilated >2.1cm) with abnormal inspiratory collapse (abnormal <50%) consistent with elevated right atrial pressure (~15, range 10-20mmHg).   9. Organized fibrinous vs coagulant material is seen in the pericardial space. There is a trace pericardial effusion noted adjacent to the posterior left ventricle.    ________________________________________________________________________________________      Stress Testing:    Cath:        Study Date:     2023   Name:           GLORYJERAMY HARTALFONSO   :            1960 (62 years)   Gender:         male   MR#:            [id number]   [facility]#:           [id number]   Patient Class:  Inpatient     Cath Lab Report    Diagnostic Cardiologist:       Gomez Oliveira MD   Referring Physician:           Michael Cuellar MD   Referring Physician:           Michael Cuellar MD     Procedures Performed   Procedures:              1.    Ultrasound Guided Access     2.    RHC   3.    Cardiac Biopsy   4. Shunt Run   5.    Transthoracic Echocardiography   6.    Venous Access - Right  Internal Jugular     Indications:               Congestive heart failure, acute on chronic  diastolic  Lab Visit Indication:      ACDiaCHF     Diagnostic Conclusions:     Low right atrial pressure (mRA 1mmHg with a V wave of 2mmHg)   Mild pre-capillary pulmonary hypertension (sPAP 44mmHg, dPAP 17mmHG,  mPAP 30mmHg)  PCWP = 3mmHg with a V wave of 5mmHg   No step-up observed in saturations   PAsat = 71% // RAsat = 100%(2L NC)   Devin CO // CI = 4.39l/min // 2.70l/min/m2   SBP = 95/57/71     Status post right ventricular endomyocardial biopsy using TTE and  fluoroscopic guidance  --> A total of 4 right ventricular endomyocardial biopsy samples were  taken      Imaging:    CXR: Personally reviewed    Labs: Personally reviewed          No Known Allergies      T(F): 99.3 (01-10), Max: 99.3 (01-10)  HR: 75 (01-10) (75 - 75)  BP: 127/88 (01-10) (127/88 - 127/88)  RR: 20 (01-10)  SpO2: 99% (01-10) Patient seen and evaluated at bedside    Chief Complaint: Cough and shortness of breath    HPI:      PMHx:   Hypertension    Rheumatoid arthritis    Neoplasm of uncertain behavior of kidney, right    Chronic systolic congestive heart failure    Renal cell carcinoma    Plasma cell dyscrasia    Chronic kidney disease (CKD)    Pulmonary hypertension    On home oxygen therapy        PSHx:   No significant past surgical history    History of cardiac cath    H/O partial nephrectomy    AICD (automatic cardioverter/defibrillator) present    Chronic kidney disease (CKD)        Allergies:  No Known Allergies      Home Meds:  Hypertension    Rheumatoid arthritis    Neoplasm of uncertain behavior of kidney, right    Chronic systolic congestive heart failure    Renal cell carcinoma    Plasma cell dyscrasia    Chronic kidney disease (CKD)    Pulmonary hypertension    On home oxygen therapy        Current Medications:       FAMILY HISTORY:  FH: diabetes mellitus (Mother)    Family history of heart disease (Father)        Social History:  Smoking History:  Alcohol Use:  Drug Use:    REVIEW OF SYSTEMS:  Constitutional:     [ ] negative [ ] fevers [ ] chills [ ] weight loss [ ] weight gain  HEENT:                  [ ] negative [ ] dry eyes [ ] eye irritation [ ] postnasal drip [ ] nasal congestion  CV:                         [ ] negative  [ ] chest pain [ ] orthopnea [ ] palpitations [ ] murmur  Resp:                     [ ] negative [ ] cough [ ] shortness of breath [ ] dyspnea [ ] wheezing [ ] sputum [ ]hemoptysis  GI:                          [ ] negative [ ] nausea [ ] vomiting [ ] diarrhea [ ] constipation [ ] abd pain [ ] dysphagia   :                        [ ] negative [ ] dysuria [ ] nocturia [ ] hematuria [ ] increased urinary frequency  Musculoskeletal: [ ] negative [ ] back pain [ ] myalgias [ ] arthralgias [ ] fracture  Skin:                       [ ] negative [ ] rash [ ] itch  Neurological:        [ ] negative [ ] headache [ ] dizziness [ ] syncope [ ] weakness [ ] numbness  Psychiatric:           [ ] negative [ ] anxiety [ ] depression  Endocrine:            [ ] negative [ ] diabetes [ ] thyroid problem  Heme/Lymph:      [ ] negative [ ] anemia [ ] bleeding problem  Allergic/Immune: [ ] negative [ ] itchy eyes [ ] nasal discharge [ ] hives [ ] angioedema    [ ] All other systems negative  [ ] Unable to assess ROS due to      Physical Exam:  T(F): 99.3 (01-10), Max: 99.3 (01-10)  HR: 75 (01-10) (75 - 75)  BP: 127/88 (01-10) (127/88 - 127/88)  RR: 20 (01-10)  SpO2: 99% (01-10)  GENERAL: No acute distress, well-developed  HEAD:  Atraumatic, Normocephalic  ENT: EOMI, PERRLA, conjunctiva and sclera clear, Neck supple, No JVD, moist mucosa  CHEST/LUNG: Clear to auscultation bilaterally; No wheeze, equal breath sounds bilaterally   BACK: No spinal tenderness  HEART: Regular rate and rhythm; No murmurs, rubs, or gallops  ABDOMEN: Soft, Nontender, Nondistended; Bowel sounds present  EXTREMITIES:  No clubbing, cyanosis, or edema  PSYCH: Nl behavior, nl affect  NEUROLOGY: AAOx3, non-focal, cranial nerves intact  SKIN: Normal color, No rashes or lesions  LINES:    Cardiovascular Diagnostic Testing:    ECG: Personally reviewed:    Echo: Personally reviewed:    TRANSTHORACIC ECHOCARDIOGRAM REPORT  ________________________________________________________________________________                                      _______       Pt. Name:       PLACIDO GOLDMAN Study Date:    10/11/2023  MRN:            [id number]     YOB: 1960  Accession #:    [id number]     Age:           63 years  Account#:       [id number]      Gender:        M  Heart Rate:                   Height:        66.00 in (167.64 cm)  Rhythm:                       Weight:        133.00 lb (60.33 kg)  Blood Pressure: 101/71 mmHg   BSA/BMI:       1.68 m² / 21.47 kg/m²  ________________________________________________________________________________________  Referring Physician:    [id number] Gema Vu  Interpreting Physician: Chele Braden MD  Primary Sonographer:    Mark Ha [facility]    CPT:               ECHO TTE WO CON COMP W DOPP - 85669.m  Indication(s):     Shortness of breath - R06.02  Procedure:         Transthoracic echocardiogram with 2-D, M-mode and complete                     spectral and color flow Doppler.  Ordering Location: [facility]    _______________________________________________________________________________________     CONCLUSIONS:      1. The left ventricular cavity is small. The interventricular septum is flattened in systole and diastole consistent with right ventricular pressure and volume overload. Left ventricular systolic function is normal with a calculated ejection fraction of 57 % by the Espino's biplane method of disks. Theleft ventricular diastolic function is indeterminate. There is normal LV mass and concentric remodeling.   2. The left ventricular diastolic function is indeterminate.   3. Right ventricular volume and pressure overload. Left ventricular systolic function is normal with an ejection fraction of 57 % by Espino's method of disks.   4. Severely enlarged right ventricular cavity size and severely reduced systolic function.   5. Device lead is visualized.   6. The right atrium is severely dilated in size.   7. Estimated pulmonary artery systolic pressure is 79 mmHg.   8. The inferior vena cava is dilated (dilated >2.1cm) with abnormal inspiratory collapse (abnormal <50%) consistent with elevated right atrial pressure (~15, range 10-20mmHg).   9. Organized fibrinous vs coagulant material is seen in the pericardial space. There is a trace pericardial effusion noted adjacent to the posterior left ventricle.    ________________________________________________________________________________________      Stress Testing:    Cath:        Study Date:     2023   Name:           GLORYJERAMY HARTALFONSO   :            1960 (62 years)   Gender:         male   MR#:            [id number]   [facility]#:           [id number]   Patient Class:  Inpatient     Cath Lab Report    Diagnostic Cardiologist:       Gomez Oliveira MD   Referring Physician:           Michael Cuellar MD   Referring Physician:           Michael Cuellar MD     Procedures Performed   Procedures:              1.    Ultrasound Guided Access     2.    RHC   3.    Cardiac Biopsy   4. Shunt Run   5.    Transthoracic Echocardiography   6.    Venous Access - Right  Internal Jugular     Indications:               Congestive heart failure, acute on chronic  diastolic  Lab Visit Indication:      ACDiaCHF     Diagnostic Conclusions:     Low right atrial pressure (mRA 1mmHg with a V wave of 2mmHg)   Mild pre-capillary pulmonary hypertension (sPAP 44mmHg, dPAP 17mmHG,  mPAP 30mmHg)  PCWP = 3mmHg with a V wave of 5mmHg   No step-up observed in saturations   PAsat = 71% // RAsat = 100%(2L NC)   Devin CO // CI = 4.39l/min // 2.70l/min/m2   SBP = 95/57/71     Status post right ventricular endomyocardial biopsy using TTE and  fluoroscopic guidance  --> A total of 4 right ventricular endomyocardial biopsy samples were  taken      Imaging:    CXR: Personally reviewed    Labs: Personally reviewed          No Known Allergies      T(F): 99.3 (01-10), Max: 99.3 (01-10)  HR: 75 (01-10) (75 - 75)  BP: 127/88 (01-10) (127/88 - 127/88)  RR: 20 (01-10)  SpO2: 99% (01-10) Patient seen and evaluated at bedside    Chief Complaint: Cough and shortness of breath    HPI:    63-year-old male with past medical history of Rheumatoid arthritis, HTN, HFrEF (55 to 60% - TTE of 2023), Home oxygen 5 liters, CKD, Renal cell carcinoma - s/p Partial nephrectomy, Plasma cell dyscrasia, and AICD presenting with shortness of breath.  Patient reports that he has had worsening cough and shortness of breath over the past few days.  Patient reports symptoms acutely worsened last night.  Patient was unable to sleep or lay flat secondary to symptoms.  No change in weight.  No lower extremity edema.       PMHx:   Hypertension    Rheumatoid arthritis    Neoplasm of uncertain behavior of kidney, right    Chronic systolic congestive heart failure    Renal cell carcinoma    Plasma cell dyscrasia    Chronic kidney disease (CKD)    Pulmonary hypertension    On home oxygen therapy        PSHx:   No significant past surgical history    History of cardiac cath    H/O partial nephrectomy    AICD (automatic cardioverter/defibrillator) present    Chronic kidney disease (CKD)        Allergies:  No Known Allergies      Home Meds:  · 	levoFLOXacin 750 mg oral tablet: 1 tab(s) orally once a day  · 	spironolactone 25 mg oral tablet: 1 tab(s) orally once a day  · 	cholecalciferol oral tablet: 2000 unit(s) orally once a day  · 	bumetanide 1 mg oral tablet: 1 tab(s) orally once a day  · 	Advair Diskus 250 mcg-50 mcg inhalation powder: 1 puff(s) inhaled 2 times a day  · 	sildenafil 20 mg oral tablet: 1 tab(s) orally 3 times a day  · 	melatonin 3 mg oral tablet: 1 tab(s) orally once a day (at bedtime), As needed, Insomnia  · 	acetaminophen 325 mg oral tablet: 2 tab(s) orally every 6 hours, As needed, Temp greater or equal to 38C (100.4F), Mild Pain (1 - 3)  · 	Albuterol (Eqv-ProAir HFA) 90 mcg/inh inhalation aerosol: 2 puff(s) inhaled 4 times a day as needed for  shortness of breath and/or wheezing  · 	predniSONE 2.5 mg oral tablet: 3 tab(s) orally once a day        Current Medications:       FAMILY HISTORY:  FH: diabetes mellitus (Mother)    Family history of heart disease (Father)        Social History:  Smoking History:  Alcohol Use:  Drug Use:    REVIEW OF SYSTEMS:  Constitutional:     [ ] negative [ ] fevers [ ] chills [ ] weight loss [ ] weight gain  HEENT:                  [ ] negative [ ] dry eyes [ ] eye irritation [ ] postnasal drip [ ] nasal congestion  CV:                         [ ] negative  [ ] chest pain [ ] orthopnea [ ] palpitations [ ] murmur  Resp:                     [ ] negative [ ] cough [ ] shortness of breath [ ] dyspnea [ ] wheezing [ ] sputum [ ]hemoptysis  GI:                          [ ] negative [ ] nausea [ ] vomiting [ ] diarrhea [ ] constipation [ ] abd pain [ ] dysphagia   :                        [ ] negative [ ] dysuria [ ] nocturia [ ] hematuria [ ] increased urinary frequency  Musculoskeletal: [ ] negative [ ] back pain [ ] myalgias [ ] arthralgias [ ] fracture  Skin:                       [ ] negative [ ] rash [ ] itch  Neurological:        [ ] negative [ ] headache [ ] dizziness [ ] syncope [ ] weakness [ ] numbness  Psychiatric:           [ ] negative [ ] anxiety [ ] depression  Endocrine:            [ ] negative [ ] diabetes [ ] thyroid problem  Heme/Lymph:      [ ] negative [ ] anemia [ ] bleeding problem  Allergic/Immune: [ ] negative [ ] itchy eyes [ ] nasal discharge [ ] hives [ ] angioedema    [ ] All other systems negative  [ ] Unable to assess ROS due to      Physical Exam:  T(F): 99.3 (01-10), Max: 99.3 (01-10)  HR: 75 (01-10) (75 - 75)  BP: 127/88 (01-10) (127/88 - 127/88)  RR: 20 (01-10)  SpO2: 99% (01-10)  GENERAL: No acute distress, well-developed  HEAD:  Atraumatic, Normocephalic  ENT: EOMI, PERRLA, conjunctiva and sclera clear, Neck supple, No JVD, moist mucosa  CHEST/LUNG: Clear to auscultation bilaterally; No wheeze, equal breath sounds bilaterally   BACK: No spinal tenderness  HEART: Regular rate and rhythm; No murmurs, rubs, or gallops  ABDOMEN: Soft, Nontender, Nondistended; Bowel sounds present  EXTREMITIES:  No clubbing, cyanosis, or edema  PSYCH: Nl behavior, nl affect  NEUROLOGY: AAOx3, non-focal, cranial nerves intact  SKIN: Normal color, No rashes or lesions  LINES:    Cardiovascular Diagnostic Testing:    ECG: Personally reviewed:    Echo: Personally reviewed:    TRANSTHORACIC ECHOCARDIOGRAM REPORT  ________________________________________________________________________________                                      _______       Pt. Name:       PLACIDO GOLDMAN Study Date:    10/11/2023  MRN:            [id number]     YOB: 1960  Accession #:    [id number]     Age:           63 years  Account#:       [id number]      Gender:        M  Heart Rate:                   Height:        66.00 in (167.64 cm)  Rhythm:                       Weight:        133.00 lb (60.33 kg)  Blood Pressure: 101/71 mmHg   BSA/BMI:       1.68 m² / 21.47 kg/m²  ________________________________________________________________________________________  Referring Physician:    [id number] Gema Vu  Interpreting Physician: Chele Braden MD  Primary Sonographer:    Mark DIAZ    CPT:               ECHO TTE WO CON COMP W DOPP - 63413.m  Indication(s):     Shortness of breath - R06.02  Procedure:         Transthoracic echocardiogram with 2-D, M-mode and complete                     spectral and color flow Doppler.  Ordering Location: [facility]    _______________________________________________________________________________________     CONCLUSIONS:      1. The left ventricular cavity is small. The interventricular septum is flattened in systole and diastole consistent with right ventricular pressure and volume overload. Left ventricular systolic function is normal with a calculated ejection fraction of 57 % by the Espino's biplane method of disks. Theleft ventricular diastolic function is indeterminate. There is normal LV mass and concentric remodeling.   2. The left ventricular diastolic function is indeterminate.   3. Right ventricular volume and pressure overload. Left ventricular systolic function is normal with an ejection fraction of 57 % by Espino's method of disks.   4. Severely enlarged right ventricular cavity size and severely reduced systolic function.   5. Device lead is visualized.   6. The right atrium is severely dilated in size.   7. Estimated pulmonary artery systolic pressure is 79 mmHg.   8. The inferior vena cava is dilated (dilated >2.1cm) with abnormal inspiratory collapse (abnormal <50%) consistent with elevated right atrial pressure (~15, range 10-20mmHg).   9. Organized fibrinous vs coagulant material is seen in the pericardial space. There is a trace pericardial effusion noted adjacent to the posterior left ventricle.    ________________________________________________________________________________________      Stress Testing:    Cath:        Study Date:     2023   Name:           PLACIDO GOLDMAN   :            1960 (62 years)   Gender:         male   MR#:            [id number]   [facility]#:           [id number]   Patient Class:  Inpatient     Cath Lab Report    Diagnostic Cardiologist:       Gomez Oliveira MD   Referring Physician:           Michael Cuellar MD   Referring Physician:           Michael Cuellar MD     Procedures Performed   Procedures:              1.    Ultrasound Guided Access     2.    RHC   3.    Cardiac Biopsy   4. Shunt Run   5.    Transthoracic Echocardiography   6.    Venous Access - Right  Internal Jugular     Indications:               Congestive heart failure, acute on chronic  diastolic  Lab Visit Indication:      ACDiaCHF     Diagnostic Conclusions:     Low right atrial pressure (mRA 1mmHg with a V wave of 2mmHg)   Mild pre-capillary pulmonary hypertension (sPAP 44mmHg, dPAP 17mmHG,  mPAP 30mmHg)  PCWP = 3mmHg with a V wave of 5mmHg   No step-up observed in saturations   PAsat = 71% // RAsat = 100%(2L NC)   Devin CO // CI = 4.39l/min // 2.70l/min/m2   SBP = 95/57/71     Status post right ventricular endomyocardial biopsy using TTE and  fluoroscopic guidance  --> A total of 4 right ventricular endomyocardial biopsy samples were  taken      Imaging:    CXR: Personally reviewed    Labs: Personally reviewed          No Known Allergies      T(F): 99.3 (01-10), Max: 99.3 (01-10)  HR: 75 (01-10) (75 - 75)  BP: 127/88 (01-10) (127/88 - 127/88)  RR: 20 (01-10)  SpO2: 99% (01-10) Patient seen and evaluated at bedside    Chief Complaint: Cough and shortness of breath    HPI:    63-year-old male with past medical history of Rheumatoid arthritis, HTN, HFrEF (55 to 60% - TTE of 2023), Home oxygen 5 liters, CKD, Renal cell carcinoma - s/p Partial nephrectomy, Plasma cell dyscrasia, and AICD presenting with shortness of breath.  Patient reports that he has had worsening cough and shortness of breath over the past few days.  Patient reports symptoms acutely worsened last night.  Patient was unable to sleep or lay flat secondary to symptoms.  No change in weight.  No lower extremity edema.       PMHx:   Hypertension    Rheumatoid arthritis    Neoplasm of uncertain behavior of kidney, right    Chronic systolic congestive heart failure    Renal cell carcinoma    Plasma cell dyscrasia    Chronic kidney disease (CKD)    Pulmonary hypertension    On home oxygen therapy        PSHx:   No significant past surgical history    History of cardiac cath    H/O partial nephrectomy    AICD (automatic cardioverter/defibrillator) present    Chronic kidney disease (CKD)        Allergies:  No Known Allergies      Home Meds:  · 	levoFLOXacin 750 mg oral tablet: 1 tab(s) orally once a day  · 	spironolactone 25 mg oral tablet: 1 tab(s) orally once a day  · 	cholecalciferol oral tablet: 2000 unit(s) orally once a day  · 	bumetanide 1 mg oral tablet: 1 tab(s) orally once a day  · 	Advair Diskus 250 mcg-50 mcg inhalation powder: 1 puff(s) inhaled 2 times a day  · 	sildenafil 20 mg oral tablet: 1 tab(s) orally 3 times a day  · 	melatonin 3 mg oral tablet: 1 tab(s) orally once a day (at bedtime), As needed, Insomnia  · 	acetaminophen 325 mg oral tablet: 2 tab(s) orally every 6 hours, As needed, Temp greater or equal to 38C (100.4F), Mild Pain (1 - 3)  · 	Albuterol (Eqv-ProAir HFA) 90 mcg/inh inhalation aerosol: 2 puff(s) inhaled 4 times a day as needed for  shortness of breath and/or wheezing  · 	predniSONE 2.5 mg oral tablet: 3 tab(s) orally once a day        Current Medications:       FAMILY HISTORY:  FH: diabetes mellitus (Mother)    Family history of heart disease (Father)        Social History:  Smoking History:  Alcohol Use:  Drug Use:    REVIEW OF SYSTEMS:  Constitutional:     [ ] negative [ ] fevers [ ] chills [ ] weight loss [ ] weight gain  HEENT:                  [ ] negative [ ] dry eyes [ ] eye irritation [ ] postnasal drip [ ] nasal congestion  CV:                         [ ] negative  [ ] chest pain [ ] orthopnea [ ] palpitations [ ] murmur  Resp:                     [ ] negative [ ] cough [ ] shortness of breath [ ] dyspnea [ ] wheezing [ ] sputum [ ]hemoptysis  GI:                          [ ] negative [ ] nausea [ ] vomiting [ ] diarrhea [ ] constipation [ ] abd pain [ ] dysphagia   :                        [ ] negative [ ] dysuria [ ] nocturia [ ] hematuria [ ] increased urinary frequency  Musculoskeletal: [ ] negative [ ] back pain [ ] myalgias [ ] arthralgias [ ] fracture  Skin:                       [ ] negative [ ] rash [ ] itch  Neurological:        [ ] negative [ ] headache [ ] dizziness [ ] syncope [ ] weakness [ ] numbness  Psychiatric:           [ ] negative [ ] anxiety [ ] depression  Endocrine:            [ ] negative [ ] diabetes [ ] thyroid problem  Heme/Lymph:      [ ] negative [ ] anemia [ ] bleeding problem  Allergic/Immune: [ ] negative [ ] itchy eyes [ ] nasal discharge [ ] hives [ ] angioedema    [ ] All other systems negative  [ ] Unable to assess ROS due to      Physical Exam:  T(F): 99.3 (01-10), Max: 99.3 (01-10)  HR: 75 (01-10) (75 - 75)  BP: 127/88 (01-10) (127/88 - 127/88)  RR: 20 (01-10)  SpO2: 99% (01-10)  GENERAL: No acute distress, well-developed  HEAD:  Atraumatic, Normocephalic  ENT: EOMI, PERRLA, conjunctiva and sclera clear, Neck supple, No JVD, moist mucosa  CHEST/LUNG: Clear to auscultation bilaterally; No wheeze, equal breath sounds bilaterally   BACK: No spinal tenderness  HEART: Regular rate and rhythm; No murmurs, rubs, or gallops  ABDOMEN: Soft, Nontender, Nondistended; Bowel sounds present  EXTREMITIES:  No clubbing, cyanosis, or edema  PSYCH: Nl behavior, nl affect  NEUROLOGY: AAOx3, non-focal, cranial nerves intact  SKIN: Normal color, No rashes or lesions  LINES:    Cardiovascular Diagnostic Testing:    ECG: Personally reviewed:    Echo: Personally reviewed:    TRANSTHORACIC ECHOCARDIOGRAM REPORT  ________________________________________________________________________________                                      _______       Pt. Name:       PLACIDO GOLDMAN Study Date:    10/11/2023  MRN:            [id number]     YOB: 1960  Accession #:    [id number]     Age:           63 years  Account#:       [id number]      Gender:        M  Heart Rate:                   Height:        66.00 in (167.64 cm)  Rhythm:                       Weight:        133.00 lb (60.33 kg)  Blood Pressure: 101/71 mmHg   BSA/BMI:       1.68 m² / 21.47 kg/m²  ________________________________________________________________________________________  Referring Physician:    [id number] Gema Vu  Interpreting Physician: Chele Braden MD  Primary Sonographer:    Mark DIAZ    CPT:               ECHO TTE WO CON COMP W DOPP - 35243.m  Indication(s):     Shortness of breath - R06.02  Procedure:         Transthoracic echocardiogram with 2-D, M-mode and complete                     spectral and color flow Doppler.  Ordering Location: [facility]    _______________________________________________________________________________________     CONCLUSIONS:      1. The left ventricular cavity is small. The interventricular septum is flattened in systole and diastole consistent with right ventricular pressure and volume overload. Left ventricular systolic function is normal with a calculated ejection fraction of 57 % by the Espino's biplane method of disks. Theleft ventricular diastolic function is indeterminate. There is normal LV mass and concentric remodeling.   2. The left ventricular diastolic function is indeterminate.   3. Right ventricular volume and pressure overload. Left ventricular systolic function is normal with an ejection fraction of 57 % by Espino's method of disks.   4. Severely enlarged right ventricular cavity size and severely reduced systolic function.   5. Device lead is visualized.   6. The right atrium is severely dilated in size.   7. Estimated pulmonary artery systolic pressure is 79 mmHg.   8. The inferior vena cava is dilated (dilated >2.1cm) with abnormal inspiratory collapse (abnormal <50%) consistent with elevated right atrial pressure (~15, range 10-20mmHg).   9. Organized fibrinous vs coagulant material is seen in the pericardial space. There is a trace pericardial effusion noted adjacent to the posterior left ventricle.    ________________________________________________________________________________________      Stress Testing:    Cath:        Study Date:     2023   Name:           PLACIDO GOLDMAN   :            1960 (62 years)   Gender:         male   MR#:            [id number]   [facility]#:           [id number]   Patient Class:  Inpatient     Cath Lab Report    Diagnostic Cardiologist:       Gomez Oliveira MD   Referring Physician:           Michael Cuellar MD   Referring Physician:           Michael Cuellar MD     Procedures Performed   Procedures:              1.    Ultrasound Guided Access     2.    RHC   3.    Cardiac Biopsy   4. Shunt Run   5.    Transthoracic Echocardiography   6.    Venous Access - Right  Internal Jugular     Indications:               Congestive heart failure, acute on chronic  diastolic  Lab Visit Indication:      ACDiaCHF     Diagnostic Conclusions:     Low right atrial pressure (mRA 1mmHg with a V wave of 2mmHg)   Mild pre-capillary pulmonary hypertension (sPAP 44mmHg, dPAP 17mmHG,  mPAP 30mmHg)  PCWP = 3mmHg with a V wave of 5mmHg   No step-up observed in saturations   PAsat = 71% // RAsat = 100%(2L NC)   Devin CO // CI = 4.39l/min // 2.70l/min/m2   SBP = 95/57/71     Status post right ventricular endomyocardial biopsy using TTE and  fluoroscopic guidance  --> A total of 4 right ventricular endomyocardial biopsy samples were  taken      Imaging:    CXR: Personally reviewed    Labs: Personally reviewed          No Known Allergies      T(F): 99.3 (01-10), Max: 99.3 (01-10)  HR: 75 (01-10) (75 - 75)  BP: 127/88 (01-10) (127/88 - 127/88)  RR: 20 (01-10)  SpO2: 99% (01-10) Patient seen and evaluated at bedside    Chief Complaint: Cough and shortness of breath    HPI:    63-year-old male with past medical history of Rheumatoid arthritis, HTN, HFrEF (55 to 60% - TTE of 2023), Home oxygen 5 liters, CKD, Renal cell carcinoma - s/p Partial nephrectomy, Plasma cell dyscrasia, and AICD presenting with shortness of breath.  Patient reports that he has had worsening cough and shortness of breath over the past few days.  Patient reports symptoms acutely worsened last night.  Patient was unable to sleep or lay flat secondary to symptoms.  No change in weight.  No lower extremity edema.   He has had fevers at home. His wife was also suffering from a URI. They both tested negative for COVID.       PMHx:   Hypertension    Rheumatoid arthritis    Neoplasm of uncertain behavior of kidney, right    Chronic systolic congestive heart failure    Renal cell carcinoma    Plasma cell dyscrasia    Chronic kidney disease (CKD)    Pulmonary hypertension    On home oxygen therapy        PSHx:   No significant past surgical history    History of cardiac cath    H/O partial nephrectomy    AICD (automatic cardioverter/defibrillator) present    Chronic kidney disease (CKD)        Allergies:  No Known Allergies      Home Meds:  · 	levoFLOXacin 750 mg oral tablet: 1 tab(s) orally once a day  · 	spironolactone 25 mg oral tablet: 1 tab(s) orally once a day  · 	cholecalciferol oral tablet: 2000 unit(s) orally once a day  · 	bumetanide 1 mg oral tablet: 1 tab(s) orally once a day  · 	Advair Diskus 250 mcg-50 mcg inhalation powder: 1 puff(s) inhaled 2 times a day  · 	sildenafil 20 mg oral tablet: 1 tab(s) orally 3 times a day  · 	melatonin 3 mg oral tablet: 1 tab(s) orally once a day (at bedtime), As needed, Insomnia  · 	acetaminophen 325 mg oral tablet: 2 tab(s) orally every 6 hours, As needed, Temp greater or equal to 38C (100.4F), Mild Pain (1 - 3)  · 	Albuterol (Eqv-ProAir HFA) 90 mcg/inh inhalation aerosol: 2 puff(s) inhaled 4 times a day as needed for  shortness of breath and/or wheezing  · 	predniSONE 2.5 mg oral tablet: 3 tab(s) orally once a day        Current Medications:       FAMILY HISTORY:  FH: diabetes mellitus (Mother)    Family history of heart disease (Father)        Social History:      REVIEW OF SYSTEMS:  Constitutional:     [ ] negative [ ] fevers [ ] chills [ ] weight loss [ ] weight gain  HEENT:                  [ ] negative [ ] dry eyes [ ] eye irritation [ ] postnasal drip [ ] nasal congestion  CV:                         [ ] negative  [ ] chest pain [ ] orthopnea [ ] palpitations [ ] murmur  Resp:                     [ ] negative [ ] cough [ ] shortness of breath [ ] dyspnea [ ] wheezing [ ] sputum [ ]hemoptysis  GI:                          [ ] negative [ ] nausea [ ] vomiting [ ] diarrhea [ ] constipation [ ] abd pain [ ] dysphagia   :                        [ ] negative [ ] dysuria [ ] nocturia [ ] hematuria [ ] increased urinary frequency  Musculoskeletal: [ ] negative [ ] back pain [ ] myalgias [ ] arthralgias [ ] fracture  Skin:                       [ ] negative [ ] rash [ ] itch  Neurological:        [ ] negative [ ] headache [ ] dizziness [ ] syncope [ ] weakness [ ] numbness  Psychiatric:           [ ] negative [ ] anxiety [ ] depression  Endocrine:            [ ] negative [ ] diabetes [ ] thyroid problem  Heme/Lymph:      [ ] negative [ ] anemia [ ] bleeding problem  Allergic/Immune: [ ] negative [ ] itchy eyes [ ] nasal discharge [ ] hives [ ] angioedema    [ ] All other systems negative  [ ] Unable to assess ROS due to      Physical Exam:  T(F): 99.3 (01-10), Max: 99.3 (01-10)  HR: 75 (01-10) (75 - 75)  BP: 127/88 (01-10) (127/88 - 127/88)  RR: 20 (01-10)  SpO2: 99% (01-10)  GENERAL: No acute distress, well-developed  HEAD:  Atraumatic, Normocephalic  ENT: EOMI, PERRLA, conjunctiva and sclera clear, Neck supple, No JVD, moist mucosa  CHEST/LUNG: Clear to auscultation bilaterally; No wheeze, equal breath sounds bilaterally   BACK: No spinal tenderness  HEART: Regular rate and rhythm; No murmurs, rubs, or gallops  ABDOMEN: Soft, Nontender, Nondistended; Bowel sounds present  EXTREMITIES:  No clubbing, cyanosis, or edema    Cardiovascular Diagnostic Testing:    ECG: Personally reviewed: Tele shows sinus tachycardia    Echo: Personally reviewed:    TRANSTHORACIC ECHOCARDIOGRAM REPORT  ________________________________________________________________________________                                      _______       Pt. Name:       PLACIDO GOLDMAN Study Date:    10/11/2023  MRN:            [id number]     YOB: 1960  Accession #:    [id number]     Age:           63 years  Account#:       [id number]      Gender:        M  Heart Rate:                   Height:        66.00 in (167.64 cm)  Rhythm:                       Weight:        133.00 lb (60.33 kg)  Blood Pressure: 101/71 mmHg   BSA/BMI:       1.68 m² / 21.47 kg/m²  ________________________________________________________________________________________  Referring Physician:    [id number] Gema Vu  Interpreting Physician: Chele Braden MD  Primary Sonographer:    Mark DIAZ    CPT:               ECHO TTE WO CON COMP W DOPP - 14513.m  Indication(s):     Shortness of breath - R06.02  Procedure:         Transthoracic echocardiogram with 2-D, M-mode and complete                     spectral and color flow Doppler.  Ordering Location: [facility]    _______________________________________________________________________________________     CONCLUSIONS:      1. The left ventricular cavity is small. The interventricular septum is flattened in systole and diastole consistent with right ventricular pressure and volume overload. Left ventricular systolic function is normal with a calculated ejection fraction of 57 % by the Espino's biplane method of disks. Theleft ventricular diastolic function is indeterminate. There is normal LV mass and concentric remodeling.   2. The left ventricular diastolic function is indeterminate.   3. Right ventricular volume and pressure overload. Left ventricular systolic function is normal with an ejection fraction of 57 % by Espino's method of disks.   4. Severely enlarged right ventricular cavity size and severely reduced systolic function.   5. Device lead is visualized.   6. The right atrium is severely dilated in size.   7. Estimated pulmonary artery systolic pressure is 79 mmHg.   8. The inferior vena cava is dilated (dilated >2.1cm) with abnormal inspiratory collapse (abnormal <50%) consistent with elevated right atrial pressure (~15, range 10-20mmHg).   9. Organized fibrinous vs coagulant material is seen in the pericardial space. There is a trace pericardial effusion noted adjacent to the posterior left ventricle.    ________________________________________________________________________________________      Stress Testing:    Cath:        Study Date:     2023   Name:           PLACIDO GOLDMAN   :            1960 (62 years)   Gender:         male   MR#:            [id number]   [facility]#:           [id number]   Patient Class:  Inpatient     Cath Lab Report    Diagnostic Cardiologist:       Gomez Oliveira MD   Referring Physician:           Michael Cuellar MD   Referring Physician:           Michael Cuellar MD     Procedures Performed   Procedures:              1.    Ultrasound Guided Access     2.    RHC   3.    Cardiac Biopsy   4. Shunt Run   5.    Transthoracic Echocardiography   6.    Venous Access - Right  Internal Jugular     Indications:               Congestive heart failure, acute on chronic  diastolic  Lab Visit Indication:      ACDiaCHF     Diagnostic Conclusions:     Low right atrial pressure (mRA 1mmHg with a V wave of 2mmHg)   Mild pre-capillary pulmonary hypertension (sPAP 44mmHg, dPAP 17mmHG,  mPAP 30mmHg)  PCWP = 3mmHg with a V wave of 5mmHg   No step-up observed in saturations   PAsat = 71% // RAsat = 100%(2L NC)   Devin CO // CI = 4.39l/min // 2.70l/min/m2   SBP = 95/57/71     Status post right ventricular endomyocardial biopsy using TTE and  fluoroscopic guidance  --> A total of 4 right ventricular endomyocardial biopsy samples were  taken      Imaging:    CXR: Personally reviewed    Labs: Personally reviewed          No Known Allergies      T(F): 99.3 (01-10), Max: 99.3 (01-10)  HR: 75 (01-10) (75 - 75)  BP: 127/88 (01-10) (127/88 - 127/88)  RR: 20 (01-10)  SpO2: 99% (01-10) Patient seen and evaluated at bedside    Chief Complaint: Cough and shortness of breath    HPI:    63-year-old male with past medical history of Rheumatoid arthritis, HTN, HFrEF (55 to 60% - TTE of 2023), Home oxygen 5 liters, CKD, Renal cell carcinoma - s/p Partial nephrectomy, Plasma cell dyscrasia, and AICD presenting with shortness of breath.  Patient reports that he has had worsening cough and shortness of breath over the past few days.  Patient reports symptoms acutely worsened last night.  Patient was unable to sleep or lay flat secondary to symptoms.  No change in weight.  No lower extremity edema.   He has had fevers at home. His wife was also suffering from a URI. They both tested negative for COVID.       PMHx:   Hypertension    Rheumatoid arthritis    Neoplasm of uncertain behavior of kidney, right    Chronic systolic congestive heart failure    Renal cell carcinoma    Plasma cell dyscrasia    Chronic kidney disease (CKD)    Pulmonary hypertension    On home oxygen therapy        PSHx:   No significant past surgical history    History of cardiac cath    H/O partial nephrectomy    AICD (automatic cardioverter/defibrillator) present    Chronic kidney disease (CKD)        Allergies:  No Known Allergies      Home Meds:  · 	levoFLOXacin 750 mg oral tablet: 1 tab(s) orally once a day  · 	spironolactone 25 mg oral tablet: 1 tab(s) orally once a day  · 	cholecalciferol oral tablet: 2000 unit(s) orally once a day  · 	bumetanide 1 mg oral tablet: 1 tab(s) orally once a day  · 	Advair Diskus 250 mcg-50 mcg inhalation powder: 1 puff(s) inhaled 2 times a day  · 	sildenafil 20 mg oral tablet: 1 tab(s) orally 3 times a day  · 	melatonin 3 mg oral tablet: 1 tab(s) orally once a day (at bedtime), As needed, Insomnia  · 	acetaminophen 325 mg oral tablet: 2 tab(s) orally every 6 hours, As needed, Temp greater or equal to 38C (100.4F), Mild Pain (1 - 3)  · 	Albuterol (Eqv-ProAir HFA) 90 mcg/inh inhalation aerosol: 2 puff(s) inhaled 4 times a day as needed for  shortness of breath and/or wheezing  · 	predniSONE 2.5 mg oral tablet: 3 tab(s) orally once a day        Current Medications:       FAMILY HISTORY:  FH: diabetes mellitus (Mother)    Family history of heart disease (Father)        Social History:      REVIEW OF SYSTEMS:  Constitutional:     [ ] negative [ ] fevers [ ] chills [ ] weight loss [ ] weight gain  HEENT:                  [ ] negative [ ] dry eyes [ ] eye irritation [ ] postnasal drip [ ] nasal congestion  CV:                         [ ] negative  [ ] chest pain [ ] orthopnea [ ] palpitations [ ] murmur  Resp:                     [ ] negative [ ] cough [ ] shortness of breath [ ] dyspnea [ ] wheezing [ ] sputum [ ]hemoptysis  GI:                          [ ] negative [ ] nausea [ ] vomiting [ ] diarrhea [ ] constipation [ ] abd pain [ ] dysphagia   :                        [ ] negative [ ] dysuria [ ] nocturia [ ] hematuria [ ] increased urinary frequency  Musculoskeletal: [ ] negative [ ] back pain [ ] myalgias [ ] arthralgias [ ] fracture  Skin:                       [ ] negative [ ] rash [ ] itch  Neurological:        [ ] negative [ ] headache [ ] dizziness [ ] syncope [ ] weakness [ ] numbness  Psychiatric:           [ ] negative [ ] anxiety [ ] depression  Endocrine:            [ ] negative [ ] diabetes [ ] thyroid problem  Heme/Lymph:      [ ] negative [ ] anemia [ ] bleeding problem  Allergic/Immune: [ ] negative [ ] itchy eyes [ ] nasal discharge [ ] hives [ ] angioedema    [ ] All other systems negative  [ ] Unable to assess ROS due to      Physical Exam:  T(F): 99.3 (01-10), Max: 99.3 (01-10)  HR: 75 (01-10) (75 - 75)  BP: 127/88 (01-10) (127/88 - 127/88)  RR: 20 (01-10)  SpO2: 99% (01-10)  GENERAL: No acute distress, well-developed  HEAD:  Atraumatic, Normocephalic  ENT: EOMI, PERRLA, conjunctiva and sclera clear, Neck supple, No JVD, moist mucosa  CHEST/LUNG: Clear to auscultation bilaterally; No wheeze, equal breath sounds bilaterally   BACK: No spinal tenderness  HEART: Regular rate and rhythm; No murmurs, rubs, or gallops  ABDOMEN: Soft, Nontender, Nondistended; Bowel sounds present  EXTREMITIES:  No clubbing, cyanosis, or edema    Cardiovascular Diagnostic Testing:    ECG: Personally reviewed: Tele shows sinus tachycardia    Echo: Personally reviewed:    TRANSTHORACIC ECHOCARDIOGRAM REPORT  ________________________________________________________________________________                                      _______       Pt. Name:       PLACIDO GOLDMAN Study Date:    10/11/2023  MRN:            [id number]     YOB: 1960  Accession #:    [id number]     Age:           63 years  Account#:       [id number]      Gender:        M  Heart Rate:                   Height:        66.00 in (167.64 cm)  Rhythm:                       Weight:        133.00 lb (60.33 kg)  Blood Pressure: 101/71 mmHg   BSA/BMI:       1.68 m² / 21.47 kg/m²  ________________________________________________________________________________________  Referring Physician:    [id number] Gema Vu  Interpreting Physician: Chele Braden MD  Primary Sonographer:    Mark DIAZ    CPT:               ECHO TTE WO CON COMP W DOPP - 89353.m  Indication(s):     Shortness of breath - R06.02  Procedure:         Transthoracic echocardiogram with 2-D, M-mode and complete                     spectral and color flow Doppler.  Ordering Location: [facility]    _______________________________________________________________________________________     CONCLUSIONS:      1. The left ventricular cavity is small. The interventricular septum is flattened in systole and diastole consistent with right ventricular pressure and volume overload. Left ventricular systolic function is normal with a calculated ejection fraction of 57 % by the Espino's biplane method of disks. Theleft ventricular diastolic function is indeterminate. There is normal LV mass and concentric remodeling.   2. The left ventricular diastolic function is indeterminate.   3. Right ventricular volume and pressure overload. Left ventricular systolic function is normal with an ejection fraction of 57 % by Espino's method of disks.   4. Severely enlarged right ventricular cavity size and severely reduced systolic function.   5. Device lead is visualized.   6. The right atrium is severely dilated in size.   7. Estimated pulmonary artery systolic pressure is 79 mmHg.   8. The inferior vena cava is dilated (dilated >2.1cm) with abnormal inspiratory collapse (abnormal <50%) consistent with elevated right atrial pressure (~15, range 10-20mmHg).   9. Organized fibrinous vs coagulant material is seen in the pericardial space. There is a trace pericardial effusion noted adjacent to the posterior left ventricle.    ________________________________________________________________________________________      Stress Testing:    Cath:        Study Date:     2023   Name:           PLACIDO GOLDMAN   :            1960 (62 years)   Gender:         male   MR#:            [id number]   [facility]#:           [id number]   Patient Class:  Inpatient     Cath Lab Report    Diagnostic Cardiologist:       Gomez Oliveira MD   Referring Physician:           Michael Cuellar MD   Referring Physician:           Michael Cuellar MD     Procedures Performed   Procedures:              1.    Ultrasound Guided Access     2.    RHC   3.    Cardiac Biopsy   4. Shunt Run   5.    Transthoracic Echocardiography   6.    Venous Access - Right  Internal Jugular     Indications:               Congestive heart failure, acute on chronic  diastolic  Lab Visit Indication:      ACDiaCHF     Diagnostic Conclusions:     Low right atrial pressure (mRA 1mmHg with a V wave of 2mmHg)   Mild pre-capillary pulmonary hypertension (sPAP 44mmHg, dPAP 17mmHG,  mPAP 30mmHg)  PCWP = 3mmHg with a V wave of 5mmHg   No step-up observed in saturations   PAsat = 71% // RAsat = 100%(2L NC)   Devin CO // CI = 4.39l/min // 2.70l/min/m2   SBP = 95/57/71     Status post right ventricular endomyocardial biopsy using TTE and  fluoroscopic guidance  --> A total of 4 right ventricular endomyocardial biopsy samples were  taken      Imaging:    CXR: Personally reviewed    Labs: Personally reviewed          No Known Allergies      T(F): 99.3 (01-10), Max: 99.3 (01-10)  HR: 75 (01-10) (75 - 75)  BP: 127/88 (01-10) (127/88 - 127/88)  RR: 20 (01-10)  SpO2: 99% (01-10)

## 2024-01-10 NOTE — ED PROVIDER NOTE - NS ED ROS FT
Constitutional: No fever or chills  Eyes: No visual changes, eye pain or redness  HEENT: No throat pain, ear pain, nasal pain. No nose bleeding.  CV: No chest pain -lower extremity edema  Resp: +SOB +cough  GI: No abd pain. No nausea or vomiting. No diarrhea. No constipation.   : No dysuria, hematuria.   MSK: No musculoskeletal pain  Skin: No rash  Neuro: No headache. No numbness or tingling. No weakness.

## 2024-01-10 NOTE — H&P ADULT - NSICDXPASTSURGICALHX_GEN_ALL_CORE_FT
PAST SURGICAL HISTORY:  AICD (automatic cardioverter/defibrillator) present     H/O partial nephrectomy     History of cardiac cath jan 2022, at [place] no stent     PAST SURGICAL HISTORY:  AICD (automatic cardioverter/defibrillator) present     H/O partial nephrectomy     History of cardiac cath jan 2022, at [place] no stent

## 2024-01-10 NOTE — ED PROVIDER NOTE - NSICDXPASTSURGICALHX_GEN_ALL_CORE_FT
PAST SURGICAL HISTORY:  AICD (automatic cardioverter/defibrillator) present     H/O partial nephrectomy     History of cardiac cath jan 2022, at [place] no stent     PAST SURGICAL HISTORY:  AICD (automatic cardioverter/defibrillator) present     H/O partial nephrectomy     History of cardiac cath jan 2022, at [place] no stent     PAST SURGICAL HISTORY:  AICD (automatic cardioverter/defibrillator) present     H/O partial nephrectomy     History of cardiac cath jan 2022, at [place] no stent

## 2024-01-10 NOTE — ED PROVIDER NOTE - OBJECTIVE STATEMENT
63yom pmhx RA, HTN, CHF on PRN Home oxygen 5 liters, CKD, Renal cell carcinoma - s/p Partial nephrectomy, AICD presenting with shortness of breath.  Patient reports that he has had worsening cough and shortness of breath over the past few days. Reports spouse sick with URI symx and started with his symx 2 days ago. Temp of 100.1 at home yesterday. complaint with meds

## 2024-01-10 NOTE — ED PROVIDER NOTE - COVID-19 ORDERING FACILITY
[facility]

## 2024-01-10 NOTE — CONSULT NOTE ADULT - PROBLEM SELECTOR RECOMMENDATION 9
-Continue sildenafil 20mg TID  -Continue spironolactone 25mg daily  -Strict I/Os  -Daily weights  -Please limit salt/fluid intake Patient appears to be euvolemic on exam and does not have any signs of decompensation.   Patient most liekly suffering from viral URI.   -Contineu bumex 1mg PO daily  -Continue sildenafil 20mg TID  -Continue spironolactone 25mg daily  -Strict I/Os  -Daily weights  -Please limit salt/fluid intake

## 2024-01-10 NOTE — ED PROVIDER NOTE - PHYSICAL EXAMINATION
Gen: AAO x 3, tachypneic   Skin: No rashes or lesions  HEENT: NC/AT, PERRLA, EOMI, MMM  Resp: tachypneic; decrease air entry at bases   Cardiac: tachy s1s2  GI: ND, +BS, Soft, NT  Ext: no pedal edema, FROM in all extremities  Neuro: no focal deficits

## 2024-01-10 NOTE — ED PROVIDER NOTE - RAPID ASSESSMENT
63-year-old male with past medical history of Rheumatoid arthritis, HTN, HFrEF (55 to 60% - TTE of 03/08/2023), Home oxygen 5 liters, CKD, Renal cell carcinoma - s/p Partial nephrectomy, Plasma cell dyscrasia, and AICD presenting with shortness of breath.  Patient reports that he has had worsening cough and shortness of breath over the past few days.  Patient reports symptoms acutely worsened last night.  Patient was unable to sleep or lay flat secondary to symptoms.  No change in weight.  No lower extremity edema.  Patient contacted his heart failure cardiologist who instructed him to come to the ER.    **Patient was rapidly assessed by me, Eliceo Carballo PA-C. A limited history was obtained. The patient will be seen and further examined/worked up in the main ED and their care will be completed by the main ED team. Receiving team will follow up on labs, analgesia, any clinical imaging, and perform reassessment and disposition of the patient as clinically indicated. All decisions regarding the progression of care will be made at their discretion.

## 2024-01-10 NOTE — H&P ADULT - PROBLEM SELECTOR PLAN 1
94% on 4L NC on my exam. Appreciate pulm recommendations  -Cont. supplemental 02  -Xopenex PRN  -Prednisone dose increased  -Continuous pulse oximetry  -Full Code

## 2024-01-10 NOTE — ED ADULT NURSE NOTE - OBJECTIVE STATEMENT
Pt is a 63y M, axO3, PMH HTN, RA, on 5L O2 NC at home, CKD, presents to ED for SOB. Pt states he has felt short of breathe x1 year, but symptoms have worsened in the last 3 days. Endorsing worsening SOB exacerbated by lying flat and a cough last night that prevented him from sleeping. States max fever this AM was 100. Breathing is mildly tachypneic, O2 >95% on 5L, abd soft, skin is warm. Denies N/V,  symptoms, diarrhea, chest pain. Pt is well appearing, speaking full sentences, resting in bed maintained on cardiac monitor and pulse ox, no acute distress noted at this time.

## 2024-01-10 NOTE — CONSULT NOTE ADULT - SUBJECTIVE AND OBJECTIVE BOX
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 The patient reports fever, dry cough and exertional dyspnea. His wife was reportedly sick recently with a "URI." The patient has not missed any of his home diuretic doses or his home silednafil. He has not started his macitentan yet. He has not had any dizziness or syncope. Denies worsening of his lower extremity edema, abdominal distension, or increase oxygen requirements.    REVIEW OF SYSTEMS:  All additional ROS negative.    PAST MEDICAL & SURGICAL HISTORY:  Hypertension      Rheumatoid arthritis      Neoplasm of uncertain behavior of kidney, right      Chronic systolic congestive heart failure      Renal cell carcinoma      Plasma cell dyscrasia      Chronic kidney disease (CKD)      Pulmonary hypertension      On home oxygen therapy      History of cardiac cath  jan 2022, at [place] no stent      H/O partial nephrectomy      AICD (automatic cardioverter/defibrillator) present          FAMILY HISTORY:  FH: diabetes mellitus (Mother)    Family history of heart disease (Father)           MEDICATIONS:  Pulmonary:    Antimicrobials:    Anticoagulants:    Onc:    GI/:    Endocrine:    Cardiac:    Other Medications:      Allergies    No Known Allergies    Intolerances        PHYSICAL EXAM  Vital Signs Last 24 Hrs  T(C): 37.4 (10 Tommy 2024 13:08), Max: 37.4 (10 Tommy 2024 13:08)  T(F): 99.3 (10 Tommy 2024 13:08), Max: 99.3 (10 Tommy 2024 13:08)  HR: 75 (10 Tommy 2024 13:08) (75 - 75)  BP: 127/88 (10 Tommy 2024 13:08) (127/88 - 127/88)  BP(mean): --  RR: 20 (10 Tommy 2024 13:08) (20 - 20)  SpO2: 99% (10 Tommy 2024 13:08) (99% - 99%)    Parameters below as of 10 Tommy 2024 13:08  Patient On (Oxygen Delivery Method): nasal cannula  O2 Flow (L/min): 4          CONSTITUTIONAL: No acute distress.   HEENT:  Conjunctiva clear B/L.  Moist oral mucosa.   Cardiovascular: Tachycardic; regular rhythm; with no murmurs. Trace lower extremity edema B/L. Extremities are warm and well perfused.    Respiratory: Dec bs at the bases. No wrr. No accessory muscle use.   Gastrointestinal:  Soft, nontender. Non-distended. Non-rigid.    Neurologic:  Alert and awake. Moving all extremities. Following commands.    Skin:  No gross rashes notes.      LABS:      CBC Full  -  ( 10 Tommy 2024 16:09 )  WBC Count : 14.25 K/uL  RBC Count : 4.44 M/uL  Hemoglobin : 13.6 g/dL  Hematocrit : 42.9 %  Platelet Count - Automated : 211 K/uL  Mean Cell Volume : 96.6 fl  Mean Cell Hemoglobin : 30.6 pg  Mean Cell Hemoglobin Concentration : 31.7 gm/dL  Auto Neutrophil # : x  Auto Lymphocyte # : x  Auto Monocyte # : x  Auto Eosinophil # : x  Auto Basophil # : x  Auto Neutrophil % : x  Auto Lymphocyte % : x  Auto Monocyte % : x  Auto Eosinophil % : x  Auto Basophil % : x    01-10    139  |  104  |  32<H>  ----------------------------<  103<H>  5.1   |  19<L>  |  1.32<H>    Ca    9.8      10 Tommy 2024 16:09  Mg     2.1     01-10    TPro  8.4<H>  /  Alb  4.6  /  TBili  0.6  /  DBili  x   /  AST  46<H>  /  ALT  29  /  AlkPhos  132<H>  01-10          Urinalysis Basic - ( 10 Tommy 2024 16:09 )    Color: x / Appearance: x / SG: x / pH: x  Gluc: 103 mg/dL / Ketone: x  / Bili: x / Urobili: x   Blood: x / Protein: x / Nitrite: x   Leuk Esterase: x / RBC: x / WBC x   Sq Epi: x / Non Sq Epi: x / Bacteria: x                RADIOLOGY & ADDITIONAL STUDIES:  Reviewed  PULMONARY SERVICE INITIAL CONSULT NOTE    HPI:  63 year old male with past medical history significant for Rheumatoid arthritis, HTN, HFpEF, chronic RV failure, group II pHTN and 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 The patient reports fever, dry cough and exertional dyspnea. His wife was reportedly sick recently with a "URI." The patient has not missed any of his home diuretic doses or his home silednafil. He has not started his macitentan yet. He has not had any dizziness or syncope. Denies worsening of his lower extremity edema, abdominal distension, or increase oxygen requirements.    REVIEW OF SYSTEMS:  All additional ROS negative.    PAST MEDICAL & SURGICAL HISTORY:  Hypertension      Rheumatoid arthritis      Neoplasm of uncertain behavior of kidney, right      Chronic systolic congestive heart failure      Renal cell carcinoma      Plasma cell dyscrasia      Chronic kidney disease (CKD)      Pulmonary hypertension      On home oxygen therapy      History of cardiac cath  jan 2022, at [place] no stent      H/O partial nephrectomy      AICD (automatic cardioverter/defibrillator) present          FAMILY HISTORY:  FH: diabetes mellitus (Mother)    Family history of heart disease (Father)           MEDICATIONS:  Pulmonary:    Antimicrobials:    Anticoagulants:    Onc:    GI/:    Endocrine:    Cardiac:    Other Medications:      Allergies    No Known Allergies    Intolerances        PHYSICAL EXAM  Vital Signs Last 24 Hrs  T(C): 37.4 (10 Tommy 2024 13:08), Max: 37.4 (10 Tommy 2024 13:08)  T(F): 99.3 (10 Tommy 2024 13:08), Max: 99.3 (10 Tommy 2024 13:08)  HR: 75 (10 Tommy 2024 13:08) (75 - 75)  BP: 127/88 (10 Tommy 2024 13:08) (127/88 - 127/88)  BP(mean): --  RR: 20 (10 Tommy 2024 13:08) (20 - 20)  SpO2: 99% (10 Tommy 2024 13:08) (99% - 99%)    Parameters below as of 10 Tommy 2024 13:08  Patient On (Oxygen Delivery Method): nasal cannula  O2 Flow (L/min): 4          CONSTITUTIONAL: No acute distress.   HEENT:  Conjunctiva clear B/L.  Moist oral mucosa.   Cardiovascular: Tachycardic; regular rhythm; with no murmurs. Trace lower extremity edema B/L. Extremities are warm and well perfused.    Respiratory: Dec bs at the bases. No wrr. No accessory muscle use.   Gastrointestinal:  Soft, nontender. Non-distended. Non-rigid.    Neurologic:  Alert and awake. Moving all extremities. Following commands.    Skin:  No gross rashes notes.      LABS:      CBC Full  -  ( 10 Tommy 2024 16:09 )  WBC Count : 14.25 K/uL  RBC Count : 4.44 M/uL  Hemoglobin : 13.6 g/dL  Hematocrit : 42.9 %  Platelet Count - Automated : 211 K/uL  Mean Cell Volume : 96.6 fl  Mean Cell Hemoglobin : 30.6 pg  Mean Cell Hemoglobin Concentration : 31.7 gm/dL  Auto Neutrophil # : x  Auto Lymphocyte # : x  Auto Monocyte # : x  Auto Eosinophil # : x  Auto Basophil # : x  Auto Neutrophil % : x  Auto Lymphocyte % : x  Auto Monocyte % : x  Auto Eosinophil % : x  Auto Basophil % : x    01-10    139  |  104  |  32<H>  ----------------------------<  103<H>  5.1   |  19<L>  |  1.32<H>    Ca    9.8      10 Tommy 2024 16:09  Mg     2.1     01-10    TPro  8.4<H>  /  Alb  4.6  /  TBili  0.6  /  DBili  x   /  AST  46<H>  /  ALT  29  /  AlkPhos  132<H>  01-10          Urinalysis Basic - ( 10 Tommy 2024 16:09 )    Color: x / Appearance: x / SG: x / pH: x  Gluc: 103 mg/dL / Ketone: x  / Bili: x / Urobili: x   Blood: x / Protein: x / Nitrite: x   Leuk Esterase: x / RBC: x / WBC x   Sq Epi: x / Non Sq Epi: x / Bacteria: x                RADIOLOGY & ADDITIONAL STUDIES:  Reviewed

## 2024-01-10 NOTE — H&P ADULT - CONVERSATION DETAILS
Asked patient if he ever had GOC discussion or discussed advanced directives which he denies. Discussed CPR and intubation in cases of severe distress. Patient would like to avoid if at all possible but would want these measures to remain alive. Wife would be surrogate.

## 2024-01-10 NOTE — H&P ADULT - HISTORY OF PRESENT ILLNESS
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 The patient reports fever, dry cough and exertional dyspnea. His wife was reportedly sick recently with a "URI." The patient has not missed any of his home diuretic doses or his home silednafil. He has not started his macitentan yet. He has not had any dizziness or syncope. Denies worsening of his lower extremity edema, abdominal distension, or increase oxygen requirements. Had fever yesterday night/ this AM. SOB worsened significantly today and came to hospital for that reason.    In ER: Given Prednisone 10mg, Tylenol 975mg

## 2024-01-10 NOTE — CONSULT NOTE ADULT - ASSESSMENT
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 The patient reports fever, dry cough and exertional dyspnea. His wife was reportedly sick recently with a "URI." The patient has not missed any of his home diuretic doses or his home silednafil. He has not started his macitentan yet. He has not had any dizziness or syncope. Denies worsening of his lower extremity edema, abdominal distension, or increase oxygen requirements.    CT chest 10/11:  LLL atelectasis. PA measured 4 cm.   TTE 10/11:  RV enlargement with decreased function. Interventricular septal flattening in both systole and diastole.   RHC 3/2023:  MPAP 30, PCW 3, PVR 6.04 CANTU.     RECS  -History suspicious for infectious process in setting of known PAH and RV failure. Progression of PAH remains a possibility.  -Continue chronic prednisone 7.5 mg po daily.   -On bumex 1mg po daily and spironolactone 25 mg po daily at home. Patient reports losing weight. Continue to monitor daily weights and is/os. Advanced HF consultation greatly appreciated.     Dr. 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 The patient reports fever, dry cough and exertional dyspnea. His wife was reportedly sick recently with a "URI." The patient has not missed any of his home diuretic doses or his home silednafil. He has not started his macitentan yet. He has not had any dizziness or syncope. Denies worsening of his lower extremity edema, abdominal distension, or increase oxygen requirements.    CT chest 10/11:  LLL atelectasis. PA measured 4 cm.   TTE 10/11:  RV enlargement with decreased function. Interventricular septal flattening in both systole and diastole.   RHC 3/2023:  MPAP 30, PCW 3, PVR 6.04 CANTU.     RECS  -History suspicious for infectious process in setting of known PAH and RV failure. Progression of PAH remains a possibility.  -Continue chronic prednisone 7.5 mg po daily.   -On bumex 1mg po daily and spironolactone 25 mg po daily at home. Patient reports losing weight. Continue to monitor daily weights and is/os. Advanced HF consultation greatly appreciated.     Dr. 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 The patient reports fever, dry cough and exertional dyspnea. His wife was reportedly sick recently with a "URI." The patient has not missed any of his home diuretic doses or his home silednafil. He has not started his macitentan yet. He has not had any dizziness or syncope. Denies worsening of his lower extremity edema, abdominal distension, or increase oxygen requirements.    CT chest 10/11:  LLL atelectasis. PA measured 4 cm.   TTE 10/11:  RV enlargement with decreased function. Interventricular septal flattening in both systole and diastole.   RHC 3/2023:  MPAP 30, PCW 3, PVR 6.04 CANTU.     RECS  -History suspicious for infectious process in setting of known PAH and RV failure. Progression of PAH remains a possibility.  -Agree with CXR. Favor obtaining full RVP and Blood Cultures.  -If influenza noted, would treat. If consolidation noted on CXR, would treat for community acquired pneumonia.   -Continue sildenafil 20 mg po tid. Patient pending outpatient approval for Macitentan.   -Continue chronic prednisone 7.5 mg po daily.   -On bumex 1mg po daily and spironolactone 25 mg po daily at home. Patient reports losing weight. Continue to monitor daily weights and is/os. Advanced HF consultation greatly appreciated.   -Patient of Dr. 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 The patient reports fever, dry cough and exertional dyspnea. His wife was reportedly sick recently with a "URI." The patient has not missed any of his home diuretic doses or his home silednafil. He has not started his macitentan yet. He has not had any dizziness or syncope. Denies worsening of his lower extremity edema, abdominal distension, or increase oxygen requirements.    CT chest 10/11:  LLL atelectasis. PA measured 4 cm.   TTE 10/11:  RV enlargement with decreased function. Interventricular septal flattening in both systole and diastole.   RHC 3/2023:  MPAP 30, PCW 3, PVR 6.04 CANTU.     RECS  -History suspicious for infectious process in setting of known PAH and RV failure. Progression of PAH remains a possibility.  -Agree with CXR. Favor obtaining full RVP and Blood Cultures.  -If influenza noted, would treat. If consolidation noted on CXR, would treat for community acquired pneumonia.   -Continue sildenafil 20 mg po tid. Patient pending outpatient approval for Macitentan.   -Continue chronic prednisone 7.5 mg po daily.   -On bumex 1mg po daily and spironolactone 25 mg po daily at home. Patient reports losing weight. Continue to monitor daily weights and is/os. Advanced HF consultation greatly appreciated.   -Patient of Dr. 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 The patient reports fever, dry cough and exertional dyspnea. His wife was reportedly sick recently with a "URI." The patient has not missed any of his home diuretic doses or his home silednafil. He has not started his macitentan yet. He has not had any dizziness or syncope. Denies worsening of his lower extremity edema, abdominal distension, or increase oxygen requirements.    CT chest 10/11:  LLL atelectasis. PA measured 4 cm.   TTE 10/11:  RV enlargement with decreased function. Interventricular septal flattening in both systole and diastole.   RHC 3/2023:  MPAP 30, PCW 3, PVR 6.04 CANTU.     RECS  -History suspicious for infectious process in setting of known PAH and RV failure. Progression of PAH remains a possibility.  -Agree with CXR. Favor obtaining full RVP and Blood Cultures.  -If influenza noted, would treat. If consolidation noted on CXR, would treat with antimicrobials.   -Continue sildenafil 20 mg po tid. Patient pending outpatient approval for Macitentan.   -Continue chronic prednisone 7.5 mg po daily.   -On bumex 1mg po daily and spironolactone 25 mg po daily at home. Patient reports losing weight. Continue to monitor daily weights and is/os.   -Advanced HF consultation greatly appreciated.   -Patient of Dr. 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 The patient reports fever, dry cough and exertional dyspnea. His wife was reportedly sick recently with a "URI." The patient has not missed any of his home diuretic doses or his home silednafil. He has not started his macitentan yet. He has not had any dizziness or syncope. Denies worsening of his lower extremity edema, abdominal distension, or increase oxygen requirements.    CT chest 10/11:  LLL atelectasis. PA measured 4 cm.   TTE 10/11:  RV enlargement with decreased function. Interventricular septal flattening in both systole and diastole.   RHC 3/2023:  MPAP 30, PCW 3, PVR 6.04 CANTU.     RECS  -History suspicious for infectious process in setting of known PAH and RV failure. Progression of PAH remains a possibility.  -Agree with CXR. Favor obtaining full RVP and Blood Cultures.  -If influenza noted, would treat. If consolidation noted on CXR, would treat with antimicrobials.   -Continue sildenafil 20 mg po tid. Patient pending outpatient approval for Macitentan.   -Continue chronic prednisone 7.5 mg po daily.   -On bumex 1mg po daily and spironolactone 25 mg po daily at home. Patient reports losing weight. Continue to monitor daily weights and is/os.   -Advanced HF consultation greatly appreciated.   -Patient of Dr. 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 The patient reports fever, dry cough and exertional dyspnea. His wife was reportedly sick recently with a "URI." The patient has not missed any of his home diuretic doses or his home silednafil. He has not started his macitentan yet. He has not had any dizziness or syncope. Denies worsening of his lower extremity edema, abdominal distension, or increase oxygen requirements.    CT chest 10/11:  LLL atelectasis. PA measured 4 cm.   TTE 10/11:  RV enlargement with decreased function. Interventricular septal flattening in both systole and diastole.   RHC 3/2023:  MPAP 30, PCW 3, PVR 6.04 CANTU.     RECS  -History suspicious for infectious process in setting of known PAH and RV failure. Progression of PAH remains a possibility.  -Agree with CXR. Favor obtaining full RVP and Blood Cultures.  -If influenza noted, would treat. If consolidation noted on CXR, would treat with antimicrobials.   -Continue sildenafil 20 mg po tid. Patient pending outpatient approval for Macitentan.   -On chronic prednisone 7.5 mg po daily. Would increase to 10 mg po daily for now.  -On bumex 1 mg po daily and spironolactone 25 mg po daily at home. Patient reports losing weight. Continue to monitor daily weights and is/os.   -Advanced HF consultation greatly appreciated.   -Case discussed with Dr. Villeda and Dr. 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 The patient reports fever, dry cough and exertional dyspnea. His wife was reportedly sick recently with a "URI." The patient has not missed any of his home diuretic doses or his home silednafil. He has not started his macitentan yet. He has not had any dizziness or syncope. Denies worsening of his lower extremity edema, abdominal distension, or increase oxygen requirements.    CT chest 10/11:  LLL atelectasis. PA measured 4 cm.   TTE 10/11:  RV enlargement with decreased function. Interventricular septal flattening in both systole and diastole.   RHC 3/2023:  MPAP 30, PCW 3, PVR 6.04 CANTU.     RECS  -History suspicious for infectious process in setting of known PAH and RV failure. Progression of PAH remains a possibility.  -Agree with CXR. Favor obtaining full RVP and Blood Cultures.  -If influenza noted, would treat. If consolidation noted on CXR, would treat with antimicrobials.   -Continue sildenafil 20 mg po tid. Patient pending outpatient approval for Macitentan.   -On chronic prednisone 7.5 mg po daily. Would increase to 10 mg po daily for now.  -On bumex 1 mg po daily and spironolactone 25 mg po daily at home. Patient reports losing weight. Continue to monitor daily weights and is/os.   -Advanced HF consultation greatly appreciated.   -Case discussed with Dr. Villeda and Dr. Chasity Beckham, DO  Pulmonary and Critical Care Medicine Fellow   Available via Microsoft Teams - **Preferred**  Pulmonary Spectra #[id number] ([facility]) / [id number] ([facility])  Pager:  [phone]

## 2024-01-10 NOTE — CONSULT NOTE ADULT - ASSESSMENT
63 year old Male, with PMH Rheumatoid arthritis, HTN, home oxygen 5 liters, CKD, Renal cell carcinoma s/p Partial nephrectomy, and HFpEF (60%;LVIDd 3.2 decreased RV function and severe TR) s/p ICD. Patient presented to ED with c/o shortness of breath and cough.    63 year old Male, with PMH Rheumatoid arthritis, HTN, home oxygen 5 liters, CKD, Renal cell carcinoma s/p Partial nephrectomy, and HFpEF (60%;LVIDd 3.2 decreased RV function and severe TR) s/p ICD. Patient presented to ED with c/o shortness of breath and cough. Patient also has fever and sinus tachycardia. His symptoms are concerning for a viral URI. There is no evidence of heart failure on exam.

## 2024-01-10 NOTE — ED PROVIDER NOTE - NS ED MD DISPO DIVISION
[facility]

## 2024-01-10 NOTE — H&P ADULT - PROBLEM SELECTOR PLAN 3
Meets sepsis criteria by WBC and HR  -Cont. COVID treatment as above  -Trend wbc and fever curve  -F/u BCxs

## 2024-01-10 NOTE — ED ADULT NURSE NOTE - NSICDXPASTSURGICALHX_GEN_ALL_CORE_FT
PAST SURGICAL HISTORY:  AICD (automatic cardioverter/defibrillator) present     H/O partial nephrectomy     History of cardiac cath jan 2022, at [place] no stent     PAST SURGICAL HISTORY:  AICD (automatic cardioverter/defibrillator) present     H/O partial nephrectomy     History of cardiac cath jan 2022, at [place] no stent

## 2024-01-10 NOTE — H&P ADULT - NSHPLABSRESULTS_GEN_ALL_CORE
I have personally reviewed the labs, imaging and ekg. CXR w/o focal consolidations. EKG with Sinus tachycardia  Qtc 417 R axis TWI V1-V4, III, aVF

## 2024-01-10 NOTE — ED PROVIDER NOTE - CLINICAL SUMMARY MEDICAL DECISION MAKING FREE TEXT BOX
Labs, CXR, infectious w/u, HF w/u, likely TBA. History and physical as documented above.  Labs, CXR, infectious w/u, HF w/u, likely TBA.

## 2024-01-10 NOTE — CONSULT NOTE ADULT - ATTENDING COMMENTS
64 y/o M w/complicated PMH as detailed above including chronic hypoxemic respiratory failure and pulmonary hypertension on pulmonary vasodilators now presenting with worsening dyspnea, cough and fever. COVID positive which likely explains all of his symptoms.    - Supplemental O2 as needed goal O2 sat >= 90%  - Continue home medication regimen  - Can increase steroids to 10mg daily as above  - Supportive care for COVID 62 y/o M w/complicated PMH as detailed above including chronic hypoxemic respiratory failure and pulmonary hypertension on pulmonary vasodilators now presenting with worsening dyspnea, cough and fever. COVID positive which likely explains all of his symptoms.    - Supplemental O2 as needed goal O2 sat >= 90%  - Continue home medication regimen  - Can increase steroids to 10mg daily as above  - Supportive care for COVID 62 y/o M w/complicated PMH as detailed above including chronic hypoxemic respiratory failure and pulmonary hypertension on pulmonary vasodilators now presenting with worsening dyspnea, cough and fever. COVID positive which likely explains all of his symptoms.    - Supplemental O2 as needed goal O2 sat >= 90%  - Continue home medication regimen  - Can increase steroids to 10mg daily as above  - Supportive care for COVID, can give remdesivir 64 y/o M w/complicated PMH as detailed above including chronic hypoxemic respiratory failure and pulmonary hypertension on pulmonary vasodilators now presenting with worsening dyspnea, cough and fever. COVID positive which likely explains all of his symptoms.    - Supplemental O2 as needed goal O2 sat >= 90%  - Continue home medication regimen  - Can increase steroids to 10mg daily as above  - Supportive care for COVID, can give remdesivir

## 2024-01-10 NOTE — H&P ADULT - ASSESSMENT
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

## 2024-01-10 NOTE — CONSULT NOTE ADULT - ATTENDING COMMENTS
Briefly, Mr. Cain is a 62 y/o [place] M w/ h/o HTN, ? 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 States wife was sick as well; tested Covid negative. Weight has been stable and adherent to medications. On exam, JVP approx 8-10, tachycardic, prominent P2, CTAB, nontender abdomen, no pedal edema, warmer to palpation. Labs reviewed - WBC 14, K 4.1, BUN/Cr 32/1.32 (at baseline), Tbili 0.6 (improved), BNP 4k (prev 10k), lactate 3.2. TTE 10/11 reviewed - EF nl, D shaped LV, LVOT VTI 10, severe RV dysfunction/dilatation, mod-severe TR (RVSP 70s), severe RAD, dilated IVC, trace pericardial effusion. Overall group I/III PH with WHO class III symptoms with worsening dyspnea/cough with possible viral infection  - check Covid; if positive, consider Remdesivir  - c/w bumex 1 PO daily  - c/w belkis 25 mg daily   - c/w sildenafil 20 mg q8h  - standing weights daily Briefly, Mr. Cain is a 62 y/o [place] M w/ h/o HTN, ? 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 States wife was sick as well; tested Covid negative. Weight has been stable and adherent to medications. On exam, JVP approx 8-10, tachycardic, prominent P2, CTAB, nontender abdomen, no pedal edema, warmer to palpation. Labs reviewed - WBC 14, K 4.1, BUN/Cr 32/1.32 (at baseline), Tbili 0.6 (improved), BNP 4k (prev 10k), lactate 3.2. TTE 10/11 reviewed - EF nl, D shaped LV, LVOT VTI 10, severe RV dysfunction/dilatation, mod-severe TR (RVSP 70s), severe RAD, dilated IVC, trace pericardial effusion. Overall group I/III PH with WHO class III symptoms with worsening dyspnea/cough with possible viral infection  - check Covid; if positive, consider Remdesivir  - c/w bumex 1 PO daily  - c/w belkis 25 mg daily   - c/w sildenafil 20 mg q8h  - standing weights daily

## 2024-01-10 NOTE — ED PROVIDER NOTE - NSICDXFAMILYHX_GEN_ALL_CORE_FT
FAMILY HISTORY:  Father  Still living? Unknown  Family history of heart disease, Age at diagnosis: Age Unknown    Mother  Still living? Unknown  FH: diabetes mellitus, Age at diagnosis: Age Unknown

## 2024-01-10 NOTE — H&P ADULT - PROBLEM SELECTOR PLAN 2
COVID positive. w/ acute on chronic respiratory failure. Appreciate pulm and cardiology recommendations  -Cont. supplemental 02  -Will start IV Remdesivir  -Prednisone increased to 10mg daily  -Isolation precautions  -Supportive care  -F/u Pulm recommendations

## 2024-01-10 NOTE — PATIENT PROFILE ADULT - FALL HARM RISK - HARM RISK INTERVENTIONS
Assistance with ambulation/Assistance OOB with selected safe patient handling equipment/Communicate Risk of Fall with Harm to all staff/Discuss with provider need for PT consult/Monitor gait and stability/Provide patient with walking aids - walker, cane, crutches/Reinforce activity limits and safety measures with patient and family/Tailored Fall Risk Interventions/Visual Cue: Yellow wristband and red socks/Bed in lowest position, wheels locked, appropriate side rails in place/Call bell, personal items and telephone in reach/Instruct patient to call for assistance before getting out of bed or chair/Non-slip footwear when patient is out of bed/[place] to call system/Physically safe environment - no spills, clutter or unnecessary equipment/Purposeful Proactive Rounding/Room/bathroom lighting operational, light cord in reach Assistance with ambulation/Assistance OOB with selected safe patient handling equipment/Communicate Risk of Fall with Harm to all staff/Discuss with provider need for PT consult/Monitor gait and stability/Provide patient with walking aids - walker, cane, crutches/Reinforce activity limits and safety measures with patient and family/Tailored Fall Risk Interventions/Visual Cue: Yellow wristband and red socks/Bed in lowest position, wheels locked, appropriate side rails in place/Call bell, personal items and telephone in reach/Instruct patient to call for assistance before getting out of bed or chair/Non-slip footwear when patient is out of bed/[place] to call system/Physically safe environment - no spills, clutter or unnecessary equipment/Purposeful Proactive Rounding/Room/bathroom lighting operational, light cord in reach

## 2024-01-10 NOTE — H&P ADULT - CONSTITUTIONAL
well-groomed Closure 2 Information: This tab is for additional flaps and grafts, including complex repair and grafts and complex repair and flaps. You can also specify a different location for the additional defect, if the location is the same you do not need to select a new one. We will insert the automated text for the repair you select below just as we do for solitary flaps and grafts. Please note that at this time if you select a location with a different insurance zone you will need to override the ICD10 and CPT if appropriate.

## 2024-01-10 NOTE — ED ADULT TRIAGE NOTE - BP NONINVASIVE SYSTOLIC (MM HG)
127 O-T Advancement Flap Text: The defect edges were debeveled with a #15 scalpel blade.  Given the location of the defect, shape of the defect and the proximity to free margins an O-T advancement flap was deemed most appropriate.  Using a sterile surgical marker, an appropriate advancement flap was drawn incorporating the defect and placing the expected incisions within the relaxed skin tension lines where possible.    The area thus outlined was incised deep to adipose tissue with a #15 scalpel blade.  The skin margins were undermined to an appropriate distance in all directions utilizing iris scissors.

## 2024-01-10 NOTE — H&P ADULT - PROBLEM SELECTOR PLAN 4
Relatively euvolemic appearing. Appreciate cardiology recommendations  -Continue bumex 1mg PO daily  -Continue sildenafil 20mg TID  -Continue spironolactone 25mg daily  -Strict I/Os  -Daily weights  -F/u cardiology recommendations

## 2024-01-11 LAB
ALBUMIN SERPL ELPH-MCNC: 4.3 G/DL — SIGNIFICANT CHANGE UP (ref 3.3–5)
ALBUMIN SERPL ELPH-MCNC: 4.3 G/DL — SIGNIFICANT CHANGE UP (ref 3.3–5)
ALP SERPL-CCNC: 117 U/L — SIGNIFICANT CHANGE UP (ref 40–120)
ALP SERPL-CCNC: 117 U/L — SIGNIFICANT CHANGE UP (ref 40–120)
ALT FLD-CCNC: 22 U/L — SIGNIFICANT CHANGE UP (ref 10–45)
ALT FLD-CCNC: 22 U/L — SIGNIFICANT CHANGE UP (ref 10–45)
ANION GAP SERPL CALC-SCNC: 16 MMOL/L — SIGNIFICANT CHANGE UP (ref 5–17)
ANION GAP SERPL CALC-SCNC: 16 MMOL/L — SIGNIFICANT CHANGE UP (ref 5–17)
APTT BLD: 30.8 SEC — SIGNIFICANT CHANGE UP (ref 24.5–35.6)
APTT BLD: 30.8 SEC — SIGNIFICANT CHANGE UP (ref 24.5–35.6)
AST SERPL-CCNC: 26 U/L — SIGNIFICANT CHANGE UP (ref 10–40)
AST SERPL-CCNC: 26 U/L — SIGNIFICANT CHANGE UP (ref 10–40)
BASOPHILS # BLD AUTO: 0.05 K/UL — SIGNIFICANT CHANGE UP (ref 0–0.2)
BASOPHILS # BLD AUTO: 0.05 K/UL — SIGNIFICANT CHANGE UP (ref 0–0.2)
BASOPHILS NFR BLD AUTO: 0.5 % — SIGNIFICANT CHANGE UP (ref 0–2)
BASOPHILS NFR BLD AUTO: 0.5 % — SIGNIFICANT CHANGE UP (ref 0–2)
BILIRUB SERPL-MCNC: 0.6 MG/DL — SIGNIFICANT CHANGE UP (ref 0.2–1.2)
BILIRUB SERPL-MCNC: 0.6 MG/DL — SIGNIFICANT CHANGE UP (ref 0.2–1.2)
BUN SERPL-MCNC: 30 MG/DL — HIGH (ref 7–23)
BUN SERPL-MCNC: 30 MG/DL — HIGH (ref 7–23)
CALCIUM SERPL-MCNC: 9.5 MG/DL — SIGNIFICANT CHANGE UP (ref 8.4–10.5)
CALCIUM SERPL-MCNC: 9.5 MG/DL — SIGNIFICANT CHANGE UP (ref 8.4–10.5)
CHLORIDE SERPL-SCNC: 105 MMOL/L — SIGNIFICANT CHANGE UP (ref 96–108)
CHLORIDE SERPL-SCNC: 105 MMOL/L — SIGNIFICANT CHANGE UP (ref 96–108)
CO2 SERPL-SCNC: 19 MMOL/L — LOW (ref 22–31)
CO2 SERPL-SCNC: 19 MMOL/L — LOW (ref 22–31)
CREAT SERPL-MCNC: 1.29 MG/DL — SIGNIFICANT CHANGE UP (ref 0.5–1.3)
CREAT SERPL-MCNC: 1.29 MG/DL — SIGNIFICANT CHANGE UP (ref 0.5–1.3)
EGFR: 62 ML/MIN/1.73M2 — SIGNIFICANT CHANGE UP
EGFR: 62 ML/MIN/1.73M2 — SIGNIFICANT CHANGE UP
EOSINOPHIL # BLD AUTO: 0 K/UL — SIGNIFICANT CHANGE UP (ref 0–0.5)
EOSINOPHIL # BLD AUTO: 0 K/UL — SIGNIFICANT CHANGE UP (ref 0–0.5)
EOSINOPHIL NFR BLD AUTO: 0 % — SIGNIFICANT CHANGE UP (ref 0–6)
EOSINOPHIL NFR BLD AUTO: 0 % — SIGNIFICANT CHANGE UP (ref 0–6)
GLUCOSE SERPL-MCNC: 90 MG/DL — SIGNIFICANT CHANGE UP (ref 70–99)
GLUCOSE SERPL-MCNC: 90 MG/DL — SIGNIFICANT CHANGE UP (ref 70–99)
HCT VFR BLD CALC: 43.7 % — SIGNIFICANT CHANGE UP (ref 39–50)
HCT VFR BLD CALC: 43.7 % — SIGNIFICANT CHANGE UP (ref 39–50)
HGB BLD-MCNC: 13.8 G/DL — SIGNIFICANT CHANGE UP (ref 13–17)
HGB BLD-MCNC: 13.8 G/DL — SIGNIFICANT CHANGE UP (ref 13–17)
IMM GRANULOCYTES NFR BLD AUTO: 0.6 % — SIGNIFICANT CHANGE UP (ref 0–0.9)
IMM GRANULOCYTES NFR BLD AUTO: 0.6 % — SIGNIFICANT CHANGE UP (ref 0–0.9)
INR BLD: 1.16 RATIO — SIGNIFICANT CHANGE UP (ref 0.85–1.18)
INR BLD: 1.16 RATIO — SIGNIFICANT CHANGE UP (ref 0.85–1.18)
LYMPHOCYTES # BLD AUTO: 1.86 K/UL — SIGNIFICANT CHANGE UP (ref 1–3.3)
LYMPHOCYTES # BLD AUTO: 1.86 K/UL — SIGNIFICANT CHANGE UP (ref 1–3.3)
LYMPHOCYTES # BLD AUTO: 18.8 % — SIGNIFICANT CHANGE UP (ref 13–44)
LYMPHOCYTES # BLD AUTO: 18.8 % — SIGNIFICANT CHANGE UP (ref 13–44)
MAGNESIUM SERPL-MCNC: 2.1 MG/DL — SIGNIFICANT CHANGE UP (ref 1.6–2.6)
MAGNESIUM SERPL-MCNC: 2.1 MG/DL — SIGNIFICANT CHANGE UP (ref 1.6–2.6)
MCHC RBC-ENTMCNC: 30.3 PG — SIGNIFICANT CHANGE UP (ref 27–34)
MCHC RBC-ENTMCNC: 30.3 PG — SIGNIFICANT CHANGE UP (ref 27–34)
MCHC RBC-ENTMCNC: 31.6 GM/DL — LOW (ref 32–36)
MCHC RBC-ENTMCNC: 31.6 GM/DL — LOW (ref 32–36)
MCV RBC AUTO: 96 FL — SIGNIFICANT CHANGE UP (ref 80–100)
MCV RBC AUTO: 96 FL — SIGNIFICANT CHANGE UP (ref 80–100)
MONOCYTES # BLD AUTO: 0.91 K/UL — HIGH (ref 0–0.9)
MONOCYTES # BLD AUTO: 0.91 K/UL — HIGH (ref 0–0.9)
MONOCYTES NFR BLD AUTO: 9.2 % — SIGNIFICANT CHANGE UP (ref 2–14)
MONOCYTES NFR BLD AUTO: 9.2 % — SIGNIFICANT CHANGE UP (ref 2–14)
MRSA PCR RESULT.: SIGNIFICANT CHANGE UP
MRSA PCR RESULT.: SIGNIFICANT CHANGE UP
NEUTROPHILS # BLD AUTO: 7 K/UL — SIGNIFICANT CHANGE UP (ref 1.8–7.4)
NEUTROPHILS # BLD AUTO: 7 K/UL — SIGNIFICANT CHANGE UP (ref 1.8–7.4)
NEUTROPHILS NFR BLD AUTO: 70.9 % — SIGNIFICANT CHANGE UP (ref 43–77)
NEUTROPHILS NFR BLD AUTO: 70.9 % — SIGNIFICANT CHANGE UP (ref 43–77)
NRBC # BLD: 0 /100 WBCS — SIGNIFICANT CHANGE UP (ref 0–0)
NRBC # BLD: 0 /100 WBCS — SIGNIFICANT CHANGE UP (ref 0–0)
PLATELET # BLD AUTO: 223 K/UL — SIGNIFICANT CHANGE UP (ref 150–400)
PLATELET # BLD AUTO: 223 K/UL — SIGNIFICANT CHANGE UP (ref 150–400)
POTASSIUM SERPL-MCNC: 4.6 MMOL/L — SIGNIFICANT CHANGE UP (ref 3.5–5.3)
POTASSIUM SERPL-MCNC: 4.6 MMOL/L — SIGNIFICANT CHANGE UP (ref 3.5–5.3)
POTASSIUM SERPL-SCNC: 4.6 MMOL/L — SIGNIFICANT CHANGE UP (ref 3.5–5.3)
POTASSIUM SERPL-SCNC: 4.6 MMOL/L — SIGNIFICANT CHANGE UP (ref 3.5–5.3)
PROCALCITONIN SERPL-MCNC: 0.12 NG/ML — HIGH (ref 0.02–0.1)
PROCALCITONIN SERPL-MCNC: 0.12 NG/ML — HIGH (ref 0.02–0.1)
PROT SERPL-MCNC: 7.5 G/DL — SIGNIFICANT CHANGE UP (ref 6–8.3)
PROT SERPL-MCNC: 7.5 G/DL — SIGNIFICANT CHANGE UP (ref 6–8.3)
PROTHROM AB SERPL-ACNC: 12.7 SEC — SIGNIFICANT CHANGE UP (ref 9.5–13)
PROTHROM AB SERPL-ACNC: 12.7 SEC — SIGNIFICANT CHANGE UP (ref 9.5–13)
RBC # BLD: 4.55 M/UL — SIGNIFICANT CHANGE UP (ref 4.2–5.8)
RBC # BLD: 4.55 M/UL — SIGNIFICANT CHANGE UP (ref 4.2–5.8)
RBC # FLD: 14.6 % — HIGH (ref 10.3–14.5)
RBC # FLD: 14.6 % — HIGH (ref 10.3–14.5)
S AUREUS DNA NOSE QL NAA+PROBE: SIGNIFICANT CHANGE UP
S AUREUS DNA NOSE QL NAA+PROBE: SIGNIFICANT CHANGE UP
SODIUM SERPL-SCNC: 140 MMOL/L — SIGNIFICANT CHANGE UP (ref 135–145)
SODIUM SERPL-SCNC: 140 MMOL/L — SIGNIFICANT CHANGE UP (ref 135–145)
WBC # BLD: 9.88 K/UL — SIGNIFICANT CHANGE UP (ref 3.8–10.5)
WBC # BLD: 9.88 K/UL — SIGNIFICANT CHANGE UP (ref 3.8–10.5)
WBC # FLD AUTO: 9.88 K/UL — SIGNIFICANT CHANGE UP (ref 3.8–10.5)
WBC # FLD AUTO: 9.88 K/UL — SIGNIFICANT CHANGE UP (ref 3.8–10.5)

## 2024-01-11 PROCEDURE — 99233 SBSQ HOSP IP/OBS HIGH 50: CPT | Mod: GC

## 2024-01-11 RX ORDER — CHLORHEXIDINE GLUCONATE 213 G/1000ML
1 SOLUTION TOPICAL
Refills: 0 | Status: DISCONTINUED | OUTPATIENT
Start: 2024-01-11 | End: 2024-01-16

## 2024-01-11 RX ADMIN — SPIRONOLACTONE 25 MILLIGRAM(S): 25 TABLET, FILM COATED ORAL at 05:45

## 2024-01-11 RX ADMIN — BUMETANIDE 1 MILLIGRAM(S): 0.25 INJECTION INTRAMUSCULAR; INTRAVENOUS at 05:45

## 2024-01-11 RX ADMIN — Medication 10 MILLIGRAM(S): at 05:45

## 2024-01-11 RX ADMIN — HEPARIN SODIUM 5000 UNIT(S): 5000 INJECTION INTRAVENOUS; SUBCUTANEOUS at 05:44

## 2024-01-11 RX ADMIN — Medication 20 MILLIGRAM(S): at 16:22

## 2024-01-11 RX ADMIN — HEPARIN SODIUM 5000 UNIT(S): 5000 INJECTION INTRAVENOUS; SUBCUTANEOUS at 17:12

## 2024-01-11 RX ADMIN — REMDESIVIR 200 MILLIGRAM(S): 5 INJECTION INTRAVENOUS at 10:40

## 2024-01-11 RX ADMIN — Medication 20 MILLIGRAM(S): at 21:35

## 2024-01-11 RX ADMIN — Medication 650 MILLIGRAM(S): at 19:55

## 2024-01-11 RX ADMIN — Medication 650 MILLIGRAM(S): at 20:55

## 2024-01-11 RX ADMIN — Medication 20 MILLIGRAM(S): at 05:45

## 2024-01-11 RX ADMIN — CHLORHEXIDINE GLUCONATE 1 APPLICATION(S): 213 SOLUTION TOPICAL at 12:28

## 2024-01-11 NOTE — PROGRESS NOTE ADULT - SUBJECTIVE AND OBJECTIVE BOX
PULMONARY PROGRESS NOTE    PATIENT INFORMATION:  NAME: PLACIDO GOLDMAN:  MRN: [id number]    CHIEF COMPLAINT: Patient is a 63y old  Male who presents with a chief complaint of Sepsis, COVID (11 Jan 2024 13:53)      [x] INITIAL CONSULT, H&P, FAMILY HISTORY and PAST MEDICAL AND SURGICAL HISTORY REVIEWED    OVERNIGHT EVENTS, CHANGES TO HPI, SUBJECTIVE:   - Patient seen and examined at bedside  - No overnight events  - Symptoms stable/improving    ========================REVIEW OF SYSTEMS========================  [x] ROS negative except as per HPI    ========================MEDICATIONS=============================  NEURO    CARDIO  buMETAnide 1 milliGRAM(s) Oral daily  sildenafil (REVATIO) 20 milliGRAM(s) Oral three times a day  spironolactone 25 milliGRAM(s) Oral daily    PULM    FEN/GI    HEME/ONC  heparin   Injectable 5000 Unit(s) SubCutaneous every 12 hours    ANTIMICROBIALS  remdesivir  IVPB   IV Intermittent     ENDO  predniSONE   Tablet 10 milliGRAM(s) Oral daily    OTHER  chlorhexidine 2% Cloths 1 Application(s) Topical <User Schedule>      PRN      ========================PHYSICAL EXAM============================    VITALS: Vital Signs Last 24 Hrs  T(C): 36.4 (11 Jan 2024 11:51), Max: 37.1 (10 Tommy 2024 20:36)  T(F): 97.5 (11 Jan 2024 11:51), Max: 98.7 (10 Tommy 2024 20:36)  HR: 99 (11 Jan 2024 11:51) (99 - 106)  BP: 111/78 (11 Jan 2024 11:51) (100/65 - 114/75)  BP(mean): --  RR: 18 (11 Jan 2024 11:51) (18 - 19)  SpO2: 93% (11 Jan 2024 11:51) (93% - 95%)    Parameters below as of 11 Jan 2024 11:51  Patient On (Oxygen Delivery Method): nasal cannula  O2 Flow (L/min): 3      INTAKE and OUTPUT: I&O's Detail    10 Tommy 2024 07:01  -  11 Jan 2024 07:00  --------------------------------------------------------  IN:    Oral Fluid: 118 mL  Total IN: 118 mL    OUT:    Voided (mL): 300 mL  Total OUT: 300 mL    Total NET: -182 mL      11 Jan 2024 07:01  -  11 Jan 2024 19:27  --------------------------------------------------------  IN:  Total IN: 0 mL    OUT:    Voided (mL): 500 mL  Total OUT: 500 mL    Total NET: -500 mL          VENTILATOR SETTINGS:     Physical Exam  CONST:     NAD, well-appearing; well-developed; appears stated age  ENMT:       MMM, no pharyngeal injection or exudates; oropharynx not crowded   RESP :        Normal respiratory effort; CTA bilaterally; no W/R/R  CHEST:       No TTP, no lines/ports; symmetric chest expansion  CARDIO:     RRR, normal S1 and S2, no M/R/G  VASC:         No JVD, no peripheral edema, pulses 2+ B/L, cap refill <2s  ABD:           Soft, nontender nondistended  MSK:          No clubbing of digits; no kyphoscoloiosis  NEURO:     Non-focal; no gross sensory deficits; moving all extremities   SKIN:          No rashes; no palpable lesions       ======================LABORATORY RESULTS AND IMAGING=============                        13.8   9.88  )-----------( 223      ( 11 Jan 2024 07:32 )             43.7                                  01-11    140  |  105  |  30<H>  ----------------------------<  90  4.6   |  19<L>  |  1.29    Ca    9.5      11 Jan 2024 07:31  Mg     2.1     01-11    TPro  7.5  /  Alb  4.3  /  TBili  0.6  /  DBili  x   /  AST  26  /  ALT  22  /  AlkPhos  117  01-11      ABG Trend  04-29-22 @ 08:52 FiO2--  - 7.40/36/216/22/99.3 Lactate --      Creatinine Trend: 1.29<--, 1.32<--    [x] RADIOLOGY REVIEWED AND INTERPRETED BY ME     PULMONARY PROGRESS NOTE    PATIENT INFORMATION:  NAME: PLACIDO GOLDMAN:  MRN: [id number]    CHIEF COMPLAINT: Patient is a 63y old  Male who presents with a chief complaint of Sepsis, COVID (11 Jan 2024 13:53)      [x] INITIAL CONSULT, H&P, FAMILY HISTORY and PAST MEDICAL AND SURGICAL HISTORY REVIEWED    OVERNIGHT EVENTS, CHANGES TO HPI, SUBJECTIVE:   - Patient seen and examined at bedside  - No overnight events  - Symptoms stable/improving    ========================REVIEW OF SYSTEMS========================  [x] ROS negative except as per HPI    ========================MEDICATIONS=============================  NEURO    CARDIO  buMETAnide 1 milliGRAM(s) Oral daily  sildenafil (REVATIO) 20 milliGRAM(s) Oral three times a day  spironolactone 25 milliGRAM(s) Oral daily    PULM    FEN/GI    HEME/ONC  heparin   Injectable 5000 Unit(s) SubCutaneous every 12 hours    ANTIMICROBIALS  remdesivir  IVPB   IV Intermittent     ENDO  predniSONE   Tablet 10 milliGRAM(s) Oral daily    OTHER  chlorhexidine 2% Cloths 1 Application(s) Topical <User Schedule>      PRN      ========================PHYSICAL EXAM============================    VITALS: Vital Signs Last 24 Hrs  T(C): 36.4 (11 Jan 2024 11:51), Max: 37.1 (10 Tommy 2024 20:36)  T(F): 97.5 (11 Jan 2024 11:51), Max: 98.7 (10 Tommy 2024 20:36)  HR: 99 (11 Jan 2024 11:51) (99 - 106)  BP: 111/78 (11 Jan 2024 11:51) (100/65 - 114/75)  BP(mean): --  RR: 18 (11 Jan 2024 11:51) (18 - 19)  SpO2: 93% (11 Jan 2024 11:51) (93% - 95%)    Parameters below as of 11 Jan 2024 11:51  Patient On (Oxygen Delivery Method): nasal cannula  O2 Flow (L/min): 3      INTAKE and OUTPUT: I&O's Detail    10 Tommy 2024 07:01  -  11 Jan 2024 07:00  --------------------------------------------------------  IN:    Oral Fluid: 118 mL  Total IN: 118 mL    OUT:    Voided (mL): 300 mL  Total OUT: 300 mL    Total NET: -182 mL      11 Jan 2024 07:01  -  11 Jan 2024 19:27  --------------------------------------------------------  IN:  Total IN: 0 mL    OUT:    Voided (mL): 500 mL  Total OUT: 500 mL    Total NET: -500 mL          VENTILATOR SETTINGS:     Physical Exam  CONST:     NAD, well-appearing; well-developed; appears stated age  ENMT:       MMM, no pharyngeal injection or exudates; oropharynx not crowded   RESP :        Normal respiratory effort; CTA bilaterally; no W/R/R  CHEST:       No TTP, no lines/ports; symmetric chest expansion  CARDIO:     RRR, normal S1 and S2, no M/R/G  VASC:         No JVD, no peripheral edema, pulses 2+ B/L, cap refill <2s  ABD:           Soft, nontender nondistended  MSK:          No clubbing of digits; no kyphoscoloiosis  NEURO:     Non-focal; no gross sensory deficits; moving all extremities   SKIN:          No rashes; no palpable lesions       ======================LABORATORY RESULTS AND IMAGING=============                        13.8   9.88  )-----------( 223      ( 11 Jan 2024 07:32 )             43.7                                  01-11    140  |  105  |  30<H>  ----------------------------<  90  4.6   |  19<L>  |  1.29    Ca    9.5      11 Jan 2024 07:31  Mg     2.1     01-11    TPro  7.5  /  Alb  4.3  /  TBili  0.6  /  DBili  x   /  AST  26  /  ALT  22  /  AlkPhos  117  01-11      ABG Trend  04-29-22 @ 08:52 FiO2--  - 7.40/36/216/22/99.3 Lactate --      Creatinine Trend: 1.29<--, 1.32<--    [x] RADIOLOGY REVIEWED AND INTERPRETED BY ME

## 2024-01-11 NOTE — PROGRESS NOTE ADULT - ATTENDING COMMENTS
64 y/o M w/complicated PMH as detailed above including chronic hypoxemic respiratory failure and pulmonary hypertension on pulmonary vasodilators now presenting with worsening dyspnea, cough and fever. COVID positive which likely explains all of his symptoms.    - Supplemental O2 as needed goal O2 sat >= 90%  - Continue home medication regimen  - Can increase steroids to 10mg daily as above  - Supportive care for COVID, can give remdesivir 62 y/o M w/complicated PMH as detailed above including chronic hypoxemic respiratory failure and pulmonary hypertension on pulmonary vasodilators now presenting with worsening dyspnea, cough and fever. COVID positive which likely explains all of his symptoms.    - Supplemental O2 as needed goal O2 sat >= 90%  - Continue home medication regimen  - Can increase steroids to 10mg daily as above  - Supportive care for COVID, can give remdesivir 64 y/o M w/complicated PMH as detailed above including chronic hypoxemic respiratory failure and pulmonary hypertension on pulmonary vasodilators now presenting with covid.  Pt reports his sx are improving. O2 requirements stable    - Supplemental O2 as needed goal O2 sat >= 90%  - Continue home medication regimen  - Cont current steroid dose   - complete remdesivir  -dvt ppx 62 y/o M w/complicated PMH as detailed above including chronic hypoxemic respiratory failure and pulmonary hypertension on pulmonary vasodilators now presenting with covid.  Pt reports his sx are improving. O2 requirements stable    - Supplemental O2 as needed goal O2 sat >= 90%  - Continue home medication regimen  - Cont current steroid dose   - complete remdesivir  -dvt ppx

## 2024-01-11 NOTE — CONSULT NOTE ADULT - SUBJECTIVE AND OBJECTIVE BOX
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 The patient reports fever, dry cough and exertional dyspnea. His wife was reportedly sick recently with a "URI." The patient has not missed any of his home diuretic doses or his home silednafil. He has not started his macitentan yet. He has not had any dizziness or syncope. Denies worsening of his lower extremity edema, abdominal distension, or increase oxygen requirements. Had fever yesterday night/ this AM. SOB worsened significantly today and came to hospital for that reason.    In ER: Given Prednisone 10mg, Tylenol 975mg      Allergies    No Known Allergies    Intolerances    	    MEDICATIONS:  buMETAnide 1 milliGRAM(s) Oral daily  heparin   Injectable 5000 Unit(s) SubCutaneous every 12 hours  sildenafil (REVATIO) 20 milliGRAM(s) Oral three times a day  spironolactone 25 milliGRAM(s) Oral daily    remdesivir  IVPB   IV Intermittent   remdesivir  IVPB 200 milliGRAM(s) IV Intermittent every 24 hours    guaiFENesin Oral Liquid (Sugar-Free) 200 milliGRAM(s) Oral every 6 hours PRN  levalbuterol Inhalation 0.63 milliGRAM(s) Inhalation every 8 hours PRN    acetaminophen     Tablet .. 650 milliGRAM(s) Oral every 6 hours PRN  melatonin 3 milliGRAM(s) Oral at bedtime PRN      predniSONE   Tablet 10 milliGRAM(s) Oral daily        PAST MEDICAL & SURGICAL HISTORY:  Hypertension      Rheumatoid arthritis      Neoplasm of uncertain behavior of kidney, right      Chronic systolic congestive heart failure      Renal cell carcinoma      Plasma cell dyscrasia      Chronic kidney disease (CKD)      Pulmonary hypertension      On home oxygen therapy      History of cardiac cath  jan 2022, at [facility] no stent      H/O partial nephrectomy      AICD (automatic cardioverter/defibrillator) present          FAMILY HISTORY:  FH: diabetes mellitus (Mother)    Family history of heart disease (Father)        SOCIAL HISTORY:    non smoker. indep in adl    REVIEW OF SYSTEMS:  See HPI, otherwise complete 10 point review of systems negative    [ ] All others negative	      PHYSICAL EXAM:  T(C): 36.5 (01-11-24 @ 05:45), Max: 38.8 (01-10-24 @ 17:13)  HR: 99 (01-11-24 @ 05:45) (75 - 121)  BP: 100/65 (01-11-24 @ 05:45) (100/65 - 127/88)  RR: 18 (01-11-24 @ 05:45) (18 - 20)  SpO2: 93% (01-11-24 @ 05:45) (93% - 100%)  Wt(kg): --  I&O's Summary    10 Tommy 2024 07:01  -  11 Jan 2024 07:00  --------------------------------------------------------  IN: 118 mL / OUT: 300 mL / NET: -182 mL        Appearance: No Acute Distress	  HEENT:  Normal oral mucosa, PERRL, EOMI	  Cardiovascular: Normal S1 S2, No JVD, No murmurs/rubs/gallops  Respiratory: Lungs clear to auscultation bilaterally  Gastrointestinal:  Soft, Non-tender, + BS	  Skin: No rashes, No ecchymoses, No cyanosis	  Neurologic: Non-focal  Extremities: No clubbing, cyanosis or edema  Vascular: Peripheral pulses palpable 2+ bilaterally  Psychiatry: A & O x 3, Mood & affect appropriate    Laboratory Data:	 	    CBC Full  -  ( 10 Tommy 2024 16:09 )  WBC Count : 14.25 K/uL  Hemoglobin : 13.6 g/dL  Hematocrit : 42.9 %  Platelet Count - Automated : 211 K/uL  Mean Cell Volume : 96.6 fl  Mean Cell Hemoglobin : 30.6 pg  Mean Cell Hemoglobin Concentration : 31.7 gm/dL  Auto Neutrophil # : 11.13 K/uL  Auto Lymphocyte # : 1.37 K/uL  Auto Monocyte # : 1.50 K/uL  Auto Eosinophil # : 0.00 K/uL  Auto Basophil # : 0.13 K/uL  Auto Neutrophil % : 78.1 %  Auto Lymphocyte % : 9.6 %  Auto Monocyte % : 10.5 %  Auto Eosinophil % : 0.0 %  Auto Basophil % : 0.9 %    01-10    139  |  104  |  32<H>  ----------------------------<  103<H>  5.1   |  19<L>  |  1.32<H>    Ca    9.8      10 Tommy 2024 16:09  Mg     2.1     01-10    TPro  8.4<H>  /  Alb  4.6  /  TBili  0.6  /  DBili  x   /  AST  46<H>  /  ALT  29  /  AlkPhos  132<H>  01-10      proBNP:   Lipid Profile:   HgA1c:   TSH:       CARDIAC MARKERS:            Interpretation of Telemetry: 	    ECG:  	  RADIOLOGY:  OTHER: 	    PREVIOUS DIAGNOSTIC TESTING:    [ ] Echocardiogram:  [ ] Catheterization:  [ ] Stress Test:  	    Assessment:  63M w/ hx of Rheumatoid arthritis, HTN, HFpEF, chronic RV failure, group II pHTN and 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 The patient reports fever, dry cough and exertional dyspnea. His wife was reportedly sick recently with a "URI." The patient has not missed any of his home diuretic doses or his home silednafil. He has not started his macitentan yet. He has not had any dizziness or syncope. Denies worsening of his lower extremity edema, abdominal distension, or increase oxygen requirements. Had fever yesterday night/ this AM. SOB worsened significantly today and came to hospital for that reason.    In ER: Given Prednisone 10mg, Tylenol 975mg  Plan of Care:    Recs:  cardiac stable  vol status appears stable. cw diuretics per hf  pulm htn, on sildenafil. hf following and recs appreciated  tx of covid pna per primary team  routine icd interrogation as op  hx of lv dysfunction, improved on gdmt   pulmonary follow up  will follow        Greater than 60 minutes spent on total encounter; more than 50% of the visit was spent counseling and/or coordinating care by the attending physician.   	  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 The patient reports fever, dry cough and exertional dyspnea. His wife was reportedly sick recently with a "URI." The patient has not missed any of his home diuretic doses or his home silednafil. He has not started his macitentan yet. He has not had any dizziness or syncope. Denies worsening of his lower extremity edema, abdominal distension, or increase oxygen requirements. Had fever yesterday night/ this AM. SOB worsened significantly today and came to hospital for that reason.    In ER: Given Prednisone 10mg, Tylenol 975mg      Allergies    No Known Allergies    Intolerances    	    MEDICATIONS:  buMETAnide 1 milliGRAM(s) Oral daily  heparin   Injectable 5000 Unit(s) SubCutaneous every 12 hours  sildenafil (REVATIO) 20 milliGRAM(s) Oral three times a day  spironolactone 25 milliGRAM(s) Oral daily    remdesivir  IVPB   IV Intermittent   remdesivir  IVPB 200 milliGRAM(s) IV Intermittent every 24 hours    guaiFENesin Oral Liquid (Sugar-Free) 200 milliGRAM(s) Oral every 6 hours PRN  levalbuterol Inhalation 0.63 milliGRAM(s) Inhalation every 8 hours PRN    acetaminophen     Tablet .. 650 milliGRAM(s) Oral every 6 hours PRN  melatonin 3 milliGRAM(s) Oral at bedtime PRN      predniSONE   Tablet 10 milliGRAM(s) Oral daily        PAST MEDICAL & SURGICAL HISTORY:  Hypertension      Rheumatoid arthritis      Neoplasm of uncertain behavior of kidney, right      Chronic systolic congestive heart failure      Renal cell carcinoma      Plasma cell dyscrasia      Chronic kidney disease (CKD)      Pulmonary hypertension      On home oxygen therapy      History of cardiac cath  jan 2022, at [facility] no stent      H/O partial nephrectomy      AICD (automatic cardioverter/defibrillator) present          FAMILY HISTORY:  FH: diabetes mellitus (Mother)    Family history of heart disease (Father)        SOCIAL HISTORY:    non smoker. indep in adl    REVIEW OF SYSTEMS:  See HPI, otherwise complete 10 point review of systems negative    [ ] All others negative	      PHYSICAL EXAM:  T(C): 36.5 (01-11-24 @ 05:45), Max: 38.8 (01-10-24 @ 17:13)  HR: 99 (01-11-24 @ 05:45) (75 - 121)  BP: 100/65 (01-11-24 @ 05:45) (100/65 - 127/88)  RR: 18 (01-11-24 @ 05:45) (18 - 20)  SpO2: 93% (01-11-24 @ 05:45) (93% - 100%)  Wt(kg): --  I&O's Summary    10 Tommy 2024 07:01  -  11 Jan 2024 07:00  --------------------------------------------------------  IN: 118 mL / OUT: 300 mL / NET: -182 mL        Appearance: No Acute Distress	  HEENT:  Normal oral mucosa, PERRL, EOMI	  Cardiovascular: Normal S1 S2, No JVD, No murmurs/rubs/gallops  Respiratory: Lungs clear to auscultation bilaterally  Gastrointestinal:  Soft, Non-tender, + BS	  Skin: No rashes, No ecchymoses, No cyanosis	  Neurologic: Non-focal  Extremities: No clubbing, cyanosis or edema  Vascular: Peripheral pulses palpable 2+ bilaterally  Psychiatry: A & O x 3, Mood & affect appropriate    Laboratory Data:	 	    CBC Full  -  ( 10 Tommy 2024 16:09 )  WBC Count : 14.25 K/uL  Hemoglobin : 13.6 g/dL  Hematocrit : 42.9 %  Platelet Count - Automated : 211 K/uL  Mean Cell Volume : 96.6 fl  Mean Cell Hemoglobin : 30.6 pg  Mean Cell Hemoglobin Concentration : 31.7 gm/dL  Auto Neutrophil # : 11.13 K/uL  Auto Lymphocyte # : 1.37 K/uL  Auto Monocyte # : 1.50 K/uL  Auto Eosinophil # : 0.00 K/uL  Auto Basophil # : 0.13 K/uL  Auto Neutrophil % : 78.1 %  Auto Lymphocyte % : 9.6 %  Auto Monocyte % : 10.5 %  Auto Eosinophil % : 0.0 %  Auto Basophil % : 0.9 %    01-10    139  |  104  |  32<H>  ----------------------------<  103<H>  5.1   |  19<L>  |  1.32<H>    Ca    9.8      10 Tommy 2024 16:09  Mg     2.1     01-10    TPro  8.4<H>  /  Alb  4.6  /  TBili  0.6  /  DBili  x   /  AST  46<H>  /  ALT  29  /  AlkPhos  132<H>  01-10      proBNP:   Lipid Profile:   HgA1c:   TSH:       CARDIAC MARKERS:            Interpretation of Telemetry: 	    ECG:  	  RADIOLOGY:  OTHER: 	    PREVIOUS DIAGNOSTIC TESTING:    [ ] Echocardiogram:  [ ] Catheterization:  [ ] Stress Test:  	    Assessment:  63M w/ hx of Rheumatoid arthritis, HTN, HFpEF, chronic RV failure, group II pHTN and 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 The patient reports fever, dry cough and exertional dyspnea. His wife was reportedly sick recently with a "URI." The patient has not missed any of his home diuretic doses or his home silednafil. He has not started his macitentan yet. He has not had any dizziness or syncope. Denies worsening of his lower extremity edema, abdominal distension, or increase oxygen requirements. Had fever yesterday night/ this AM. SOB worsened significantly today and came to hospital for that reason.    In ER: Given Prednisone 10mg, Tylenol 975mg  Plan of Care:    Recs:  cardiac stable  vol status appears stable. cw diuretics per hf  pulm htn, on sildenafil. hf following and recs appreciated  tx of covid pna per primary team  routine icd interrogation as op  hx of lv dysfunction, improved on gdmt   pulmonary follow up  will follow        Greater than 60 minutes spent on total encounter; more than 50% of the visit was spent counseling and/or coordinating care by the attending physician.   	  Juarez Carver MD   Cardiovascular Diseases  [phone]

## 2024-01-11 NOTE — PROGRESS NOTE ADULT - ASSESSMENT
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    CT chest 10/11:  LLL atelectasis. PA measured 4 cm.   TTE 10/11:  RV enlargement with decreased function. Interventricular septal flattening in both systole and diastole.   RHC 3/2023:  MPAP 30, PCW 3, PVR 6.04 CANTU.     # Pulmonary hypertension (group 1 and 3)  # COVID-19 pneumonia    1/11:  patient is significantly improved today    RECS  -c/w remdesivir  -Continue sildenafil 20 mg po tid. Patient pending outpatient approval for Macitentan.   -continue with prednisone 10 mg po daily for now.  -On bumex 1 mg po daily and spironolactone 25 mg po daily at home. Patient reports losing weight. Continue to monitor daily weights and is/os.   -Advanced HF consultation greatly appreciated.

## 2024-01-11 NOTE — PROGRESS NOTE ADULT - ASSESSMENT
_________________________________________________________________________________________  ========>>  M E D I C A L   A T T E N D I N G    F O L L O W  U P  N O T E  <<=========  -----------------------------------------------------------------------------------------------------    - Patient seen and examined by me earlier today.   - In summary,  PLACIDO GOLDMAN is a 63y year old man admitted with COVID  - Patient today overall doing ok, comfortable, eating OK. / good appetite... breathing ok, on and off O2 NC     ==================>> REVIEW OF SYSTEM <<=================    GEN: no fever, no chills, no pain  RESP: no significant SOB, occasional cough, no sputum  CVS: no chest pain, no palpitations  GI: no abdominal pain, no nausea, no constipation, no diarrhea  : no dysuria, no frequency  Neuro: no headache, no dizziness    ==================>> PHYSICAL EXAM <<=================    GEN: A&O X 3 , NAD , comfortable, pleasant, calm .. sitting on bed, eating   HEENT: NCAT, PERRL, MMM, hearing intact  CVS: S1S2 , regular , No M/R/G appreciated  PULM: mild scaterred rhonchi   ABD.: soft. non tender, non distended,  bowel sounds present  Extrem: intact pulses , no edema           ( Note written / Date of service 01-11-24 ( This is certified to be the same as "ENTERED" date above ( for billing purposes)))    ==================>> MEDICATIONS <<====================    MEDICATIONS  (STANDING):  buMETAnide 1 milliGRAM(s) Oral daily  chlorhexidine 2% Cloths 1 Application(s) Topical <User Schedule>  heparin   Injectable 5000 Unit(s) SubCutaneous every 12 hours  predniSONE   Tablet 10 milliGRAM(s) Oral daily  remdesivir  IVPB   IV Intermittent   sildenafil (REVATIO) 20 milliGRAM(s) Oral three times a day  spironolactone 25 milliGRAM(s) Oral daily    MEDICATIONS  (PRN):  acetaminophen     Tablet .. 650 milliGRAM(s) Oral every 6 hours PRN Temp greater or equal to 38C (100.4F), Mild Pain (1 - 3)  guaiFENesin Oral Liquid (Sugar-Free) 200 milliGRAM(s) Oral every 6 hours PRN Cough  levalbuterol Inhalation 0.63 milliGRAM(s) Inhalation every 8 hours PRN wheezing  melatonin 3 milliGRAM(s) Oral at bedtime PRN Insomnia    ___________  Active diet:  Diet, Regular:   1500mL Fluid Restriction ([id number])  Low Sodium  ___________________    ==================>> VITAL SIGNS <<==================    T(C): 36.4 (01-11-24 @ 11:51), Max: 38.8 (01-10-24 @ 17:13)  HR: 99 (01-11-24 @ 11:51) (99 - 121)  BP: 111/78 (01-11-24 @ 11:51) (100/65 - 124/90)  RR: 18 (01-11-24 @ 11:51) (18 - 19)  SpO2: 93% (01-11-24 @ 11:51) (93% - 100%)       I&O's Summary    10 Tommy 2024 07:01  -  11 Jan 2024 07:00  --------------------------------------------------------  IN: 118 mL / OUT: 300 mL / NET: -182 mL    11 Jan 2024 07:01  -  11 Jan 2024 13:53  --------------------------------------------------------  IN: 0 mL / OUT: 500 mL / NET: -500 mL       ==================>> LAB AND IMAGING <<==================                        13.8   9.88  )-----------( 223      ( 11 Jan 2024 07:32 )             43.7        01-11    140  |  105  |  30<H>  ----------------------------<  90  4.6   |  19<L>  |  1.29    Ca    9.5      11 Jan 2024 07:31  Mg     2.1     01-11    TPro  7.5  /  Alb  4.3  /  TBili  0.6  /  DBili  x   /  AST  26  /  ALT  22  /  AlkPhos  117  01-11    PT/INR - ( 11 Jan 2024 07:32 )   PT: 12.7 sec;   INR: 1.16 ratio    PTT - ( 11 Jan 2024 07:32 )  PTT:30.8 sec              __________________________________________________________________________________  ===============>>  A S S E S S M E N T   A N D   P L A N <<===============  ------------------------------------------------------------------------------------------    · Assessment	  63M w/ hx of Rheumatoid arthritis, HTN, HFpEF, chronic RV failure, group II pHTN and 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   ·  Plan: 94% on 4L NC on my exam. Appreciate pulm recommendations  -Cont. supplemental 02  -Xopenex PRN  -Prednisone dose increased  -Continuous pulse oximetry  -Full Code.    Problem/Plan - 2:  ·  Problem: 2019 novel coronavirus disease (COVID-19).   ·  Plan: COVID positive. w/ acute on chronic respiratory failure. Appreciate pulm and cardiology recommendations  -Cont. supplemental 02  -on IV Remdesivir  -Prednisone increased to 10mg daily  -Isolation precautions  -Supportive care  - nutrition, hydration, supplements, vitamins   -F/u Pulm recommendations.    Problem/Plan - 3:  ·  Problem: Sepsis.   Met sepsis criteria by WBC and HR  -Cont. COVID treatment as above  -Trend wbc and fever curve  -F/u BCxs.    Problem/Plan - 4:  ·  Problem: Chronic heart failure with preserved ejection fraction (HFpEF).   ·  Plan: Relatively euvolemic appearing. Appreciate cardiology recommendations  -Continue bumex 1mg PO daily  -Continue sildenafil 20mg TID  -Continue spironolactone 25mg daily  -Strict I/Os  -Daily weights  -F/u cardiology recommendations.    Problem/Plan - 5:  ·  Problem: H/O pulmonary hypertension.   ·  Plan: -Contineu bumex 1mg PO daily  -Continue sildenafil 20mg TID  -Cont. Advair BID.    Problem/Plan - 6:  ·  Problem: Stage 3 chronic kidney disease.   ·  Plan: Crt  1.3 stable compared to prior  -Trend BMP  -Renal dose meds  -Limit nephrotoxic meds.    Problem/Plan - 7:  ·  Problem: Prophylactic measure.   ·  Plan: DVT PPx  -Hep subq.    --------------------------------------------  Case discussed with patient..   Education given on findings and plan of care  ___________________________  SALIMA Vu D.O.  Pager: [phone]     _________________________________________________________________________________________  ========>>  M E D I C A L   A T T E N D I N G    F O L L O W  U P  N O T E  <<=========  -----------------------------------------------------------------------------------------------------    - Patient seen and examined by me earlier today.   - In summary,  PLACIDO GOLDMAN is a 63y year old man admitted with COVID  - Patient today overall doing ok, comfortable, eating OK. / good appetite... breathing ok, on and off O2 NC     ==================>> REVIEW OF SYSTEM <<=================    GEN: no fever, no chills, no pain  RESP: no significant SOB, occasional cough, no sputum  CVS: no chest pain, no palpitations  GI: no abdominal pain, no nausea, no constipation, no diarrhea  : no dysuria, no frequency  Neuro: no headache, no dizziness    ==================>> PHYSICAL EXAM <<=================    GEN: A&O X 3 , NAD , comfortable, pleasant, calm .. sitting on bed, eating   HEENT: NCAT, PERRL, MMM, hearing intact  CVS: S1S2 , regular , No M/R/G appreciated  PULM: mild scaterred rhonchi   ABD.: soft. non tender, non distended,  bowel sounds present  Extrem: intact pulses , no edema           ( Note written / Date of service 01-11-24 ( This is certified to be the same as "ENTERED" date above ( for billing purposes)))    ==================>> MEDICATIONS <<====================    MEDICATIONS  (STANDING):  buMETAnide 1 milliGRAM(s) Oral daily  chlorhexidine 2% Cloths 1 Application(s) Topical <User Schedule>  heparin   Injectable 5000 Unit(s) SubCutaneous every 12 hours  predniSONE   Tablet 10 milliGRAM(s) Oral daily  remdesivir  IVPB   IV Intermittent   sildenafil (REVATIO) 20 milliGRAM(s) Oral three times a day  spironolactone 25 milliGRAM(s) Oral daily    MEDICATIONS  (PRN):  acetaminophen     Tablet .. 650 milliGRAM(s) Oral every 6 hours PRN Temp greater or equal to 38C (100.4F), Mild Pain (1 - 3)  guaiFENesin Oral Liquid (Sugar-Free) 200 milliGRAM(s) Oral every 6 hours PRN Cough  levalbuterol Inhalation 0.63 milliGRAM(s) Inhalation every 8 hours PRN wheezing  melatonin 3 milliGRAM(s) Oral at bedtime PRN Insomnia    ___________  Active diet:  Diet, Regular:   1500mL Fluid Restriction ([id number])  Low Sodium  ___________________    ==================>> VITAL SIGNS <<==================    T(C): 36.4 (01-11-24 @ 11:51), Max: 38.8 (01-10-24 @ 17:13)  HR: 99 (01-11-24 @ 11:51) (99 - 121)  BP: 111/78 (01-11-24 @ 11:51) (100/65 - 124/90)  RR: 18 (01-11-24 @ 11:51) (18 - 19)  SpO2: 93% (01-11-24 @ 11:51) (93% - 100%)       I&O's Summary    10 Tommy 2024 07:01  -  11 Jan 2024 07:00  --------------------------------------------------------  IN: 118 mL / OUT: 300 mL / NET: -182 mL    11 Jan 2024 07:01  -  11 Jan 2024 13:53  --------------------------------------------------------  IN: 0 mL / OUT: 500 mL / NET: -500 mL       ==================>> LAB AND IMAGING <<==================                        13.8   9.88  )-----------( 223      ( 11 Jan 2024 07:32 )             43.7        01-11    140  |  105  |  30<H>  ----------------------------<  90  4.6   |  19<L>  |  1.29    Ca    9.5      11 Jan 2024 07:31  Mg     2.1     01-11    TPro  7.5  /  Alb  4.3  /  TBili  0.6  /  DBili  x   /  AST  26  /  ALT  22  /  AlkPhos  117  01-11    PT/INR - ( 11 Jan 2024 07:32 )   PT: 12.7 sec;   INR: 1.16 ratio    PTT - ( 11 Jan 2024 07:32 )  PTT:30.8 sec              __________________________________________________________________________________  ===============>>  A S S E S S M E N T   A N D   P L A N <<===============  ------------------------------------------------------------------------------------------    · Assessment	  63M w/ hx of Rheumatoid arthritis, HTN, HFpEF, chronic RV failure, group II pHTN and 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   ·  Plan: 94% on 4L NC on my exam. Appreciate pulm recommendations  -Cont. supplemental 02  -Xopenex PRN  -Prednisone dose increased  -Continuous pulse oximetry  -Full Code.    Problem/Plan - 2:  ·  Problem: 2019 novel coronavirus disease (COVID-19).   ·  Plan: COVID positive. w/ acute on chronic respiratory failure. Appreciate pulm and cardiology recommendations  -Cont. supplemental 02  -on IV Remdesivir  -Prednisone increased to 10mg daily  -Isolation precautions  -Supportive care  - nutrition, hydration, supplements, vitamins   -F/u Pulm recommendations.    Problem/Plan - 3:  ·  Problem: Sepsis.   Met sepsis criteria by WBC and HR  -Cont. COVID treatment as above  -Trend wbc and fever curve  -F/u BCxs.    Problem/Plan - 4:  ·  Problem: Chronic heart failure with preserved ejection fraction (HFpEF).   ·  Plan: Relatively euvolemic appearing. Appreciate cardiology recommendations  -Continue bumex 1mg PO daily  -Continue sildenafil 20mg TID  -Continue spironolactone 25mg daily  -Strict I/Os  -Daily weights  -F/u cardiology recommendations.    Problem/Plan - 5:  ·  Problem: H/O pulmonary hypertension.   ·  Plan: -Contineu bumex 1mg PO daily  -Continue sildenafil 20mg TID  -Cont. Advair BID.    Problem/Plan - 6:  ·  Problem: Stage 3 chronic kidney disease.   ·  Plan: Crt  1.3 stable compared to prior  -Trend BMP  -Renal dose meds  -Limit nephrotoxic meds.    Problem/Plan - 7:  ·  Problem: Prophylactic measure.   ·  Plan: DVT PPx  -Hep subq.    --------------------------------------------  Case discussed with patient..   Education given on findings and plan of care  ___________________________  SALIMA Vu D.O.  Pager: [phone]

## 2024-01-12 LAB
ALBUMIN SERPL ELPH-MCNC: 3.9 G/DL — SIGNIFICANT CHANGE UP (ref 3.3–5)
ALBUMIN SERPL ELPH-MCNC: 3.9 G/DL — SIGNIFICANT CHANGE UP (ref 3.3–5)
ALBUMIN SERPL ELPH-MCNC: 4.4 G/DL — SIGNIFICANT CHANGE UP (ref 3.3–5)
ALBUMIN SERPL ELPH-MCNC: 4.4 G/DL — SIGNIFICANT CHANGE UP (ref 3.3–5)
ALP SERPL-CCNC: 121 U/L — HIGH (ref 40–120)
ALP SERPL-CCNC: 121 U/L — HIGH (ref 40–120)
ALP SERPL-CCNC: <15 U/L — LOW (ref 40–120)
ALP SERPL-CCNC: <15 U/L — LOW (ref 40–120)
ALT FLD-CCNC: 18 U/L — SIGNIFICANT CHANGE UP (ref 10–45)
ALT FLD-CCNC: 18 U/L — SIGNIFICANT CHANGE UP (ref 10–45)
ALT FLD-CCNC: 20 U/L — SIGNIFICANT CHANGE UP (ref 10–45)
ALT FLD-CCNC: 20 U/L — SIGNIFICANT CHANGE UP (ref 10–45)
ANION GAP SERPL CALC-SCNC: 15 MMOL/L — SIGNIFICANT CHANGE UP (ref 5–17)
ANION GAP SERPL CALC-SCNC: 15 MMOL/L — SIGNIFICANT CHANGE UP (ref 5–17)
ANION GAP SERPL CALC-SCNC: 26 MMOL/L — HIGH (ref 5–17)
ANION GAP SERPL CALC-SCNC: 26 MMOL/L — HIGH (ref 5–17)
AST SERPL-CCNC: 27 U/L — SIGNIFICANT CHANGE UP (ref 10–40)
AST SERPL-CCNC: 27 U/L — SIGNIFICANT CHANGE UP (ref 10–40)
AST SERPL-CCNC: 29 U/L — SIGNIFICANT CHANGE UP (ref 10–40)
AST SERPL-CCNC: 29 U/L — SIGNIFICANT CHANGE UP (ref 10–40)
BILIRUB SERPL-MCNC: 0.6 MG/DL — SIGNIFICANT CHANGE UP (ref 0.2–1.2)
BUN SERPL-MCNC: 41 MG/DL — HIGH (ref 7–23)
BUN SERPL-MCNC: 41 MG/DL — HIGH (ref 7–23)
BUN SERPL-MCNC: 43 MG/DL — HIGH (ref 7–23)
BUN SERPL-MCNC: 43 MG/DL — HIGH (ref 7–23)
CALCIUM SERPL-MCNC: 9.4 MG/DL — SIGNIFICANT CHANGE UP (ref 8.4–10.5)
CALCIUM SERPL-MCNC: 9.4 MG/DL — SIGNIFICANT CHANGE UP (ref 8.4–10.5)
CALCIUM SERPL-MCNC: <3 MG/DL — CRITICAL LOW (ref 8.4–10.5)
CALCIUM SERPL-MCNC: <3 MG/DL — CRITICAL LOW (ref 8.4–10.5)
CHLORIDE SERPL-SCNC: 92 MMOL/L — LOW (ref 96–108)
CHLORIDE SERPL-SCNC: 92 MMOL/L — LOW (ref 96–108)
CHLORIDE SERPL-SCNC: 99 MMOL/L — SIGNIFICANT CHANGE UP (ref 96–108)
CHLORIDE SERPL-SCNC: 99 MMOL/L — SIGNIFICANT CHANGE UP (ref 96–108)
CO2 SERPL-SCNC: 15 MMOL/L — LOW (ref 22–31)
CO2 SERPL-SCNC: 15 MMOL/L — LOW (ref 22–31)
CO2 SERPL-SCNC: 24 MMOL/L — SIGNIFICANT CHANGE UP (ref 22–31)
CO2 SERPL-SCNC: 24 MMOL/L — SIGNIFICANT CHANGE UP (ref 22–31)
CREAT SERPL-MCNC: 1.44 MG/DL — HIGH (ref 0.5–1.3)
CREAT SERPL-MCNC: 1.44 MG/DL — HIGH (ref 0.5–1.3)
CREAT SERPL-MCNC: 1.58 MG/DL — HIGH (ref 0.5–1.3)
CREAT SERPL-MCNC: 1.58 MG/DL — HIGH (ref 0.5–1.3)
EGFR: 49 ML/MIN/1.73M2 — LOW
EGFR: 49 ML/MIN/1.73M2 — LOW
EGFR: 54 ML/MIN/1.73M2 — LOW
EGFR: 54 ML/MIN/1.73M2 — LOW
GLUCOSE SERPL-MCNC: 69 MG/DL — LOW (ref 70–99)
GLUCOSE SERPL-MCNC: 69 MG/DL — LOW (ref 70–99)
GLUCOSE SERPL-MCNC: 73 MG/DL — SIGNIFICANT CHANGE UP (ref 70–99)
GLUCOSE SERPL-MCNC: 73 MG/DL — SIGNIFICANT CHANGE UP (ref 70–99)
HCT VFR BLD CALC: 42.7 % — SIGNIFICANT CHANGE UP (ref 39–50)
HCT VFR BLD CALC: 42.7 % — SIGNIFICANT CHANGE UP (ref 39–50)
HGB BLD-MCNC: 13.5 G/DL — SIGNIFICANT CHANGE UP (ref 13–17)
HGB BLD-MCNC: 13.5 G/DL — SIGNIFICANT CHANGE UP (ref 13–17)
MAGNESIUM SERPL-MCNC: 2.1 MG/DL — SIGNIFICANT CHANGE UP (ref 1.6–2.6)
MAGNESIUM SERPL-MCNC: 2.1 MG/DL — SIGNIFICANT CHANGE UP (ref 1.6–2.6)
MAGNESIUM SERPL-MCNC: <.6 MG/DL — CRITICAL LOW (ref 1.6–2.6)
MAGNESIUM SERPL-MCNC: <.6 MG/DL — CRITICAL LOW (ref 1.6–2.6)
MCHC RBC-ENTMCNC: 30 PG — SIGNIFICANT CHANGE UP (ref 27–34)
MCHC RBC-ENTMCNC: 30 PG — SIGNIFICANT CHANGE UP (ref 27–34)
MCHC RBC-ENTMCNC: 31.6 GM/DL — LOW (ref 32–36)
MCHC RBC-ENTMCNC: 31.6 GM/DL — LOW (ref 32–36)
MCV RBC AUTO: 94.9 FL — SIGNIFICANT CHANGE UP (ref 80–100)
MCV RBC AUTO: 94.9 FL — SIGNIFICANT CHANGE UP (ref 80–100)
NRBC # BLD: 0 /100 WBCS — SIGNIFICANT CHANGE UP (ref 0–0)
NRBC # BLD: 0 /100 WBCS — SIGNIFICANT CHANGE UP (ref 0–0)
PLATELET # BLD AUTO: 218 K/UL — SIGNIFICANT CHANGE UP (ref 150–400)
PLATELET # BLD AUTO: 218 K/UL — SIGNIFICANT CHANGE UP (ref 150–400)
POTASSIUM SERPL-MCNC: 4.5 MMOL/L — SIGNIFICANT CHANGE UP (ref 3.5–5.3)
POTASSIUM SERPL-MCNC: 4.5 MMOL/L — SIGNIFICANT CHANGE UP (ref 3.5–5.3)
POTASSIUM SERPL-MCNC: >9 MMOL/L — CRITICAL HIGH (ref 3.5–5.3)
POTASSIUM SERPL-MCNC: >9 MMOL/L — CRITICAL HIGH (ref 3.5–5.3)
POTASSIUM SERPL-SCNC: 4.5 MMOL/L — SIGNIFICANT CHANGE UP (ref 3.5–5.3)
POTASSIUM SERPL-SCNC: 4.5 MMOL/L — SIGNIFICANT CHANGE UP (ref 3.5–5.3)
POTASSIUM SERPL-SCNC: >9 MMOL/L — CRITICAL HIGH (ref 3.5–5.3)
POTASSIUM SERPL-SCNC: >9 MMOL/L — CRITICAL HIGH (ref 3.5–5.3)
PROT SERPL-MCNC: 7.3 G/DL — SIGNIFICANT CHANGE UP (ref 6–8.3)
PROT SERPL-MCNC: 7.3 G/DL — SIGNIFICANT CHANGE UP (ref 6–8.3)
PROT SERPL-MCNC: 8.1 G/DL — SIGNIFICANT CHANGE UP (ref 6–8.3)
PROT SERPL-MCNC: 8.1 G/DL — SIGNIFICANT CHANGE UP (ref 6–8.3)
RBC # BLD: 4.5 M/UL — SIGNIFICANT CHANGE UP (ref 4.2–5.8)
RBC # BLD: 4.5 M/UL — SIGNIFICANT CHANGE UP (ref 4.2–5.8)
RBC # FLD: 14.4 % — SIGNIFICANT CHANGE UP (ref 10.3–14.5)
RBC # FLD: 14.4 % — SIGNIFICANT CHANGE UP (ref 10.3–14.5)
SODIUM SERPL-SCNC: 133 MMOL/L — LOW (ref 135–145)
SODIUM SERPL-SCNC: 133 MMOL/L — LOW (ref 135–145)
SODIUM SERPL-SCNC: 138 MMOL/L — SIGNIFICANT CHANGE UP (ref 135–145)
SODIUM SERPL-SCNC: 138 MMOL/L — SIGNIFICANT CHANGE UP (ref 135–145)
WBC # BLD: 9.45 K/UL — SIGNIFICANT CHANGE UP (ref 3.8–10.5)
WBC # BLD: 9.45 K/UL — SIGNIFICANT CHANGE UP (ref 3.8–10.5)
WBC # FLD AUTO: 9.45 K/UL — SIGNIFICANT CHANGE UP (ref 3.8–10.5)
WBC # FLD AUTO: 9.45 K/UL — SIGNIFICANT CHANGE UP (ref 3.8–10.5)

## 2024-01-12 RX ORDER — MACITENTAN 10 MG/1
10 TABLET, FILM COATED ORAL
Qty: 1 | Refills: 3 | Status: ACTIVE | COMMUNITY
Start: 2024-01-05 | End: 1900-01-01

## 2024-01-12 RX ADMIN — SPIRONOLACTONE 25 MILLIGRAM(S): 25 TABLET, FILM COATED ORAL at 06:20

## 2024-01-12 RX ADMIN — HEPARIN SODIUM 5000 UNIT(S): 5000 INJECTION INTRAVENOUS; SUBCUTANEOUS at 06:20

## 2024-01-12 RX ADMIN — CHLORHEXIDINE GLUCONATE 1 APPLICATION(S): 213 SOLUTION TOPICAL at 06:39

## 2024-01-12 RX ADMIN — REMDESIVIR 200 MILLIGRAM(S): 5 INJECTION INTRAVENOUS at 09:26

## 2024-01-12 RX ADMIN — Medication 20 MILLIGRAM(S): at 13:29

## 2024-01-12 RX ADMIN — Medication 20 MILLIGRAM(S): at 22:07

## 2024-01-12 RX ADMIN — BUMETANIDE 1 MILLIGRAM(S): 0.25 INJECTION INTRAMUSCULAR; INTRAVENOUS at 06:20

## 2024-01-12 RX ADMIN — Medication 20 MILLIGRAM(S): at 06:20

## 2024-01-12 RX ADMIN — Medication 3 MILLIGRAM(S): at 22:07

## 2024-01-12 RX ADMIN — Medication 10 MILLIGRAM(S): at 06:20

## 2024-01-12 RX ADMIN — HEPARIN SODIUM 5000 UNIT(S): 5000 INJECTION INTRAVENOUS; SUBCUTANEOUS at 17:34

## 2024-01-12 NOTE — PROGRESS NOTE ADULT - SUBJECTIVE AND OBJECTIVE BOX
Cardiovascular Disease Progress Note    Overnight events: No acute events overnight.  no new cardiac sx  Otherwise review of systems negative    Objective Findings:  T(C): 36.7 (24 @ 05:25), Max: 36.7 (24 @ 05:25)  HR: 99 (24 @ 05:25) (56 - 99)  BP: 112/69 (24 @ 05:25) (110/- - 112/69)  RR: 18 (24 @ 05:25) (18 - 18)  SpO2: 95% (24 @ 05:25) (93% - 95%)  Wt(kg): --  Daily     Daily Weight in k.3 (2024 05:25)      Physical Exam:  Gen: NAD  HEENT: EOMI  CV: RRR, normal S1 + S2, no m/r/g  Lungs: CTAB  Abd: soft, non-tender  Ext: No edema    Telemetry:    Laboratory Data:                        13.8   9.88  )-----------( 223      ( 2024 07:32 )             43.7         140  |  105  |  30<H>  ----------------------------<  90  4.6   |  19<L>  |  1.29    Ca    9.5      2024 07:31  Mg     2.1         TPro  7.5  /  Alb  4.3  /  TBili  0.6  /  DBili  x   /  AST  26  /  ALT  22  /  AlkPhos  117      PT/INR - ( 2024 07:32 )   PT: 12.7 sec;   INR: 1.16 ratio         PTT - ( 2024 07:32 )  PTT:30.8 sec          Inpatient Medications:  MEDICATIONS  (STANDING):  buMETAnide 1 milliGRAM(s) Oral daily  chlorhexidine 2% Cloths 1 Application(s) Topical <User Schedule>  heparin   Injectable 5000 Unit(s) SubCutaneous every 12 hours  predniSONE   Tablet 10 milliGRAM(s) Oral daily  remdesivir  IVPB   IV Intermittent   remdesivir  IVPB 100 milliGRAM(s) IV Intermittent every 24 hours  sildenafil (REVATIO) 20 milliGRAM(s) Oral three times a day  spironolactone 25 milliGRAM(s) Oral daily      Assessment:  63M w/ hx of Rheumatoid arthritis, HTN, HFpEF, chronic RV failure, group II pHTN and 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       Recs:  cardiac stable  vol status appears stable. cw diuretics per hf  pulm htn, on sildenafil. hf following and recs appreciated  tx of covid pna per primary team  routine icd interrogation as op  hx of lv dysfunction, improved on gdmt   pulmonary follow up  will follow  dvt ppx        Over 25 minutes spent on total encounter; more than 50% of the visit was spent counseling and/or coordinating care by the attending physician.      Juarez Carver MD   Cardiovascular Disease  [phone] Cardiovascular Disease Progress Note    Overnight events: No acute events overnight.  no new cardiac sx  Otherwise review of systems negative    Objective Findings:  T(C): 36.7 (24 @ 05:25), Max: 36.7 (24 @ 05:25)  HR: 99 (24 @ 05:25) (56 - 99)  BP: 112/69 (24 @ 05:25) (110/- - 112/69)  RR: 18 (24 @ 05:25) (18 - 18)  SpO2: 95% (24 @ 05:25) (93% - 95%)  Wt(kg): --  Daily     Daily Weight in k.3 (2024 05:25)      Physical Exam:  Gen: NAD  HEENT: EOMI  CV: RRR, normal S1 + S2, no m/r/g  Lungs: CTAB  Abd: soft, non-tender  Ext: No edema    Telemetry:    Laboratory Data:                        13.8   9.88  )-----------( 223      ( 2024 07:32 )             43.7         140  |  105  |  30<H>  ----------------------------<  90  4.6   |  19<L>  |  1.29    Ca    9.5      2024 07:31  Mg     2.1         TPro  7.5  /  Alb  4.3  /  TBili  0.6  /  DBili  x   /  AST  26  /  ALT  22  /  AlkPhos  117      PT/INR - ( 2024 07:32 )   PT: 12.7 sec;   INR: 1.16 ratio         PTT - ( 2024 07:32 )  PTT:30.8 sec          Inpatient Medications:  MEDICATIONS  (STANDING):  buMETAnide 1 milliGRAM(s) Oral daily  chlorhexidine 2% Cloths 1 Application(s) Topical <User Schedule>  heparin   Injectable 5000 Unit(s) SubCutaneous every 12 hours  predniSONE   Tablet 10 milliGRAM(s) Oral daily  remdesivir  IVPB   IV Intermittent   remdesivir  IVPB 100 milliGRAM(s) IV Intermittent every 24 hours  sildenafil (REVATIO) 20 milliGRAM(s) Oral three times a day  spironolactone 25 milliGRAM(s) Oral daily      Assessment:  63M w/ hx of Rheumatoid arthritis, HTN, HFpEF, chronic RV failure, group II pHTN and 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       Recs:  cardiac stable  vol status appears stable. cw diuretics per hf  pulm htn, on sildenafil. hf following and recs appreciated  tx of covid pna per primary team  routine icd interrogation as op  hx of lv dysfunction, improved on gdmt   pulmonary follow up  will follow  dvt ppx        Over 25 minutes spent on total encounter; more than 50% of the visit was spent counseling and/or coordinating care by the attending physician.      Juarez Carver MD   Cardiovascular Disease  [phone]

## 2024-01-12 NOTE — PROGRESS NOTE ADULT - ASSESSMENT
_________________________________________________________________________________________  ========>>  M E D I C A L   A T T E N D I N G    F O L L O W  U P  N O T E  <<=========  -----------------------------------------------------------------------------------------------------    - Patient seen and examined by me earlier today.   - In summary,  PLACIDO GOLDMAN is a 63y year old man admitted with COVID  - Patient today overall doing ok, comfortable, eating OK. / good appetite... breathing ok, on and off O2 NC       On and off coughing, with some sputum    ==================>> REVIEW OF SYSTEM <<=================    GEN: no fever, no chills, no pain  RESP: no significant SOB, occasional cough , Occasional yellow sputum  CVS: no chest pain, no palpitations  GI: no abdominal pain, no nausea, Good appetite  : no dysuria, no frequency  Neuro: no headache, no dizziness    ==================>> PHYSICAL EXAM <<=================    GEN: A&O X 3 , NAD , comfortable, pleasant, calm .. in  bed, encouraged out of bed to chair with assistance as needed.   HEENT: NCAT, PERRL, MMM, hearing intact  CVS: S1S2 , regular , No M/R/G appreciated  PULM: mild scaterred rhonchi , Slightly improved  ABD.: soft. non tender, non distended,  bowel sounds present  Extrem: intact pulses , no edema        ( Note written / Date of service 01-12-24 ( This is certified to be the same as "ENTERED" date above ( for billing purposes)))    ==================>> MEDICATIONS <<====================    buMETAnide 1 milliGRAM(s) Oral daily  chlorhexidine 2% Cloths 1 Application(s) Topical <User Schedule>  heparin   Injectable 5000 Unit(s) SubCutaneous every 12 hours  predniSONE   Tablet 10 milliGRAM(s) Oral daily  remdesivir  IVPB   IV Intermittent   remdesivir  IVPB 100 milliGRAM(s) IV Intermittent every 24 hours  sildenafil (REVATIO) 20 milliGRAM(s) Oral three times a day  spironolactone 25 milliGRAM(s) Oral daily    MEDICATIONS  (PRN):  acetaminophen     Tablet .. 650 milliGRAM(s) Oral every 6 hours PRN Temp greater or equal to 38C (100.4F), Mild Pain (1 - 3)  guaiFENesin Oral Liquid (Sugar-Free) 200 milliGRAM(s) Oral every 6 hours PRN Cough  levalbuterol Inhalation 0.63 milliGRAM(s) Inhalation every 8 hours PRN wheezing  melatonin 3 milliGRAM(s) Oral at bedtime PRN Insomnia    ___________  Active diet:  Diet, Regular:   1500mL Fluid Restriction ([id number])  Low Sodium  ___________________    ==================>> VITAL SIGNS <<==================    Height (cm): 167.6  Weight (kg): 60.8  BMI (kg/m2): 21.6  Vital Signs Last 24 HrsT(C): 36.4 (01-12-24 @ 11:10)  T(F): 97.6 (01-12-24 @ 11:10), Max: 98.1 (01-12-24 @ 05:25)  HR: 92 (01-12-24 @ 11:10) (56 - 99)  BP: 109/76 (01-12-24 @ 11:10)  RR: 18 (01-12-24 @ 11:10) (18 - 18)  SpO2: 93% (01-12-24 @ 11:10) (93% - 95%)       ==================>> LAB AND IMAGING <<==================                        13.5   9.45  )-----------( 218      ( 12 Jan 2024 07:04 )             42.7        01-12    138  |  99  |  41<H>  ----------------------------<  73  4.5   |  24  |  1.44<H>    Ca    9.4      12 Jan 2024 14:18  Mg     2.1     01-12    TPro  8.1  /  Alb  4.4  /  TBili  0.6  /  DBili  x   /  AST  29  /  ALT  20  /  AlkPhos  121<H>  01-12    WBC count:   9.45 <<== ,  9.88 <<== ,  14.25 <<==   Hemoglobin:   13.5 <<==,  13.8 <<==,  13.6 <<==  platelets:  218 <==, 223 <==, 211 <==    Creatinine:  1.44  <<==, 1.58  <<==, 1.29  <<==, 1.32  <<==  Sodium:   138  <==, 133  <==, 140  <==, 139  <==       AST:          29(01-12) <== , 27(01-12) <== , 26(01-11) <== , 46(01-10) <==      ALT:        20(01-12)  <== , 18(01-12)  <== , 22(01-11)  <== , 29(01-10)  <==      AP:        121(01-12)  <=, <15(01-12)  <=, 117(01-11)  <=, 132(01-10)  <=     Bili:        0.6(01-12)  <=, 0.6(01-12)  <=, 0.6(01-11)  <=, 0.6(01-10)  <=      ____________________________    M I C R O B I O L O G Y :    Culture - Blood (collected 10 Tommy 2024 18:42)  Source: .Blood Blood  Preliminary Report (12 Jan 2024 02:04):    No growth at 24 hours    Culture - Blood (collected 10 Tommy 2024 18:25)  Source: .Blood Blood  Preliminary Report (12 Jan 2024 02:04):    No growth at 24 hours    __________________________________________________________________________________  ===============>>  A S S E S S M E N T   A N D   P L A N <<===============  ------------------------------------------------------------------------------------------    · Assessment	  63M w/ hx of Rheumatoid arthritis, HTN, HFpEF, chronic RV failure, group II pHTN and 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   ·  Plan: 94% on 4L NC on my exam. Appreciate pulm recommendations  -Cont. supplemental 02  -Xopenex PRN  -Prednisone dose increased  -Continuous pulse oximetry  -Full Code.    Problem/Plan - 2:  ·  Problem: 2019 novel coronavirus disease (COVID-19).   ·  Plan: COVID positive. w/ acute on chronic respiratory failure. Appreciate pulm and cardiology recommendations  -Cont. supplemental 02 And wean down as able  -on IV Remdesivir  -Prednisone increased to 10mg daily  - Nebulizer treatment as needed  -Isolation precautions  -Supportive care  - nutrition, hydration, supplements, vitamins     Problem/Plan - 3:  ·  Problem: Sepsis.   Met sepsis criteria by WBC and HR  -Cont. COVID treatment as above  -Trend wbc and fever curve  -F/u BCxs.    Problem/Plan - 4:  ·  Problem: Chronic heart failure with preserved ejection fraction (HFpEF).   ·  Plan: Relatively euvolemic appearing. Appreciate cardiology recommendations  -Continue bumex 1mg PO daily  -Continue sildenafil 20mg TID  -Continue spironolactone 25mg daily  -Strict I/Os  -Daily weights  -F/u cardiology recommendations.    Problem/Plan - 5:  ·  Problem: H/O pulmonary hypertension.   ·  Plan: -Contineu bumex 1mg PO daily  -Continue sildenafil 20mg TID  -Cont. Advair BID.    Problem/Plan - 6:  ·  Problem: Stage 3 chronic kidney disease.   ·  Plan: Crt  1.3 stable compared to prior  -Trend BMP  -Renal dose meds  -Limit nephrotoxic meds.    Problem/Plan - 7:  ·  Problem: Prophylactic measure.   ·  Plan: DVT PPx  -Hep subq.    --------------------------------------------  Case discussed with patient..   Education given on findings and plan of care  ___________________________  HEvangelist Vu D.O.  Pager: [phone]     _________________________________________________________________________________________  ========>>  M E D I C A L   A T T E N D I N G    F O L L O W  U P  N O T E  <<=========  -----------------------------------------------------------------------------------------------------    - Patient seen and examined by me earlier today.   - In summary,  PLACIDO GOLDMAN is a 63y year old man admitted with COVID  - Patient today overall doing ok, comfortable, eating OK. / good appetite... breathing ok, on and off O2 NC       On and off coughing, with some sputum    ==================>> REVIEW OF SYSTEM <<=================    GEN: no fever, no chills, no pain  RESP: no significant SOB, occasional cough , Occasional yellow sputum  CVS: no chest pain, no palpitations  GI: no abdominal pain, no nausea, Good appetite  : no dysuria, no frequency  Neuro: no headache, no dizziness    ==================>> PHYSICAL EXAM <<=================    GEN: A&O X 3 , NAD , comfortable, pleasant, calm .. in  bed, encouraged out of bed to chair with assistance as needed.   HEENT: NCAT, PERRL, MMM, hearing intact  CVS: S1S2 , regular , No M/R/G appreciated  PULM: mild scaterred rhonchi , Slightly improved  ABD.: soft. non tender, non distended,  bowel sounds present  Extrem: intact pulses , no edema        ( Note written / Date of service 01-12-24 ( This is certified to be the same as "ENTERED" date above ( for billing purposes)))    ==================>> MEDICATIONS <<====================    buMETAnide 1 milliGRAM(s) Oral daily  chlorhexidine 2% Cloths 1 Application(s) Topical <User Schedule>  heparin   Injectable 5000 Unit(s) SubCutaneous every 12 hours  predniSONE   Tablet 10 milliGRAM(s) Oral daily  remdesivir  IVPB   IV Intermittent   remdesivir  IVPB 100 milliGRAM(s) IV Intermittent every 24 hours  sildenafil (REVATIO) 20 milliGRAM(s) Oral three times a day  spironolactone 25 milliGRAM(s) Oral daily    MEDICATIONS  (PRN):  acetaminophen     Tablet .. 650 milliGRAM(s) Oral every 6 hours PRN Temp greater or equal to 38C (100.4F), Mild Pain (1 - 3)  guaiFENesin Oral Liquid (Sugar-Free) 200 milliGRAM(s) Oral every 6 hours PRN Cough  levalbuterol Inhalation 0.63 milliGRAM(s) Inhalation every 8 hours PRN wheezing  melatonin 3 milliGRAM(s) Oral at bedtime PRN Insomnia    ___________  Active diet:  Diet, Regular:   1500mL Fluid Restriction ([id number])  Low Sodium  ___________________    ==================>> VITAL SIGNS <<==================    Height (cm): 167.6  Weight (kg): 60.8  BMI (kg/m2): 21.6  Vital Signs Last 24 HrsT(C): 36.4 (01-12-24 @ 11:10)  T(F): 97.6 (01-12-24 @ 11:10), Max: 98.1 (01-12-24 @ 05:25)  HR: 92 (01-12-24 @ 11:10) (56 - 99)  BP: 109/76 (01-12-24 @ 11:10)  RR: 18 (01-12-24 @ 11:10) (18 - 18)  SpO2: 93% (01-12-24 @ 11:10) (93% - 95%)       ==================>> LAB AND IMAGING <<==================                        13.5   9.45  )-----------( 218      ( 12 Jan 2024 07:04 )             42.7        01-12    138  |  99  |  41<H>  ----------------------------<  73  4.5   |  24  |  1.44<H>    Ca    9.4      12 Jan 2024 14:18  Mg     2.1     01-12    TPro  8.1  /  Alb  4.4  /  TBili  0.6  /  DBili  x   /  AST  29  /  ALT  20  /  AlkPhos  121<H>  01-12    WBC count:   9.45 <<== ,  9.88 <<== ,  14.25 <<==   Hemoglobin:   13.5 <<==,  13.8 <<==,  13.6 <<==  platelets:  218 <==, 223 <==, 211 <==    Creatinine:  1.44  <<==, 1.58  <<==, 1.29  <<==, 1.32  <<==  Sodium:   138  <==, 133  <==, 140  <==, 139  <==       AST:          29(01-12) <== , 27(01-12) <== , 26(01-11) <== , 46(01-10) <==      ALT:        20(01-12)  <== , 18(01-12)  <== , 22(01-11)  <== , 29(01-10)  <==      AP:        121(01-12)  <=, <15(01-12)  <=, 117(01-11)  <=, 132(01-10)  <=     Bili:        0.6(01-12)  <=, 0.6(01-12)  <=, 0.6(01-11)  <=, 0.6(01-10)  <=      ____________________________    M I C R O B I O L O G Y :    Culture - Blood (collected 10 Tommy 2024 18:42)  Source: .Blood Blood  Preliminary Report (12 Jan 2024 02:04):    No growth at 24 hours    Culture - Blood (collected 10 Tommy 2024 18:25)  Source: .Blood Blood  Preliminary Report (12 Jan 2024 02:04):    No growth at 24 hours    __________________________________________________________________________________  ===============>>  A S S E S S M E N T   A N D   P L A N <<===============  ------------------------------------------------------------------------------------------    · Assessment	  63M w/ hx of Rheumatoid arthritis, HTN, HFpEF, chronic RV failure, group II pHTN and 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   ·  Plan: 94% on 4L NC on my exam. Appreciate pulm recommendations  -Cont. supplemental 02  -Xopenex PRN  -Prednisone dose increased  -Continuous pulse oximetry  -Full Code.    Problem/Plan - 2:  ·  Problem: 2019 novel coronavirus disease (COVID-19).   ·  Plan: COVID positive. w/ acute on chronic respiratory failure. Appreciate pulm and cardiology recommendations  -Cont. supplemental 02 And wean down as able  -on IV Remdesivir  -Prednisone increased to 10mg daily  - Nebulizer treatment as needed  -Isolation precautions  -Supportive care  - nutrition, hydration, supplements, vitamins     Problem/Plan - 3:  ·  Problem: Sepsis.   Met sepsis criteria by WBC and HR  -Cont. COVID treatment as above  -Trend wbc and fever curve  -F/u BCxs.    Problem/Plan - 4:  ·  Problem: Chronic heart failure with preserved ejection fraction (HFpEF).   ·  Plan: Relatively euvolemic appearing. Appreciate cardiology recommendations  -Continue bumex 1mg PO daily  -Continue sildenafil 20mg TID  -Continue spironolactone 25mg daily  -Strict I/Os  -Daily weights  -F/u cardiology recommendations.    Problem/Plan - 5:  ·  Problem: H/O pulmonary hypertension.   ·  Plan: -Contineu bumex 1mg PO daily  -Continue sildenafil 20mg TID  -Cont. Advair BID.    Problem/Plan - 6:  ·  Problem: Stage 3 chronic kidney disease.   ·  Plan: Crt  1.3 stable compared to prior  -Trend BMP  -Renal dose meds  -Limit nephrotoxic meds.    Problem/Plan - 7:  ·  Problem: Prophylactic measure.   ·  Plan: DVT PPx  -Hep subq.    --------------------------------------------  Case discussed with patient..   Education given on findings and plan of care  ___________________________  HEvangelist Vu D.O.  Pager: [phone]

## 2024-01-13 LAB
CRP SERPL-MCNC: 19 MG/L — HIGH (ref 0–4)
CRP SERPL-MCNC: 19 MG/L — HIGH (ref 0–4)
D DIMER BLD IA.RAPID-MCNC: 281 NG/ML DDU — HIGH
D DIMER BLD IA.RAPID-MCNC: 281 NG/ML DDU — HIGH
FERRITIN SERPL-MCNC: 280 NG/ML — SIGNIFICANT CHANGE UP (ref 30–400)
FERRITIN SERPL-MCNC: 280 NG/ML — SIGNIFICANT CHANGE UP (ref 30–400)
PROCALCITONIN SERPL-MCNC: 0.11 NG/ML — HIGH (ref 0.02–0.1)
PROCALCITONIN SERPL-MCNC: 0.11 NG/ML — HIGH (ref 0.02–0.1)

## 2024-01-13 RX ADMIN — Medication 10 MILLIGRAM(S): at 06:24

## 2024-01-13 RX ADMIN — Medication 20 MILLIGRAM(S): at 06:23

## 2024-01-13 RX ADMIN — HEPARIN SODIUM 5000 UNIT(S): 5000 INJECTION INTRAVENOUS; SUBCUTANEOUS at 06:24

## 2024-01-13 RX ADMIN — Medication 20 MILLIGRAM(S): at 13:09

## 2024-01-13 RX ADMIN — HEPARIN SODIUM 5000 UNIT(S): 5000 INJECTION INTRAVENOUS; SUBCUTANEOUS at 18:02

## 2024-01-13 RX ADMIN — CHLORHEXIDINE GLUCONATE 1 APPLICATION(S): 213 SOLUTION TOPICAL at 06:41

## 2024-01-13 RX ADMIN — Medication 20 MILLIGRAM(S): at 21:54

## 2024-01-13 RX ADMIN — Medication 3 MILLIGRAM(S): at 21:55

## 2024-01-13 RX ADMIN — REMDESIVIR 200 MILLIGRAM(S): 5 INJECTION INTRAVENOUS at 10:22

## 2024-01-13 RX ADMIN — BUMETANIDE 1 MILLIGRAM(S): 0.25 INJECTION INTRAMUSCULAR; INTRAVENOUS at 06:24

## 2024-01-13 RX ADMIN — SPIRONOLACTONE 25 MILLIGRAM(S): 25 TABLET, FILM COATED ORAL at 06:23

## 2024-01-13 NOTE — PROGRESS NOTE ADULT - SUBJECTIVE AND OBJECTIVE BOX
Cardiovascular Disease Progress Note    Overnight events: No acute events overnight.  no new cardiac sx  Otherwise review of systems negative    Objective Findings:  T(C): 36.4 (01-13-24 @ 05:03), Max: 36.6 (01-12-24 @ 21:19)  HR: 69 (01-13-24 @ 05:03) (50 - 92)  BP: 112/78 (01-13-24 @ 05:03) (109/76 - 112/78)  RR: 18 (01-13-24 @ 05:03) (18 - 18)  SpO2: 94% (01-13-24 @ 05:03) (93% - 94%)  Wt(kg): --  Daily     Daily       Physical Exam:  Gen: NAD  HEENT: EOMI  CV: RRR, normal S1 + S2, no m/r/g  Lungs: CTAB  Abd: soft, non-tender  Ext: No edema    Telemetry:    Laboratory Data:                        13.5   9.45  )-----------( 218      ( 12 Jan 2024 07:04 )             42.7     01-12    138  |  99  |  41<H>  ----------------------------<  73  4.5   |  24  |  1.44<H>    Ca    9.4      12 Jan 2024 14:18  Mg     2.1     01-12    TPro  8.1  /  Alb  4.4  /  TBili  0.6  /  DBili  x   /  AST  29  /  ALT  20  /  AlkPhos  121<H>  01-12              Inpatient Medications:  MEDICATIONS  (STANDING):  buMETAnide 1 milliGRAM(s) Oral daily  chlorhexidine 2% Cloths 1 Application(s) Topical <User Schedule>  heparin   Injectable 5000 Unit(s) SubCutaneous every 12 hours  predniSONE   Tablet 10 milliGRAM(s) Oral daily  remdesivir  IVPB   IV Intermittent   remdesivir  IVPB 100 milliGRAM(s) IV Intermittent every 24 hours  sildenafil (REVATIO) 20 milliGRAM(s) Oral three times a day  spironolactone 25 milliGRAM(s) Oral daily      Assessment:  63M w/ hx of Rheumatoid arthritis, HTN, HFpEF, chronic RV failure, group II pHTN and 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       Recs:  cardiac stable  vol status appears stable. cw diuretics per hf  pulm htn, on sildenafil. hf following and recs appreciated  tx of covid pna per primary team. sx appear mild at present  routine icd interrogation as op  hx of lv dysfunction, improved on gdmt   pulmonary follow up  will follow  dvt ppx        Over 25 minutes spent on total encounter; more than 50% of the visit was spent counseling and/or coordinating care by the attending physician.      Juarez Carver MD   Cardiovascular Disease  [phone] Cardiovascular Disease Progress Note    Overnight events: No acute events overnight.  no new cardiac sx  Otherwise review of systems negative    Objective Findings:  T(C): 36.4 (01-13-24 @ 05:03), Max: 36.6 (01-12-24 @ 21:19)  HR: 69 (01-13-24 @ 05:03) (50 - 92)  BP: 112/78 (01-13-24 @ 05:03) (109/76 - 112/78)  RR: 18 (01-13-24 @ 05:03) (18 - 18)  SpO2: 94% (01-13-24 @ 05:03) (93% - 94%)  Wt(kg): --  Daily     Daily       Physical Exam:  Gen: NAD  HEENT: EOMI  CV: RRR, normal S1 + S2, no m/r/g  Lungs: CTAB  Abd: soft, non-tender  Ext: No edema    Telemetry:    Laboratory Data:                        13.5   9.45  )-----------( 218      ( 12 Jan 2024 07:04 )             42.7     01-12    138  |  99  |  41<H>  ----------------------------<  73  4.5   |  24  |  1.44<H>    Ca    9.4      12 Jan 2024 14:18  Mg     2.1     01-12    TPro  8.1  /  Alb  4.4  /  TBili  0.6  /  DBili  x   /  AST  29  /  ALT  20  /  AlkPhos  121<H>  01-12              Inpatient Medications:  MEDICATIONS  (STANDING):  buMETAnide 1 milliGRAM(s) Oral daily  chlorhexidine 2% Cloths 1 Application(s) Topical <User Schedule>  heparin   Injectable 5000 Unit(s) SubCutaneous every 12 hours  predniSONE   Tablet 10 milliGRAM(s) Oral daily  remdesivir  IVPB   IV Intermittent   remdesivir  IVPB 100 milliGRAM(s) IV Intermittent every 24 hours  sildenafil (REVATIO) 20 milliGRAM(s) Oral three times a day  spironolactone 25 milliGRAM(s) Oral daily      Assessment:  63M w/ hx of Rheumatoid arthritis, HTN, HFpEF, chronic RV failure, group II pHTN and 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       Recs:  cardiac stable  vol status appears stable. cw diuretics per hf  pulm htn, on sildenafil. hf following and recs appreciated  tx of covid pna per primary team. sx appear mild at present  routine icd interrogation as op  hx of lv dysfunction, improved on gdmt   pulmonary follow up  will follow  dvt ppx        Over 25 minutes spent on total encounter; more than 50% of the visit was spent counseling and/or coordinating care by the attending physician.      Juarez Carver MD   Cardiovascular Disease  [phone]

## 2024-01-13 NOTE — PROGRESS NOTE ADULT - ASSESSMENT
63 year old male       h/o  Rheumatoid arthritis, HTN, HFpEF,       chronic RV failure, group II pHTN and PAH presumably from sclerosis sin scleroderma) chronic hypoxemia (5L NC), CKD (baseline Cr 1.4), Renal cell carcinoma - s/p Partial nephrectomy, plasma cell dyscrasia, and  10/2023 for klebsiella bacteremia from UTI/stone     here with COVID-pneumonia.  CT chest 10/11:  LLL atelectasis. PA measured 4 cm.   TTE 10/11:  RV enlargement with decreased function. Interventricular septal flattening in both systole and diastole.     Pulmonary hypertension            covid,  on  remdesivir/ prednisone    pHTN,  on   sildenafil 20 mg po tid.. pe r card  prednisone /  bumex  and spironolactone    on dvt  ppx

## 2024-01-13 NOTE — PROGRESS NOTE ADULT - SUBJECTIVE AND OBJECTIVE BOX
date of service: 01-13-24 @ 15:01  afberile  REVIEW OF SYSTEMS:  CONSTITUTIONAL: No fever,  no  weight loss  ENT:  No  tinnitus,   no   vertigo  NECK: No pain or stiffness  RESPIRATORY: No cough, wheezing, chills or hemoptysis;    No Shortness of Breath  CARDIOVASCULAR: No chest pain, palpitations, dizziness  GASTROINTESTINAL: No abdominal or epigastric pain. No nausea, vomiting, or hematemesis; No diarrhea  No melena or hematochezia.  GENITOURINARY: No dysuria, frequency, hematuria, or incontinence  NEUROLOGICAL: No headaches  SKIN: No itching,  no   rash  LYMPH Nodes: No enlarged glands  ENDOCRINE: No heat or cold intolerance  MUSCULOSKELETAL: No joint pain or swelling  PSYCHIATRIC: No depression, anxiety  HEME/LYMPH: No easy bruising, or bleeding gums  ALLERGY AND IMMUNOLOGIC: No hives or eczema	    MEDICATIONS  (STANDING):  buMETAnide 1 milliGRAM(s) Oral daily  chlorhexidine 2% Cloths 1 Application(s) Topical <User Schedule>  heparin   Injectable 5000 Unit(s) SubCutaneous every 12 hours  predniSONE   Tablet 10 milliGRAM(s) Oral daily  remdesivir  IVPB   IV Intermittent   remdesivir  IVPB 100 milliGRAM(s) IV Intermittent every 24 hours  sildenafil (REVATIO) 20 milliGRAM(s) Oral three times a day  spironolactone 25 milliGRAM(s) Oral daily    MEDICATIONS  (PRN):  acetaminophen     Tablet .. 650 milliGRAM(s) Oral every 6 hours PRN Temp greater or equal to 38C (100.4F), Mild Pain (1 - 3)  guaiFENesin Oral Liquid (Sugar-Free) 200 milliGRAM(s) Oral every 6 hours PRN Cough  levalbuterol Inhalation 0.63 milliGRAM(s) Inhalation every 8 hours PRN wheezing  melatonin 3 milliGRAM(s) Oral at bedtime PRN Insomnia      Vital Signs Last 24 Hrs  T(C): 36.3 (13 Jan 2024 12:24), Max: 36.6 (12 Jan 2024 21:19)  T(F): 97.3 (13 Jan 2024 12:24), Max: 97.9 (12 Jan 2024 21:19)  HR: 93 (13 Jan 2024 12:24) (50 - 93)  BP: 116/80 (13 Jan 2024 12:24) (110/70 - 116/80)  BP(mean): --  RR: 18 (13 Jan 2024 12:24) (18 - 18)  SpO2: 94% (13 Jan 2024 12:24) (94% - 94%)    Parameters below as of 13 Jan 2024 12:24  Patient On (Oxygen Delivery Method): nasal cannula      CAPILLARY BLOOD GLUCOSE        I&O's Summary    12 Jan 2024 07:01  -  13 Jan 2024 07:00  --------------------------------------------------------  IN: 820 mL / OUT: 1100 mL / NET: -280 mL          Appearance: Normal	  HEENT:   Normal oral mucosa, PERRL, EOMI	  Lymphatic: No lymphadenopathy  Cardiovascular: Normal S1 S2, No JVD  Respiratory: Lungs clear to auscultation	  Gastrointestinal:  Soft, Non-tender, + BS	  Skin: No rash, No ecchymoses	  Extremities:     LABS:                        13.5   9.45  )-----------( 218      ( 12 Jan 2024 07:04 )             42.7     01-12    138  |  99  |  41<H>  ----------------------------<  73  4.5   |  24  |  1.44<H>    Ca    9.4      12 Jan 2024 14:18  Mg     2.1     01-12    TPro  8.1  /  Alb  4.4  /  TBili  0.6  /  DBili  x   /  AST  29  /  ALT  20  /  AlkPhos  121<H>  01-12          Urinalysis Basic - ( 12 Jan 2024 14:18 )    Color: x / Appearance: x / SG: x / pH: x  Gluc: 73 mg/dL / Ketone: x  / Bili: x / Urobili: x   Blood: x / Protein: x / Nitrite: x   Leuk Esterase: x / RBC: x / WBC x   Sq Epi: x / Non Sq Epi: x / Bacteria: x                      Consultant(s) Notes Reviewed:      Care Discussed with Consultants/Other Providers:

## 2024-01-14 LAB
CRP SERPL-MCNC: 15 MG/L — HIGH (ref 0–4)
CRP SERPL-MCNC: 15 MG/L — HIGH (ref 0–4)
D DIMER BLD IA.RAPID-MCNC: 311 NG/ML DDU — HIGH
D DIMER BLD IA.RAPID-MCNC: 311 NG/ML DDU — HIGH
FERRITIN SERPL-MCNC: 260 NG/ML — SIGNIFICANT CHANGE UP (ref 30–400)
FERRITIN SERPL-MCNC: 260 NG/ML — SIGNIFICANT CHANGE UP (ref 30–400)
PROCALCITONIN SERPL-MCNC: 0.08 NG/ML — SIGNIFICANT CHANGE UP (ref 0.02–0.1)
PROCALCITONIN SERPL-MCNC: 0.08 NG/ML — SIGNIFICANT CHANGE UP (ref 0.02–0.1)

## 2024-01-14 RX ADMIN — HEPARIN SODIUM 5000 UNIT(S): 5000 INJECTION INTRAVENOUS; SUBCUTANEOUS at 17:50

## 2024-01-14 RX ADMIN — Medication 20 MILLIGRAM(S): at 13:03

## 2024-01-14 RX ADMIN — REMDESIVIR 200 MILLIGRAM(S): 5 INJECTION INTRAVENOUS at 10:06

## 2024-01-14 RX ADMIN — BUMETANIDE 1 MILLIGRAM(S): 0.25 INJECTION INTRAMUSCULAR; INTRAVENOUS at 05:36

## 2024-01-14 RX ADMIN — Medication 10 MILLIGRAM(S): at 05:36

## 2024-01-14 RX ADMIN — SPIRONOLACTONE 25 MILLIGRAM(S): 25 TABLET, FILM COATED ORAL at 05:35

## 2024-01-14 RX ADMIN — CHLORHEXIDINE GLUCONATE 1 APPLICATION(S): 213 SOLUTION TOPICAL at 05:41

## 2024-01-14 RX ADMIN — HEPARIN SODIUM 5000 UNIT(S): 5000 INJECTION INTRAVENOUS; SUBCUTANEOUS at 05:36

## 2024-01-14 RX ADMIN — Medication 20 MILLIGRAM(S): at 21:03

## 2024-01-14 RX ADMIN — Medication 20 MILLIGRAM(S): at 05:36

## 2024-01-14 NOTE — PROGRESS NOTE ADULT - ASSESSMENT
_________________________________________________________________________________________  ========>>  M E D I C A L   A T T E N D I N G    F O L L O W  U P  N O T E  <<=========  -----------------------------------------------------------------------------------------------------    - Patient seen and examined by me earlier today.   - In summary,  PLACIDO GOLDMAN is a 63y year old man admitted with COVID  - Patient today overall doing ok, comfortable, eating OK. / good appetite... breathing ok,       On and off coughing, with little sputum    ==================>> REVIEW OF SYSTEM <<=================    GEN: no fever, no chills, no pain  RESP: no significant SOB, occasional cough   CVS: no chest pain, no palpitations  GI: no abdominal pain, no nausea, Good appetite  : no dysuria, no frequency  Neuro: no headache, no dizziness    ==================>> PHYSICAL EXAM <<=================    GEN: A&O X 3 , NAD , comfortable, pleasant, calm .. in  bed, encouraged out of bed to chair with assistance as needed.   HEENT: NCAT, PERRL, MMM, hearing intact  CVS: S1S2 , regular , No M/R/G appreciated  PULM: mild scaterred rhonchi , Slightly improved  ABD.: soft. non tender, non distended,  bowel sounds present  Extrem: intact pulses , no edema        ( Note written / Date of service 01-14-24 ( This is certified to be the same as "ENTERED" date above ( for billing purposes)))    ==================>> MEDICATIONS <<====================    buMETAnide 1 milliGRAM(s) Oral daily  chlorhexidine 2% Cloths 1 Application(s) Topical <User Schedule>  heparin   Injectable 5000 Unit(s) SubCutaneous every 12 hours  predniSONE   Tablet 10 milliGRAM(s) Oral daily  remdesivir  IVPB   IV Intermittent   remdesivir  IVPB 100 milliGRAM(s) IV Intermittent every 24 hours  sildenafil (REVATIO) 20 milliGRAM(s) Oral three times a day  spironolactone 25 milliGRAM(s) Oral daily    MEDICATIONS  (PRN):  acetaminophen     Tablet .. 650 milliGRAM(s) Oral every 6 hours PRN Temp greater or equal to 38C (100.4F), Mild Pain (1 - 3)  guaiFENesin Oral Liquid (Sugar-Free) 200 milliGRAM(s) Oral every 6 hours PRN Cough  levalbuterol Inhalation 0.63 milliGRAM(s) Inhalation every 8 hours PRN wheezing  melatonin 3 milliGRAM(s) Oral at bedtime PRN Insomnia    ___________  Active diet:  Diet, Regular:   1500mL Fluid Restriction ([id number])  Low Sodium  ___________________    ==================>> VITAL SIGNS <<==================    Vital Signs Last 24 HrsT(C): 36.5 (01-14-24 @ 12:38)  T(F): 97.7 (01-14-24 @ 12:38), Max: 97.7 (01-14-24 @ 04:40)  HR: 62 (01-14-24 @ 12:38) (62 - 98)  BP: 113/79 (01-14-24 @ 12:38)  RR: 18 (01-14-24 @ 12:38) (18 - 18)  SpO2: 94% (01-14-24 @ 12:38) (94% - 98%)       ==================>> LAB AND IMAGING <<==================       no labs today !       WBC count:   9.45 <<== ,  9.88 <<== ,  14.25 <<==   Hemoglobin:   13.5 <<==,  13.8 <<==,  13.6 <<==  platelets:  218 <==, 223 <==, 211 <==    Creatinine:  1.44  <<==, 1.58  <<==, 1.29  <<==, 1.32  <<==  Sodium:   138  <==, 133  <==, 140  <==, 139  <==       AST:          29(01-12) <== , 27(01-12) <== , 26(01-11) <== , 46(01-10) <==      ALT:        20(01-12)  <== , 18(01-12)  <== , 22(01-11)  <== , 29(01-10)  <==      AP:        121(01-12)  <=, <15(01-12)  <=, 117(01-11)  <=, 132(01-10)  <=     Bili:        0.6(01-12)  <=, 0.6(01-12)  <=, 0.6(01-11)  <=, 0.6(01-10)  <=    ^^^ Inflammatory markers :  ^^^  C R P :          15 (01-14-24)  <<--, 19 (01-13-24)  <<--  P C T :         0.08 (01-14-24) <<--, 0.11 (01-13-24) <<--, 0.12 (01-11-24) <<--    Ferritin :         260 (01-14-24) <<--, 280 (01-13-24) <<--    D-Dimer :          311 (01-14-24) <<--, 281 (01-13-24) <<--      ____________________________    M I C R O B I O L O G Y :    Culture - Blood (collected 10 Tommy 2024 18:42)  Source: .Blood Blood  Preliminary Report (14 Jan 2024 02:02):    No growth at 72 Hours    Culture - Blood (collected 10 Tommy 2024 18:25)  Source: .Blood Blood  Preliminary Report (14 Jan 2024 02:02):    No growth at 72 Hours      __________________________________________________________________________________  ===============>>  A S S E S S M E N T   A N D   P L A N <<===============  ------------------------------------------------------------------------------------------    · Assessment	  63M w/ hx of Rheumatoid arthritis, HTN, HFpEF, chronic RV failure, group II pHTN and 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   ·  Plan: 94% on 4L NC on my exam. Appreciate pulm recommendations  -Cont. supplemental 02 >>> wean off O2 as jyoti   -Xopenex PRN  -Prednisone dose increased  -Continuous pulse oximetry    Problem/Plan - 2:  ·  Problem: 2019 novel coronavirus disease (COVID-19).   ·  Plan: COVID positive. w/ acute on chronic respiratory failure. Appreciate pulm and cardiology recommendations  -Cont. supplemental 02 And wean down as able  -on IV Remdesivir  -Prednisone increased to 10mg daily  - Nebulizer treatment as needed  -Isolation precautions  -Supportive care  - nutrition, hydration, supplements, vitamins   incentive spirometer / OOB to chair     Problem/Plan - 3:  ·  Problem: Sepsis.   Met sepsis criteria by WBC and HR  -Cont. COVID treatment as above  -Trend wbc and fever curve    Problem/Plan - 4:  ·  Problem: Chronic heart failure with preserved ejection fraction (HFpEF).   ·  Plan: Relatively euvolemic appearing. Appreciate cardiology recommendations  -Continue bumex 1mg PO daily  -Continue sildenafil 20mg TID  -Continue spironolactone 25mg daily  -Strict I/Os  -Daily weights  -F/u cardiology recommendations.    Problem/Plan - 5:  ·  Problem: H/O pulmonary hypertension.   ·  Plan: -Contineu bumex 1mg PO daily  -Continue sildenafil 20mg TID  -Cont. Advair BID.    Problem/Plan - 6:  ·  Problem: Stage 3 chronic kidney disease.   ·  Plan: Crt  1.3 stable compared to prior  -Trend BMP  -Renal dose meds  -Limit nephrotoxic meds.    Problem/Plan - 7:  ·  Problem: Prophylactic measure.   ·  Plan: DVT PPx  -Hep subq.  -Full Code.    --------------------------------------------  Case discussed with patient..   Education given on findings and plan of care  ___________________________  H. JEANETH Vu.  Pager: [phone]     _________________________________________________________________________________________  ========>>  M E D I C A L   A T T E N D I N G    F O L L O W  U P  N O T E  <<=========  -----------------------------------------------------------------------------------------------------    - Patient seen and examined by me earlier today.   - In summary,  PLACIDO GOLDMAN is a 63y year old man admitted with COVID  - Patient today overall doing ok, comfortable, eating OK. / good appetite... breathing ok,       On and off coughing, with little sputum    ==================>> REVIEW OF SYSTEM <<=================    GEN: no fever, no chills, no pain  RESP: no significant SOB, occasional cough   CVS: no chest pain, no palpitations  GI: no abdominal pain, no nausea, Good appetite  : no dysuria, no frequency  Neuro: no headache, no dizziness    ==================>> PHYSICAL EXAM <<=================    GEN: A&O X 3 , NAD , comfortable, pleasant, calm .. in  bed, encouraged out of bed to chair with assistance as needed.   HEENT: NCAT, PERRL, MMM, hearing intact  CVS: S1S2 , regular , No M/R/G appreciated  PULM: mild scaterred rhonchi , Slightly improved  ABD.: soft. non tender, non distended,  bowel sounds present  Extrem: intact pulses , no edema        ( Note written / Date of service 01-14-24 ( This is certified to be the same as "ENTERED" date above ( for billing purposes)))    ==================>> MEDICATIONS <<====================    buMETAnide 1 milliGRAM(s) Oral daily  chlorhexidine 2% Cloths 1 Application(s) Topical <User Schedule>  heparin   Injectable 5000 Unit(s) SubCutaneous every 12 hours  predniSONE   Tablet 10 milliGRAM(s) Oral daily  remdesivir  IVPB   IV Intermittent   remdesivir  IVPB 100 milliGRAM(s) IV Intermittent every 24 hours  sildenafil (REVATIO) 20 milliGRAM(s) Oral three times a day  spironolactone 25 milliGRAM(s) Oral daily    MEDICATIONS  (PRN):  acetaminophen     Tablet .. 650 milliGRAM(s) Oral every 6 hours PRN Temp greater or equal to 38C (100.4F), Mild Pain (1 - 3)  guaiFENesin Oral Liquid (Sugar-Free) 200 milliGRAM(s) Oral every 6 hours PRN Cough  levalbuterol Inhalation 0.63 milliGRAM(s) Inhalation every 8 hours PRN wheezing  melatonin 3 milliGRAM(s) Oral at bedtime PRN Insomnia    ___________  Active diet:  Diet, Regular:   1500mL Fluid Restriction ([id number])  Low Sodium  ___________________    ==================>> VITAL SIGNS <<==================    Vital Signs Last 24 HrsT(C): 36.5 (01-14-24 @ 12:38)  T(F): 97.7 (01-14-24 @ 12:38), Max: 97.7 (01-14-24 @ 04:40)  HR: 62 (01-14-24 @ 12:38) (62 - 98)  BP: 113/79 (01-14-24 @ 12:38)  RR: 18 (01-14-24 @ 12:38) (18 - 18)  SpO2: 94% (01-14-24 @ 12:38) (94% - 98%)       ==================>> LAB AND IMAGING <<==================       no labs today !       WBC count:   9.45 <<== ,  9.88 <<== ,  14.25 <<==   Hemoglobin:   13.5 <<==,  13.8 <<==,  13.6 <<==  platelets:  218 <==, 223 <==, 211 <==    Creatinine:  1.44  <<==, 1.58  <<==, 1.29  <<==, 1.32  <<==  Sodium:   138  <==, 133  <==, 140  <==, 139  <==       AST:          29(01-12) <== , 27(01-12) <== , 26(01-11) <== , 46(01-10) <==      ALT:        20(01-12)  <== , 18(01-12)  <== , 22(01-11)  <== , 29(01-10)  <==      AP:        121(01-12)  <=, <15(01-12)  <=, 117(01-11)  <=, 132(01-10)  <=     Bili:        0.6(01-12)  <=, 0.6(01-12)  <=, 0.6(01-11)  <=, 0.6(01-10)  <=    ^^^ Inflammatory markers :  ^^^  C R P :          15 (01-14-24)  <<--, 19 (01-13-24)  <<--  P C T :         0.08 (01-14-24) <<--, 0.11 (01-13-24) <<--, 0.12 (01-11-24) <<--    Ferritin :         260 (01-14-24) <<--, 280 (01-13-24) <<--    D-Dimer :          311 (01-14-24) <<--, 281 (01-13-24) <<--      ____________________________    M I C R O B I O L O G Y :    Culture - Blood (collected 10 Tommy 2024 18:42)  Source: .Blood Blood  Preliminary Report (14 Jan 2024 02:02):    No growth at 72 Hours    Culture - Blood (collected 10 Tommy 2024 18:25)  Source: .Blood Blood  Preliminary Report (14 Jan 2024 02:02):    No growth at 72 Hours      __________________________________________________________________________________  ===============>>  A S S E S S M E N T   A N D   P L A N <<===============  ------------------------------------------------------------------------------------------    · Assessment	  63M w/ hx of Rheumatoid arthritis, HTN, HFpEF, chronic RV failure, group II pHTN and 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   ·  Plan: 94% on 4L NC on my exam. Appreciate pulm recommendations  -Cont. supplemental 02 >>> wean off O2 as jyoti   -Xopenex PRN  -Prednisone dose increased  -Continuous pulse oximetry    Problem/Plan - 2:  ·  Problem: 2019 novel coronavirus disease (COVID-19).   ·  Plan: COVID positive. w/ acute on chronic respiratory failure. Appreciate pulm and cardiology recommendations  -Cont. supplemental 02 And wean down as able  -on IV Remdesivir  -Prednisone increased to 10mg daily  - Nebulizer treatment as needed  -Isolation precautions  -Supportive care  - nutrition, hydration, supplements, vitamins   incentive spirometer / OOB to chair     Problem/Plan - 3:  ·  Problem: Sepsis.   Met sepsis criteria by WBC and HR  -Cont. COVID treatment as above  -Trend wbc and fever curve    Problem/Plan - 4:  ·  Problem: Chronic heart failure with preserved ejection fraction (HFpEF).   ·  Plan: Relatively euvolemic appearing. Appreciate cardiology recommendations  -Continue bumex 1mg PO daily  -Continue sildenafil 20mg TID  -Continue spironolactone 25mg daily  -Strict I/Os  -Daily weights  -F/u cardiology recommendations.    Problem/Plan - 5:  ·  Problem: H/O pulmonary hypertension.   ·  Plan: -Contineu bumex 1mg PO daily  -Continue sildenafil 20mg TID  -Cont. Advair BID.    Problem/Plan - 6:  ·  Problem: Stage 3 chronic kidney disease.   ·  Plan: Crt  1.3 stable compared to prior  -Trend BMP  -Renal dose meds  -Limit nephrotoxic meds.    Problem/Plan - 7:  ·  Problem: Prophylactic measure.   ·  Plan: DVT PPx  -Hep subq.  -Full Code.    --------------------------------------------  Case discussed with patient..   Education given on findings and plan of care  ___________________________  H. JEANETH Vu.  Pager: [phone]

## 2024-01-15 LAB
ALBUMIN SERPL ELPH-MCNC: 3.9 G/DL — SIGNIFICANT CHANGE UP (ref 3.3–5)
ALBUMIN SERPL ELPH-MCNC: 3.9 G/DL — SIGNIFICANT CHANGE UP (ref 3.3–5)
ALP SERPL-CCNC: 106 U/L — SIGNIFICANT CHANGE UP (ref 40–120)
ALP SERPL-CCNC: 106 U/L — SIGNIFICANT CHANGE UP (ref 40–120)
ALT FLD-CCNC: 14 U/L — SIGNIFICANT CHANGE UP (ref 10–45)
ALT FLD-CCNC: 14 U/L — SIGNIFICANT CHANGE UP (ref 10–45)
ANION GAP SERPL CALC-SCNC: 17 MMOL/L — SIGNIFICANT CHANGE UP (ref 5–17)
ANION GAP SERPL CALC-SCNC: 17 MMOL/L — SIGNIFICANT CHANGE UP (ref 5–17)
AST SERPL-CCNC: 21 U/L — SIGNIFICANT CHANGE UP (ref 10–40)
AST SERPL-CCNC: 21 U/L — SIGNIFICANT CHANGE UP (ref 10–40)
BILIRUB SERPL-MCNC: 0.6 MG/DL — SIGNIFICANT CHANGE UP (ref 0.2–1.2)
BILIRUB SERPL-MCNC: 0.6 MG/DL — SIGNIFICANT CHANGE UP (ref 0.2–1.2)
BUN SERPL-MCNC: 44 MG/DL — HIGH (ref 7–23)
BUN SERPL-MCNC: 44 MG/DL — HIGH (ref 7–23)
CALCIUM SERPL-MCNC: 9.6 MG/DL — SIGNIFICANT CHANGE UP (ref 8.4–10.5)
CALCIUM SERPL-MCNC: 9.6 MG/DL — SIGNIFICANT CHANGE UP (ref 8.4–10.5)
CHLORIDE SERPL-SCNC: 103 MMOL/L — SIGNIFICANT CHANGE UP (ref 96–108)
CHLORIDE SERPL-SCNC: 103 MMOL/L — SIGNIFICANT CHANGE UP (ref 96–108)
CO2 SERPL-SCNC: 19 MMOL/L — LOW (ref 22–31)
CO2 SERPL-SCNC: 19 MMOL/L — LOW (ref 22–31)
CREAT SERPL-MCNC: 1.71 MG/DL — HIGH (ref 0.5–1.3)
CREAT SERPL-MCNC: 1.71 MG/DL — HIGH (ref 0.5–1.3)
EGFR: 44 ML/MIN/1.73M2 — LOW
EGFR: 44 ML/MIN/1.73M2 — LOW
GLUCOSE SERPL-MCNC: 81 MG/DL — SIGNIFICANT CHANGE UP (ref 70–99)
GLUCOSE SERPL-MCNC: 81 MG/DL — SIGNIFICANT CHANGE UP (ref 70–99)
MAGNESIUM SERPL-MCNC: 2.1 MG/DL — SIGNIFICANT CHANGE UP (ref 1.6–2.6)
MAGNESIUM SERPL-MCNC: 2.1 MG/DL — SIGNIFICANT CHANGE UP (ref 1.6–2.6)
POTASSIUM SERPL-MCNC: 4.2 MMOL/L — SIGNIFICANT CHANGE UP (ref 3.5–5.3)
POTASSIUM SERPL-MCNC: 4.2 MMOL/L — SIGNIFICANT CHANGE UP (ref 3.5–5.3)
POTASSIUM SERPL-SCNC: 4.2 MMOL/L — SIGNIFICANT CHANGE UP (ref 3.5–5.3)
POTASSIUM SERPL-SCNC: 4.2 MMOL/L — SIGNIFICANT CHANGE UP (ref 3.5–5.3)
PROT SERPL-MCNC: 7.5 G/DL — SIGNIFICANT CHANGE UP (ref 6–8.3)
PROT SERPL-MCNC: 7.5 G/DL — SIGNIFICANT CHANGE UP (ref 6–8.3)
SODIUM SERPL-SCNC: 139 MMOL/L — SIGNIFICANT CHANGE UP (ref 135–145)
SODIUM SERPL-SCNC: 139 MMOL/L — SIGNIFICANT CHANGE UP (ref 135–145)

## 2024-01-15 PROCEDURE — 99233 SBSQ HOSP IP/OBS HIGH 50: CPT

## 2024-01-15 RX ADMIN — Medication 20 MILLIGRAM(S): at 21:13

## 2024-01-15 RX ADMIN — Medication 10 MILLIGRAM(S): at 05:21

## 2024-01-15 RX ADMIN — Medication 20 MILLIGRAM(S): at 05:22

## 2024-01-15 RX ADMIN — Medication 20 MILLIGRAM(S): at 13:33

## 2024-01-15 RX ADMIN — HEPARIN SODIUM 5000 UNIT(S): 5000 INJECTION INTRAVENOUS; SUBCUTANEOUS at 17:58

## 2024-01-15 RX ADMIN — SPIRONOLACTONE 25 MILLIGRAM(S): 25 TABLET, FILM COATED ORAL at 05:21

## 2024-01-15 RX ADMIN — BUMETANIDE 1 MILLIGRAM(S): 0.25 INJECTION INTRAMUSCULAR; INTRAVENOUS at 05:22

## 2024-01-15 RX ADMIN — HEPARIN SODIUM 5000 UNIT(S): 5000 INJECTION INTRAVENOUS; SUBCUTANEOUS at 05:22

## 2024-01-15 NOTE — PROGRESS NOTE ADULT - ASSESSMENT
63 year old Male, with PMH Rheumatoid arthritis, HTN, home oxygen 5 liters, CKD, Renal cell carcinoma s/p Partial nephrectomy, and HFpEF (60%;LVIDd 3.2 decreased RV function and severe TR) s/p ICD. Patient presented to ED with c/o shortness of breath and cough. Patient also has fever and sinus tachycardia. His symptoms are concerning for a viral URI. Found to have Covid postiive and has received Remdesivir and increase in steroids with improvement. There is no evidence of heart failure on exam. Will sign off; please call with questions.

## 2024-01-15 NOTE — PROGRESS NOTE ADULT - SUBJECTIVE AND OBJECTIVE BOX
Cardiovascular Disease Progress Note    Overnight events: No acute events overnight.  no new cardiac sx  Otherwise review of systems negative    Objective Findings:  T(C): 37.1 (01-15-24 @ 04:44), Max: 37.1 (01-15-24 @ 04:44)  HR: 98 (01-15-24 @ 04:44) (62 - 98)  BP: 126/84 (01-15-24 @ 04:44) (113/79 - 126/84)  RR: 18 (01-15-24 @ 04:44) (18 - 18)  SpO2: 97% (01-15-24 @ 04:44) (94% - 97%)  Wt(kg): --  Daily     Daily Weight in k.4 (15 Tommy 2024 04:44)      Physical Exam:  Gen: NAD  HEENT: EOMI  CV: RRR, normal S1 + S2, no m/r/g  Lungs: CTAB  Abd: soft, non-tender  Ext: No edema    Telemetry:    Laboratory Data:                    Inpatient Medications:  MEDICATIONS  (STANDING):  buMETAnide 1 milliGRAM(s) Oral daily  chlorhexidine 2% Cloths 1 Application(s) Topical <User Schedule>  heparin   Injectable 5000 Unit(s) SubCutaneous every 12 hours  predniSONE   Tablet 10 milliGRAM(s) Oral daily  remdesivir  IVPB   IV Intermittent   remdesivir  IVPB 100 milliGRAM(s) IV Intermittent every 24 hours  sildenafil (REVATIO) 20 milliGRAM(s) Oral three times a day  spironolactone 25 milliGRAM(s) Oral daily      Assessment:  63M w/ hx of Rheumatoid arthritis, HTN, HFpEF, chronic RV failure, group II pHTN and 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       Recs:  cardiac stable  vol status appears stable. cw diuretics per hf  pulm htn, on sildenafil. hf following and recs appreciated  tx of covid pna per primary team. sx appear mild at present  routine icd interrogation as op  hx of lv dysfunction, improved on gdmt   pulmonary follow up  will follow  dvt ppx  dcp      Over 25 minutes spent on total encounter; more than 50% of the visit was spent counseling and/or coordinating care by the attending physician.      Juarez Carver MD   Cardiovascular Disease  [phone] Cardiovascular Disease Progress Note    Overnight events: No acute events overnight.  no new cardiac sx  Otherwise review of systems negative    Objective Findings:  T(C): 37.1 (01-15-24 @ 04:44), Max: 37.1 (01-15-24 @ 04:44)  HR: 98 (01-15-24 @ 04:44) (62 - 98)  BP: 126/84 (01-15-24 @ 04:44) (113/79 - 126/84)  RR: 18 (01-15-24 @ 04:44) (18 - 18)  SpO2: 97% (01-15-24 @ 04:44) (94% - 97%)  Wt(kg): --  Daily     Daily Weight in k.4 (15 Tommy 2024 04:44)      Physical Exam:  Gen: NAD  HEENT: EOMI  CV: RRR, normal S1 + S2, no m/r/g  Lungs: CTAB  Abd: soft, non-tender  Ext: No edema    Telemetry:    Laboratory Data:                    Inpatient Medications:  MEDICATIONS  (STANDING):  buMETAnide 1 milliGRAM(s) Oral daily  chlorhexidine 2% Cloths 1 Application(s) Topical <User Schedule>  heparin   Injectable 5000 Unit(s) SubCutaneous every 12 hours  predniSONE   Tablet 10 milliGRAM(s) Oral daily  remdesivir  IVPB   IV Intermittent   remdesivir  IVPB 100 milliGRAM(s) IV Intermittent every 24 hours  sildenafil (REVATIO) 20 milliGRAM(s) Oral three times a day  spironolactone 25 milliGRAM(s) Oral daily      Assessment:  63M w/ hx of Rheumatoid arthritis, HTN, HFpEF, chronic RV failure, group II pHTN and 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       Recs:  cardiac stable  vol status appears stable. cw diuretics per hf  pulm htn, on sildenafil. hf following and recs appreciated  tx of covid pna per primary team. sx appear mild at present  routine icd interrogation as op  hx of lv dysfunction, improved on gdmt   pulmonary follow up  will follow  dvt ppx  dcp      Over 25 minutes spent on total encounter; more than 50% of the visit was spent counseling and/or coordinating care by the attending physician.      Juarez Carver MD   Cardiovascular Disease  [phone] Cardiovascular Disease Progress Note    Overnight events: No acute events overnight.  no new cardiac sx  Otherwise review of systems negative    Objective Findings:  T(C): 37.1 (01-15-24 @ 04:44), Max: 37.1 (01-15-24 @ 04:44)  HR: 98 (01-15-24 @ 04:44) (62 - 98)  BP: 126/84 (01-15-24 @ 04:44) (113/79 - 126/84)  RR: 18 (01-15-24 @ 04:44) (18 - 18)  SpO2: 97% (01-15-24 @ 04:44) (94% - 97%)  Wt(kg): --  Daily     Daily Weight in k.4 (15 Tommy 2024 04:44)      Physical Exam:  Gen: NAD  HEENT: EOMI  CV: RRR, normal S1 + S2, no m/r/g  Lungs: CTAB  Abd: soft, non-tender  Ext: No edema    Telemetry:    Laboratory Data:                    Inpatient Medications:  MEDICATIONS  (STANDING):  buMETAnide 1 milliGRAM(s) Oral daily  chlorhexidine 2% Cloths 1 Application(s) Topical <User Schedule>  heparin   Injectable 5000 Unit(s) SubCutaneous every 12 hours  predniSONE   Tablet 10 milliGRAM(s) Oral daily  remdesivir  IVPB   IV Intermittent   remdesivir  IVPB 100 milliGRAM(s) IV Intermittent every 24 hours  sildenafil (REVATIO) 20 milliGRAM(s) Oral three times a day  spironolactone 25 milliGRAM(s) Oral daily      Assessment:  63M w/ hx of Rheumatoid arthritis, HTN, HFpEF, chronic RV failure, group II pHTN and 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       Recs:  cardiac stable  vol status appears stable. cw diuretics per hf  pulm htn, on sildenafil. hf following and recs appreciated  tx of covid pna per primary team. sx appear mild at present  routine icd interrogation as op  hx of lv dysfunction, improved on gdmt   pulmonary follow up  will follow  dvt ppx  dcp      Over 25 minutes spent on total encounter; more than 50% of the visit was spent counseling and/or coordinating care by the attending physician.      Juarez Carver MD   Cardiovascular Disease  [phone]

## 2024-01-15 NOTE — PROGRESS NOTE ADULT - ASSESSMENT
_________________________________________________________________________________________  ========>>  M E D I C A L   A T T E N D I N G    F O L L O W  U P  N O T E  <<=========  -----------------------------------------------------------------------------------------------------    - Patient seen and examined by me earlier today.   - In summary,  PLACIDO GOLDMAN is a 63y year old man admitted with COVID  - Patient today overall doing ok, comfortable, eating OK. / good appetite... breathing ok,       On and off coughing, with little sputum   >> patient at home has Oxygen and uses it PRN.. ( states when he goes out and shopping usually doesn't need it !!)  >> trying to wean down ( discussed with RN)    ==================>> REVIEW OF SYSTEM <<=================    GEN: no fever, no chills, no pain  RESP: no significant SOB, occasional cough   CVS: no chest pain, no palpitations  GI: no abdominal pain, no nausea, Good appetite >> encouraged increased fuid intake today given elevated creatinine   : no dysuria, no frequency  Neuro: no headache, no dizziness    ==================>> PHYSICAL EXAM <<=================    GEN: A&O X 3 , NAD , comfortable, pleasant, calm .. in  bed, encouraged out of bed to chair with assistance as needed.   HEENT: NCAT, PERRL, MMM, hearing intact  CVS: S1S2 , regular , No M/R/G appreciated  PULM: mild scaterred rhonchi , Slightly improved  ABD.: soft. non tender, non distended,  bowel sounds present  Extrem: intact pulses , no edema        ( Note written / Date of service 01-15-24 ( This is certified to be the same as "ENTERED" date above ( for billing purposes)))    ==================>> MEDICATIONS <<====================    buMETAnide 1 milliGRAM(s) Oral daily  chlorhexidine 2% Cloths 1 Application(s) Topical <User Schedule>  heparin   Injectable 5000 Unit(s) SubCutaneous every 12 hours  predniSONE   Tablet 10 milliGRAM(s) Oral daily  sildenafil (REVATIO) 20 milliGRAM(s) Oral three times a day    MEDICATIONS  (PRN):  acetaminophen     Tablet .. 650 milliGRAM(s) Oral every 6 hours PRN Temp greater or equal to 38C (100.4F), Mild Pain (1 - 3)  guaiFENesin Oral Liquid (Sugar-Free) 200 milliGRAM(s) Oral every 6 hours PRN Cough  levalbuterol Inhalation 0.63 milliGRAM(s) Inhalation every 8 hours PRN wheezing  melatonin 3 milliGRAM(s) Oral at bedtime PRN Insomnia    ___________  Active diet:  Diet, Regular:   1500mL Fluid Restriction ([id number])  Low Sodium  ___________________    ==================>> VITAL SIGNS <<==================    Vital Signs Last 24 HrsT(C): 36.7 (01-15-24 @ 11:29)  T(F): 98.1 (01-15-24 @ 11:29), Max: 98.7 (01-15-24 @ 04:44)  HR: 99 (01-15-24 @ 11:29) (62 - 99)  BP: 107/73 (01-15-24 @ 11:29)  RR: 20 (01-15-24 @ 11:29) (18 - 20)  SpO2: 99% (01-15-24 @ 11:29) (94% - 99%)       ==================>> LAB AND IMAGING <<==================       01-15    139  |  103  |  44<H>  ----------------------------<  81  4.2   |  19<L>  |  1.71<H>    Ca    9.6      15 Tommy 2024 07:02  Mg     2.1     01-15    TPro  7.5  /  Alb  3.9  /  TBili  0.6  /  DBili  x   /  AST  21  /  ALT  14  /  AlkPhos  106  01-15    WBC count:   9.45 <<== ,  9.88 <<== ,  14.25 <<==   Hemoglobin:   13.5 <<==,  13.8 <<==,  13.6 <<==  platelets:  218 <==, 223 <==, 211 <==    Creatinine:  1.71  <<==, 1.44  <<==, 1.58  <<==, 1.29  <<==, 1.32  <<==  Sodium:   139  <==, 138  <==, 133  <==, 140  <==, 139  <==       AST:          21(01-15) <== , 29(01-12) <== , 27(01-12) <== , 26(01-11) <== , 46(01-10) <==      ALT:        14(01-15)  <== , 20(01-12)  <== , 18(01-12)  <== , 22(01-11)  <== , 29(01-10)  <==      AP:        106(01-15)  <=, 121(01-12)  <=, <15(01-12)  <=, 117(01-11)  <=, 132(01-10)  <=     Bili:        0.6(01-15)  <=, 0.6(01-12)  <=, 0.6(01-12)  <=, 0.6(01-11)  <=, 0.6(01-10)  <=    ____________________________    M I C R O B I O L O G Y :    Culture - Blood (collected 10 Tommy 2024 18:42)  Source: .Blood Blood  Preliminary Report (15 Tommy 2024 02:01):    No growth at 4 days    Culture - Blood (collected 10 Tommy 2024 18:25)  Source: .Blood Blood  Preliminary Report (15 Tommy 2024 02:01):    No growth at 4 days    __________________________________________________________________________________  ===============>>  A S S E S S M E N T   A N D   P L A N <<===============  ------------------------------------------------------------------------------------------    · Assessment	  63M w/ hx of Rheumatoid arthritis, HTN, HFpEF, chronic RV failure, group II pHTN and 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   ·  Plan: 94% on 4L NC on my exam. Appreciate pulm recommendations  -Cont. supplemental 02 >>> wean down/off O2 as jyoti   -Xopenex PRN  -Prednisone dose increased  -Continuous pulse oximetry    Problem/Plan - 2:  ·  Problem: 2019 novel coronavirus disease (COVID-19).   ·  Plan: COVID positive. w/ acute on chronic respiratory failure. Appreciate pulm and cardiology recommendations  -Cont. supplemental 02 And wean down as able  -IV Remdesivir >> stopped given elevated creatinine today  -Prednisone increased to 10mg daily  - Nebulizer treatment as needed  -Isolation precautions  -Supportive care  - nutrition, hydration, supplements, vitamins   incentive spirometer / OOB to chair   sepsis resolved     Problem/Plan - 3:  ·  Problem: DON on CKD III   remdesivir stopped  hold aldactone  increase PO hydration today  monitor closely    Problem/Plan - 4:  ·  Problem: Chronic heart failure with preserved ejection fraction (HFpEF).   ·  Plan: Relatively euvolemic appearing. Appreciate cardiology recommendations  -Continue bumex 1mg PO daily  -Continue sildenafil 20mg TID  -Continue spironolactone 25mg daily>> hold today given DON  -Strict I/Os  -Daily weights  -F/u cardiology recommendations.    Problem/Plan - 5:  ·  Problem: H/O pulmonary hypertension.   ·  Plan: -Contineu bumex 1mg PO daily  -Continue sildenafil 20mg TID  -Cont. Advair BID.    Problem/Plan - 6:  ·  Problem: Prophylactic measure.   ·  Plan: DVT PPx  -Hep subq.  -Full Code.    --------------------------------------------  Case discussed with patient.. RN..   Education given on findings and plan of care  ___________________________  HEvangelist Vu D.O.  Pager: [phone]     _________________________________________________________________________________________  ========>>  M E D I C A L   A T T E N D I N G    F O L L O W  U P  N O T E  <<=========  -----------------------------------------------------------------------------------------------------    - Patient seen and examined by me earlier today.   - In summary,  PLACIDO GOLDMAN is a 63y year old man admitted with COVID  - Patient today overall doing ok, comfortable, eating OK. / good appetite... breathing ok,       On and off coughing, with little sputum   >> patient at home has Oxygen and uses it PRN.. ( states when he goes out and shopping usually doesn't need it !!)  >> trying to wean down ( discussed with RN)    ==================>> REVIEW OF SYSTEM <<=================    GEN: no fever, no chills, no pain  RESP: no significant SOB, occasional cough   CVS: no chest pain, no palpitations  GI: no abdominal pain, no nausea, Good appetite >> encouraged increased fuid intake today given elevated creatinine   : no dysuria, no frequency  Neuro: no headache, no dizziness    ==================>> PHYSICAL EXAM <<=================    GEN: A&O X 3 , NAD , comfortable, pleasant, calm .. in  bed, encouraged out of bed to chair with assistance as needed.   HEENT: NCAT, PERRL, MMM, hearing intact  CVS: S1S2 , regular , No M/R/G appreciated  PULM: mild scaterred rhonchi , Slightly improved  ABD.: soft. non tender, non distended,  bowel sounds present  Extrem: intact pulses , no edema        ( Note written / Date of service 01-15-24 ( This is certified to be the same as "ENTERED" date above ( for billing purposes)))    ==================>> MEDICATIONS <<====================    buMETAnide 1 milliGRAM(s) Oral daily  chlorhexidine 2% Cloths 1 Application(s) Topical <User Schedule>  heparin   Injectable 5000 Unit(s) SubCutaneous every 12 hours  predniSONE   Tablet 10 milliGRAM(s) Oral daily  sildenafil (REVATIO) 20 milliGRAM(s) Oral three times a day    MEDICATIONS  (PRN):  acetaminophen     Tablet .. 650 milliGRAM(s) Oral every 6 hours PRN Temp greater or equal to 38C (100.4F), Mild Pain (1 - 3)  guaiFENesin Oral Liquid (Sugar-Free) 200 milliGRAM(s) Oral every 6 hours PRN Cough  levalbuterol Inhalation 0.63 milliGRAM(s) Inhalation every 8 hours PRN wheezing  melatonin 3 milliGRAM(s) Oral at bedtime PRN Insomnia    ___________  Active diet:  Diet, Regular:   1500mL Fluid Restriction ([id number])  Low Sodium  ___________________    ==================>> VITAL SIGNS <<==================    Vital Signs Last 24 HrsT(C): 36.7 (01-15-24 @ 11:29)  T(F): 98.1 (01-15-24 @ 11:29), Max: 98.7 (01-15-24 @ 04:44)  HR: 99 (01-15-24 @ 11:29) (62 - 99)  BP: 107/73 (01-15-24 @ 11:29)  RR: 20 (01-15-24 @ 11:29) (18 - 20)  SpO2: 99% (01-15-24 @ 11:29) (94% - 99%)       ==================>> LAB AND IMAGING <<==================       01-15    139  |  103  |  44<H>  ----------------------------<  81  4.2   |  19<L>  |  1.71<H>    Ca    9.6      15 Tommy 2024 07:02  Mg     2.1     01-15    TPro  7.5  /  Alb  3.9  /  TBili  0.6  /  DBili  x   /  AST  21  /  ALT  14  /  AlkPhos  106  01-15    WBC count:   9.45 <<== ,  9.88 <<== ,  14.25 <<==   Hemoglobin:   13.5 <<==,  13.8 <<==,  13.6 <<==  platelets:  218 <==, 223 <==, 211 <==    Creatinine:  1.71  <<==, 1.44  <<==, 1.58  <<==, 1.29  <<==, 1.32  <<==  Sodium:   139  <==, 138  <==, 133  <==, 140  <==, 139  <==       AST:          21(01-15) <== , 29(01-12) <== , 27(01-12) <== , 26(01-11) <== , 46(01-10) <==      ALT:        14(01-15)  <== , 20(01-12)  <== , 18(01-12)  <== , 22(01-11)  <== , 29(01-10)  <==      AP:        106(01-15)  <=, 121(01-12)  <=, <15(01-12)  <=, 117(01-11)  <=, 132(01-10)  <=     Bili:        0.6(01-15)  <=, 0.6(01-12)  <=, 0.6(01-12)  <=, 0.6(01-11)  <=, 0.6(01-10)  <=    ____________________________    M I C R O B I O L O G Y :    Culture - Blood (collected 10 Tommy 2024 18:42)  Source: .Blood Blood  Preliminary Report (15 Tommy 2024 02:01):    No growth at 4 days    Culture - Blood (collected 10 Tommy 2024 18:25)  Source: .Blood Blood  Preliminary Report (15 Tommy 2024 02:01):    No growth at 4 days    __________________________________________________________________________________  ===============>>  A S S E S S M E N T   A N D   P L A N <<===============  ------------------------------------------------------------------------------------------    · Assessment	  63M w/ hx of Rheumatoid arthritis, HTN, HFpEF, chronic RV failure, group II pHTN and 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   ·  Plan: 94% on 4L NC on my exam. Appreciate pulm recommendations  -Cont. supplemental 02 >>> wean down/off O2 as jyoti   -Xopenex PRN  -Prednisone dose increased  -Continuous pulse oximetry    Problem/Plan - 2:  ·  Problem: 2019 novel coronavirus disease (COVID-19).   ·  Plan: COVID positive. w/ acute on chronic respiratory failure. Appreciate pulm and cardiology recommendations  -Cont. supplemental 02 And wean down as able  -IV Remdesivir >> stopped given elevated creatinine today  -Prednisone increased to 10mg daily  - Nebulizer treatment as needed  -Isolation precautions  -Supportive care  - nutrition, hydration, supplements, vitamins   incentive spirometer / OOB to chair   sepsis resolved     Problem/Plan - 3:  ·  Problem: DON on CKD III   remdesivir stopped  hold aldactone  increase PO hydration today  monitor closely    Problem/Plan - 4:  ·  Problem: Chronic heart failure with preserved ejection fraction (HFpEF).   ·  Plan: Relatively euvolemic appearing. Appreciate cardiology recommendations  -Continue bumex 1mg PO daily  -Continue sildenafil 20mg TID  -Continue spironolactone 25mg daily>> hold today given DON  -Strict I/Os  -Daily weights  -F/u cardiology recommendations.    Problem/Plan - 5:  ·  Problem: H/O pulmonary hypertension.   ·  Plan: -Contineu bumex 1mg PO daily  -Continue sildenafil 20mg TID  -Cont. Advair BID.    Problem/Plan - 6:  ·  Problem: Prophylactic measure.   ·  Plan: DVT PPx  -Hep subq.  -Full Code.    --------------------------------------------  Case discussed with patient.. RN..   Education given on findings and plan of care  ___________________________  HEvangelist Vu D.O.  Pager: [phone]     _________________________________________________________________________________________  ========>>  M E D I C A L   A T T E N D I N G    F O L L O W  U P  N O T E  <<=========  -----------------------------------------------------------------------------------------------------    - Patient seen and examined by me earlier today.   - In summary,  PLACIDO GOLDMAN is a 63y year old man admitted with COVID  - Patient today overall doing ok, comfortable, eating OK. / good appetite... breathing ok,       On and off coughing, with little sputum   >> patient at home has Oxygen and uses it PRN.. ( states when he goes out and shopping usually doesn't need it !!)  >> trying to wean down ( discussed with RN)    ==================>> REVIEW OF SYSTEM <<=================    GEN: no fever, no chills, no pain  RESP: no significant SOB, occasional cough   CVS: no chest pain, no palpitations  GI: no abdominal pain, no nausea, Good appetite >> encouraged increased fuid intake today given elevated creatinine   : no dysuria, no frequency  Neuro: no headache, no dizziness    ==================>> PHYSICAL EXAM <<=================    GEN: A&O X 3 , NAD , comfortable, pleasant, calm .. in  bed, encouraged out of bed to chair with assistance as needed.   HEENT: NCAT, PERRL, MMM, hearing intact  CVS: S1S2 , regular , No M/R/G appreciated  PULM: mild scaterred rhonchi , Slightly improved  ABD.: soft. non tender, non distended,  bowel sounds present  Extrem: intact pulses , no edema        ( Note written / Date of service 01-15-24 ( This is certified to be the same as "ENTERED" date above ( for billing purposes)))    ==================>> MEDICATIONS <<====================    buMETAnide 1 milliGRAM(s) Oral daily  chlorhexidine 2% Cloths 1 Application(s) Topical <User Schedule>  heparin   Injectable 5000 Unit(s) SubCutaneous every 12 hours  predniSONE   Tablet 10 milliGRAM(s) Oral daily  sildenafil (REVATIO) 20 milliGRAM(s) Oral three times a day    MEDICATIONS  (PRN):  acetaminophen     Tablet .. 650 milliGRAM(s) Oral every 6 hours PRN Temp greater or equal to 38C (100.4F), Mild Pain (1 - 3)  guaiFENesin Oral Liquid (Sugar-Free) 200 milliGRAM(s) Oral every 6 hours PRN Cough  levalbuterol Inhalation 0.63 milliGRAM(s) Inhalation every 8 hours PRN wheezing  melatonin 3 milliGRAM(s) Oral at bedtime PRN Insomnia    ___________  Active diet:  Diet, Regular:   1500mL Fluid Restriction ([id number])  Low Sodium  ___________________    ==================>> VITAL SIGNS <<==================    Vital Signs Last 24 HrsT(C): 36.7 (01-15-24 @ 11:29)  T(F): 98.1 (01-15-24 @ 11:29), Max: 98.7 (01-15-24 @ 04:44)  HR: 99 (01-15-24 @ 11:29) (62 - 99)  BP: 107/73 (01-15-24 @ 11:29)  RR: 20 (01-15-24 @ 11:29) (18 - 20)  SpO2: 99% (01-15-24 @ 11:29) (94% - 99%)       ==================>> LAB AND IMAGING <<==================       01-15    139  |  103  |  44<H>  ----------------------------<  81  4.2   |  19<L>  |  1.71<H>    Ca    9.6      15 Tommy 2024 07:02  Mg     2.1     01-15    TPro  7.5  /  Alb  3.9  /  TBili  0.6  /  DBili  x   /  AST  21  /  ALT  14  /  AlkPhos  106  01-15    WBC count:   9.45 <<== ,  9.88 <<== ,  14.25 <<==   Hemoglobin:   13.5 <<==,  13.8 <<==,  13.6 <<==  platelets:  218 <==, 223 <==, 211 <==    Creatinine:  1.71  <<==, 1.44  <<==, 1.58  <<==, 1.29  <<==, 1.32  <<==  Sodium:   139  <==, 138  <==, 133  <==, 140  <==, 139  <==       AST:          21(01-15) <== , 29(01-12) <== , 27(01-12) <== , 26(01-11) <== , 46(01-10) <==      ALT:        14(01-15)  <== , 20(01-12)  <== , 18(01-12)  <== , 22(01-11)  <== , 29(01-10)  <==      AP:        106(01-15)  <=, 121(01-12)  <=, <15(01-12)  <=, 117(01-11)  <=, 132(01-10)  <=     Bili:        0.6(01-15)  <=, 0.6(01-12)  <=, 0.6(01-12)  <=, 0.6(01-11)  <=, 0.6(01-10)  <=    ____________________________    M I C R O B I O L O G Y :    Culture - Blood (collected 10 Tommy 2024 18:42)  Source: .Blood Blood  Preliminary Report (15 Tommy 2024 02:01):    No growth at 4 days    Culture - Blood (collected 10 Tommy 2024 18:25)  Source: .Blood Blood  Preliminary Report (15 Tommy 2024 02:01):    No growth at 4 days    __________________________________________________________________________________  ===============>>  A S S E S S M E N T   A N D   P L A N <<===============  ------------------------------------------------------------------------------------------    · Assessment	  63M w/ hx of Rheumatoid arthritis, HTN, HFpEF, chronic RV failure, group II pHTN and 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   ·  Plan: 94% on 4L NC on my exam. Appreciate pulm recommendations  -Cont. supplemental 02 >>> wean down/off O2 as jyoti   -Xopenex PRN  -Prednisone dose increased  -Continuous pulse oximetry    Problem/Plan - 2:  ·  Problem: 2019 novel coronavirus disease (COVID-19).   ·  Plan: COVID positive. w/ acute on chronic respiratory failure. Appreciate pulm and cardiology recommendations  -Cont. supplemental 02 And wean down as able  -IV Remdesivir >> stopped given elevated creatinine today  -Prednisone increased to 10mg daily  - Nebulizer treatment as needed  -Isolation precautions  -Supportive care  - nutrition, hydration, supplements, vitamins   incentive spirometer / OOB to chair   sepsis resolved     Problem/Plan - 3:  ·  Problem: DON on CKD III   remdesivir stopped  hold aldactone  increase PO hydration today  monitor closely    Problem/Plan - 4:  ·  Problem: Chronic heart failure with preserved ejection fraction (HFpEF).   ·  Plan: Relatively euvolemic appearing. Appreciate cardiology recommendations  -Continue bumex 1mg PO daily  -Continue sildenafil 20mg TID  -Continue spironolactone 25mg daily>> hold today given DON  -Strict I/Os  -Daily weights  -F/u cardiology recommendations.    Problem/Plan - 5:  ·  Problem: H/O pulmonary hypertension.   ·  Plan: -Contineu bumex 1mg PO daily  -Continue sildenafil 20mg TID  -Cont. Advair BID.    Problem/Plan - 6:  ·  Problem: Prophylactic measure.   ·  Plan: DVT PPx  -Hep subq.  -Full Code.    --------------------------------------------  Case discussed with patient.. RN..   Education given on findings and plan of care  ___________________________  HEvangelist Vu D.O.  Pager: [phone]

## 2024-01-15 NOTE — PROGRESS NOTE ADULT - SUBJECTIVE AND OBJECTIVE BOX
Interval History:  feels improved    Medications:  acetaminophen     Tablet .. 650 milliGRAM(s) Oral every 6 hours PRN  buMETAnide 1 milliGRAM(s) Oral daily  chlorhexidine 2% Cloths 1 Application(s) Topical <User Schedule>  guaiFENesin Oral Liquid (Sugar-Free) 200 milliGRAM(s) Oral every 6 hours PRN  heparin   Injectable 5000 Unit(s) SubCutaneous every 12 hours  levalbuterol Inhalation 0.63 milliGRAM(s) Inhalation every 8 hours PRN  melatonin 3 milliGRAM(s) Oral at bedtime PRN  predniSONE   Tablet 10 milliGRAM(s) Oral daily  sildenafil (REVATIO) 20 milliGRAM(s) Oral three times a day      Vitals:  T(C): 36.7 (01-15-24 @ 11:29), Max: 37.1 (01-15-24 @ 04:44)  HR: 99 (01-15-24 @ 11:29) (97 - 99)  BP: 107/73 (01-15-24 @ 11:29) (107/73 - 126/84)  BP(mean): --  RR: 20 (01-15-24 @ 11:29) (18 - 20)  SpO2: 99% (01-15-24 @ 11:29) (97% - 99%)    Daily     Daily Weight in k.4 (15 Tommy 2024 04:44)        I&O's Summary    2024 07:01  -  15 Tommy 2024 07:00  --------------------------------------------------------  IN: 240 mL / OUT: 400 mL / NET: -160 mL    15 Tommy 2024 07:01  -  15 Tommy 2024 15:46  --------------------------------------------------------  IN: 0 mL / OUT: 700 mL / NET: -700 mL        Physical Exam:  Appearance: No Acute Distress  HEENT: PERRL  Neck: JVD approx 6 cm   Cardiovascular: Normal S1 S2, No murmurs/rubs/gallops; prominent P2  Respiratory: Clear to auscultation bilaterally  Gastrointestinal: Soft, Non-tender	  Skin: No cyanosis	  Neurologic: Non-focal  Extremities: No LE edema  Psychiatry: A & O x 3, Mood & affect appropriate    Labs:    01-15    139  |  103  |  44<H>  ----------------------------<  81  4.2   |  19<L>  |  1.71<H>    Ca    9.6      15 Tommy 2024 07:02  Mg     2.1     01-15    TPro  7.5  /  Alb  3.9  /  TBili  0.6  /  DBili  x   /  AST  21  /  ALT  14  /  AlkPhos  106  01-15

## 2024-01-16 ENCOUNTER — TRANSCRIPTION ENCOUNTER (OUTPATIENT)
Age: 64
End: 2024-01-16

## 2024-01-16 LAB
ANION GAP SERPL CALC-SCNC: 13 MMOL/L — SIGNIFICANT CHANGE UP (ref 5–17)
BUN SERPL-MCNC: 42 MG/DL — HIGH (ref 7–23)
CALCIUM SERPL-MCNC: 9.6 MG/DL — SIGNIFICANT CHANGE UP (ref 8.4–10.5)
CHLORIDE SERPL-SCNC: 99 MMOL/L — SIGNIFICANT CHANGE UP (ref 96–108)
CO2 SERPL-SCNC: 23 MMOL/L — SIGNIFICANT CHANGE UP (ref 22–31)
CREAT SERPL-MCNC: 1.72 MG/DL — HIGH (ref 0.5–1.3)
CULTURE RESULTS: SIGNIFICANT CHANGE UP
CULTURE RESULTS: SIGNIFICANT CHANGE UP
EGFR: 44 ML/MIN/1.73M2 — LOW
GLUCOSE SERPL-MCNC: 98 MG/DL — SIGNIFICANT CHANGE UP (ref 70–99)
POTASSIUM SERPL-MCNC: 4.5 MMOL/L — SIGNIFICANT CHANGE UP (ref 3.5–5.3)
POTASSIUM SERPL-SCNC: 4.5 MMOL/L — SIGNIFICANT CHANGE UP (ref 3.5–5.3)
SODIUM SERPL-SCNC: 135 MMOL/L — SIGNIFICANT CHANGE UP (ref 135–145)
SPECIMEN SOURCE: SIGNIFICANT CHANGE UP
SPECIMEN SOURCE: SIGNIFICANT CHANGE UP

## 2024-01-16 PROCEDURE — 87640 STAPH A DNA AMP PROBE: CPT

## 2024-01-16 PROCEDURE — 87637 SARSCOV2&INF A&B&RSV AMP PRB: CPT

## 2024-01-16 PROCEDURE — 86140 C-REACTIVE PROTEIN: CPT

## 2024-01-16 PROCEDURE — 82947 ASSAY GLUCOSE BLOOD QUANT: CPT

## 2024-01-16 PROCEDURE — 87641 MR-STAPH DNA AMP PROBE: CPT

## 2024-01-16 PROCEDURE — 85610 PROTHROMBIN TIME: CPT

## 2024-01-16 PROCEDURE — 85018 HEMOGLOBIN: CPT

## 2024-01-16 PROCEDURE — 85379 FIBRIN DEGRADATION QUANT: CPT

## 2024-01-16 PROCEDURE — 80053 COMPREHEN METABOLIC PANEL: CPT

## 2024-01-16 PROCEDURE — 80048 BASIC METABOLIC PNL TOTAL CA: CPT

## 2024-01-16 PROCEDURE — 83880 ASSAY OF NATRIURETIC PEPTIDE: CPT

## 2024-01-16 PROCEDURE — 99233 SBSQ HOSP IP/OBS HIGH 50: CPT | Mod: GC

## 2024-01-16 PROCEDURE — 93005 ELECTROCARDIOGRAM TRACING: CPT

## 2024-01-16 PROCEDURE — 99285 EMERGENCY DEPT VISIT HI MDM: CPT | Mod: 25

## 2024-01-16 PROCEDURE — 85730 THROMBOPLASTIN TIME PARTIAL: CPT

## 2024-01-16 PROCEDURE — 36415 COLL VENOUS BLD VENIPUNCTURE: CPT

## 2024-01-16 PROCEDURE — 83735 ASSAY OF MAGNESIUM: CPT

## 2024-01-16 PROCEDURE — 82728 ASSAY OF FERRITIN: CPT

## 2024-01-16 PROCEDURE — 85014 HEMATOCRIT: CPT

## 2024-01-16 PROCEDURE — 82803 BLOOD GASES ANY COMBINATION: CPT

## 2024-01-16 PROCEDURE — 87040 BLOOD CULTURE FOR BACTERIA: CPT

## 2024-01-16 PROCEDURE — 82435 ASSAY OF BLOOD CHLORIDE: CPT

## 2024-01-16 PROCEDURE — 85027 COMPLETE CBC AUTOMATED: CPT

## 2024-01-16 PROCEDURE — 85025 COMPLETE CBC W/AUTO DIFF WBC: CPT

## 2024-01-16 PROCEDURE — 84484 ASSAY OF TROPONIN QUANT: CPT

## 2024-01-16 PROCEDURE — 84132 ASSAY OF SERUM POTASSIUM: CPT

## 2024-01-16 PROCEDURE — 82330 ASSAY OF CALCIUM: CPT

## 2024-01-16 PROCEDURE — 84145 PROCALCITONIN (PCT): CPT

## 2024-01-16 PROCEDURE — 83605 ASSAY OF LACTIC ACID: CPT

## 2024-01-16 PROCEDURE — 84295 ASSAY OF SERUM SODIUM: CPT

## 2024-01-16 PROCEDURE — 71045 X-RAY EXAM CHEST 1 VIEW: CPT

## 2024-01-16 RX ADMIN — Medication 650 MILLIGRAM(S): at 13:13

## 2024-01-16 RX ADMIN — Medication 650 MILLIGRAM(S): at 14:00

## 2024-01-16 RX ADMIN — Medication 20 MILLIGRAM(S): at 13:19

## 2024-01-16 RX ADMIN — BUMETANIDE 1 MILLIGRAM(S): 0.25 INJECTION INTRAMUSCULAR; INTRAVENOUS at 05:02

## 2024-01-16 RX ADMIN — Medication 10 MILLIGRAM(S): at 05:02

## 2024-01-16 RX ADMIN — CHLORHEXIDINE GLUCONATE 1 APPLICATION(S): 213 SOLUTION TOPICAL at 05:25

## 2024-01-16 RX ADMIN — Medication 20 MILLIGRAM(S): at 05:01

## 2024-01-16 NOTE — DISCHARGE NOTE PROVIDER - NSDCCPCAREPLAN_GEN_ALL_CORE_FT
PRINCIPAL DISCHARGE DIAGNOSIS  Diagnosis: Acute on chronic respiratory failure with hypoxia  Assessment and Plan of Treatment: You were treated for COVID, completed 2 doses of remdesivir.      SECONDARY DISCHARGE DIAGNOSES  Diagnosis: 2019 novel coronavirus disease (COVID-19)  Assessment and Plan of Treatment: You tested positive for COVID 19.  You no longer require hospitalization.  Please restrict activities outside of your home except for getting medical care.  Do not go to work, school, or public areas.  Avoid using public transportation, ride-sharing, or taxis.  Separate yourself from other people and animals in your home.  Call ahead before visiting your doctor.  Wear a facemask when you are around other people. Cover your cough and sneezes.  Clean your hands often.  Avoid sharing personal household items.  Clean all frequently touched surfaces daily.      Diagnosis: Stage 3 chronic kidney disease  Assessment and Plan of Treatment: Avoid taking (NSAIDs) - (ex: Ibuprofen, Advil, Celebrex, Naprosyn)  Avoid taking any nephrotoxic agents (can harm kidneys) - Intravenous contrast for diagnostic testing, combination cold medications.  Have all medications adjusted for your renal function by your Health Care Provider.  Blood pressure control is important.  Take all medication as prescribed.

## 2024-01-16 NOTE — DISCHARGE NOTE PROVIDER - NSDCFUSCHEDAPPT_GEN_ALL_CORE_FT
Michael Cuellar  [facility]  HEARTFAIL [address]  Scheduled Appointment: 01/17/2024    Norman Price  [facility]  PULMMED [address]  Scheduled Appointment: 01/26/2024    [facility]  PULED [address]  Scheduled Appointment: 02/06/2024    [facility]  PULMMED [address]  Scheduled Appointment: 02/06/2024    Shruti Villeda  [facility]  PULMMED [address]  Scheduled Appointment: 02/06/2024    [facility]  ELECTROPH [id number]  Scheduled Appointment: 02/21/2024    [facility]  HEARTFAIL [address]  Scheduled Appointment: 02/21/2024     Michael Cuellar  [facility]  HEARTFAIL [address]  Scheduled Appointment: 01/17/2024    Norman Price  [facility]  PULMMED [address]  Scheduled Appointment: 01/26/2024    [facility]  PULED [address]  Scheduled Appointment: 02/06/2024    [facility]  PULMMED [address]  Scheduled Appointment: 02/06/2024    Shruti Villeda  [facility]  PULMMED [address]  Scheduled Appointment: 02/06/2024    [facility]  ELECTROPH [id number]  Scheduled Appointment: 02/21/2024    [facility]  HEARTFAIL [address]  Scheduled Appointment: 02/21/2024     [facility]  PULMMED [address]  Scheduled Appointment: 02/06/2024    [facility]  PULMMED [address]  Scheduled Appointment: 02/06/2024    Shruti Villeda  [facility]  PULED [address]  Scheduled Appointment: 02/06/2024    Macey Muñoz  [facility]  [facility]  Scheduled Appointment: 02/21/2024    [facility]  ELECTROPH [id number]  Scheduled Appointment: 02/21/2024    [facility]  HEARTFAIL [address]  Scheduled Appointment: 02/21/2024     [facility]  PULMMED [address]  Scheduled Appointment: 02/06/2024    [facility]  PULMMED [address]  Scheduled Appointment: 02/06/2024    Shruti Villeda  [facility]  PULED [address]  Scheduled Appointment: 02/06/2024    Macey Muñoz  [facility]  [facility]  Scheduled Appointment: 02/21/2024    [facility]  ELECTROPH [address]  Scheduled Appointment: 02/21/2024    [facility]  HEARTFAIL [address]  Scheduled Appointment: 02/21/2024

## 2024-01-16 NOTE — DISCHARGE NOTE NURSING/CASE MANAGEMENT/SOCIAL WORK - PATIENT PORTAL LINK FT
You can access the FollowMyHealth Patient Portal offered by [facility] by registering at the following website: [email or web address]. By joining [facility] FollowMyHealth portal, you will also be able to view your health information using other applications (apps) compatible with our system.

## 2024-01-16 NOTE — PROGRESS NOTE ADULT - PROVIDER SPECIALTY LIST ADULT
Cardiology
Internal Medicine
Pulmonology
Cardiology
Pulmonology
Cardiology
Cardiology
Internal Medicine
Internal Medicine
Heart Failure

## 2024-01-16 NOTE — DISCHARGE NOTE PROVIDER - CARE PROVIDER_API CALL
Jose Daniel Carver  Cardiovascular Disease  [address] [place] [id number]  Phone: [phone]  Fax: [phone]  Follow Up Time: 1 week   Jose Daniel Carver  Cardiovascular Disease  [address] [place] [id number]  Phone: [phone]  Fax: [phone]  Follow Up Time: 1 week

## 2024-01-16 NOTE — DISCHARGE NOTE NURSING/CASE MANAGEMENT/SOCIAL WORK - NSDCVIVACCINE_GEN_ALL_CORE_FT
----- Message from PONCHO Gonzales sent at 8/20/2021  9:57 AM CDT -----  Her labs are stable, she is cleared for surgery.    No Vaccines Administered.

## 2024-01-16 NOTE — PROGRESS NOTE ADULT - ASSESSMENT
_________________________________________________________________________________________  ========>>  M E D I C A L   A T T E N D I N G    F O L L O W  U P  N O T E  <<=========  -----------------------------------------------------------------------------------------------------    - Patient seen and examined by me earlier today.   - In summary,  PLACIDO GOLDMAN is a 63y year old man admitted with COVID  - Patient today overall doing ok, comfortable, eating OK. / good appetite... breathing ok,    >> patient at home has Oxygen and uses it PRN.. ( home portable O2 at bedside now)     ==================>> REVIEW OF SYSTEM <<=================    GEN: no fever, no chills, no pain  RESP: no significant SOB, occasional cough   CVS: no chest pain, no palpitations  GI: no abdominal pain, no nausea, Good appetite >> encouraged increased fluid intake today given elevated creatinine   : no dysuria, no frequency  Neuro: no headache, no dizziness    ==================>> PHYSICAL EXAM <<=================    GEN: A&O X 3 , NAD , comfortable, pleasant, calm .. in  bed, encouraged out of bed to chair with assistance as needed.   HEENT: NCAT, PERRL, MMM, hearing intact  CVS: S1S2 , regular , No M/R/G appreciated  PULM: mild scaterred rhonchi , Slightly improved  ABD.: soft. non tender, non distended,  bowel sounds present  Extrem: intact pulses , no edema        ( Note written / Date of service 01-16-24 ( This is certified to be the same as "ENTERED" date above ( for billing purposes)))    ==================>> MEDICATIONS <<====================    buMETAnide 1 milliGRAM(s) Oral daily  chlorhexidine 2% Cloths 1 Application(s) Topical <User Schedule>  heparin   Injectable 5000 Unit(s) SubCutaneous every 12 hours  predniSONE   Tablet 10 milliGRAM(s) Oral daily  sildenafil (REVATIO) 20 milliGRAM(s) Oral three times a day    MEDICATIONS  (PRN):  acetaminophen     Tablet .. 650 milliGRAM(s) Oral every 6 hours PRN Temp greater or equal to 38C (100.4F), Mild Pain (1 - 3)  guaiFENesin Oral Liquid (Sugar-Free) 200 milliGRAM(s) Oral every 6 hours PRN Cough  levalbuterol Inhalation 0.63 milliGRAM(s) Inhalation every 8 hours PRN wheezing  melatonin 3 milliGRAM(s) Oral at bedtime PRN Insomnia    ___________  Active diet:  Diet, Regular:   1500mL Fluid Restriction ([id number])  Low Sodium  ___________________    ==================>> VITAL SIGNS <<==================    Vital Signs Last 24 HrsT(C): 36.3 (01-16-24 @ 11:10)  T(F): 97.4 (01-16-24 @ 11:10), Max: 97.4 (01-16-24 @ 11:10)  HR: 91 (01-16-24 @ 11:10) (91 - 101)  BP: 113/77 (01-16-24 @ 11:10)  RR: 18 (01-16-24 @ 11:10) (18 - 18)  SpO2: 99% (01-16-24 @ 11:10) (95% - 99%)       ==================>> LAB AND IMAGING <<==================       01-15    139  |  103  |  44<H>  ----------------------------<  81  4.2   |  19<L>  |  1.71<H>    Ca    9.6      15 Tommy 2024 07:02  Mg     2.1     01-15    TPro  7.5  /  Alb  3.9  /  TBili  0.6  /  DBili  x   /  AST  21  /  ALT  14  /  AlkPhos  106  01-15    WBC count:   9.45 <<==   Hemoglobin:   13.5 <<==  platelets:  218 <==, 223 <==, 211 <==    Creatinine:  1.71  <<==, 1.44  <<==, 1.58  <<==, 1.29  <<==, 1.32  <<==  Sodium:   139  <==, 138  <==, 133  <==, 140  <==, 139  <==       AST:          21(01-15) <== , 29(01-12) <== , 27(01-12) <== , 26(01-11) <== , 46(01-10) <==      ALT:        14(01-15)  <== , 20(01-12)  <== , 18(01-12)  <== , 22(01-11)  <== , 29(01-10)  <==      AP:        106(01-15)  <=, 121(01-12)  <=, <15(01-12)  <=, 117(01-11)  <=, 132(01-10)  <=     Bili:        0.6(01-15)  <=, 0.6(01-12)  <=, 0.6(01-12)  <=, 0.6(01-11)  <=, 0.6(01-10)  <=    ____________________________    M I C R O B I O L O G Y :    Culture - Blood (collected 10 Tommy 2024 18:42)  Source: .Blood Blood  Final Report (16 Jan 2024 02:01):    No growth at 5 days    Culture - Blood (collected 10 Tommy 2024 18:25)  Source: .Blood Blood  Final Report (16 Jan 2024 02:01):    No growth at 5 days    __________________________________________________________________________________  ===============>>  A S S E S S M E N T   A N D   P L A N <<===============  ------------------------------------------------------------------------------------------    · Assessment	  63M w/ hx of Rheumatoid arthritis, HTN, HFpEF, chronic RV failure, group II pHTN and 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   -Cont. supplemental 02 >>> wean down/off O2 as able   -Xopenex PRN  -Prednisone dose increased >> taper back down to baseline dose on DC  -Continuous pulse oximetry    Problem/Plan - 2:  ·  Problem: 2019 novel coronavirus disease (COVID-19).   ·  Plan: COVID positive. w/ acute on chronic respiratory failure. Appreciate pulm and cardiology recommendations  -Cont. supplemental 02 And wean down as able  -IV Remdesivir >> stopped given elevated creatinine today  -Prednisone increased to 10mg daily  - Nebulizer treatment as needed  -Isolation precautions  -Supportive care  - nutrition, hydration, supplements, vitamins   incentive spirometer / OOB to chair   sepsis resolved     Problem/Plan - 3:  ·  Problem: DON on CKD III   remdesivir stopped  hold aldactone  increase PO hydration today  monitor closely >> repeat tis afternoon     Problem/Plan - 4:  ·  Problem: Chronic heart failure with preserved ejection fraction (HFpEF).   ·  Plan: Relatively euvolemic appearing. Appreciate cardiology recommendations  -Continue bumex 1mg PO daily  -Continue sildenafil 20mg TID  -Continue spironolactone 25mg daily>> hold today given DON  -Strict I/Os  -Daily weights  -cardiology / HF team appreciated     Problem/Plan - 5:  ·  Problem: H/O pulmonary hypertension.   ·  Plan: -Contineu bumex 1mg PO daily  -Continue sildenafil 20mg TID  -Cont. Advair BID.    Problem/Plan - 6:  ·  Problem: Prophylactic measure.   ·  Plan: DVT PPx  -Hep subq.  -Full Code.    DC planing if creatinine better / stable    --------------------------------------------  Case discussed with patient.. RN.. Nurse Practitioner, cardio..   Education given on findings and plan of care  ___________________________  SALIMA Vu D.O.  Pager: [phone]     _________________________________________________________________________________________  ========>>  M E D I C A L   A T T E N D I N G    F O L L O W  U P  N O T E  <<=========  -----------------------------------------------------------------------------------------------------    - Patient seen and examined by me earlier today.   - In summary,  PLACIDO GOLDMAN is a 63y year old man admitted with COVID  - Patient today overall doing ok, comfortable, eating OK. / good appetite... breathing ok,    >> patient at home has Oxygen and uses it PRN.. ( home portable O2 at bedside now)     ==================>> REVIEW OF SYSTEM <<=================    GEN: no fever, no chills, no pain  RESP: no significant SOB, occasional cough   CVS: no chest pain, no palpitations  GI: no abdominal pain, no nausea, Good appetite >> encouraged increased fluid intake today given elevated creatinine   : no dysuria, no frequency  Neuro: no headache, no dizziness    ==================>> PHYSICAL EXAM <<=================    GEN: A&O X 3 , NAD , comfortable, pleasant, calm .. in  bed, encouraged out of bed to chair with assistance as needed.   HEENT: NCAT, PERRL, MMM, hearing intact  CVS: S1S2 , regular , No M/R/G appreciated  PULM: mild scaterred rhonchi , Slightly improved  ABD.: soft. non tender, non distended,  bowel sounds present  Extrem: intact pulses , no edema        ( Note written / Date of service 01-16-24 ( This is certified to be the same as "ENTERED" date above ( for billing purposes)))    ==================>> MEDICATIONS <<====================    buMETAnide 1 milliGRAM(s) Oral daily  chlorhexidine 2% Cloths 1 Application(s) Topical <User Schedule>  heparin   Injectable 5000 Unit(s) SubCutaneous every 12 hours  predniSONE   Tablet 10 milliGRAM(s) Oral daily  sildenafil (REVATIO) 20 milliGRAM(s) Oral three times a day    MEDICATIONS  (PRN):  acetaminophen     Tablet .. 650 milliGRAM(s) Oral every 6 hours PRN Temp greater or equal to 38C (100.4F), Mild Pain (1 - 3)  guaiFENesin Oral Liquid (Sugar-Free) 200 milliGRAM(s) Oral every 6 hours PRN Cough  levalbuterol Inhalation 0.63 milliGRAM(s) Inhalation every 8 hours PRN wheezing  melatonin 3 milliGRAM(s) Oral at bedtime PRN Insomnia    ___________  Active diet:  Diet, Regular:   1500mL Fluid Restriction ([id number])  Low Sodium  ___________________    ==================>> VITAL SIGNS <<==================    Vital Signs Last 24 HrsT(C): 36.3 (01-16-24 @ 11:10)  T(F): 97.4 (01-16-24 @ 11:10), Max: 97.4 (01-16-24 @ 11:10)  HR: 91 (01-16-24 @ 11:10) (91 - 101)  BP: 113/77 (01-16-24 @ 11:10)  RR: 18 (01-16-24 @ 11:10) (18 - 18)  SpO2: 99% (01-16-24 @ 11:10) (95% - 99%)       ==================>> LAB AND IMAGING <<==================       01-15    139  |  103  |  44<H>  ----------------------------<  81  4.2   |  19<L>  |  1.71<H>    Ca    9.6      15 Tommy 2024 07:02  Mg     2.1     01-15    TPro  7.5  /  Alb  3.9  /  TBili  0.6  /  DBili  x   /  AST  21  /  ALT  14  /  AlkPhos  106  01-15    WBC count:   9.45 <<==   Hemoglobin:   13.5 <<==  platelets:  218 <==, 223 <==, 211 <==    Creatinine:  1.71  <<==, 1.44  <<==, 1.58  <<==, 1.29  <<==, 1.32  <<==  Sodium:   139  <==, 138  <==, 133  <==, 140  <==, 139  <==       AST:          21(01-15) <== , 29(01-12) <== , 27(01-12) <== , 26(01-11) <== , 46(01-10) <==      ALT:        14(01-15)  <== , 20(01-12)  <== , 18(01-12)  <== , 22(01-11)  <== , 29(01-10)  <==      AP:        106(01-15)  <=, 121(01-12)  <=, <15(01-12)  <=, 117(01-11)  <=, 132(01-10)  <=     Bili:        0.6(01-15)  <=, 0.6(01-12)  <=, 0.6(01-12)  <=, 0.6(01-11)  <=, 0.6(01-10)  <=    ____________________________    M I C R O B I O L O G Y :    Culture - Blood (collected 10 Tommy 2024 18:42)  Source: .Blood Blood  Final Report (16 Jan 2024 02:01):    No growth at 5 days    Culture - Blood (collected 10 Tommy 2024 18:25)  Source: .Blood Blood  Final Report (16 Jan 2024 02:01):    No growth at 5 days    __________________________________________________________________________________  ===============>>  A S S E S S M E N T   A N D   P L A N <<===============  ------------------------------------------------------------------------------------------    · Assessment	  63M w/ hx of Rheumatoid arthritis, HTN, HFpEF, chronic RV failure, group II pHTN and 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   -Cont. supplemental 02 >>> wean down/off O2 as able   -Xopenex PRN  -Prednisone dose increased >> taper back down to baseline dose on DC  -Continuous pulse oximetry    Problem/Plan - 2:  ·  Problem: 2019 novel coronavirus disease (COVID-19).   ·  Plan: COVID positive. w/ acute on chronic respiratory failure. Appreciate pulm and cardiology recommendations  -Cont. supplemental 02 And wean down as able  -IV Remdesivir >> stopped given elevated creatinine today  -Prednisone increased to 10mg daily  - Nebulizer treatment as needed  -Isolation precautions  -Supportive care  - nutrition, hydration, supplements, vitamins   incentive spirometer / OOB to chair   sepsis resolved     Problem/Plan - 3:  ·  Problem: DON on CKD III   remdesivir stopped  hold aldactone  increase PO hydration today  monitor closely >> repeat tis afternoon     Problem/Plan - 4:  ·  Problem: Chronic heart failure with preserved ejection fraction (HFpEF).   ·  Plan: Relatively euvolemic appearing. Appreciate cardiology recommendations  -Continue bumex 1mg PO daily  -Continue sildenafil 20mg TID  -Continue spironolactone 25mg daily>> hold today given DON  -Strict I/Os  -Daily weights  -cardiology / HF team appreciated     Problem/Plan - 5:  ·  Problem: H/O pulmonary hypertension.   ·  Plan: -Contineu bumex 1mg PO daily  -Continue sildenafil 20mg TID  -Cont. Advair BID.    Problem/Plan - 6:  ·  Problem: Prophylactic measure.   ·  Plan: DVT PPx  -Hep subq.  -Full Code.    DC planing if creatinine better / stable    --------------------------------------------  Case discussed with patient.. RN.. Nurse Practitioner, cardio..   Education given on findings and plan of care  ___________________________  SALIMA Vu D.O.  Pager: [phone]

## 2024-01-16 NOTE — PROGRESS NOTE ADULT - SUBJECTIVE AND OBJECTIVE BOX
Cardiovascular Disease Progress Note    Overnight events: No acute events overnight.  no new cardiac sx  Otherwise review of systems negative    Objective Findings:  T(C): 36.2 (24 @ 04:00), Max: 36.7 (01-15-24 @ 11:29)  HR: 91 (24 @ 04:00) (91 - 101)  BP: 111/84 (24 @ 04:00) (107/73 - 111/84)  RR: 18 (24 @ 04:00) (18 - 20)  SpO2: 95% (24 @ 04:00) (95% - 99%)  Wt(kg): --  Daily     Daily Weight in k.8 (2024 04:00)      Physical Exam:  Gen: NAD  HEENT: EOMI  CV: RRR, normal S1 + S2, no m/r/g  Lungs: CTAB  Abd: soft, non-tender  Ext: No edema    Telemetry:    Laboratory Data:    01-15    139  |  103  |  44<H>  ----------------------------<  81  4.2   |  19<L>  |  1.71<H>    Ca    9.6      15 Tommy 2024 07:02  Mg     2.1     01-15    TPro  7.5  /  Alb  3.9  /  TBili  0.6  /  DBili  x   /  AST  21  /  ALT  14  /  AlkPhos  106  01-15              Inpatient Medications:  MEDICATIONS  (STANDING):  buMETAnide 1 milliGRAM(s) Oral daily  chlorhexidine 2% Cloths 1 Application(s) Topical <User Schedule>  heparin   Injectable 5000 Unit(s) SubCutaneous every 12 hours  predniSONE   Tablet 10 milliGRAM(s) Oral daily  sildenafil (REVATIO) 20 milliGRAM(s) Oral three times a day      Assessment:  63M w/ hx of Rheumatoid arthritis, HTN, HFpEF, chronic RV failure, group II pHTN and 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       Recs:  cardiac stable  vol status appears stable. cw diuretics per hf  pulm htn, on sildenafil. hf following and recs appreciated  tx of covid pna per primary team. sx appear mild at present  routine icd interrogation as op  hx of lv dysfunction, improved on gdmt   pulmonary follow up  will follow  dvt ppx  pt eval  dcp          Over 25 minutes spent on total encounter; more than 50% of the visit was spent counseling and/or coordinating care by the attending physician.      Juarez Carver MD   Cardiovascular Disease  [phone] Cardiovascular Disease Progress Note    Overnight events: No acute events overnight.  no new cardiac sx  Otherwise review of systems negative    Objective Findings:  T(C): 36.2 (24 @ 04:00), Max: 36.7 (01-15-24 @ 11:29)  HR: 91 (24 @ 04:00) (91 - 101)  BP: 111/84 (24 @ 04:00) (107/73 - 111/84)  RR: 18 (24 @ 04:00) (18 - 20)  SpO2: 95% (24 @ 04:00) (95% - 99%)  Wt(kg): --  Daily     Daily Weight in k.8 (2024 04:00)      Physical Exam:  Gen: NAD  HEENT: EOMI  CV: RRR, normal S1 + S2, no m/r/g  Lungs: CTAB  Abd: soft, non-tender  Ext: No edema    Telemetry:    Laboratory Data:    01-15    139  |  103  |  44<H>  ----------------------------<  81  4.2   |  19<L>  |  1.71<H>    Ca    9.6      15 Tommy 2024 07:02  Mg     2.1     01-15    TPro  7.5  /  Alb  3.9  /  TBili  0.6  /  DBili  x   /  AST  21  /  ALT  14  /  AlkPhos  106  01-15              Inpatient Medications:  MEDICATIONS  (STANDING):  buMETAnide 1 milliGRAM(s) Oral daily  chlorhexidine 2% Cloths 1 Application(s) Topical <User Schedule>  heparin   Injectable 5000 Unit(s) SubCutaneous every 12 hours  predniSONE   Tablet 10 milliGRAM(s) Oral daily  sildenafil (REVATIO) 20 milliGRAM(s) Oral three times a day      Assessment:  63M w/ hx of Rheumatoid arthritis, HTN, HFpEF, chronic RV failure, group II pHTN and 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       Recs:  cardiac stable  vol status appears stable. cw diuretics per hf  pulm htn, on sildenafil. hf following and recs appreciated  tx of covid pna per primary team. sx appear mild at present  routine icd interrogation as op  hx of lv dysfunction, improved on gdmt   pulmonary follow up  will follow  dvt ppx  pt eval  dcp          Over 25 minutes spent on total encounter; more than 50% of the visit was spent counseling and/or coordinating care by the attending physician.      Juarez Carver MD   Cardiovascular Disease  [phone]

## 2024-01-16 NOTE — DISCHARGE NOTE PROVIDER - NSDCMRMEDTOKEN_GEN_ALL_CORE_FT
Advair Diskus 250 mcg-50 mcg inhalation powder: 1 puff(s) inhaled 2 times a day  Albuterol (Eqv-ProAir HFA) 90 mcg/inh inhalation aerosol: 2 puff(s) inhaled 4 times a day as needed for  shortness of breath and/or wheezing  bumetanide 1 mg oral tablet: 1 tab(s) orally once a day  cholecalciferol oral tablet: 2000 unit(s) orally once a day  melatonin 3 mg oral tablet: 1 tab(s) orally once a day (at bedtime), As needed, Insomnia  predniSONE 2.5 mg oral tablet: 3 tab(s) orally once a day  sildenafil 20 mg oral tablet: 1 tab(s) orally 3 times a day  spironolactone 25 mg oral tablet: 1 tab(s) orally once a day   Advair Diskus 250 mcg-50 mcg inhalation powder: 1 puff(s) inhaled 2 times a day  Albuterol (Eqv-ProAir HFA) 90 mcg/inh inhalation aerosol: 2 puff(s) inhaled 4 times a day as needed for  shortness of breath and/or wheezing  bumetanide 1 mg oral tablet: 1 tab(s) orally once a day  cholecalciferol oral tablet: 2000 unit(s) orally once a day  melatonin 3 mg oral tablet: 1 tab(s) orally once a day (at bedtime), As needed, Insomnia  predniSONE 2.5 mg oral tablet: 3 tab(s) orally once a day  sildenafil 20 mg oral tablet: 1 tab(s) orally 3 times a day

## 2024-01-16 NOTE — DISCHARGE NOTE NURSING/CASE MANAGEMENT/SOCIAL WORK - NSDCPEFALRISK_GEN_ALL_CORE
For information on Fall & Injury Prevention, visit: [email or web address] OR  [email or web address] OR  https://www.cdc.gov/steadi/patient.html

## 2024-01-16 NOTE — PROGRESS NOTE ADULT - REASON FOR ADMISSION
Sepsis, COVID
COVID
Sepsis, COVID

## 2024-01-16 NOTE — DISCHARGE NOTE PROVIDER - NSFOLLOWUPCLINICS_GEN_ALL_ED_FT
[facility]  Nephrology  [address] [place] [address], [address] [place] [address]  Phone: [phone]  Fax:   Follow Up Time: 1 week     [facility]  Nephrology  [address] [place] [address], [address] [place] [address]  Phone: [phone]  Fax:   Follow Up Time: 1 week

## 2024-01-16 NOTE — PROGRESS NOTE ADULT - ASSESSMENT
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    CT chest 10/11:  LLL atelectasis. PA measured 4 cm.   TTE 10/11:  RV enlargement with decreased function. Interventricular septal flattening in both systole and diastole.   RHC 3/2023:  MPAP 30, PCW 3, PVR 6.04 CANTU.     # Pulmonary hypertension (group 1 and 3)  # COVID-19 pneumonia      RECS  -Completed remdesivir  -Continue sildenafil 20 mg po tid. Patient pending outpatient approval for Macitentan.   -continue with prednisone 10 mg po daily for now.  -On bumex 1 mg po daily and spironolactone 25 mg po daily at home. P . Continue to monitor daily weights and is/os.   - Wean o2 as able.     Patient should follow with pulmonology within 2 weeks of discharge. Please email [email or web address] and include patient's name, , MRN, telephone number, and discharge diagnosis, and allow 24 hours for scheduling. The office is located at [address] [place] [address], [address]. Number [phone].       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    CT chest 10/11:  LLL atelectasis. PA measured 4 cm.   TTE 10/11:  RV enlargement with decreased function. Interventricular septal flattening in both systole and diastole.   RHC 3/2023:  MPAP 30, PCW 3, PVR 6.04 CANTU.     # Pulmonary hypertension (group 1 and 3)  # COVID-19 pneumonia      RECS  -Completed remdesivir  -Continue sildenafil 20 mg po tid. Patient pending outpatient approval for Macitentan.   -continue with prednisone 10 mg po daily for now.  -On bumex 1 mg po daily and spironolactone 25 mg po daily at home. P . Continue to monitor daily weights and is/os.   - Wean o2 as able.     Patient should follow with pulmonology within 2 weeks of discharge. Please email [email or web address] and include patient's name, , MRN, telephone number, and discharge diagnosis, and allow 24 hours for scheduling. The office is located at [address] [place] [address], [address]. Number [phone].

## 2024-01-16 NOTE — PROGRESS NOTE ADULT - TIME BILLING
Patient comes in today with congestion off and on diarrhea, fatigue, achiness,  Right ear pain at times. Patient's father was diagnosed with covid on 1/2/2021 for 1-2 weeks off and on. Patient does have a mild headache today. This writer was wearing a mask, N95 duckbill mask, face shield, gown and gloves for this visit. Patient was also wearing a mask.  
Medical management as above, reviewing chart and coordinating care with primary team/staff, as well as reviewing vitals, radiology, medication list, recent labs, and prior records.    Does not include teaching time.
review of laboratory data, radiology results, discussion with primary team\patient, and monitoring for potential decompensation. Interventions were performed as documented above

## 2024-01-16 NOTE — DISCHARGE NOTE PROVIDER - NSDCCAREPROVSEEN_GEN_ALL_CORE_FT
Gema Vu Hoorbod Delshadfar  Hoorbod Delshadfar  Hoorbod Delshadfar  Norman Albert Carver  Team [facility] Pulmonary  Advance PracticeTeam [facility] Medicine  Heart Failure Svc [facility] Cardiology      [ Greater than 35 min spent for discharge services.   SALIMA Vu ]       ( Note written / Date of service is the same as last day of patient stay  in the hospital ( for billing purposes))) Hoorbod Delshadfar  Hoorbod Delshadfar  Hoorbod Delshadfar  Norman Albert Carver  Team [facility] Pulmonary  Advance PracticeTeam [facility] Medicine  Heart Failure Svc [facility] Cardiology      [ Greater than 35 min spent for discharge services.   SALIMA Vu ]       ( Note written / Date of service is the same as last day of patient stay  in the hospital ( for billing purposes)))

## 2024-01-16 NOTE — DISCHARGE NOTE PROVIDER - HOSPITAL COURSE
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 The patient reports fever, dry cough and exertional dyspnea. His wife was reportedly sick recently with a "URI." The patient has not missed any of his home diuretic doses or his home silednafil. He has not started his macitentan yet. He has not had any dizziness or syncope. Denies worsening of his lower extremity edema, abdominal distension, or increase oxygen requirements. Had fever yesterday night/ this AM. SOB worsened significantly today and came to hospital for that reason.    In ER: Given Prednisone 10mg, Tylenol 975mg (10 Tommy 2024 20:01)    Hospital Course:  Patient was admitted to medicine telemetry floor for further monitoring. COVID + and started on remdesivir that completed 1/12 - 1/15 and prednisone. HF team consulted, no evidence of heart failure on exam, no interventions recommended.    Patient is medically cleared for discharge. Medication reconciliation reviewed, revised, and resolved with Dr. Vu who had medically cleared patient for discharge with follow-up as advised. Patient is currently stable for discharge to home at this time.    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 The patient reports fever, dry cough and exertional dyspnea. His wife was reportedly sick recently with a "URI." The patient has not missed any of his home diuretic doses or his home silednafil. He has not started his macitentan yet. He has not had any dizziness or syncope. Denies worsening of his lower extremity edema, abdominal distension, or increase oxygen requirements. Had fever yesterday night/ this AM. SOB worsened significantly today and came to hospital for that reason.    In ER: Given Prednisone 10mg, Tylenol 975mg (10 Tommy 2024 20:01)    Hospital Course:  Patient was admitted to medicine telemetry floor for further monitoring. COVID + and started on remdesivir that completed 2 doses; third dose held 2/2 DON. Prednisone increased this admission and tapered to home dose on DC. HF team consulted, no evidence of heart failure on exam, no interventions recommended. General Cardiology consulted, no interventions. Monitored for lv dysfunction, improved on gdmt.  Pulmonology consulted for pulmonary hypertension; Continue sildenafil 20 mg po tid. Patient pending outpatient approval for Macitentan.  Continue with prednisone 10 mg po daily for now. On bumex 1 mg po daily and spironolactone 25 mg po daily at home. Patient reports losing weight. Monitored during admission. Per Dr. Vu, HOLD aldactone due to increased Cr. Follow up nephrology outpatient    Patient is medically cleared for discharge. Medication reconciliation reviewed, revised, and resolved with Dr. Vu who had medically cleared patient for discharge with follow-up as advised. Patient is currently stable for discharge to home at this time.    Important Medication Changes and Reason:  - HOLD Aldactone    Active or Pending Issues Requiring Follow-up:  - f/u nephrology outpatient    Advanced Directives:   [x] Full code  [ ] DNR  [ ] Hospice    Discharge Diagnoses:  -Acute on chronic respiratory failure with hypoxia.   - COVID  - DON

## 2024-01-16 NOTE — PROGRESS NOTE ADULT - ATTENDING COMMENTS
Patient is a 62 yo M w/ chronic hypoxemic respiratory failure (4 to 5 L O2), pHTN on Sildenafil and Pred 7.5 mg PO daily), RA, HFpEF, RCC s/p partial nephrectomy admitted for COVID infection.     #COVID infection  #Chronic hypoxemic respiratory failure  #pHTN  - c/w Bumex   - c/w Pred 10mg for now  - c/w Sildenafil 20mg PO TID   - c/w supplemental O2 to maintain SO2 > 88%  - Completed Remdesivir    Gabriele Meredith MD  Pulmonary & Critical Care

## 2024-01-16 NOTE — PROGRESS NOTE ADULT - SUBJECTIVE AND OBJECTIVE BOX
Pulmonary Consult Follow up     Interval Events:    Doing well. Breathing has improved.    REVIEW OF SYSTEMS:  [x] All other systems negative except per HPI   [ ] Unable to assess ROS because ________    OBJECTIVE:  ICU Vital Signs Last 24 Hrs  T(C): 36.3 (16 Jan 2024 11:10), Max: 36.3 (15 Tommy 2024 21:19)  T(F): 97.4 (16 Jan 2024 11:10), Max: 97.4 (16 Jan 2024 11:10)  HR: 91 (16 Jan 2024 11:10) (91 - 101)  BP: 113/77 (16 Jan 2024 11:10) (111/83 - 113/77)  BP(mean): --  ABP: --  ABP(mean): --  RR: 18 (16 Jan 2024 11:10) (18 - 18)  SpO2: 99% (16 Jan 2024 11:10) (95% - 99%)    O2 Parameters below as of 16 Jan 2024 11:10  Patient On (Oxygen Delivery Method): nasal cannula              01-15 @ 07:01 - 01-16 @ 07:00  --------------------------------------------------------  IN: 0 mL / OUT: 1200 mL / NET: -1200 mL    01-16 @ 07:01  -  01-16 @ 16:39  --------------------------------------------------------  IN: 0 mL / OUT: 360 mL / NET: -360 mL        PHYSICAL EXAM:  GENERAL: NAD, well-groomed, well-developed  CHEST/LUNG: Clear to auscultation bilaterally   HEART: Regular rate and rhythm; No murmurs, rubs, or gallops   NERVOUS SYSTEM:  Alert & Oriented X3, Good concentration    HOSPITAL MEDICATIONS:  MEDICATIONS  (STANDING):  buMETAnide 1 milliGRAM(s) Oral daily  chlorhexidine 2% Cloths 1 Application(s) Topical <User Schedule>  heparin   Injectable 5000 Unit(s) SubCutaneous every 12 hours  predniSONE   Tablet 10 milliGRAM(s) Oral daily  sildenafil (REVATIO) 20 milliGRAM(s) Oral three times a day    MEDICATIONS  (PRN):  acetaminophen     Tablet .. 650 milliGRAM(s) Oral every 6 hours PRN Temp greater or equal to 38C (100.4F), Mild Pain (1 - 3)  guaiFENesin Oral Liquid (Sugar-Free) 200 milliGRAM(s) Oral every 6 hours PRN Cough  levalbuterol Inhalation 0.63 milliGRAM(s) Inhalation every 8 hours PRN wheezing  melatonin 3 milliGRAM(s) Oral at bedtime PRN Insomnia      LABS:  01-16    135  |  99  |  42<H>  ----------------------------<  98  4.5   |  23  |  1.72<H>  01-15    139  |  103  |  44<H>  ----------------------------<  81  4.2   |  19<L>  |  1.71<H>    Ca    9.6      16 Jan 2024 15:06  Ca    9.6      15 Tommy 2024 07:02  Mg     2.1     01-15    TPro  7.5  /  Alb  3.9  /  TBili  0.6  /  DBili  x   /  AST  21  /  ALT  14  /  AlkPhos  106  01-15    Magnesium: 2.1 mg/dL (01-15-24 @ 07:02)                      Urinalysis Basic - ( 16 Jan 2024 15:06 )    Color: x / Appearance: x / SG: x / pH: x  Gluc: 98 mg/dL / Ketone: x  / Bili: x / Urobili: x   Blood: x / Protein: x / Nitrite: x   Leuk Esterase: x / RBC: x / WBC x   Sq Epi: x / Non Sq Epi: x / Bacteria: x          CAPILLARY BLOOD GLUCOSE

## 2024-01-17 ENCOUNTER — APPOINTMENT (OUTPATIENT)
Dept: HEART FAILURE | Facility: CLINIC | Age: 64
End: 2024-01-17

## 2024-01-17 VITALS
RESPIRATION RATE: 18 BRPM | DIASTOLIC BLOOD PRESSURE: 68 MMHG | TEMPERATURE: 98 F | OXYGEN SATURATION: 96 % | SYSTOLIC BLOOD PRESSURE: 101 MMHG | HEART RATE: 105 BPM

## 2024-01-18 ENCOUNTER — APPOINTMENT (OUTPATIENT)
Dept: PULMONOLOGY | Facility: CLINIC | Age: 64
End: 2024-01-18

## 2024-01-26 ENCOUNTER — NON-APPOINTMENT (OUTPATIENT)
Age: 64
End: 2024-01-26

## 2024-01-26 ENCOUNTER — APPOINTMENT (OUTPATIENT)
Dept: PULMONOLOGY | Facility: CLINIC | Age: 64
End: 2024-01-26
Payer: COMMERCIAL

## 2024-01-26 VITALS
DIASTOLIC BLOOD PRESSURE: 84 MMHG | HEART RATE: 106 BPM | BODY MASS INDEX: 22.53 KG/M2 | TEMPERATURE: 97.7 F | SYSTOLIC BLOOD PRESSURE: 146 MMHG | WEIGHT: 140.2 LBS | RESPIRATION RATE: 15 BRPM | HEIGHT: 66 IN | OXYGEN SATURATION: 95 %

## 2024-01-26 PROCEDURE — 99215 OFFICE O/P EST HI 40 MIN: CPT

## 2024-01-26 NOTE — ASSESSMENT
[FreeTextEntry1] : 63-year-old Malaysian M with PMH of HTN, pulm HTN, scleroderma, RA, CKD, HFrecEF/NICM (EF prev 25-30%, LVEDD 4.1 cm improved to 57% with predominant RV dysfunction) of unclear etiology s/p ICD, group I/III pulmonary HTN (on sildenafil), renal cancer s/p partial nephrectomy 4/2022, MGUS (ruled out for cardiac amyloid w/ myocardial biopsy)  #lung transplant evaluation Due to patients' various comorbidities and history of renal cancer. Referral and follow ups are needed before proceeding with lung transplant evaluation.  #NEP -PAST H/O RENAL CA 4/2022----S/P PARTIAL NEPHRECTOMY   -staging, surveillance and prognosis  -urology referral  #CKD -Cret 1.56 (12/28/23) - referred to cardio-renal for evaluation.  #PULM HTN - on sildenafil 20 mg three times/day  #MUGS -heme appt 2/21/24  FOLLOW UPS -continue to follow up with Dr. Villeda -continue to follow up with dr. Gardner -referred to Cardio-nephrology  RTC in 3-6 after appointment with Dr. Gardner and Ta  All questions answered, used teach back method, patient verbalized understanding.   Above discussed with Dr. Price

## 2024-01-26 NOTE — PHYSICAL EXAM
[No Acute Distress] : no acute distress [Normal Oropharynx] : normal oropharynx [Normal Appearance] : normal appearance [No Resp Distress] : no resp distress [No Abnormalities] : no abnormalities [Benign] : benign [No Edema] : no edema [No Focal Deficits] : no focal deficits [Normal Color/ Pigmentation] : normal color/ pigmentation [Oriented x3] : oriented x3 [Normal Affect] : normal affect [No Murmurs] : no murmurs [TextBox_105] : has features of scleroderma on hands

## 2024-01-26 NOTE — REASON FOR VISIT
[Initial] : an initial visit [Pulmonary Hypertension] : pulmonary hypertension [TextBox_44] : Lung transplant evaluation [TextBox_13] : Dr. Villeda

## 2024-01-26 NOTE — HISTORY OF PRESENT ILLNESS
[TextBox_4] :  63-year-old [place]ian M with PMH of HTN, scleroderma, RA,, CKD, HFrecEF/NICM (EF prev 25-30%, LVEDD 4.1 cm improved to 57% with predominant RV dysfunction) of unclear etiology s/p ICD, group I/III pulmonary HTN (on sildenafil), renal cancer s/p partial nephrectomy 4/2022, MGUS (ruled out for cardiac amyloid w/ myocardial biopsy), chronic dyspnea. Initial Dx 1 year ago.   Patient presents to clinic for initial visit for lung transplant evaluation.  Reports a decline of respiratory status over the past few months. States ambulation and activity has become difficult. Patient is uncertain about proceeding with lung transplant. Educated patient and wife about lung transplant.   We went over the risks and benefits of lung transplantation including one- and five-year survival. The median survival after a double lung transplant is about 6.5 years and this was explained in detail. We also went over the risks of opportunistic infections and the risks of calcineurin inhibitor use after the transplant with inherent risks of neoplasms and opportunistic infections and other complications. The post-operative recovery period was explained in detail including the need for mechanical ventilation and other means of cardiopulmonary support including extracorporeal membrane oxygenation use and the types of incisions and chest tubes that are required.    Oxygen requirement 4L NC at rest 5L NC on exertion 5L NL at night  Quality of life: Has declined. Patient is unable to tolerate activity.   Functional status: [X] performs activities of daily living with NO assistance [] performs activities of daily living with SOME assistance [] performs activities of daily living with TOTAL assistance  Exposures: Denies  Prior hospitalizations, ICU admission or intubations: Hospitalized at [facility] for COVID 1 week. Denies intubation.  Hx covid infection, blood transfusions: COVID 1/2023, denies blood transfusions   Health maintenance/vaccines: Flu COVID vax: Pfizer x2 2020  Family History: Mother DM, HTN, HLD  Social History Lives with: Lives with wife, sister is willing to assist his wife if need.   CT CHEST 10/11/23 IMPRESSION: Mild circumferential bladder wall thickening which may be due to cystitis or chronic outlet obstruction. Correlate with urinalysis.   ECHO 10/11/23  CONCLUSIONS:  1. The left ventricular cavity is small. The interventricular septum is flattened in systole and diastole consistent with right ventricular pressure and volume overload. Left ventricular systolic function is normal with a calculated ejection fraction of 57 % by the Espino's biplane method of disks. The left ventricular diastolic function is indeterminate. There is normal LV mass and concentric remodeling.  2. The left ventricular diastolic function is indeterminate.  3. Right ventricular volume and pressure overload. Left ventricular systolic function is normal with an ejection fraction of 57 % by Espino's method of disks.  4. Severely enlarged right ventricular cavity size and severely reduced systolic function.  5. Device lead is visualized.  6. The right atrium is severely dilated in size.  7. Estimated pulmonary artery systolic pressure is 79 mmHg.  8. The inferior vena cava is dilated (dilated >2.1cm) with abnormal inspiratory collapse (abnormal <50%) consistent with elevated right atrial pressure (~15, range 10-20mmHg).  9. Organized fibrinous vs coagulant material is seen in the pericardial space. There is a trace pericardial effusion noted adjacent to the posterior left ventricle.    RHC/LHC:

## 2024-01-26 NOTE — END OF VISIT
[Time Spent: ___ minutes] : I have spent [unfilled] minutes of time on the encounter. [FreeTextEntry3] : Agree with plan as outlined above. Will need to discuss staging, surveillance and prognosis with urologist after the two year sangeeta this Spring. GIven CKD history, referred to cardio-renal for evaluation. Follow up with heme re: ANIA TODD in 3 months.  yes

## 2024-01-27 NOTE — PATIENT PROFILE ADULT - NSPROMEDSBROUGHTTOHOSP_GEN_A_NUR
Counseling about adaptation of healthy dietary habits and lifestyle modifications, once medically stable  BMI >50  Affects all aspects of care   no

## 2024-02-06 ENCOUNTER — APPOINTMENT (OUTPATIENT)
Dept: PULMONOLOGY | Facility: CLINIC | Age: 64
End: 2024-02-06
Payer: COMMERCIAL

## 2024-02-06 DIAGNOSIS — R05.9 COUGH, UNSPECIFIED: ICD-10-CM

## 2024-02-06 PROCEDURE — 94010 BREATHING CAPACITY TEST: CPT

## 2024-02-06 PROCEDURE — ZZZZZ: CPT

## 2024-02-06 PROCEDURE — 94726 PLETHYSMOGRAPHY LUNG VOLUMES: CPT

## 2024-02-06 PROCEDURE — 94729 DIFFUSING CAPACITY: CPT

## 2024-02-06 PROCEDURE — 94618 PULMONARY STRESS TESTING: CPT

## 2024-02-06 PROCEDURE — 99215 OFFICE O/P EST HI 40 MIN: CPT | Mod: 25

## 2024-02-06 RX ORDER — BENZONATATE 100 MG/1
100 CAPSULE ORAL 3 TIMES DAILY
Qty: 60 | Refills: 1 | Status: ACTIVE | COMMUNITY
Start: 2024-02-06 | End: 1900-01-01

## 2024-02-07 NOTE — DISCUSSION/SUMMARY
[FreeTextEntry1] : ---Assessment plan----------The patient has been referred here for further opinion regarding pulmonary problem,   a 62 y/o [place]aur[place] M w/ h/o HTN, SCLERODERMA SINE SCLEROSIS-------- -has features of scleroderma on hands---SCELRODERMA SINE SCLEROSIS----LUIS FERNANDO   NUCLEOAR PATTERN ---OTHER ANTIBODIES NEGATIVE----- ,H/O MGUS ---H/O   RCC s/p partial R nephrectomy (4/2022), CKD Pt also has anemia with some suspicion of plasma cell dyscrasia BUT BM biopsy WAS NEGATIVE-. Kappa/lambda ratio of 3.23 (10.7/3.31) with serum IF revealing weak Kappa band.  Following up with hematology------ has been worked up for secondary pulmonary hypertension----ECHO -----12/15/22 - , EF 28% -----patient already started on sildenafil for secondary pulm hypertension =----- patient does not have features of sarcoidosis or amyloidosis Cardiac Cath----12/16/22 - RHC - RA 5, RV 48/11, PA 46/24/33, PCWP9, CO/CI 4.9/2.94, PVR 4.8  1 HYPOXEMIA---SECONDARY  PULM HTN -- ---S/P AICD---FOLLOWS CARDIOLOGY --  --------NEEDS  ECHO WITH BUBBLE STUDY - - - - -  - - 2-  needs supplemental oxygen advised that he use around-the-clock 3-secondary pulm hypertension-----ON SILDENAFIL AND  Macitentan 10mg this visit (paperwork to be filled out) --IF TOLERATES WILL ADD ORENITRAM  4- referred to lung transplant team 5 Pulmonary Rehab 6- scleroderma-SIEN SCELROSIS----- follows rheum DR Larson- continue with LOW DOSE PREDNISONE----------- 7 H/O -, RCC s/p partial R nephrectomy (4/22), CKD --REFD TO UROLOGY =- - - --  8 MGUS---REFD  TO HEMATOLOGY - - - - referral once again given today 9-H/O RCC --NEED TO F/U WITH NEPHROLOGY  10- Pt is cleared for Pulmonary rehab will need to start 11 DEXA  Thanks for allowing  me to participate  in the care of this patient.  Patient at this time  will follow  the above mentioned recommendations and return back for follow up visit. If you have any questions  I can be reached  at # [phone] (office).  Shruti Villeda MD, [facility]

## 2024-02-07 NOTE — HISTORY OF PRESENT ILLNESS
[Never] : never [TextBox_4] : This letter  is regarding your patient  who  attended pulmonary out patient office today.  I have reviewed  patient's  past history, social history, family history and medication list. I also  reviewed nurse practitioners/ and fellows  notes and assessment and agree with it.   The patient was referred by Dr.Samit ADAN  Mr. Cain is a 62 y/o [place] M w/ h/o HTN, ? RA, RCC s/p partial R nephrectomy (), CKD Pt also has anemia with some suspicion of plasma cell dyscrasia with plan for outpatient BM biopsy. Kappa/lambda ratio of 3.23 (10.7/3.31) with serum IF revealing weak [place] band. Was to see Dr. Schmidt (hematology) for a BM biopsy but did not make appointment.   ------No history of , fever, chills , rigors, chest pain, or hemoptysis. Questionable history of Raynaud's phenomenon. No h/o significant weight loss in last few months. No history of liver dysfunction , collagen vascular disorder or chronic thromboembolic disease. I would classify the patient's dyspnea as WHO  FUNCTIONAL CLASS II--------  --EC23 sinus tach, , APCs, incomplete RBBB, inferior Q waves; relatively low voltage 23 sinus tachycardia, , PRWP, incomplete RBBB, inferior Q waves 23 sinus tachycardia, , PRWP, inferior Q waves, incomplete RBBB 22 - sinus, , PRWP, inferior Q waves  Stress Test:  21 - treadmill stress test - EF 71%, no ECG changes; negative for ischemia/infarct   ECHO -----12/15/22 - septum 1.5 cm, PW 1.3 cm, LVEDD 4.1 cm, EF 28%, highly trabeculated apex, mild-mod TR, RVSP 60, RV dysfunction/enlargement, DT 78 msec, E/e' 28, LVOT VTI 12 cm, IVC 1.5 cm   CT/MRI:  22 - nl pericardium; concentric LVH; EF 36%, subtle LGE along inferior hinge point of RV  3/8/22 - Chest CT PE protocol - dilated PA (4 cm), mildly enlarged subcarinal LN (1.1 cm), mildly enlarged R hilar LN (1.2 cm), clear lungs; mildly enlarged bilateral axillary LN (L>R); no PE  Viability/Other Nuclear:  1/3/22 ([place]) Tc-Pyp scan - negative for TTR amyloid   Cardiac Cath/PCI: CARDIAC CATH - - -------------- ---------- 22 - RHC - RA 5, RV 48/11, PA 46/24/33, PCWP9, CO/CI 4.9/2.94, PVR 4.8  EBUS 2023----mediastinal lymph nodes----reactive lymphadenopathy  ENDOMYOCARDIAL BIOPSY--------no evidence of an infiltrative disorder like sarcoidosis or amyloidosis  Desaturates ON WALKING====NEEDS  PORTABLE OXYGEN - - -   2023------ on sildenafil----oxygen as needed has dry cough----has dilated esophagus ---??REFLUX----we will add omeprazole and Flonase nasal spray for his postnasal drip to see if that helps with his cough ------- needs PFT 6-minute walk test ------ also needs rheumatology opinion to rule out any underlying rheumatological condition ? SCLERODERMA SINE  SCLEROSIS------  2023----has features of scleroderma on hands---SCELRODERMA SINE SCLEROSIS----LUIS FERNANDO   NUCLEOAR PATTERN ---OTHER ANTIBODIES NEGATIVE-------- pulm htn on sildenafil 20mg bid, diuretics and as needed oxygen Scleroderma- s/p rheum consult- no role for MMF- remains on pred 10mg GI--DR MELTON EBUS 2023----mediastinal lymph nodes----reactive lymphadenopathy NONISCHAEMIC CARDIOMYOPATHY/HFrEF --follows cardiology - - -      HAS MGUS ---PAST H/O RENAL CA   2022----S/P   PARTIAL NEPHRECTOMY - -  accompanied today by wife  2023- MGUS - never followed up with Heme  1 block ET  PAH ----history of scleroderma features 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 Adan------[facility] PUL [facility] - - -  11  10/2023 63 year old male, with past history significant for Rheumatoid arthritis, HTN, HFrEF (55 to 60% - TTE of 2023), Home oxygen 5 liters, CKD, Renal cell carcinoma - s/p Partial nephrectomy, Plasma cell dyscrasia, and AICD placement, presented to the ED secondary to shortness of breath.  ---was hospitalized with /cystitis-------- ----   Ct pelvis 10/2023 IMPRESSION: Mild circumferential bladder wall thickening which may be due to cystitis or chronic outlet obstruction. Correlate with urinalysis. 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 Adan------[facility] PULM REH[facility] - - -  24-- had worsening cough  and sob and was given Solumedrol 20mg IM and Prednisone 15mg daily. He is back on his baseline 5mg at this time. He reports feeling somewhat better on the increased steroid dose. He denies weight gain and feels somewhat improved but not qquite back to his baseline. --------OTHERWISE---- has features of scleroderma on hands---SCELRODERMA SINE SCLEROSIS----LUIS FERNANDO   NUCLEOAR PATTERN ---OTHER ANTIBODIES NEGATIVE-------- pulm htn on sildenafil , diuretics and -oxygen --IS NON COMPLIANT TO OXYGEN - - - Scleroderma- s/p rheum consult- no role for MMF- remains on pred 10mg GI--DR LINH MATUTE 2023----mediastinal lymph nodes----reactive lymphadenopathy HAS MGUS --NEEDS HEMATOLOGY - - - --PAST H/O RENAL CA   2022----S/P   PARTIAL NEPHRECTOM

## 2024-02-07 NOTE — PHYSICAL EXAM
[No Acute Distress] : no acute distress [Normal Oropharynx] : normal oropharynx [III] : Mallampati Class: III [No Neck Mass] : no neck mass [Normal S1, S2] : normal s1, s2 [Clear to Auscultation Bilaterally] : clear to auscultation bilaterally [No Abnormalities] : no abnormalities [Benign] : benign [Normal Gait] : normal gait [No Edema] : no edema [No Focal Deficits] : no focal deficits [Oriented x3] : oriented x3 [TextBox_105] : Thickened skin [TextBox_125] : Thickened Skin

## 2024-02-07 NOTE — END OF VISIT
[FreeTextEntry3] :   I, Dr. Shruti Villeda  personally performed the evaluation and management (E/M) services for this established patient who presents today with (a) new problem(s)/exacerbation of (an) existing condition(s). That E/M includes conducting the clinically appropriate interval history &/or exam, assessing all new/exacerbated conditions, and establishing a new plan of care. Today, my NIEHSA, Mary Samuels NP, , was here to observe my evaluation and management service for this new problem/exacerbated condition and follow the plan of care established by me going forward. [Time Spent: ___ minutes] : I have spent [unfilled] minutes of time on the encounter.

## 2024-02-08 ENCOUNTER — OUTPATIENT (OUTPATIENT)
Dept: OUTPATIENT SERVICES | Facility: HOSPITAL | Age: 64
LOS: 1 days | Discharge: HOME | End: 2024-02-08

## 2024-02-08 DIAGNOSIS — Z90.5 ACQUIRED ABSENCE OF KIDNEY: Chronic | ICD-10-CM

## 2024-02-08 DIAGNOSIS — Z95.810 PRESENCE OF AUTOMATIC (IMPLANTABLE) CARDIAC DEFIBRILLATOR: Chronic | ICD-10-CM

## 2024-02-08 DIAGNOSIS — Z98.890 OTHER SPECIFIED POSTPROCEDURAL STATES: Chronic | ICD-10-CM

## 2024-02-08 DIAGNOSIS — C64.9 MALIGNANT NEOPLASM OF UNSPECIFIED KIDNEY, EXCEPT RENAL PELVIS: ICD-10-CM

## 2024-02-08 DIAGNOSIS — D47.2 MONOCLONAL GAMMOPATHY: ICD-10-CM

## 2024-02-09 ENCOUNTER — APPOINTMENT (OUTPATIENT)
Dept: RHEUMATOLOGY | Facility: CLINIC | Age: 64
End: 2024-02-09
Payer: COMMERCIAL

## 2024-02-09 VITALS
SYSTOLIC BLOOD PRESSURE: 143 MMHG | BODY MASS INDEX: 21.69 KG/M2 | WEIGHT: 135 LBS | OXYGEN SATURATION: 82 % | HEIGHT: 66 IN | DIASTOLIC BLOOD PRESSURE: 81 MMHG | HEART RATE: 130 BPM

## 2024-02-09 DIAGNOSIS — I73.00 RAYNAUD'S SYNDROME W/OUT GANGRENE: ICD-10-CM

## 2024-02-09 DIAGNOSIS — M25.552 PAIN IN LEFT HIP: ICD-10-CM

## 2024-02-09 PROCEDURE — G2211 COMPLEX E/M VISIT ADD ON: CPT

## 2024-02-09 PROCEDURE — 99215 OFFICE O/P EST HI 40 MIN: CPT

## 2024-02-10 PROBLEM — I73.00 RAYNAUD'S DISEASE: Status: ACTIVE | Noted: 2024-02-09

## 2024-02-10 NOTE — HISTORY OF PRESENT ILLNESS
[de-identified] : Last was seen on May, 2023 [FreeTextEntry1] : Interval history: ---------------------   on prednisone 7.5 mg a day  persistent exertional SOB ,, but no worsening, mild cough, mostly dry   again reviewed PET/ and CT chest - no ILD discussed with pulmonary difficulty of EBUS for LN bx of this small size  c/o left hip pain , when walking or changing position from sitting

## 2024-02-10 NOTE — ASSESSMENT
[FreeTextEntry1] :  # Scleroderma like phenotype with minimal skin tightening, salt and pepper skin changes in V area and telangiectasias ( sine scleroderma) - Positive LUIS FERNANDO (nucleolar pattern) with negative sub- serologies - skin tightening of the face with skin depigmentation of V area and over MCP joints, but no evidence of skin. tightening - sclerodactyly/ pitting scars - Raynaud's - no ILD on CT chest # pulmonary HTN;  on 3/2023 RCH: sPAP 44mmHg, dPAP 17mmHG, mPAP 30mmHg   # Nonischemic cardiomyopathy  -  HFrEF/NICM s/p ICD # questioned re lung transplant - urged to duscuss further with pulmonary and cardiology-   # Left hip pain # MGUS - systemic sclerosis in males is describes as paraneoplastic process in patients with MGUS - had not seen hematology yet  - xrays left hip - add Procardia XL 30 mg a day ( d/w pulmon and cardiology) / keep core body warm  - - try to taper steroids as tolerates  - no indications for MMF for tx of pulmonary HTN   - main focus on tx  is management of pulmonary HTN   RTO 6 months or earlier if needed

## 2024-02-10 NOTE — PHYSICAL EXAM
[General Appearance - Alert] : alert [General Appearance - Well Developed] : well developed [General Appearance - Well Nourished] : well nourished [Sclera] : the sclera and conjunctiva were normal [PERRL With Normal Accommodation] : pupils were equal in size, round, and reactive to light [] : no respiratory distress [Heart Rate And Rhythm] : heart rate was normal and rhythm regular [Edema] : there was no peripheral edema [Heart Sounds] : normal S1 and S2 [Bowel Sounds] : normal bowel sounds [Abdomen Soft] : soft [Abdomen Tenderness] : non-tender [Cervical Lymph Nodes Enlarged Posterior Bilaterally] : posterior cervical [Cervical Lymph Nodes Enlarged Anterior Bilaterally] : anterior cervical [Supraclavicular Lymph Nodes Enlarged Bilaterally] : supraclavicular [Nail Clubbing] : no clubbing  or cyanosis of the fingernails [Musculoskeletal - Swelling] : no joint swelling seen [Motor Tone] : muscle strength and tone were normal [No Focal Deficits] : no focal deficits [Oriented To Time, Place, And Person] : oriented to person, place, and time [Impaired Insight] : insight and judgment were intact [FreeTextEntry1] : skin tightening of the skin on the face; +Salt and pepper like changes with depigmentation on anterior chest wall V area, minimal tightening. Depigmentation b/l ears. Mild skin tightening of V area and ? of skin UE and LE proximal to the wrist and ankle, + sclerodactyly; + pitting scars

## 2024-02-12 ENCOUNTER — APPOINTMENT (OUTPATIENT)
Dept: PULMONOLOGY | Facility: CLINIC | Age: 64
End: 2024-02-12

## 2024-02-12 ENCOUNTER — APPOINTMENT (OUTPATIENT)
Dept: PULMONOLOGY | Facility: CLINIC | Age: 64
End: 2024-02-12
Payer: COMMERCIAL

## 2024-02-12 LAB
ALBUMIN SERPL ELPH-MCNC: 4.4 G/DL
ALP BLD-CCNC: 124 U/L
ALT SERPL-CCNC: 19 U/L
ANION GAP SERPL CALC-SCNC: 18 MMOL/L
AST SERPL-CCNC: 26 U/L
BASOPHILS # BLD AUTO: 0.06 K/UL
BASOPHILS NFR BLD AUTO: 0.5 %
BILIRUB SERPL-MCNC: 0.4 MG/DL
BUN SERPL-MCNC: 30 MG/DL
CALCIUM SERPL-MCNC: 9.9 MG/DL
CHLORIDE SERPL-SCNC: 105 MMOL/L
CK SERPL-CCNC: 123 U/L
CO2 SERPL-SCNC: 17 MMOL/L
CREAT SERPL-MCNC: 1.44 MG/DL
EGFR: 54 ML/MIN/1.73M2
EOSINOPHIL # BLD AUTO: 0.01 K/UL
EOSINOPHIL NFR BLD AUTO: 0.1 %
GLUCOSE SERPL-MCNC: 69 MG/DL
HCT VFR BLD CALC: 40 %
HGB BLD-MCNC: 12.3 G/DL
IMM GRANULOCYTES NFR BLD AUTO: 0.5 %
LYMPHOCYTES # BLD AUTO: 1.59 K/UL
LYMPHOCYTES NFR BLD AUTO: 14.3 %
MAN DIFF?: NORMAL
MCHC RBC-ENTMCNC: 30.7 PG
MCHC RBC-ENTMCNC: 30.8 GM/DL
MCV RBC AUTO: 99.8 FL
MONOCYTES # BLD AUTO: 0.75 K/UL
MONOCYTES NFR BLD AUTO: 6.8 %
NEUTROPHILS # BLD AUTO: 8.63 K/UL
NEUTROPHILS NFR BLD AUTO: 77.8 %
PLATELET # BLD AUTO: 322 K/UL
POTASSIUM SERPL-SCNC: 3.9 MMOL/L
PROT SERPL-MCNC: 7.2 G/DL
RBC # BLD: 4.01 M/UL
RBC # FLD: 14.9 %
SODIUM SERPL-SCNC: 140 MMOL/L
WBC # FLD AUTO: 11.09 K/UL

## 2024-02-12 PROCEDURE — 94626 PHY/QHP OP PULM RHB W/MNTR: CPT

## 2024-02-14 ENCOUNTER — APPOINTMENT (OUTPATIENT)
Dept: PULMONOLOGY | Facility: CLINIC | Age: 64
End: 2024-02-14
Payer: COMMERCIAL

## 2024-02-14 ENCOUNTER — APPOINTMENT (OUTPATIENT)
Dept: PULMONOLOGY | Facility: CLINIC | Age: 64
End: 2024-02-14

## 2024-02-14 LAB
ALBUMIN MFR SERPL ELPH: 53.9 %
ALBUMIN SERPL-MCNC: 3.9 G/DL
ALBUMIN/GLOB SERPL: 1.2 RATIO
ALPHA1 GLOB MFR SERPL ELPH: 6.1 %
ALPHA1 GLOB SERPL ELPH-MCNC: 0.4 G/DL
ALPHA2 GLOB MFR SERPL ELPH: 11 %
ALPHA2 GLOB SERPL ELPH-MCNC: 0.8 G/DL
B-GLOBULIN MFR SERPL ELPH: 12.7 %
B-GLOBULIN SERPL ELPH-MCNC: 0.9 G/DL
DEPRECATED KAPPA LC FREE/LAMBDA SER: 2.13 RATIO
GAMMA GLOB FLD ELPH-MCNC: 1.2 G/DL
GAMMA GLOB MFR SERPL ELPH: 16.3 %
IGA SER QL IEP: 251 MG/DL
IGG SER QL IEP: 1288 MG/DL
IGM SER QL IEP: 63 MG/DL
INTERPRETATION SERPL IEP-IMP: NORMAL
KAPPA LC CSF-MCNC: 2.02 MG/DL
KAPPA LC SERPL-MCNC: 4.3 MG/DL
M PROTEIN MFR SERPL ELPH: 7 %
M PROTEIN SPEC IFE-MCNC: NORMAL
MONOCLON BAND OBS SERPL: 0.5 G/DL
PROT SERPL-MCNC: 7.2 G/DL
PROT SERPL-MCNC: 7.2 G/DL

## 2024-02-14 PROCEDURE — 94626 PHY/QHP OP PULM RHB W/MNTR: CPT

## 2024-02-21 ENCOUNTER — APPOINTMENT (OUTPATIENT)
Dept: HEMATOLOGY ONCOLOGY | Facility: CLINIC | Age: 64
End: 2024-02-21
Payer: COMMERCIAL

## 2024-02-21 ENCOUNTER — APPOINTMENT (OUTPATIENT)
Dept: PULMONOLOGY | Facility: CLINIC | Age: 64
End: 2024-02-21
Payer: COMMERCIAL

## 2024-02-21 ENCOUNTER — APPOINTMENT (OUTPATIENT)
Dept: PULMONOLOGY | Facility: CLINIC | Age: 64
End: 2024-02-21

## 2024-02-21 ENCOUNTER — NON-APPOINTMENT (OUTPATIENT)
Age: 64
End: 2024-02-21

## 2024-02-21 VITALS
OXYGEN SATURATION: 91 % | WEIGHT: 144.62 LBS | TEMPERATURE: 97.8 F | RESPIRATION RATE: 16 BRPM | HEIGHT: 66.26 IN | HEART RATE: 108 BPM | BODY MASS INDEX: 23.24 KG/M2 | SYSTOLIC BLOOD PRESSURE: 143 MMHG | DIASTOLIC BLOOD PRESSURE: 101 MMHG

## 2024-02-21 VITALS — SYSTOLIC BLOOD PRESSURE: 144 MMHG | DIASTOLIC BLOOD PRESSURE: 95 MMHG

## 2024-02-21 DIAGNOSIS — Z82.49 FAMILY HISTORY OF ISCHEMIC HEART DISEASE AND OTHER DISEASES OF THE CIRCULATORY SYSTEM: ICD-10-CM

## 2024-02-21 PROCEDURE — 99205 OFFICE O/P NEW HI 60 MIN: CPT

## 2024-02-21 PROCEDURE — 94626 PHY/QHP OP PULM RHB W/MNTR: CPT

## 2024-02-21 RX ORDER — PREDNISONE 5 MG/1
5 TABLET ORAL DAILY
Qty: 30 | Refills: 1 | Status: DISCONTINUED | COMMUNITY
Start: 2023-12-27 | End: 2024-02-21

## 2024-02-21 NOTE — PHYSICAL EXAM
ABNORMAL SWALLOWING STUDY    An MBSS was completed on this patient today. Results indicated aspiration of thin liquids, penetration of nectar-thick liquids and fairly significant residue on all consistencies. Patient was educated regarding findings, recommendations for diet, and we are going to get her in for speech therapy.     I also am concerned about her vocal quality. Given her history of neck dissections in conjunction with her hoarseness, I'm concerned about a vocal fold pathology that could be impacting laryngeal vestibule closure/airway protection. Can you please also place a referral to ENT?     Please see patient's chart for full evaluation. Thank you for the referral, and please contact if you have any questions or concerns.     Thanks!   Shira Capps M.A., CCC-SLP   Speech Language Pathologist      You have an abnormal swallowing study  You may benefit from seeing a ENT doctor - a specialist in voice and swallowing.  ( I have reviewed your chart and I did not see that you had  seen a ENT specialist recently)  Your swallowing difficulties are the cause of your symptoms of coughing and shortness of breath.  The reason why you may have developed this problem may be related to the prior problems and surgery you had with your neck as many of the nerves used for swallowing come out of the neck. The other possibility is that nerve damage occurred during your prior multiple parotid gland surgeries.  My office will call to schedule an ENT evaluation   [Ambulatory and capable of all self care but unable to carry out any work activities] : Status 2- Ambulatory and capable of all self care but unable to carry out any work activities. Up and about more than 50% of waking hours [Normal] : affect appropriate

## 2024-02-26 ENCOUNTER — APPOINTMENT (OUTPATIENT)
Dept: PULMONOLOGY | Facility: CLINIC | Age: 64
End: 2024-02-26
Payer: COMMERCIAL

## 2024-02-26 PROCEDURE — 94626 PHY/QHP OP PULM RHB W/MNTR: CPT

## 2024-02-27 ENCOUNTER — APPOINTMENT (OUTPATIENT)
Dept: NEPHROLOGY | Facility: CLINIC | Age: 64
End: 2024-02-27

## 2024-02-28 ENCOUNTER — APPOINTMENT (OUTPATIENT)
Dept: ELECTROPHYSIOLOGY | Facility: CLINIC | Age: 64
End: 2024-02-28
Payer: COMMERCIAL

## 2024-02-28 ENCOUNTER — APPOINTMENT (OUTPATIENT)
Dept: PULMONOLOGY | Facility: CLINIC | Age: 64
End: 2024-02-28
Payer: COMMERCIAL

## 2024-02-28 ENCOUNTER — APPOINTMENT (OUTPATIENT)
Dept: HEART FAILURE | Facility: CLINIC | Age: 64
End: 2024-02-28
Payer: COMMERCIAL

## 2024-02-28 ENCOUNTER — NON-APPOINTMENT (OUTPATIENT)
Age: 64
End: 2024-02-28

## 2024-02-28 VITALS
HEART RATE: 116 BPM | WEIGHT: 138 LBS | BODY MASS INDEX: 22.18 KG/M2 | SYSTOLIC BLOOD PRESSURE: 135 MMHG | DIASTOLIC BLOOD PRESSURE: 89 MMHG | HEIGHT: 66.26 IN

## 2024-02-28 VITALS — DIASTOLIC BLOOD PRESSURE: 85 MMHG | SYSTOLIC BLOOD PRESSURE: 131 MMHG

## 2024-02-28 PROCEDURE — 93282 PRGRMG EVAL IMPLANTABLE DFB: CPT

## 2024-02-28 PROCEDURE — 99214 OFFICE O/P EST MOD 30 MIN: CPT | Mod: 25

## 2024-02-28 PROCEDURE — 94626 PHY/QHP OP PULM RHB W/MNTR: CPT

## 2024-02-28 PROCEDURE — 93000 ELECTROCARDIOGRAM COMPLETE: CPT

## 2024-02-28 RX ORDER — HYDRALAZINE HYDROCHLORIDE 10 MG/1
10 TABLET ORAL
Qty: 90 | Refills: 3 | Status: ACTIVE | COMMUNITY
Start: 2024-02-28 | End: 1900-01-01

## 2024-02-28 RX ORDER — NIFEDIPINE 30 MG/1
30 TABLET, FILM COATED, EXTENDED RELEASE ORAL
Qty: 30 | Refills: 0 | Status: ACTIVE | COMMUNITY
Start: 2024-02-09

## 2024-02-28 NOTE — PHYSICAL EXAM
[de-identified] : NAD [de-identified] : JVP approx 8-10 cm  [de-identified] : tachycardic 100's, Left chest ICD; prominent P2  [de-identified] : no lower extremity edema bilaterally [de-identified] : possible taut skin on hands; hypopigmented skin along neck/upper chest

## 2024-02-28 NOTE — HISTORY OF PRESENT ILLNESS
[FreeTextEntry1] : Briefly, Mr. Cain is a 62 y/o [place] M w/ h/o HTN, ? RA, RCC s/p partial R nephrectomy (4/22), CKD (b/l Cr 1.4; 5/10/23), HFrecEF/NICM (EF prev 25-30%, LVEDD 4.1 cm improved to 55% with predominant RV dysfunction) of unclear etiology s/p ICD, group I/III pulmonary HTN (on sildenafil), MGUS (ruled out for cardiac amyloid w/ myocardial biopsy), chronic dyspnea (on intermittent O2) . Admission 0/9-10/16 for UTI complicated by fluid overload. Pt presents for a post hospital f/u of care after admission 1/10/24-1/16/24 secondary to Covid infection. Accompanied by sister. Referred by Dr. Manuelito Carver.   For full initial details, please refer to note from 1/13/23  Course in hospital 1/10-1/16/24- Patient presented to ED for cough and dyspnea found to be COVID + and treated with remdesivir that completed 2 doses; third dose held 2/2 DON. Prednisone increased during admission and tapered to home dose on DC. HF team consulted, no evidence of heart failure on exam, no interventions recommended. Pulm consulted and continued sildenafil 20 mg po tid. Patient pending outpatient approval for Macitentan. Continue on bumex 1 mg po daily and spironolactone 25 mg po daily.  Pt is here with his sister for a Biotronik device check and post hospital follow up after admission 1/10-1/16/24 secondary to Covid infection. Prior admission 10/9-10/16 for UTI complicated by fluid overload. Improved with antibiotics. Biotronik ICD with one episode of SVT in log and with no shocks.   Last 10/11/23 TTE- EF 57%, LVEDD 3.2 cm, normal LV systolic function, reduced RV systolic function, estimated PA systolic pressure 79 mmHg, IVC dilated at > 2.1 cm, elevated RA pressure, trace pericardial effusion, organized fibrinous vs. coagulant material in pericardial space.   Since last visit 11/15/23, he started pulm rehab and tolerates 20- 30 minutes on exercise bicycle. Followed up appt with pulmonary and was started on Opsumit 10 mg daily which he is tolerating with issues.  He followed up with rheumatology and was start ed on nifedipine 30 mg daily. States he started it but was getting headaches so doesn't take in every day.  Reports his hands feel warm when he does take it. His prednisone was decreased to 2.5 mg bid from tid. He had labs 2/9/24 notable for Na 140, K 3.9, BUN 30 and creat 1.49.  Continues to have dyspnea with exertion particularly with doing small errands. . He has dyspnea with walking 1 hallway length. States he can walk up 8-10 steps limited by leg fatigue. He no longer has a cough, may have been related to Entresto.   Appetite has improved (no longer has early satiety).  Undergoing a swallow eval 11/22. Weight has been 140 from 138 pounds.   Has been followed by Dr. Villeda. On prednisone 2.5 mg twice/day. Uses oxygen at home He uses continuous oxygen 4 liters at home and qhs.  Was referred to Dr. Pittman for consideration of lung transplant. He feels scared about the whole process. He is no longer working and reports 18 months ago he didn't have the lung issues.   For PAH, workup has been negative. Given significantly elevated LUIS FERNANDO, seen by Dr. Larson (rheumatology) where further serologic testing was negative and recommended to taper steroids with primary focus on treatment of pulmonary hypertension.   Monitors BP at home which has been in the range of 130 and is 135/89 in office today. Takes bumex 1 mg daily but reports he had a "bad week" last week. He sleeps with 2 pillows and reports he was waking up night with shortness of breath and took an additional bumex 1mg 3 times last week with improvement in his symptoms.  He denies chest pain, palpitations, dizziness/LH, syncope and no ICD shocks.   Denies prior Covid illness. Received Covid vaccines.

## 2024-02-28 NOTE — CARDIOLOGY SUMMARY
[de-identified] : \par  7/26/21 - treadmill stress test - 7minutes 18 seconds; stage 3 Nelson; EF 71%, no ECG changes; negative for ischemia/infarct [de-identified] : 2/28/24 - sinus tach 107 RVH, low voltage, inferior Q waves 11/15/23 - sinus tach, RVH, low voltage, inferior Q waves 7/14/23 - sinus tach, RVH, TWI anteriorly, inferior Q waves 5/10/23 sinus tach, incomplete RBBB, inferior Q waves, rate 101 bpm 3/29/23 - sinus tach, incomplete RBBB, inferior Q waves 2/22/23 sinus tach, , APCs, incomplete RBBB, inferior Q waves; relatively low voltage 2/8/23 sinus tachycardia, , PRWP, incomplete RBBB, inferior Q waves 1/25/23 sinus tachycardia, , PRWP, inferior Q waves, incomplete RBBB 12/14/22 - sinus, , PRWP, inferior Q waves [de-identified] : 10/11/23 TTE- EF 57%, LVEDD 3.2 cm, normal LV systolic function, reduced RV systolic function, estimated PA systolic pressure 79 mmHg, IVC dilated at > 2.1 cm, elevated RA pressure, trace pericardial effusion,, organized fibrinous vs. coagulant material in pericardial space.  4/20/23 - EF read as 40-45% (more c/w 55-60%), LVEDD 3.2 cm, ? normal RV size/function (severe RV dysfunction/dilatation), mod TR; RVSP 68  3/8/23 - septum 1 cm, PW 1.3 cm, LVEDD 3 cm, EF 50%, severe RV dysfunction/dilatation, D-shaped in systole, mod TR, RVSP 60-65, IVC 1.7 cm (collapsible)  12/15/22 - septum 1.5 cm, PW 1.3 cm, LVEDD 4.1 cm, EF 28%, highly trabeculated apex, mild-mod TR, RVSP 60, RV dysfunction/enlargement, DT 78 msec, E/e' 28, LVOT VTI 12 cm, IVC 1.5 cm [de-identified] : \par  3/8/23 - chest CT - mild GGO centrilobular pattern, nonspecific; b/l axillary LN 1.7 x 1.4 cm (unchanged from 4/28); no pericardial effusion; \par  \par  12/16/22 - nl pericardium; concentric LVH; EF 36%, subtle LGE along inferior hinge point of RV\par  \par  3/8/22 - Chest CT PE protocol - dilated PA (4 cm), mildly enlarged subcarinal LN (1.1 cm), mildly enlarged R hilar LN (1.2 cm), clear lungs; mildly enlarged bilateral axillary LN (L>R); no PE [de-identified] : \par  3/6/23 RHC - RA 1, PA 44/17/30, PCW 3, CO/CI 4.39/2.7; PVR 6; TPG 27; DPG 14; negative shunt run\par  \par  12/16/22 - RHC - RA 5, RV 48/11, PA 46/24/33, PCWP 9, CO/CI 4.9/2.94, PVR 4.8; TPG 24; DPG 15\par  \par  January 2022 ([place]) - reportedly normal LHC [de-identified] : 4/28/22 - PET scan - physiologic FDG activity throughout; few mildly enlarged FDG-avid b/l axillary LN (L>R); few small FDG-avid mediastinal and b/l hilar LN (difficult to delineate); no abnormal FDG activity in lungs or pericardium (myocardium not commented on)  1/3/22 ([place]) Tc-Pyp scan - negative for TTR amyloid [de-identified] : \par  3/10/23 - EBUS guided FA - no granulomas seen; reactive process favored\par  \par  3/6/23 - myocardial biopsy - myocyte hypertrophy; Congo red stain negative\par  \par  7/29/22 - L axillar LN core biopsy and FNA - reactive follicular hyperplasia\par  \par  \par  4/5/22 - PFTs FEV1 2.46 (89%), FVC 2.97 (86%), FEV1/FVC 83%; DLCO 11.4 (43%); DLCO/VA 51% - normal spirometry but reduced DLCO

## 2024-02-28 NOTE — ASSESSMENT
[FreeTextEntry1] : Briefly, Mr. Cain is a 62 y/o [place] M w/ h/o HTN, ? RA, RCC s/p partial R nephrectomy (4/22), CKD (b/l Cr 1.4; 5/10/23), HFrecEF/NICM (EF prev 25-30%, LVEDD 4.1 cm improved to 55% with predominant RV dysfunction) of unclear etiology s/p ICD, group I/III pulmonary HTN (on sildenafil), MGUS (ruled out for cardiac amyloid w/ myocardial biopsy), chronic dyspnea (on intermittent O2)  Unclear etiology of PH/CM however given LVH, relative low voltage, neuropathy, was concerned about amyloid which was ruled out with biopsy. Other consideration was sarcoid despite reassuring cardiac MRI as has LAD on CT imaging, incomplete RBBB however several LN biopsies have been negative. Has significant precapillary PHTN for which he was started on PDEi with modest improvement but remains symptomatic with WHO class III symptoms. Unclear etiology of systemic process however has elevated LUIS FERNANDO (1:2560) and may have features of scleroderma (however seronegative). Admission 0/9-10/16 for UTI complicated by fluid overload. Pt presents for a post hospital f/u of care after admission 1/10/24-1/16/24 secondary to Covid infection.    Group 1 with functional WHO class III Appears mildly volume overloaded and hypertensive.   1. HFrecEF - rapidly progressive over past 1-2 years - on bumex 1 mg daily, will increase to 1 mg daily with additional 1 mg 3 days a week on M-W-F and as needed for weight gain of 2-3 lbs in 2-3 days; will continue; goal weight  <138 lbs - prev on hydral 50 mg three times/day but discontinued in hospital due to low BP; will restart at low dose 10 mg q 8 hours - prev on Entresto 97/103 twice/day but discontinued d/t fluctuating renal dysfunction; no further cough with discontinuation. prior EF had recovered so defer on reinitiating. - prev on toprol but was discontinued in hospital due to low output concerns; remain off given normalization of LV function - continue belkis 25 mg daily - will take nifedipine 15 mg bid to see if he tolerates better, will monitor for any headaches - monitor weight/BP at home - counseled extensively on disease process - labs checked 2/9 with K 3.9 and creat 1.49  2. CKD - Cr august 1.3; recently 1.3-1.5 - may benefit from renal consult - c/w meds as above  3. Hypertension- elevated - keep log of BP - reinitiate anti-hypertensive hydralazine at 10 mg q 8  4. Pulmonary HTN - appears c/w group 1 with functional WHO class III - on Opsumit 10 mg daily - on sildenafil 20 mg three times/day - appreciate Dr. Villeda's input; referred to Dr. Pittman - may need to reassess hemos and consider augmented therapy  5. Hypopigmentation - unclear etiology of loss of pigmentation in upper chest - will refer to dermatology  RTC 2 months with Dr. Cuellar, will report B/P readings on hydralazine and with staggering nifedipine.

## 2024-03-04 ENCOUNTER — APPOINTMENT (OUTPATIENT)
Dept: PULMONOLOGY | Facility: CLINIC | Age: 64
End: 2024-03-04

## 2024-03-06 ENCOUNTER — APPOINTMENT (OUTPATIENT)
Dept: PULMONOLOGY | Facility: CLINIC | Age: 64
End: 2024-03-06
Payer: COMMERCIAL

## 2024-03-06 ENCOUNTER — APPOINTMENT (OUTPATIENT)
Dept: PULMONOLOGY | Facility: CLINIC | Age: 64
End: 2024-03-06

## 2024-03-06 PROCEDURE — 94626 PHY/QHP OP PULM RHB W/MNTR: CPT

## 2024-03-11 ENCOUNTER — APPOINTMENT (OUTPATIENT)
Dept: PULMONOLOGY | Facility: CLINIC | Age: 64
End: 2024-03-11
Payer: COMMERCIAL

## 2024-03-11 PROCEDURE — 94626 PHY/QHP OP PULM RHB W/MNTR: CPT

## 2024-03-13 ENCOUNTER — APPOINTMENT (OUTPATIENT)
Dept: PULMONOLOGY | Facility: CLINIC | Age: 64
End: 2024-03-13
Payer: COMMERCIAL

## 2024-03-13 PROCEDURE — 94626 PHY/QHP OP PULM RHB W/MNTR: CPT

## 2024-03-18 ENCOUNTER — APPOINTMENT (OUTPATIENT)
Dept: PULMONOLOGY | Facility: CLINIC | Age: 64
End: 2024-03-18
Payer: COMMERCIAL

## 2024-03-18 PROCEDURE — 94626 PHY/QHP OP PULM RHB W/MNTR: CPT

## 2024-03-20 ENCOUNTER — APPOINTMENT (OUTPATIENT)
Dept: PULMONOLOGY | Facility: CLINIC | Age: 64
End: 2024-03-20
Payer: COMMERCIAL

## 2024-03-20 PROCEDURE — 94626 PHY/QHP OP PULM RHB W/MNTR: CPT

## 2024-03-25 ENCOUNTER — APPOINTMENT (OUTPATIENT)
Dept: PULMONOLOGY | Facility: CLINIC | Age: 64
End: 2024-03-25
Payer: COMMERCIAL

## 2024-03-25 PROCEDURE — 94626 PHY/QHP OP PULM RHB W/MNTR: CPT

## 2024-03-27 ENCOUNTER — APPOINTMENT (OUTPATIENT)
Dept: PULMONOLOGY | Facility: CLINIC | Age: 64
End: 2024-03-27
Payer: COMMERCIAL

## 2024-03-27 PROCEDURE — 94626 PHY/QHP OP PULM RHB W/MNTR: CPT

## 2024-04-01 ENCOUNTER — APPOINTMENT (OUTPATIENT)
Dept: PULMONOLOGY | Facility: CLINIC | Age: 64
End: 2024-04-01
Payer: COMMERCIAL

## 2024-04-01 PROCEDURE — 94626 PHY/QHP OP PULM RHB W/MNTR: CPT

## 2024-04-03 ENCOUNTER — APPOINTMENT (OUTPATIENT)
Dept: PULMONOLOGY | Facility: CLINIC | Age: 64
End: 2024-04-03
Payer: COMMERCIAL

## 2024-04-03 PROCEDURE — 94626 PHY/QHP OP PULM RHB W/MNTR: CPT

## 2024-04-08 ENCOUNTER — APPOINTMENT (OUTPATIENT)
Dept: PULMONOLOGY | Facility: CLINIC | Age: 64
End: 2024-04-08

## 2024-04-08 ENCOUNTER — APPOINTMENT (OUTPATIENT)
Dept: PULMONOLOGY | Facility: CLINIC | Age: 64
End: 2024-04-08
Payer: COMMERCIAL

## 2024-04-08 PROCEDURE — 94626 PHY/QHP OP PULM RHB W/MNTR: CPT

## 2024-04-10 ENCOUNTER — APPOINTMENT (OUTPATIENT)
Dept: PULMONOLOGY | Facility: CLINIC | Age: 64
End: 2024-04-10
Payer: COMMERCIAL

## 2024-04-10 ENCOUNTER — APPOINTMENT (OUTPATIENT)
Dept: PULMONOLOGY | Facility: CLINIC | Age: 64
End: 2024-04-10

## 2024-04-10 PROCEDURE — 94626 PHY/QHP OP PULM RHB W/MNTR: CPT

## 2024-04-15 ENCOUNTER — APPOINTMENT (OUTPATIENT)
Dept: PULMONOLOGY | Facility: CLINIC | Age: 64
End: 2024-04-15

## 2024-04-15 ENCOUNTER — APPOINTMENT (OUTPATIENT)
Dept: PULMONOLOGY | Facility: CLINIC | Age: 64
End: 2024-04-15
Payer: COMMERCIAL

## 2024-04-15 PROCEDURE — 94626 PHY/QHP OP PULM RHB W/MNTR: CPT

## 2024-04-17 ENCOUNTER — APPOINTMENT (OUTPATIENT)
Dept: PULMONOLOGY | Facility: CLINIC | Age: 64
End: 2024-04-17
Payer: COMMERCIAL

## 2024-04-17 ENCOUNTER — APPOINTMENT (OUTPATIENT)
Dept: PULMONOLOGY | Facility: CLINIC | Age: 64
End: 2024-04-17

## 2024-04-17 PROCEDURE — 94626 PHY/QHP OP PULM RHB W/MNTR: CPT

## 2024-04-22 ENCOUNTER — APPOINTMENT (OUTPATIENT)
Dept: PULMONOLOGY | Facility: CLINIC | Age: 64
End: 2024-04-22
Payer: COMMERCIAL

## 2024-04-22 PROCEDURE — 94626 PHY/QHP OP PULM RHB W/MNTR: CPT

## 2024-04-24 ENCOUNTER — APPOINTMENT (OUTPATIENT)
Dept: PULMONOLOGY | Facility: CLINIC | Age: 64
End: 2024-04-24
Payer: COMMERCIAL

## 2024-04-24 PROCEDURE — 94626 PHY/QHP OP PULM RHB W/MNTR: CPT

## 2024-04-29 ENCOUNTER — APPOINTMENT (OUTPATIENT)
Dept: PULMONOLOGY | Facility: CLINIC | Age: 64
End: 2024-04-29
Payer: COMMERCIAL

## 2024-04-29 PROCEDURE — 94626 PHY/QHP OP PULM RHB W/MNTR: CPT

## 2024-05-01 ENCOUNTER — APPOINTMENT (OUTPATIENT)
Dept: PULMONOLOGY | Facility: CLINIC | Age: 64
End: 2024-05-01
Payer: COMMERCIAL

## 2024-05-01 PROCEDURE — 94626 PHY/QHP OP PULM RHB W/MNTR: CPT

## 2024-05-06 ENCOUNTER — NON-APPOINTMENT (OUTPATIENT)
Age: 64
End: 2024-05-06

## 2024-05-06 ENCOUNTER — APPOINTMENT (OUTPATIENT)
Dept: PULMONOLOGY | Facility: CLINIC | Age: 64
End: 2024-05-06
Payer: COMMERCIAL

## 2024-05-06 PROCEDURE — 94626 PHY/QHP OP PULM RHB W/MNTR: CPT

## 2024-05-07 ENCOUNTER — APPOINTMENT (OUTPATIENT)
Dept: PULMONOLOGY | Facility: CLINIC | Age: 64
End: 2024-05-07
Payer: COMMERCIAL

## 2024-05-07 VITALS
TEMPERATURE: 97.5 F | SYSTOLIC BLOOD PRESSURE: 128 MMHG | HEART RATE: 70 BPM | DIASTOLIC BLOOD PRESSURE: 86 MMHG | BODY MASS INDEX: 22.34 KG/M2 | HEIGHT: 66.26 IN | WEIGHT: 139 LBS | RESPIRATION RATE: 17 BRPM

## 2024-05-07 DIAGNOSIS — I27.20 PULMONARY HYPERTENSION, UNSPECIFIED: ICD-10-CM

## 2024-05-07 DIAGNOSIS — R76.8 OTHER SPECIFIED ABNORMAL IMMUNOLOGICAL FINDINGS IN SERUM: ICD-10-CM

## 2024-05-07 DIAGNOSIS — D47.2 MONOCLONAL GAMMOPATHY: ICD-10-CM

## 2024-05-07 PROCEDURE — 99215 OFFICE O/P EST HI 40 MIN: CPT

## 2024-05-07 PROCEDURE — 36415 COLL VENOUS BLD VENIPUNCTURE: CPT

## 2024-05-07 NOTE — DISCUSSION/SUMMARY
[FreeTextEntry1] : ---Assessment plan----------The patient has been referred here for further opinion regarding pulmonary problem,   a 62 y/o Filipino M w/ h/o HTN, SCLERODERMA SINE SCLEROSIS-------- -has features of scleroderma on hands---SCELRODERMA SINE SCLEROSIS----LUIS FERNANDO   NUCLEOAR PATTERN ---OTHER ANTIBODIES NEGATIVE----- ,H/O MGUS ---H/O   RCC s/p partial R nephrectomy (4/2022), CKD Pt also has anemia with some suspicion of plasma cell dyscrasia BUT BM biopsy WAS NEGATIVE-. Kappa/lambda ratio of 3.23 (10.7/3.31) with serum IF revealing weak Kappa band.  Following up with hematology------ has been worked up for secondary pulmonary hypertension----ECHO -----12/15/22 - , EF 28% -----patient already started on sildenafil for secondary pulm hypertension =----- patient does not have features of sarcoidosis or amyloidosis Cardiac Cath----12/16/22 - RHC - RA 5, RV 48/11, PA 46/24/33, PCWP9, CO/CI 4.9/2.94, PVR 4.8  1 HYPOXEMIA---SECONDARY  PULM HTN -- ---S/P AICD---FOLLOWS CARDIOLOGY --  ---has upcoming appt with Dr Michael ADAN-----NEEDS  ECHO WITH BUBBLE STUDY - - - - -  - - 2-  needs supplemental oxygen advised that he use around-the-clock and to use neb 3-4x daily 3-secondary pulm hypertension-----ON SILDENAFIL AND  Macitentan 10mg - after echo will consider adding  ORENITRAM ---REPEAT ECHO - - - - 4- continue f/u with lung transplant team 5 Pulmonary Rehab to continue- recommend pulm maintenance therapy 6- scleroderma-SIEN SCELROSIS----- follow-up with rheum DR Larson- - 7 H/O -, RCC s/p partial R nephrectomy (4/22), CKD --upcoming appt with Dr JOHNSTON in UROLOGY =- - - --  8 MGUS--f/u with  HEMATOLOGY - - 9-DERM OPINION- referred to derm for skin biopsy (neck) 10-labs drawn in our office today 11- f/u in 3m  Thanks for allowing  me to participate  in the care of this patient.  Patient at this time  will follow  the above mentioned recommendations and return back for follow up visit. If you have any questions  I can be reached  at # [phone] (office).  Shruti Villeda MD, [facility]

## 2024-05-07 NOTE — HISTORY OF PRESENT ILLNESS
[Never] : never [TextBox_4] : This letter  is regarding your patient  who  attended pulmonary out patient office today.  I have reviewed  patient's  past history, social history, family history and medication list. I also  reviewed nurse practitioners/ and fellows  notes and assessment and agree with it.   The patient was referred by Dr.Samit ADAN  Mr. Cain is a 62 y/o [place] M w/ h/o HTN, ? RA, RCC s/p partial R nephrectomy (), CKD Pt also has anemia with some suspicion of plasma cell dyscrasia with plan for outpatient BM biopsy. Kappa/lambda ratio of 3.23 (10.7/3.31) with serum IF revealing weak [place] band. Was to see Dr. Schmidt (hematology) for a BM biopsy but did not make appointment.   ------No history of , fever, chills , rigors, chest pain, or hemoptysis. Questionable history of Raynaud's phenomenon. No h/o significant weight loss in last few months. No history of liver dysfunction , collagen vascular disorder or chronic thromboembolic disease. I would classify the patient's dyspnea as WHO  FUNCTIONAL CLASS II--------  --EC23 sinus tach, , APCs, incomplete RBBB, inferior Q waves; relatively low voltage 23 sinus tachycardia, , PRWP, incomplete RBBB, inferior Q waves 23 sinus tachycardia, , PRWP, inferior Q waves, incomplete RBBB 22 - sinus, , PRWP, inferior Q waves  Stress Test:  21 - treadmill stress test - EF 71%, no ECG changes; negative for ischemia/infarct   ECHO -----12/15/22 - septum 1.5 cm, PW 1.3 cm, LVEDD 4.1 cm, EF 28%, highly trabeculated apex, mild-mod TR, RVSP 60, RV dysfunction/enlargement, DT 78 msec, E/e' 28, LVOT VTI 12 cm, IVC 1.5 cm   CT/MRI:  22 - nl pericardium; concentric LVH; EF 36%, subtle LGE along inferior hinge point of RV  3/8/22 - Chest CT PE protocol - dilated PA (4 cm), mildly enlarged subcarinal LN (1.1 cm), mildly enlarged R hilar LN (1.2 cm), clear lungs; mildly enlarged bilateral axillary LN (L>R); no PE  Viability/Other Nuclear:  1/3/22 ([place]) Tc-Pyp scan - negative for TTR amyloid   Cardiac Cath/PCI: CARDIAC CATH - - -------------- ---------- 22 - RHC - RA 5, RV 48/11, PA 46/24/33, PCWP9, CO/CI 4.9/2.94, PVR 4.8  EBUS 2023----mediastinal lymph nodes----reactive lymphadenopathy  ENDOMYOCARDIAL BIOPSY--------no evidence of an infiltrative disorder like sarcoidosis or amyloidosis  Desaturates ON WALKING====NEEDS  PORTABLE OXYGEN - - -   2023------ on sildenafil----oxygen as needed has dry cough----has dilated esophagus ---??REFLUX----we will add omeprazole and Flonase nasal spray for his postnasal drip to see if that helps with his cough ------- needs PFT 6-minute walk test ------ also needs rheumatology opinion to rule out any underlying rheumatological condition ? SCLERODERMA SINE  SCLEROSIS------  2023----has features of scleroderma on hands---SCELRODERMA SINE SCLEROSIS----LUIS FERNANDO   NUCLEOAR PATTERN ---OTHER ANTIBODIES NEGATIVE-------- pulm htn on sildenafil 20mg bid, diuretics and as needed oxygen Scleroderma- s/p rheum consult- no role for MMF- remains on pred 10mg GI--DR MELTON EBUS 2023----mediastinal lymph nodes----reactive lymphadenopathy NONISCHAEMIC CARDIOMYOPATHY/HFrEF --follows cardiology - - -      HAS MGUS ---PAST H/O RENAL CA   2022----S/P   PARTIAL NEPHRECTOMY - -  accompanied today by wife  2023- MGUS - never followed up with Heme  1 block ET  PAH ----history of scleroderma features 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 Adan------[facility] PUL REHAB - - -  11  10/2023 63 year old male, with past history significant for Rheumatoid arthritis, HTN, HFrEF (55 to 60% - TTE of 2023), Home oxygen 5 liters, CKD, Renal cell carcinoma - s/p Partial nephrectomy, Plasma cell dyscrasia, and AICD placement, presented to the ED secondary to shortness of breath.  ---was hospitalized with /cystitis-------- ----   Ct pelvis 10/2023 IMPRESSION: Mild circumferential bladder wall thickening which may be due to cystitis or chronic outlet obstruction. Correlate with urinalysis. 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 Adan------[facility] PULM REHAB - - -  24-- had worsening cough  and sob and was given Solumedrol 20mg IM and Prednisone 15mg daily. He is back on his baseline 5mg at this time. He reports feeling somewhat better on the increased steroid dose. He denies weight gain and feels somewhat improved but not qquite back to his baseline. --------OTHERWISE---- has features of scleroderma on hands---SCELRODERMA SINE SCLEROSIS----LUIS FERNANDO   NUCLEOAR PATTERN ---OTHER ANTIBODIES NEGATIVE-------- pulm htn on sildenafil , diuretics and -oxygen --IS NON COMPLIANT TO OXYGEN - - - Scleroderma- s/p rheum consult- no role for MMF- remains on pred 10mg GI--DR LINH MATUTE 2023----mediastinal lymph nodes----reactive lymphadenopathy HAS MGUS --NEEDS HEMATOLOGY - - - --PAST H/O RENAL CA   2022----S/P   PARTIAL NEPHRECTOM  2024 multiple comorbities contributing to ongoing dyspnea  -APPEARS TO HAVE SCELRODERMA SINE SCLEROSIS--- -pulm htn- remains compliant to opsumit and sildenafil -on diuretics- bumex 1mg daily and aldactone -only using qid- follows heart failure team of Dr Michael Adan -is completing pulm rehab this week- will consider maintenance. Using oxygen x 24hrs and nebs only once daily -MGUS- follow heme Dr Muñoz scleroderma- no recent follow up w/Dr Larson H/O  renal ca-----S/P RIGHT PARTIAL NEPHRECTOMY - has upcoming appt with Dr Gardner in urology -DERMATOLOGY OPINION REGHARDING POSSIBLE SKIN BIOPSY

## 2024-05-08 ENCOUNTER — APPOINTMENT (OUTPATIENT)
Dept: PULMONOLOGY | Facility: CLINIC | Age: 64
End: 2024-05-08

## 2024-05-08 ENCOUNTER — NON-APPOINTMENT (OUTPATIENT)
Age: 64
End: 2024-05-08

## 2024-05-08 ENCOUNTER — APPOINTMENT (OUTPATIENT)
Dept: PULMONOLOGY | Facility: CLINIC | Age: 64
End: 2024-05-08
Payer: COMMERCIAL

## 2024-05-08 ENCOUNTER — APPOINTMENT (OUTPATIENT)
Dept: HEART FAILURE | Facility: CLINIC | Age: 64
End: 2024-05-08
Payer: COMMERCIAL

## 2024-05-08 VITALS
OXYGEN SATURATION: 92 % | WEIGHT: 141 LBS | HEIGHT: 66.26 IN | HEART RATE: 109 BPM | SYSTOLIC BLOOD PRESSURE: 111 MMHG | DIASTOLIC BLOOD PRESSURE: 69 MMHG | BODY MASS INDEX: 22.66 KG/M2

## 2024-05-08 DIAGNOSIS — I50.20 UNSPECIFIED SYSTOLIC (CONGESTIVE) HEART FAILURE: ICD-10-CM

## 2024-05-08 DIAGNOSIS — I27.20 PULMONARY HYPERTENSION, UNSPECIFIED: ICD-10-CM

## 2024-05-08 LAB
ALBUMIN SERPL ELPH-MCNC: 4.7 G/DL
ALDOLASE SERPL-CCNC: 6.8 U/L
ALP BLD-CCNC: 141 U/L
ALT SERPL-CCNC: 23 U/L
ANION GAP SERPL CALC-SCNC: 17 MMOL/L
AST SERPL-CCNC: 31 U/L
BILIRUB SERPL-MCNC: 0.8 MG/DL
BUN SERPL-MCNC: 22 MG/DL
CALCIUM SERPL-MCNC: 9.7 MG/DL
CHLORIDE SERPL-SCNC: 107 MMOL/L
CK SERPL-CCNC: 223 U/L
CO2 SERPL-SCNC: 20 MMOL/L
CREAT SERPL-MCNC: 1.61 MG/DL
EGFR: 47 ML/MIN/1.73M2
FERRITIN SERPL-MCNC: 71 NG/ML
GLUCOSE SERPL-MCNC: 98 MG/DL
HCT VFR BLD CALC: 43.7 %
HGB BLD-MCNC: 13.2 G/DL
MCHC RBC-ENTMCNC: 29.1 PG
MCHC RBC-ENTMCNC: 30.2 GM/DL
MCV RBC AUTO: 96.5 FL
NT-PROBNP SERPL-MCNC: 2542 PG/ML
PLATELET # BLD AUTO: 238 K/UL
POTASSIUM SERPL-SCNC: 3.7 MMOL/L
PROT SERPL-MCNC: 7.7 G/DL
RBC # BLD: 4.53 M/UL
RBC # FLD: 15.2 %
SODIUM SERPL-SCNC: 144 MMOL/L
WBC # FLD AUTO: 9.01 K/UL

## 2024-05-08 PROCEDURE — 93000 ELECTROCARDIOGRAM COMPLETE: CPT

## 2024-05-08 PROCEDURE — G2211 COMPLEX E/M VISIT ADD ON: CPT

## 2024-05-08 PROCEDURE — 94626 PHY/QHP OP PULM RHB W/MNTR: CPT

## 2024-05-08 PROCEDURE — 99214 OFFICE O/P EST MOD 30 MIN: CPT

## 2024-05-08 RX ORDER — SPIRONOLACTONE 25 MG/1
25 TABLET ORAL
Qty: 90 | Refills: 3 | Status: ACTIVE | COMMUNITY
Start: 2023-01-13 | End: 1900-01-01

## 2024-05-08 RX ORDER — BUMETANIDE 1 MG/1
1 TABLET ORAL
Qty: 180 | Refills: 3 | Status: ACTIVE | COMMUNITY
Start: 2023-04-26 | End: 1900-01-01

## 2024-05-13 ENCOUNTER — APPOINTMENT (OUTPATIENT)
Dept: PULMONOLOGY | Facility: CLINIC | Age: 64
End: 2024-05-13

## 2024-05-13 NOTE — ASSESSMENT
[FreeTextEntry1] : Briefly, Mr. Cain is a 62 y/o [place] M w/ h/o HTN, ? RA, RCC s/p partial R nephrectomy (4/22), CKD (b/l Cr 1.4; 5/10/23), HFrecEF/NICM (EF prev 25-30%, LVEDD 4.1 cm improved to 55% with predominant RV dysfunction) of unclear etiology s/p ICD, group I/III pulmonary HTN (on sildenafil), MGUS (ruled out for cardiac amyloid w/ myocardial biopsy), chronic dyspnea (on intermittent O2) who presents for follow-up. Unclear etiology of PH/CM however given LVH, relative low voltage, neuropathy, was concerned about amyloid which was ruled out with biopsy. Other consideration was sarcoid despite reassuring cardiac MRI as has LAD on CT imaging, incomplete RBBB however several LN biopsies have been negative. Has significant precapillary PHTN for which he was started on PDEi with modest improvement but remains symptomatic with WHO class III symptoms. Unclear etiology of systemic process however has elevated LUIS FERNANDO (1:2560) and may have features of scleroderma (however seronegative). Currently has functional WHO class III and is mildly volume overloaded and normotensive in the setting of being out of belkis x 1 week and misses doses of bumex 1 mg when going out  1. HFrecEF - rapidly progressive over past 1-2 years - will restart belkis 25 mg daily, last K on 5/7 was 3.7 - continue on bumex 1 mg daily with additional 1 mg 3 days a week on M-W-F and as needed for weight gain of 2-3 lbs in 2-3 days; goal weight  <138 lbs and is 140 lbs today -continue on  hydral 10 mg three times/day - prev on Entresto 97/103 twice/day but discontinued d/t fluctuating renal dysfunction; no further cough with discontinuation. prior EF had recovered so defer on reinitiating. - prev on toprol but was discontinued in hospital due to low output concerns; remain off given normalization of LV function - will take nifedipine 15 mg bid to see if he tolerates better, will monitor for any headaches - monitor weight/BP at home - counseled extensively on disease process - labs checked 5/7 with K 3.7, BUN/creat 22/1.61 and serum pro BNP 2542 from 3094 - schedule TTE within 2 weeks, may consider RHC after reviewing results  2. CKD - Cr august 1.3; recently 1.3-1.6 - may benefit from renal consult - c/w meds as above  3. Hypertension- elevated - keep log of BP - reinitiate anti-hypertensive hydralazine at 10 mg q 8  4. Pulmonary HTN - appears c/w group 1 with functional WHO class III - on Opsumit 10 mg daily - on sildenafil 20 mg three times/day - appreciate Dr. Villeda's input; referred to Dr. Pittman - may need to reassess hemos and consider augmented therapy  5. Hypopigmentation - unclear etiology of loss of pigmentation in upper chest - will refer to dermatology  RTC 2 months with Dr. Cuellar, will report B/P readings on hydralazine and with staggering nifedipine.

## 2024-05-13 NOTE — PHYSICAL EXAM
[Well Developed] : well developed [Well Nourished] : well nourished [No Acute Distress] : no acute distress [Normal Conjunctiva] : normal conjunctiva [Normal Venous Pressure] : normal venous pressure [No Carotid Bruit] : no carotid bruit [Normal S1, S2] : normal S1, S2 [No Murmur] : no murmur [No Rub] : no rub [No Gallop] : no gallop [Clear Lung Fields] : clear lung fields [Good Air Entry] : good air entry [No Respiratory Distress] : no respiratory distress  [Soft] : abdomen soft [Non Tender] : non-tender [No Masses/organomegaly] : no masses/organomegaly [Normal Bowel Sounds] : normal bowel sounds [Normal Gait] : normal gait [No Cyanosis] : no cyanosis [No Clubbing] : no clubbing [No Varicosities] : no varicosities [No Rash] : no rash [No Skin Lesions] : no skin lesions [Moves all extremities] : moves all extremities [No Focal Deficits] : no focal deficits [Normal Speech] : normal speech [Normal] : alert and oriented, normal memory [Alert and Oriented] : alert and oriented [Normal memory] : normal memory [de-identified] : NAD [de-identified] : JVP approx 8-10 cm  [de-identified] : tachycardic 100's, Left chest ICD; prominent P2  [de-identified] : trace lower extremity edema bilaterally [de-identified] : possible taut skin on hands; hypopigmented skin along neck/upper chest

## 2024-05-13 NOTE — CARDIOLOGY SUMMARY
[de-identified] : 5/8/24 - sinus tach 110 RVH, low voltage, inferior Q waves 2/28/24 - sinus tach 107 RVH, low voltage, inferior Q waves 11/15/23 - sinus tach, RVH, low voltage, inferior Q waves 7/14/23 - sinus tach, RVH, TWI anteriorly, inferior Q waves 5/10/23 sinus tach, incomplete RBBB, inferior Q waves, rate 101 bpm 3/29/23 - sinus tach, incomplete RBBB, inferior Q waves 2/22/23 sinus tach, , APCs, incomplete RBBB, inferior Q waves; relatively low voltage 2/8/23 sinus tachycardia, , PRWP, incomplete RBBB, inferior Q waves 1/25/23 sinus tachycardia, , PRWP, inferior Q waves, incomplete RBBB 12/14/22 - sinus, , PRWP, inferior Q waves [de-identified] : \par  7/26/21 - treadmill stress test - 7minutes 18 seconds; stage 3 Nelson; EF 71%, no ECG changes; negative for ischemia/infarct [de-identified] : 10/11/23 TTE- EF 57%, LVEDD 3.2 cm, normal LV systolic function, reduced RV systolic function, estimated PA systolic pressure 79 mmHg, IVC dilated at > 2.1 cm, elevated RA pressure, trace pericardial effusion,, organized fibrinous vs. coagulant material in pericardial space.  4/20/23 - EF read as 40-45% (more c/w 55-60%), LVEDD 3.2 cm, ? normal RV size/function (severe RV dysfunction/dilatation), mod TR; RVSP 68  3/8/23 - septum 1 cm, PW 1.3 cm, LVEDD 3 cm, EF 50%, severe RV dysfunction/dilatation, D-shaped in systole, mod TR, RVSP 60-65, IVC 1.7 cm (collapsible)  12/15/22 - septum 1.5 cm, PW 1.3 cm, LVEDD 4.1 cm, EF 28%, highly trabeculated apex, mild-mod TR, RVSP 60, RV dysfunction/enlargement, DT 78 msec, E/e' 28, LVOT VTI 12 cm, IVC 1.5 cm [de-identified] : \par  3/8/23 - chest CT - mild GGO centrilobular pattern, nonspecific; b/l axillary LN 1.7 x 1.4 cm (unchanged from 4/28); no pericardial effusion; \par  \par  12/16/22 - nl pericardium; concentric LVH; EF 36%, subtle LGE along inferior hinge point of RV\par  \par  3/8/22 - Chest CT PE protocol - dilated PA (4 cm), mildly enlarged subcarinal LN (1.1 cm), mildly enlarged R hilar LN (1.2 cm), clear lungs; mildly enlarged bilateral axillary LN (L>R); no PE [de-identified] : 4/28/22 - PET scan - physiologic FDG activity throughout; few mildly enlarged FDG-avid b/l axillary LN (L>R); few small FDG-avid mediastinal and b/l hilar LN (difficult to delineate); no abnormal FDG activity in lungs or pericardium (myocardium not commented on)  1/3/22 ([place]) Tc-Pyp scan - negative for TTR amyloid [de-identified] : \par  3/6/23 RHC - RA 1, PA 44/17/30, PCW 3, CO/CI 4.39/2.7; PVR 6; TPG 27; DPG 14; negative shunt run\par  \par  12/16/22 - RHC - RA 5, RV 48/11, PA 46/24/33, PCWP 9, CO/CI 4.9/2.94, PVR 4.8; TPG 24; DPG 15\par  \par  January 2022 ([facility]) - reportedly normal LHC [de-identified] : \par  3/10/23 - EBUS guided FA - no granulomas seen; reactive process favored\par  \par  3/6/23 - myocardial biopsy - myocyte hypertrophy; Congo red stain negative\par  \par  7/29/22 - L axillar LN core biopsy and FNA - reactive follicular hyperplasia\par  \par  \par  4/5/22 - PFTs FEV1 2.46 (89%), FVC 2.97 (86%), FEV1/FVC 83%; DLCO 11.4 (43%); DLCO/VA 51% - normal spirometry but reduced DLCO

## 2024-05-13 NOTE — HISTORY OF PRESENT ILLNESS
[FreeTextEntry1] : Briefly, Mr. Cain is a 62 y/o [place]ag[place] M w/ h/o HTN, ? RA, RCC s/p partial R nephrectomy (4/22), CKD (b/l Cr 1.4; 5/10/23), HFrecEF/NICM (EF prev 25-30%, LVEDD 4.1 cm improved to 55% with predominant RV dysfunction) of unclear etiology s/p ICD, group I/III pulmonary HTN (on sildenafil), MGUS (ruled out for cardiac amyloid w/ myocardial biopsy), chronic dyspnea (on intermittent O2) who presents for follow-up. Accompanied by sister. Referred by Dr. Manuelito Carver.   For full initial details, please refer to note from 1/13/23  Last was admitted in hospital 1/10-1/16/24 for Covid and treated with Remdesivir and steroids with improvement.   Since last visit, had been well. Has been undergoing pulm rehab and tolerates 20- 30 minutes on exercise bicycle, treadmill and hand exercise machine for 20-30 minutes. Followed up appt with pulmonary and was started on Opsumit 10 mg daily which he is tolerating with issues. He had labs 5/7/24 notable for K 3.7, BUN/creat 22/1.61 from 1.4 and serum pro BNP 2542 from 3094 Reports he ran out of belkis 25 1 week ago.   He followed up with rheumatology and was started on nifedipine 30 mg daily. States he started it but was getting headaches so doesn't take in every day. Reports his hands feel warm when he does take it. His prednisone was decreased to 2.5 mg bid from tid. He is taking bumex 1 mg daily with no additional doses but feels he is not urinating as much.   Continues to have dyspnea with exertion particularly with doing small errands. He has dyspnea with walking 1 hallway length. States he can walk up 8-10 steps limited by leg fatigue. Reports he has difficulty sleeping through the whole night. He has periodic episodes of dizziness with unsteady gait that last 3 days and then subsides. He has palpitations at times with rehab.   Appetite has improved (no longer has early satiety).  Weight has been 140 from 138 pounds.   Was referred to Dr. Pittman for consideration of lung transplant. He feels scared about the whole process. He is no longer working.   For PAH, workup has been negative. Given significantly elevated LUIS FERNANDO, seen by Dr. Larson (rheumatology) where further serologic testing was negative and recommended to taper steroids with primary focus on treatment of pulmonary hypertension.   Monitors BP at home which has been in the range of 130 and is 135/89 in office today. Takes bumex 1 mg daily but reports he had a "bad week" last week. He sleeps with 2 pillows and reports he was waking up night with shortness of breath and took an additional bumex 1mg 3 times last week with improvement in his symptoms.  He denies chest pain, palpitations, syncope and no ICD shocks.   Denies prior Covid illness. Received Covid vaccines.

## 2024-05-15 ENCOUNTER — APPOINTMENT (OUTPATIENT)
Dept: PULMONOLOGY | Facility: CLINIC | Age: 64
End: 2024-05-15

## 2024-05-16 ENCOUNTER — APPOINTMENT (OUTPATIENT)
Dept: UROLOGY | Facility: CLINIC | Age: 64
End: 2024-05-16
Payer: COMMERCIAL

## 2024-05-16 VITALS
WEIGHT: 139 LBS | HEART RATE: 94 BPM | HEIGHT: 66.26 IN | RESPIRATION RATE: 16 BRPM | SYSTOLIC BLOOD PRESSURE: 129 MMHG | BODY MASS INDEX: 22.34 KG/M2 | DIASTOLIC BLOOD PRESSURE: 84 MMHG

## 2024-05-16 DIAGNOSIS — Z80.42 FAMILY HISTORY OF MALIGNANT NEOPLASM OF PROSTATE: ICD-10-CM

## 2024-05-16 DIAGNOSIS — Z12.5 ENCOUNTER FOR SCREENING FOR MALIGNANT NEOPLASM OF PROSTATE: ICD-10-CM

## 2024-05-16 PROCEDURE — G2211 COMPLEX E/M VISIT ADD ON: CPT

## 2024-05-16 PROCEDURE — 99212 OFFICE O/P EST SF 10 MIN: CPT

## 2024-05-16 NOTE — HISTORY OF PRESENT ILLNESS
[FreeTextEntry1] : Patient is a 63 year old man here today for follow up for kidney cancer S/P right partial nephrectomy April 29th 2022 PATH: ccRCC, [id number], margins negative.   No new complaints.  Denies gross hematuria, flank pain.  Urinary function well controlled. Since his last visit, had a single episode of UTI, treated and now residual symptoms.  Currently undergoing evaluation for possible lung transplant.

## 2024-05-16 NOTE — ASSESSMENT
[FreeTextEntry1] : CT abdomen w/wo IV contrast ordered for kidney cancer surveillance. Prostate cancer Screening: PSA ordered today.  If scan normal, then follow up in one year with repeat imaging.

## 2024-05-20 ENCOUNTER — APPOINTMENT (OUTPATIENT)
Dept: PULMONOLOGY | Facility: CLINIC | Age: 64
End: 2024-05-20

## 2024-05-20 ENCOUNTER — NON-APPOINTMENT (OUTPATIENT)
Age: 64
End: 2024-05-20

## 2024-05-21 ENCOUNTER — APPOINTMENT (OUTPATIENT)
Dept: ELECTROPHYSIOLOGY | Facility: CLINIC | Age: 64
End: 2024-05-21
Payer: COMMERCIAL

## 2024-05-21 ENCOUNTER — APPOINTMENT (OUTPATIENT)
Dept: DERMATOLOGY | Facility: CLINIC | Age: 64
End: 2024-05-21
Payer: COMMERCIAL

## 2024-05-21 DIAGNOSIS — D48.9 NEOPLASM OF UNCERTAIN BEHAVIOR, UNSPECIFIED: ICD-10-CM

## 2024-05-21 PROCEDURE — 99204 OFFICE O/P NEW MOD 45 MIN: CPT

## 2024-05-21 PROCEDURE — 93296 REM INTERROG EVL PM/IDS: CPT

## 2024-05-21 PROCEDURE — 93295 DEV INTERROG REMOTE 1/2/MLT: CPT

## 2024-05-22 ENCOUNTER — APPOINTMENT (OUTPATIENT)
Dept: PULMONOLOGY | Facility: CLINIC | Age: 64
End: 2024-05-22

## 2024-05-22 LAB — PSA SERPL-MCNC: 2.26 NG/ML

## 2024-05-24 ENCOUNTER — APPOINTMENT (OUTPATIENT)
Dept: CT IMAGING | Facility: IMAGING CENTER | Age: 64
End: 2024-05-24
Payer: COMMERCIAL

## 2024-05-24 ENCOUNTER — OUTPATIENT (OUTPATIENT)
Dept: OUTPATIENT SERVICES | Facility: HOSPITAL | Age: 64
LOS: 1 days | End: 2024-05-24
Payer: COMMERCIAL

## 2024-05-24 DIAGNOSIS — Z90.5 ACQUIRED ABSENCE OF KIDNEY: Chronic | ICD-10-CM

## 2024-05-24 DIAGNOSIS — Z85.528 PERSONAL HISTORY OF OTHER MALIGNANT NEOPLASM OF KIDNEY: ICD-10-CM

## 2024-05-24 DIAGNOSIS — Z98.890 OTHER SPECIFIED POSTPROCEDURAL STATES: Chronic | ICD-10-CM

## 2024-05-24 DIAGNOSIS — Z95.810 PRESENCE OF AUTOMATIC (IMPLANTABLE) CARDIAC DEFIBRILLATOR: Chronic | ICD-10-CM

## 2024-05-24 PROCEDURE — 74170 CT ABD WO CNTRST FLWD CNTRST: CPT

## 2024-05-24 PROCEDURE — 74170 CT ABD WO CNTRST FLWD CNTRST: CPT | Mod: 26

## 2024-05-27 NOTE — PHYSICAL EXAM
[FreeTextEntry3] : Focused exam only (see below) per patient request: - salt and pepper depigmented patches on the chest with perifollicular sparing in background of hyperpigmentation, anterior neck with sclerotic hyperpigmented patches - face with several scatted matted telangiectasias  - tightening of the skin on the dorsal hands and chest

## 2024-05-27 NOTE — ASSESSMENT
[Use of independent historian: [ enter independent historian's relationship to patient ] :____] : As the patient was unable to provide a complete and reliable history, I obtained clinical history from the patient's [unfilled] [External notes review: [ enter provider(s) name(s) ] :____] : As part of my evaluation, I have reviewed prior clinical note(s) from provider(s) outside of my group practice. 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 with  # Matted telangiectasias  + hx possible Raynaud's phenomenon and mild sclerodactyly  - Pt denies any difficulty swallowing - Pt clinically with cutaneous findings compatible with scleroderma, however pt with extensive rheumatologic work-up notable for positive LUIS FERNANDO (1:2560 - nucleolar pattern) with negative sub serologies (Scl-70 ab, RNP ab negative 4/10/23) and negative myomarker panel  - Given high suspicion for cutaneous involvement of skin fibrosis/ scleroderma today will pursue skin bx for confirmation - Will also likely pursue extended ab serologies, do not see RNA polymerase III, Fibrillarin (U3 RNP) or PM/Scl 100 abs checked - If biopsy suggestive of skin fibrosis, may benefit from MMF  # Neoplasm of Uncertain Behavior, on the mid-chest  Suspicious for scleroderma  - Recommend skin biopsy to help confirm diagnosis. See procedure note below - Wound care instructions given. Recommended OTC vaseline for wound care  Procedure: Punch bx on the mid-chest  The risks/benefits/alternatives of skin biopsy were explained to the patient, which include and are not limited to bleeding, infection, scarring or discoloration of skin, and recurrence of lesion. Patient expressed understanding of these risks and provided consent to the procedure. Time out with verification of patient and lesion site was performed. Site was prepped with rubbing alcohol, lidocaine with epinephrine was injected for anesthesia, and biopsy was performed. Specimen sent to path. Procedure was without complication and well tolerated. Wound care was discussed.  RTC 2 weeks for SR/ discussion of bx

## 2024-05-27 NOTE — HISTORY OF PRESENT ILLNESS
[FreeTextEntry1] : NPV: discoloration on chest [de-identified] : PLACIDO GOLDMAN is a 63-year-old male new patient with hx pulmonary HTN, non-ischemic cardiomyopathy s/p AICD placement MGUS and RCC dx in 2022 s/p partial nephrectomy who presents for evaluation of the followin. Discoloration on the chest, appeared about 1.5 years ago, stable in appearance, asx  Also notes tightening of the skin on the hands and around the finger joints and endorses hands becoming very cold/changing colors often. Denies any difficulty swallowing  Of note, pt is followed by rheumatology (Dr. Giron) and had an extensive rheumatological workup 2023 which was notable for a positive LUIS FERNANDO (1:2560), nucleolar pattern with negative sub-serologies (Scl-70 and RNP appear to have been checked per chart review) Pt follows with pulm for pulmonary HTN

## 2024-05-28 ENCOUNTER — NON-APPOINTMENT (OUTPATIENT)
Age: 64
End: 2024-05-28

## 2024-05-28 LAB — DERMATOLOGY BIOPSY: NORMAL

## 2024-06-03 RX ORDER — SILDENAFIL 20 MG/1
20 TABLET ORAL 3 TIMES DAILY
Qty: 90 | Refills: 3 | Status: ACTIVE | COMMUNITY
Start: 2023-04-26

## 2024-06-04 DIAGNOSIS — Z85.528 PERSONAL HISTORY OF OTHER MALIGNANT NEOPLASM OF KIDNEY: ICD-10-CM

## 2024-06-05 ENCOUNTER — APPOINTMENT (OUTPATIENT)
Dept: DERMATOLOGY | Facility: CLINIC | Age: 64
End: 2024-06-05
Payer: COMMERCIAL

## 2024-06-05 DIAGNOSIS — M34.9 SYSTEMIC SCLEROSIS, UNSPECIFIED: ICD-10-CM

## 2024-06-05 DIAGNOSIS — L81.5 LEUKODERMA, NOT ELSEWHERE CLASSIFIED: ICD-10-CM

## 2024-06-05 DIAGNOSIS — I78.1 NEVUS, NON-NEOPLASTIC: ICD-10-CM

## 2024-06-05 PROCEDURE — 99214 OFFICE O/P EST MOD 30 MIN: CPT

## 2024-06-11 NOTE — HISTORY OF PRESENT ILLNESS
[TextBox_4] :  63-year-old Stateless M with PMH of HTN, scleroderma, RA,, CKD, HFrecEF/NICM (EF prev 25-30%, LVEDD 4.1 cm improved to 57% with predominant RV dysfunction) of unclear etiology s/p ICD, group I/III pulmonary HTN (on sildenafil), renal cancer s/p partial nephrectomy 2022, MGUS (ruled out for cardiac amyloid w/ myocardial biopsy), chronic dyspnea. Initial Dx 1 year ago.   CLINIC 2024: Patient presents to clinic for initial visit for lung transplant evaluation.  Reports a decline of respiratory status over the past few months. States ambulation and activity has become difficult. Patient is uncertain about proceeding with lung transplant. Educated patient and wife about lung transplant.   We went over the risks and benefits of lung transplantation including one- and five-year survival. The median survival after a double lung transplant is about 6.5 years and this was explained in detail. We also went over the risks of opportunistic infections and the risks of calcineurin inhibitor use after the transplant with inherent risks of neoplasms and opportunistic infections and other complications. The post-operative recovery period was explained in detail including the need for mechanical ventilation and other means of cardiopulmonary support including extracorporeal membrane oxygenation use and the types of incisions and chest tubes that are required.    CLINIC 2024: Patient presents or routine follow up appointment. Reports *****     Oxygen requirement 4L NC at rest 5L NC on exertion 5L NL at night  Quality of life: Has declined. Patient is unable to tolerate activity.   Functional status: [X] performs activities of daily living with NO assistance [] performs activities of daily living with SOME assistance [] performs activities of daily living with TOTAL assistance  Exposures: Denies  Prior hospitalizations, ICU admission or intubations: Hospitalized at [facility] for COVID 1 week. Denies intubation.  Hx covid infection, blood transfusions: COVID 2023, denies blood transfusions   Health maintenance/vaccines: Flu COVID vax: Pfizer 2020  Family History: Mother DM, HTN, HLD  Social History Lives with: Lives with wife, sister is willing to assist his wife if need.   DATA REVIEWED  PFTs 2024 FVC: 3.05, 90% FEV1: 2.40, 93% T.09, 97% DLCO: 8.7, 34%  PFTs 2023 FVC: 2.79, 85% FEV1: 2.41, 94% T.05, 80% DLCO: 7.79, 33%   6MWT 2024: 264 meters   CT CHEST 10/11/23 IMPRESSION: Mild circumferential bladder wall thickening which may be due to cystitis or chronic outlet obstruction. Correlate with urinalysis.   ECHO 10/11/23  CONCLUSIONS:  1. The left ventricular cavity is small. The interventricular septum is flattened in systole and diastole consistent with right ventricular pressure and volume overload. Left ventricular systolic function is normal with a calculated ejection fraction of 57 % by the Espino's biplane method of disks. The left ventricular diastolic function is indeterminate. There is normal LV mass and concentric remodeling.  2. The left ventricular diastolic function is indeterminate.  3. Right ventricular volume and pressure overload. Left ventricular systolic function is normal with an ejection fraction of 57 % by Espino's method of disks.  4. Severely enlarged right ventricular cavity size and severely reduced systolic function.  5. Device lead is visualized.  6. The right atrium is severely dilated in size.  7. Estimated pulmonary artery systolic pressure is 79 mmHg.  8. The inferior vena cava is dilated (dilated >2.1cm) with abnormal inspiratory collapse (abnormal <50%) consistent with elevated right atrial pressure (~15, range 10-20mmHg).  9. Organized fibrinous vs coagulant material is seen in the pericardial space. There is a trace pericardial effusion noted adjacent to the posterior left ventricle.    RHC/LHC:

## 2024-06-11 NOTE — ASSESSMENT
[FreeTextEntry1] : 63-year-old Ethiopian M with PMH of HTN, pulm HTN, scleroderma, RA, CKD, HFrecEF/NICM (EF prev 25-30%, LVEDD 4.1 cm improved to 57% with predominant RV dysfunction) of unclear etiology s/p ICD, group I/III pulmonary HTN (on sildenafil), renal cancer s/p partial nephrectomy 4/2022, MGUS (ruled out for cardiac amyloid w/ myocardial biopsy)  #lung transplant evaluation - Due to patients' various comorbidities and history of renal cancer. Referral and follow ups are needed before proceeding with lung transplant evaluation.  #NEP -PAST H/O RENAL CA 4/2022----S/P PARTIAL NEPHRECTOMY   -staging, surveillance and prognosis  -urology referral  #CKD -Cret 1.56 (12/28/23) - referred to cardio-renal for evaluation.  #PULM HTN - on sildenafil 20 mg three times/day  #MUGS -heme appt 2/21/24  FOLLOW UPS -continue to follow up with Dr. Villeda -Follow up with Dr. Gardner, 5/24/2024 CT abdomen no new evidence of new lesion or metastatic disease -referred to Cardio-nephrology     RTC in *****  Patient asked relevant questions which were answered. Patient education provided. Teach back performed.  Above discussed with Dr. Pittman

## 2024-06-13 NOTE — ASSESSMENT
[FreeTextEntry1] : 63-year-old [place]ore[place] M with PMH of HTN, pulm HTN, scleroderma, RA, CKD, HFrecEF/NICM (EF prev 25-30%, LVEDD 4.1 cm improved to 57% with predominant RV dysfunction) of unclear etiology s/p ICD, group I/III pulmonary HTN (on sildenafil), renal cancer s/p partial nephrectomy 4/2022, MGUS (ruled out for cardiac amyloid w/ myocardial biopsy)  #lung transplant evaluation - Due to patients' various comorbidities and history of renal cancer, Referral and follow ups are needed before proceeding with lung transplant evaluation.  #NEP -PAST H/O RENAL CA 4/2022----S/P PARTIAL NEPHRECTOMY   -staging, surveillance and prognosis  -urology f/u Dr. Gardner> CT Abdomen 5/24/24 for renal Ca surveillance>no malignancy   #CKD -Creat 1.56 (12/28/23) - referred to cardio-renal for evaluation.  #PULM HTN - on Opsumit 10 mg daily - on sildenafil 20 mg three times/day  #MUGS -heme appt 2/21/24  FOLLOW UPS -continue to follow up with Dr. Villeda -continue to follow up with cardiology Dr. Cuellar -Follow up with Dr. Gardner, 5/24/2024 CT abdomen no new evidence of new lesion or metastatic disease -referred to Cardio-nephrology***?     RTC in *****

## 2024-06-13 NOTE — HISTORY OF PRESENT ILLNESS
[TextBox_4] :  63-year-old [place]ry[place] M with PMH of HTN, scleroderma, RA,, CKD, HFrecEF/NICM (EF prev 25-30%, LVEDD 4.1 cm improved to 57% with predominant RV dysfunction) of unclear etiology s/p ICD, group I/III pulmonary HTN (on sildenafil), renal cancer s/p partial nephrectomy 2022, MGUS (ruled out for cardiac amyloid w/ myocardial biopsy), chronic dyspnea. Initial Dx 1 year ago.   CLINIC 2024: Patient presents to clinic for initial visit for lung transplant evaluation.  Reports a decline of respiratory status over the past few months. States ambulation and activity has become difficult. Patient is uncertain about proceeding with lung transplant. Educated patient and wife about lung transplant.   We went over the risks and benefits of lung transplantation including one- and five-year survival. The median survival after a double lung transplant is about 6.5 years and this was explained in detail. We also went over the risks of opportunistic infections and the risks of calcineurin inhibitor use after the transplant with inherent risks of neoplasms and opportunistic infections and other complications. The post-operative recovery period was explained in detail including the need for mechanical ventilation and other means of cardiopulmonary support including extracorporeal membrane oxygenation use and the types of incisions and chest tubes that are required.    CLINIC 2024: Patient presents for routine follow up appointment.  Oxygen saturation    % on    LNC.  Reports ***** Saw Cardiologist Dr. Cuellar and Pulmonologist Dr. Villeda 2024 Pulmonary HTN > is on Opsumit 10 mg daily and sildenafil 20 mg three times/day   Oxygen requirement 4L NC at rest 5L NC on exertion 5L NL at night****  Quality of life: Has declined. Patient is unable to tolerate activity.   Functional status: [X] performs activities of daily living with NO assistance [] performs activities of daily living with SOME assistance [] performs activities of daily living with TOTAL assistance  Exposures: Denies  Prior hospitalizations, ICU admission or intubations: Hospitalized at [facility] for COVID 1 week. Denies intubation.  Hx covid infection, blood transfusions: COVID 2023, denies blood transfusions   Health maintenance/vaccines: Flu COVID vax: Pfizer 2020  Family History: Mother DM, HTN, HLD  Social History Lives with: Lives with wife, sister is willing to assist his wife if need.   DATA REVIEWED  PFTs 2024 FVC: 3.05, 90% FEV1: 2.40, 93% T.09, 97% DLCO: 8.7, 34%  PFTs 2023 FVC: 2.79, 85% FEV1: 2.41, 94% T.05, 80% DLCO: 7.79, 33%   6MWT 2024: 264 meters   CT CHEST 10/11/23 IMPRESSION: Mild circumferential bladder wall thickening which may be due to cystitis or chronic outlet obstruction. Correlate with urinalysis.   ECHO 10/11/23  CONCLUSIONS:  1. The left ventricular cavity is small. The interventricular septum is flattened in systole and diastole consistent with right ventricular pressure and volume overload. Left ventricular systolic function is normal with a calculated ejection fraction of 57 % by the Espino's biplane method of disks. The left ventricular diastolic function is indeterminate. There is normal LV mass and concentric remodeling.  2. The left ventricular diastolic function is indeterminate.  3. Right ventricular volume and pressure overload. Left ventricular systolic function is normal with an ejection fraction of 57 % by Espino's method of disks.  4. Severely enlarged right ventricular cavity size and severely reduced systolic function.  5. Device lead is visualized.  6. The right atrium is severely dilated in size.  7. Estimated pulmonary artery systolic pressure is 79 mmHg.  8. The inferior vena cava is dilated (dilated >2.1cm) with abnormal inspiratory collapse (abnormal <50%) consistent with elevated right atrial pressure (~15, range 10-20mmHg).  9. Organized fibrinous vs coagulant material is seen in the pericardial space. There is a trace pericardial effusion noted adjacent to the posterior left ventricle.    RHC/LHC:  Patient asked relevant questions which were answered. Patient education provided. Teach back performed.  Above discussed with Dr. Pittman

## 2024-06-14 ENCOUNTER — APPOINTMENT (OUTPATIENT)
Dept: PULMONOLOGY | Facility: CLINIC | Age: 64
End: 2024-06-14

## 2024-06-14 ENCOUNTER — NON-APPOINTMENT (OUTPATIENT)
Age: 64
End: 2024-06-14

## 2024-06-23 LAB
ALBUMIN SERPL ELPH-MCNC: 4.4 G/DL
ALP BLD-CCNC: 144 U/L
ALT SERPL-CCNC: 24 U/L
ANION GAP SERPL CALC-SCNC: 16 MMOL/L
AST SERPL-CCNC: 40 U/L
BASOPHILS # BLD AUTO: 0.06 K/UL
BASOPHILS NFR BLD AUTO: 0.7 %
BILIRUB SERPL-MCNC: 0.6 MG/DL
BUN SERPL-MCNC: 24 MG/DL
CALCIUM SERPL-MCNC: 9.4 MG/DL
CHLORIDE SERPL-SCNC: 103 MMOL/L
CHOLEST SERPL-MCNC: 175 MG/DL
CO2 SERPL-SCNC: 21 MMOL/L
CREAT SERPL-MCNC: 1.59 MG/DL
EGFR: 48 ML/MIN/1.73M2
EOSINOPHIL # BLD AUTO: 0.07 K/UL
EOSINOPHIL NFR BLD AUTO: 0.9 %
GLUCOSE SERPL-MCNC: 79 MG/DL
HBV CORE IGG+IGM SER QL: NONREACTIVE
HBV E AB SER QL: NONREACTIVE
HBV E AG SER QL: NONREACTIVE
HBV SURFACE AB SER QL: NONREACTIVE
HBV SURFACE AG SER QL: NONREACTIVE
HCT VFR BLD CALC: 41.1 %
HCV AB SER QL: NONREACTIVE
HCV S/CO RATIO: 0.08 S/CO
HDLC SERPL-MCNC: 41 MG/DL
HGB BLD-MCNC: 12.7 G/DL
HIV1+2 AB SPEC QL IA.RAPID: NONREACTIVE
IMM GRANULOCYTES NFR BLD AUTO: 0.2 %
LDLC SERPL CALC-MCNC: 110 MG/DL
LYMPHOCYTES # BLD AUTO: 1.89 K/UL
LYMPHOCYTES NFR BLD AUTO: 23.4 %
M TB IFN-G BLD-IMP: NEGATIVE
MAN DIFF?: NORMAL
MCHC RBC-ENTMCNC: 30 PG
MCHC RBC-ENTMCNC: 30.9 GM/DL
MCV RBC AUTO: 96.9 FL
MONOCYTES # BLD AUTO: 0.53 K/UL
MONOCYTES NFR BLD AUTO: 6.6 %
NEUTROPHILS # BLD AUTO: 5.51 K/UL
NEUTROPHILS NFR BLD AUTO: 68.2 %
NONHDLC SERPL-MCNC: 134 MG/DL
PLATELET # BLD AUTO: 267 K/UL
POTASSIUM SERPL-SCNC: 4.7 MMOL/L
PROT SERPL-MCNC: 7.5 G/DL
QUANTIFERON TB PLUS MITOGEN MINUS NIL: >10 IU/ML
QUANTIFERON TB PLUS NIL: 0.03 IU/ML
QUANTIFERON TB PLUS TB1 MINUS NIL: 0 IU/ML
QUANTIFERON TB PLUS TB2 MINUS NIL: 0 IU/ML
RBC # BLD: 4.24 M/UL
RBC # FLD: 15 %
SODIUM SERPL-SCNC: 139 MMOL/L
TRIGL SERPL-MCNC: 132 MG/DL
WBC # FLD AUTO: 8.08 K/UL

## 2024-06-25 ENCOUNTER — NON-APPOINTMENT (OUTPATIENT)
Age: 64
End: 2024-06-25

## 2024-06-25 DIAGNOSIS — Z79.899 OTHER LONG TERM (CURRENT) DRUG THERAPY: ICD-10-CM

## 2024-06-25 RX ORDER — MYCOPHENOLATE MOFETIL 500 MG/1
500 TABLET ORAL
Qty: 60 | Refills: 0 | Status: ACTIVE | COMMUNITY
Start: 2024-06-25 | End: 1900-01-01

## 2024-07-16 ENCOUNTER — APPOINTMENT (OUTPATIENT)
Dept: PULMONOLOGY | Facility: CLINIC | Age: 64
End: 2024-07-16

## 2024-07-16 VITALS
WEIGHT: 140 LBS | SYSTOLIC BLOOD PRESSURE: 123 MMHG | OXYGEN SATURATION: 98 % | DIASTOLIC BLOOD PRESSURE: 83 MMHG | RESPIRATION RATE: 16 BRPM | HEART RATE: 76 BPM | BODY MASS INDEX: 22.5 KG/M2 | HEIGHT: 66.26 IN

## 2024-07-16 DIAGNOSIS — I50.20 UNSPECIFIED SYSTOLIC (CONGESTIVE) HEART FAILURE: ICD-10-CM

## 2024-07-16 PROCEDURE — 99214 OFFICE O/P EST MOD 30 MIN: CPT

## 2024-07-16 PROCEDURE — 36415 COLL VENOUS BLD VENIPUNCTURE: CPT

## 2024-07-17 LAB
ALBUMIN SERPL ELPH-MCNC: 4.7 G/DL
ALP BLD-CCNC: 125 U/L
ALT SERPL-CCNC: 13 U/L
ANION GAP SERPL CALC-SCNC: 16 MMOL/L
AST SERPL-CCNC: 24 U/L
BILIRUB SERPL-MCNC: 0.5 MG/DL
BUN SERPL-MCNC: 23 MG/DL
CALCIUM SERPL-MCNC: 9.7 MG/DL
CHLORIDE SERPL-SCNC: 106 MMOL/L
CO2 SERPL-SCNC: 21 MMOL/L
CREAT SERPL-MCNC: 1.37 MG/DL
EGFR: 58 ML/MIN/1.73M2
GLUCOSE SERPL-MCNC: 90 MG/DL
HCT VFR BLD CALC: 41.2 %
HGB BLD-MCNC: 12.2 G/DL
MCHC RBC-ENTMCNC: 29.3 PG
MCHC RBC-ENTMCNC: 29.6 GM/DL
MCV RBC AUTO: 99 FL
NT-PROBNP SERPL-MCNC: 2460 PG/ML
PLATELET # BLD AUTO: 238 K/UL
POTASSIUM SERPL-SCNC: 4.4 MMOL/L
PROT SERPL-MCNC: 7.7 G/DL
RBC # BLD: 4.16 M/UL
RBC # FLD: 14.9 %
SODIUM SERPL-SCNC: 142 MMOL/L
WBC # FLD AUTO: 8.76 K/UL

## 2024-07-24 NOTE — HISTORY OF PRESENT ILLNESS
[TextBox_4] :  63-year-old [place]ian M with PMH of HTN, scleroderma, RA,, CKD, HFrecEF/NICM (EF prev 25-30%, LVEDD 4.1 cm improved to 57% with predominant RV dysfunction) of unclear etiology s/p ICD, group I/III pulmonary HTN (on sildenafil), renal cancer s/p partial nephrectomy 2022, MGUS (ruled out for cardiac amyloid w/ myocardial biopsy), chronic dyspnea. Initial Dx 1 year ago.   CLINIC 2024: Patient presents to clinic for initial visit for lung transplant evaluation.  Reports a decline of respiratory status over the past few months. States ambulation and activity has become difficult. Patient is uncertain about proceeding with lung transplant. Educated patient and wife about lung transplant.   We went over the risks and benefits of lung transplantation including one- and five-year survival. The median survival after a double lung transplant is about 6.5 years and this was explained in detail. We also went over the risks of opportunistic infections and the risks of calcineurin inhibitor use after the transplant with inherent risks of neoplasms and opportunistic infections and other complications. The post-operative recovery period was explained in detail including the need for mechanical ventilation and other means of cardiopulmonary support including extracorporeal membrane oxygenation use and the types of incisions and chest tubes that are required.    CLINIC 2024: Patient presents or routine follow up appointment. Reports *****     Oxygen requirement 4L NC at rest 5L NC on exertion 5L NL at night  Quality of life: Has declined. Patient is unable to tolerate activity.   Functional status: [X] performs activities of daily living with NO assistance [] performs activities of daily living with SOME assistance [] performs activities of daily living with TOTAL assistance  Exposures: Denies  Prior hospitalizations, ICU admission or intubations: Hospitalized at [facility] for COVID 1 week. Denies intubation.  Hx covid infection, blood transfusions: COVID 2023, denies blood transfusions   Health maintenance/vaccines: Flu COVID vax: Pfizer 2020  Family History: Mother DM, HTN, HLD  Social History Lives with: Lives with wife, sister is willing to assist his wife if need.   DATA REVIEWED  PFTs 2024 FVC: 3.05, 90% FEV1: 2.40, 93% T.09, 97% DLCO: 8.7, 34%  PFTs 2023 FVC: 2.79, 85% FEV1: 2.41, 94% T.05, 80% DLCO: 7.79, 33%   6MWT 2024: 264 meters   CT CHEST 10/11/23 IMPRESSION: Mild circumferential bladder wall thickening which may be due to cystitis or chronic outlet obstruction. Correlate with urinalysis.   ECHO 10/11/23  CONCLUSIONS:  1. The left ventricular cavity is small. The interventricular septum is flattened in systole and diastole consistent with right ventricular pressure and volume overload. Left ventricular systolic function is normal with a calculated ejection fraction of 57 % by the Espino's biplane method of disks. The left ventricular diastolic function is indeterminate. There is normal LV mass and concentric remodeling.  2. The left ventricular diastolic function is indeterminate.  3. Right ventricular volume and pressure overload. Left ventricular systolic function is normal with an ejection fraction of 57 % by Espino's method of disks.  4. Severely enlarged right ventricular cavity size and severely reduced systolic function.  5. Device lead is visualized.  6. The right atrium is severely dilated in size.  7. Estimated pulmonary artery systolic pressure is 79 mmHg.  8. The inferior vena cava is dilated (dilated >2.1cm) with abnormal inspiratory collapse (abnormal <50%) consistent with elevated right atrial pressure (~15, range 10-20mmHg).  9. Organized fibrinous vs coagulant material is seen in the pericardial space. There is a trace pericardial effusion noted adjacent to the posterior left ventricle.    RHC/LHC:

## 2024-07-24 NOTE — ASSESSMENT
[FreeTextEntry1] : 63-year-old Stateless M with PMH of HTN, pulm HTN, scleroderma, RA, CKD, HFrecEF/NICM (EF prev 25-30%, LVEDD 4.1 cm improved to 57% with predominant RV dysfunction) of unclear etiology s/p ICD, group I/III pulmonary HTN (on sildenafil), renal cancer s/p partial nephrectomy 4/2022, MGUS (ruled out for cardiac amyloid w/ myocardial biopsy)  #lung transplant evaluation - Due to patients' various comorbidities and history of renal cancer. Referral and follow ups are needed before proceeding with lung transplant evaluation.  #NEP -PAST H/O RENAL CA 4/2022----S/P PARTIAL NEPHRECTOMY   -staging, surveillance and prognosis  -urology referral  #CKD -Cret 1.56 (12/28/23) - referred to cardio-renal for evaluation.  #PULM HTN - on sildenafil 20 mg three times/day  #MUGS -heme appt 2/21/24  FOLLOW UPS -continue to follow up with Dr. Villeda -Follow up with Dr. Gardner, 5/24/2024 CT abdomen no new evidence of new lesion or metastatic disease -referred to Cardio-nephrology     RTC in *****   Patient asked relevant questions which were answered. Patient education provided. Teach back performed.  Above discussed with Dr. Hickey

## 2024-07-31 ENCOUNTER — APPOINTMENT (OUTPATIENT)
Dept: DERMATOLOGY | Facility: CLINIC | Age: 64
End: 2024-07-31
Payer: COMMERCIAL

## 2024-07-31 DIAGNOSIS — L81.5 LEUKODERMA, NOT ELSEWHERE CLASSIFIED: ICD-10-CM

## 2024-07-31 DIAGNOSIS — M34.9 SYSTEMIC SCLEROSIS, UNSPECIFIED: ICD-10-CM

## 2024-07-31 PROCEDURE — 99214 OFFICE O/P EST MOD 30 MIN: CPT

## 2024-08-01 ENCOUNTER — NON-APPOINTMENT (OUTPATIENT)
Age: 64
End: 2024-08-01

## 2024-08-02 ENCOUNTER — APPOINTMENT (OUTPATIENT)
Dept: PULMONOLOGY | Facility: CLINIC | Age: 64
End: 2024-08-02
Payer: COMMERCIAL

## 2024-08-02 ENCOUNTER — NON-APPOINTMENT (OUTPATIENT)
Age: 64
End: 2024-08-02

## 2024-08-02 VITALS
WEIGHT: 139 LBS | RESPIRATION RATE: 17 BRPM | HEART RATE: 74 BPM | HEIGHT: 66.26 IN | DIASTOLIC BLOOD PRESSURE: 86 MMHG | OXYGEN SATURATION: 91 % | BODY MASS INDEX: 22.34 KG/M2 | SYSTOLIC BLOOD PRESSURE: 141 MMHG

## 2024-08-02 PROCEDURE — 99215 OFFICE O/P EST HI 40 MIN: CPT

## 2024-08-02 NOTE — ASSESSMENT
[FreeTextEntry1] : 63-year-old Greek M with PMH of HTN, pulm HTN, scleroderma, RA, CKD, HFrecEF/NICM (EF prev 25-30%, LVEDD 4.1 cm improved to 57% with predominant RV dysfunction) of unclear etiology s/p ICD, group I/III pulmonary HTN (on sildenafil), renal cancer s/p partial nephrectomy 4/2022, MGUS (ruled out for cardiac amyloid w/ myocardial biopsy)  #lung transplant evaluation - Due to patients' various comorbidities and history of renal cancer. Referral and follow ups are needed before proceeding with lung transplant evaluation. -8/2/24; PAH, on RA at rest, sending for nephro eval before next visit in 6 mos.  #NEP -PAST H/O RENAL CA 4/2022----S/P PARTIAL NEPHRECTOMY   -staging, surveillance and prognosis  - urology follow up  #CKD -Elevated Creatinine. Most recent 1.37 7/16/24 - referred to cardio-renal for evaluation.  #PULM HTN - on sildenafil and opsimut  #MGUS -hematology follow up  FOLLOW UPS -Follow up with Dr. Villeda -Referred to Dr. Chappell nephrologist for eval (GFR 58)   RTC in 6 months or sooner if symptoms worsen   Patient asked relevant questions which were answered. Patient education provided. Teach back performed.  Above discussed with Dr. Hickey 
[FreeTextEntry1] : 63-year-old [place]an M with PMH of HTN, pulm HTN, scleroderma, RA, CKD, HFrecEF/NICM (EF prev 25-30%, LVEDD 4.1 cm improved to 57% with predominant RV dysfunction) of unclear etiology s/p ICD, group I/III pulmonary HTN (on sildenafil), renal cancer s/p partial nephrectomy 4/2022, MGUS (ruled out for cardiac amyloid w/ myocardial biopsy)  #lung transplant evaluation - Due to patients' various comorbidities and history of renal cancer. Referral and follow ups are needed before proceeding with lung transplant evaluation. -8/2/24; PAH, on RA at rest, sending for nephro eval before next visit in 6 mos.  #NEP -PAST H/O RENAL CA 4/2022----S/P PARTIAL NEPHRECTOMY   -staging, surveillance and prognosis  - urology follow up  #CKD -Elevated Creatinine. Most recent 1.37 7/16/24 - referred to cardio-renal for evaluation.  #PULM HTN - on sildenafil and opsimut  #MGUS -hematology follow up  FOLLOW UPS -Follow up with Dr. Villeda -Referred to Dr. Chappell nephrologist for eval (GFR 58)   RTC in 6 months or sooner if symptoms worsen   Patient asked relevant questions which were answered. Patient education provided. Teach back performed.  Above discussed with Dr. Hickey 
Walk in

## 2024-08-02 NOTE — END OF VISIT
[FreeTextEntry3] : 63 year old male with history of chronic hypoxic respiratory failure, scleroderma, pulmonary hypertension, chronic kidney disease, and non-ischemic cardiomyopathy who presents for routine follow up for pre-lung transplant evaluation. Saturating low 90s on room air at rest. Requires 2L O2 supplementation with exertion. Overall appears well. Obstacles to transplant presently include his chronic kidney disease and history of renal cell carcinoma. Recent surveillance CT A/P 5/24/2024 of note is negative with no evidence of recurrent or metastatic disease. Will refer to nephrology for evaluation. Advised to RTC in 6 months or sooner as needed. Remainder of plan as detailed above. [Time Spent: ___ minutes] : I have spent [unfilled] minutes of time on the encounter.

## 2024-08-02 NOTE — HISTORY OF PRESENT ILLNESS
[Never] : never [TextBox_4] :  63-year-old [place]an M with PMH of HTN, scleroderma, RA,, CKD, HFrecEF/NICM (EF prev 25-30%, LVEDD 4.1 cm improved to 57% with predominant RV dysfunction) of unclear etiology s/p ICD, group I/III pulmonary HTN (on sildenafil), renal cancer s/p partial nephrectomy 2022, MGUS (ruled out for cardiac amyloid w/ myocardial biopsy), chronic dyspnea. Initial Dx 1 year ago.   CLINIC 2024: Patient presents to clinic for initial visit for lung transplant evaluation.  Reports a decline of respiratory status over the past few months. States ambulation and activity has become difficult. Patient is uncertain about proceeding with lung transplant. Educated patient and wife about lung transplant.   We went over the risks and benefits of lung transplantation including one- and five-year survival. The median survival after a double lung transplant is about 6.5 years and this was explained in detail. We also went over the risks of opportunistic infections and the risks of calcineurin inhibitor use after the transplant with inherent risks of neoplasms and opportunistic infections and other complications. The post-operative recovery period was explained in detail including the need for mechanical ventilation and other means of cardiopulmonary support including extracorporeal membrane oxygenation use and the types of incisions and chest tubes that are required.    CLINIC 2024: Patient presents or routine follow up appointment. Reports improvement, less exertional SOB. Remains on RA at rest, 2LNC for exertion such as climbing stairs, walking around. Has pulse ox and runs average 95% at rest at home on RA. Oxygen requirement RA at rest 2L NC on exertion RA at night.     Quality of life: Has declined. Patient able to tolerate activity using oxygen at 2LNC on portable concentrator.   Functional status: [X] performs activities of daily living with NO assistance [] performs activities of daily living with SOME assistance [] performs activities of daily living with TOTAL assistance  Exposures: Denies  Prior hospitalizations, ICU admission or intubations: Hospitalized at [facility] for COVID 1 week. Denies intubation.  Hx covid infection, blood transfusions: COVID 2023, denies blood transfusions   Health maintenance/vaccines: Flu COVID vax: Pfizer x2   Family History: Mother DM, HTN, HLD  Social History Lives with: Lives with wife, sister is willing to assist his wife if need.   DATA REVIEWED  PFTs 2024 FVC: 3.05, 90% FEV1: 2.40, 93% T.09, 97% DLCO: 8.7, 34%  PFTs 2023 FVC: 2.79, 85% FEV1: 2.41, 94% T.05, 80% DLCO: 7.79, 33%   6MWT 2024: 264 meters   CT CHEST 3/8/22 IMPRESSION: No pulmonary embolus Dilated pulmonary artery suggestive of pulmonary hypertension. Recommend correlation with echocardiogram. Mildly enlarged subcarinal and right hilar lymph nodes. Mildly enlarged bilateral axillary lymph nodes, left greater than right.  ECHO 10/11/23  CONCLUSIONS:  1. The left ventricular cavity is small. The interventricular septum is flattened in systole and diastole consistent with right ventricular pressure and volume overload. Left ventricular systolic function is normal with a calculated ejection fraction of 57 % by the Espino's biplane method of disks. The left ventricular diastolic function is indeterminate. There is normal LV mass and concentric remodeling.  2. The left ventricular diastolic function is indeterminate.  3. Right ventricular volume and pressure overload. Left ventricular systolic function is normal with an ejection fraction of 57 % by Espino's method of disks.  4. Severely enlarged right ventricular cavity size and severely reduced systolic function.  5. Device lead is visualized.  6. The right atrium is severely dilated in size.  7. Estimated pulmonary artery systolic pressure is 79 mmHg.  8. The inferior vena cava is dilated (dilated >2.1cm) with abnormal inspiratory collapse (abnormal <50%) consistent with elevated right atrial pressure (~15, range 10-20mmHg).  9. Organized fibrinous vs coagulant material is seen in the pericardial space. There is a trace pericardial effusion noted adjacent to the posterior left ventricle.    RHC/LHC:

## 2024-08-02 NOTE — PHYSICAL EXAM
[No Acute Distress] : no acute distress [Normal Oropharynx] : normal oropharynx [Normal Appearance] : normal appearance [No Neck Mass] : no neck mass [Normal Rate/Rhythm] : normal rate/rhythm [Normal S1, S2] : normal s1, s2 [No Varicosities] : no varicosities [No Resp Distress] : no resp distress [Clear to Auscultation Bilaterally] : clear to auscultation bilaterally [No Abnormalities] : no abnormalities [Benign] : benign [Normal Gait] : normal gait [No Clubbing] : no clubbing [No Cyanosis] : no cyanosis [No Edema] : no edema [Normal Color/ Pigmentation] : normal color/ pigmentation [No Focal Deficits] : no focal deficits [Oriented x3] : oriented x3 [Normal Affect] : normal affect

## 2024-08-02 NOTE — HISTORY OF PRESENT ILLNESS
[Never] : never [TextBox_4] :  63-year-old Mexican M with PMH of HTN, scleroderma, RA,, CKD, HFrecEF/NICM (EF prev 25-30%, LVEDD 4.1 cm improved to 57% with predominant RV dysfunction) of unclear etiology s/p ICD, group I/III pulmonary HTN (on sildenafil), renal cancer s/p partial nephrectomy 2022, MGUS (ruled out for cardiac amyloid w/ myocardial biopsy), chronic dyspnea. Initial Dx 1 year ago.   CLINIC 2024: Patient presents to clinic for initial visit for lung transplant evaluation.  Reports a decline of respiratory status over the past few months. States ambulation and activity has become difficult. Patient is uncertain about proceeding with lung transplant. Educated patient and wife about lung transplant.   We went over the risks and benefits of lung transplantation including one- and five-year survival. The median survival after a double lung transplant is about 6.5 years and this was explained in detail. We also went over the risks of opportunistic infections and the risks of calcineurin inhibitor use after the transplant with inherent risks of neoplasms and opportunistic infections and other complications. The post-operative recovery period was explained in detail including the need for mechanical ventilation and other means of cardiopulmonary support including extracorporeal membrane oxygenation use and the types of incisions and chest tubes that are required.    CLINIC 2024: Patient presents or routine follow up appointment. Reports improvement, less exertional SOB. Remains on RA at rest, 2LNC for exertion such as climbing stairs, walking around. Has pulse ox and runs average 95% at rest at home on RA. Oxygen requirement RA at rest 2L NC on exertion RA at night.     Quality of life: Has declined. Patient able to tolerate activity using oxygen at 2LNC on portable concentrator.   Functional status: [X] performs activities of daily living with NO assistance [] performs activities of daily living with SOME assistance [] performs activities of daily living with TOTAL assistance  Exposures: Denies  Prior hospitalizations, ICU admission or intubations: Hospitalized at [facility] for COVID 1 week. Denies intubation.  Hx covid infection, blood transfusions: COVID 2023, denies blood transfusions   Health maintenance/vaccines: Flu COVID vax: Pfizer x2   Family History: Mother DM, HTN, HLD  Social History Lives with: Lives with wife, sister is willing to assist his wife if need.   DATA REVIEWED  PFTs 2024 FVC: 3.05, 90% FEV1: 2.40, 93% T.09, 97% DLCO: 8.7, 34%  PFTs 2023 FVC: 2.79, 85% FEV1: 2.41, 94% T.05, 80% DLCO: 7.79, 33%   6MWT 2024: 264 meters   CT CHEST 3/8/22 IMPRESSION: No pulmonary embolus Dilated pulmonary artery suggestive of pulmonary hypertension. Recommend correlation with echocardiogram. Mildly enlarged subcarinal and right hilar lymph nodes. Mildly enlarged bilateral axillary lymph nodes, left greater than right.  ECHO 10/11/23  CONCLUSIONS:  1. The left ventricular cavity is small. The interventricular septum is flattened in systole and diastole consistent with right ventricular pressure and volume overload. Left ventricular systolic function is normal with a calculated ejection fraction of 57 % by the Espino's biplane method of disks. The left ventricular diastolic function is indeterminate. There is normal LV mass and concentric remodeling.  2. The left ventricular diastolic function is indeterminate.  3. Right ventricular volume and pressure overload. Left ventricular systolic function is normal with an ejection fraction of 57 % by Espino's method of disks.  4. Severely enlarged right ventricular cavity size and severely reduced systolic function.  5. Device lead is visualized.  6. The right atrium is severely dilated in size.  7. Estimated pulmonary artery systolic pressure is 79 mmHg.  8. The inferior vena cava is dilated (dilated >2.1cm) with abnormal inspiratory collapse (abnormal <50%) consistent with elevated right atrial pressure (~15, range 10-20mmHg).  9. Organized fibrinous vs coagulant material is seen in the pericardial space. There is a trace pericardial effusion noted adjacent to the posterior left ventricle.    RHC/LHC:

## 2024-08-13 ENCOUNTER — RESULT REVIEW (OUTPATIENT)
Age: 64
End: 2024-08-13

## 2024-08-13 ENCOUNTER — APPOINTMENT (OUTPATIENT)
Dept: CV DIAGNOSITCS | Facility: HOSPITAL | Age: 64
End: 2024-08-13

## 2024-08-14 ENCOUNTER — NON-APPOINTMENT (OUTPATIENT)
Age: 64
End: 2024-08-14

## 2024-08-14 ENCOUNTER — APPOINTMENT (OUTPATIENT)
Dept: HEART FAILURE | Facility: CLINIC | Age: 64
End: 2024-08-14
Payer: COMMERCIAL

## 2024-08-14 VITALS
DIASTOLIC BLOOD PRESSURE: 86 MMHG | WEIGHT: 142 LBS | HEART RATE: 112 BPM | HEIGHT: 66 IN | SYSTOLIC BLOOD PRESSURE: 137 MMHG | BODY MASS INDEX: 22.82 KG/M2 | OXYGEN SATURATION: 91 %

## 2024-08-14 DIAGNOSIS — I27.20 PULMONARY HYPERTENSION, UNSPECIFIED: ICD-10-CM

## 2024-08-14 PROCEDURE — 93000 ELECTROCARDIOGRAM COMPLETE: CPT

## 2024-08-14 PROCEDURE — 99214 OFFICE O/P EST MOD 30 MIN: CPT | Mod: 25

## 2024-08-14 PROCEDURE — G2211 COMPLEX E/M VISIT ADD ON: CPT | Mod: NC

## 2024-08-15 NOTE — ASSESSMENT
[FreeTextEntry1] : Briefly, Mr. Cain is a 62 y/o Tajik M w/ h/o HTN, ? RA, RCC s/p partial R nephrectomy (4/22), CKD (b/l Cr 1.4; 5/10/23), HFrecEF/NICM (EF prev 25-30%, LVEDD 4.1 cm improved to 55% with predominant RV dysfunction) of unclear etiology s/p ICD, group I/III pulmonary HTN (on sildenafil), MGUS (ruled out for cardiac amyloid w/ myocardial biopsy), chronic dyspnea (on intermittent O2) who presents for follow-up. 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 Has significant precapillary PHTN for  WHO class III symptoms. Undergoing consideration for lung transplant  1. HFrecEF - rapidly progressive over past 1-2 years - will restart belkis 25 mg daily, last K on 7/16 4.4; repeat labs in 2 weeks - continue on bumex 1 mg daily with additional 1 mg 3 days a week on M-W-F and as needed for weight gain of 2-3 lbs in 2-3 days; goal weight  <138 lbs  - d/c hydralazine - prev on Entresto 97/103 twice/day but discontinued d/t fluctuating renal dysfunction; no further cough with discontinuation. prior EF had recovered so defer on reinitiating. - prev on toprol but was discontinued in hospital due to low output concerns; remain off given normalization of LV function - not taking nifedipine  - monitor weight/BP at home - counseled extensively on disease process  2. CKD - Cr august 1.3; recently 1.3-1.6 - may benefit from renal consult - c/w meds as above  3. Hypertension- elevated - keep log of BP - spironoactone as above  4. Pulmonary HTN - appears c/w group 1 with functional WHO class III - on Opsumit 10 mg daily - on sildenafil 20 mg three times/day - appreciate Dr. Villeda's input; referred to Dr. Pittman - may need to reassess hemos and consider augmented therapy  5. Hypopigmentation - unclear etiology of loss of pigmentation in upper chest; skin biopsy c/w cutaenous scleroderma - c/w f/u with dermatology - reasonable to consider Cellcept  RTC 2 months with me

## 2024-08-15 NOTE — ASSESSMENT
[FreeTextEntry1] : Briefly, Mr. Cain is a 64 y/o Spanish M w/ h/o HTN, ? RA, RCC s/p partial R nephrectomy (4/22), CKD (b/l Cr 1.4; 5/10/23), HFrecEF/NICM (EF prev 25-30%, LVEDD 4.1 cm improved to 55% with predominant RV dysfunction) of unclear etiology s/p ICD, group I/III pulmonary HTN (on sildenafil), MGUS (ruled out for cardiac amyloid w/ myocardial biopsy), chronic dyspnea (on intermittent O2) who presents for follow-up. 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 Has significant precapillary PHTN for  WHO class III symptoms. Undergoing consideration for lung transplant  1. HFrecEF - rapidly progressive over past 1-2 years - will restart belkis 25 mg daily, last K on 7/16 4.4; repeat labs in 2 weeks - continue on bumex 1 mg daily with additional 1 mg 3 days a week on M-W-F and as needed for weight gain of 2-3 lbs in 2-3 days; goal weight  <138 lbs  - d/c hydralazine - prev on Entresto 97/103 twice/day but discontinued d/t fluctuating renal dysfunction; no further cough with discontinuation. prior EF had recovered so defer on reinitiating. - prev on toprol but was discontinued in hospital due to low output concerns; remain off given normalization of LV function - not taking nifedipine  - monitor weight/BP at home - counseled extensively on disease process  2. CKD - Cr august 1.3; recently 1.3-1.6 - may benefit from renal consult - c/w meds as above  3. Hypertension- elevated - keep log of BP - spironoactone as above  4. Pulmonary HTN - appears c/w group 1 with functional WHO class III - on Opsumit 10 mg daily - on sildenafil 20 mg three times/day - appreciate Dr. Villeda's input; referred to Dr. Pittman - may need to reassess hemos and consider augmented therapy  5. Hypopigmentation - unclear etiology of loss of pigmentation in upper chest; skin biopsy c/w cutaenous scleroderma - c/w f/u with dermatology - reasonable to consider Cellcept  RTC 2 months with me

## 2024-08-15 NOTE — PHYSICAL EXAM
[Well Developed] : well developed [Well Nourished] : well nourished [No Acute Distress] : no acute distress [Normal Conjunctiva] : normal conjunctiva [Normal Venous Pressure] : normal venous pressure [No Carotid Bruit] : no carotid bruit [Normal S1, S2] : normal S1, S2 [No Murmur] : no murmur [No Rub] : no rub [No Gallop] : no gallop [Clear Lung Fields] : clear lung fields [Good Air Entry] : good air entry [No Respiratory Distress] : no respiratory distress  [Soft] : abdomen soft [Non Tender] : non-tender [No Masses/organomegaly] : no masses/organomegaly [Normal Bowel Sounds] : normal bowel sounds [Normal Gait] : normal gait [No Cyanosis] : no cyanosis [No Clubbing] : no clubbing [No Varicosities] : no varicosities [No Rash] : no rash [No Skin Lesions] : no skin lesions [Moves all extremities] : moves all extremities [No Focal Deficits] : no focal deficits [Normal Speech] : normal speech [Normal] : alert and oriented, normal memory [Alert and Oriented] : alert and oriented [Normal memory] : normal memory [de-identified] : NAD [de-identified] : JVP approx 8-10 cm  [de-identified] : tachycardic 100's, Left chest ICD; prominent P2  [de-identified] : trace lower extremity edema bilaterally [de-identified] : possible taut skin on hands; hypopigmented skin along neck/upper chest

## 2024-08-15 NOTE — HISTORY OF PRESENT ILLNESS
[FreeTextEntry1] : Briefly, Mr. Cain is a 64 y/o [place]ry[place]in[place] M w/ h/o HTN, ? RA, RCC s/p partial R nephrectomy (4/22), CKD (b/l Cr 1.4; 5/10/23), HFrecEF/NICM (EF prev 25-30%, LVEDD 4.1 cm improved to 55% with predominant RV dysfunction) of unclear etiology s/p ICD, group I/III pulmonary HTN (on sildenafil), MGUS (ruled out for cardiac amyloid w/ myocardial biopsy), chronic dyspnea (on intermittent O2) who presents for follow-up. Accompanied by wife. Referred by Dr. Manuelito Carver. Being evaluated by lung transplant team.   For full initial details, please refer to note from 1/13/23  Last was admitted in hospital 1/10-1/16/24 for Covid and treated with Remdesivir and steroids with improvement.   Since last visit, has been well. Had completed pulm rehab in May. Has been doing exercise at home.   Was to be on spironolactone 25 mg daily but hasn't taken. Also not taking nifedipine (started by rheum for Raynaud's) due to headaches. Does report that it helped w/ Raynaud's. His prednisone was decreased to 2.5 mg once/day. He is taking bumex 1 mg daily with no additional doses. Reportedly taking hydralazine 10 mg every other day (unclear reasons).   Continues to have dyspnea with exertion particularly with doing small errands. He has dyspnea with walking 1 hallway length. States he can walk up 8-10 steps limited by leg fatigue. Reports he has difficulty sleeping through the whole night.   Appetite has improved (no longer has early satiety). Weight has been 140 from 138 pounds.   Being seen by Dr. Pittman for consideration of lung transplant.   For PAH, workup has been negative. Given significantly elevated LUIS FERNANDO, seen by Dr. Larson (rheumatology) where further serologic testing was negative and recommended to taper steroids with primary focus on treatment of pulmonary hypertension. Had seen dermatology and had skin biopsy which was consistent with cutaneous scleroderma. 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 Had d/w Dr. Larson who was equivocal on therapy.   Monitors BP at home which has been in the range of 130-140. He sleeps with 2 pillows.   He denies chest pain, palpitations, syncope and no ICD shocks.   Denies prior Covid illness. Received Covid vaccines.

## 2024-08-15 NOTE — HISTORY OF PRESENT ILLNESS
[FreeTextEntry1] : Briefly, Mr. Cain is a 64 y/o Hong Konger M w/ h/o HTN, ? RA, RCC s/p partial R nephrectomy (4/22), CKD (b/l Cr 1.4; 5/10/23), HFrecEF/NICM (EF prev 25-30%, LVEDD 4.1 cm improved to 55% with predominant RV dysfunction) of unclear etiology s/p ICD, group I/III pulmonary HTN (on sildenafil), MGUS (ruled out for cardiac amyloid w/ myocardial biopsy), chronic dyspnea (on intermittent O2) who presents for follow-up. Accompanied by wife. Referred by Dr. Manuelito Carver. Being evaluated by lung transplant team.   For full initial details, please refer to note from 1/13/23  Last was admitted in hospital 1/10-1/16/24 for Covid and treated with Remdesivir and steroids with improvement.   Since last visit, has been well. Had completed pulm rehab in May. Has been doing exercise at home.   Was to be on spironolactone 25 mg daily but hasn't taken. Also not taking nifedipine (started by rheum for Raynaud's) due to headaches. Does report that it helped w/ Raynaud's. His prednisone was decreased to 2.5 mg once/day. He is taking bumex 1 mg daily with no additional doses. Reportedly taking hydralazine 10 mg every other day (unclear reasons).   Continues to have dyspnea with exertion particularly with doing small errands. He has dyspnea with walking 1 hallway length. States he can walk up 8-10 steps limited by leg fatigue. Reports he has difficulty sleeping through the whole night.   Appetite has improved (no longer has early satiety). Weight has been 140 from 138 pounds.   Being seen by Dr. Pittman for consideration of lung transplant.   For PAH, workup has been negative. Given significantly elevated LUIS FERNANDO, seen by Dr. Larson (rheumatology) where further serologic testing was negative and recommended to taper steroids with primary focus on treatment of pulmonary hypertension. Had seen dermatology and had skin biopsy which was consistent with cutaneous scleroderma. 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 Had d/w Dr. Larson who was equivocal on therapy.   Monitors BP at home which has been in the range of 130-140. He sleeps with 2 pillows.   He denies chest pain, palpitations, syncope and no ICD shocks.   Denies prior Covid illness. Received Covid vaccines.

## 2024-08-15 NOTE — HISTORY OF PRESENT ILLNESS
[FreeTextEntry1] : Briefly, Mr. Cain is a 62 y/o Lao M w/ h/o HTN, ? RA, RCC s/p partial R nephrectomy (4/22), CKD (b/l Cr 1.4; 5/10/23), HFrecEF/NICM (EF prev 25-30%, LVEDD 4.1 cm improved to 55% with predominant RV dysfunction) of unclear etiology s/p ICD, group I/III pulmonary HTN (on sildenafil), MGUS (ruled out for cardiac amyloid w/ myocardial biopsy), chronic dyspnea (on intermittent O2) who presents for follow-up. Accompanied by wife. Referred by Dr. Manuelito Carver. Being evaluated by lung transplant team.   For full initial details, please refer to note from 1/13/23  Last was admitted in hospital 1/10-1/16/24 for Covid and treated with Remdesivir and steroids with improvement.   Since last visit, has been well. Had completed pulm rehab in May. Has been doing exercise at home.   Was to be on spironolactone 25 mg daily but hasn't taken. Also not taking nifedipine (started by rheum for Raynaud's) due to headaches. Does report that it helped w/ Raynaud's. His prednisone was decreased to 2.5 mg once/day. He is taking bumex 1 mg daily with no additional doses. Reportedly taking hydralazine 10 mg every other day (unclear reasons).   Continues to have dyspnea with exertion particularly with doing small errands. He has dyspnea with walking 1 hallway length. States he can walk up 8-10 steps limited by leg fatigue. Reports he has difficulty sleeping through the whole night.   Appetite has improved (no longer has early satiety). Weight has been 140 from 138 pounds.   Being seen by Dr. Pittman for consideration of lung transplant.   For PAH, workup has been negative. Given significantly elevated LUIS FERNANDO, seen by Dr. Larson (rheumatology) where further serologic testing was negative and recommended to taper steroids with primary focus on treatment of pulmonary hypertension. Had seen dermatology and had skin biopsy which was consistent with cutaneous scleroderma. 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 Had d/w Dr. aLrson who was equivocal on therapy.   Monitors BP at home which has been in the range of 130-140. He sleeps with 2 pillows.   He denies chest pain, palpitations, syncope and no ICD shocks.   Denies prior Covid illness. Received Covid vaccines.

## 2024-08-15 NOTE — PHYSICAL EXAM
[Well Developed] : well developed [Well Nourished] : well nourished [No Acute Distress] : no acute distress [Normal Conjunctiva] : normal conjunctiva [Normal Venous Pressure] : normal venous pressure [No Carotid Bruit] : no carotid bruit [Normal S1, S2] : normal S1, S2 [No Murmur] : no murmur [No Rub] : no rub [No Gallop] : no gallop [Clear Lung Fields] : clear lung fields [Good Air Entry] : good air entry [No Respiratory Distress] : no respiratory distress  [Soft] : abdomen soft [Non Tender] : non-tender [No Masses/organomegaly] : no masses/organomegaly [Normal Bowel Sounds] : normal bowel sounds [Normal Gait] : normal gait [No Cyanosis] : no cyanosis [No Clubbing] : no clubbing [No Varicosities] : no varicosities [No Rash] : no rash [No Skin Lesions] : no skin lesions [Moves all extremities] : moves all extremities [No Focal Deficits] : no focal deficits [Normal Speech] : normal speech [Normal] : alert and oriented, normal memory [Alert and Oriented] : alert and oriented [Normal memory] : normal memory [de-identified] : NAD [de-identified] : JVP approx 8-10 cm  [de-identified] : tachycardic 100's, Left chest ICD; prominent P2  [de-identified] : trace lower extremity edema bilaterally [de-identified] : possible taut skin on hands; hypopigmented skin along neck/upper chest

## 2024-08-15 NOTE — ASSESSMENT
[FreeTextEntry1] : Briefly, Mr. Cain is a 62 y/o [place] M w/ h/o HTN, ? RA, RCC s/p partial R nephrectomy (4/22), CKD (b/l Cr 1.4; 5/10/23), HFrecEF/NICM (EF prev 25-30%, LVEDD 4.1 cm improved to 55% with predominant RV dysfunction) of unclear etiology s/p ICD, group I/III pulmonary HTN (on sildenafil), MGUS (ruled out for cardiac amyloid w/ myocardial biopsy), chronic dyspnea (on intermittent O2) who presents for follow-up. 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 Has significant precapillary PHTN for  WHO class III symptoms. Undergoing consideration for lung transplant  1. HFrecEF - rapidly progressive over past 1-2 years - will restart belkis 25 mg daily, last K on 7/16 4.4; repeat labs in 2 weeks - continue on bumex 1 mg daily with additional 1 mg 3 days a week on M-W-F and as needed for weight gain of 2-3 lbs in 2-3 days; goal weight  <138 lbs  - d/c hydralazine - prev on Entresto 97/103 twice/day but discontinued d/t fluctuating renal dysfunction; no further cough with discontinuation. prior EF had recovered so defer on reinitiating. - prev on toprol but was discontinued in hospital due to low output concerns; remain off given normalization of LV function - not taking nifedipine  - monitor weight/BP at home - counseled extensively on disease process  2. CKD - Cr august 1.3; recently 1.3-1.6 - may benefit from renal consult - c/w meds as above  3. Hypertension- elevated - keep log of BP - spironoactone as above  4. Pulmonary HTN - appears c/w group 1 with functional WHO class III - on Opsumit 10 mg daily - on sildenafil 20 mg three times/day - appreciate Dr. Villeda's input; referred to Dr. Pittman - may need to reassess hemos and consider augmented therapy  5. Hypopigmentation - unclear etiology of loss of pigmentation in upper chest; skin biopsy c/w cutaenous scleroderma - c/w f/u with dermatology - reasonable to consider Cellcept  RTC 2 months with me

## 2024-08-15 NOTE — CARDIOLOGY SUMMARY
[de-identified] : 8/14/24 - sinus tach, RVH, R axis deviation, low voltage limb leads, inferior Q waves 5/8/24 - sinus tach 110 RVH, low voltage, inferior Q waves 2/28/24 - sinus tach 107 RVH, low voltage, inferior Q waves 11/15/23 - sinus tach, RVH, low voltage, inferior Q waves 7/14/23 - sinus tach, RVH, TWI anteriorly, inferior Q waves 5/10/23 sinus tach, incomplete RBBB, inferior Q waves, rate 101 bpm 3/29/23 - sinus tach, incomplete RBBB, inferior Q waves 2/22/23 sinus tach, , APCs, incomplete RBBB, inferior Q waves; relatively low voltage 2/8/23 sinus tachycardia, , PRWP, incomplete RBBB, inferior Q waves 1/25/23 sinus tachycardia, , PRWP, inferior Q waves, incomplete RBBB 12/14/22 - sinus, , PRWP, inferior Q waves [de-identified] : 8/13/24 - nl LV function, D-shaped LV in systole c/w pressure overload, severe RV dysfunction, mod TR, LVOT VTI 13 cm, PASP 54, IVC 1.9 cm   10/11/23 TTE- EF 57%, LVEDD 3.2 cm, normal LV systolic function, reduced RV systolic function, estimated PA systolic pressure 79 mmHg, IVC dilated at > 2.1 cm, elevated RA pressure, trace pericardial effusion,, organized fibrinous vs. coagulant material in pericardial space.  4/20/23 - EF read as 40-45% (more c/w 55-60%), LVEDD 3.2 cm, ? normal RV size/function (severe RV dysfunction/dilatation), mod TR; RVSP 68  3/8/23 - septum 1 cm, PW 1.3 cm, LVEDD 3 cm, EF 50%, severe RV dysfunction/dilatation, D-shaped in systole, mod TR, RVSP 60-65, IVC 1.7 cm (collapsible)  12/15/22 - septum 1.5 cm, PW 1.3 cm, LVEDD 4.1 cm, EF 28%, highly trabeculated apex, mild-mod TR, RVSP 60, RV dysfunction/enlargement, DT 78 msec, E/e' 28, LVOT VTI 12 cm, IVC 1.5 cm [de-identified] : \par  7/26/21 - treadmill stress test - 7minutes 18 seconds; stage 3 Nelson; EF 71%, no ECG changes; negative for ischemia/infarct [de-identified] : \par  3/8/23 - chest CT - mild GGO centrilobular pattern, nonspecific; b/l axillary LN 1.7 x 1.4 cm (unchanged from 4/28); no pericardial effusion; \par  \par  12/16/22 - nl pericardium; concentric LVH; EF 36%, subtle LGE along inferior hinge point of RV\par  \par  3/8/22 - Chest CT PE protocol - dilated PA (4 cm), mildly enlarged subcarinal LN (1.1 cm), mildly enlarged R hilar LN (1.2 cm), clear lungs; mildly enlarged bilateral axillary LN (L>R); no PE [de-identified] : 4/28/22 - PET scan - physiologic FDG activity throughout; few mildly enlarged FDG-avid b/l axillary LN (L>R); few small FDG-avid mediastinal and b/l hilar LN (difficult to delineate); no abnormal FDG activity in lungs or pericardium (myocardium not commented on)  1/3/22 ([place]) Tc-Pyp scan - negative for TTR amyloid [de-identified] : \par  3/6/23 RHC - RA 1, PA 44/17/30, PCW 3, CO/CI 4.39/2.7; PVR 6; TPG 27; DPG 14; negative shunt run\par  \par  12/16/22 - RHC - RA 5, RV 48/11, PA 46/24/33, PCWP 9, CO/CI 4.9/2.94, PVR 4.8; TPG 24; DPG 15\par  \par  January 2022 ([place]) - reportedly normal LHC [de-identified] : \par  3/10/23 - EBUS guided FA - no granulomas seen; reactive process favored\par  \par  3/6/23 - myocardial biopsy - myocyte hypertrophy; Congo red stain negative\par  \par  7/29/22 - L axillar LN core biopsy and FNA - reactive follicular hyperplasia\par  \par  \par  4/5/22 - PFTs FEV1 2.46 (89%), FVC 2.97 (86%), FEV1/FVC 83%; DLCO 11.4 (43%); DLCO/VA 51% - normal spirometry but reduced DLCO

## 2024-08-15 NOTE — PHYSICAL EXAM
[Well Developed] : well developed [Well Nourished] : well nourished [No Acute Distress] : no acute distress [Normal Conjunctiva] : normal conjunctiva [Normal Venous Pressure] : normal venous pressure [No Carotid Bruit] : no carotid bruit [Normal S1, S2] : normal S1, S2 [No Murmur] : no murmur [No Rub] : no rub [No Gallop] : no gallop [Clear Lung Fields] : clear lung fields [Good Air Entry] : good air entry [No Respiratory Distress] : no respiratory distress  [Soft] : abdomen soft [Non Tender] : non-tender [No Masses/organomegaly] : no masses/organomegaly [Normal Bowel Sounds] : normal bowel sounds [Normal Gait] : normal gait [No Cyanosis] : no cyanosis [No Clubbing] : no clubbing [No Varicosities] : no varicosities [No Rash] : no rash [No Skin Lesions] : no skin lesions [Moves all extremities] : moves all extremities [No Focal Deficits] : no focal deficits [Normal Speech] : normal speech [Normal] : alert and oriented, normal memory [Alert and Oriented] : alert and oriented [Normal memory] : normal memory [de-identified] : NAD [de-identified] : JVP approx 8-10 cm  [de-identified] : tachycardic 100's, Left chest ICD; prominent P2  [de-identified] : trace lower extremity edema bilaterally [de-identified] : possible taut skin on hands; hypopigmented skin along neck/upper chest

## 2024-08-15 NOTE — CARDIOLOGY SUMMARY
[de-identified] : 8/14/24 - sinus tach, RVH, R axis deviation, low voltage limb leads, inferior Q waves 5/8/24 - sinus tach 110 RVH, low voltage, inferior Q waves 2/28/24 - sinus tach 107 RVH, low voltage, inferior Q waves 11/15/23 - sinus tach, RVH, low voltage, inferior Q waves 7/14/23 - sinus tach, RVH, TWI anteriorly, inferior Q waves 5/10/23 sinus tach, incomplete RBBB, inferior Q waves, rate 101 bpm 3/29/23 - sinus tach, incomplete RBBB, inferior Q waves 2/22/23 sinus tach, , APCs, incomplete RBBB, inferior Q waves; relatively low voltage 2/8/23 sinus tachycardia, , PRWP, incomplete RBBB, inferior Q waves 1/25/23 sinus tachycardia, , PRWP, inferior Q waves, incomplete RBBB 12/14/22 - sinus, , PRWP, inferior Q waves [de-identified] : 8/13/24 - nl LV function, D-shaped LV in systole c/w pressure overload, severe RV dysfunction, mod TR, LVOT VTI 13 cm, PASP 54, IVC 1.9 cm   10/11/23 TTE- EF 57%, LVEDD 3.2 cm, normal LV systolic function, reduced RV systolic function, estimated PA systolic pressure 79 mmHg, IVC dilated at > 2.1 cm, elevated RA pressure, trace pericardial effusion,, organized fibrinous vs. coagulant material in pericardial space.  4/20/23 - EF read as 40-45% (more c/w 55-60%), LVEDD 3.2 cm, ? normal RV size/function (severe RV dysfunction/dilatation), mod TR; RVSP 68  3/8/23 - septum 1 cm, PW 1.3 cm, LVEDD 3 cm, EF 50%, severe RV dysfunction/dilatation, D-shaped in systole, mod TR, RVSP 60-65, IVC 1.7 cm (collapsible)  12/15/22 - septum 1.5 cm, PW 1.3 cm, LVEDD 4.1 cm, EF 28%, highly trabeculated apex, mild-mod TR, RVSP 60, RV dysfunction/enlargement, DT 78 msec, E/e' 28, LVOT VTI 12 cm, IVC 1.5 cm [de-identified] : \par  7/26/21 - treadmill stress test - 7minutes 18 seconds; stage 3 Nelson; EF 71%, no ECG changes; negative for ischemia/infarct [de-identified] : \par  3/8/23 - chest CT - mild GGO centrilobular pattern, nonspecific; b/l axillary LN 1.7 x 1.4 cm (unchanged from 4/28); no pericardial effusion; \par  \par  12/16/22 - nl pericardium; concentric LVH; EF 36%, subtle LGE along inferior hinge point of RV\par  \par  3/8/22 - Chest CT PE protocol - dilated PA (4 cm), mildly enlarged subcarinal LN (1.1 cm), mildly enlarged R hilar LN (1.2 cm), clear lungs; mildly enlarged bilateral axillary LN (L>R); no PE [de-identified] : 4/28/22 - PET scan - physiologic FDG activity throughout; few mildly enlarged FDG-avid b/l axillary LN (L>R); few small FDG-avid mediastinal and b/l hilar LN (difficult to delineate); no abnormal FDG activity in lungs or pericardium (myocardium not commented on)  1/3/22 ([place]) Tc-Pyp scan - negative for TTR amyloid [de-identified] : \par  3/6/23 RHC - RA 1, PA 44/17/30, PCW 3, CO/CI 4.39/2.7; PVR 6; TPG 27; DPG 14; negative shunt run\par  \par  12/16/22 - RHC - RA 5, RV 48/11, PA 46/24/33, PCWP 9, CO/CI 4.9/2.94, PVR 4.8; TPG 24; DPG 15\par  \par  January 2022 ([place]) - reportedly normal LHC [de-identified] : \par  3/10/23 - EBUS guided FA - no granulomas seen; reactive process favored\par  \par  3/6/23 - myocardial biopsy - myocyte hypertrophy; Congo red stain negative\par  \par  7/29/22 - L axillar LN core biopsy and FNA - reactive follicular hyperplasia\par  \par  \par  4/5/22 - PFTs FEV1 2.46 (89%), FVC 2.97 (86%), FEV1/FVC 83%; DLCO 11.4 (43%); DLCO/VA 51% - normal spirometry but reduced DLCO

## 2024-08-15 NOTE — CARDIOLOGY SUMMARY
[de-identified] : 8/14/24 - sinus tach, RVH, R axis deviation, low voltage limb leads, inferior Q waves 5/8/24 - sinus tach 110 RVH, low voltage, inferior Q waves 2/28/24 - sinus tach 107 RVH, low voltage, inferior Q waves 11/15/23 - sinus tach, RVH, low voltage, inferior Q waves 7/14/23 - sinus tach, RVH, TWI anteriorly, inferior Q waves 5/10/23 sinus tach, incomplete RBBB, inferior Q waves, rate 101 bpm 3/29/23 - sinus tach, incomplete RBBB, inferior Q waves 2/22/23 sinus tach, , APCs, incomplete RBBB, inferior Q waves; relatively low voltage 2/8/23 sinus tachycardia, , PRWP, incomplete RBBB, inferior Q waves 1/25/23 sinus tachycardia, , PRWP, inferior Q waves, incomplete RBBB 12/14/22 - sinus, , PRWP, inferior Q waves [de-identified] : 8/13/24 - nl LV function, D-shaped LV in systole c/w pressure overload, severe RV dysfunction, mod TR, LVOT VTI 13 cm, PASP 54, IVC 1.9 cm   10/11/23 TTE- EF 57%, LVEDD 3.2 cm, normal LV systolic function, reduced RV systolic function, estimated PA systolic pressure 79 mmHg, IVC dilated at > 2.1 cm, elevated RA pressure, trace pericardial effusion,, organized fibrinous vs. coagulant material in pericardial space.  4/20/23 - EF read as 40-45% (more c/w 55-60%), LVEDD 3.2 cm, ? normal RV size/function (severe RV dysfunction/dilatation), mod TR; RVSP 68  3/8/23 - septum 1 cm, PW 1.3 cm, LVEDD 3 cm, EF 50%, severe RV dysfunction/dilatation, D-shaped in systole, mod TR, RVSP 60-65, IVC 1.7 cm (collapsible)  12/15/22 - septum 1.5 cm, PW 1.3 cm, LVEDD 4.1 cm, EF 28%, highly trabeculated apex, mild-mod TR, RVSP 60, RV dysfunction/enlargement, DT 78 msec, E/e' 28, LVOT VTI 12 cm, IVC 1.5 cm [de-identified] : \par  7/26/21 - treadmill stress test - 7minutes 18 seconds; stage 3 Nelson; EF 71%, no ECG changes; negative for ischemia/infarct [de-identified] : \par  3/8/23 - chest CT - mild GGO centrilobular pattern, nonspecific; b/l axillary LN 1.7 x 1.4 cm (unchanged from 4/28); no pericardial effusion; \par  \par  12/16/22 - nl pericardium; concentric LVH; EF 36%, subtle LGE along inferior hinge point of RV\par  \par  3/8/22 - Chest CT PE protocol - dilated PA (4 cm), mildly enlarged subcarinal LN (1.1 cm), mildly enlarged R hilar LN (1.2 cm), clear lungs; mildly enlarged bilateral axillary LN (L>R); no PE [de-identified] : 4/28/22 - PET scan - physiologic FDG activity throughout; few mildly enlarged FDG-avid b/l axillary LN (L>R); few small FDG-avid mediastinal and b/l hilar LN (difficult to delineate); no abnormal FDG activity in lungs or pericardium (myocardium not commented on)  1/3/22 ([place]) Tc-Pyp scan - negative for TTR amyloid [de-identified] : \par  3/6/23 [facility] - RA 1, PA 44/17/30, PCW 3, CO/CI 4.39/2.7; PVR 6; TPG 27; DPG 14; negative shunt run\par  \par  12/16/22 - [facility] - RA 5, RV 48/11, PA 46/24/33, PCWP 9, CO/CI 4.9/2.94, PVR 4.8; TPG 24; DPG 15\par  \par  January 2022 ([place]) - reportedly normal LHC [de-identified] : \par  3/10/23 - EBUS guided FA - no granulomas seen; reactive process favored\par  \par  3/6/23 - myocardial biopsy - myocyte hypertrophy; Congo red stain negative\par  \par  7/29/22 - L axillar LN core biopsy and FNA - reactive follicular hyperplasia\par  \par  \par  4/5/22 - PFTs FEV1 2.46 (89%), FVC 2.97 (86%), FEV1/FVC 83%; DLCO 11.4 (43%); DLCO/VA 51% - normal spirometry but reduced DLCO

## 2024-08-19 ENCOUNTER — NON-APPOINTMENT (OUTPATIENT)
Age: 64
End: 2024-08-19

## 2024-08-20 ENCOUNTER — APPOINTMENT (OUTPATIENT)
Dept: ELECTROPHYSIOLOGY | Facility: CLINIC | Age: 64
End: 2024-08-20
Payer: COMMERCIAL

## 2024-08-20 PROCEDURE — 93296 REM INTERROG EVL PM/IDS: CPT

## 2024-08-20 PROCEDURE — 93295 DEV INTERROG REMOTE 1/2/MLT: CPT

## 2024-08-21 ENCOUNTER — APPOINTMENT (OUTPATIENT)
Dept: RHEUMATOLOGY | Facility: CLINIC | Age: 64
End: 2024-08-21
Payer: COMMERCIAL

## 2024-08-21 ENCOUNTER — APPOINTMENT (OUTPATIENT)
Dept: HEMATOLOGY ONCOLOGY | Facility: CLINIC | Age: 64
End: 2024-08-21

## 2024-08-21 VITALS
WEIGHT: 142 LBS | HEART RATE: 108 BPM | TEMPERATURE: 98.1 F | SYSTOLIC BLOOD PRESSURE: 133 MMHG | DIASTOLIC BLOOD PRESSURE: 84 MMHG | BODY MASS INDEX: 22.82 KG/M2 | RESPIRATION RATE: 16 BRPM | HEIGHT: 66 IN | OXYGEN SATURATION: 95 %

## 2024-08-21 DIAGNOSIS — I27.20 PULMONARY HYPERTENSION, UNSPECIFIED: ICD-10-CM

## 2024-08-21 DIAGNOSIS — M34.9 SYSTEMIC SCLEROSIS, UNSPECIFIED: ICD-10-CM

## 2024-08-21 DIAGNOSIS — R59.1 GENERALIZED ENLARGED LYMPH NODES: ICD-10-CM

## 2024-08-21 DIAGNOSIS — Z79.899 OTHER LONG TERM (CURRENT) DRUG THERAPY: ICD-10-CM

## 2024-08-21 PROCEDURE — 99214 OFFICE O/P EST MOD 30 MIN: CPT

## 2024-08-21 PROCEDURE — G2211 COMPLEX E/M VISIT ADD ON: CPT | Mod: NC

## 2024-08-22 NOTE — ASSESSMENT
[FreeTextEntry1] :  # Scleroderma phenotype with minimal skin tightening, salt and pepper skin changes in V area and telangiectasias ( sine scleroderma) - skin tightening of the face with skin depigmentation of V area and over MCP joints, but no evidence of skin. tightening - sclerodactyly/ pitting scars - Raynaud's - Positive LUIS FERNANDO (nucleolar pattern) with negative sub- serologies - no ILD on CT chest # pulmonary HTN; on 3/2023 [facility]: sPAP 44mmHg, dPAP 17mmHG, mPAP 30mmHg   # Nonischemic cardiomyopathy  -  HFrEF/NICM s/p ICD # questioned re lung transplant - urged to duscuss further with pulmonary and cardiology-   # Left hip pain # MGUS - systemic sclerosis in males is describes as paraneoplastic process in patients with MGUS - had not seen hematology yet   - try to taper steroids as tolerates  - no indications for Mycophenolate for tx of pulmonary HTN   - main focus on tx  is management of pulmonary HTN - GI evaluation and fu  RTO 3 months with Dr Franklin for a second opinion  Fu 6 months with me or earlier if needed

## 2024-08-22 NOTE — PHYSICAL EXAM
[General Appearance - Alert] : alert [General Appearance - Well Nourished] : well nourished [General Appearance - Well Developed] : well developed [PERRL With Normal Accommodation] : pupils were equal in size, round, and reactive to light [Sclera] : the sclera and conjunctiva were normal [] : no respiratory distress [Heart Rate And Rhythm] : heart rate was normal and rhythm regular [Heart Sounds] : normal S1 and S2 [Edema] : there was no peripheral edema [Bowel Sounds] : normal bowel sounds [Abdomen Soft] : soft [Abdomen Tenderness] : non-tender [Cervical Lymph Nodes Enlarged Posterior Bilaterally] : posterior cervical [Cervical Lymph Nodes Enlarged Anterior Bilaterally] : anterior cervical [Supraclavicular Lymph Nodes Enlarged Bilaterally] : supraclavicular [No Focal Deficits] : no focal deficits [Oriented To Time, Place, And Person] : oriented to person, place, and time [Impaired Insight] : insight and judgment were intact [Nail Clubbing] : no clubbing  or cyanosis of the fingernails [Musculoskeletal - Swelling] : no joint swelling seen [Motor Tone] : muscle strength and tone were normal [FreeTextEntry1] :  left hip pain on full abduction

## 2024-08-22 NOTE — HISTORY OF PRESENT ILLNESS
[de-identified] : Last was seen on February,2024 [FreeTextEntry1] : Interval history: ---------------------   on prednisone 7.5 mg a day  persistent exertional SOB,  no ILD on PET/ and CT chest   no dysphagia, frequent bm, but no constipation skin tightening as before

## 2024-08-22 NOTE — HISTORY OF PRESENT ILLNESS
[de-identified] : Last was seen on February,2024 [FreeTextEntry1] : Interval history: ---------------------   on prednisone 7.5 mg a day  persistent exertional SOB,  no ILD on PET/ and CT chest   no dysphagia, frequent bm, but no constipation skin tightening as before

## 2024-08-29 ENCOUNTER — APPOINTMENT (OUTPATIENT)
Dept: CARDIOLOGY | Facility: CLINIC | Age: 64
End: 2024-08-29

## 2024-09-18 ENCOUNTER — APPOINTMENT (OUTPATIENT)
Dept: NEPHROLOGY | Facility: CLINIC | Age: 64
End: 2024-09-18

## 2024-10-24 NOTE — ADDENDUM
Pt would like to have blood work done at [facility].  Can we please fax the blood work to them?  Pt does not have the fax number.    Please advise when they are faxed and pt will have blood work drawn.       [FreeTextEntry1] : Labs reviewed notable for improvement in serum pro BNP 2644 from 6422. WIll continue current meds.

## 2024-11-12 ENCOUNTER — APPOINTMENT (OUTPATIENT)
Dept: RHEUMATOLOGY | Facility: CLINIC | Age: 64
End: 2024-11-12

## 2024-11-19 ENCOUNTER — APPOINTMENT (OUTPATIENT)
Dept: ELECTROPHYSIOLOGY | Facility: CLINIC | Age: 64
End: 2024-11-19
Payer: COMMERCIAL

## 2024-11-19 ENCOUNTER — NON-APPOINTMENT (OUTPATIENT)
Age: 64
End: 2024-11-19

## 2024-11-19 PROCEDURE — 93296 REM INTERROG EVL PM/IDS: CPT

## 2024-11-19 PROCEDURE — 93295 DEV INTERROG REMOTE 1/2/MLT: CPT

## 2024-11-26 ENCOUNTER — APPOINTMENT (OUTPATIENT)
Dept: PULMONOLOGY | Facility: CLINIC | Age: 64
End: 2024-11-26
Payer: MEDICARE

## 2024-11-26 ENCOUNTER — MED ADMIN CHARGE (OUTPATIENT)
Age: 64
End: 2024-11-26

## 2024-11-26 VITALS
SYSTOLIC BLOOD PRESSURE: 146 MMHG | TEMPERATURE: 97.8 F | OXYGEN SATURATION: 90 % | HEIGHT: 66.25 IN | HEART RATE: 121 BPM | BODY MASS INDEX: 21.86 KG/M2 | WEIGHT: 136 LBS | DIASTOLIC BLOOD PRESSURE: 87 MMHG

## 2024-11-26 DIAGNOSIS — Z23 ENCOUNTER FOR IMMUNIZATION: ICD-10-CM

## 2024-11-26 DIAGNOSIS — I27.20 PULMONARY HYPERTENSION, UNSPECIFIED: ICD-10-CM

## 2024-11-26 DIAGNOSIS — I50.20 UNSPECIFIED SYSTOLIC (CONGESTIVE) HEART FAILURE: ICD-10-CM

## 2024-11-26 DIAGNOSIS — D47.2 MONOCLONAL GAMMOPATHY: ICD-10-CM

## 2024-11-26 LAB
ALBUMIN SERPL ELPH-MCNC: 4.3 G/DL
ALP BLD-CCNC: 123 U/L
ALT SERPL-CCNC: 17 U/L
ANION GAP SERPL CALC-SCNC: 15 MMOL/L
AST SERPL-CCNC: 22 U/L
BASOPHILS # BLD AUTO: 0.07 K/UL
BASOPHILS NFR BLD AUTO: 0.7 %
BILIRUB SERPL-MCNC: 0.4 MG/DL
BUN SERPL-MCNC: 26 MG/DL
CALCIUM SERPL-MCNC: 9.8 MG/DL
CHLORIDE SERPL-SCNC: 107 MMOL/L
CO2 SERPL-SCNC: 20 MMOL/L
CREAT SERPL-MCNC: 1.54 MG/DL
EGFR: 50 ML/MIN/1.73M2
EOSINOPHIL # BLD AUTO: 0.12 K/UL
EOSINOPHIL NFR BLD AUTO: 1.2 %
GLUCOSE SERPL-MCNC: 84 MG/DL
HCT VFR BLD CALC: 40.2 %
HGB BLD-MCNC: 12 G/DL
IMM GRANULOCYTES NFR BLD AUTO: 0.3 %
LYMPHOCYTES # BLD AUTO: 1.26 K/UL
LYMPHOCYTES NFR BLD AUTO: 13.1 %
MAN DIFF?: NORMAL
MCHC RBC-ENTMCNC: 29.5 PG
MCHC RBC-ENTMCNC: 29.9 G/DL
MCV RBC AUTO: 98.8 FL
MONOCYTES # BLD AUTO: 0.8 K/UL
MONOCYTES NFR BLD AUTO: 8.3 %
NEUTROPHILS # BLD AUTO: 7.37 K/UL
NEUTROPHILS NFR BLD AUTO: 76.4 %
NT-PROBNP SERPL-MCNC: 1804 PG/ML
PLATELET # BLD AUTO: 232 K/UL
POTASSIUM SERPL-SCNC: 5 MMOL/L
PROT SERPL-MCNC: 8.1 G/DL
RBC # BLD: 4.07 M/UL
RBC # FLD: 14.7 %
SODIUM SERPL-SCNC: 142 MMOL/L
WBC # FLD AUTO: 9.65 K/UL

## 2024-11-26 PROCEDURE — 90662 IIV NO PRSV INCREASED AG IM: CPT

## 2024-11-26 PROCEDURE — 36415 COLL VENOUS BLD VENIPUNCTURE: CPT

## 2024-11-26 PROCEDURE — 99204 OFFICE O/P NEW MOD 45 MIN: CPT | Mod: 25

## 2024-11-26 PROCEDURE — G0008: CPT

## 2025-01-13 NOTE — CHART NOTE - NSCHARTNOTESELECT_GEN_ALL_CORE
Previously referred to pulmonology.  No appointment has been made yet.  Advised patient to continue with referral process.        Resume DVT PPX/Event Note

## 2025-01-30 NOTE — PATIENT PROFILE ADULT - OVER THE PAST TWO WEEKS HAVE YOU FELT DOWN, DEPRESSED OR HOPELESS?
Physical Therapy    Visit Type: treatment  SUBJECTIVE  Patient verbally agrees to allow the following to be present during session: spouse (Linette present)  Patient / Family Goal: return to previous functional status    Pain   Patient denies pain.     OBJECTIVE      Patient Activity Tolerance: 1 to 1 activity to rest         Transfers  Assistive devices: 2-wheeled walker  - Sit to stand: supervision  - Stand to sit: supervision  Occasional cues for NWB status    Curb Step Ambulation  - Assist Level: minimal assist  - Assistive Device: 2-wheeled walker  Curb steps 5 inch then 7inch    Step completed retro onto 5 inch step then seated onto w/c  Step completed retro on to 7 inch step then seated onto w/c.    Min A for balance and safety.  Pt with have 2 brothers to assist stairs for discharge home tomorrow.  And are able to bump him up in w/c if needed.      Pt and wife instructed to contact MD for PT evaluation post OP if difficulty with stair negotiation as current plan for surgery on 2/7.                Education:   - Present and ready to learn: patient    ASSESSMENT   Progress: progressing toward goals    Discharge needs based on today's assessment:   - Current level of function: significantly below baseline level of function   - Therapy needs at discharge: does not require ongoing therapy   - Activities of daily living (ADLs) requiring support at discharge: transfers, ambulation and stairs   - Impairments that require further therapy intervention: activity tolerance, strength and balance    AM-PAC  - Generalized Prior Level of Function: IND/MOD I ([facility] 22-24)       Key: MOD A=moderate assistance, IND/MOD I=independent/modified independent  - Generalized Current Level of Function     - Current Mobility Score: 17       AM-PAC Scoring Key= >21 Modified Independent; 20-21 Supervision; 18-19 Minimal assist; 13-17 Moderate assist; 9-12 Max assist; <9 Total assist    PT session fouce on stairs mobility as pt and wife  report plan for discharge home tomorrow.  Pt reports his 2 brothers will assist with stiars for home tomorrow and are able to bump him in w/c.  Pt completes stoop step retro with min A for balance and safety  x2 reps.  Dicussed the ramp as family may be able to build.  Pt instructed to contact MD for PT visits post surgery if difficulty with stairs at home.      Anticipate pt safe for discharge home with family support      PLAN (while hospitalized)  Suggestions for next session as indicated: W/c mobility,  pivot transfers   PT Frequency: 3-5 x per week      PT/OT Mobility Equipment for Discharge: pt has 2ww and 4ww at home.  would benefit from w/c for mobility at home if remains NWB  PT/OT ADL Equipment for Discharge: Pt has toilet riser, shower chair, and grab bar in shower.  Agreement to plan and goals: patient agrees with goals and treatment plan        GOALS  Review Date: 2/6/2025  Long Term Goals: (to be met by time of discharge from hospital)  Sit to supine: Patient will complete sit to supine modified independent.  Status: met   Supine to sit: Patient will complete supine to sit modified independent.  Status: met   Sit to stand: Patient will complete sit to stand transfer with modified independent and following weight bearing status.   Status: progressing/ongoing  Ambulation (even): Patient will ambulate on even surface for 30 feet with 2-wheeled walker, modified independent and following weight bearing status.   Status: progressing/ongoing  Stairs: Patient will ambulate 5 steps with least restrictive device modified independent.   Status: progressing/ongoing  Wheelchair mobility (level): Patient complete wheelchair mobility over level surfaces 75, using manual propel method, modified independent.   Status: met   Wheelchair Mobility Status Summary / Details:   NWB stoop step min assist of spouse for set up  Documented in the chart in the following areas: Assessment/Plan.      Patient at End of Session:    Safety measures: alarm system in place/re-engaged  Handoff to: nurse      Therapy procedure time and total treatment time can be found documented on the Time Entry flowsheet   no

## 2025-02-18 ENCOUNTER — APPOINTMENT (OUTPATIENT)
Dept: ELECTROPHYSIOLOGY | Facility: CLINIC | Age: 65
End: 2025-02-18
Payer: MEDICARE

## 2025-02-18 ENCOUNTER — NON-APPOINTMENT (OUTPATIENT)
Age: 65
End: 2025-02-18

## 2025-02-18 PROCEDURE — 93295 DEV INTERROG REMOTE 1/2/MLT: CPT

## 2025-02-18 PROCEDURE — 93296 REM INTERROG EVL PM/IDS: CPT

## 2025-02-21 ENCOUNTER — APPOINTMENT (OUTPATIENT)
Dept: CV DIAGNOSITCS | Facility: HOSPITAL | Age: 65
End: 2025-02-21

## 2025-02-21 ENCOUNTER — OUTPATIENT (OUTPATIENT)
Dept: OUTPATIENT SERVICES | Facility: HOSPITAL | Age: 65
LOS: 1 days | End: 2025-02-21
Payer: MEDICARE

## 2025-02-21 ENCOUNTER — RESULT REVIEW (OUTPATIENT)
Age: 65
End: 2025-02-21

## 2025-02-21 DIAGNOSIS — Z95.810 PRESENCE OF AUTOMATIC (IMPLANTABLE) CARDIAC DEFIBRILLATOR: Chronic | ICD-10-CM

## 2025-02-21 DIAGNOSIS — Z98.890 OTHER SPECIFIED POSTPROCEDURAL STATES: Chronic | ICD-10-CM

## 2025-02-21 DIAGNOSIS — Z90.5 ACQUIRED ABSENCE OF KIDNEY: Chronic | ICD-10-CM

## 2025-02-21 DIAGNOSIS — I50.20 UNSPECIFIED SYSTOLIC (CONGESTIVE) HEART FAILURE: ICD-10-CM

## 2025-02-21 PROCEDURE — 93306 TTE W/DOPPLER COMPLETE: CPT | Mod: 26

## 2025-02-28 ENCOUNTER — NON-APPOINTMENT (OUTPATIENT)
Age: 65
End: 2025-02-28

## 2025-02-28 ENCOUNTER — APPOINTMENT (OUTPATIENT)
Dept: HEART FAILURE | Facility: CLINIC | Age: 65
End: 2025-02-28
Payer: MEDICARE

## 2025-02-28 ENCOUNTER — APPOINTMENT (OUTPATIENT)
Dept: CT IMAGING | Facility: IMAGING CENTER | Age: 65
End: 2025-02-28

## 2025-02-28 ENCOUNTER — OUTPATIENT (OUTPATIENT)
Dept: OUTPATIENT SERVICES | Facility: HOSPITAL | Age: 65
LOS: 1 days | End: 2025-02-28
Payer: MEDICARE

## 2025-02-28 VITALS
WEIGHT: 139 LBS | DIASTOLIC BLOOD PRESSURE: 89 MMHG | SYSTOLIC BLOOD PRESSURE: 139 MMHG | HEIGHT: 60 IN | OXYGEN SATURATION: 90 % | TEMPERATURE: 97.8 F | BODY MASS INDEX: 27.29 KG/M2 | HEART RATE: 112 BPM

## 2025-02-28 DIAGNOSIS — I27.20 PULMONARY HYPERTENSION, UNSPECIFIED: ICD-10-CM

## 2025-02-28 DIAGNOSIS — Z95.810 PRESENCE OF AUTOMATIC (IMPLANTABLE) CARDIAC DEFIBRILLATOR: Chronic | ICD-10-CM

## 2025-02-28 DIAGNOSIS — Z98.890 OTHER SPECIFIED POSTPROCEDURAL STATES: Chronic | ICD-10-CM

## 2025-02-28 DIAGNOSIS — R59.1 GENERALIZED ENLARGED LYMPH NODES: ICD-10-CM

## 2025-02-28 DIAGNOSIS — Z90.5 ACQUIRED ABSENCE OF KIDNEY: Chronic | ICD-10-CM

## 2025-02-28 PROCEDURE — 93000 ELECTROCARDIOGRAM COMPLETE: CPT

## 2025-02-28 PROCEDURE — 99204 OFFICE O/P NEW MOD 45 MIN: CPT

## 2025-02-28 PROCEDURE — G2211 COMPLEX E/M VISIT ADD ON: CPT

## 2025-02-28 PROCEDURE — 71250 CT THORAX DX C-: CPT | Mod: 26

## 2025-02-28 PROCEDURE — 71250 CT THORAX DX C-: CPT

## 2025-03-06 ENCOUNTER — APPOINTMENT (OUTPATIENT)
Dept: PULMONOLOGY | Facility: CLINIC | Age: 65
End: 2025-03-06
Payer: MEDICARE

## 2025-03-06 VITALS
DIASTOLIC BLOOD PRESSURE: 76 MMHG | WEIGHT: 135 LBS | HEART RATE: 66 BPM | BODY MASS INDEX: 21.69 KG/M2 | SYSTOLIC BLOOD PRESSURE: 130 MMHG | OXYGEN SATURATION: 99 % | HEIGHT: 66 IN

## 2025-03-06 DIAGNOSIS — I27.20 PULMONARY HYPERTENSION, UNSPECIFIED: ICD-10-CM

## 2025-03-06 DIAGNOSIS — I50.20 UNSPECIFIED SYSTOLIC (CONGESTIVE) HEART FAILURE: ICD-10-CM

## 2025-03-06 PROCEDURE — 94729 DIFFUSING CAPACITY: CPT

## 2025-03-06 PROCEDURE — 94726 PLETHYSMOGRAPHY LUNG VOLUMES: CPT

## 2025-03-06 PROCEDURE — 99214 OFFICE O/P EST MOD 30 MIN: CPT | Mod: 25

## 2025-03-06 PROCEDURE — 94618 PULMONARY STRESS TESTING: CPT

## 2025-03-06 PROCEDURE — ZZZZZ: CPT

## 2025-03-06 PROCEDURE — 94010 BREATHING CAPACITY TEST: CPT

## 2025-03-07 LAB
ALBUMIN SERPL ELPH-MCNC: 4.2 G/DL
ALP BLD-CCNC: 125 U/L
ALT SERPL-CCNC: 15 U/L
ANION GAP SERPL CALC-SCNC: 15 MMOL/L
AST SERPL-CCNC: 25 U/L
BASOPHILS # BLD AUTO: 0.07 K/UL
BASOPHILS NFR BLD AUTO: 0.7 %
BILIRUB SERPL-MCNC: 0.4 MG/DL
BUN SERPL-MCNC: 33 MG/DL
CALCIUM SERPL-MCNC: 9.6 MG/DL
CHLORIDE SERPL-SCNC: 102 MMOL/L
CO2 SERPL-SCNC: 20 MMOL/L
CREAT SERPL-MCNC: 1.65 MG/DL
EGFRCR SERPLBLD CKD-EPI 2021: 46 ML/MIN/1.73M2
EOSINOPHIL # BLD AUTO: 0.07 K/UL
EOSINOPHIL NFR BLD AUTO: 0.7 %
GLUCOSE SERPL-MCNC: 95 MG/DL
HCT VFR BLD CALC: 40.1 %
HGB BLD-MCNC: 12.2 G/DL
IMM GRANULOCYTES NFR BLD AUTO: 0.2 %
LYMPHOCYTES # BLD AUTO: 1.63 K/UL
LYMPHOCYTES NFR BLD AUTO: 15.7 %
MAN DIFF?: NORMAL
MCHC RBC-ENTMCNC: 29.2 PG
MCHC RBC-ENTMCNC: 30.4 G/DL
MCV RBC AUTO: 95.9 FL
MONOCYTES # BLD AUTO: 0.68 K/UL
MONOCYTES NFR BLD AUTO: 6.5 %
NEUTROPHILS # BLD AUTO: 7.92 K/UL
NEUTROPHILS NFR BLD AUTO: 76.2 %
NT-PROBNP SERPL-MCNC: 1694 PG/ML
PLATELET # BLD AUTO: 214 K/UL
POTASSIUM SERPL-SCNC: 4.9 MMOL/L
PROT SERPL-MCNC: 7.7 G/DL
RBC # BLD: 4.18 M/UL
RBC # FLD: 14.9 %
SODIUM SERPL-SCNC: 137 MMOL/L
WBC # FLD AUTO: 10.39 K/UL

## 2025-03-10 ENCOUNTER — APPOINTMENT (OUTPATIENT)
Dept: PULMONOLOGY | Facility: CLINIC | Age: 65
End: 2025-03-10
Payer: MEDICARE

## 2025-03-10 PROCEDURE — G0239: CPT

## 2025-03-12 ENCOUNTER — APPOINTMENT (OUTPATIENT)
Dept: PULMONOLOGY | Facility: CLINIC | Age: 65
End: 2025-03-12
Payer: MEDICARE

## 2025-03-12 PROCEDURE — G0239: CPT

## 2025-03-12 NOTE — H&P ADULT - NSHPPOADEEPVENOUSTHROMB_GEN_A_CORE
no
Pt lives in an apartment (second floor) with 18 EVGENY. Pt lives with his wife and his wife's son. Pt's sister is able to stay with patient during the day (sister works nights) to assist, and then wife is home at night. Pt independent PTA and did not use AD for ambulation. Pt owns a stall shower with a shower chair.

## 2025-03-13 RX ORDER — TREPROSTINIL 1 MG/1
1 TABLET, EXTENDED RELEASE ORAL
Qty: 90 | Refills: 3 | Status: ACTIVE | COMMUNITY
Start: 2025-03-06 | End: 1900-01-01

## 2025-03-13 RX ORDER — TREPROSTINIL 0.12 MG/1
0.12 TABLET, EXTENDED RELEASE ORAL
Qty: 90 | Refills: 0 | Status: ACTIVE | COMMUNITY
Start: 2025-03-06 | End: 1900-01-01

## 2025-03-13 RX ORDER — TREPROSTINIL 0.25 MG/1
0.25 TABLET, EXTENDED RELEASE ORAL
Qty: 270 | Refills: 3 | Status: ACTIVE | COMMUNITY
Start: 2025-03-06 | End: 1900-01-01

## 2025-03-17 ENCOUNTER — APPOINTMENT (OUTPATIENT)
Dept: PULMONOLOGY | Facility: CLINIC | Age: 65
End: 2025-03-17
Payer: MEDICARE

## 2025-03-17 PROCEDURE — G0239: CPT

## 2025-03-19 ENCOUNTER — APPOINTMENT (OUTPATIENT)
Dept: PULMONOLOGY | Facility: CLINIC | Age: 65
End: 2025-03-19
Payer: MEDICARE

## 2025-03-19 PROCEDURE — G0239: CPT

## 2025-03-21 ENCOUNTER — APPOINTMENT (OUTPATIENT)
Dept: PULMONOLOGY | Facility: CLINIC | Age: 65
End: 2025-03-21
Payer: MEDICARE

## 2025-03-21 VITALS
WEIGHT: 140 LBS | TEMPERATURE: 98 F | BODY MASS INDEX: 22.5 KG/M2 | SYSTOLIC BLOOD PRESSURE: 131 MMHG | DIASTOLIC BLOOD PRESSURE: 78 MMHG | RESPIRATION RATE: 15 BRPM | HEIGHT: 66 IN | OXYGEN SATURATION: 80 % | HEART RATE: 63 BPM

## 2025-03-21 PROCEDURE — 99215 OFFICE O/P EST HI 40 MIN: CPT

## 2025-03-24 ENCOUNTER — APPOINTMENT (OUTPATIENT)
Dept: PULMONOLOGY | Facility: CLINIC | Age: 65
End: 2025-03-24
Payer: MEDICARE

## 2025-03-24 PROCEDURE — G0239: CPT

## 2025-03-26 ENCOUNTER — APPOINTMENT (OUTPATIENT)
Dept: PULMONOLOGY | Facility: CLINIC | Age: 65
End: 2025-03-26
Payer: MEDICARE

## 2025-03-26 PROCEDURE — G0239: CPT

## 2025-03-31 ENCOUNTER — APPOINTMENT (OUTPATIENT)
Dept: PULMONOLOGY | Facility: CLINIC | Age: 65
End: 2025-03-31
Payer: MEDICARE

## 2025-03-31 PROCEDURE — G0239: CPT

## 2025-04-02 ENCOUNTER — APPOINTMENT (OUTPATIENT)
Dept: PULMONOLOGY | Facility: CLINIC | Age: 65
End: 2025-04-02
Payer: MEDICARE

## 2025-04-02 PROCEDURE — G0239: CPT

## 2025-04-07 ENCOUNTER — APPOINTMENT (OUTPATIENT)
Dept: PULMONOLOGY | Facility: CLINIC | Age: 65
End: 2025-04-07
Payer: MEDICARE

## 2025-04-07 PROCEDURE — G0239: CPT

## 2025-04-09 ENCOUNTER — APPOINTMENT (OUTPATIENT)
Dept: PULMONOLOGY | Facility: CLINIC | Age: 65
End: 2025-04-09
Payer: MEDICARE

## 2025-04-09 PROCEDURE — G0239: CPT

## 2025-04-14 ENCOUNTER — APPOINTMENT (OUTPATIENT)
Dept: PULMONOLOGY | Facility: CLINIC | Age: 65
End: 2025-04-14

## 2025-04-16 ENCOUNTER — APPOINTMENT (OUTPATIENT)
Dept: PULMONOLOGY | Facility: CLINIC | Age: 65
End: 2025-04-16
Payer: MEDICARE

## 2025-04-16 PROCEDURE — G0239: CPT

## 2025-04-21 ENCOUNTER — APPOINTMENT (OUTPATIENT)
Dept: PULMONOLOGY | Facility: CLINIC | Age: 65
End: 2025-04-21
Payer: MEDICARE

## 2025-04-21 PROCEDURE — G0239: CPT

## 2025-04-23 ENCOUNTER — APPOINTMENT (OUTPATIENT)
Dept: PULMONOLOGY | Facility: CLINIC | Age: 65
End: 2025-04-23

## 2025-04-28 ENCOUNTER — APPOINTMENT (OUTPATIENT)
Dept: PULMONOLOGY | Facility: CLINIC | Age: 65
End: 2025-04-28

## 2025-04-30 ENCOUNTER — APPOINTMENT (OUTPATIENT)
Dept: PULMONOLOGY | Facility: CLINIC | Age: 65
End: 2025-04-30

## 2025-05-05 ENCOUNTER — APPOINTMENT (OUTPATIENT)
Dept: PULMONOLOGY | Facility: CLINIC | Age: 65
End: 2025-05-05

## 2025-05-07 ENCOUNTER — APPOINTMENT (OUTPATIENT)
Dept: PULMONOLOGY | Facility: CLINIC | Age: 65
End: 2025-05-07

## 2025-05-12 ENCOUNTER — APPOINTMENT (OUTPATIENT)
Dept: CT IMAGING | Facility: IMAGING CENTER | Age: 65
End: 2025-05-12
Payer: MEDICARE

## 2025-05-12 ENCOUNTER — APPOINTMENT (OUTPATIENT)
Dept: PULMONOLOGY | Facility: CLINIC | Age: 65
End: 2025-05-12

## 2025-05-12 ENCOUNTER — OUTPATIENT (OUTPATIENT)
Dept: OUTPATIENT SERVICES | Facility: HOSPITAL | Age: 65
LOS: 1 days | End: 2025-05-12
Payer: MEDICARE

## 2025-05-12 DIAGNOSIS — Z95.810 PRESENCE OF AUTOMATIC (IMPLANTABLE) CARDIAC DEFIBRILLATOR: Chronic | ICD-10-CM

## 2025-05-12 DIAGNOSIS — Z90.5 ACQUIRED ABSENCE OF KIDNEY: Chronic | ICD-10-CM

## 2025-05-12 DIAGNOSIS — Z98.890 OTHER SPECIFIED POSTPROCEDURAL STATES: Chronic | ICD-10-CM

## 2025-05-12 DIAGNOSIS — Z85.528 PERSONAL HISTORY OF OTHER MALIGNANT NEOPLASM OF KIDNEY: ICD-10-CM

## 2025-05-12 PROCEDURE — 74178 CT ABD&PLV WO CNTR FLWD CNTR: CPT

## 2025-05-12 PROCEDURE — 74178 CT ABD&PLV WO CNTR FLWD CNTR: CPT | Mod: 26

## 2025-05-14 ENCOUNTER — APPOINTMENT (OUTPATIENT)
Dept: PULMONOLOGY | Facility: CLINIC | Age: 65
End: 2025-05-14

## 2025-05-19 ENCOUNTER — APPOINTMENT (OUTPATIENT)
Dept: PULMONOLOGY | Facility: CLINIC | Age: 65
End: 2025-05-19

## 2025-05-20 ENCOUNTER — APPOINTMENT (OUTPATIENT)
Dept: UROLOGY | Facility: CLINIC | Age: 65
End: 2025-05-20
Payer: MEDICARE

## 2025-05-20 ENCOUNTER — NON-APPOINTMENT (OUTPATIENT)
Age: 65
End: 2025-05-20

## 2025-05-20 ENCOUNTER — APPOINTMENT (OUTPATIENT)
Dept: ELECTROPHYSIOLOGY | Facility: CLINIC | Age: 65
End: 2025-05-20
Payer: MEDICARE

## 2025-05-20 VITALS
HEIGHT: 66 IN | DIASTOLIC BLOOD PRESSURE: 87 MMHG | WEIGHT: 140 LBS | OXYGEN SATURATION: 85 % | SYSTOLIC BLOOD PRESSURE: 133 MMHG | HEART RATE: 63 BPM | RESPIRATION RATE: 15 BRPM | BODY MASS INDEX: 22.5 KG/M2

## 2025-05-20 DIAGNOSIS — Z85.528 PERSONAL HISTORY OF OTHER MALIGNANT NEOPLASM OF KIDNEY: ICD-10-CM

## 2025-05-20 PROCEDURE — 93296 REM INTERROG EVL PM/IDS: CPT

## 2025-05-20 PROCEDURE — 99203 OFFICE O/P NEW LOW 30 MIN: CPT

## 2025-05-20 PROCEDURE — 93295 DEV INTERROG REMOTE 1/2/MLT: CPT

## 2025-05-20 PROCEDURE — G2211 COMPLEX E/M VISIT ADD ON: CPT

## 2025-05-20 NOTE — DISCHARGE NOTE PROVIDER - NSDCFUSCHEDAPPT_GEN_ALL_CORE_FT
The patient received Current weight: 119 kg (262 lb 12.8 oz)  Weight change since last visit (-) denotes wt loss -2.2 lbs   Weight loss needed to achieve BMI 25: 126.4 Lbs  Weight loss needed to achieve BMI 30: 99.1 Lbs    Provided instructions on dietary changes  Provided instructions on exercise  Advised to Increase physical activity because they have an above normal BMI.         [facility]  HEARTFAIL [address]  Scheduled Appointment: 10/18/2023    Mitzy Larson  [facility] [address] [facility] [facility]  Scheduled Appointment: 11/28/2023    [facility]  PULMMED [address]  Scheduled Appointment: 12/27/2023    [facility]  ELECTROPH [id number]  Scheduled Appointment: 01/10/2024     Shruti Villeda  [facility]  PULMMED [address]  Scheduled Appointment: 10/30/2023    Michael Cuellar  [facility]  HEARTFAIL [address]  Scheduled Appointment: 11/15/2023    Mitzy Larson  [facility]  RHEUM [address]  Scheduled Appointment: 11/28/2023    [facility]  PULMMED [address]  Scheduled Appointment: 12/27/2023    [facility]  ELECTROPH [phone]  Scheduled Appointment: 01/10/2024    Michael Cuellar  [facility]  HEARTFAIL [address]  Scheduled Appointment: 01/17/2024    Shruti Villeda  [facility]  PULMMED [address]  Scheduled Appointment: 01/18/2024

## 2025-05-21 ENCOUNTER — NON-APPOINTMENT (OUTPATIENT)
Age: 65
End: 2025-05-21

## 2025-05-21 ENCOUNTER — APPOINTMENT (OUTPATIENT)
Dept: PULMONOLOGY | Facility: CLINIC | Age: 65
End: 2025-05-21

## 2025-05-21 LAB
PSA FREE FLD-MCNC: 30 %
PSA FREE SERPL-MCNC: 0.33 NG/ML
PSA SERPL-MCNC: 1.1 NG/ML

## 2025-05-22 ENCOUNTER — APPOINTMENT (OUTPATIENT)
Dept: PULMONOLOGY | Facility: CLINIC | Age: 65
End: 2025-05-22
Payer: MEDICARE

## 2025-05-22 VITALS
DIASTOLIC BLOOD PRESSURE: 86 MMHG | OXYGEN SATURATION: 95 % | WEIGHT: 138 LBS | HEART RATE: 110 BPM | HEIGHT: 66 IN | RESPIRATION RATE: 18 BRPM | BODY MASS INDEX: 22.18 KG/M2 | SYSTOLIC BLOOD PRESSURE: 144 MMHG | TEMPERATURE: 97.4 F

## 2025-05-22 PROCEDURE — 99214 OFFICE O/P EST MOD 30 MIN: CPT

## 2025-05-22 RX ORDER — ACETYLCYSTEINE 100 MG/ML
10 SOLUTION RESPIRATORY (INHALATION)
Qty: 2160 | Refills: 1 | Status: ACTIVE | COMMUNITY
Start: 2025-05-22 | End: 1900-01-01

## 2025-05-28 ENCOUNTER — APPOINTMENT (OUTPATIENT)
Dept: PULMONOLOGY | Facility: CLINIC | Age: 65
End: 2025-05-28

## 2025-05-29 ENCOUNTER — APPOINTMENT (OUTPATIENT)
Dept: HEART FAILURE | Facility: CLINIC | Age: 65
End: 2025-05-29

## 2025-06-02 ENCOUNTER — APPOINTMENT (OUTPATIENT)
Dept: PULMONOLOGY | Facility: CLINIC | Age: 65
End: 2025-06-02

## 2025-06-16 ENCOUNTER — NON-APPOINTMENT (OUTPATIENT)
Age: 65
End: 2025-06-16

## 2025-06-20 ENCOUNTER — APPOINTMENT (OUTPATIENT)
Dept: PULMONOLOGY | Facility: CLINIC | Age: 65
End: 2025-06-20

## 2025-07-01 ENCOUNTER — APPOINTMENT (OUTPATIENT)
Dept: PULMONOLOGY | Facility: CLINIC | Age: 65
End: 2025-07-01
Payer: COMMERCIAL

## 2025-07-01 ENCOUNTER — NON-APPOINTMENT (OUTPATIENT)
Age: 65
End: 2025-07-01

## 2025-07-01 PROBLEM — R07.81 RIB PAIN: Status: ACTIVE | Noted: 2025-07-01

## 2025-07-01 PROCEDURE — 99214 OFFICE O/P EST MOD 30 MIN: CPT | Mod: 95

## 2025-07-01 RX ORDER — PREDNISONE 10 MG/1
10 TABLET ORAL DAILY
Qty: 14 | Refills: 1 | Status: ACTIVE | COMMUNITY
Start: 2025-07-01 | End: 1900-01-01

## 2025-07-02 ENCOUNTER — NON-APPOINTMENT (OUTPATIENT)
Age: 65
End: 2025-07-02

## 2025-07-17 ENCOUNTER — APPOINTMENT (OUTPATIENT)
Dept: PULMONOLOGY | Facility: CLINIC | Age: 65
End: 2025-07-17
Payer: MEDICARE

## 2025-07-17 VITALS
WEIGHT: 132 LBS | DIASTOLIC BLOOD PRESSURE: 90 MMHG | SYSTOLIC BLOOD PRESSURE: 146 MMHG | BODY MASS INDEX: 21.21 KG/M2 | TEMPERATURE: 98.2 F | HEART RATE: 105 BPM | HEIGHT: 66 IN

## 2025-07-17 PROCEDURE — 99214 OFFICE O/P EST MOD 30 MIN: CPT

## 2025-07-21 RX ORDER — TREPROSTINIL 16 UG/1
16 INHALANT ORAL
Refills: 0 | Status: DISCONTINUED | COMMUNITY
Start: 2025-07-17 | End: 2025-07-21

## 2025-07-21 RX ORDER — TREPROSTINIL 1.74 MG/2.9ML
0.6 INHALANT ORAL
Qty: 81.2 | Refills: 0 | Status: ACTIVE | COMMUNITY
Start: 2025-07-21 | End: 1900-01-01

## 2025-07-21 RX ORDER — TREPROSTINIL 1.74 MG/2.9ML
0.6 INHALANT ORAL
Qty: 81.2 | Refills: 11 | Status: ACTIVE | COMMUNITY
Start: 2025-07-21 | End: 1900-01-01

## 2025-08-19 ENCOUNTER — APPOINTMENT (OUTPATIENT)
Dept: ELECTROPHYSIOLOGY | Facility: CLINIC | Age: 65
End: 2025-08-19
Payer: MEDICARE

## 2025-08-19 ENCOUNTER — NON-APPOINTMENT (OUTPATIENT)
Age: 65
End: 2025-08-19

## 2025-08-19 PROCEDURE — 93296 REM INTERROG EVL PM/IDS: CPT

## 2025-08-19 PROCEDURE — 93295 DEV INTERROG REMOTE 1/2/MLT: CPT

## 2025-08-29 ENCOUNTER — APPOINTMENT (OUTPATIENT)
Dept: HEART FAILURE | Facility: CLINIC | Age: 65
End: 2025-08-29

## 2025-08-31 ENCOUNTER — INPATIENT (INPATIENT)
Facility: HOSPITAL | Age: 65
LOS: 4 days | Discharge: HOME | DRG: 57 | End: 2025-09-05
Attending: INTERNAL MEDICINE | Admitting: INTERNAL MEDICINE
Payer: MEDICARE

## 2025-08-31 VITALS
WEIGHT: 136.03 LBS | TEMPERATURE: 98 F | DIASTOLIC BLOOD PRESSURE: 84 MMHG | HEART RATE: 106 BPM | HEIGHT: 66 IN | RESPIRATION RATE: 20 BRPM | OXYGEN SATURATION: 93 % | SYSTOLIC BLOOD PRESSURE: 145 MMHG

## 2025-08-31 DIAGNOSIS — Z90.5 ACQUIRED ABSENCE OF KIDNEY: Chronic | ICD-10-CM

## 2025-08-31 DIAGNOSIS — Z98.890 OTHER SPECIFIED POSTPROCEDURAL STATES: Chronic | ICD-10-CM

## 2025-08-31 DIAGNOSIS — R42 DIZZINESS AND GIDDINESS: ICD-10-CM

## 2025-08-31 DIAGNOSIS — Z95.810 PRESENCE OF AUTOMATIC (IMPLANTABLE) CARDIAC DEFIBRILLATOR: Chronic | ICD-10-CM

## 2025-08-31 LAB
BASOPHILS # BLD AUTO: 0.05 K/UL — SIGNIFICANT CHANGE UP (ref 0–0.2)
BASOPHILS NFR BLD AUTO: 0.5 % — SIGNIFICANT CHANGE UP (ref 0–2)
EOSINOPHIL # BLD AUTO: 0.09 K/UL — SIGNIFICANT CHANGE UP (ref 0–0.5)
EOSINOPHIL NFR BLD AUTO: 1 % — SIGNIFICANT CHANGE UP (ref 0–6)
GAS PNL BLDV: SIGNIFICANT CHANGE UP
HCT VFR BLD CALC: 36.3 % — LOW (ref 39–50)
HGB BLD-MCNC: 10.7 G/DL — LOW (ref 13–17)
IMM GRANULOCYTES # BLD AUTO: 0.03 K/UL — SIGNIFICANT CHANGE UP (ref 0–0.07)
IMM GRANULOCYTES NFR BLD AUTO: 0.3 % — SIGNIFICANT CHANGE UP (ref 0–0.9)
LYMPHOCYTES # BLD AUTO: 1.59 K/UL — SIGNIFICANT CHANGE UP (ref 1–3.3)
LYMPHOCYTES NFR BLD AUTO: 17.2 % — SIGNIFICANT CHANGE UP (ref 13–44)
MCHC RBC-ENTMCNC: 28.6 PG — SIGNIFICANT CHANGE UP (ref 27–34)
MCHC RBC-ENTMCNC: 29.5 G/DL — LOW (ref 32–36)
MCV RBC AUTO: 97.1 FL — SIGNIFICANT CHANGE UP (ref 80–100)
MONOCYTES # BLD AUTO: 0.66 K/UL — SIGNIFICANT CHANGE UP (ref 0–0.9)
MONOCYTES NFR BLD AUTO: 7.1 % — SIGNIFICANT CHANGE UP (ref 2–14)
NEUTROPHILS # BLD AUTO: 6.85 K/UL — SIGNIFICANT CHANGE UP (ref 1.8–7.4)
NEUTROPHILS NFR BLD AUTO: 73.9 % — SIGNIFICANT CHANGE UP (ref 43–77)
NRBC # BLD AUTO: 0 K/UL — SIGNIFICANT CHANGE UP (ref 0–0)
NRBC # FLD: 0 K/UL — SIGNIFICANT CHANGE UP (ref 0–0)
NRBC BLD AUTO-RTO: 0 /100 WBCS — SIGNIFICANT CHANGE UP (ref 0–0)
NT-PROBNP SERPL-SCNC: 1010 PG/ML — HIGH (ref 0–300)
PLATELET # BLD AUTO: 236 K/UL — SIGNIFICANT CHANGE UP (ref 150–400)
PMV BLD: 10.5 FL — SIGNIFICANT CHANGE UP (ref 7–13)
RBC # BLD: 3.74 M/UL — LOW (ref 4.2–5.8)
RBC # FLD: 16.8 % — HIGH (ref 10.3–14.5)
TROPONIN T, HIGH SENSITIVITY RESULT: 41 NG/L — HIGH
TROPONIN T, HIGH SENSITIVITY RESULT: 54 NG/L — HIGH
WBC # BLD: 9.27 K/UL — SIGNIFICANT CHANGE UP (ref 3.8–10.5)
WBC # FLD AUTO: 9.27 K/UL — SIGNIFICANT CHANGE UP (ref 3.8–10.5)

## 2025-08-31 PROCEDURE — 84484 ASSAY OF TROPONIN QUANT: CPT

## 2025-08-31 PROCEDURE — 70498 CT ANGIOGRAPHY NECK: CPT

## 2025-08-31 PROCEDURE — 83690 ASSAY OF LIPASE: CPT

## 2025-08-31 PROCEDURE — 84132 ASSAY OF SERUM POTASSIUM: CPT

## 2025-08-31 PROCEDURE — 70496 CT ANGIOGRAPHY HEAD: CPT | Mod: 26

## 2025-08-31 PROCEDURE — 85018 HEMOGLOBIN: CPT

## 2025-08-31 PROCEDURE — 80053 COMPREHEN METABOLIC PANEL: CPT

## 2025-08-31 PROCEDURE — 71045 X-RAY EXAM CHEST 1 VIEW: CPT

## 2025-08-31 PROCEDURE — 70450 CT HEAD/BRAIN W/O DYE: CPT

## 2025-08-31 PROCEDURE — 70450 CT HEAD/BRAIN W/O DYE: CPT | Mod: 26,XU

## 2025-08-31 PROCEDURE — 93010 ELECTROCARDIOGRAM REPORT: CPT

## 2025-08-31 PROCEDURE — 85025 COMPLETE CBC W/AUTO DIFF WBC: CPT

## 2025-08-31 PROCEDURE — 36415 COLL VENOUS BLD VENIPUNCTURE: CPT

## 2025-08-31 PROCEDURE — 99223 1ST HOSP IP/OBS HIGH 75: CPT

## 2025-08-31 PROCEDURE — 84295 ASSAY OF SERUM SODIUM: CPT

## 2025-08-31 PROCEDURE — 70498 CT ANGIOGRAPHY NECK: CPT | Mod: 26

## 2025-08-31 PROCEDURE — 82435 ASSAY OF BLOOD CHLORIDE: CPT

## 2025-08-31 PROCEDURE — 99285 EMERGENCY DEPT VISIT HI MDM: CPT | Mod: FS

## 2025-08-31 PROCEDURE — 82803 BLOOD GASES ANY COMBINATION: CPT

## 2025-08-31 PROCEDURE — 74177 CT ABD & PELVIS W/CONTRAST: CPT | Mod: 26

## 2025-08-31 PROCEDURE — 74177 CT ABD & PELVIS W/CONTRAST: CPT

## 2025-08-31 PROCEDURE — 85014 HEMATOCRIT: CPT

## 2025-08-31 PROCEDURE — 82330 ASSAY OF CALCIUM: CPT

## 2025-08-31 PROCEDURE — 71045 X-RAY EXAM CHEST 1 VIEW: CPT | Mod: 26

## 2025-08-31 PROCEDURE — 70496 CT ANGIOGRAPHY HEAD: CPT

## 2025-08-31 PROCEDURE — 83880 ASSAY OF NATRIURETIC PEPTIDE: CPT

## 2025-08-31 PROCEDURE — 82947 ASSAY GLUCOSE BLOOD QUANT: CPT

## 2025-08-31 PROCEDURE — 83605 ASSAY OF LACTIC ACID: CPT

## 2025-08-31 RX ORDER — MACITENTAN 10 MG/1
1 TABLET, FILM COATED ORAL
Refills: 0 | DISCHARGE

## 2025-08-31 RX ORDER — ASPIRIN 325 MG
81 TABLET ORAL DAILY
Refills: 0 | Status: DISCONTINUED | OUTPATIENT
Start: 2025-08-31 | End: 2025-09-05

## 2025-08-31 RX ORDER — ACETAMINOPHEN 500 MG/5ML
650 LIQUID (ML) ORAL EVERY 6 HOURS
Refills: 0 | Status: DISCONTINUED | OUTPATIENT
Start: 2025-08-31 | End: 2025-09-05

## 2025-08-31 RX ORDER — HEPARIN SODIUM 1000 [USP'U]/ML
5000 INJECTION INTRAVENOUS; SUBCUTANEOUS EVERY 12 HOURS
Refills: 0 | Status: DISCONTINUED | OUTPATIENT
Start: 2025-08-31 | End: 2025-09-05

## 2025-08-31 RX ORDER — AMLODIPINE BESYLATE 10 MG/1
1 TABLET ORAL
Refills: 0 | DISCHARGE

## 2025-09-01 DIAGNOSIS — R42 DIZZINESS AND GIDDINESS: ICD-10-CM

## 2025-09-01 DIAGNOSIS — R93.89 ABNORMAL FINDINGS ON DIAGNOSTIC IMAGING OF OTHER SPECIFIED BODY STRUCTURES: ICD-10-CM

## 2025-09-01 DIAGNOSIS — I27.20 PULMONARY HYPERTENSION, UNSPECIFIED: ICD-10-CM

## 2025-09-01 DIAGNOSIS — I63.9 CEREBRAL INFARCTION, UNSPECIFIED: ICD-10-CM

## 2025-09-01 DIAGNOSIS — Z29.9 ENCOUNTER FOR PROPHYLACTIC MEASURES, UNSPECIFIED: ICD-10-CM

## 2025-09-01 DIAGNOSIS — N18.9 CHRONIC KIDNEY DISEASE, UNSPECIFIED: ICD-10-CM

## 2025-09-01 DIAGNOSIS — I10 ESSENTIAL (PRIMARY) HYPERTENSION: ICD-10-CM

## 2025-09-01 DIAGNOSIS — N18.30 CHRONIC KIDNEY DISEASE, STAGE 3 UNSPECIFIED: ICD-10-CM

## 2025-09-01 DIAGNOSIS — M34.9 SYSTEMIC SCLEROSIS, UNSPECIFIED: ICD-10-CM

## 2025-09-01 DIAGNOSIS — I77.9 DISORDER OF ARTERIES AND ARTERIOLES, UNSPECIFIED: ICD-10-CM

## 2025-09-01 LAB
A1C WITH ESTIMATED AVERAGE GLUCOSE RESULT: 4.6 % — SIGNIFICANT CHANGE UP (ref 4–5.6)
ANION GAP SERPL CALC-SCNC: 14 MMOL/L — SIGNIFICANT CHANGE UP (ref 5–17)
BASOPHILS # BLD AUTO: 0.06 K/UL — SIGNIFICANT CHANGE UP (ref 0–0.2)
BASOPHILS NFR BLD AUTO: 0.7 % — SIGNIFICANT CHANGE UP (ref 0–2)
BUN SERPL-MCNC: 25 MG/DL — HIGH (ref 7–23)
CALCIUM SERPL-MCNC: 9.3 MG/DL — SIGNIFICANT CHANGE UP (ref 8.4–10.5)
CHLORIDE SERPL-SCNC: 107 MMOL/L — SIGNIFICANT CHANGE UP (ref 96–108)
CHOLEST SERPL-MCNC: 148 MG/DL — SIGNIFICANT CHANGE UP
CO2 SERPL-SCNC: 16 MMOL/L — LOW (ref 22–31)
CREAT SERPL-MCNC: 1.41 MG/DL — HIGH (ref 0.5–1.3)
EGFR: 55 ML/MIN/1.73M2 — LOW
EGFR: 55 ML/MIN/1.73M2 — LOW
EOSINOPHIL # BLD AUTO: 0.19 K/UL — SIGNIFICANT CHANGE UP (ref 0–0.5)
EOSINOPHIL NFR BLD AUTO: 2.1 % — SIGNIFICANT CHANGE UP (ref 0–6)
ESTIMATED AVERAGE GLUCOSE: 85 MG/DL — SIGNIFICANT CHANGE UP (ref 68–114)
GLUCOSE SERPL-MCNC: 73 MG/DL — SIGNIFICANT CHANGE UP (ref 70–99)
HCT VFR BLD CALC: 36.2 % — LOW (ref 39–50)
HDLC SERPL-MCNC: 30 MG/DL — LOW
HGB BLD-MCNC: 10.5 G/DL — LOW (ref 13–17)
IMM GRANULOCYTES # BLD AUTO: 0.03 K/UL — SIGNIFICANT CHANGE UP (ref 0–0.07)
IMM GRANULOCYTES NFR BLD AUTO: 0.3 % — SIGNIFICANT CHANGE UP (ref 0–0.9)
LDLC SERPL-MCNC: 94 MG/DL — SIGNIFICANT CHANGE UP
LIPID PNL WITH DIRECT LDL SERPL: 94 MG/DL — SIGNIFICANT CHANGE UP
LYMPHOCYTES # BLD AUTO: 2.07 K/UL — SIGNIFICANT CHANGE UP (ref 1–3.3)
LYMPHOCYTES NFR BLD AUTO: 22.6 % — SIGNIFICANT CHANGE UP (ref 13–44)
MAGNESIUM SERPL-MCNC: 2.2 MG/DL — SIGNIFICANT CHANGE UP (ref 1.6–2.6)
MCHC RBC-ENTMCNC: 28.8 PG — SIGNIFICANT CHANGE UP (ref 27–34)
MCHC RBC-ENTMCNC: 29 G/DL — LOW (ref 32–36)
MCV RBC AUTO: 99.2 FL — SIGNIFICANT CHANGE UP (ref 80–100)
MONOCYTES # BLD AUTO: 0.92 K/UL — HIGH (ref 0–0.9)
MONOCYTES NFR BLD AUTO: 10.1 % — SIGNIFICANT CHANGE UP (ref 2–14)
NEUTROPHILS # BLD AUTO: 5.88 K/UL — SIGNIFICANT CHANGE UP (ref 1.8–7.4)
NEUTROPHILS NFR BLD AUTO: 64.2 % — SIGNIFICANT CHANGE UP (ref 43–77)
NONHDLC SERPL-MCNC: 117 MG/DL — SIGNIFICANT CHANGE UP
NRBC # BLD AUTO: 0 K/UL — SIGNIFICANT CHANGE UP (ref 0–0)
NRBC # FLD: 0 K/UL — SIGNIFICANT CHANGE UP (ref 0–0)
NRBC BLD AUTO-RTO: 0 /100 WBCS — SIGNIFICANT CHANGE UP (ref 0–0)
PHOSPHATE SERPL-MCNC: 3.1 MG/DL — SIGNIFICANT CHANGE UP (ref 2.5–4.5)
PLATELET # BLD AUTO: 197 K/UL — SIGNIFICANT CHANGE UP (ref 150–400)
PMV BLD: 9.9 FL — SIGNIFICANT CHANGE UP (ref 7–13)
POTASSIUM SERPL-MCNC: 4.4 MMOL/L — SIGNIFICANT CHANGE UP (ref 3.5–5.3)
POTASSIUM SERPL-SCNC: 4.4 MMOL/L — SIGNIFICANT CHANGE UP (ref 3.5–5.3)
RBC # BLD: 3.65 M/UL — LOW (ref 4.2–5.8)
RBC # FLD: 16.8 % — HIGH (ref 10.3–14.5)
SODIUM SERPL-SCNC: 137 MMOL/L — SIGNIFICANT CHANGE UP (ref 135–145)
TRIGL SERPL-MCNC: 125 MG/DL — SIGNIFICANT CHANGE UP
WBC # BLD: 9.15 K/UL — SIGNIFICANT CHANGE UP (ref 3.8–10.5)
WBC # FLD AUTO: 9.15 K/UL — SIGNIFICANT CHANGE UP (ref 3.8–10.5)

## 2025-09-01 PROCEDURE — 80048 BASIC METABOLIC PNL TOTAL CA: CPT

## 2025-09-01 PROCEDURE — 85014 HEMATOCRIT: CPT

## 2025-09-01 PROCEDURE — 80061 LIPID PANEL: CPT

## 2025-09-01 PROCEDURE — 70498 CT ANGIOGRAPHY NECK: CPT

## 2025-09-01 PROCEDURE — 85018 HEMOGLOBIN: CPT

## 2025-09-01 PROCEDURE — 83735 ASSAY OF MAGNESIUM: CPT

## 2025-09-01 PROCEDURE — 82435 ASSAY OF BLOOD CHLORIDE: CPT

## 2025-09-01 PROCEDURE — 94640 AIRWAY INHALATION TREATMENT: CPT

## 2025-09-01 PROCEDURE — 71045 X-RAY EXAM CHEST 1 VIEW: CPT

## 2025-09-01 PROCEDURE — 84295 ASSAY OF SERUM SODIUM: CPT

## 2025-09-01 PROCEDURE — 80053 COMPREHEN METABOLIC PANEL: CPT

## 2025-09-01 PROCEDURE — 83690 ASSAY OF LIPASE: CPT

## 2025-09-01 PROCEDURE — 84100 ASSAY OF PHOSPHORUS: CPT

## 2025-09-01 PROCEDURE — 84132 ASSAY OF SERUM POTASSIUM: CPT

## 2025-09-01 PROCEDURE — 74177 CT ABD & PELVIS W/CONTRAST: CPT

## 2025-09-01 PROCEDURE — 70450 CT HEAD/BRAIN W/O DYE: CPT

## 2025-09-01 PROCEDURE — 82947 ASSAY GLUCOSE BLOOD QUANT: CPT

## 2025-09-01 PROCEDURE — 85025 COMPLETE CBC W/AUTO DIFF WBC: CPT

## 2025-09-01 PROCEDURE — 82803 BLOOD GASES ANY COMBINATION: CPT

## 2025-09-01 PROCEDURE — 83880 ASSAY OF NATRIURETIC PEPTIDE: CPT

## 2025-09-01 PROCEDURE — 82330 ASSAY OF CALCIUM: CPT

## 2025-09-01 PROCEDURE — 36415 COLL VENOUS BLD VENIPUNCTURE: CPT

## 2025-09-01 PROCEDURE — 84484 ASSAY OF TROPONIN QUANT: CPT

## 2025-09-01 PROCEDURE — 83605 ASSAY OF LACTIC ACID: CPT

## 2025-09-01 PROCEDURE — 70496 CT ANGIOGRAPHY HEAD: CPT

## 2025-09-01 PROCEDURE — 83036 HEMOGLOBIN GLYCOSYLATED A1C: CPT

## 2025-09-01 PROCEDURE — 97162 PT EVAL MOD COMPLEX 30 MIN: CPT

## 2025-09-01 RX ORDER — AMLODIPINE BESYLATE 10 MG/1
2.5 TABLET ORAL DAILY
Refills: 0 | Status: DISCONTINUED | OUTPATIENT
Start: 2025-09-01 | End: 2025-09-05

## 2025-09-01 RX ORDER — PREDNISONE 20 MG/1
7.5 TABLET ORAL DAILY
Refills: 0 | Status: DISCONTINUED | OUTPATIENT
Start: 2025-09-01 | End: 2025-09-05

## 2025-09-01 RX ORDER — ALBUTEROL SULFATE 2.5 MG/3ML
2 VIAL, NEBULIZER (ML) INHALATION EVERY 6 HOURS
Refills: 0 | Status: DISCONTINUED | OUTPATIENT
Start: 2025-09-01 | End: 2025-09-05

## 2025-09-01 RX ORDER — SILDENAFIL 50 MG/1
20 TABLET, FILM COATED ORAL THREE TIMES A DAY
Refills: 0 | Status: DISCONTINUED | OUTPATIENT
Start: 2025-09-01 | End: 2025-09-05

## 2025-09-01 RX ORDER — LIDOCAINE HYDROCHLORIDE 20 MG/ML
1 JELLY TOPICAL ONCE
Refills: 0 | Status: COMPLETED | OUTPATIENT
Start: 2025-09-01 | End: 2025-09-01

## 2025-09-01 RX ADMIN — HEPARIN SODIUM 5000 UNIT(S): 1000 INJECTION INTRAVENOUS; SUBCUTANEOUS at 06:49

## 2025-09-01 RX ADMIN — Medication 1 DOSE(S): at 06:50

## 2025-09-01 RX ADMIN — Medication 81 MILLIGRAM(S): at 12:14

## 2025-09-01 RX ADMIN — LIDOCAINE HYDROCHLORIDE 1 PATCH: 20 JELLY TOPICAL at 19:30

## 2025-09-01 RX ADMIN — Medication 650 MILLIGRAM(S): at 21:25

## 2025-09-01 RX ADMIN — HEPARIN SODIUM 5000 UNIT(S): 1000 INJECTION INTRAVENOUS; SUBCUTANEOUS at 17:14

## 2025-09-01 RX ADMIN — SILDENAFIL 20 MILLIGRAM(S): 50 TABLET, FILM COATED ORAL at 06:50

## 2025-09-01 RX ADMIN — AMLODIPINE BESYLATE 2.5 MILLIGRAM(S): 10 TABLET ORAL at 06:50

## 2025-09-01 RX ADMIN — SILDENAFIL 20 MILLIGRAM(S): 50 TABLET, FILM COATED ORAL at 21:25

## 2025-09-01 RX ADMIN — SILDENAFIL 20 MILLIGRAM(S): 50 TABLET, FILM COATED ORAL at 13:26

## 2025-09-01 RX ADMIN — PREDNISONE 7.5 MILLIGRAM(S): 20 TABLET ORAL at 06:50

## 2025-09-01 RX ADMIN — Medication 1 DOSE(S): at 17:15

## 2025-09-01 RX ADMIN — LIDOCAINE HYDROCHLORIDE 1 PATCH: 20 JELLY TOPICAL at 17:14

## 2025-09-02 LAB
ACTH SER-ACNC: 4.8 PG/ML — LOW (ref 7.2–63.3)
ADD ON TEST-SPECIMEN IN LAB: SIGNIFICANT CHANGE UP
CORTIS AM PEAK SERPL-MCNC: 2.4 UG/DL — LOW (ref 6–18.4)
PROLACTIN SERPL-MCNC: 20.4 NG/ML — HIGH (ref 4.1–18.4)
TSH SERPL-MCNC: 3.81 UIU/ML — SIGNIFICANT CHANGE UP (ref 0.27–4.2)

## 2025-09-02 PROCEDURE — 70547 MR ANGIOGRAPHY NECK W/O DYE: CPT | Mod: 26

## 2025-09-02 PROCEDURE — 70551 MRI BRAIN STEM W/O DYE: CPT

## 2025-09-02 PROCEDURE — 36415 COLL VENOUS BLD VENIPUNCTURE: CPT

## 2025-09-02 PROCEDURE — 82803 BLOOD GASES ANY COMBINATION: CPT

## 2025-09-02 PROCEDURE — 84484 ASSAY OF TROPONIN QUANT: CPT

## 2025-09-02 PROCEDURE — 97162 PT EVAL MOD COMPLEX 30 MIN: CPT

## 2025-09-02 PROCEDURE — 85025 COMPLETE CBC W/AUTO DIFF WBC: CPT

## 2025-09-02 PROCEDURE — 85018 HEMOGLOBIN: CPT

## 2025-09-02 PROCEDURE — 70544 MR ANGIOGRAPHY HEAD W/O DYE: CPT | Mod: 26,XU

## 2025-09-02 PROCEDURE — 80048 BASIC METABOLIC PNL TOTAL CA: CPT

## 2025-09-02 PROCEDURE — 83036 HEMOGLOBIN GLYCOSYLATED A1C: CPT

## 2025-09-02 PROCEDURE — 84132 ASSAY OF SERUM POTASSIUM: CPT

## 2025-09-02 PROCEDURE — 82330 ASSAY OF CALCIUM: CPT

## 2025-09-02 PROCEDURE — 82435 ASSAY OF BLOOD CHLORIDE: CPT

## 2025-09-02 PROCEDURE — 83690 ASSAY OF LIPASE: CPT

## 2025-09-02 PROCEDURE — 82947 ASSAY GLUCOSE BLOOD QUANT: CPT

## 2025-09-02 PROCEDURE — 74177 CT ABD & PELVIS W/CONTRAST: CPT

## 2025-09-02 PROCEDURE — 83520 IMMUNOASSAY QUANT NOS NONAB: CPT

## 2025-09-02 PROCEDURE — 70544 MR ANGIOGRAPHY HEAD W/O DYE: CPT

## 2025-09-02 PROCEDURE — 82024 ASSAY OF ACTH: CPT

## 2025-09-02 PROCEDURE — 84100 ASSAY OF PHOSPHORUS: CPT

## 2025-09-02 PROCEDURE — 70547 MR ANGIOGRAPHY NECK W/O DYE: CPT

## 2025-09-02 PROCEDURE — 80061 LIPID PANEL: CPT

## 2025-09-02 PROCEDURE — 83605 ASSAY OF LACTIC ACID: CPT

## 2025-09-02 PROCEDURE — 70496 CT ANGIOGRAPHY HEAD: CPT

## 2025-09-02 PROCEDURE — 84146 ASSAY OF PROLACTIN: CPT

## 2025-09-02 PROCEDURE — 70551 MRI BRAIN STEM W/O DYE: CPT | Mod: 26

## 2025-09-02 PROCEDURE — 84295 ASSAY OF SERUM SODIUM: CPT

## 2025-09-02 PROCEDURE — 70498 CT ANGIOGRAPHY NECK: CPT

## 2025-09-02 PROCEDURE — 84443 ASSAY THYROID STIM HORMONE: CPT

## 2025-09-02 PROCEDURE — 70450 CT HEAD/BRAIN W/O DYE: CPT

## 2025-09-02 PROCEDURE — 83735 ASSAY OF MAGNESIUM: CPT

## 2025-09-02 PROCEDURE — 71046 X-RAY EXAM CHEST 2 VIEWS: CPT | Mod: 26

## 2025-09-02 PROCEDURE — 85014 HEMATOCRIT: CPT

## 2025-09-02 PROCEDURE — 82533 TOTAL CORTISOL: CPT

## 2025-09-02 PROCEDURE — 80053 COMPREHEN METABOLIC PANEL: CPT

## 2025-09-02 PROCEDURE — 71046 X-RAY EXAM CHEST 2 VIEWS: CPT

## 2025-09-02 PROCEDURE — 94640 AIRWAY INHALATION TREATMENT: CPT

## 2025-09-02 PROCEDURE — 71045 X-RAY EXAM CHEST 1 VIEW: CPT

## 2025-09-02 PROCEDURE — 83880 ASSAY OF NATRIURETIC PEPTIDE: CPT

## 2025-09-02 RX ORDER — MECLIZINE HCL 12.5 MG
25 TABLET ORAL EVERY 8 HOURS
Refills: 0 | Status: DISCONTINUED | OUTPATIENT
Start: 2025-09-02 | End: 2025-09-05

## 2025-09-02 RX ORDER — MELATONIN 5 MG
3 TABLET ORAL ONCE
Refills: 0 | Status: COMPLETED | OUTPATIENT
Start: 2025-09-02 | End: 2025-09-02

## 2025-09-02 RX ADMIN — HEPARIN SODIUM 5000 UNIT(S): 1000 INJECTION INTRAVENOUS; SUBCUTANEOUS at 17:27

## 2025-09-02 RX ADMIN — SILDENAFIL 20 MILLIGRAM(S): 50 TABLET, FILM COATED ORAL at 21:56

## 2025-09-02 RX ADMIN — SILDENAFIL 20 MILLIGRAM(S): 50 TABLET, FILM COATED ORAL at 05:00

## 2025-09-02 RX ADMIN — Medication 1 DOSE(S): at 17:27

## 2025-09-02 RX ADMIN — Medication 81 MILLIGRAM(S): at 11:11

## 2025-09-02 RX ADMIN — AMLODIPINE BESYLATE 2.5 MILLIGRAM(S): 10 TABLET ORAL at 05:00

## 2025-09-02 RX ADMIN — HEPARIN SODIUM 5000 UNIT(S): 1000 INJECTION INTRAVENOUS; SUBCUTANEOUS at 05:00

## 2025-09-02 RX ADMIN — SILDENAFIL 20 MILLIGRAM(S): 50 TABLET, FILM COATED ORAL at 14:02

## 2025-09-02 RX ADMIN — Medication 1 DOSE(S): at 05:00

## 2025-09-02 RX ADMIN — PREDNISONE 7.5 MILLIGRAM(S): 20 TABLET ORAL at 05:00

## 2025-09-02 RX ADMIN — Medication 3 MILLIGRAM(S): at 21:56

## 2025-09-03 ENCOUNTER — RESULT REVIEW (OUTPATIENT)
Age: 65
End: 2025-09-03

## 2025-09-03 DIAGNOSIS — Z79.52 LONG TERM (CURRENT) USE OF SYSTEMIC STEROIDS: ICD-10-CM

## 2025-09-03 DIAGNOSIS — R79.89 OTHER SPECIFIED ABNORMAL FINDINGS OF BLOOD CHEMISTRY: ICD-10-CM

## 2025-09-03 DIAGNOSIS — D35.2 BENIGN NEOPLASM OF PITUITARY GLAND: ICD-10-CM

## 2025-09-03 DIAGNOSIS — E29.1 TESTICULAR HYPOFUNCTION: ICD-10-CM

## 2025-09-03 LAB
ANION GAP SERPL CALC-SCNC: 15 MMOL/L — SIGNIFICANT CHANGE UP (ref 5–17)
BUN SERPL-MCNC: 26 MG/DL — HIGH (ref 7–23)
CALCIUM SERPL-MCNC: 9 MG/DL — SIGNIFICANT CHANGE UP (ref 8.4–10.5)
CHLORIDE SERPL-SCNC: 107 MMOL/L — SIGNIFICANT CHANGE UP (ref 96–108)
CO2 SERPL-SCNC: 16 MMOL/L — LOW (ref 22–31)
CREAT SERPL-MCNC: 1.36 MG/DL — HIGH (ref 0.5–1.3)
EGFR: 58 ML/MIN/1.73M2 — LOW
EGFR: 58 ML/MIN/1.73M2 — LOW
FSH SERPL-MCNC: 21.2 IU/L — HIGH (ref 1.5–12.4)
GLUCOSE SERPL-MCNC: 75 MG/DL — SIGNIFICANT CHANGE UP (ref 70–99)
LH SERPL-ACNC: 9.6 IU/L — SIGNIFICANT CHANGE UP
POTASSIUM SERPL-MCNC: 4.3 MMOL/L — SIGNIFICANT CHANGE UP (ref 3.5–5.3)
POTASSIUM SERPL-SCNC: 4.3 MMOL/L — SIGNIFICANT CHANGE UP (ref 3.5–5.3)
PROLACTIN SERPL-MCNC: 20.7 NG/ML — HIGH (ref 4.1–18.4)
PROLACTIN SERPL-MCNC: 21.5 NG/ML — HIGH (ref 4.1–18.4)
SODIUM SERPL-SCNC: 138 MMOL/L — SIGNIFICANT CHANGE UP (ref 135–145)
T4 FREE SERPL-MCNC: 0.8 NG/DL — LOW (ref 0.9–1.8)
TESTOST FREE SERPL-MCNC: 2.2 PG/ML — LOW (ref 5.9–27)
TESTOST FREE+TOTAL PANEL SERPL-MCNC: 285 NG/DL — LOW (ref 300–740)

## 2025-09-03 PROCEDURE — 93306 TTE W/DOPPLER COMPLETE: CPT | Mod: 26

## 2025-09-03 PROCEDURE — 99222 1ST HOSP IP/OBS MODERATE 55: CPT | Mod: GC

## 2025-09-03 PROCEDURE — 93356 MYOCRD STRAIN IMG SPCKL TRCK: CPT

## 2025-09-03 RX ORDER — ATORVASTATIN CALCIUM 80 MG/1
40 TABLET, FILM COATED ORAL AT BEDTIME
Refills: 0 | Status: DISCONTINUED | OUTPATIENT
Start: 2025-09-03 | End: 2025-09-05

## 2025-09-03 RX ADMIN — SILDENAFIL 20 MILLIGRAM(S): 50 TABLET, FILM COATED ORAL at 06:47

## 2025-09-03 RX ADMIN — SILDENAFIL 20 MILLIGRAM(S): 50 TABLET, FILM COATED ORAL at 13:10

## 2025-09-03 RX ADMIN — ATORVASTATIN CALCIUM 40 MILLIGRAM(S): 80 TABLET, FILM COATED ORAL at 21:10

## 2025-09-03 RX ADMIN — Medication 1 DOSE(S): at 17:01

## 2025-09-03 RX ADMIN — SILDENAFIL 20 MILLIGRAM(S): 50 TABLET, FILM COATED ORAL at 21:10

## 2025-09-03 RX ADMIN — Medication 81 MILLIGRAM(S): at 13:10

## 2025-09-03 RX ADMIN — Medication 1 DOSE(S): at 06:48

## 2025-09-03 RX ADMIN — PREDNISONE 7.5 MILLIGRAM(S): 20 TABLET ORAL at 06:48

## 2025-09-03 RX ADMIN — HEPARIN SODIUM 5000 UNIT(S): 1000 INJECTION INTRAVENOUS; SUBCUTANEOUS at 06:47

## 2025-09-03 RX ADMIN — HEPARIN SODIUM 5000 UNIT(S): 1000 INJECTION INTRAVENOUS; SUBCUTANEOUS at 17:05

## 2025-09-03 RX ADMIN — AMLODIPINE BESYLATE 2.5 MILLIGRAM(S): 10 TABLET ORAL at 06:47

## 2025-09-04 DIAGNOSIS — R19.7 DIARRHEA, UNSPECIFIED: ICD-10-CM

## 2025-09-04 LAB
ANION GAP SERPL CALC-SCNC: 14 MMOL/L — SIGNIFICANT CHANGE UP (ref 5–17)
BUN SERPL-MCNC: 27 MG/DL — HIGH (ref 7–23)
CALCIUM SERPL-MCNC: 9.3 MG/DL — SIGNIFICANT CHANGE UP (ref 8.4–10.5)
CHLORIDE SERPL-SCNC: 104 MMOL/L — SIGNIFICANT CHANGE UP (ref 96–108)
CO2 SERPL-SCNC: 19 MMOL/L — LOW (ref 22–31)
CREAT SERPL-MCNC: 1.3 MG/DL — SIGNIFICANT CHANGE UP (ref 0.5–1.3)
EGFR: 61 ML/MIN/1.73M2 — SIGNIFICANT CHANGE UP
EGFR: 61 ML/MIN/1.73M2 — SIGNIFICANT CHANGE UP
GLUCOSE SERPL-MCNC: 81 MG/DL — SIGNIFICANT CHANGE UP (ref 70–99)
HCT VFR BLD CALC: 37.5 % — LOW (ref 39–50)
HGB BLD-MCNC: 11.3 G/DL — LOW (ref 13–17)
INSULIN-LIKE GROWTH FACTOR 1 INTERPRETATION: SIGNIFICANT CHANGE UP
INSULIN-LIKE GROWTH FACTOR 1: 60 NG/ML — SIGNIFICANT CHANGE UP (ref 37–236)
MAGNESIUM SERPL-MCNC: 2 MG/DL — SIGNIFICANT CHANGE UP (ref 1.6–2.6)
MCHC RBC-ENTMCNC: 29.1 PG — SIGNIFICANT CHANGE UP (ref 27–34)
MCHC RBC-ENTMCNC: 30.1 G/DL — LOW (ref 32–36)
MCV RBC AUTO: 96.6 FL — SIGNIFICANT CHANGE UP (ref 80–100)
NRBC # BLD AUTO: 0 K/UL — SIGNIFICANT CHANGE UP (ref 0–0)
NRBC # FLD: 0 K/UL — SIGNIFICANT CHANGE UP (ref 0–0)
NRBC BLD AUTO-RTO: 0 /100 WBCS — SIGNIFICANT CHANGE UP (ref 0–0)
PHOSPHATE SERPL-MCNC: 2.6 MG/DL — SIGNIFICANT CHANGE UP (ref 2.5–4.5)
PLATELET # BLD AUTO: 246 K/UL — SIGNIFICANT CHANGE UP (ref 150–400)
PMV BLD: 9.9 FL — SIGNIFICANT CHANGE UP (ref 7–13)
POTASSIUM SERPL-MCNC: 4.5 MMOL/L — SIGNIFICANT CHANGE UP (ref 3.5–5.3)
POTASSIUM SERPL-SCNC: 4.5 MMOL/L — SIGNIFICANT CHANGE UP (ref 3.5–5.3)
RBC # BLD: 3.88 M/UL — LOW (ref 4.2–5.8)
RBC # FLD: 16.7 % — HIGH (ref 10.3–14.5)
SODIUM SERPL-SCNC: 137 MMOL/L — SIGNIFICANT CHANGE UP (ref 135–145)
WBC # BLD: 10.66 K/UL — HIGH (ref 3.8–10.5)
WBC # FLD AUTO: 10.66 K/UL — HIGH (ref 3.8–10.5)

## 2025-09-04 PROCEDURE — 93287 PERI-PX DEVICE EVAL & PRGR: CPT | Mod: 26,76

## 2025-09-04 PROCEDURE — 99232 SBSQ HOSP IP/OBS MODERATE 35: CPT | Mod: GC

## 2025-09-04 PROCEDURE — 70553 MRI BRAIN STEM W/O & W/DYE: CPT | Mod: 26,76

## 2025-09-04 RX ADMIN — SILDENAFIL 20 MILLIGRAM(S): 50 TABLET, FILM COATED ORAL at 21:14

## 2025-09-04 RX ADMIN — SILDENAFIL 20 MILLIGRAM(S): 50 TABLET, FILM COATED ORAL at 05:25

## 2025-09-04 RX ADMIN — AMLODIPINE BESYLATE 2.5 MILLIGRAM(S): 10 TABLET ORAL at 05:25

## 2025-09-04 RX ADMIN — Medication 1 DOSE(S): at 19:09

## 2025-09-04 RX ADMIN — Medication 1 DOSE(S): at 05:25

## 2025-09-04 RX ADMIN — PREDNISONE 7.5 MILLIGRAM(S): 20 TABLET ORAL at 06:15

## 2025-09-04 RX ADMIN — Medication 81 MILLIGRAM(S): at 11:22

## 2025-09-04 RX ADMIN — ATORVASTATIN CALCIUM 40 MILLIGRAM(S): 80 TABLET, FILM COATED ORAL at 21:15

## 2025-09-05 ENCOUNTER — TRANSCRIPTION ENCOUNTER (OUTPATIENT)
Age: 65
End: 2025-09-05

## 2025-09-05 VITALS
HEART RATE: 91 BPM | DIASTOLIC BLOOD PRESSURE: 72 MMHG | OXYGEN SATURATION: 97 % | SYSTOLIC BLOOD PRESSURE: 120 MMHG | TEMPERATURE: 98 F | RESPIRATION RATE: 18 BRPM

## 2025-09-05 DIAGNOSIS — D35.2 BENIGN NEOPLASM OF PITUITARY GLAND: ICD-10-CM

## 2025-09-05 PROCEDURE — 84484 ASSAY OF TROPONIN QUANT: CPT

## 2025-09-05 PROCEDURE — 82533 TOTAL CORTISOL: CPT

## 2025-09-05 PROCEDURE — 83520 IMMUNOASSAY QUANT NOS NONAB: CPT

## 2025-09-05 PROCEDURE — 83036 HEMOGLOBIN GLYCOSYLATED A1C: CPT

## 2025-09-05 PROCEDURE — 84443 ASSAY THYROID STIM HORMONE: CPT

## 2025-09-05 PROCEDURE — 82024 ASSAY OF ACTH: CPT

## 2025-09-05 PROCEDURE — 85018 HEMOGLOBIN: CPT

## 2025-09-05 PROCEDURE — 84100 ASSAY OF PHOSPHORUS: CPT

## 2025-09-05 PROCEDURE — 83880 ASSAY OF NATRIURETIC PEPTIDE: CPT

## 2025-09-05 PROCEDURE — 84402 ASSAY OF FREE TESTOSTERONE: CPT

## 2025-09-05 PROCEDURE — 70544 MR ANGIOGRAPHY HEAD W/O DYE: CPT

## 2025-09-05 PROCEDURE — 82947 ASSAY GLUCOSE BLOOD QUANT: CPT

## 2025-09-05 PROCEDURE — 82803 BLOOD GASES ANY COMBINATION: CPT

## 2025-09-05 PROCEDURE — 93356 MYOCRD STRAIN IMG SPCKL TRCK: CPT

## 2025-09-05 PROCEDURE — 85014 HEMATOCRIT: CPT

## 2025-09-05 PROCEDURE — 84403 ASSAY OF TOTAL TESTOSTERONE: CPT

## 2025-09-05 PROCEDURE — 70553 MRI BRAIN STEM W/O & W/DYE: CPT

## 2025-09-05 PROCEDURE — 82330 ASSAY OF CALCIUM: CPT

## 2025-09-05 PROCEDURE — 82435 ASSAY OF BLOOD CHLORIDE: CPT

## 2025-09-05 PROCEDURE — 70498 CT ANGIOGRAPHY NECK: CPT

## 2025-09-05 PROCEDURE — 83002 ASSAY OF GONADOTROPIN (LH): CPT

## 2025-09-05 PROCEDURE — 99285 EMERGENCY DEPT VISIT HI MDM: CPT

## 2025-09-05 PROCEDURE — A9585: CPT

## 2025-09-05 PROCEDURE — 71046 X-RAY EXAM CHEST 2 VIEWS: CPT

## 2025-09-05 PROCEDURE — 84146 ASSAY OF PROLACTIN: CPT

## 2025-09-05 PROCEDURE — 84305 ASSAY OF SOMATOMEDIN: CPT

## 2025-09-05 PROCEDURE — 84132 ASSAY OF SERUM POTASSIUM: CPT

## 2025-09-05 PROCEDURE — 93005 ELECTROCARDIOGRAM TRACING: CPT

## 2025-09-05 PROCEDURE — 97116 GAIT TRAINING THERAPY: CPT

## 2025-09-05 PROCEDURE — 80048 BASIC METABOLIC PNL TOTAL CA: CPT

## 2025-09-05 PROCEDURE — 74177 CT ABD & PELVIS W/CONTRAST: CPT

## 2025-09-05 PROCEDURE — 84295 ASSAY OF SERUM SODIUM: CPT

## 2025-09-05 PROCEDURE — 83001 ASSAY OF GONADOTROPIN (FSH): CPT

## 2025-09-05 PROCEDURE — 70450 CT HEAD/BRAIN W/O DYE: CPT

## 2025-09-05 PROCEDURE — 83690 ASSAY OF LIPASE: CPT

## 2025-09-05 PROCEDURE — 71045 X-RAY EXAM CHEST 1 VIEW: CPT

## 2025-09-05 PROCEDURE — 70496 CT ANGIOGRAPHY HEAD: CPT

## 2025-09-05 PROCEDURE — 36415 COLL VENOUS BLD VENIPUNCTURE: CPT

## 2025-09-05 PROCEDURE — 83605 ASSAY OF LACTIC ACID: CPT

## 2025-09-05 PROCEDURE — 85025 COMPLETE CBC W/AUTO DIFF WBC: CPT

## 2025-09-05 PROCEDURE — 70547 MR ANGIOGRAPHY NECK W/O DYE: CPT

## 2025-09-05 PROCEDURE — 93306 TTE W/DOPPLER COMPLETE: CPT

## 2025-09-05 PROCEDURE — 70551 MRI BRAIN STEM W/O DYE: CPT

## 2025-09-05 PROCEDURE — 94640 AIRWAY INHALATION TREATMENT: CPT

## 2025-09-05 PROCEDURE — 80061 LIPID PANEL: CPT

## 2025-09-05 PROCEDURE — 83735 ASSAY OF MAGNESIUM: CPT

## 2025-09-05 PROCEDURE — 84439 ASSAY OF FREE THYROXINE: CPT

## 2025-09-05 PROCEDURE — 85027 COMPLETE CBC AUTOMATED: CPT

## 2025-09-05 PROCEDURE — 97162 PT EVAL MOD COMPLEX 30 MIN: CPT

## 2025-09-05 PROCEDURE — 80053 COMPREHEN METABOLIC PANEL: CPT

## 2025-09-05 RX ORDER — ATORVASTATIN CALCIUM 80 MG/1
1 TABLET, FILM COATED ORAL
Qty: 30 | Refills: 0
Start: 2025-09-05 | End: 2025-10-04

## 2025-09-05 RX ORDER — BUMETANIDE 1 MG/1
1 TABLET ORAL
Refills: 0 | Status: CANCELLED | OUTPATIENT
Start: 2025-09-08 | End: 2025-09-05

## 2025-09-05 RX ORDER — FUROSEMIDE 10 MG/ML
40 INJECTION INTRAMUSCULAR; INTRAVENOUS ONCE
Refills: 0 | Status: COMPLETED | OUTPATIENT
Start: 2025-09-05 | End: 2025-09-05

## 2025-09-05 RX ORDER — ASPIRIN 325 MG
1 TABLET ORAL
Qty: 30 | Refills: 0
Start: 2025-09-05 | End: 2025-10-04

## 2025-09-05 RX ORDER — MECLIZINE HCL 12.5 MG
1 TABLET ORAL
Qty: 60 | Refills: 0
Start: 2025-09-05 | End: 2025-09-24

## 2025-09-05 RX ADMIN — SILDENAFIL 20 MILLIGRAM(S): 50 TABLET, FILM COATED ORAL at 15:35

## 2025-09-05 RX ADMIN — AMLODIPINE BESYLATE 2.5 MILLIGRAM(S): 10 TABLET ORAL at 06:58

## 2025-09-05 RX ADMIN — PREDNISONE 7.5 MILLIGRAM(S): 20 TABLET ORAL at 06:58

## 2025-09-05 RX ADMIN — Medication 1 DOSE(S): at 07:04

## 2025-09-05 RX ADMIN — SILDENAFIL 20 MILLIGRAM(S): 50 TABLET, FILM COATED ORAL at 06:58

## 2025-09-05 RX ADMIN — Medication 81 MILLIGRAM(S): at 12:14

## 2025-09-05 RX ADMIN — FUROSEMIDE 40 MILLIGRAM(S): 10 INJECTION INTRAMUSCULAR; INTRAVENOUS at 12:14

## 2025-09-09 ENCOUNTER — APPOINTMENT (OUTPATIENT)
Dept: NEUROSURGERY | Facility: CLINIC | Age: 65
End: 2025-09-09

## 2025-09-10 LAB — GHBP SERPL-SCNC: 19 NG/ML — SIGNIFICANT CHANGE UP

## 2025-09-11 ENCOUNTER — APPOINTMENT (OUTPATIENT)
Dept: HEMATOLOGY ONCOLOGY | Facility: CLINIC | Age: 65
End: 2025-09-11

## 2025-09-12 ENCOUNTER — RESULT REVIEW (OUTPATIENT)
Age: 65
End: 2025-09-12

## 2025-09-12 ENCOUNTER — APPOINTMENT (OUTPATIENT)
Dept: HEMATOLOGY ONCOLOGY | Facility: CLINIC | Age: 65
End: 2025-09-12
Payer: MEDICARE

## 2025-09-12 ENCOUNTER — NON-APPOINTMENT (OUTPATIENT)
Age: 65
End: 2025-09-12

## 2025-09-12 VITALS
TEMPERATURE: 98.6 F | BODY MASS INDEX: 21.17 KG/M2 | HEART RATE: 124 BPM | DIASTOLIC BLOOD PRESSURE: 93 MMHG | RESPIRATION RATE: 16 BRPM | OXYGEN SATURATION: 99 % | WEIGHT: 131.15 LBS | SYSTOLIC BLOOD PRESSURE: 137 MMHG

## 2025-09-12 DIAGNOSIS — I50.20 UNSPECIFIED SYSTOLIC (CONGESTIVE) HEART FAILURE: ICD-10-CM

## 2025-09-12 DIAGNOSIS — R06.09 OTHER FORMS OF DYSPNEA: ICD-10-CM

## 2025-09-12 DIAGNOSIS — R76.8 OTHER SPECIFIED ABNORMAL IMMUNOLOGICAL FINDINGS IN SERUM: ICD-10-CM

## 2025-09-12 DIAGNOSIS — Z01.812 ENCOUNTER FOR PREPROCEDURAL LABORATORY EXAMINATION: ICD-10-CM

## 2025-09-12 DIAGNOSIS — D47.2 MONOCLONAL GAMMOPATHY: ICD-10-CM

## 2025-09-12 DIAGNOSIS — R59.0 LOCALIZED ENLARGED LYMPH NODES: ICD-10-CM

## 2025-09-12 DIAGNOSIS — Z11.52 ENCOUNTER FOR PREPROCEDURAL LABORATORY EXAMINATION: ICD-10-CM

## 2025-09-12 PROCEDURE — 99204 OFFICE O/P NEW MOD 45 MIN: CPT

## 2025-09-14 LAB
ALBUMIN SERPL ELPH-MCNC: 4.3 G/DL
ALP BLD-CCNC: 99 U/L
ALT SERPL-CCNC: 31 U/L
ANION GAP SERPL CALC-SCNC: 17 MMOL/L
AST SERPL-CCNC: 33 U/L
BILIRUB SERPL-MCNC: 0.3 MG/DL
BUN SERPL-MCNC: 29 MG/DL
CALCIUM SERPL-MCNC: 9.4 MG/DL
CHLORIDE SERPL-SCNC: 106 MMOL/L
CO2 SERPL-SCNC: 16 MMOL/L
CREAT SERPL-MCNC: 2.02 MG/DL
EGFRCR SERPLBLD CKD-EPI 2021: 36 ML/MIN/1.73M2
GLUCOSE SERPL-MCNC: 92 MG/DL
LDH SERPL-CCNC: 261 U/L
POTASSIUM SERPL-SCNC: 5.2 MMOL/L
PROT SERPL-MCNC: 6.9 G/DL
SODIUM SERPL-SCNC: 139 MMOL/L

## 2025-09-16 ENCOUNTER — APPOINTMENT (OUTPATIENT)
Age: 65
End: 2025-09-16
Payer: MEDICARE

## 2025-09-16 ENCOUNTER — NON-APPOINTMENT (OUTPATIENT)
Age: 65
End: 2025-09-16

## 2025-09-16 LAB
ALBUMIN MFR SERPL ELPH: 56.7 %
ALBUMIN SERPL-MCNC: 3.9 G/DL
ALBUMIN/GLOB SERPL: 1.3 RATIO
ALPHA1 GLOB MFR SERPL ELPH: 5.3 %
ALPHA1 GLOB SERPL ELPH-MCNC: 0.4 G/DL
ALPHA2 GLOB MFR SERPL ELPH: 9 %
ALPHA2 GLOB SERPL ELPH-MCNC: 0.6 G/DL
B-GLOBULIN MFR SERPL ELPH: 13 %
B-GLOBULIN SERPL ELPH-MCNC: 0.9 G/DL
DEPRECATED KAPPA LC FREE/LAMBDA SER: 1.81 RATIO
GAMMA GLOB FLD ELPH-MCNC: 1.1 G/DL
GAMMA GLOB MFR SERPL ELPH: 16 %
IGA SERPL-MCNC: 260 MG/DL
IGG SERPL-MCNC: 1183 MG/DL
IGM SERPL-MCNC: 59 MG/DL
INTERPRETATION SERPL IEP-IMP: NORMAL
KAPPA LC CSF-MCNC: 2.62 MG/DL
KAPPA LC SERPL-MCNC: 4.75 MG/DL
M PROTEIN MFR SERPL ELPH: 6.4 %
M PROTEIN SPEC IFE-MCNC: NORMAL
MONOCLON BAND OBS SERPL: 0.4 G/DL
PROT SERPL-MCNC: 6.9 G/DL
PROT SERPL-MCNC: 6.9 G/DL

## 2025-09-16 PROCEDURE — 92004 COMPRE OPH EXAM NEW PT 1/>: CPT

## 2025-09-16 PROCEDURE — 92202 OPSCPY EXTND ON/MAC DRAW: CPT

## 2025-09-16 PROCEDURE — 92134 CPTRZ OPH DX IMG PST SGM RTA: CPT

## (undated) DEVICE — ELCTR BOVIE TIP BLADE INSULATED 6.5" EDGE

## (undated) DEVICE — FOLEY CATH 2-WAY 18FR 5CC SILICONE

## (undated) DEVICE — Device

## (undated) DEVICE — FOLEY TRAY 16FR 5CC LTX UMETER CLOSED

## (undated) DEVICE — SOL ANTI FOG

## (undated) DEVICE — SP KIT LAP ENTRYGUIDE STND L100X25MM

## (undated) DEVICE — SYR LUER LOK 10CC

## (undated) DEVICE — SP MARYLAND BIPOLAR FORCEP 6MM

## (undated) DEVICE — SUT VLOC 90 3-0 6" CV-23 UNDYED

## (undated) DEVICE — VENODYNE/SCD SLEEVE CALF MEDIUM

## (undated) DEVICE — SP SHEATH

## (undated) DEVICE — NDL ASPIRATION 21G

## (undated) DEVICE — DRAPE MAYO STAND 30"

## (undated) DEVICE — GOWN TRIMAX LG

## (undated) DEVICE — SUT POLYSORB 2-0 30" V-20 UNDYED

## (undated) DEVICE — GLV 7.5 PROTEXIS (WHITE)

## (undated) DEVICE — SP NEEDLE DRIVER 6MM

## (undated) DEVICE — WARMING BLANKET LOWER ADULT

## (undated) DEVICE — SUT VLOC 180 2-0 6" GS-22 GREEN

## (undated) DEVICE — TRAP SPECIMEN SPUTUM 40CC

## (undated) DEVICE — MARKING PEN W RULER

## (undated) DEVICE — NDL COUNTER FOAM AND MAGNET 40-70

## (undated) DEVICE — SYR CATH TIP 2 OZ

## (undated) DEVICE — SP COVER CAMERA SHEATH

## (undated) DEVICE — SOL IRR POUR NS 0.9% 500ML

## (undated) DEVICE — SUT VICRYL 2-0 27" SH UNDYED

## (undated) DEVICE — DRSG MASTISOL

## (undated) DEVICE — VENODYNE/SCD SLEEVE CALF LARGE

## (undated) DEVICE — GOWN XL

## (undated) DEVICE — VISITEC 4X4

## (undated) DEVICE — DRSG TEGADERM 6"X8"

## (undated) DEVICE — DRSG OPSITE 13.75 X 4"

## (undated) DEVICE — TUBING SUCTION 20FT

## (undated) DEVICE — SUT VICRYL 2-0 36" CT-1 UNDYED

## (undated) DEVICE — PACK ROBOTIC LIJ

## (undated) DEVICE — SPECIMEN CONTAINER 100ML

## (undated) DEVICE — PACK PERI GYN

## (undated) DEVICE — ELCTR BOVIE PENCIL HANDPIECE

## (undated) DEVICE — GOWN XXXL

## (undated) DEVICE — GLV 6.5 PROTEXIS (WHITE)

## (undated) DEVICE — TUBING STRYKEFLOW II SUCTION / IRRIGATOR

## (undated) DEVICE — SUT VLOC 90 3-0 6" CV-23 VIOLET

## (undated) DEVICE — ELCTR BOVIE TIP BLADE INSULATED 2.75" EDGE

## (undated) DEVICE — SUT POLYSORB 0 60" TIES UNDYED

## (undated) DEVICE — BLADE SCALPEL SAFETYLOCK #15

## (undated) DEVICE — DRAPE TOWEL BLUE 17" X 24"

## (undated) DEVICE — PREP CHLORAPREP HI-LITE ORANGE 26ML

## (undated) DEVICE — DRAIN RESERVOIR FOR JACKSON PRATT 100CC CARDINAL

## (undated) DEVICE — SUT MONOCRYL 4-0 27" PS-2 UNDYED

## (undated) DEVICE — SP MONOPOLAR SCISSOR CURVED 6MM

## (undated) DEVICE — PACK GENERAL MINOR

## (undated) DEVICE — MEDICATION LABELS W MARKER

## (undated) DEVICE — ADAPTER BIOPSY VALVE

## (undated) DEVICE — DRAPE INSTRUMENT POUCH 6.75" X 11"

## (undated) DEVICE — GLV 8 PROTEXIS (WHITE)

## (undated) DEVICE — SOL IRR BAG H2O 3000ML

## (undated) DEVICE — TROCAR SURGIQUEST AIRSEAL 12MMX100MM

## (undated) DEVICE — BLADE SCALPEL SAFETYLOCK #11

## (undated) DEVICE — SP CLIP APPLIER MEDIUM-LARGE 6MM

## (undated) DEVICE — SOL IRR POUR H2O 250ML

## (undated) DEVICE — DRSG TELFA 3 X 8

## (undated) DEVICE — SP DRAPE ARM

## (undated) DEVICE — FOLEY CATH 2-WAY 20FR 5CC SILASTIC

## (undated) DEVICE — SUCTION YANKAUER NO CONTROL VENT

## (undated) DEVICE — DRSG TEGADERM 2.5X3"

## (undated) DEVICE — GLV 8.5 PROTEXIS (WHITE)

## (undated) DEVICE — PRESSURE INFUSOR BAG 1000ML

## (undated) DEVICE — SUT VICRYL 1 36" CT-1 UNDYED

## (undated) DEVICE — TUBING AIRSEAL TRI-LUMEN FILTERED

## (undated) DEVICE — SUT POLYSORB 4-0 18" P-12 UNDYED

## (undated) DEVICE — SYR ASEPTO

## (undated) DEVICE — DRAPE LIGHT HANDLE COVER (BLUE)

## (undated) DEVICE — SUT SILK 2-0 18" FS

## (undated) DEVICE — ELCTR CORD FOOTSWITCH 1PLR LAPSCP 10FT

## (undated) DEVICE — POSITIONER PINK PAD PIGAZZI SYSTEM

## (undated) DEVICE — POSITIONER PURPLE ARM ONE STEP (LARGE)

## (undated) DEVICE — BAG URINE W METER 2L

## (undated) DEVICE — PREP BETADINE SPONGE STICKS

## (undated) DEVICE — FOLEY HOLDER STATLOCK 2 WAY ADULT

## (undated) DEVICE — POSITIONER FOAM HEAD CRADLE (PINK)

## (undated) DEVICE — GLV 7.5 PROTEXIS (CREAM) MICRO

## (undated) DEVICE — LUBRICATING JELLY ONESHOT 1.25OZ

## (undated) DEVICE — ELCTR GROUNDING PAD ADULT COVIDIEN

## (undated) DEVICE — TROCAR SURGIQUEST AIRSEAL 8MMX100MM

## (undated) DEVICE — SOL INJ NS 0.9% 1000ML

## (undated) DEVICE — BLADE SCALPEL SAFETYLOCK #10

## (undated) DEVICE — NDL HYPO SAFE 22G X 1.5" (BLACK)

## (undated) DEVICE — DRSG STERISTRIPS 0.5 X 4"

## (undated) DEVICE — GLV 7 PROTEXIS (WHITE)

## (undated) DEVICE — WARMING BLANKET UPPER ADULT

## (undated) DEVICE — DRAPE 3/4 SHEET W REINFORCEMENT 56X77"

## (undated) DEVICE — DRAPE 1/2 SHEET 40X57"

## (undated) DEVICE — POSITIONER FOAM EGG CRATE ULNAR 2PCS (PINK)